# Patient Record
Sex: MALE | Race: WHITE | NOT HISPANIC OR LATINO | Employment: OTHER | URBAN - NONMETROPOLITAN AREA
[De-identification: names, ages, dates, MRNs, and addresses within clinical notes are randomized per-mention and may not be internally consistent; named-entity substitution may affect disease eponyms.]

---

## 2017-05-19 ENCOUNTER — OFFICE VISIT (OUTPATIENT)
Dept: FAMILY MEDICINE CLINIC | Facility: CLINIC | Age: 57
End: 2017-05-19

## 2017-05-19 VITALS
WEIGHT: 213 LBS | OXYGEN SATURATION: 98 % | HEART RATE: 83 BPM | BODY MASS INDEX: 30.49 KG/M2 | HEIGHT: 70 IN | SYSTOLIC BLOOD PRESSURE: 120 MMHG | DIASTOLIC BLOOD PRESSURE: 81 MMHG

## 2017-05-19 DIAGNOSIS — Z87.39 HISTORY OF DEGENERATIVE DISC DISEASE: Primary | ICD-10-CM

## 2017-05-19 DIAGNOSIS — G89.29 CHRONIC LOW BACK PAIN, UNSPECIFIED BACK PAIN LATERALITY, WITH SCIATICA PRESENCE UNSPECIFIED: ICD-10-CM

## 2017-05-19 DIAGNOSIS — I10 ESSENTIAL HYPERTENSION: ICD-10-CM

## 2017-05-19 DIAGNOSIS — G56.01 CARPAL TUNNEL SYNDROME OF RIGHT WRIST: ICD-10-CM

## 2017-05-19 DIAGNOSIS — M54.5 CHRONIC LOW BACK PAIN, UNSPECIFIED BACK PAIN LATERALITY, WITH SCIATICA PRESENCE UNSPECIFIED: ICD-10-CM

## 2017-05-19 PROCEDURE — 99214 OFFICE O/P EST MOD 30 MIN: CPT | Performed by: NURSE PRACTITIONER

## 2017-05-19 RX ORDER — NABUMETONE 500 MG/1
500 TABLET, FILM COATED ORAL 2 TIMES DAILY
Qty: 60 TABLET | Refills: 5 | Status: SHIPPED | OUTPATIENT
Start: 2017-05-19 | End: 2017-10-19 | Stop reason: SDUPTHER

## 2017-05-19 RX ORDER — METHOCARBAMOL 750 MG/1
TABLET, FILM COATED ORAL
Qty: 120 TABLET | Refills: 5 | Status: SHIPPED | OUTPATIENT
Start: 2017-05-19 | End: 2017-10-19

## 2017-05-19 RX ORDER — GABAPENTIN 800 MG/1
1200 TABLET ORAL 3 TIMES DAILY
Qty: 135 TABLET | Refills: 5 | Status: SHIPPED | OUTPATIENT
Start: 2017-05-19 | End: 2017-10-19 | Stop reason: SDUPTHER

## 2017-05-19 RX ORDER — ATENOLOL 50 MG/1
50 TABLET ORAL DAILY
Qty: 30 TABLET | Refills: 5 | Status: SHIPPED | OUTPATIENT
Start: 2017-05-19 | End: 2017-10-19 | Stop reason: SDUPTHER

## 2017-05-19 RX ORDER — LISINOPRIL 40 MG/1
40 TABLET ORAL DAILY
Qty: 30 TABLET | Refills: 5 | Status: SHIPPED | OUTPATIENT
Start: 2017-05-19 | End: 2017-10-19 | Stop reason: SDUPTHER

## 2017-07-19 DIAGNOSIS — I10 ESSENTIAL HYPERTENSION: ICD-10-CM

## 2017-10-19 ENCOUNTER — TELEPHONE (OUTPATIENT)
Dept: FAMILY MEDICINE CLINIC | Facility: CLINIC | Age: 57
End: 2017-10-19

## 2017-10-19 ENCOUNTER — OFFICE VISIT (OUTPATIENT)
Dept: FAMILY MEDICINE CLINIC | Facility: CLINIC | Age: 57
End: 2017-10-19

## 2017-10-19 VITALS
BODY MASS INDEX: 31.41 KG/M2 | DIASTOLIC BLOOD PRESSURE: 75 MMHG | HEART RATE: 88 BPM | WEIGHT: 219.4 LBS | OXYGEN SATURATION: 98 % | SYSTOLIC BLOOD PRESSURE: 121 MMHG | HEIGHT: 70 IN

## 2017-10-19 DIAGNOSIS — Z72.0 TOBACCO ABUSE: ICD-10-CM

## 2017-10-19 DIAGNOSIS — F10.10 ALCOHOL ABUSE: ICD-10-CM

## 2017-10-19 DIAGNOSIS — Z87.39 HISTORY OF DEGENERATIVE DISC DISEASE: ICD-10-CM

## 2017-10-19 DIAGNOSIS — H61.22 IMPACTED CERUMEN OF LEFT EAR: ICD-10-CM

## 2017-10-19 DIAGNOSIS — M54.5 CHRONIC LOW BACK PAIN, UNSPECIFIED BACK PAIN LATERALITY, WITH SCIATICA PRESENCE UNSPECIFIED: ICD-10-CM

## 2017-10-19 DIAGNOSIS — G89.29 CHRONIC LOW BACK PAIN, UNSPECIFIED BACK PAIN LATERALITY, WITH SCIATICA PRESENCE UNSPECIFIED: ICD-10-CM

## 2017-10-19 DIAGNOSIS — I10 ESSENTIAL HYPERTENSION: Primary | ICD-10-CM

## 2017-10-19 DIAGNOSIS — E55.9 VITAMIN D DEFICIENCY: ICD-10-CM

## 2017-10-19 LAB
25(OH)D3 SERPL-MCNC: 29 NG/ML
ALBUMIN SERPL-MCNC: 4.2 G/DL (ref 3.5–5)
ALBUMIN/GLOB SERPL: 1.1 G/DL (ref 1.5–2.5)
ALP SERPL-CCNC: 102 U/L (ref 40–129)
ALT SERPL W P-5'-P-CCNC: 24 U/L (ref 10–44)
ANION GAP SERPL CALCULATED.3IONS-SCNC: 1 MMOL/L (ref 3.6–11.2)
AST SERPL-CCNC: 31 U/L (ref 10–34)
BASOPHILS # BLD AUTO: 0.1 10*3/MM3 (ref 0–0.3)
BASOPHILS NFR BLD AUTO: 1.8 % (ref 0–2)
BILIRUB SERPL-MCNC: 0.4 MG/DL (ref 0.2–1.8)
BUN BLD-MCNC: 7 MG/DL (ref 7–21)
BUN/CREAT SERPL: 5.6 (ref 7–25)
CALCIUM SPEC-SCNC: 10 MG/DL (ref 7.7–10)
CHLORIDE SERPL-SCNC: 105 MMOL/L (ref 99–112)
CO2 SERPL-SCNC: 30 MMOL/L (ref 24.3–31.9)
CREAT BLD-MCNC: 1.25 MG/DL (ref 0.43–1.29)
DEPRECATED RDW RBC AUTO: 45.7 FL (ref 37–54)
EOSINOPHIL # BLD AUTO: 0.35 10*3/MM3 (ref 0–0.7)
EOSINOPHIL NFR BLD AUTO: 6.2 % (ref 0–5)
ERYTHROCYTE [DISTWIDTH] IN BLOOD BY AUTOMATED COUNT: 13.9 % (ref 11.5–14.5)
GFR SERPL CREATININE-BSD FRML MDRD: 60 ML/MIN/1.73
GLOBULIN UR ELPH-MCNC: 3.7 GM/DL
GLUCOSE BLD-MCNC: 122 MG/DL (ref 70–110)
HCT VFR BLD AUTO: 49.5 % (ref 42–52)
HGB BLD-MCNC: 16.8 G/DL (ref 14–18)
IMM GRANULOCYTES # BLD: 0.02 10*3/MM3 (ref 0–0.03)
IMM GRANULOCYTES NFR BLD: 0.4 % (ref 0–0.5)
LYMPHOCYTES # BLD AUTO: 1.56 10*3/MM3 (ref 1–3)
LYMPHOCYTES NFR BLD AUTO: 27.5 % (ref 21–51)
MAGNESIUM SERPL-MCNC: 1.9 MG/DL (ref 1.7–2.6)
MCH RBC QN AUTO: 31.4 PG (ref 27–33)
MCHC RBC AUTO-ENTMCNC: 33.9 G/DL (ref 33–37)
MCV RBC AUTO: 92.5 FL (ref 80–94)
MONOCYTES # BLD AUTO: 0.72 10*3/MM3 (ref 0.1–0.9)
MONOCYTES NFR BLD AUTO: 12.7 % (ref 0–10)
NEUTROPHILS # BLD AUTO: 2.92 10*3/MM3 (ref 1.4–6.5)
NEUTROPHILS NFR BLD AUTO: 51.4 % (ref 30–70)
OSMOLALITY SERPL CALC.SUM OF ELEC: 271.2 MOSM/KG (ref 273–305)
PLATELET # BLD AUTO: 189 10*3/MM3 (ref 130–400)
PMV BLD AUTO: 11 FL (ref 6–10)
POTASSIUM BLD-SCNC: 4.8 MMOL/L (ref 3.5–5.3)
PROT SERPL-MCNC: 7.9 G/DL (ref 6–8)
RBC # BLD AUTO: 5.35 10*6/MM3 (ref 4.7–6.1)
SODIUM BLD-SCNC: 136 MMOL/L (ref 135–153)
TSH SERPL DL<=0.05 MIU/L-ACNC: 0.88 MIU/ML (ref 0.55–4.78)
VIT B12 BLD-MCNC: 1563 PG/ML (ref 211–911)
WBC NRBC COR # BLD: 5.67 10*3/MM3 (ref 4.5–12.5)

## 2017-10-19 PROCEDURE — 99214 OFFICE O/P EST MOD 30 MIN: CPT | Performed by: NURSE PRACTITIONER

## 2017-10-19 PROCEDURE — 84443 ASSAY THYROID STIM HORMONE: CPT | Performed by: NURSE PRACTITIONER

## 2017-10-19 PROCEDURE — 82306 VITAMIN D 25 HYDROXY: CPT | Performed by: NURSE PRACTITIONER

## 2017-10-19 PROCEDURE — 82607 VITAMIN B-12: CPT | Performed by: NURSE PRACTITIONER

## 2017-10-19 PROCEDURE — 83735 ASSAY OF MAGNESIUM: CPT | Performed by: NURSE PRACTITIONER

## 2017-10-19 PROCEDURE — 85025 COMPLETE CBC W/AUTO DIFF WBC: CPT | Performed by: NURSE PRACTITIONER

## 2017-10-19 PROCEDURE — 80053 COMPREHEN METABOLIC PANEL: CPT | Performed by: NURSE PRACTITIONER

## 2017-10-19 PROCEDURE — 69209 REMOVE IMPACTED EAR WAX UNI: CPT | Performed by: NURSE PRACTITIONER

## 2017-10-19 RX ORDER — ATENOLOL 50 MG/1
50 TABLET ORAL DAILY
Qty: 30 TABLET | Refills: 5 | Status: SHIPPED | OUTPATIENT
Start: 2017-10-19 | End: 2018-05-11 | Stop reason: SDUPTHER

## 2017-10-19 RX ORDER — NABUMETONE 500 MG/1
500 TABLET, FILM COATED ORAL 2 TIMES DAILY
Qty: 60 TABLET | Refills: 5 | Status: SHIPPED | OUTPATIENT
Start: 2017-10-19 | End: 2018-05-11 | Stop reason: SDUPTHER

## 2017-10-19 RX ORDER — GABAPENTIN 800 MG/1
1200 TABLET ORAL 3 TIMES DAILY
Qty: 135 TABLET | Refills: 3 | Status: SHIPPED | OUTPATIENT
Start: 2017-10-19 | End: 2018-02-12 | Stop reason: SDUPTHER

## 2017-10-19 RX ORDER — LISINOPRIL 40 MG/1
40 TABLET ORAL DAILY
Qty: 30 TABLET | Refills: 5 | Status: SHIPPED | OUTPATIENT
Start: 2017-10-19 | End: 2018-05-11 | Stop reason: SDUPTHER

## 2017-10-19 NOTE — PROGRESS NOTES
Subjective   Gurwinder Harding is a 57 y.o. male.     Chief Complaint: Earache (pt stats pain about 1 week)    History of Present Illness     Patient has a history of chronic lower back pain with right lower extremity neuropathy. History of back surgery with fusion; neuropathy is at knee downwards, especially on right lower extremity. Patient is currently on gabapentin and with relafen 500 mg. Used to be on tramadol 50 mg orally BID, but we discontinued secondary to concerns about elevated liver enzymes. Failed etodalac 200 mg orally TID. Feels that the gabapentin and the relafen does offer some relief along with the robaxin. Denies any side effects of the medications. States that the medications somewhat control the pain enough so that he can accomplish daily activities. Movement and ambulation are okay. 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  Denies any sedation from the medications.      Patient has a history of hypertension and is on atenolol 50 mg orally daily and lisinopril 20 mg orally daily. Denies any side effects of the medications. Denies any dizziness, lightheadedness, blurry vision, chest pain, or edema. Patient does not take his blood pressures at home. Tries to follow a low-salt diet. His blood pressure is controlled today at 121/75.      Patient does continue to smoke cigarettes.  Patient also continues to drink alcohol on a daily basis.    Left ear pain for the past week.  Feels full.  Cant hear very well from left ear.  No issues with the right ear.  He has tried rinsing and flushing his left ear without any relief.      Denies fever, headache, n/v/d.        [facility] # [id number]  Reviewed and is consistent.  Patient comfort assessment guide reviewed and updated today.    As part of the patient's treatment plan they are being prescribed a controlled substance/ substances with potential for abuse.  This patient has been made aware of the appropriate use of such medications, including potential risk of  somnolence, limited ability to drive and/or work safely, and potential for overdose.  It has also been made clear these medications are for use by the patient only, without concomitant use of alcohol or other substances unless prescribed/advised by medical provider.    Patient has completed prescribing agreement detailing terms of continued prescribing of controlled substances including monitoring HERON reports, urine drug screens, and pill counts.  The patient is aware HERON reports are reviewed on a regular basis and scanned into the chart.    History and physical exam exhibit continued safe and appropriate use of controlled substances.    Family History   Problem Relation Age of Onset   • Hypertension Mother    • Cancer Father      LUNG   • Diabetes Father    • Hypertension Father    • Heart attack Other      GRANDFATHER       Social History     Social History   • Marital status:      Spouse name: N/A   • Number of children: N/A   • Years of education: N/A     Occupational History   • Not on file.     Social History Main Topics   • Smoking status: Current Every Day Smoker     Packs/day: 1.00     Types: Cigarettes   • Smokeless tobacco: Never Used   • Alcohol use Yes      Comment: OCCASIONAL   • Drug use: No   • Sexual activity: Defer     Other Topics Concern   • Not on file     Social History Narrative       Past Medical History:   Diagnosis Date   • Alcohol abuse    • ED (erectile dysfunction)    • History of degenerative disc disease    • Hyperglycemia    • Hypertension    • Low back pain    • Neuropathy    • Screening PSA (prostate specific antigen) 09/2015   • Tobacco abuse    • Vitamin D deficiency        Review of Systems   Constitutional: Negative.    HENT: Positive for ear pain and hearing loss.    Respiratory: Negative.    Cardiovascular: Negative.    Gastrointestinal: Negative.    Musculoskeletal: Negative.    Skin: Negative.    Neurological: Negative.    Psychiatric/Behavioral: Negative.   "      Objective   Physical Exam   Constitutional: He is oriented to person, place, and time. He appears well-developed and well-nourished.   HENT:   Right Ear: External ear normal.   Mouth/Throat: Oropharynx is clear and moist.   Left cerumen impaction   Neck: Normal range of motion. Neck supple.   Cardiovascular: Normal rate, regular rhythm and normal heart sounds.    Pulmonary/Chest: Effort normal and breath sounds normal.   Neurological: He is alert and oriented to person, place, and time.   Skin: Skin is warm and dry.   Psychiatric: He has a normal mood and affect. His behavior is normal. Judgment and thought content normal.   Nursing note and vitals reviewed.      Ear Cerumen Removal Instrumentation  Date/Time: 10/19/2017 11:41 AM  Performed by: PAIGE OLVERA  Authorized by: PAIGE OLVERA   Consent: Verbal consent obtained.  Risks and benefits: risks, benefits and alternatives were discussed  Consent given by: patient  Patient understanding: patient states understanding of the procedure being performed  Patient consent: the patient's understanding of the procedure matches consent given  Patient identity confirmed: verbally with patient    Anesthesia:  Local Anesthetic: none  Location details: left ear  Procedure type: irrigation    Sedation:  Patient sedated: no  Patient tolerance: Patient tolerated the procedure well with no immediate complications          Vitals: Blood pressure 121/75, pulse 88, height 70\" (177.8 cm), weight 219 lb 6.4 oz (99.5 kg), SpO2 98 %.    Allergies:   Allergies   Allergen Reactions   • Novocain [Procaine]    • Penicillins           Assessment/Plan   Gurwinder was seen today for earache.    Diagnoses and all orders for this visit:    Essential hypertension  -     atenolol (TENORMIN) 50 MG tablet; Take 1 tablet by mouth Daily.  -     folic acid-pyridoxine-cyanocobalamin (FOLBIC) 2.5-25-2 MG tablet tablet; Take 1 tablet by mouth Daily.  -     lisinopril (PRINIVIL,ZESTRIL) 40 " MG tablet; Take 1 tablet by mouth Daily.  -     CBC & Differential  -     Comprehensive Metabolic Panel  -     Magnesium  -     TSH  -     Vitamin D 25 Hydroxy  -     Vitamin B12  -     CBC Auto Differential  -     Osmolality, Calculated; Future  -     Osmolality, Calculated    Chronic low back pain, unspecified back pain laterality, with sciatica presence unspecified  -     gabapentin (NEURONTIN) 800 MG tablet; Take 1.5 tablets by mouth 3 (Three) Times a Day.  -     nabumetone (RELAFEN) 500 MG tablet; Take 1 tablet by mouth 2 (Two) Times a Day.  -     CBC & Differential  -     Comprehensive Metabolic Panel  -     Magnesium  -     TSH  -     Vitamin D 25 Hydroxy  -     Vitamin B12  -     CBC Auto Differential  -     Osmolality, Calculated; Future  -     Osmolality, Calculated    History of degenerative disc disease  -     CBC & Differential  -     Comprehensive Metabolic Panel  -     Magnesium  -     TSH  -     Vitamin D 25 Hydroxy  -     Vitamin B12  -     CBC Auto Differential  -     Osmolality, Calculated; Future  -     Osmolality, Calculated    Vitamin D deficiency  -     CBC & Differential  -     Comprehensive Metabolic Panel  -     Magnesium  -     TSH  -     Vitamin D 25 Hydroxy  -     Vitamin B12  -     CBC Auto Differential  -     Osmolality, Calculated; Future  -     Osmolality, Calculated    Tobacco abuse    Alcohol abuse    Impacted cerumen of left ear  -     Ear Cerumen Removal

## 2017-10-19 NOTE — PATIENT INSTRUCTIONS
Neuropathic Pain  Neuropathic pain is pain caused by damage to the nerves that are responsible for certain sensations in your body (sensory nerves). The pain can be caused by damage to:   · The sensory nerves that send signals to your spinal cord and brain (peripheral nervous system).  · The sensory nerves in your brain or spinal cord (central nervous system).  Neuropathic pain can make you more sensitive to pain. What would be a minor sensation for most people may feel very painful if you have neuropathic pain. This is usually a long-term condition that can be difficult to treat. The type of pain can differ from person to person. It may start suddenly (acute), or it may develop slowly and last for a long time (chronic). Neuropathic pain may come and go as damaged nerves heal or may stay at the same level for years. It often causes emotional distress, loss of sleep, and a lower quality of life.  CAUSES   The most common cause of damage to a sensory nerve is diabetes. Many other diseases and conditions can also cause neuropathic pain. Causes of neuropathic pain can be classified as:  · Toxic. Many drugs and chemicals can cause toxic damage. The most common cause of toxic neuropathic pain is damage from drug treatment for cancer (chemotherapy).  · Metabolic. This type of pain can happen when a disease causes imbalances that damage nerves. Diabetes is the most common of these diseases. Vitamin B deficiency caused by long-term alcohol abuse is another common cause.  · Traumatic. Any injury that cuts, crushes, or stretches a nerve can cause damage and pain. A common example is feeling pain after losing an arm or leg (phantom limb pain).  · Compression-related. If a sensory nerve gets trapped or compressed for a long period of time, the blood supply to the nerve can be cut off.  · Vascular. Many blood vessel diseases can cause neuropathic pain by decreasing blood supply and oxygen to nerves.  · Autoimmune. This type of  pain results from diseases in which the body's defense system mistakenly attacks sensory nerves. Examples of autoimmune diseases that can cause neuropathic pain include lupus and multiple sclerosis.  · Infectious. Many types of viral infections can damage sensory nerves and cause pain. Shingles infection is a common cause of this type of pain.  · Inherited. Neuropathic pain can be a symptom of many diseases that are passed down through families (genetic).  SIGNS AND SYMPTOMS   The main symptom is pain. Neuropathic pain is often described as:  · Burning.  · Shock-like.  · Stinging.  · Hot or cold.  · Itching.  DIAGNOSIS   No single test can diagnose neuropathic pain. Your health care provider will do a physical exam and ask you about your pain. You may use a pain scale to describe how bad your pain is. You may also have tests to see if you have a high sensitivity to pain and to help find the cause and location of any sensory nerve damage. These tests may include:  · Imaging studies, such as:    X-rays.    CT scan.    MRI.  · Nerve conduction studies to test how well nerve signals travel through your sensory nerves (electrodiagnostic testing).  · Stimulating your sensory nerves through electrodes on your skin and measuring the response in your spinal cord and brain (somatosensory evoked potentials).  TREATMENT   Treatment for neuropathic pain may change over time. You may need to try different treatment options or a combination of treatments. Some options include:  · Over-the-counter pain relievers.  · Prescription medicines. Some medicines used to treat other conditions may also help neuropathic pain. These include medicines to:  ¨ Control seizures (anticonvulsants).  ¨ Relieve depression (antidepressants).  · Prescription-strength pain relievers (narcotics). These are usually used when other pain relievers do not help.  · Transcutaneous nerve stimulation (TENS). This uses electrical currents to block painful nerve  signals. The treatment is painless.  · Topical and local anesthetics. These are medicines that numb the nerves. They can be injected as a nerve block or applied to the skin.  · Alternative treatments, such as:  ¨ Acupuncture.  ¨ Meditation.  ¨ Massage.  ¨ Physical therapy.  ¨ Pain management programs.  ¨ Counseling.  HOME CARE INSTRUCTIONS  · Learn as much as you can about your condition.  · Take medicines only as directed by your health care provider.  · Work closely with all your health care providers to find what works best for you.  · Have a good support system at home.  · Consider joining a chronic pain support group.  SEEK MEDICAL CARE IF:  · Your pain treatments are not helping.  · You are having side effects from your medicines.  · You are struggling with fatigue, mood changes, depression, or anxiety.     This information is not intended to replace advice given to you by your health care provider. Make sure you discuss any questions you have with your health care provider.     Document Released: 09/14/2005 Document Revised: 01/08/2016 Document Reviewed: 05/28/2015  [email or web address] Patient Education ©2017 [email or web address]

## 2017-11-30 ENCOUNTER — TELEPHONE (OUTPATIENT)
Dept: FAMILY MEDICINE CLINIC | Facility: CLINIC | Age: 57
End: 2017-11-30

## 2017-11-30 RX ORDER — METHOCARBAMOL 750 MG/1
750 TABLET, FILM COATED ORAL 4 TIMES DAILY PRN
Qty: 120 TABLET | Refills: 5 | Status: SHIPPED | OUTPATIENT
Start: 2017-11-30 | End: 2018-05-11 | Stop reason: SDUPTHER

## 2017-11-30 NOTE — TELEPHONE ENCOUNTER
Script sent to his pharmacy.  He had been taking it.  It was on the discontinued list.      Patient notified.

## 2017-11-30 NOTE — TELEPHONE ENCOUNTER
Patient called requesting refills on his Robaxin 750mg reports he takkes 3-4 a day (not on med list) he reports he has been taking this for about a year?

## 2018-02-12 ENCOUNTER — OFFICE VISIT (OUTPATIENT)
Dept: FAMILY MEDICINE CLINIC | Facility: CLINIC | Age: 58
End: 2018-02-12

## 2018-02-12 VITALS
WEIGHT: 216.6 LBS | HEIGHT: 70 IN | OXYGEN SATURATION: 99 % | DIASTOLIC BLOOD PRESSURE: 85 MMHG | SYSTOLIC BLOOD PRESSURE: 137 MMHG | BODY MASS INDEX: 31.01 KG/M2 | HEART RATE: 89 BPM

## 2018-02-12 DIAGNOSIS — G62.9 NEUROPATHY: Primary | ICD-10-CM

## 2018-02-12 DIAGNOSIS — I10 ESSENTIAL HYPERTENSION: ICD-10-CM

## 2018-02-12 DIAGNOSIS — G89.29 CHRONIC LOW BACK PAIN, UNSPECIFIED BACK PAIN LATERALITY, WITH SCIATICA PRESENCE UNSPECIFIED: ICD-10-CM

## 2018-02-12 DIAGNOSIS — R05.9 COUGH: ICD-10-CM

## 2018-02-12 DIAGNOSIS — M54.5 CHRONIC LOW BACK PAIN, UNSPECIFIED BACK PAIN LATERALITY, WITH SCIATICA PRESENCE UNSPECIFIED: ICD-10-CM

## 2018-02-12 PROCEDURE — 99214 OFFICE O/P EST MOD 30 MIN: CPT | Performed by: NURSE PRACTITIONER

## 2018-02-12 RX ORDER — GABAPENTIN 800 MG/1
1200 TABLET ORAL 3 TIMES DAILY
Qty: 135 TABLET | Refills: 3 | Status: SHIPPED | OUTPATIENT
Start: 2018-02-12 | End: 2018-05-11 | Stop reason: SDUPTHER

## 2018-02-12 RX ORDER — AZITHROMYCIN 250 MG/1
TABLET, FILM COATED ORAL
Qty: 6 TABLET | Refills: 0 | Status: SHIPPED | OUTPATIENT
Start: 2018-02-12 | End: 2018-05-11

## 2018-02-12 RX ORDER — ALBUTEROL SULFATE 90 UG/1
2 AEROSOL, METERED RESPIRATORY (INHALATION) EVERY 4 HOURS PRN
Qty: 1 INHALER | Refills: 1 | Status: SHIPPED | OUTPATIENT
Start: 2018-02-12 | End: 2019-02-08 | Stop reason: SDUPTHER

## 2018-02-12 NOTE — PROGRESS NOTES
Subjective   Gurwinder Harding is a 57 y.o. male.     Chief Complaint: Cough and Leg Pain (wants work restriction)    History of Present Illness     Patient has a history of chronic lower back pain with right lower extremity neuropathy. History of back surgery with fusion; neuropathy is at knee downwards, especially on right lower extremity. Patient is currently on gabapentin and with relafen 500 mg. Used to be on tramadol 50 mg orally BID, but we discontinued secondary to concerns about elevated liver enzymes. Failed etodalac 200 mg orally TID. Feels that the gabapentin and the relafen does offer some relief along with the robaxin. Denies any side effects of the medications. States that the medications somewhat control the pain enough so that he can accomplish daily activities. Movement and ambulation are okay. 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  Denies any sedation from the medications.  Continues to work a full time position and states that with his pain he is unable to stand on his feet greater than a regular 8 hour shift.  Patient ask that he get a note requesting to reduce his hours        Patient has a history of hypertension and is on atenolol 50 mg orally daily and lisinopril 20 mg orally daily. Denies any side effects of the medications. Denies any dizziness, lightheadedness, blurry vision, chest pain, or edema. Patient does not take his blood pressures at home. Tries to follow a low-salt diet. His blood pressure is controlled today at 121/75.      Patient complains of a cough for the past 4 days.  Cough deep with production.  Denies any fever chills or sweats.  Patient has a history of smoking.  Cough improved with inhaler of albuterol form his wife.         Family History   Problem Relation Age of Onset   • Hypertension Mother    • Cancer Father      LUNG   • Diabetes Father    • Hypertension Father    • Heart attack Other      GRANDFATHER       Social History     Social History   • Marital status:       Spouse name: N/A   • Number of children: N/A   • Years of education: N/A     Occupational History   • Not on file.     Social History Main Topics   • Smoking status: Current Every Day Smoker     Packs/day: 1.00     Types: Cigarettes   • Smokeless tobacco: Never Used   • Alcohol use Yes      Comment: OCCASIONAL   • Drug use: No   • Sexual activity: Defer     Other Topics Concern   • Not on file     Social History Narrative       Past Medical History:   Diagnosis Date   • Alcohol abuse    • ED (erectile dysfunction)    • History of degenerative disc disease    • Hyperglycemia    • Hypertension    • Low back pain    • Neuropathy    • Screening PSA (prostate specific antigen) 09/2015   • Tobacco abuse    • Vitamin D deficiency        Review of Systems   Constitutional: Negative.  Negative for fever.   HENT: Positive for congestion, postnasal drip and rhinorrhea. Negative for sinus pressure.    Respiratory: Positive for cough. Negative for chest tightness, shortness of breath and wheezing.    Cardiovascular: Negative.    Gastrointestinal: Negative.    Musculoskeletal: Positive for arthralgias and back pain.   Skin: Negative.    Neurological: Negative.         Lower extremity aches and burns especially with standing for long periods of time     Psychiatric/Behavioral: Negative.    All other systems reviewed and are negative.      Objective   Physical Exam   Constitutional: He is oriented to person, place, and time. He appears well-developed and well-nourished. No distress.   HENT:   Head: Normocephalic.   Right Ear: External ear normal.   Left Ear: External ear normal.   Nose: Nose normal.   Mouth/Throat: Oropharynx is clear and moist. No oropharyngeal exudate.   Left cerumen impaction   Eyes: Conjunctivae are normal. Right eye exhibits no discharge. Left eye exhibits no discharge.   Neck: Normal range of motion. Neck supple.   Cardiovascular: Normal rate, regular rhythm, normal heart sounds and intact distal  "pulses.    Pulmonary/Chest: Effort normal and breath sounds normal.   Musculoskeletal: He exhibits no edema.        Lumbar back: He exhibits decreased range of motion, tenderness and bony tenderness.   Vascular changes bilateral lower extremity     Neurological: He is alert and oriented to person, place, and time.   Skin: Skin is warm and dry. He is not diaphoretic.   Psychiatric: He has a normal mood and affect. His behavior is normal. Judgment and thought content normal.   Nursing note and vitals reviewed.      Procedures    Vitals: Blood pressure 137/85, pulse 89, height 177.8 cm (70\"), weight 98.2 kg (216 lb 9.6 oz), SpO2 99 %.    Allergies:   Allergies   Allergen Reactions   • Novocain [Procaine]    • Penicillins           Assessment/Plan   Gurwinder was seen today for cough and leg pain.    Diagnoses and all orders for this visit:    Neuropathy    Chronic low back pain, unspecified back pain laterality, with sciatica presence unspecified  -     gabapentin (NEURONTIN) 800 MG tablet; Take 1.5 tablets by mouth 3 (Three) Times a Day.    Cough    Essential hypertension    Other orders  -     azithromycin (ZITHROMAX) 250 MG tablet; Take 2 tablets the first day, then 1 tablet daily for 4 days.  -     albuterol (PROVENTIL HFA;VENTOLIN HFA) 108 (90 Base) MCG/ACT inhaler; Inhale 2 puffs Every 4 (Four) Hours As Needed for Wheezing.      [facility] # [id number] Reviewed and is consistent.  UDS collected consistent in the past.   Patient comfort assessment guide reviewed and updated today.    As part of the patient's treatment plan they are being prescribed a controlled substance/ substances with potential for abuse.  This patient has been made aware of the appropriate use of such medications, including potential risk of somnolence, limited ability to drive and/or work safely, and potential for overdose.  It has also been made clear these medications are for use by the patient only, without concomitant use of alcohol or other " substances unless prescribed/advised by medical provider.    Patient has completed prescribing agreement detailing terms of continued prescribing of controlled substances including monitoring HERON reports, urine drug screens, and pill counts.  The patient is aware HERON reports are reviewed on a regular basis and scanned into the chart.    History and physical exam exhibit continued safe and appropriate use of controlled substances.

## 2018-05-11 ENCOUNTER — OFFICE VISIT (OUTPATIENT)
Dept: FAMILY MEDICINE CLINIC | Facility: CLINIC | Age: 58
End: 2018-05-11

## 2018-05-11 VITALS
HEIGHT: 70 IN | DIASTOLIC BLOOD PRESSURE: 77 MMHG | WEIGHT: 216 LBS | HEART RATE: 89 BPM | SYSTOLIC BLOOD PRESSURE: 145 MMHG | OXYGEN SATURATION: 99 % | BODY MASS INDEX: 30.92 KG/M2

## 2018-05-11 DIAGNOSIS — I10 ESSENTIAL HYPERTENSION: Primary | ICD-10-CM

## 2018-05-11 DIAGNOSIS — R73.09 ELEVATED GLUCOSE: Primary | ICD-10-CM

## 2018-05-11 DIAGNOSIS — G62.9 NEUROPATHY: ICD-10-CM

## 2018-05-11 DIAGNOSIS — F10.10 ALCOHOL ABUSE: ICD-10-CM

## 2018-05-11 DIAGNOSIS — E55.9 VITAMIN D DEFICIENCY: ICD-10-CM

## 2018-05-11 DIAGNOSIS — Z72.0 TOBACCO ABUSE: ICD-10-CM

## 2018-05-11 DIAGNOSIS — M54.5 CHRONIC LOW BACK PAIN, UNSPECIFIED BACK PAIN LATERALITY, WITH SCIATICA PRESENCE UNSPECIFIED: ICD-10-CM

## 2018-05-11 DIAGNOSIS — G89.29 CHRONIC LOW BACK PAIN, UNSPECIFIED BACK PAIN LATERALITY, WITH SCIATICA PRESENCE UNSPECIFIED: ICD-10-CM

## 2018-05-11 LAB
25(OH)D3 SERPL-MCNC: 26 NG/ML
ALBUMIN SERPL-MCNC: 4 G/DL (ref 3.5–5)
ALBUMIN/GLOB SERPL: 1.2 G/DL (ref 1.5–2.5)
ALP SERPL-CCNC: 96 U/L (ref 40–129)
ALT SERPL W P-5'-P-CCNC: 27 U/L (ref 10–44)
ANION GAP SERPL CALCULATED.3IONS-SCNC: 0.7 MMOL/L (ref 3.6–11.2)
AST SERPL-CCNC: 28 U/L (ref 10–34)
BASOPHILS # BLD AUTO: 0.1 10*3/MM3 (ref 0–0.3)
BASOPHILS NFR BLD AUTO: 1.7 % (ref 0–2)
BILIRUB SERPL-MCNC: 0.5 MG/DL (ref 0.2–1.8)
BUN BLD-MCNC: 9 MG/DL (ref 7–21)
BUN/CREAT SERPL: 6.9 (ref 7–25)
CALCIUM SPEC-SCNC: 9.2 MG/DL (ref 7.7–10)
CHLORIDE SERPL-SCNC: 102 MMOL/L (ref 99–112)
CHOLEST SERPL-MCNC: 149 MG/DL (ref 0–200)
CO2 SERPL-SCNC: 28.3 MMOL/L (ref 24.3–31.9)
CREAT BLD-MCNC: 1.31 MG/DL (ref 0.43–1.29)
DEPRECATED RDW RBC AUTO: 44.5 FL (ref 37–54)
EOSINOPHIL # BLD AUTO: 0.15 10*3/MM3 (ref 0–0.7)
EOSINOPHIL NFR BLD AUTO: 2.5 % (ref 0–5)
ERYTHROCYTE [DISTWIDTH] IN BLOOD BY AUTOMATED COUNT: 13.6 % (ref 11.5–14.5)
GFR SERPL CREATININE-BSD FRML MDRD: 57 ML/MIN/1.73
GLOBULIN UR ELPH-MCNC: 3.3 GM/DL
GLUCOSE BLD-MCNC: 154 MG/DL (ref 70–110)
HBA1C MFR BLD: 5.8 % (ref 4.5–5.7)
HCT VFR BLD AUTO: 50.1 % (ref 42–52)
HDLC SERPL-MCNC: 50 MG/DL (ref 60–100)
HGB BLD-MCNC: 17.4 G/DL (ref 14–18)
IMM GRANULOCYTES # BLD: 0.02 10*3/MM3 (ref 0–0.03)
IMM GRANULOCYTES NFR BLD: 0.3 % (ref 0–0.5)
LDLC SERPL CALC-MCNC: 82 MG/DL (ref 0–100)
LDLC/HDLC SERPL: 1.64 {RATIO}
LYMPHOCYTES # BLD AUTO: 1.14 10*3/MM3 (ref 1–3)
LYMPHOCYTES NFR BLD AUTO: 18.9 % (ref 21–51)
MAGNESIUM SERPL-MCNC: 2.2 MG/DL (ref 1.7–2.6)
MCH RBC QN AUTO: 32.3 PG (ref 27–33)
MCHC RBC AUTO-ENTMCNC: 34.7 G/DL (ref 33–37)
MCV RBC AUTO: 92.9 FL (ref 80–94)
MONOCYTES # BLD AUTO: 0.59 10*3/MM3 (ref 0.1–0.9)
MONOCYTES NFR BLD AUTO: 9.8 % (ref 0–10)
NEUTROPHILS # BLD AUTO: 4.04 10*3/MM3 (ref 1.4–6.5)
NEUTROPHILS NFR BLD AUTO: 66.8 % (ref 30–70)
OSMOLALITY SERPL CALC.SUM OF ELEC: 264.4 MOSM/KG (ref 273–305)
PLATELET # BLD AUTO: 181 10*3/MM3 (ref 130–400)
PMV BLD AUTO: 10.9 FL (ref 6–10)
POTASSIUM BLD-SCNC: 5.1 MMOL/L (ref 3.5–5.3)
PROT SERPL-MCNC: 7.3 G/DL (ref 6–8)
RBC # BLD AUTO: 5.39 10*6/MM3 (ref 4.7–6.1)
SODIUM BLD-SCNC: 131 MMOL/L (ref 135–153)
TRIGL SERPL-MCNC: 85 MG/DL (ref 0–150)
TSH SERPL DL<=0.05 MIU/L-ACNC: 0.93 MIU/ML (ref 0.55–4.78)
VIT B12 BLD-MCNC: 1741 PG/ML (ref 211–911)
VLDLC SERPL-MCNC: 17 MG/DL
WBC NRBC COR # BLD: 6.04 10*3/MM3 (ref 4.5–12.5)

## 2018-05-11 PROCEDURE — 82607 VITAMIN B-12: CPT | Performed by: NURSE PRACTITIONER

## 2018-05-11 PROCEDURE — 82306 VITAMIN D 25 HYDROXY: CPT | Performed by: NURSE PRACTITIONER

## 2018-05-11 PROCEDURE — 80053 COMPREHEN METABOLIC PANEL: CPT | Performed by: NURSE PRACTITIONER

## 2018-05-11 PROCEDURE — 80061 LIPID PANEL: CPT | Performed by: NURSE PRACTITIONER

## 2018-05-11 PROCEDURE — 83036 HEMOGLOBIN GLYCOSYLATED A1C: CPT | Performed by: NURSE PRACTITIONER

## 2018-05-11 PROCEDURE — 83735 ASSAY OF MAGNESIUM: CPT | Performed by: NURSE PRACTITIONER

## 2018-05-11 PROCEDURE — 99214 OFFICE O/P EST MOD 30 MIN: CPT | Performed by: NURSE PRACTITIONER

## 2018-05-11 PROCEDURE — 36415 COLL VENOUS BLD VENIPUNCTURE: CPT | Performed by: NURSE PRACTITIONER

## 2018-05-11 PROCEDURE — 84443 ASSAY THYROID STIM HORMONE: CPT | Performed by: NURSE PRACTITIONER

## 2018-05-11 PROCEDURE — 85025 COMPLETE CBC W/AUTO DIFF WBC: CPT | Performed by: NURSE PRACTITIONER

## 2018-05-11 RX ORDER — NABUMETONE 500 MG/1
500 TABLET, FILM COATED ORAL 2 TIMES DAILY PRN
Qty: 60 TABLET | Refills: 5 | Status: SHIPPED | OUTPATIENT
Start: 2018-05-11 | End: 2018-08-10 | Stop reason: SDUPTHER

## 2018-05-11 RX ORDER — METHOCARBAMOL 750 MG/1
750 TABLET, FILM COATED ORAL 4 TIMES DAILY PRN
Qty: 120 TABLET | Refills: 5 | Status: SHIPPED | OUTPATIENT
Start: 2018-05-11 | End: 2018-08-10 | Stop reason: SDUPTHER

## 2018-05-11 RX ORDER — LISINOPRIL 40 MG/1
40 TABLET ORAL DAILY
Qty: 30 TABLET | Refills: 5 | Status: SHIPPED | OUTPATIENT
Start: 2018-05-11 | End: 2018-08-10 | Stop reason: SDUPTHER

## 2018-05-11 RX ORDER — ERGOCALCIFEROL 1.25 MG/1
50000 CAPSULE ORAL WEEKLY
Qty: 4 CAPSULE | Refills: 2 | Status: SHIPPED | OUTPATIENT
Start: 2018-05-11 | End: 2019-02-08

## 2018-05-11 RX ORDER — ATENOLOL 50 MG/1
50 TABLET ORAL DAILY
Qty: 30 TABLET | Refills: 5 | Status: SHIPPED | OUTPATIENT
Start: 2018-05-11 | End: 2018-08-10 | Stop reason: SDUPTHER

## 2018-05-11 RX ORDER — GABAPENTIN 800 MG/1
1200 TABLET ORAL 3 TIMES DAILY
Qty: 135 TABLET | Refills: 3 | Status: SHIPPED | OUTPATIENT
Start: 2018-05-11 | End: 2018-08-10 | Stop reason: SDUPTHER

## 2018-05-11 NOTE — PATIENT INSTRUCTIONS
Alcohol Abuse and Nutrition  Alcohol abuse is any pattern of alcohol consumption that harms your health, relationships, or work. Alcohol abuse can affect how your body breaks down and absorbs nutrients from food by causing your liver to work abnormally. Additionally, many people who abuse alcohol do not eat enough carbohydrates, protein, fat, vitamins, and minerals. This can cause poor nutrition (malnutrition) and a lack of nutrients (nutrient deficiencies), which can lead to further complications.  Nutrients that are commonly lacking (deficient) among people who abuse alcohol include:  · Vitamins.  ¨ Vitamin A. This is stored in your liver. It is important for your vision, metabolism, and ability to fight off infections (immunity).  ¨ B vitamins. These include vitamins such as folate, thiamin, and niacin. These are important in new cell growth and maintenance.  ¨ Vitamin C. This plays an important role in iron absorption, wound healing, and immunity.  ¨ Vitamin D. This is produced by your liver, but you can also get vitamin D from food. Vitamin D is necessary for your body to absorb and use calcium.  · Minerals.  ¨ Calcium. This is important for your bones and your heart and blood vessel (cardiovascular) function.  ¨ Iron. This is important for blood, muscle, and nervous system functioning.  ¨ Magnesium. This plays an important role in muscle and nerve function, and it helps to control blood sugar and blood pressure.  ¨ Zinc. This is important for the normal function of your nervous system and digestive system (gastrointestinal tract).  Nutrition is an essential component of therapy for alcohol abuse. Your health care provider or dietitian will work with you to design a plan that can help restore nutrients to your body and prevent potential complications.  What is my plan?  Your dietitian may develop a specific diet plan that is based on your condition and any other complications you may have. A diet plan will  commonly include:  · A balanced diet.  ¨ Grains: 6-8 oz per day.  ¨ Vegetables: 2-3 cups per day.  ¨ Fruits: 1-2 cups per day.  ¨ Meat and other protein: 5-6 oz per day.  ¨ Dairy: 2-3 cups per day.  · Vitamin and mineral supplements.  What do I need to know about alcohol and nutrition?  · Consume foods that are high in antioxidants, such as grapes, berries, nuts, green tea, and dark green and orange vegetables. This can help to counteract some of the stress that is placed on your liver by consuming alcohol.  · Avoid food and drinks that are high in fat and sugar. Foods such as sugared soft drinks, salty snack foods, and candy contain empty calories. This means that they lack important nutrients such as protein, fiber, and vitamins.  · Eat frequent meals and snacks. Try to eat 5-6 small meals each day.  · Eat a variety of fresh fruits and vegetables each day. This will help you get plenty of water, fiber, and vitamins in your diet.  · Drink plenty of water and other clear fluids. Try to drink at least 48-64 oz (1.5-2 L) of water per day.  · If you are a vegetarian, eat a variety of protein-rich foods. Pair whole grains with plant-based proteins at meals and snacks to obtain the greatest nutrient benefit from your food. For example, eat rice with beans, put peanut butter on whole-grain toast, or eat oatmeal with sunflower seeds.  · Soak beans and whole grains overnight before cooking. This can help your body to absorb the nutrients more easily.  · Include foods fortified with vitamins and minerals in your diet. Commonly fortified foods include milk, orange juice, cereal, and bread.  · If you are malnourished, your dietitian may recommend a high-protein, high-calorie diet. This may include:  ¨ 2,000-3,000 calories (kilocalories) per day.  ¨  grams of protein per day.  · Your health care provider may recommend a complete nutritional supplement beverage. This can help to restore calories, protein, and vitamins to  your body. Depending on your condition, you may be advised to consume this instead of or in addition to meals.  · Limit your intake of caffeine. Replace drinks like coffee and black tea with decaffeinated coffee and herbal tea.  · Eat a variety of foods that are high in omega fatty acids. These include fish, nuts and seeds, and soybeans. These foods may help your liver to recover and may also stabilize your mood.  · Certain medicines may cause changes in your appetite, taste, and weight. Work with your health care provider and dietitian to make any adjustments to your medicines and diet plan.  · Include other healthy lifestyle choices in your daily routine.  ¨ Be physically active.  ¨ Get enough sleep.  ¨ Spend time doing activities that you enjoy.  · If you are unable to take in enough food and calories by mouth, your health care provider may recommend a feeding tube. This is a tube that passes through your nose and throat, directly into your stomach. Nutritional supplement beverages can be given to you through the feeding tube to help you get the nutrients you need.  · Take vitamin or mineral supplements as recommended by your health care provider.  What foods can I eat?  Grains   Enriched pasta. Enriched rice. Fortified whole-grain bread. Fortified whole-grain cereal. Barley. Brown rice. Quinoa. Millet.  Vegetables   All fresh, frozen, and canned vegetables. Spinach. Kale. Artichoke. Carrots. Winter squash and pumpkin. Sweet potatoes. Broccoli. Cabbage. Cucumbers. Tomatoes. Sweet peppers. Green beans. Peas. Corn.  Fruits   All fresh and frozen fruits. Berries. Grapes. Hector. Papaya. Guava. Cherries. Apples. Bananas. Peaches. Plums. Pineapple. Watermelon. Cantaloupe. Oranges. Avocado.  Meats and Other Protein Sources   Beef liver. Lean beef. Pork. Fresh and canned chicken. Fresh fish. Oysters. Sardines. Canned tuna. Shrimp. Eggs with yolks. Nuts and seeds. Peanut butter. Beans and lentils. Soybeans. Tofu.  Dairy    Whole, low-fat, and nonfat milk. Whole, low-fat, and nonfat yogurt. Cottage cheese. Sour cream. Hard and soft cheeses.  Beverages   Water. Herbal tea. Decaffeinated coffee. Decaffeinated green tea. 100% fruit juice. 100% vegetable juice. Instant breakfast shakes.  Condiments   Ketchup. Mayonnaise. Mustard. Salad dressing. Barbecue sauce.  Sweets and Desserts   Sugar-free ice cream. Sugar-free pudding. Sugar-free gelatin.  Fats and Oils   Butter. Vegetable oil, flaxseed oil, olive oil, and walnut oil.  Other   Complete nutrition shakes. Protein bars. Sugar-free gum.  The items listed above may not be a complete list of recommended foods or beverages. Contact your dietitian for more options.   What foods are not recommended?  Grains   Sugar-sweetened breakfast cereals. Flavored instant oatmeal. Fried breads.  Vegetables   Breaded or deep-fried vegetables.  Fruits   Dried fruit with added sugar. Candied fruit. Canned fruit in syrup.  Meats and Other Protein Sources   Breaded or deep-fried meats.  Dairy   Flavored milks. Fried cheese curds or fried cheese sticks.  Beverages   Alcohol. Sugar-sweetened soft drinks. Sugar-sweetened tea. Caffeinated coffee and tea.  Condiments   Sugar. Honey. Agave nectar. Molasses.  Sweets and Desserts   Chocolate. Cake. Cookies. Candy.  Other   Potato chips. Pretzels. Salted nuts. Candied nuts.  The items listed above may not be a complete list of foods and beverages to avoid. Contact your dietitian for more information.   This information is not intended to replace advice given to you by your health care provider. Make sure you discuss any questions you have with your health care provider.  Document Released: 10/12/2006 Document Revised: 04/26/2017 Document Reviewed: 07/21/2015  Elsevier Interactive Patient Education © 2017 [employer]  Health Risks of Smoking  Smoking cigarettes is very bad for your health. Tobacco smoke has over 200 known poisons in it. It contains the poisonous  gases nitrogen oxide and carbon monoxide. There are over 60 chemicals in tobacco smoke that cause cancer.  Smoking is difficult to quit because a chemical in tobacco, called nicotine, causes addiction or dependence. When you smoke and inhale, nicotine is absorbed rapidly into the bloodstream through your lungs. Both inhaled and non-inhaled nicotine may be addictive.  What are the risks of cigarette smoke?  Cigarette smokers have an increased risk of many serious medical problems, including:  · Lung cancer.  · Lung disease, such as pneumonia, bronchitis, and emphysema.  · Chest pain (angina) and heart attack because the heart is not getting enough oxygen.  · Heart disease and peripheral blood vessel disease.  · High blood pressure (hypertension).  · Stroke.  · Oral cancer, including cancer of the lip, mouth, or voice box.  · Bladder cancer.  · Pancreatic cancer.  · Cervical cancer.  · Pregnancy complications, including premature birth.  · Stillbirths and smaller  babies, birth defects, and genetic damage to sperm.  · Early menopause.  · Lower estrogen level for women.  · Infertility.  · Facial wrinkles.  · Blindness.  · Increased risk of broken bones (fractures).  · Senile dementia.  · Stomach ulcers and internal bleeding.  · Delayed wound healing and increased risk of complications during surgery.  · Even smoking lightly shortens your life expectancy by several years.  Because of secondhand smoke exposure, children of smokers have an increased risk of the following:  · Sudden infant death syndrome (SIDS).  · Respiratory infections.  · Lung cancer.  · Heart disease.  · Ear infections.  What are the benefits of quitting?  There are many health benefits of quitting smoking. Here are some of them:  · Within days of quitting smoking, your risk of having a heart attack decreases, your blood flow improves, and your lung capacity improves. Blood pressure, pulse rate, and breathing patterns start returning to normal  soon after quitting.  · Within months, your lungs may clear up completely.  · Quitting for 10 years reduces your risk of developing lung cancer and heart disease to almost that of a nonsmoker.  · People who quit may see an improvement in their overall quality of life.  How do I quit smoking?  Smoking is an addiction with both physical and psychological effects, and longtime habits can be hard to change. Your health care provider can recommend:  · Programs and community resources, which may include group support, education, or talk therapy.  · Prescription medicines to help reduce cravings.  · Nicotine replacement products, such as patches, gum, and nasal sprays. Use these products only as directed. Do not replace cigarette smoking with electronic cigarettes, which are commonly called e-cigarettes. The safety of e-cigarettes is not known, and some may contain harmful chemicals.  · A combination of two or more of these methods.  Where to find more information:  · American Lung Association: www.lung.org  · American Cancer Society: www.cancer.org  Summary  · Smoking cigarettes is very bad for your health. Cigarette smokers have an increased risk of many serious medical problems, including several cancers, heart disease, and stroke.  · Smoking is an addiction with both physical and psychological effects, and longtime habits can be hard to change.  · By stopping right away, you can greatly reduce the risk of medical problems for you and your family.  · To help you quit smoking, your health care provider can recommend programs, community resources, prescription medicines, and nicotine replacement products such as patches, gum, and nasal sprays.  This information is not intended to replace advice given to you by your health care provider. Make sure you discuss any questions you have with your health care provider.  Document Released: 01/25/2006 Document Revised: 12/22/2017 Document Reviewed: 12/22/2017  [employer]  Patient Education © 2017 [employer]  Calorie Counting for Weight Loss  Calories are units of energy. Your body needs a certain amount of calories from food to keep you going throughout the day. When you eat more calories than your body needs, your body stores the extra calories as fat. When you eat fewer calories than your body needs, your body burns fat to get the energy it needs.  Calorie counting means keeping track of how many calories you eat and drink each day. Calorie counting can be helpful if you need to lose weight. If you make sure to eat fewer calories than your body needs, you should lose weight. Ask your health care provider what a healthy weight is for you.  For calorie counting to work, you will need to eat the right number of calories in a day in order to lose a healthy amount of weight per week. A dietitian can help you determine how many calories you need in a day and will give you suggestions on how to reach your calorie goal.  · A healthy amount of weight to lose per week is usually 1-2 lb (0.5-0.9 kg). This usually means that your daily calorie intake should be reduced by 500-750 calories.  · Eating 1,200 - 1,500 calories per day can help most women lose weight.  · Eating 1,500 - 1,800 calories per day can help most men lose weight.  What is my plan?  My goal is to have __________ calories per day.  If I have this many calories per day, I should lose around __________ pounds per week.  What do I need to know about calorie counting?  In order to meet your daily calorie goal, you will need to:  · Find out how many calories are in each food you would like to eat. Try to do this before you eat.  · Decide how much of the food you plan to eat.  · Write down what you ate and how many calories it had. Doing this is called keeping a food log.  To successfully lose weight, it is important to balance calorie counting with a healthy lifestyle that includes regular activity. Aim for 150 minutes of  moderate exercise (such as walking) or 75 minutes of vigorous exercise (such as running) each week.  Where do I find calorie information?     The number of calories in a food can be found on a Nutrition Facts label. If a food does not have a Nutrition Facts label, try to look up the calories online or ask your dietitian for help.  Remember that calories are listed per serving. If you choose to have more than one serving of a food, you will have to multiply the calories per serving by the amount of servings you plan to eat. For example, the label on a package of bread might say that a serving size is 1 slice and that there are 90 calories in a serving. If you eat 1 slice, you will have eaten 90 calories. If you eat 2 slices, you will have eaten 180 calories.  How do I keep a food log?  Immediately after each meal, record the following information in your food log:  · What you ate. Don't forget to include toppings, sauces, and other extras on the food.  · How much you ate. This can be measured in cups, ounces, or number of items.  · How many calories each food and drink had.  · The total number of calories in the meal.  Keep your food log near you, such as in a small notebook in your pocket, or use a mobile nacho or website. Some programs will calculate calories for you and show you how many calories you have left for the day to meet your goal.  What are some calorie counting tips?  · Use your calories on foods and drinks that will fill you up and not leave you hungry:  ¨ Some examples of foods that fill you up are nuts and nut butters, vegetables, lean proteins, and high-fiber foods like whole grains. High-fiber foods are foods with more than 5 g fiber per serving.  ¨ Drinks such as sodas, specialty coffee drinks, alcohol, and juices have a lot of calories, yet do not fill you up.  · Eat nutritious foods and avoid empty calories. Empty calories are calories you get from foods or beverages that do not have many vitamins  "or protein, such as candy, sweets, and soda. It is better to have a nutritious high-calorie food (such as an avocado) than a food with few nutrients (such as a bag of chips).  · Know how many calories are in the foods you eat most often. This will help you calculate calorie counts faster.  · Pay attention to calories in drinks. Low-calorie drinks include water and unsweetened drinks.  · Pay attention to nutrition labels for \"low fat\" or \"fat free\" foods. These foods sometimes have the same amount of calories or more calories than the full fat versions. They also often have added sugar, starch, or salt, to make up for flavor that was removed with the fat.  · Find a way of tracking calories that works for you. Get creative. Try different apps or programs if writing down calories does not work for you.  What are some portion control tips?  · Know how many calories are in a serving. This will help you know how many servings of a certain food you can have.  · Use a measuring cup to measure serving sizes. You could also try weighing out portions on a kitchen scale. With time, you will be able to estimate serving sizes for some foods.  · Take some time to put servings of different foods on your favorite plates, bowls, and cups so you know what a serving looks like.  · Try not to eat straight from a bag or box. Doing this can lead to overeating. Put the amount you would like to eat in a cup or on a plate to make sure you are eating the right portion.  · Use smaller plates, glasses, and bowls to prevent overeating.  · Try not to multitask (for example, watch TV or use your computer) while eating. If it is time to eat, sit down at a table and enjoy your food. This will help you to know when you are full. It will also help you to be aware of what you are eating and how much you are eating.  What are tips for following this plan?  Reading food labels   · Check the calorie count compared to the serving size. The serving size may " be smaller than what you are used to eating.  · Check the source of the calories. Make sure the food you are eating is high in vitamins and protein and low in saturated and trans fats.  Shopping   · Read nutrition labels while you shop. This will help you make healthy decisions before you decide to purchase your food.  · Make a grocery list and stick to it.  Cooking   · Try to cook your favorite foods in a healthier way. For example, try baking instead of frying.  · Use low-fat dairy products.  Meal planning   · Use more fruits and vegetables. Half of your plate should be fruits and vegetables.  · Include lean proteins like poultry and fish.  How do I count calories when eating out?  · Ask for smaller portion sizes.  · Consider sharing an entree and sides instead of getting your own entree.  · If you get your own entree, eat only half. Ask for a box at the beginning of your meal and put the rest of your entree in it so you are not tempted to eat it.  · If calories are listed on the menu, choose the lower calorie options.  · Choose dishes that include vegetables, fruits, whole grains, low-fat dairy products, and lean protein.  · Choose items that are boiled, broiled, grilled, or steamed. Stay away from items that are buttered, battered, fried, or served with cream sauce. Items labeled “crispy” are usually fried, unless stated otherwise.  · Choose water, low-fat milk, unsweetened iced tea, or other drinks without added sugar. If you want an alcoholic beverage, choose a lower calorie option such as a glass of wine or light beer.  · Ask for dressings, sauces, and syrups on the side. These are usually high in calories, so you should limit the amount you eat.  · If you want a salad, choose a garden salad and ask for grilled meats. Avoid extra toppings like bryant, cheese, or fried items. Ask for the dressing on the side, or ask for olive oil and vinegar or lemon to use as dressing.  · Estimate how many servings of a food  you are given. For example, a serving of cooked rice is ½ cup or about the size of half a baseball. Knowing serving sizes will help you be aware of how much food you are eating at restaurants. The list below tells you how big or small some common portion sizes are based on everyday objects:  ¨ 1 oz--4 stacked dice.  ¨ 3 oz--1 deck of cards.  ¨ 1 tsp--1 die.  ¨ 1 Tbsp--½ a ping-pong ball.  ¨ 2 Tbsp--1 ping-pong ball.  ¨ ½ cup--½ baseball.  ¨ 1 cup--1 baseball.  Summary  · Calorie counting means keeping track of how many calories you eat and drink each day. If you eat fewer calories than your body needs, you should lose weight.  · A healthy amount of weight to lose per week is usually 1-2 lb (0.5-0.9 kg). This usually means reducing your daily calorie intake by 500-750 calories.  · The number of calories in a food can be found on a Nutrition Facts label. If a food does not have a Nutrition Facts label, try to look up the calories online or ask your dietitian for help.  · Use your calories on foods and drinks that will fill you up, and not on foods and drinks that will leave you hungry.  · Use smaller plates, glasses, and bowls to prevent overeating.  This information is not intended to replace advice given to you by your health care provider. Make sure you discuss any questions you have with your health care provider.  Document Released: 12/18/2006 Document Revised: 11/17/2017 Document Reviewed: 11/17/2017  [employer] Interactive Patient Education © 2017 [employer]

## 2018-05-11 NOTE — PROGRESS NOTES
Subjective   Gurwinder Harding is a 57 y.o. male.     Chief Complaint: Follow-up; Hypertension; and Leg Pain (bilateral)    History of Present Illness   Patient has a history of chronic lower back pain with right lower extremity neuropathy. History of back surgery with fusion; neuropathy is at knee downwards, especially on right lower extremity. Patient is currently on gabapentin and with relafen 500 mg. Used to be on tramadol 50 mg orally BID, but we discontinued secondary to concerns about elevated liver enzymes. Failed etodalac 200 mg orally TID. Feels that the gabapentin and the relafen does offer some relief along with the robaxin. Denies any side effects of the medications. States that the medications somewhat control the pain enough so that he can accomplish daily activities. Movement and ambulation are okay. 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  Denies any sedation from the medications.         Patient has a history of hypertension and is on atenolol 50 mg orally daily and lisinopril 20 mg orally daily. Denies any side effects of the medications. Denies any dizziness, lightheadedness, blurry vision, chest pain, or edema. Patient does not take his blood pressures at home. Tries to follow a low-salt diet. His blood pressure is controlled today at 145/77.  He states that he has not had his medication yet today.    Labs today.    Patient's Body mass index is 30.99 kg/m². BMI is above normal parameters. Recommendations include: educational material.    I advised the patient of the risks in continuing to use tobacco, and I provided this patient with smoking cessation educational materials.    During this visit, I spent 3 minutes counseling the patient regarding smoking cessation.    Family History   Problem Relation Age of Onset   • Hypertension Mother    • Cancer Father      LUNG   • Diabetes Father    • Hypertension Father    • Heart attack Other      GRANDFATHER       Social History     Social History   • Marital  "status:      Spouse name: N/A   • Number of children: N/A   • Years of education: N/A     Occupational History   • Not on file.     Social History Main Topics   • Smoking status: Current Every Day Smoker     Packs/day: 1.00     Types: Cigarettes   • Smokeless tobacco: Never Used   • Alcohol use 3.6 oz/week     6 Cans of beer per week      Comment: Daily    • Drug use: No   • Sexual activity: Defer     Other Topics Concern   • Not on file     Social History Narrative   • No narrative on file       Past Medical History:   Diagnosis Date   • Alcohol abuse    • ED (erectile dysfunction)    • History of degenerative disc disease    • Hyperglycemia    • Hypertension    • Low back pain    • Neuropathy    • Screening PSA (prostate specific antigen) 09/2015   • Tobacco abuse    • Vitamin D deficiency        Review of Systems   Constitutional: Negative.    HENT: Negative.    Respiratory: Negative.    Cardiovascular: Negative.    Gastrointestinal: Negative.    Musculoskeletal: Positive for back pain and myalgias.   Skin: Negative.    Neurological: Negative.    Psychiatric/Behavioral: Negative.        Objective   Physical Exam   Constitutional: He is oriented to person, place, and time. He appears well-developed and well-nourished.   Neck: Normal range of motion. Neck supple.   Cardiovascular: Normal rate, regular rhythm and normal heart sounds.    Pulmonary/Chest: Effort normal and breath sounds normal.   Musculoskeletal: Normal range of motion. He exhibits no edema, tenderness or deformity.   Neurological: He is alert and oriented to person, place, and time.   Skin: Skin is warm and dry.   Psychiatric: He has a normal mood and affect. His behavior is normal. Judgment and thought content normal.   Nursing note and vitals reviewed.      Procedures    Vitals: Blood pressure 145/77, pulse 89, height 177.8 cm (70\"), weight 98 kg (216 lb), SpO2 99 %.    Allergies:   Allergies   Allergen Reactions   • Novocain [Procaine]    • " Penicillins         During this visit the following were done:  Labs Reviewed []    Labs Ordered [x]    Radiology Reports Reviewed []    Radiology Ordered []    PCP Records Reviewed []    Referring Provider Records Reviewed []    ER Records Reviewed []    Hospital Records Reviewed []    History Obtained From Family []    Radiology Images Reviewed []    Other Reviewed []    Records Requested []      Assessment/Plan   Gurwinder was seen today for follow-up, hypertension and leg pain.    Diagnoses and all orders for this visit:    Essential hypertension  -     CBC & Differential  -     Comprehensive Metabolic Panel  -     Lipid Panel  -     TSH  -     Vitamin B12  -     Magnesium  -     atenolol (TENORMIN) 50 MG tablet; Take 1 tablet by mouth Daily.  -     folic acid-pyridoxine-cyanocobalamin (FOLBIC) 2.5-25-2 MG tablet tablet; Take 1 tablet by mouth Daily.  -     lisinopril (PRINIVIL,ZESTRIL) 40 MG tablet; Take 1 tablet by mouth Daily.    Vitamin D deficiency  -     CBC & Differential  -     Comprehensive Metabolic Panel  -     Lipid Panel  -     TSH  -     Vitamin B12  -     Magnesium  -     Vitamin D 25 Hydroxy    Tobacco abuse  -     CBC & Differential  -     Comprehensive Metabolic Panel  -     Lipid Panel  -     TSH  -     Vitamin B12  -     Magnesium    Alcohol abuse  -     CBC & Differential  -     Comprehensive Metabolic Panel  -     Lipid Panel  -     TSH  -     Vitamin B12  -     Magnesium    Chronic low back pain, unspecified back pain laterality, with sciatica presence unspecified  -     CBC & Differential  -     Comprehensive Metabolic Panel  -     Lipid Panel  -     TSH  -     Vitamin B12  -     Magnesium  -     nabumetone (RELAFEN) 500 MG tablet; Take 1 tablet by mouth 2 (Two) Times a Day As Needed for Mild Pain .  -     gabapentin (NEURONTIN) 800 MG tablet; Take 1.5 tablets by mouth 3 (Three) Times a Day.    Neuropathy  -     CBC & Differential  -     Comprehensive Metabolic Panel  -     Lipid Panel  -      TSH  -     Vitamin B12  -     Magnesium    Other orders  -     methocarbamol (ROBAXIN) 750 MG tablet; Take 1 tablet by mouth 4 (Four) Times a Day As Needed for Muscle Spasms.

## 2018-05-11 NOTE — PROGRESS NOTES
Patients Vit D is a little low.  I have sent a script to his pharmacy for supplementation.   His kidney function is slightly elevated.  He needs to drink plenty of water.  We discussed water this morning due to leg cramps.  He really does need to drink.   Also his blood sugar was elevated this morning.  I have sent an additional order in for an A1C but it is not back yet.  Could you please let pt know.   Thanks

## 2018-05-12 NOTE — PROGRESS NOTES
His A1C is slightly elevated.  He needs to watch his sweets/carbohydrate intake.  Recheck the A1C in 3 months.   Thank you

## 2018-06-20 ENCOUNTER — OFFICE VISIT (OUTPATIENT)
Dept: FAMILY MEDICINE CLINIC | Facility: CLINIC | Age: 58
End: 2018-06-20

## 2018-06-20 VITALS
WEIGHT: 216 LBS | DIASTOLIC BLOOD PRESSURE: 83 MMHG | HEART RATE: 88 BPM | SYSTOLIC BLOOD PRESSURE: 130 MMHG | OXYGEN SATURATION: 98 % | BODY MASS INDEX: 30.92 KG/M2 | HEIGHT: 70 IN

## 2018-06-20 DIAGNOSIS — Z53.21 PATIENT LEFT WITHOUT BEING SEEN: ICD-10-CM

## 2018-06-20 NOTE — PROGRESS NOTES
Patient came in seeking care for a Workman's Comp injury to his back done several weeks ago.  He has seen a Workmans Comp physician but hasnt gotten the follow up care he thought he should receive.  After rooming, I told Ray we couldn't see him for a Workmans Comp injury and that he would have to follow up and be persistent with them if he didn't feel he was getting proper care.  He understood and expressed understanding and left without seeing the provider.

## 2018-08-10 ENCOUNTER — OFFICE VISIT (OUTPATIENT)
Dept: FAMILY MEDICINE CLINIC | Facility: CLINIC | Age: 58
End: 2018-08-10

## 2018-08-10 VITALS
DIASTOLIC BLOOD PRESSURE: 72 MMHG | HEIGHT: 70 IN | SYSTOLIC BLOOD PRESSURE: 120 MMHG | HEART RATE: 91 BPM | WEIGHT: 220 LBS | OXYGEN SATURATION: 97 % | BODY MASS INDEX: 31.5 KG/M2

## 2018-08-10 DIAGNOSIS — Z87.39 HISTORY OF DEGENERATIVE DISC DISEASE: Primary | ICD-10-CM

## 2018-08-10 DIAGNOSIS — I10 ESSENTIAL HYPERTENSION: ICD-10-CM

## 2018-08-10 DIAGNOSIS — G89.29 CHRONIC LOW BACK PAIN, UNSPECIFIED BACK PAIN LATERALITY, WITH SCIATICA PRESENCE UNSPECIFIED: ICD-10-CM

## 2018-08-10 DIAGNOSIS — M54.5 CHRONIC LOW BACK PAIN, UNSPECIFIED BACK PAIN LATERALITY, WITH SCIATICA PRESENCE UNSPECIFIED: ICD-10-CM

## 2018-08-10 DIAGNOSIS — G62.9 NEUROPATHY: ICD-10-CM

## 2018-08-10 PROCEDURE — 99214 OFFICE O/P EST MOD 30 MIN: CPT | Performed by: NURSE PRACTITIONER

## 2018-08-10 RX ORDER — GABAPENTIN 800 MG/1
1200 TABLET ORAL 3 TIMES DAILY
Qty: 135 TABLET | Refills: 1 | Status: SHIPPED | OUTPATIENT
Start: 2018-08-10 | End: 2019-01-30 | Stop reason: SDUPTHER

## 2018-08-10 RX ORDER — ALBUTEROL SULFATE 90 UG/1
2 AEROSOL, METERED RESPIRATORY (INHALATION) EVERY 4 HOURS PRN
Qty: 1 INHALER | Refills: 1 | Status: CANCELLED | OUTPATIENT
Start: 2018-08-10

## 2018-08-10 RX ORDER — METHOCARBAMOL 750 MG/1
750 TABLET, FILM COATED ORAL 4 TIMES DAILY PRN
Qty: 120 TABLET | Refills: 5 | Status: SHIPPED | OUTPATIENT
Start: 2018-08-10 | End: 2019-02-08 | Stop reason: SDUPTHER

## 2018-08-10 RX ORDER — GABAPENTIN 800 MG/1
1200 TABLET ORAL 3 TIMES DAILY
Qty: 135 TABLET | Refills: 1 | Status: SHIPPED | OUTPATIENT
Start: 2018-08-10 | End: 2018-08-10 | Stop reason: SDUPTHER

## 2018-08-10 RX ORDER — ATENOLOL 50 MG/1
50 TABLET ORAL DAILY
Qty: 30 TABLET | Refills: 5 | Status: SHIPPED | OUTPATIENT
Start: 2018-08-10 | End: 2019-02-08 | Stop reason: SDUPTHER

## 2018-08-10 RX ORDER — LISINOPRIL 40 MG/1
40 TABLET ORAL DAILY
Qty: 30 TABLET | Refills: 5 | Status: SHIPPED | OUTPATIENT
Start: 2018-08-10 | End: 2019-01-24 | Stop reason: SDUPTHER

## 2018-08-10 RX ORDER — METHYLPREDNISOLONE 4 MG/1
TABLET ORAL
Qty: 21 EACH | Refills: 0 | Status: SHIPPED | OUTPATIENT
Start: 2018-08-10 | End: 2018-09-17

## 2018-08-10 RX ORDER — NABUMETONE 500 MG/1
500 TABLET, FILM COATED ORAL 2 TIMES DAILY PRN
Qty: 60 TABLET | Refills: 5 | Status: SHIPPED | OUTPATIENT
Start: 2018-08-10 | End: 2019-02-08 | Stop reason: SDUPTHER

## 2018-08-10 NOTE — PROGRESS NOTES
Subjective   Gurwinder Harding is a 58 y.o. male.     Chief Complaint: Follow-up and Hypertension    Hypertension   This is a chronic problem. The problem is controlled. Pertinent negatives include no chest pain, headaches, palpitations or shortness of breath. Current antihypertension treatment includes ACE inhibitors and beta blockers. The current treatment provides significant improvement. Compliance problems include exercise and diet.    Back Pain   This is a chronic problem. The current episode started more than 1 year ago. The problem has been waxing and waning since onset. The pain is present in the lumbar spine. The quality of the pain is described as aching. The pain radiates to the left foot and right knee. The pain is moderate. The symptoms are aggravated by standing, bending and twisting. Associated symptoms include leg pain. Pertinent negatives include no bladder incontinence, bowel incontinence, chest pain or headaches. He has tried analgesics and muscle relaxant for the symptoms. The treatment provided mild relief.      Patient has a history of chronic lower back pain with right lower extremity neuropathy. History of back surgery with fusion; neuropathy is at knee downwards, especially on right lower extremity. Patient is currently on gabapentin and with relafen 500 mg. Used to be on tramadol 50 mg orally BID, but we discontinued secondary to concerns about elevated liver enzymes. Failed etodalac 200 mg orally TID. Feels that the gabapentin and the relafen does offer some relief along with the robaxin. Denies any side effects of the medications. States that the medications somewhat control the pain enough so that he can accomplish daily activities. Movement and ambulation are okay. 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  Denies any sedation from the medications.  Pain has been exacerbated over the past couple of weeks.    Family History   Problem Relation Age of Onset   • Hypertension Mother    • Cancer  Father         LUNG   • Diabetes Father    • Hypertension Father    • Heart attack Other         GRANDFATHER       Social History     Social History   • Marital status:      Spouse name: N/A   • Number of children: N/A   • Years of education: N/A     Occupational History   • Not on file.     Social History Main Topics   • Smoking status: Current Every Day Smoker     Packs/day: 1.00     Types: Cigarettes   • Smokeless tobacco: Never Used   • Alcohol use 3.6 oz/week     6 Cans of beer per week      Comment: Daily    • Drug use: No   • Sexual activity: Defer     Other Topics Concern   • Not on file     Social History Narrative   • No narrative on file       Past Medical History:   Diagnosis Date   • Alcohol abuse    • ED (erectile dysfunction)    • History of degenerative disc disease    • Hyperglycemia    • Hypertension    • Low back pain    • Neuropathy    • Screening PSA (prostate specific antigen) 09/2015   • Tobacco abuse    • Vitamin D deficiency        Review of Systems   Constitutional: Negative.    HENT: Negative.    Respiratory: Negative.  Negative for shortness of breath.    Cardiovascular: Negative.  Negative for chest pain and palpitations.   Gastrointestinal: Negative.  Negative for bowel incontinence.   Genitourinary: Negative for bladder incontinence.   Musculoskeletal: Positive for back pain.   Skin: Negative.    Neurological: Negative.  Negative for headaches.   Psychiatric/Behavioral: Negative.        Objective   Physical Exam   Constitutional: He is oriented to person, place, and time. He appears well-developed and well-nourished.   Neck: Normal range of motion. Neck supple.   Cardiovascular: Normal rate, regular rhythm and normal heart sounds.    Pulmonary/Chest: Effort normal and breath sounds normal.   Neurological: He is alert and oriented to person, place, and time.   Skin: Skin is warm and dry.   Psychiatric: He has a normal mood and affect. His behavior is normal. Judgment and thought  "content normal.   Nursing note and vitals reviewed.      Procedures    Vitals: Blood pressure 120/72, pulse 91, height 177.8 cm (70\"), weight 99.8 kg (220 lb), SpO2 97 %.    Allergies:   Allergies   Allergen Reactions   • Novocain [Procaine]    • Penicillins         During this visit the following were done:  Labs Reviewed []    Labs Ordered []    Radiology Reports Reviewed []    Radiology Ordered []    PCP Records Reviewed []    Referring Provider Records Reviewed []    ER Records Reviewed []    Hospital Records Reviewed []    History Obtained From Family []    Radiology Images Reviewed []    Other Reviewed []    Records Requested []      Assessment/Plan   Gurwinder was seen today for follow-up and hypertension.    Diagnoses and all orders for this visit:    History of degenerative disc disease  -     methocarbamol (ROBAXIN) 750 MG tablet; Take 1 tablet by mouth 4 (Four) Times a Day As Needed for Muscle Spasms.  -     MethylPREDNISolone (MEDROL, NEY,) 4 MG tablet; Take as directed on package instructions.    Chronic low back pain, unspecified back pain laterality, with sciatica presence unspecified  -     Discontinue: gabapentin (NEURONTIN) 800 MG tablet; Take 1.5 tablets by mouth 3 (Three) Times a Day.  -     nabumetone (RELAFEN) 500 MG tablet; Take 1 tablet by mouth 2 (Two) Times a Day As Needed for Mild Pain .  -     methocarbamol (ROBAXIN) 750 MG tablet; Take 1 tablet by mouth 4 (Four) Times a Day As Needed for Muscle Spasms.  -     MethylPREDNISolone (MEDROL, NEY,) 4 MG tablet; Take as directed on package instructions.  -     gabapentin (NEURONTIN) 800 MG tablet; Take 1.5 tablets by mouth 3 (Three) Times a Day.    Essential hypertension  -     atenolol (TENORMIN) 50 MG tablet; Take 1 tablet by mouth Daily.  -     folic acid-pyridoxine-cyanocobalamin (FOLBIC) 2.5-25-2 MG tablet tablet; Take 1 tablet by mouth Daily.  -     lisinopril (PRINIVIL,ZESTRIL) 40 MG tablet; Take 1 tablet by mouth " Daily.    Neuropathy    Other orders  -     Cancel: albuterol (PROVENTIL HFA;VENTOLIN HFA) 108 (90 Base) MCG/ACT inhaler; Inhale 2 puffs Every 4 (Four) Hours As Needed for Wheezing.

## 2018-09-17 ENCOUNTER — OFFICE VISIT (OUTPATIENT)
Dept: FAMILY MEDICINE CLINIC | Facility: CLINIC | Age: 58
End: 2018-09-17

## 2018-09-17 VITALS
SYSTOLIC BLOOD PRESSURE: 152 MMHG | BODY MASS INDEX: 32.5 KG/M2 | HEIGHT: 70 IN | OXYGEN SATURATION: 98 % | HEART RATE: 94 BPM | WEIGHT: 227 LBS | DIASTOLIC BLOOD PRESSURE: 91 MMHG

## 2018-09-17 DIAGNOSIS — M54.50 CHRONIC BILATERAL LOW BACK PAIN WITHOUT SCIATICA: Primary | ICD-10-CM

## 2018-09-17 DIAGNOSIS — G89.29 CHRONIC BILATERAL LOW BACK PAIN WITHOUT SCIATICA: Primary | ICD-10-CM

## 2018-09-17 PROCEDURE — 99214 OFFICE O/P EST MOD 30 MIN: CPT | Performed by: FAMILY MEDICINE

## 2018-09-17 RX ORDER — ERGOCALCIFEROL 1.25 MG/1
50000 CAPSULE ORAL WEEKLY
Qty: 4 CAPSULE | Refills: 2 | Status: CANCELLED | OUTPATIENT
Start: 2018-09-17

## 2018-09-17 NOTE — PROGRESS NOTES
Subjective   Gruwinder Harding is a 58 y.o. male.   Pt presents today with CC of Back Pain; Pain (IN LEGS ); and Extremity Weakness (LEGS )      History of Present Illness   Patient is a 58-year-old male current every day smoker, history of back surgery, reports that in late May he fell back and hit his back on a metal corner.  He denies laceration, weakness, loss of sensation, loss of bowel or bladder, or known bruising after the event.  However, he had back pain and went to urgent care on May 29, 2018.  No acute findings were seen on lumbar and thoracic x-ray at that time.   Despite this, he has continued to have mid back pain since.  The pain is better with rest, worse with bending, he denies radiation, he denies any associated symptoms.  He presents today for evaluation.  He denies any acute issues.         The following portions of the patient's history were reviewed and updated as appropriate: allergies, current medications, past medical history, past social history, past surgical history and problem list.    Review of Systems   Constitutional: Negative for chills, fever and unexpected weight loss.   HENT: Negative for congestion and sore throat.    Eyes: Negative for blurred vision and visual disturbance.   Respiratory: Negative for cough and wheezing.    Cardiovascular: Negative for chest pain and palpitations.   Gastrointestinal: Negative for abdominal pain and diarrhea.   Genitourinary: Negative for hematuria and nocturia.   Musculoskeletal: Negative for gait problem and neck stiffness.   Skin: Negative for rash.   Neurological: Negative for dizziness, seizures, syncope, numbness and headache.       Objective   Physical Exam   Constitutional: He is oriented to person, place, and time. He appears well-developed and well-nourished.   HENT:   Head: Normocephalic and atraumatic.   Right Ear: External ear normal.   Left Ear: External ear normal.   Mouth/Throat: Oropharynx is clear and moist.   Eyes: Pupils are  equal, round, and reactive to light. Conjunctivae and EOM are normal.   Neck: Normal range of motion. Neck supple.   Cardiovascular: Normal rate, regular rhythm and normal heart sounds.    Pulmonary/Chest: Effort normal and breath sounds normal.   Abdominal: Soft. Bowel sounds are normal.   Musculoskeletal:   Surgical scar noted over L5-S1.  No skin lesions otherwise.  No bruising, patient is tender to palpation over the paraspinal musculature bilaterally in his lower L-spine.    Hips, knees, and ankles with full range of motion and 5/5 strength bilaterally. DTRs symmetrical. Straight leg raise test negative bilaterally.   Neurological: He is alert and oriented to person, place, and time. No sensory deficit. Coordination normal.   Skin: Skin is warm and dry.         Assessment/Plan   Gurwinder was seen today for back pain, pain and extremity weakness.    Diagnoses and all orders for this visit:    Chronic bilateral low back pain without sciatica  -     Ambulatory Referral to Physical Therapy  He is agreeable to physical therapy, and he is agreeable to return for osteopathic manipulation as needed.  Retrieved records from urgent care for his back imaging.  May consider MRI later.  May also consider compounded creams to help with his pain.  He is to continue his muscle relaxer, and Relafen for as needed pain.       I advised Gurwinder of the risks of continuing to use tobacco, and I provided him with tobacco cessation educational materials in the After Visit Summary.     During this visit, I spent 2 minutes counseling the patient regarding tobacco cessation.    Patient's Body mass index is 32.57 kg/m². BMI is above normal parameters. Recommendations include: exercise counseling and nutrition counseling.

## 2018-09-19 ENCOUNTER — HOSPITAL ENCOUNTER (OUTPATIENT)
Dept: PHYSICAL THERAPY | Facility: HOSPITAL | Age: 58
Setting detail: THERAPIES SERIES
Discharge: HOME | End: 2018-09-19

## 2018-09-19 DIAGNOSIS — G89.29 CHRONIC BILATERAL LOW BACK PAIN WITHOUT SCIATICA: Primary | ICD-10-CM

## 2018-09-19 DIAGNOSIS — M54.50 CHRONIC BILATERAL LOW BACK PAIN WITHOUT SCIATICA: Primary | ICD-10-CM

## 2018-09-19 PROCEDURE — 97010 HOT OR COLD PACKS THERAPY: CPT | Performed by: PHYSICAL THERAPIST

## 2018-09-19 PROCEDURE — G0283 ELEC STIM OTHER THAN WOUND: HCPCS | Performed by: PHYSICAL THERAPIST

## 2018-09-19 PROCEDURE — 97163 PT EVAL HIGH COMPLEX 45 MIN: CPT | Performed by: PHYSICAL THERAPIST

## 2018-09-19 NOTE — THERAPY EVALUATION
"    Outpatient Physical Therapy Ortho Initial Evaluation   Samy     Patient Name: Gurwinder Harding  : 1960  MRN: [id number]  Today's Date: 2018      Visit Date: 2018    Patient Active Problem List   Diagnosis   • Vitamin D deficiency   • Tobacco abuse   • Alcohol abuse   • ED (erectile dysfunction)   • History of degenerative disc disease   • Neuropathy   • Low back pain   • Hypertension   • Elevated AST (SGOT)   • Screening PSA (prostate specific antigen)   • Hyperglycemia        Past Medical History:   Diagnosis Date   • Alcohol abuse    • ED (erectile dysfunction)    • History of degenerative disc disease    • Hyperglycemia    • Hypertension    • Low back pain    • Neuropathy    • Screening PSA (prostate specific antigen) 2015   • Tobacco abuse    • Vitamin D deficiency         Past Surgical History:   Procedure Laterality Date   • APPENDECTOMY     • BACK SURGERY      DISC RUPTURE REPAIR, L5       Visit Dx:     ICD-10-CM ICD-9-CM   1. Chronic bilateral low back pain without sciatica M54.5 724.2    G89.29 338.29             Patient History     Row Name 18 1000             History    Chief Complaint Pain  -AD      Type of Pain Back pain  -AD      Date Current Problem(s) Began --   3 months  -AD      Brief Description of Current Complaint Pt reported falling at work and hitting a metal beam, which resulted in instant pain. He stated he did not hear/feel a pop but stated he had \"such extreme pain it about knocked me out\". He stated he had difficulty breathing and now has pain down both legs into the feet. He also reported he has bilateral leg weakness in addition to low back pain. He reported he is currently on light duty seondary to the injury. He stated he had back surgery 15 years ago which resulted in intermittent pain, but stated he was not functionally impaired. He stated prior to the fall he was able to play golf and \"do whatever I wanted\" but since the fall he is \"unable to go " "anywhere\". He stated he currently has difficulty with prolonged standing, sitting, and walking. He is currently only able to lift light weights and has increased pain with self care. He reported the pain has \"stayed the same for three months\".  -AD      Patient/Caregiver Goals Relieve pain;Return to prior level of function;Improve mobility;Improve strength   Return to full duty at work.  -AD      Smoking Status Daily smoker  -AD      Patient's Rating of General Health Good  -AD      Hand Dominance ambidextrous  -AD      Occupation/sports/leisure activities \"\" at [employer] food  -AD      Patient seeing anyone else for problem(s)? Physician  -AD      How has patient tried to help current problem? Muscle relaxer, heat, ice  -AD      What clinical tests have you had for this problem? X-ray  -AD      Results of Clinical Tests Negative for fractures  -AD         Pain     Pain Location Back  -AD      Pain at Present 6  -AD      Pain at Best 4   With muscle relaxer  -AD      Pain at Worst 9  -AD      Pain Frequency Constant/continuous  -AD      Pain Description Aching;Sharp;Shooting;Numbness   Numbness into right foot  -AD      What Performance Factors Make the Current Problem(s) WORSE? Prolonged standing, walking, bending, lifting  -AD      What Performance Factors Make the Current Problem(s) BETTER? Muscle relaxer  -AD      Tolerance Time- Standing 30 minutes  -AD      Tolerance Time- Sitting 30 minutes  -AD      Tolerance Time- Walking 10 minutes  -AD      Is your sleep disturbed? No  -AD      Is medication used to assist with sleep? No  -AD      Difficulties at work? Currently on light duty.  -AD      Difficulties with ADL's? Bathing, dressing, donning/doffing socks and shoes.  -AD      Difficulties with recreational activities? Unable to play golf  -AD         Fall Risk Assessment    Any falls in the past year: Yes  -AD      Number of falls reported in the last 12 months 1  -AD      Factors that contributed to " the fall: Tripped  -AD         Daily Activities    Primary Language English  -AD      How does patient learn best? Listening  -AD      Pt Participated in POC and Goals Yes  -AD         Safety    Are you being hurt, hit, or frightened by anyone at home or in your life? No  -AD      Are you being neglected by a caregiver No  -AD        User Key  (r) = Recorded By, (t) = Taken By, (c) = Cosigned By    Initials Name Provider Type    AD Dalton, Ashley Claudene, PT Physical Therapist                PT Ortho     Row Name 09/19/18 1000       Posture/Observations    Posture- WNL --   Flexed forward posture.  -AD       DTR- Lower Quarter Clearing    Patellar tendon (L2-4) Right:;1- Minimal response;Left:;2- Normal response  -AD    Achilles tendon (S1-2) Right:;1- Minimal response;Left:;2- Normal response  -AD       Neural Tension Signs- Lower Quarter Clearing    Slump Bilateral:;Negative  -AD       Pathological Reflexes- Lower Quarter Clearing    Clonus Bilateral:;Negative  -AD       Sensory Screen for Light Touch- Lower Quarter Clearing    L1 (inguinal area) Bilateral:;Intact  -AD    L2 (anterior mid thigh) Bilateral:;Intact  -AD    L3 (distal anterior thigh) Bilateral:;Intact  -AD    L4 (medial lower leg/foot) Right:;Diminished  -AD    L5 (lateral lower leg/great toe) Right:;Diminished   Pt reported right foot tingling/numbness since surgery.  -AD    S1 (bottom of foot) Bilateral:;Intact  -AD       Myotomal Screen- Lower Quarter Clearing    Hip flexion (L2) Right:;3+ (Fair +);Left:;4+ (Good +)  -AD    Knee extension (L3) Right:;3+ (Fair +);Left:;4+ (Good +)  -AD    Ankle DF (L4) Right:;3+ (Fair +);Left:;4+ (Good +)  -AD    Ankle PF (S1) Right:;3+ (Fair +);Left:;4+ (Good +)  -AD    Knee flexion (S2) Right:;3+ (Fair +);Left:;4+ (Good +)  -AD       Lumbar ROM Screen- Lower Quarter Clearing    Lumbar Flexion --   50%  -AD    Lumbar Extension --   25%  -AD    Lumbar Lateral Flexion --   25% bilaterally  -AD    Lumbar Rotation --    25% bilaterally  -AD       SI/Hip Screen- Lower Quarter Clearing    ASIS compression Negative  -AD    ASIS distraction Negative  -AD    Sigifredo's/Genaro's test Right:;Positive  -AD    Posterior thigh sheer Right:;Positive  -AD       Special Tests/Palpation    Special Tests/Palpation --   L3-L5 SP, bilateral paraspinal tenderness  -AD       Sensation    Sensation WNL? --   Decreased sensation of right foot.  -AD       Gait/Stairs Assessment/Training    Comment (Gait/Stairs) No gait abnormalities observed.  -AD      User Key  (r) = Recorded By, (t) = Taken By, (c) = Cosigned By    Initials Name Provider Type    AD Dalton, Ashley Claudene, PT Physical Therapist                      Therapy Education  Given: HEP, Symptoms/condition management, Pain management, Posture/body mechanics, Fall prevention and home safety, Mobility training  Program: New  How Provided: Verbal, Demonstration, Written  Provided to: Patient  Level of Understanding: Teach back education performed, Verbalized, Demonstrated           PT OP Goals     Row Name 09/19/18 1100          PT Short Term Goals    STG Date to Achieve 10/03/18  -AD     STG 1 Pt will be instructed in HEP for improved functional independence.  -AD     STG 1 Progress New  -AD     STG 2 Pt will demonstrate a 25% improvement in lumbar ROM for improved functional mobility.  -AD     STG 2 Progress New  -AD     STG 3 Pt will report a maximum of 6/10 low back pain with daily activities.  -AD     STG 3 Progress New  -AD        Long Term Goals    LTG Date to Achieve 10/19/18  -AD     LTG 1 Pt will demonstrate 75% lumbar ROM for improved functional independence.  -AD     LTG 1 Progress New  -AD     LTG 2 Pt will demonstrate 4/5 RLE strength for improved functional independence and balance.  -AD     LTG 2 Progress New  -AD     LTG 3 Pt will report the ability to stand for 3 hours at work with no more than 5/10 low back for improved ability to perform work duties.  -AD     LTG 3 Progress  New  -AD     LTG 4 Pt will report less than 40% impairment on the Modified Oswestry for improved functional independence.  -AD     LTG 4 Progress New  -AD     LTG 5 Pt will report a maximum of 2/10 low back pain with self-care for improved independence.  -AD     LTG 5 Progress New  -AD        Time Calculation    PT Goal Re-Cert Due Date 10/19/18  -AD       User Key  (r) = Recorded By, (t) = Taken By, (c) = Cosigned By    Initials Name Provider Type    AD Dalton, Ashley Claudene, PT Physical Therapist                PT Assessment/Plan     Row Name 09/19/18 1124          PT Assessment    Functional Limitations Decreased safety during functional activities;Limitation in home management;Limitations in community activities;Performance in work activities;Performance in self-care ADL;Performance in leisure activities;Limitations in functional capacity and performance  -AD     Impairments Balance;Endurance;Gait;Joint mobility;Joint integrity;Muscle strength;Pain;Poor body mechanics;Posture;Range of motion;Sensation  -AD     Assessment Comments The patient is a 58 year old female presenting to the clinic with low back and right lower extremity pain. Special tests to reproduce right radicular pain were negative, with positive SARAH and hip scour on the right side. He demonstrated impaired lumbar ROM as well as right lower extremity weakness. He would benefit from skilled physical therapy to address functional limitations and impairments. He will be progressed as tolerated.  -AD     Please refer to paper survey for additional self-reported information Yes  -AD     Rehab Potential Good  -AD     Patient/caregiver participated in establishment of treatment plan and goals Yes  -AD     Patient would benefit from skilled therapy intervention Yes  -AD        PT Plan    PT Frequency 2x/week;1x/week;3x/week  -AD     Predicted Duration of Therapy Intervention (Therapy Eval) 4 weeks  -AD     Planned CPT's? PT EVAL HIGH COMPLEXITY:  05493;PT RE-EVAL: 24761;PT NEUROMUSC RE-EDUCATION EA 15 MIN: 58510;PT MANUAL THERAPY EA 15 MIN: 69599;PT THER ACT EA 15 MIN: 61385;PT THER PROC EA 15 MIN: 11180;PT SELF CARE/HOME MGMT/TRAIN EA 15: 07428;PT ULTRASOUND EA 15 MIN: 91011;PT ELECTRICAL STIM ATTD EA 15 MIN: 07257;PT ELECTRICAL STIM UNATTEND: ;PT TRACTION LUMBAR: 31972;PT HOT/COLD PACK WC NONMCARE: 06390;PT IONTOPHORESIS EA 15 MIN: 36079;PT THER SUPP EA 15 MIN  -AD     Physical Therapy Interventions (Optional Details) balance training;gait training;gross motor skills;home exercise program;patient/family education;neuromuscular re-education;motor coordination training;modalities;manual therapy techniques;lumbar stabilization;joint mobilization;postural re-education;ROM (Range of Motion);stair training;strengthening;stretching;transfer training  -AD     PT Plan Comments The above noted interventions will be used to address functional limitations and impairments. He will be progressed as tolerated.  -AD       User Key  (r) = Recorded By, (t) = Taken By, (c) = Cosigned By    Initials Name Provider Type    AD Dalton, Ashley Claudene, PT Physical Therapist                Modalities     Row Name 09/19/18 1100             Moist Heat    MH Applied Yes   With IFC, no skin irritation observed.  -AD      Location Lumbar  -AD      Rx Minutes 10 mins  -AD      MH S/P Rx Yes  -AD         ELECTRICAL STIMULATION    Attended/Unattended Unattended  -AD      Stimulation Type IFC  -AD      Max mAmp --   Per pt tolerance.  -AD      Location/Electrode Placement/Other Lumbar  -AD      62860 - PT Electrical Stimulation (Manual) Minutes --   Unattended with MH for 10 minutes.  -AD        User Key  (r) = Recorded By, (t) = Taken By, (c) = Cosigned By    Initials Name Provider Type    AD Dalton, Ashley Claudene, PT Physical Therapist              Exercises     Row Name 09/19/18 1100             Exercise 1    Exercise Name 1 HEP: SKTC, piriformis stretch, LTR  -AD      Cueing 1  Verbal;Demo  -AD        User Key  (r) = Recorded By, (t) = Taken By, (c) = Cosigned By    Initials Name Provider Type    AD Dalton, Ashley Claudene, PT Physical Therapist                        Outcome Measure Options: Modifed Owestry  Modified Oswestry  Modified Oswestry Score/Comments: 26/50 = 52% impaired      Time Calculation:     Therapy Suggested Charges     Code   Minutes Charges    None             Start Time: 1000  Stop Time: 1125  Time Calculation (min): 85 min     Therapy Charges for Today     Code Description Service Date Service Provider Modifiers Qty    12317002782 HC PT EVAL HIGH COMPLEXITY 4 9/19/2018 Dalton, Ashley Claudene, PT GP 1    58550103642 HC PT ELECTRICAL STIM UNATTENDED 9/19/2018 Dalton, Ashley Claudene, PT  1    29578787913 HC PT HOT/COLD PACK WC NONMCARE 9/19/2018 Dalton, Ashley Claudene, PT GP 1          PT G-Codes  Outcome Measure Options: Modifed Owestry  Modified Oswestry Score/Comments: 26/50 = 52% impaired         Ashley Claudene Dalton, PT  9/19/2018

## 2018-09-24 ENCOUNTER — HOSPITAL ENCOUNTER (OUTPATIENT)
Dept: PHYSICAL THERAPY | Facility: HOSPITAL | Age: 58
Setting detail: THERAPIES SERIES
Discharge: HOME | End: 2018-09-24

## 2018-09-24 DIAGNOSIS — M54.50 CHRONIC BILATERAL LOW BACK PAIN WITHOUT SCIATICA: Primary | ICD-10-CM

## 2018-09-24 DIAGNOSIS — G89.29 CHRONIC BILATERAL LOW BACK PAIN WITHOUT SCIATICA: Primary | ICD-10-CM

## 2018-09-24 PROCEDURE — 97035 APP MDLTY 1+ULTRASOUND EA 15: CPT

## 2018-09-24 PROCEDURE — 97010 HOT OR COLD PACKS THERAPY: CPT

## 2018-09-24 PROCEDURE — 97110 THERAPEUTIC EXERCISES: CPT

## 2018-09-24 PROCEDURE — G0283 ELEC STIM OTHER THAN WOUND: HCPCS

## 2018-09-24 NOTE — THERAPY TREATMENT NOTE
Outpatient Physical Therapy Ortho Treatment Note  [facility]     Patient Name: Gurwinder Harding  : 1960  MRN: [id number]  Today's Date: 2018      Visit Date: 2018    Visit Dx:    ICD-10-CM ICD-9-CM   1. Chronic bilateral low back pain without sciatica M54.5 724.2    G89.29 338.29       Patient Active Problem List   Diagnosis   • Vitamin D deficiency   • Tobacco abuse   • Alcohol abuse   • ED (erectile dysfunction)   • History of degenerative disc disease   • Neuropathy   • Low back pain   • Hypertension   • Elevated AST (SGOT)   • Screening PSA (prostate specific antigen)   • Hyperglycemia        Past Medical History:   Diagnosis Date   • Alcohol abuse    • ED (erectile dysfunction)    • History of degenerative disc disease    • Hyperglycemia    • Hypertension    • Low back pain    • Neuropathy    • Screening PSA (prostate specific antigen) 2015   • Tobacco abuse    • Vitamin D deficiency         Past Surgical History:   Procedure Laterality Date   • APPENDECTOMY     • BACK SURGERY      DISC RUPTURE REPAIR, L5             PT Ortho     Row Name 18 1000       Subjective Comments    Subjective Comments Patient arrives to therapy w/ reports of 2/10 low back pain.  Pt verbalizes compliance of initiating home program w/ no complaints.   -JENNIFER       Subjective Pain    Able to rate subjective pain? yes  -JENNIFER    Pre-Treatment Pain Level 2  -JENNIFER    Post-Treatment Pain Level 2  -JENNIFER      User Key  (r) = Recorded By, (t) = Taken By, (c) = Cosigned By    Initials Name Provider Type    Hannah Harp, PTA Physical Therapy Assistant                            PT Assessment/Plan     Row Name 18 1107          PT Assessment    Assessment Comments Patient completed today's tx w/ no changes in pain reported pre and post session.  Pt verbalized compliance of initiating home program w/ no complaints.  Pt recevied  and Estim to lowback region for pain control f/b therex as listed.  Treatment  "concluded with continuous US to lumbar paraspinals.  Pt received cues throughout session for improved mechanics and for max benefit w/ activities.  No distress or adverse reactions observed during and/ or following session.    -JENNIFER        PT Plan    PT Plan Comments Continue with PT's POC and progress tx as tolerated by patient.   -JENNIFER       User Key  (r) = Recorded By, (t) = Taken By, (c) = Cosigned By    Initials Name Provider Type    Hannah Harp PTA Physical Therapy Assistant                Modalities     Row Name 09/24/18 1000             Moist Heat    MH Applied Yes   no redness observed following MH  -JENNIFER      Location Lumbar  -JENNIFER      Rx Minutes 15 mins  -JENNIFER      MH Prior to Rx Yes   w/ estim in seated position  -JENNIFER         Ultrasound 35302    Location bilateral lumbar paraspinals  -JENNIFER      Duty Cycle 100  -JENNIFER      Frequency 1.0 MHz  -JENNIFER      Intensity - Wts/cm 1.2  -JENNIFER      42843 - PT Ultrasound Minutes 8  -JENNIFER         ELECTRICAL STIMULATION    Attended/Unattended Unattended   no skin irritation observed following  -JENNIFER      Stimulation Type IFC  -JENNIFER      Max mAmp --   as to pt's tolerance  -JENNIFER      Location/Electrode Placement/Other Lumbar  -JENNIFER        User Key  (r) = Recorded By, (t) = Taken By, (c) = Cosigned By    Initials Name Provider Type    Hannah Harp PTA Physical Therapy Assistant                Exercises     Row Name 09/24/18 1000             Subjective Comments    Subjective Comments Patient arrives to therapy w/ reports of 2/10 low back pain.  Pt verbalizes compliance of initiating home program w/ no complaints.   -JENNIFER         Subjective Pain    Able to rate subjective pain? yes  -JENNIFER      Pre-Treatment Pain Level 2  -JENNIFER      Post-Treatment Pain Level 2  -JENNIFER         Total Minutes    76649 - PT Therapeutic Exercise Minutes 30  -JENNIFER         Exercise 1    Exercise Name 1 SKTC 3x20\", LTR 10x2, piriformis stretch 3x20\", bridge x10, PPT 10x2, pillow squeeze 10x2, clams (sidelying) " 10x2  -JENNIFER      Cueing 1 Verbal;Tactile;Demo  -JENNIFER      Time 1 30 min  -JENNIFER        User Key  (r) = Recorded By, (t) = Taken By, (c) = Cosigned By    Initials Name Provider Type    Hannah Harp PTA Physical Therapy Assistant                             Therapy Education  Given: HEP, Symptoms/condition management, Pain management, Posture/body mechanics  Program: Reinforced  How Provided: Verbal, Demonstration  Provided to: Patient  Level of Understanding: Verbalized, Demonstrated              Time Calculation:   Start Time: 1000  Stop Time: 1103  Time Calculation (min): 63 min  Therapy Suggested Charges     Code   Minutes Charges    10712 (CPT®) Hc Pt Neuromusc Re Education Ea 15 Min      86544 (CPT®) Hc Pt Ther Proc Ea 15 Min 30 2    24836 (CPT®) Hc Gait Training Ea 15 Min      61472 (CPT®) Hc Pt Therapeutic Act Ea 15 Min      96426 (CPT®) Hc Pt Manual Therapy Ea 15 Min      11856 (CPT®) Hc Pt Ther Massage- Per 15 Min      16400 (CPT®) Hc Pt Iontophoresis Ea 15 Min      51028 (CPT®) Hc Pt Elec Stim Ea-Per 15 Min      73029 (CPT®) Hc Pt Ultrasound Ea 15 Min 8 1    94250 (CPT®) Hc Pt Self Care/Mgmt/Train Ea 15 Min      67580 (CPT®) Hc Pt Prosthetic (S) Train Initial Encounter, Each 15 Min      35312 (CPT®) Hc Orthotic(S) Mgmt/Train Initial Encounter, Each 15min      22092 (CPT®) Hc Pt Aquatic Therapy Ea 15 Min      98158 (CPT®) Hc Pt Orthotic(S)/Prosthetic(S) Encounter, Each 15 Min      Total  38 3        Therapy Charges for Today     Code Description Service Date Service Provider Modifiers Qty    80208118114 HC PT THER PROC EA 15 MIN 9/24/2018 Hannah Houston PTA GP 2    72694991317 HC PT ULTRASOUND EA 15 MIN 9/24/2018 Hannah Houston PTA GP 1    61471629209 HC PT ELECTRICAL STIM UNATTENDED 9/24/2018 Hannah Houston PTA  1    64328656251 HC PT HOT/COLD PACK WC NONMCARE 9/24/2018 Hannah Houston PTA GP 1                    Hannah Dejesus. RODENY Houston  9/24/2018

## 2018-09-26 ENCOUNTER — OFFICE VISIT (OUTPATIENT)
Dept: FAMILY MEDICINE CLINIC | Facility: CLINIC | Age: 58
End: 2018-09-26

## 2018-09-26 VITALS
HEART RATE: 94 BPM | TEMPERATURE: 97.6 F | HEIGHT: 70 IN | DIASTOLIC BLOOD PRESSURE: 81 MMHG | WEIGHT: 226.4 LBS | BODY MASS INDEX: 32.41 KG/M2 | OXYGEN SATURATION: 99 % | SYSTOLIC BLOOD PRESSURE: 124 MMHG

## 2018-09-26 DIAGNOSIS — M99.03 SEGMENTAL AND SOMATIC DYSFUNCTION OF LUMBAR REGION: ICD-10-CM

## 2018-09-26 DIAGNOSIS — M54.50 CHRONIC BILATERAL LOW BACK PAIN WITHOUT SCIATICA: Primary | ICD-10-CM

## 2018-09-26 DIAGNOSIS — G89.29 CHRONIC BILATERAL LOW BACK PAIN WITHOUT SCIATICA: Primary | ICD-10-CM

## 2018-09-26 DIAGNOSIS — M99.05 SEGMENTAL AND SOMATIC DYSFUNCTION OF PELVIC REGION: ICD-10-CM

## 2018-09-26 DIAGNOSIS — M99.04 SEGMENTAL AND SOMATIC DYSFUNCTION OF SACRAL REGION: ICD-10-CM

## 2018-09-26 PROCEDURE — 98926 OSTEOPATH MANJ 3-4 REGIONS: CPT | Performed by: FAMILY MEDICINE

## 2018-09-26 PROCEDURE — 99213 OFFICE O/P EST LOW 20 MIN: CPT | Performed by: FAMILY MEDICINE

## 2018-09-26 RX ORDER — ERGOCALCIFEROL 1.25 MG/1
50000 CAPSULE ORAL WEEKLY
Qty: 4 CAPSULE | Refills: 2 | Status: CANCELLED | OUTPATIENT
Start: 2018-09-26

## 2018-09-26 NOTE — PROGRESS NOTES
Subjective   Gurwinder Harding is a 58 y.o. male.   Pt presents today with CC of Follow-up (back and leg pain)      History of Present Illness   Patient is here today to follow-up on low back pain.  Today it is associated with some right anterior leg pain at the top of his thigh that is worse when he stands up straight.  He is been going to physical therapy for his back, he reports that he has been sore from this though overall improved.  However, yesterday he stood up and noted to have a sharp increase in his pain, it has improved slightly since then, currently 7 out of 10 severity.  He has been taking his Relafen and muscle relaxer with some benefit.  If indicated, he would like manipulation today.         The following portions of the patient's history were reviewed and updated as appropriate: allergies, current medications, past family history, past social history, past surgical history and problem list.    Review of Systems   Constitutional: Negative for chills, fever and unexpected weight loss.   Eyes: Negative for double vision and visual disturbance.   Respiratory: Negative for shortness of breath and wheezing.    Cardiovascular: Negative for chest pain and palpitations.   Gastrointestinal: Negative for abdominal pain and vomiting.   Genitourinary: Negative for urgency.   Musculoskeletal: Positive for back pain. Negative for gait problem and neck pain.   Skin: Negative for skin lesions.   Neurological: Negative for dizziness, numbness and confusion.       Objective   Physical Exam   Constitutional: He is oriented to person, place, and time. He appears well-developed and well-nourished.   HENT:   Head: Normocephalic and atraumatic.   Eyes: Conjunctivae are normal.   Neck: Neck supple.   Musculoskeletal:   Patient is tender to palpation over L2 transverse process on the right.  It is associated with tenderness to palpation of the paraspinal musculature  Osteopathic: L2 is flexed rotated side bent right.   Associated with TART findings.  Positive Mariusz test on the right.  Sacrum is  left on left,  right anterior hip with inferior right pubic symphysis   Neurological: He is alert and oriented to person, place, and time.   Mild intention for tremor, upper and lower extremity.  Minimal cogwheel type movements in his upper and lower extremities.  I will call it within normal limits, though wanted to make note.  Hips, knees, and ankles with full range of motion and 5/5 strength bilaterally. DTRs symmetrical. Straight leg raise test negative bilaterally.   Skin: Skin is warm and dry.         Assessment/Plan   Gurwinder was seen today for follow-up.    Diagnoses and all orders for this visit:    Chronic bilateral low back pain without sciatica  Patient's low back pain had been improving with physical therapy.  However, something happened in the last couple days that has set him back a little.  The treatment options were discussed, he would like to try osteopathic manipulation today.  He intends to continue with physical therapy.  Segmental and somatic dysfunction of lumbar region  OMT procedure, expected risks and benefits discussed with patient, who elects to proceed. Verbal consent obtained. 3 techniques were used(no hvla). Pt tolerated OMT well. No complications noted. Patient to do home stretches as shown in clinic today or instructed in after visit summary.  Segmental and somatic dysfunction of pelvic region    Segmental and somatic dysfunction of sacral region       Patient's Body mass index is 32.49 kg/m². BMI is above normal parameters. Recommendations include: exercise counseling and nutrition counseling.

## 2018-09-27 ENCOUNTER — HOSPITAL ENCOUNTER (OUTPATIENT)
Dept: PHYSICAL THERAPY | Facility: HOSPITAL | Age: 58
Setting detail: THERAPIES SERIES
Discharge: HOME | End: 2018-09-27

## 2018-09-27 DIAGNOSIS — M54.50 CHRONIC BILATERAL LOW BACK PAIN WITHOUT SCIATICA: Primary | ICD-10-CM

## 2018-09-27 DIAGNOSIS — G89.29 CHRONIC BILATERAL LOW BACK PAIN WITHOUT SCIATICA: Primary | ICD-10-CM

## 2018-09-27 PROCEDURE — G0283 ELEC STIM OTHER THAN WOUND: HCPCS

## 2018-09-27 PROCEDURE — 97035 APP MDLTY 1+ULTRASOUND EA 15: CPT

## 2018-09-27 PROCEDURE — 97110 THERAPEUTIC EXERCISES: CPT

## 2018-09-27 NOTE — THERAPY TREATMENT NOTE
Outpatient Physical Therapy Ortho Treatment Note  [facility]     Patient Name: Gurwinder Harding  : 1960  MRN: [id number]  Today's Date: 2018      Visit Date: 2018    Visit Dx:    ICD-10-CM ICD-9-CM   1. Chronic bilateral low back pain without sciatica M54.5 724.2    G89.29 338.29       Patient Active Problem List   Diagnosis   • Vitamin D deficiency   • Tobacco abuse   • Alcohol abuse   • ED (erectile dysfunction)   • History of degenerative disc disease   • Neuropathy   • Low back pain   • Hypertension   • Elevated AST (SGOT)   • Screening PSA (prostate specific antigen)   • Hyperglycemia   • Segmental and somatic dysfunction of lumbar region   • Segmental and somatic dysfunction of pelvic region   • Segmental and somatic dysfunction of sacral region        Past Medical History:   Diagnosis Date   • Alcohol abuse    • ED (erectile dysfunction)    • History of degenerative disc disease    • Hyperglycemia    • Hypertension    • Low back pain    • Neuropathy    • Screening PSA (prostate specific antigen) 2015   • Tobacco abuse    • Vitamin D deficiency         Past Surgical History:   Procedure Laterality Date   • APPENDECTOMY     • BACK SURGERY      DISC RUPTURE REPAIR, L5             PT Ortho     Row Name 18 1100       Subjective Comments    Subjective Comments Patient reports that he was sore after last treatment session. Patient reports that he is having soreness in both sides of his low back.  -AC       Subjective Pain    Able to rate subjective pain? yes  -AC    Pre-Treatment Pain Level 6  -AC    Post-Treatment Pain Level 4  -AC      User Key  (r) = Recorded By, (t) = Taken By, (c) = Cosigned By    Initials Name Provider Type    Jeanette Osorio, PTA Physical Therapy Assistant                            PT Assessment/Plan     Row Name 18 1154          PT Assessment    Assessment Comments New ther-ex added per the patient's tolerance, patient demonstrated and understood  new ther-ex with no increase in pain noted. Educated patient to perform ther-ex per his tolerance, patient verbalized understanding. No adverse reactions with modalities or treatment session. Verbal and tactile cues needed on proper technique of exercises. Decrease in pain noted following treatment session.   -AC        PT Plan    PT Plan Comments Continue per PT's POC, progress per the patient's tolerance.  -AC       User Key  (r) = Recorded By, (t) = Taken By, (c) = Cosigned By    Initials Name Provider Type    Jeanette Osorio PTA Physical Therapy Assistant                Modalities     Row Name 09/27/18 1100             Moist Heat    MH Applied Yes   No redness noted following moist heat  -AC      Location Lumbar  -AC      Rx Minutes 15 mins  -AC      MH Prior to Rx Yes  -AC         Ultrasound 14423    Location bilateral lumbar paraspinals  -AC      Duty Cycle 100  -AC      Frequency 1.0 MHz  -AC      Intensity - Wts/cm 1.2  -AC      61144 - PT Ultrasound Minutes 8  -AC         ELECTRICAL STIMULATION    Attended/Unattended Unattended   No irritation noted following estim  -AC      Stimulation Type IFC  -AC      Max mAmp --   per the patient's tolerance, 15 minutes with MH  -AC      Location/Electrode Placement/Other Lumbar  -AC        User Key  (r) = Recorded By, (t) = Taken By, (c) = Cosigned By    Initials Name Provider Type    Jeanette Osorio PTA Physical Therapy Assistant                Exercises     Row Name 09/27/18 1100             Subjective Comments    Subjective Comments Patient reports that he was sore after last treatment session. Patient reports that he is having soreness in both sides of his low back.  -AC         Subjective Pain    Able to rate subjective pain? yes  -AC      Pre-Treatment Pain Level 6  -AC      Post-Treatment Pain Level 4  -AC         Total Minutes    07026 - PT Therapeutic Exercise Minutes 30  -AC         Exercise 1    Exercise Name 1 SKTC 20 sec hold x3,  piriformis stretch 20 sec hold x3, LTR 10x2, PPT x15, pillow squeeze x15, supine march x15, supine clams x15  -AC      Cueing 1 Verbal;Tactile;Demo  -AC      Time 1 30 min  -AC        User Key  (r) = Recorded By, (t) = Taken By, (c) = Cosigned By    Initials Name Provider Type    AC Jeanette Veliz, RODNEY Physical Therapy Assistant                             Therapy Education  Given: HEP, Symptoms/condition management, Pain management, Posture/body mechanics  Program: New  How Provided: Verbal, Demonstration  Provided to: Patient  Level of Understanding: Verbalized, Demonstrated              Time Calculation:   Start Time: 1000  Stop Time: 1100  Time Calculation (min): 60 min  Therapy Suggested Charges     Code   Minutes Charges    38024 (CPT®) Hc Pt Neuromusc Re Education Ea 15 Min      40066 (CPT®) Hc Pt Ther Proc Ea 15 Min 30 2    72436 (CPT®) Hc Gait Training Ea 15 Min      84600 (CPT®) Hc Pt Therapeutic Act Ea 15 Min      65239 (CPT®) Hc Pt Manual Therapy Ea 15 Min      82036 (CPT®) Hc Pt Ther Massage- Per 15 Min      12763 (CPT®) Hc Pt Iontophoresis Ea 15 Min      15709 (CPT®) Hc Pt Elec Stim Ea-Per 15 Min      75842 (CPT®) Hc Pt Ultrasound Ea 15 Min 8 1    81089 (CPT®) Hc Pt Self Care/Mgmt/Train Ea 15 Min      81925 (CPT®) Hc Pt Prosthetic (S) Train Initial Encounter, Each 15 Min      76901 (CPT®) Hc Orthotic(S) Mgmt/Train Initial Encounter, Each 15min      43053 (CPT®) Hc Pt Aquatic Therapy Ea 15 Min      99091 (CPT®) Hc Pt Orthotic(S)/Prosthetic(S) Encounter, Each 15 Min      Total  38 3        Therapy Charges for Today     Code Description Service Date Service Provider Modifiers Qty    93482570664 HC PT THER PROC EA 15 MIN 9/27/2018 Jeanette Veliz PTA GP 2    46300425070 HC PT ELECTRICAL STIM UNATTENDED 9/27/2018 Jeanette Veliz, RODNEY  1    23750832889 HC PT ULTRASOUND EA 15 MIN 9/27/2018 Jeanette Veliz PTA GP 1                    Jeanette Veliz PTA  9/27/2018

## 2018-10-01 ENCOUNTER — HOSPITAL ENCOUNTER (OUTPATIENT)
Dept: PHYSICAL THERAPY | Facility: HOSPITAL | Age: 58
Setting detail: THERAPIES SERIES
Discharge: HOME | End: 2018-10-01

## 2018-10-01 DIAGNOSIS — G89.29 CHRONIC BILATERAL LOW BACK PAIN WITHOUT SCIATICA: Primary | ICD-10-CM

## 2018-10-01 DIAGNOSIS — M54.50 CHRONIC BILATERAL LOW BACK PAIN WITHOUT SCIATICA: Primary | ICD-10-CM

## 2018-10-01 PROCEDURE — 97035 APP MDLTY 1+ULTRASOUND EA 15: CPT

## 2018-10-01 PROCEDURE — 97110 THERAPEUTIC EXERCISES: CPT

## 2018-10-01 PROCEDURE — G0283 ELEC STIM OTHER THAN WOUND: HCPCS

## 2018-10-01 NOTE — THERAPY TREATMENT NOTE
Outpatient Physical Therapy Ortho Treatment Note  [facility]     Patient Name: Gurwinder Harding  : 1960  MRN: [id number]  Today's Date: 10/1/2018      Visit Date: 10/01/2018    Visit Dx:    ICD-10-CM ICD-9-CM   1. Chronic bilateral low back pain without sciatica M54.5 724.2    G89.29 338.29       Patient Active Problem List   Diagnosis   • Vitamin D deficiency   • Tobacco abuse   • Alcohol abuse   • ED (erectile dysfunction)   • History of degenerative disc disease   • Neuropathy   • Low back pain   • Hypertension   • Elevated AST (SGOT)   • Screening PSA (prostate specific antigen)   • Hyperglycemia   • Segmental and somatic dysfunction of lumbar region   • Segmental and somatic dysfunction of pelvic region   • Segmental and somatic dysfunction of sacral region        Past Medical History:   Diagnosis Date   • Alcohol abuse    • ED (erectile dysfunction)    • History of degenerative disc disease    • Hyperglycemia    • Hypertension    • Low back pain    • Neuropathy    • Screening PSA (prostate specific antigen) 2015   • Tobacco abuse    • Vitamin D deficiency         Past Surgical History:   Procedure Laterality Date   • APPENDECTOMY     • BACK SURGERY      DISC RUPTURE REPAIR, L5             PT Ortho     Row Name 10/01/18 1300       Subjective Comments    Subjective Comments Patient states that last treatment session went good. Patient states that he is having pain in his low back.  -AC       Subjective Pain    Able to rate subjective pain? yes  -AC    Pre-Treatment Pain Level 2  -AC    Post-Treatment Pain Level 2  -AC      User Key  (r) = Recorded By, (t) = Taken By, (c) = Cosigned By    Initials Name Provider Type    Jeanette Osorio, PTA Physical Therapy Assistant                            PT Assessment/Plan     Row Name 10/01/18 1317          PT Assessment    Assessment Comments Patient tolerated treatment session well with rest breaks taken as needed by the patient. New ther-ex added  per the patient's tolerance, patient demonstrated and understood new ther-ex per his tolerance with no increase in pain noted. Educated patient to perform ther-ex per his tolerance, patient verbalized understanding. No adverse reactions with modalities or treatment session. Jeanette March, PT, DPT initiated treatment session.   -AC        PT Plan    PT Plan Comments Continue per PT's POC, progress per the patient's tolerance.  -AC       User Key  (r) = Recorded By, (t) = Taken By, (c) = Cosigned By    Initials Name Provider Type    Jeanette Osorio PTA Physical Therapy Assistant                Modalities     Row Name 10/01/18 1300             Moist Heat    MH Applied Yes   No redness noted following moist heat  -AC      Location Lumbar  -AC      Rx Minutes 15 mins  -AC      MH Prior to Rx Yes  -AC         Ultrasound 91625    Location bilateral lumbar paraspinals  -AC      Duty Cycle 100  -AC      Frequency 1.0 MHz  -AC      Intensity - Wts/cm 1.2  -AC      29149 - PT Ultrasound Minutes 8  -AC         ELECTRICAL STIMULATION    Attended/Unattended Unattended   No irritation noted following estim  -AC      Stimulation Type IFC  -AC      Max mAmp --   per the patient's tolerance, 15 minutes with MH  -AC      Location/Electrode Placement/Other Lumbar  -AC        User Key  (r) = Recorded By, (t) = Taken By, (c) = Cosigned By    Initials Name Provider Type    Jeanette Osorio PTA Physical Therapy Assistant                Exercises     Row Name 10/01/18 1300             Subjective Comments    Subjective Comments Patient states that last treatment session went good. Patient states that he is having pain in his low back.  -AC         Subjective Pain    Able to rate subjective pain? yes  -AC      Pre-Treatment Pain Level 2  -AC      Post-Treatment Pain Level 2  -AC         Total Minutes    32378 - PT Therapeutic Exercise Minutes 32  -AC         Exercise 1    Exercise Name 1 SKTC 20 sec hold x3, piriformis  stretch 20 sec hold x3, LTR 10x2, PPT x15, supine march x15, ball squeeze 10x2, supine clams x15, SAQ x10 each, bridge x10  -AC      Cueing 1 Verbal;Tactile;Demo  -AC      Time 1 32 minutes  -AC        User Key  (r) = Recorded By, (t) = Taken By, (c) = Cosigned By    Initials Name Provider Type    AC Jeanette Veliz PTA Physical Therapy Assistant                             Therapy Education  Given: HEP, Symptoms/condition management, Pain management, Posture/body mechanics  Program: Reinforced, Progressed  How Provided: Demonstration, Verbal  Provided to: Patient  Level of Understanding: Verbalized              Time Calculation:   Start Time: 0959  Stop Time: 1100  Time Calculation (min): 61 min  Therapy Suggested Charges     Code   Minutes Charges    57438 (CPT®) Hc Pt Neuromusc Re Education Ea 15 Min      39838 (CPT®) Hc Pt Ther Proc Ea 15 Min 32 2    42924 (CPT®) Hc Gait Training Ea 15 Min      96258 (CPT®) Hc Pt Therapeutic Act Ea 15 Min      18550 (CPT®) Hc Pt Manual Therapy Ea 15 Min      05502 (CPT®) Hc Pt Ther Massage- Per 15 Min      19505 (CPT®) Hc Pt Iontophoresis Ea 15 Min      24350 (CPT®) Hc Pt Elec Stim Ea-Per 15 Min      28433 (CPT®) Hc Pt Ultrasound Ea 15 Min 8 1    16395 (CPT®) Hc Pt Self Care/Mgmt/Train Ea 15 Min      85586 (CPT®) Hc Pt Prosthetic (S) Train Initial Encounter, Each 15 Min      75581 (CPT®) Hc Orthotic(S) Mgmt/Train Initial Encounter, Each 15min      54434 (CPT®) Hc Pt Aquatic Therapy Ea 15 Min      57915 (CPT®) Hc Pt Orthotic(S)/Prosthetic(S) Encounter, Each 15 Min      Total  40 3        Therapy Charges for Today     Code Description Service Date Service Provider Modifiers Qty    47044673472 HC PT THER PROC EA 15 MIN 10/1/2018 Jeanette Veliz PTA GP 2    83344869642 HC PT ULTRASOUND EA 15 MIN 10/1/2018 Jeanette Veliz PTA GP 1    27549635854 HC PT ELECTRICAL STIM UNATTENDED 10/1/2018 Jeanette Veliz, PTA  1                    Jeanette Veliz  PTA  10/1/2018

## 2018-10-08 ENCOUNTER — HOSPITAL ENCOUNTER (OUTPATIENT)
Dept: PHYSICAL THERAPY | Facility: HOSPITAL | Age: 58
Setting detail: THERAPIES SERIES
Discharge: HOME | End: 2018-10-08

## 2018-10-08 DIAGNOSIS — G89.29 CHRONIC BILATERAL LOW BACK PAIN WITHOUT SCIATICA: Primary | ICD-10-CM

## 2018-10-08 DIAGNOSIS — M54.50 CHRONIC BILATERAL LOW BACK PAIN WITHOUT SCIATICA: Primary | ICD-10-CM

## 2018-10-08 PROCEDURE — G0283 ELEC STIM OTHER THAN WOUND: HCPCS

## 2018-10-08 PROCEDURE — 97010 HOT OR COLD PACKS THERAPY: CPT

## 2018-10-08 PROCEDURE — 97035 APP MDLTY 1+ULTRASOUND EA 15: CPT

## 2018-10-08 PROCEDURE — 97110 THERAPEUTIC EXERCISES: CPT

## 2018-10-08 NOTE — THERAPY TREATMENT NOTE
Outpatient Physical Therapy Ortho Treatment Note  [facility]     Patient Name: Gurwinder Harding  : 1960  MRN: [id number]  Today's Date: 10/8/2018      Visit Date: 10/08/2018    Visit Dx:    ICD-10-CM ICD-9-CM   1. Chronic bilateral low back pain without sciatica M54.5 724.2    G89.29 338.29       Patient Active Problem List   Diagnosis   • Vitamin D deficiency   • Tobacco abuse   • Alcohol abuse   • ED (erectile dysfunction)   • History of degenerative disc disease   • Neuropathy   • Low back pain   • Hypertension   • Elevated AST (SGOT)   • Screening PSA (prostate specific antigen)   • Hyperglycemia   • Segmental and somatic dysfunction of lumbar region   • Segmental and somatic dysfunction of pelvic region   • Segmental and somatic dysfunction of sacral region        Past Medical History:   Diagnosis Date   • Alcohol abuse    • ED (erectile dysfunction)    • History of degenerative disc disease    • Hyperglycemia    • Hypertension    • Low back pain    • Neuropathy    • Screening PSA (prostate specific antigen) 2015   • Tobacco abuse    • Vitamin D deficiency         Past Surgical History:   Procedure Laterality Date   • APPENDECTOMY     • BACK SURGERY      DISC RUPTURE REPAIR, L5             PT Ortho     Row Name 10/08/18 0900       Subjective Comments    Subjective Comments Patient arrives to therapy w/ reports of 3/10 low back and intermittent leg pain.  Pt states he feels therapy has helped some, however states he is not back to prior activities.    -JENNIFER       Subjective Pain    Able to rate subjective pain? yes  -JENNIFER    Pre-Treatment Pain Level 3  -JENNIFER    Post-Treatment Pain Level 1  -JENNIFER      User Key  (r) = Recorded By, (t) = Taken By, (c) = Cosigned By    Initials Name Provider Type    Hannah Harp, PTA Physical Therapy Assistant                            PT Assessment/Plan     Row Name 10/08/18 1048          PT Assessment    Assessment Comments Patient tolerated treatment well today  w/ reports of decreased pain at conclusion of session, 1/10.  Pt received MH and Estim to low back region in seated position for pain control f/b therex as listed.  Additional LE strengthening activities added to program w/ good response.  Pt required cues throughout session for improved mechanics and for max benefit w/ activities.  Pt continues to progress w/ therapy as tolerated to address goals, and achieve maximum level of function.  No adverse reactions observed following modalities.   -JENNIFER        PT Plan    PT Plan Comments Continue with PT's POC and will progress tx as tolerated by patient.   -JENNIFER       User Key  (r) = Recorded By, (t) = Taken By, (c) = Cosigned By    Initials Name Provider Type    Hannah Harp PTA Physical Therapy Assistant                Modalities     Row Name 10/08/18 0900             Moist Heat    MH Applied Yes   no redness observed following MH  -JENNIFER      Location Lumbar  -JENNIFER      Rx Minutes 15 mins  -JENNIFER      MH Prior to Rx Yes   w/ estim in seated position  -JENNIFER         Ultrasound 91873    Location R) lumbar paraspinals  -JENNIFER      Duty Cycle 100  -JENNIFER      Frequency 1.0 MHz  -JENNIFER      Intensity - Wts/cm 1.2  -JENNIFER      18608 - PT Ultrasound Minutes 8  -JENNIFER         ELECTRICAL STIMULATION    Attended/Unattended Unattended   no skin irritation observed following estim  -JENNIFER      Stimulation Type Pre-Mod  -JENNIFER      Max mAmp --   as to pt's tolerance  -JENNIFER      Location/Electrode Placement/Other Lumbar  -JENNIFER        User Key  (r) = Recorded By, (t) = Taken By, (c) = Cosigned By    Initials Name Provider Type    Hannah Harp PTA Physical Therapy Assistant                Exercises     Row Name 10/08/18 0900             Subjective Comments    Subjective Comments Patient arrives to therapy w/ reports of 3/10 low back and intermittent leg pain.  Pt states he feels therapy has helped some, however states he is not back to prior activities.    -JENNIFER         Subjective Pain    Able to  "rate subjective pain? yes  -JENNIFER      Pre-Treatment Pain Level 3  -JENNIFER      Post-Treatment Pain Level 1  -JENNIFER         Total Minutes    22389 - PT Therapeutic Exercise Minutes 30  -JENNIFER         Exercise 1    Exercise Name 1 SKTC 3x20\", piriformis stretch 3x20\", LTR 10x2, PPT x25, supine march 10x2, clams (sidelying) 10x2, SAQ 15x2, knee flex w/tband (red) 10x2, seated march 10x2  -JENNIFER      Cueing 1 Verbal;Tactile;Demo  -JENNIFER      Time 1 30 min  -JENNIFER        User Key  (r) = Recorded By, (t) = Taken By, (c) = Cosigned By    Initials Name Provider Type    Hannah Harp, PTA Physical Therapy Assistant                             Therapy Education  Given: HEP, Symptoms/condition management, Pain management, Posture/body mechanics  Program: Reinforced  How Provided: Demonstration, Verbal  Provided to: Patient  Level of Understanding: Verbalized, Demonstrated              Time Calculation:   Start Time: 0950  Stop Time: 1052  Time Calculation (min): 62 min  Therapy Suggested Charges     Code   Minutes Charges    71731 (CPT®) Hc Pt Neuromusc Re Education Ea 15 Min      91806 (CPT®) Hc Pt Ther Proc Ea 15 Min 30 2    83502 (CPT®) Hc Gait Training Ea 15 Min      89971 (CPT®) Hc Pt Therapeutic Act Ea 15 Min      33659 (CPT®) Hc Pt Manual Therapy Ea 15 Min      83256 (CPT®) Hc Pt Ther Massage- Per 15 Min      39558 (CPT®) Hc Pt Iontophoresis Ea 15 Min      61605 (CPT®) Hc Pt Elec Stim Ea-Per 15 Min      15833 (CPT®) Hc Pt Ultrasound Ea 15 Min 8 1    24677 (CPT®) Hc Pt Self Care/Mgmt/Train Ea 15 Min      64715 (CPT®) Hc Pt Prosthetic (S) Train Initial Encounter, Each 15 Min      98368 (CPT®) Hc Orthotic(S) Mgmt/Train Initial Encounter, Each 15min      92650 (CPT®) Hc Pt Aquatic Therapy Ea 15 Min      37905 (CPT®) Hc Pt Orthotic(S)/Prosthetic(S) Encounter, Each 15 Min       (CPT®) Hc Pt Electrical Stim Unattended      Total  38 3        Therapy Charges for Today     Code Description Service Date Service Provider Modifiers Qty "    57135960189 HC PT THER PROC EA 15 MIN 10/8/2018 Hannah Houston, PTA GP 2    16500218669 HC PT ULTRASOUND EA 15 MIN 10/8/2018 Hannah Houston, PTA GP 1    79931485371 HC PT ELECTRICAL STIM UNATTENDED 10/8/2018 Hannah Houston, PTA  1    41773035612 HC PT HOT/COLD PACK WC NONMCARE 10/8/2018 Hannah Houston, PTA GP 1                    Hannah Dejesus. RODNEY [place]  10/8/2018

## 2018-10-11 ENCOUNTER — HOSPITAL ENCOUNTER (OUTPATIENT)
Dept: PHYSICAL THERAPY | Facility: HOSPITAL | Age: 58
Setting detail: THERAPIES SERIES
Discharge: HOME | End: 2018-10-11

## 2018-10-11 DIAGNOSIS — G89.29 CHRONIC BILATERAL LOW BACK PAIN WITHOUT SCIATICA: Primary | ICD-10-CM

## 2018-10-11 DIAGNOSIS — M54.50 CHRONIC BILATERAL LOW BACK PAIN WITHOUT SCIATICA: Primary | ICD-10-CM

## 2018-10-11 PROCEDURE — 97010 HOT OR COLD PACKS THERAPY: CPT

## 2018-10-11 PROCEDURE — 97110 THERAPEUTIC EXERCISES: CPT

## 2018-10-11 PROCEDURE — G0283 ELEC STIM OTHER THAN WOUND: HCPCS

## 2018-10-11 PROCEDURE — 97035 APP MDLTY 1+ULTRASOUND EA 15: CPT

## 2018-10-11 NOTE — THERAPY TREATMENT NOTE
Outpatient Physical Therapy Ortho Treatment Note  [facility]     Patient Name: Gurwinder Harding  : 1960  MRN: [id number]  Today's Date: 10/11/2018      Visit Date: 10/11/2018    Visit Dx:    ICD-10-CM ICD-9-CM   1. Chronic bilateral low back pain without sciatica M54.5 724.2    G89.29 338.29       Patient Active Problem List   Diagnosis   • Vitamin D deficiency   • Tobacco abuse   • Alcohol abuse   • ED (erectile dysfunction)   • History of degenerative disc disease   • Neuropathy   • Low back pain   • Hypertension   • Elevated AST (SGOT)   • Screening PSA (prostate specific antigen)   • Hyperglycemia   • Segmental and somatic dysfunction of lumbar region   • Segmental and somatic dysfunction of pelvic region   • Segmental and somatic dysfunction of sacral region        Past Medical History:   Diagnosis Date   • Alcohol abuse    • ED (erectile dysfunction)    • History of degenerative disc disease    • Hyperglycemia    • Hypertension    • Low back pain    • Neuropathy    • Screening PSA (prostate specific antigen) 2015   • Tobacco abuse    • Vitamin D deficiency         Past Surgical History:   Procedure Laterality Date   • APPENDECTOMY     • BACK SURGERY      DISC RUPTURE REPAIR, L5             PT Ortho     Row Name 10/11/18 1000       Subjective Comments    Subjective Comments Patient reports 3/10 low back and bilateral leg pain prior to tx.  Pt states he is scheduled to f/u with referring MD at end the of October or beginning of November.    -JENNIFER       Subjective Pain    Able to rate subjective pain? yes  -JENNIFER    Pre-Treatment Pain Level 3  -JENNIFER    Post-Treatment Pain Level 2  -JENNIFER      User Key  (r) = Recorded By, (t) = Taken By, (c) = Cosigned By    Initials Name Provider Type    Hannah Harp, PTA Physical Therapy Assistant                            PT Assessment/Plan     Row Name 10/11/18 1242          PT Assessment    Assessment Comments Patient tolerated treatment well today w/  decreased pain noted at conclusion of session, 2/10.  Pt initiated tband back strengthening activities as well as LE bike w/ no complaints.  Pt required cues and demonstration w/ new added therex for improved mechanics and for max benefit w/ activities.  Treatment concluded with continuous US.  No adverse reactions observed following modalities.   -JENNIFER        PT Plan    PT Plan Comments Continue with PT's POC and progress tx as tolerated by patient.   -JENNIFER       User Key  (r) = Recorded By, (t) = Taken By, (c) = Cosigned By    Initials Name Provider Type    Hannah Harp PTA Physical Therapy Assistant                Modalities     Row Name 10/11/18 1000             Moist Heat    MH Applied Yes   no redness observed following MH  -JENNIFER      Location Lumbar  -JENNIFER      Rx Minutes 15 mins  -JENNIFER      MH Prior to Rx Yes   w/ estim in seated position  -JENNIFER         Ultrasound 77649    Location B) lumbar paraspinals  -JENNIFER      Duty Cycle 100  -JENNIFER      Frequency 1.0 MHz  -JENNIFER      Intensity - Wts/cm 1.2  -JENNIFER      45062 - PT Ultrasound Minutes 8  -JENNIFRE         ELECTRICAL STIMULATION    Attended/Unattended Unattended   no skin irritation observed following estim  -JENNIFER      Stimulation Type IFC  -JENNIFER      Max mAmp --   as to pt's tolerance  -JENNIFER      Location/Electrode Placement/Other Lumbar  -JENNIFER        User Key  (r) = Recorded By, (t) = Taken By, (c) = Cosigned By    Initials Name Provider Type    Hannah Harp PTA Physical Therapy Assistant                Exercises     Row Name 10/11/18 1000             Subjective Comments    Subjective Comments Patient reports 3/10 low back and bilateral leg pain prior to tx.  Pt states he is scheduled to f/u with referring MD at end the of October or beginning of November.    -JENNIFER         Subjective Pain    Able to rate subjective pain? yes  -JENNIFER      Pre-Treatment Pain Level 3  -JENNIFER      Post-Treatment Pain Level 2  -JENNIFER         Total Minutes    44804 - PT Therapeutic Exercise  "Minutes 35  -JENNIFER         Exercise 1    Exercise Name 1 SKTC 3x20\", piriformis stretch 3x20\", LTR 15x2, PPT 15x2, bridge 10x2, clams (sidelying) 10x2, SAQ 15x2, midrow w/ tband (red) 10x2, LE bike LV 1 x3min  -JENNIFER      Cueing 1 Verbal;Tactile;Demo  -JENNIFER      Time 1 35 min  -JENNIFER        User Key  (r) = Recorded By, (t) = Taken By, (c) = Cosigned By    Initials Name Provider Type    Hannah Harp PTA Physical Therapy Assistant                             Therapy Education  Given: HEP, Symptoms/condition management, Pain management, Posture/body mechanics  Program: Reinforced  How Provided: Verbal, Demonstration  Provided to: Patient  Level of Understanding: Verbalized, Demonstrated              Time Calculation:   Start Time: 0950  Stop Time: 1057  Time Calculation (min): 67 min  Therapy Suggested Charges     Code   Minutes Charges    80161 (CPT®) Hc Pt Neuromusc Re Education Ea 15 Min      26657 (CPT®) Hc Pt Ther Proc Ea 15 Min 35 2    70620 (CPT®) Hc Gait Training Ea 15 Min      24747 (CPT®) Hc Pt Therapeutic Act Ea 15 Min      19268 (CPT®) Hc Pt Manual Therapy Ea 15 Min      42396 (CPT®) Hc Pt Ther Massage- Per 15 Min      94266 (CPT®) Hc Pt Iontophoresis Ea 15 Min      30208 (CPT®) Hc Pt Elec Stim Ea-Per 15 Min      85347 (CPT®) Hc Pt Ultrasound Ea 15 Min 8 1    74775 (CPT®) Hc Pt Self Care/Mgmt/Train Ea 15 Min      12057 (CPT®) Hc Pt Prosthetic (S) Train Initial Encounter, Each 15 Min      84755 (CPT®) Hc Orthotic(S) Mgmt/Train Initial Encounter, Each 15min      31663 (CPT®) Hc Pt Aquatic Therapy Ea 15 Min      31692 (CPT®) Hc Pt Orthotic(S)/Prosthetic(S) Encounter, Each 15 Min       (CPT®) Hc Pt Electrical Stim Unattended      Total  43 3        Therapy Charges for Today     Code Description Service Date Service Provider Modifiers Qty    49772421897 HC PT THER PROC EA 15 MIN 10/11/2018 Hannah Houston PTA GP 2    63629073592 HC PT ULTRASOUND EA 15 MIN 10/11/2018 Hannah Houston PTA GP " 1    57062667994 HC PT ELECTRICAL STIM UNATTENDED 10/11/2018 Hannah Houston, PTA  1    22441679271 HC PT HOT/COLD PACK WC NONMCARE 10/11/2018 Hannah Houston, PTA GP 1                    Hannah Dejesus. Celso, RODNEY  10/11/2018

## 2018-10-15 ENCOUNTER — HOSPITAL ENCOUNTER (OUTPATIENT)
Dept: PHYSICAL THERAPY | Facility: HOSPITAL | Age: 58
Setting detail: THERAPIES SERIES
Discharge: HOME | End: 2018-10-15

## 2018-10-15 DIAGNOSIS — G89.29 CHRONIC BILATERAL LOW BACK PAIN WITHOUT SCIATICA: Primary | ICD-10-CM

## 2018-10-15 DIAGNOSIS — M54.50 CHRONIC BILATERAL LOW BACK PAIN WITHOUT SCIATICA: Primary | ICD-10-CM

## 2018-10-15 PROCEDURE — 97035 APP MDLTY 1+ULTRASOUND EA 15: CPT

## 2018-10-15 PROCEDURE — 97110 THERAPEUTIC EXERCISES: CPT

## 2018-10-15 PROCEDURE — G0283 ELEC STIM OTHER THAN WOUND: HCPCS

## 2018-10-15 NOTE — THERAPY TREATMENT NOTE
Outpatient Physical Therapy Ortho Treatment Note  [facility]     Patient Name: Gurwinder Harding  : 1960  MRN: [id number]  Today's Date: 10/15/2018      Visit Date: 10/15/2018    Visit Dx:    ICD-10-CM ICD-9-CM   1. Chronic bilateral low back pain without sciatica M54.5 724.2    G89.29 338.29       Patient Active Problem List   Diagnosis   • Vitamin D deficiency   • Tobacco abuse   • Alcohol abuse   • ED (erectile dysfunction)   • History of degenerative disc disease   • Neuropathy   • Low back pain   • Hypertension   • Elevated AST (SGOT)   • Screening PSA (prostate specific antigen)   • Hyperglycemia   • Segmental and somatic dysfunction of lumbar region   • Segmental and somatic dysfunction of pelvic region   • Segmental and somatic dysfunction of sacral region        Past Medical History:   Diagnosis Date   • Alcohol abuse    • ED (erectile dysfunction)    • History of degenerative disc disease    • Hyperglycemia    • Hypertension    • Low back pain    • Neuropathy    • Screening PSA (prostate specific antigen) 2015   • Tobacco abuse    • Vitamin D deficiency         Past Surgical History:   Procedure Laterality Date   • APPENDECTOMY     • BACK SURGERY      DISC RUPTURE REPAIR, L5             PT Ortho     Row Name 10/15/18 0900       Subjective Comments    Subjective Comments Patient states that his pain is 3/10 this morning. Patient reports that he done some traveling and the car ride went good. Patient states that pain runs down both legs but mainly on the left leg today. Patient states that steps bothered him  -AC       Subjective Pain    Able to rate subjective pain? yes  -AC    Pre-Treatment Pain Level 3  -AC      User Key  (r) = Recorded By, (t) = Taken By, (c) = Cosigned By    Initials Name Provider Type    Jeanette Osorio, PTA Physical Therapy Assistant                            PT Assessment/Plan     Row Name 10/15/18 1114          PT Assessment    Assessment Comments WANDA mcfarlane  unable to be performed secondary to patient's increased pain prior to treatment session. Reps decreased on mid rows secondary to increased soreness. Patient tolerated treatment session well with rest breaks taken as needed by the patient. Verbal cues needed on proper technique of exercises. Decrease in pain noted following treatment session.  -AC        PT Plan    PT Plan Comments Continue per PT's POC, progress per the patient's tolerance.  -AC       User Key  (r) = Recorded By, (t) = Taken By, (c) = Cosigned By    Initials Name Provider Type    Jeanette Osorio PTA Physical Therapy Assistant                Modalities     Row Name 10/15/18 0900             Moist Heat    MH Applied Yes   No redness noted following moist heat  -AC      Location Lumbar  -AC      Rx Minutes 15 mins  -AC      MH Prior to Rx Yes  -AC         Ultrasound 41703    Location B) lumbar paraspinals  -AC      Duty Cycle 100  -AC      Frequency 1.0 MHz  -AC      Intensity - Wts/cm 1.2  -AC      83949 - PT Ultrasound Minutes 8  -AC         ELECTRICAL STIMULATION    Attended/Unattended Unattended   No irritation noted following estim  -AC      Stimulation Type IFC  -AC      Max mAmp --   per the patient's tolerance, 15 minutes with MH  -AC      Location/Electrode Placement/Other Lumbar  -AC        User Key  (r) = Recorded By, (t) = Taken By, (c) = Cosigned By    Initials Name Provider Type    Jeanette Osorio PTA Physical Therapy Assistant                Exercises     Row Name 10/15/18 0900             Subjective Comments    Subjective Comments Patient states that his pain is 3/10 this morning. Patient reports that he done some traveling and the car ride went good. Patient states that pain runs down both legs but mainly on the left leg today. Patient states that steps bothered him  -AC         Subjective Pain    Able to rate subjective pain? yes  -AC      Pre-Treatment Pain Level 3  -AC      Post-Treatment Pain Level 1  -AC       "   Total Minutes    68374 - PT Therapeutic Exercise Minutes 30  -AC         Exercise 1    Exercise Name 1 SKTC 3x20\", piriformis stretch 3x20\", LTR 15x2, PPT 15x2, bridge 10x2, clams (sidelying) 10x2, SAQ 15x2, midrow w/ tband (red) x15  -AC      Cueing 1 Verbal;Tactile;Demo  -AC      Time 1 30 minutes  -AC        User Key  (r) = Recorded By, (t) = Taken By, (c) = Cosigned By    Initials Name Provider Type    AC Jeanette Veliz PTA Physical Therapy Assistant                             Therapy Education  Given: HEP, Symptoms/condition management, Pain management, Posture/body mechanics  Program: Reinforced  How Provided: Verbal, Demonstration  Provided to: Patient  Level of Understanding: Demonstrated, Verbalized              Time Calculation:   Start Time: 0953  Stop Time: 1053  Time Calculation (min): 60 min  Therapy Suggested Charges     Code   Minutes Charges    05299 (CPT®) Hc Pt Neuromusc Re Education Ea 15 Min      49322 (CPT®) Hc Pt Ther Proc Ea 15 Min 30 2    11125 (CPT®) Hc Gait Training Ea 15 Min      61416 (CPT®) Hc Pt Therapeutic Act Ea 15 Min      89891 (CPT®) Hc Pt Manual Therapy Ea 15 Min      75840 (CPT®) Hc Pt Ther Massage- Per 15 Min      53524 (CPT®) Hc Pt Iontophoresis Ea 15 Min      49218 (CPT®) Hc Pt Elec Stim Ea-Per 15 Min      61472 (CPT®) Hc Pt Ultrasound Ea 15 Min 8 1    73285 (CPT®) Hc Pt Self Care/Mgmt/Train Ea 15 Min      76983 (CPT®) Hc Pt Prosthetic (S) Train Initial Encounter, Each 15 Min      38884 (CPT®) Hc Orthotic(S) Mgmt/Train Initial Encounter, Each 15min      71009 (CPT®) Hc Pt Aquatic Therapy Ea 15 Min      58396 (CPT®) Hc Pt Orthotic(S)/Prosthetic(S) Encounter, Each 15 Min       (CPT®) Hc Pt Electrical Stim Unattended      Total  38 3        Therapy Charges for Today     Code Description Service Date Service Provider Modifiers Qty    41760503805 HC PT THER PROC EA 15 MIN 10/15/2018 Jeanette Veliz PTA GP 2    04490004012 HC PT ULTRASOUND EA 15 MIN " 10/15/2018 Jeanette Veliz, PTA GP 1    17489857069 HC PT ELECTRICAL STIM UNATTENDED 10/15/2018 Jeanette Veliz, RODNEY  1                    Jeanette Veliz, RODNEY  10/15/2018

## 2018-10-18 ENCOUNTER — APPOINTMENT (OUTPATIENT)
Dept: PHYSICAL THERAPY | Facility: HOSPITAL | Age: 58
End: 2018-10-18

## 2018-10-31 ENCOUNTER — DOCUMENTATION (OUTPATIENT)
Dept: PHYSICAL THERAPY | Facility: HOSPITAL | Age: 58
End: 2018-10-31

## 2018-10-31 DIAGNOSIS — G89.29 CHRONIC BILATERAL LOW BACK PAIN WITHOUT SCIATICA: Primary | ICD-10-CM

## 2018-10-31 DIAGNOSIS — M54.50 CHRONIC BILATERAL LOW BACK PAIN WITHOUT SCIATICA: Primary | ICD-10-CM

## 2018-11-07 ENCOUNTER — DOCUMENTATION (OUTPATIENT)
Dept: PHYSICAL THERAPY | Facility: HOSPITAL | Age: 58
End: 2018-11-07

## 2018-11-07 DIAGNOSIS — G89.29 CHRONIC BILATERAL LOW BACK PAIN WITHOUT SCIATICA: Primary | ICD-10-CM

## 2018-11-07 DIAGNOSIS — M54.50 CHRONIC BILATERAL LOW BACK PAIN WITHOUT SCIATICA: Primary | ICD-10-CM

## 2019-01-24 DIAGNOSIS — I10 ESSENTIAL HYPERTENSION: ICD-10-CM

## 2019-01-24 RX ORDER — LISINOPRIL 40 MG/1
40 TABLET ORAL DAILY
Qty: 30 TABLET | Refills: 0 | Status: SHIPPED | OUTPATIENT
Start: 2019-01-24 | End: 2019-02-08 | Stop reason: SDUPTHER

## 2019-01-24 NOTE — TELEPHONE ENCOUNTER
Patsy called reports they need a 1 month refill to do until his apt. (pharmacy says they have no refills left) refilled x 1 month pending his follow up in Feb.

## 2019-01-30 ENCOUNTER — TELEPHONE (OUTPATIENT)
Dept: FAMILY MEDICINE CLINIC | Facility: CLINIC | Age: 59
End: 2019-01-30

## 2019-01-30 DIAGNOSIS — G89.29 CHRONIC LOW BACK PAIN, UNSPECIFIED BACK PAIN LATERALITY, WITH SCIATICA PRESENCE UNSPECIFIED: ICD-10-CM

## 2019-01-30 DIAGNOSIS — M54.5 CHRONIC LOW BACK PAIN, UNSPECIFIED BACK PAIN LATERALITY, WITH SCIATICA PRESENCE UNSPECIFIED: ICD-10-CM

## 2019-01-30 RX ORDER — GABAPENTIN 800 MG/1
1200 TABLET ORAL 3 TIMES DAILY
Qty: 135 TABLET | Refills: 1 | Status: SHIPPED | OUTPATIENT
Start: 2019-01-30 | End: 2019-02-08 | Stop reason: SDUPTHER

## 2019-01-30 NOTE — TELEPHONE ENCOUNTER
Wife called for a refill on patient's Gabapentin,axel is on your desk,has a follow up scheduled for 2/08/19.

## 2019-02-08 ENCOUNTER — OFFICE VISIT (OUTPATIENT)
Dept: FAMILY MEDICINE CLINIC | Facility: CLINIC | Age: 59
End: 2019-02-08

## 2019-02-08 VITALS
SYSTOLIC BLOOD PRESSURE: 118 MMHG | WEIGHT: 234 LBS | BODY MASS INDEX: 33.5 KG/M2 | TEMPERATURE: 97.9 F | DIASTOLIC BLOOD PRESSURE: 62 MMHG | OXYGEN SATURATION: 98 % | HEIGHT: 70 IN | HEART RATE: 94 BPM

## 2019-02-08 DIAGNOSIS — G89.29 CHRONIC LOW BACK PAIN, UNSPECIFIED BACK PAIN LATERALITY, WITH SCIATICA PRESENCE UNSPECIFIED: ICD-10-CM

## 2019-02-08 DIAGNOSIS — Z87.39 HISTORY OF DEGENERATIVE DISC DISEASE: ICD-10-CM

## 2019-02-08 DIAGNOSIS — E55.9 VITAMIN D DEFICIENCY: Primary | ICD-10-CM

## 2019-02-08 DIAGNOSIS — R73.09 ELEVATED GLUCOSE: ICD-10-CM

## 2019-02-08 DIAGNOSIS — M54.5 CHRONIC LOW BACK PAIN, UNSPECIFIED BACK PAIN LATERALITY, WITH SCIATICA PRESENCE UNSPECIFIED: ICD-10-CM

## 2019-02-08 DIAGNOSIS — I10 ESSENTIAL HYPERTENSION: ICD-10-CM

## 2019-02-08 LAB
25(OH)D3 SERPL-MCNC: 25 NG/ML
ALBUMIN SERPL-MCNC: 4 G/DL (ref 3.5–5)
ALBUMIN/GLOB SERPL: 1.2 G/DL (ref 1.5–2.5)
ALP SERPL-CCNC: 98 U/L (ref 40–129)
ALT SERPL W P-5'-P-CCNC: 23 U/L (ref 10–44)
ANION GAP SERPL CALCULATED.3IONS-SCNC: 0.5 MMOL/L (ref 3.6–11.2)
AST SERPL-CCNC: 24 U/L (ref 10–34)
BASOPHILS # BLD AUTO: 0.07 10*3/MM3 (ref 0–0.3)
BASOPHILS NFR BLD AUTO: 0.9 % (ref 0–2)
BILIRUB SERPL-MCNC: 0.3 MG/DL (ref 0.2–1.8)
BUN BLD-MCNC: 9 MG/DL (ref 7–21)
BUN/CREAT SERPL: 7.1 (ref 7–25)
CALCIUM SPEC-SCNC: 8.8 MG/DL (ref 7.7–10)
CHLORIDE SERPL-SCNC: 101 MMOL/L (ref 99–112)
CO2 SERPL-SCNC: 27.5 MMOL/L (ref 24.3–31.9)
CREAT BLD-MCNC: 1.27 MG/DL (ref 0.43–1.29)
DEPRECATED RDW RBC AUTO: 45.1 FL (ref 37–54)
EOSINOPHIL # BLD AUTO: 0.12 10*3/MM3 (ref 0–0.7)
EOSINOPHIL NFR BLD AUTO: 1.5 % (ref 0–5)
ERYTHROCYTE [DISTWIDTH] IN BLOOD BY AUTOMATED COUNT: 13.6 % (ref 11.5–14.5)
GFR SERPL CREATININE-BSD FRML MDRD: 58 ML/MIN/1.73
GLOBULIN UR ELPH-MCNC: 3.3 GM/DL
GLUCOSE BLD-MCNC: 166 MG/DL (ref 70–110)
HBA1C MFR BLD: 6.3 % (ref 4.5–5.7)
HCT VFR BLD AUTO: 49.7 % (ref 42–52)
HGB BLD-MCNC: 17 G/DL (ref 14–18)
IMM GRANULOCYTES # BLD AUTO: 0.02 10*3/MM3 (ref 0–0.03)
IMM GRANULOCYTES NFR BLD AUTO: 0.2 % (ref 0–0.5)
LYMPHOCYTES # BLD AUTO: 1.59 10*3/MM3 (ref 1–3)
LYMPHOCYTES NFR BLD AUTO: 19.3 % (ref 21–51)
MCH RBC QN AUTO: 32 PG (ref 27–33)
MCHC RBC AUTO-ENTMCNC: 34.2 G/DL (ref 33–37)
MCV RBC AUTO: 93.6 FL (ref 80–94)
MONOCYTES # BLD AUTO: 0.76 10*3/MM3 (ref 0.1–0.9)
MONOCYTES NFR BLD AUTO: 9.2 % (ref 0–10)
NEUTROPHILS # BLD AUTO: 5.67 10*3/MM3 (ref 1.4–6.5)
NEUTROPHILS NFR BLD AUTO: 68.9 % (ref 30–70)
OSMOLALITY SERPL CALC.SUM OF ELEC: 261.4 MOSM/KG (ref 273–305)
PLATELET # BLD AUTO: 185 10*3/MM3 (ref 130–400)
PMV BLD AUTO: 11 FL (ref 6–10)
POTASSIUM BLD-SCNC: 4.8 MMOL/L (ref 3.5–5.3)
PROT SERPL-MCNC: 7.3 G/DL (ref 6–8)
RBC # BLD AUTO: 5.31 10*6/MM3 (ref 4.7–6.1)
SODIUM BLD-SCNC: 129 MMOL/L (ref 135–153)
TSH SERPL DL<=0.05 MIU/L-ACNC: 1.03 MIU/ML (ref 0.55–4.78)
VIT B12 BLD-MCNC: 1612 PG/ML (ref 211–911)
WBC NRBC COR # BLD: 8.23 10*3/MM3 (ref 4.5–12.5)

## 2019-02-08 PROCEDURE — 84443 ASSAY THYROID STIM HORMONE: CPT | Performed by: NURSE PRACTITIONER

## 2019-02-08 PROCEDURE — 82607 VITAMIN B-12: CPT | Performed by: NURSE PRACTITIONER

## 2019-02-08 PROCEDURE — 80053 COMPREHEN METABOLIC PANEL: CPT | Performed by: NURSE PRACTITIONER

## 2019-02-08 PROCEDURE — 99214 OFFICE O/P EST MOD 30 MIN: CPT | Performed by: NURSE PRACTITIONER

## 2019-02-08 PROCEDURE — 82306 VITAMIN D 25 HYDROXY: CPT | Performed by: NURSE PRACTITIONER

## 2019-02-08 PROCEDURE — 83036 HEMOGLOBIN GLYCOSYLATED A1C: CPT | Performed by: NURSE PRACTITIONER

## 2019-02-08 PROCEDURE — 85025 COMPLETE CBC W/AUTO DIFF WBC: CPT | Performed by: NURSE PRACTITIONER

## 2019-02-08 RX ORDER — NABUMETONE 500 MG/1
500 TABLET, FILM COATED ORAL 2 TIMES DAILY PRN
Qty: 60 TABLET | Refills: 5 | Status: SHIPPED | OUTPATIENT
Start: 2019-02-08 | End: 2019-08-07 | Stop reason: SDUPTHER

## 2019-02-08 RX ORDER — ATENOLOL 50 MG/1
50 TABLET ORAL DAILY
Qty: 30 TABLET | Refills: 5 | Status: SHIPPED | OUTPATIENT
Start: 2019-02-08 | End: 2019-08-07 | Stop reason: SDUPTHER

## 2019-02-08 RX ORDER — METHOCARBAMOL 750 MG/1
750 TABLET, FILM COATED ORAL 4 TIMES DAILY PRN
Qty: 120 TABLET | Refills: 5 | Status: SHIPPED | OUTPATIENT
Start: 2019-02-08 | End: 2019-08-07 | Stop reason: SDUPTHER

## 2019-02-08 RX ORDER — ALBUTEROL SULFATE 90 UG/1
2 AEROSOL, METERED RESPIRATORY (INHALATION) EVERY 6 HOURS PRN
Qty: 1 INHALER | Refills: 5 | Status: SHIPPED | OUTPATIENT
Start: 2019-02-08 | End: 2020-08-13 | Stop reason: SDUPTHER

## 2019-02-08 RX ORDER — GABAPENTIN 800 MG/1
1200 TABLET ORAL 3 TIMES DAILY
Qty: 135 TABLET | Refills: 2 | Status: SHIPPED | OUTPATIENT
Start: 2019-02-08 | End: 2019-07-08 | Stop reason: SDUPTHER

## 2019-02-08 RX ORDER — LISINOPRIL 40 MG/1
40 TABLET ORAL DAILY
Qty: 30 TABLET | Refills: 5 | Status: SHIPPED | OUTPATIENT
Start: 2019-02-08 | End: 2019-08-07 | Stop reason: SDUPTHER

## 2019-02-08 RX ORDER — GABAPENTIN 800 MG/1
1200 TABLET ORAL 3 TIMES DAILY
Qty: 135 TABLET | Refills: 1 | Status: CANCELLED | OUTPATIENT
Start: 2019-02-08

## 2019-02-08 NOTE — PROGRESS NOTES
Subjective   Gurwinder Harding is a 58 y.o. male.     Chief Complaint: Follow-up and Hypertension    History of Present Illness   Hypertension   This is a chronic problem. The problem is controlled. Pertinent negatives include no chest pain, headaches, palpitations or shortness of breath. Current antihypertension treatment includes ACE inhibitors and beta blockers. The current treatment provides significant improvement. Compliance problems include exercise and diet.    Back Pain   This is a chronic problem. The current episode started more than 1 year ago. The problem has been waxing and waning since onset. The pain is present in the lumbar spine. The quality of the pain is described as aching. The pain radiates to the left foot and right knee. The pain is moderate. The symptoms are aggravated by standing, bending and twisting. Associated symptoms include leg pain. Pertinent negatives include no bladder incontinence, bowel incontinence, chest pain or headaches. He has tried analgesics and muscle relaxant for the symptoms. The treatment provided mild relief.   Patient has a history of chronic lower back pain with right lower extremity neuropathy. History of back surgery with fusion; neuropathy is at knee downwards, especially on right lower extremity. Patient is currently on gabapentin and with relafen 500 mg. Used to be on tramadol 50 mg orally BID, but we discontinued secondary to concerns about elevated liver enzymes. Failed etodalac 200 mg orally TID. Feels that the gabapentin and the relafen does offer some relief along with the robaxin. Denies any side effects of the medications. States that the medications somewhat control the pain enough so that he can accomplish daily activities. Movement and ambulation are okay. 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  Denies any sedation from the medications.     Pt also states that he has a workers compensation case at work.  He apparently fell at work last year and has a  "\"broken vertabrae and rib\" from the fall.  He continues to see Dr. Fay in [place] regarding his treatment.  He is currently taking hydrocodone prn for his pain.  He has brought the bottle to his office visit today to show what he is taking.  I have advised him to continue to follow with Dr. Fay for his back pain/workers compensation claim.     Heron # [id number] Reviewed and is consistent.  UDS 8/10/2018 reviewed and is consistent.  Patient comfort assessment guide reviewed and updated today.    As part of the patient's treatment plan they are being prescribed a controlled substance/ substances with potential for abuse.  This patient has been made aware of the appropriate use of such medications, including potential risk of somnolence, limited ability to drive and/or work safely, and potential for overdose.  It has also been made clear these medications are for use by the patient only, without concomitant use of alcohol or other substances unless prescribed/advised by medical provider.    Patient has completed prescribing agreement detailing terms of continued prescribing of controlled substances including monitoring HERON reports, urine drug screens, and pill counts.  The patient is aware HERON reports are reviewed on a regular basis and scanned into the chart.    History and physical exam exhibit continued safe and appropriate use of controlled substances.    I advised Gurwinder of the risks of continuing to use tobacco, and I provided him with tobacco cessation educational materials in the After Visit Summary.     During this visit, I spent 3 minutes counseling the patient regarding tobacco cessation.    Family History   Problem Relation Age of Onset   • Hypertension Mother    • Cancer Father         LUNG   • Diabetes Father    • Hypertension Father    • Heart attack Other         GRANDFATHER       Social History     Socioeconomic History   • Marital status:      Spouse name: Not on file   • Number of " children: Not on file   • Years of education: Not on file   • Highest education level: Not on file   Social Needs   • Financial resource strain: Not on file   • Food insecurity - worry: Not on file   • Food insecurity - inability: Not on file   • Transportation needs - medical: Not on file   • Transportation needs - non-medical: Not on file   Occupational History   • Not on file   Tobacco Use   • Smoking status: Current Every Day Smoker     Packs/day: 1.00     Types: Cigarettes   • Smokeless tobacco: Never Used   Substance and Sexual Activity   • Alcohol use: Yes     Alcohol/week: 3.6 oz     Types: 6 Cans of beer per week     Comment: Daily    • Drug use: No   • Sexual activity: Defer   Other Topics Concern   • Not on file   Social History Narrative   • Not on file       Past Medical History:   Diagnosis Date   • Alcohol abuse    • ED (erectile dysfunction)    • History of degenerative disc disease    • Hyperglycemia    • Hypertension    • Low back pain    • Neuropathy    • Screening PSA (prostate specific antigen) 09/2015   • Tobacco abuse    • Vitamin D deficiency        Review of Systems   Constitutional: Negative.    HENT: Negative.    Respiratory: Negative.    Cardiovascular: Negative.    Gastrointestinal: Negative.    Musculoskeletal: Positive for back pain.   Skin: Negative.    Neurological: Negative.    Psychiatric/Behavioral: Negative.        Objective   Physical Exam   Constitutional: He is oriented to person, place, and time. He appears well-developed and well-nourished.   Neck: Normal range of motion. Neck supple.   Cardiovascular: Normal rate, regular rhythm and normal heart sounds.   Pulmonary/Chest: Effort normal and breath sounds normal.   Neurological: He is alert and oriented to person, place, and time.   Skin: Skin is warm and dry.   Psychiatric: He has a normal mood and affect. His behavior is normal. Judgment and thought content normal.   Nursing note and vitals  "reviewed.      Procedures    Vitals: Blood pressure 118/62, pulse 94, temperature 97.9 °F (36.6 °C), temperature source Oral, height 177.8 cm (70\"), weight 106 kg (234 lb), SpO2 98 %.    Allergies:   Allergies   Allergen Reactions   • Novocain [Procaine]    • Penicillins         During this visit the following were done:  Labs Reviewed []    Labs Ordered []    Radiology Reports Reviewed []    Radiology Ordered []    PCP Records Reviewed []    Referring Provider Records Reviewed []    ER Records Reviewed []    Hospital Records Reviewed []    History Obtained From Family []    Radiology Images Reviewed []    Other Reviewed []    Records Requested []      Assessment/Plan   Gurwinder was seen today for follow-up and hypertension.    Diagnoses and all orders for this visit:    Vitamin D deficiency  -     CBC & Differential  -     Comprehensive Metabolic Panel  -     TSH  -     Vitamin B12  -     Vitamin D 25 Hydroxy  -     CBC Auto Differential    Chronic low back pain, unspecified back pain laterality, with sciatica presence unspecified  -     nabumetone (RELAFEN) 500 MG tablet; Take 1 tablet by mouth 2 (Two) Times a Day As Needed for Mild Pain .  -     methocarbamol (ROBAXIN) 750 MG tablet; Take 1 tablet by mouth 4 (Four) Times a Day As Needed for Muscle Spasms.  -     CBC & Differential  -     Comprehensive Metabolic Panel  -     TSH  -     Vitamin B12  -     gabapentin (NEURONTIN) 800 MG tablet; Take 1.5 tablets by mouth 3 (Three) Times a Day.  -     CBC Auto Differential    History of degenerative disc disease  -     methocarbamol (ROBAXIN) 750 MG tablet; Take 1 tablet by mouth 4 (Four) Times a Day As Needed for Muscle Spasms.  -     CBC & Differential  -     Comprehensive Metabolic Panel  -     TSH  -     Vitamin B12  -     CBC Auto Differential    Essential hypertension  -     atenolol (TENORMIN) 50 MG tablet; Take 1 tablet by mouth Daily.  -     folic acid-pyridoxine-cyanocobalamin (FOLBIC) 2.5-25-2 MG tablet tablet; " Take 1 tablet by mouth Daily.  -     lisinopril (PRINIVIL,ZESTRIL) 40 MG tablet; Take 1 tablet by mouth Daily.  -     CBC & Differential  -     Comprehensive Metabolic Panel  -     TSH  -     Vitamin B12  -     CBC Auto Differential    Elevated glucose  -     CBC & Differential  -     Comprehensive Metabolic Panel  -     Hemoglobin A1c  -     TSH  -     Vitamin B12  -     CBC Auto Differential    Other orders  -     Cancel: gabapentin (NEURONTIN) 800 MG tablet; Take 1.5 tablets by mouth 3 (Three) Times a Day.  -     albuterol sulfate  (90 Base) MCG/ACT inhaler; Inhale 2 puffs Every 6 (Six) Hours As Needed for Wheezing.      Labs today

## 2019-02-12 ENCOUNTER — TELEPHONE (OUTPATIENT)
Dept: FAMILY MEDICINE CLINIC | Facility: CLINIC | Age: 59
End: 2019-02-12

## 2019-02-12 DIAGNOSIS — E87.1 HYPONATREMIA: Primary | ICD-10-CM

## 2019-02-12 RX ORDER — ERGOCALCIFEROL 1.25 MG/1
50000 CAPSULE ORAL WEEKLY
Qty: 4 CAPSULE | Refills: 2 | Status: SHIPPED | OUTPATIENT
Start: 2019-02-12 | End: 2019-08-07

## 2019-02-12 NOTE — TELEPHONE ENCOUNTER
----- Message from KEVIN Mullen sent at 2/12/2019  1:13 PM EST -----  Vit D is a little low.  I have sent a script to his pharmacy for weekly Vit D.    His sodium is a little low.  Is he still drinking alcohol daily.  He really needs to stop alcohol.  It is dropping his sodium too low and he could end up hospitalized due to it being low.   I would suggest rechecking his sodium level in a month  Please let him know.        Spoke with patient & he verbalized understanding,reports he is still drinking alcohol,reports he will return in 1 month for a repeat lab

## 2019-02-12 NOTE — PROGRESS NOTES
Vit D is a little low.  I have sent a script to his pharmacy for weekly Vit D.    His sodium is a little low.  Is he still drinking alcohol daily.  He really needs to stop alcohol.  It is dropping his sodium too low and he could end up hospitalized due to it being low.   I would suggest rechecking his sodium level in a month  Please let him know.

## 2019-06-13 ENCOUNTER — OFFICE VISIT (OUTPATIENT)
Dept: FAMILY MEDICINE CLINIC | Facility: CLINIC | Age: 59
End: 2019-06-13

## 2019-06-13 VITALS
TEMPERATURE: 98 F | HEIGHT: 70 IN | BODY MASS INDEX: 31.92 KG/M2 | OXYGEN SATURATION: 99 % | DIASTOLIC BLOOD PRESSURE: 81 MMHG | WEIGHT: 223 LBS | SYSTOLIC BLOOD PRESSURE: 133 MMHG | HEART RATE: 73 BPM

## 2019-06-13 DIAGNOSIS — M79.672 BILATERAL FOOT PAIN: Primary | ICD-10-CM

## 2019-06-13 DIAGNOSIS — L02.91 ABSCESS: ICD-10-CM

## 2019-06-13 DIAGNOSIS — M79.671 BILATERAL FOOT PAIN: Primary | ICD-10-CM

## 2019-06-13 PROCEDURE — 99214 OFFICE O/P EST MOD 30 MIN: CPT | Performed by: NURSE PRACTITIONER

## 2019-06-13 RX ORDER — SULFAMETHOXAZOLE AND TRIMETHOPRIM 800; 160 MG/1; MG/1
1 TABLET ORAL 2 TIMES DAILY
Qty: 20 TABLET | Refills: 0 | Status: SHIPPED | OUTPATIENT
Start: 2019-06-13 | End: 2019-08-07

## 2019-06-13 NOTE — PROGRESS NOTES
Subjective   Gurwinder Harding is a 59 y.o. male.     Chief Complaint: Pain (feet and legs ) and Suspicious Skin Lesion (back of left ear )    Lower Extremity Issue   This is a new problem. The current episode started 1 to 4 weeks ago. The problem occurs constantly. The problem has been unchanged. Associated symptoms include a rash. Associated symptoms comments: Bilateral lower extremity pain and redness to bilateral feet; pt states that he awoke through the night and had severe leg cramp in his left lower extremity and then foot pain with redness since then; painful walking on feet . Nothing aggravates the symptoms. He has tried nothing for the symptoms.   Rash   This is a new (abscess behind left ear) problem. The current episode started 1 to 4 weeks ago. The problem is unchanged. Location: behind left ear. He was exposed to nothing. Past treatments include nothing.        Family History   Problem Relation Age of Onset   • Hypertension Mother    • Cancer Father         LUNG   • Diabetes Father    • Hypertension Father    • Heart attack Other         GRANDFATHER       Social History     Socioeconomic History   • Marital status:      Spouse name: Not on file   • Number of children: Not on file   • Years of education: Not on file   • Highest education level: Not on file   Tobacco Use   • Smoking status: Current Every Day Smoker     Packs/day: 1.00     Types: Cigarettes   • Smokeless tobacco: Never Used   Substance and Sexual Activity   • Alcohol use: Yes     Alcohol/week: 3.6 oz     Types: 6 Cans of beer per week     Comment: Daily    • Drug use: No   • Sexual activity: Defer       Past Medical History:   Diagnosis Date   • Alcohol abuse    • ED (erectile dysfunction)    • History of degenerative disc disease    • Hyperglycemia    • Hypertension    • Low back pain    • Neuropathy    • Screening PSA (prostate specific antigen) 09/2015   • Tobacco abuse    • Vitamin D deficiency        Review of Systems  "  Constitutional: Negative.    HENT: Negative.    Respiratory: Negative.    Cardiovascular: Negative.    Gastrointestinal: Negative.    Musculoskeletal: Negative.    Skin: Positive for rash.   Neurological: Negative.    Psychiatric/Behavioral: Negative.        Objective   Physical Exam   Constitutional: He is oriented to person, place, and time. He appears well-developed and well-nourished.   Neck: Normal range of motion. Neck supple.   Cardiovascular: Normal rate, regular rhythm and normal heart sounds.   Pulmonary/Chest: Effort normal and breath sounds normal.   Musculoskeletal: Normal range of motion. He exhibits no edema or deformity.   Erythematous feet bilaterally; blanchable areas; tender to touch; bilateral pedal pulses decreased   Neurological: He is alert and oriented to person, place, and time.   Skin: Skin is warm and dry. Capillary refill takes more than 3 seconds. There is erythema.   Small cyst appearing area behind left ear.  Right foot cool to touch   Psychiatric: He has a normal mood and affect. His behavior is normal. Judgment and thought content normal.   Nursing note and vitals reviewed.      Procedures    Vitals: Blood pressure 133/81, pulse 73, temperature 98 °F (36.7 °C), temperature source Oral, height 177.8 cm (70\"), weight 101 kg (223 lb), SpO2 99 %.    Allergies:   Allergies   Allergen Reactions   • Novocain [Procaine]    • Penicillins         During this visit the following were done:  Labs Reviewed []    Labs Ordered []    Radiology Reports Reviewed []    Radiology Ordered []    PCP Records Reviewed []    Referring Provider Records Reviewed []    ER Records Reviewed []    Hospital Records Reviewed []    History Obtained From Family []    Radiology Images Reviewed []    Other Reviewed []    Records Requested []      Assessment/Plan   Gurwinder was seen today for pain and suspicious skin lesion.    Diagnoses and all orders for this visit:    Bilateral foot pain  -     Duplex Lower Extremity " Art / Grafts - Bilateral CAR; Future    Abscess  -     sulfamethoxazole-trimethoprim (BACTRIM DS,SEPTRA DS) 800-160 MG per tablet; Take 1 tablet by mouth 2 (Two) Times a Day.

## 2019-06-14 ENCOUNTER — TELEPHONE (OUTPATIENT)
Dept: FAMILY MEDICINE CLINIC | Facility: CLINIC | Age: 59
End: 2019-06-14

## 2019-06-14 DIAGNOSIS — G62.9 NEUROPATHY: ICD-10-CM

## 2019-06-14 DIAGNOSIS — M79.672 BILATERAL FOOT PAIN: Primary | ICD-10-CM

## 2019-06-14 DIAGNOSIS — M79.671 BILATERAL FOOT PAIN: Primary | ICD-10-CM

## 2019-06-14 NOTE — TELEPHONE ENCOUNTER
Need corrected order for duplex lower ext.    TEST SHOULD BE US ARTERIAL DOPPLER LOWER EXT COMPLETE. PLEASE CORRECT. Per scheduling

## 2019-06-25 ENCOUNTER — HOSPITAL ENCOUNTER (OUTPATIENT)
Dept: CARDIOLOGY | Facility: HOSPITAL | Age: 59
Discharge: HOME | End: 2019-06-25
Admitting: NURSE PRACTITIONER

## 2019-06-25 DIAGNOSIS — M79.672 BILATERAL FOOT PAIN: ICD-10-CM

## 2019-06-25 DIAGNOSIS — M79.671 BILATERAL FOOT PAIN: ICD-10-CM

## 2019-06-25 DIAGNOSIS — G62.9 NEUROPATHY: ICD-10-CM

## 2019-06-25 PROCEDURE — 93925 LOWER EXTREMITY STUDY: CPT

## 2019-06-25 PROCEDURE — 93925 LOWER EXTREMITY STUDY: CPT | Performed by: RADIOLOGY

## 2019-06-26 NOTE — PROGRESS NOTES
Patient does have some blood flow issues in his lower extremities and needs to be evaluated by vascular surgeon.    Is he agreeable and does he have a preference to who he sees?

## 2019-06-27 ENCOUNTER — TELEPHONE (OUTPATIENT)
Dept: FAMILY MEDICINE CLINIC | Facility: CLINIC | Age: 59
End: 2019-06-27

## 2019-06-27 NOTE — TELEPHONE ENCOUNTER
----- Message from KEVIN Mullen sent at 6/26/2019 11:52 AM EDT -----  Patient does have some blood flow issues in his lower extremities and needs to be evaluated by vascular surgeon.    Is he agreeable and does he have a preference to who he sees?      Left a message to return call.    Spoke with patient & he verbalized understanding,has no preferences,whom ever you feel is best.

## 2019-07-02 DIAGNOSIS — I73.9 PAD (PERIPHERAL ARTERY DISEASE) (HCC): Primary | ICD-10-CM

## 2019-07-08 ENCOUNTER — TELEPHONE (OUTPATIENT)
Dept: FAMILY MEDICINE CLINIC | Facility: CLINIC | Age: 59
End: 2019-07-08

## 2019-07-08 DIAGNOSIS — M54.5 CHRONIC LOW BACK PAIN, UNSPECIFIED BACK PAIN LATERALITY, WITH SCIATICA PRESENCE UNSPECIFIED: ICD-10-CM

## 2019-07-08 DIAGNOSIS — G89.29 CHRONIC LOW BACK PAIN, UNSPECIFIED BACK PAIN LATERALITY, WITH SCIATICA PRESENCE UNSPECIFIED: ICD-10-CM

## 2019-07-08 RX ORDER — GABAPENTIN 800 MG/1
1200 TABLET ORAL 3 TIMES DAILY
Qty: 135 TABLET | Refills: 2 | Status: SHIPPED | OUTPATIENT
Start: 2019-07-08 | End: 2019-10-07 | Stop reason: SDUPTHER

## 2019-07-08 NOTE — TELEPHONE ENCOUNTER
Sent to pharmacy  axel # [id number] reviewed      Left a detailed message that the requested medication has been sent to his pharmacy.

## 2019-07-19 ENCOUNTER — OFFICE VISIT (OUTPATIENT)
Dept: SURGERY | Facility: CLINIC | Age: 59
End: 2019-07-19

## 2019-07-19 VITALS
BODY MASS INDEX: 31.92 KG/M2 | HEIGHT: 70 IN | DIASTOLIC BLOOD PRESSURE: 89 MMHG | OXYGEN SATURATION: 99 % | TEMPERATURE: 98 F | HEART RATE: 76 BPM | RESPIRATION RATE: 17 BRPM | WEIGHT: 223 LBS | SYSTOLIC BLOOD PRESSURE: 137 MMHG

## 2019-07-19 DIAGNOSIS — I10 HYPERTENSION, UNSPECIFIED TYPE: ICD-10-CM

## 2019-07-19 DIAGNOSIS — I73.9 PERIPHERAL VASCULAR DISEASE OF EXTREMITY (HCC): Primary | ICD-10-CM

## 2019-07-19 DIAGNOSIS — I73.9 CLAUDICATION (HCC): ICD-10-CM

## 2019-07-19 PROCEDURE — 99204 OFFICE O/P NEW MOD 45 MIN: CPT | Performed by: SURGERY

## 2019-07-19 NOTE — PROGRESS NOTES
7/19/2019    Patient Information  Gurwinder Harding  [address] [place] [address]  1960  [phone] (home) [phone] (work)    Chief Complaint   Patient presents with   • Consult     Vascular Consult       HPI  Patient is a 59-year-old white male referred for vascular consultation.  He is having bilateral leg pain and very short distance claudication.  He is a long-term smoker.  He also has hypertension.  He denies diabetes, stroke, TIAs.  He had bilateral arterial Doppler performed which suggest inflow disease and also's outflow disease.    Review of Systems    The Past medical history, family history, social history, medication list, allergies, and Review of Systems has been reviewed and confirmed.      General: negative  Integumentary: negative  Eyes: glasses  ENT: negative  Respiratory: shortness of breath  Gastrointestinal: negative  Cardiovascular: negative  Neurological: dizziness  Psychiatric: negative  Hematologic/Lymphatic: negative  Genitourinary: negative  Musculoskeletal: painful joints, sore muscles and back pain  Endocrine: negative  Breasts: negative      Patient Active Problem List   Diagnosis   • Vitamin D deficiency   • Tobacco abuse   • Alcohol abuse   • ED (erectile dysfunction)   • History of degenerative disc disease   • Neuropathy   • Low back pain   • Hypertension   • Elevated AST (SGOT)   • Screening PSA (prostate specific antigen)   • Hyperglycemia   • Segmental and somatic dysfunction of lumbar region   • Segmental and somatic dysfunction of pelvic region   • Segmental and somatic dysfunction of sacral region         Past Medical History:   Diagnosis Date   • Alcohol abuse    • ED (erectile dysfunction)    • History of degenerative disc disease    • Hyperglycemia    • Hypertension    • Low back pain    • Neuropathy    • Screening PSA (prostate specific antigen) 09/2015   • Tobacco abuse    • Vitamin D deficiency          Past Surgical History:   Procedure Laterality Date   •  "APPENDECTOMY     • BACK SURGERY      DISC RUPTURE REPAIR, L5         Family History   Problem Relation Age of Onset   • Hypertension Mother    • Cancer Father         LUNG   • Diabetes Father    • Hypertension Father    • Heart attack Other         GRANDFATHER         Social History     Tobacco Use   • Smoking status: Current Every Day Smoker     Packs/day: 1.00     Types: Cigarettes   • Smokeless tobacco: Never Used   Substance Use Topics   • Alcohol use: Yes     Alcohol/week: 3.6 oz     Types: 6 Cans of beer per week     Comment: Daily    • Drug use: No       Current Outpatient Medications   Medication Sig Dispense Refill   • albuterol sulfate  (90 Base) MCG/ACT inhaler Inhale 2 puffs Every 6 (Six) Hours As Needed for Wheezing. 1 inhaler 5   • atenolol (TENORMIN) 50 MG tablet Take 1 tablet by mouth Daily. 30 tablet 5   • folic acid-pyridoxine-cyanocobalamin (FOLBIC) 2.5-25-2 MG tablet tablet Take 1 tablet by mouth Daily. 30 each 5   • gabapentin (NEURONTIN) 800 MG tablet Take 1.5 tablets by mouth 3 (Three) Times a Day. 135 tablet 2   • lisinopril (PRINIVIL,ZESTRIL) 40 MG tablet Take 1 tablet by mouth Daily. 30 tablet 5   • methocarbamol (ROBAXIN) 750 MG tablet Take 1 tablet by mouth 4 (Four) Times a Day As Needed for Muscle Spasms. 120 tablet 5   • nabumetone (RELAFEN) 500 MG tablet Take 1 tablet by mouth 2 (Two) Times a Day As Needed for Mild Pain . 60 tablet 5   • sulfamethoxazole-trimethoprim (BACTRIM DS,SEPTRA DS) 800-160 MG per tablet Take 1 tablet by mouth 2 (Two) Times a Day. 20 tablet 0   • vitamin D (ERGOCALCIFEROL) 83490 units capsule capsule Take 1 capsule by mouth 1 (One) Time Per Week. 4 capsule 2     No current facility-administered medications for this visit.          Allergies  Novocain [procaine] and Penicillins    /89   Pulse 76   Temp 98 °F (36.7 °C)   Resp 17   Ht 177.8 cm (70\")   Wt 101 kg (223 lb)   SpO2 99%   BMI 32.00 kg/m²      Physical Exam    General :  Patient is a " 59 y.o. male in no acute distress.    HEENT:  Normocephalic, pupils equally round and reactive to light, extraocular motions intact.  Chest:  Clear bilaterally. Equal breath sounds. No rales or rhonchi.  Heart:   Regular rate and rhythm.  Abdomen:  Soft, nontender. No distention.  :  Normal external genitalia.  Rectal:  Not performed.  Extremities:  He has dependent rubor and pallor on elevation.  He has no palpable popliteal, posterior tibial, dorsalis pedis pulses bilaterally.  Left side he has a very weak femoral pulse.  Right side he has an adequate femoral pulse   patient oriented ×3. Cranial nerves grossly intact. No sensory,cellebellar, or motor dysfunction.                ASSESSMENT  Peripheral vascular disease with lifestyle limiting claudication        PLAN    ABIs, CTA    Discussed with patient and wife the importance of smoking sensation    Patient's Body mass index is 32 kg/m². BMI is above normal parameters. Recommendations include: educational material.           This document signed by Rick Floyd MD July 19, 2019 4:20 PM

## 2019-07-31 ENCOUNTER — HOSPITAL ENCOUNTER (OUTPATIENT)
Dept: CT IMAGING | Facility: HOSPITAL | Age: 59
Discharge: HOME | End: 2019-07-31
Admitting: SURGERY

## 2019-07-31 ENCOUNTER — HOSPITAL ENCOUNTER (OUTPATIENT)
Dept: ULTRASOUND IMAGING | Facility: HOSPITAL | Age: 59
Discharge: HOME | End: 2019-07-31

## 2019-07-31 DIAGNOSIS — I73.9 CLAUDICATION (HCC): ICD-10-CM

## 2019-07-31 DIAGNOSIS — I73.9 PERIPHERAL VASCULAR DISEASE OF EXTREMITY (HCC): ICD-10-CM

## 2019-07-31 DIAGNOSIS — I10 HYPERTENSION, UNSPECIFIED TYPE: ICD-10-CM

## 2019-07-31 PROCEDURE — 75635 CT ANGIO ABDOMINAL ARTERIES: CPT | Performed by: RADIOLOGY

## 2019-07-31 PROCEDURE — 0 IOVERSOL 74 % SOLUTION: Performed by: SURGERY

## 2019-07-31 PROCEDURE — 93922 UPR/L XTREMITY ART 2 LEVELS: CPT | Performed by: RADIOLOGY

## 2019-07-31 PROCEDURE — 75635 CT ANGIO ABDOMINAL ARTERIES: CPT

## 2019-07-31 PROCEDURE — 93922 UPR/L XTREMITY ART 2 LEVELS: CPT

## 2019-07-31 RX ADMIN — IOVERSOL 100 ML: 741 INJECTION INTRA-ARTERIAL; INTRAVENOUS at 14:14

## 2019-08-07 ENCOUNTER — OFFICE VISIT (OUTPATIENT)
Dept: FAMILY MEDICINE CLINIC | Facility: CLINIC | Age: 59
End: 2019-08-07

## 2019-08-07 VITALS
TEMPERATURE: 97.9 F | HEART RATE: 86 BPM | HEIGHT: 70 IN | WEIGHT: 224 LBS | BODY MASS INDEX: 32.07 KG/M2 | SYSTOLIC BLOOD PRESSURE: 134 MMHG | OXYGEN SATURATION: 99 % | DIASTOLIC BLOOD PRESSURE: 66 MMHG

## 2019-08-07 DIAGNOSIS — Z87.39 HISTORY OF DEGENERATIVE DISC DISEASE: ICD-10-CM

## 2019-08-07 DIAGNOSIS — I10 ESSENTIAL HYPERTENSION: Primary | ICD-10-CM

## 2019-08-07 DIAGNOSIS — F10.10 ALCOHOL ABUSE: ICD-10-CM

## 2019-08-07 DIAGNOSIS — E55.9 VITAMIN D DEFICIENCY: ICD-10-CM

## 2019-08-07 DIAGNOSIS — G89.29 CHRONIC LOW BACK PAIN, UNSPECIFIED BACK PAIN LATERALITY, WITH SCIATICA PRESENCE UNSPECIFIED: ICD-10-CM

## 2019-08-07 DIAGNOSIS — Z72.0 TOBACCO ABUSE: ICD-10-CM

## 2019-08-07 DIAGNOSIS — I73.9 PVD (PERIPHERAL VASCULAR DISEASE) WITH CLAUDICATION (HCC): ICD-10-CM

## 2019-08-07 DIAGNOSIS — R73.03 PREDIABETES: ICD-10-CM

## 2019-08-07 DIAGNOSIS — M99.03 SEGMENTAL AND SOMATIC DYSFUNCTION OF LUMBAR REGION: ICD-10-CM

## 2019-08-07 DIAGNOSIS — G62.9 NEUROPATHY: ICD-10-CM

## 2019-08-07 DIAGNOSIS — S90.812A FOOT ABRASION, LEFT, INITIAL ENCOUNTER: ICD-10-CM

## 2019-08-07 DIAGNOSIS — M54.5 CHRONIC LOW BACK PAIN, UNSPECIFIED BACK PAIN LATERALITY, WITH SCIATICA PRESENCE UNSPECIFIED: ICD-10-CM

## 2019-08-07 PROCEDURE — 82306 VITAMIN D 25 HYDROXY: CPT | Performed by: NURSE PRACTITIONER

## 2019-08-07 PROCEDURE — 83036 HEMOGLOBIN GLYCOSYLATED A1C: CPT | Performed by: NURSE PRACTITIONER

## 2019-08-07 PROCEDURE — 99214 OFFICE O/P EST MOD 30 MIN: CPT | Performed by: NURSE PRACTITIONER

## 2019-08-07 PROCEDURE — 80053 COMPREHEN METABOLIC PANEL: CPT | Performed by: NURSE PRACTITIONER

## 2019-08-07 PROCEDURE — 84443 ASSAY THYROID STIM HORMONE: CPT | Performed by: NURSE PRACTITIONER

## 2019-08-07 PROCEDURE — 85025 COMPLETE CBC W/AUTO DIFF WBC: CPT | Performed by: NURSE PRACTITIONER

## 2019-08-07 PROCEDURE — 83735 ASSAY OF MAGNESIUM: CPT | Performed by: NURSE PRACTITIONER

## 2019-08-07 RX ORDER — NABUMETONE 500 MG/1
500 TABLET, FILM COATED ORAL 2 TIMES DAILY PRN
Qty: 60 TABLET | Refills: 5 | Status: SHIPPED | OUTPATIENT
Start: 2019-08-07 | End: 2020-05-07 | Stop reason: SDUPTHER

## 2019-08-07 RX ORDER — GINSENG 100 MG
CAPSULE ORAL 2 TIMES DAILY
Qty: 28 G | Refills: 1 | Status: SHIPPED | OUTPATIENT
Start: 2019-08-07 | End: 2020-02-07

## 2019-08-07 RX ORDER — METHOCARBAMOL 750 MG/1
750 TABLET, FILM COATED ORAL 4 TIMES DAILY PRN
Qty: 120 TABLET | Refills: 5 | Status: SHIPPED | OUTPATIENT
Start: 2019-08-07 | End: 2020-04-06 | Stop reason: SDUPTHER

## 2019-08-07 RX ORDER — DOXYCYCLINE 50 MG/1
50 CAPSULE ORAL 2 TIMES DAILY
Qty: 14 CAPSULE | Refills: 0 | Status: SHIPPED | OUTPATIENT
Start: 2019-08-07 | End: 2019-08-28

## 2019-08-07 RX ORDER — ATENOLOL 50 MG/1
50 TABLET ORAL DAILY
Qty: 30 TABLET | Refills: 5 | Status: SHIPPED | OUTPATIENT
Start: 2019-08-07 | End: 2020-02-07 | Stop reason: SDUPTHER

## 2019-08-07 RX ORDER — LISINOPRIL 40 MG/1
40 TABLET ORAL DAILY
Qty: 30 TABLET | Refills: 5 | Status: SHIPPED | OUTPATIENT
Start: 2019-08-07 | End: 2020-02-07 | Stop reason: SDUPTHER

## 2019-08-07 NOTE — PROGRESS NOTES
Subjective   Gurwinder Harding is a 59 y.o. male.     Chief Complaint: Follow-up; Hypertension; and Foot Pain (left )    Lower Extremity Issue   This is a chronic (neuropathy bilateral lower extremities; PVD lower extremities and currently following with Dr Floyd) problem. The problem occurs constantly. The problem has been unchanged. The symptoms are aggravated by smoking. Treatments tried: gabapentin. The treatment provided moderate relief.   Diabetes   He presents for his follow-up diabetic visit. Diabetes type: pre-diabetes. His disease course has been stable. There are no hypoglycemic associated symptoms. Associated symptoms include foot paresthesias. There are no hypoglycemic complications. Symptoms are stable. There are no diabetic complications. Risk factors for coronary artery disease include hypertension, male sex, sedentary lifestyle and tobacco exposure. When asked about current treatments, none were reported. He is following a generally healthy diet. When asked about meal planning, he reported none. He has not had a previous visit with a dietitian. He rarely participates in exercise. An ACE inhibitor/angiotensin II receptor blocker is being taken. He does not see a podiatrist.Eye exam is not current.   Foot Injury    The incident occurred more than 1 week ago (pt had 2 cracks in heel and area is reddned and very tender and aching). There was no injury mechanism. The pain is present in the left heel. Nothing aggravates the symptoms.   Hypertension   This is a chronic problem. The problem is controlled. Pertinent negatives include no chest pain, headaches, palpitations or shortness of breath. Current antihypertension treatment includes ACE inhibitors and beta blockers. The current treatment provides significant improvement. Compliance problems include exercise and diet.    Back Pain   This is a chronic problem. The current episode started more than 1 year ago. The problem has been waxing and waning since  onset. The pain is present in the lumbar spine. The quality of the pain is described as aching. The pain radiates to the left foot and right knee. The pain is moderate. The symptoms are aggravated by standing, bending and twisting. Associated symptoms include leg pain. Pertinent negatives include no bladder incontinence, bowel incontinence, chest pain or headaches. He has tried analgesics and muscle relaxant for the symptoms. The treatment provided mild relief.      Heron # [id number] Reviewed and is consistent.  UDS 2/8/2019 reviewed and is consistent.  Patient comfort assessment guide reviewed and updated today.    As part of the patient's treatment plan they are being prescribed a controlled substance/ substances with potential for abuse.  This patient has been made aware of the appropriate use of such medications, including potential risk of somnolence, limited ability to drive and/or work safely, and potential for overdose.  It has also been made clear these medications are for use by the patient only, without concomitant use of alcohol or other substances unless prescribed/advised by medical provider.    Patient has completed prescribing agreement detailing terms of continued prescribing of controlled substances including monitoring HERON reports, urine drug screens, and pill counts.  The patient is aware HERON reports are reviewed on a regular basis and scanned into the chart.    History and physical exam exhibit continued safe and appropriate use of controlled substances.          Family History   Problem Relation Age of Onset   • Hypertension Mother    • Cancer Father         LUNG   • Diabetes Father    • Hypertension Father    • Heart attack Other         GRANDFATHER       Social History     Socioeconomic History   • Marital status:      Spouse name: Not on file   • Number of children: Not on file   • Years of education: Not on file   • Highest education level: Not on file   Tobacco Use   • Smoking  "status: Current Every Day Smoker     Packs/day: 1.00     Types: Cigarettes   • Smokeless tobacco: Never Used   Substance and Sexual Activity   • Alcohol use: Yes     Alcohol/week: 3.6 oz     Types: 6 Cans of beer per week     Comment: Daily    • Drug use: No   • Sexual activity: Defer       Past Medical History:   Diagnosis Date   • Alcohol abuse    • ED (erectile dysfunction)    • History of degenerative disc disease    • Hyperglycemia    • Hypertension    • Low back pain    • Neuropathy    • Screening PSA (prostate specific antigen) 09/2015   • Tobacco abuse    • Vitamin D deficiency        Review of Systems   Constitutional: Negative.    HENT: Negative.    Respiratory: Negative.    Cardiovascular: Negative.    Gastrointestinal: Negative.    Musculoskeletal: Negative.    Skin: Positive for wound.   Neurological: Negative.    Psychiatric/Behavioral: Negative.        Objective   Physical Exam   Constitutional: He is oriented to person, place, and time. He appears well-developed and well-nourished.   Neck: Normal range of motion. Neck supple.   Cardiovascular: Normal rate, regular rhythm and normal heart sounds.   Pulmonary/Chest: Effort normal and breath sounds normal.   Neurological: He is alert and oriented to person, place, and time.   Skin: Skin is warm and dry.   Skin breakdown to left lateral heel with redness extending to ankle area   Psychiatric: He has a normal mood and affect. His behavior is normal. Judgment and thought content normal.   Nursing note and vitals reviewed.      Procedures    Vitals: Blood pressure 134/66, pulse 86, temperature 97.9 °F (36.6 °C), temperature source Oral, height 177.8 cm (70\"), weight 102 kg (224 lb), SpO2 99 %.    Allergies:   Allergies   Allergen Reactions   • Novocain [Procaine]    • Penicillins         During this visit the following were done:  Labs Reviewed []    Labs Ordered []    Radiology Reports Reviewed []    Radiology Ordered []    PCP Records Reviewed []  "   Referring Provider Records Reviewed []    ER Records Reviewed []    Hospital Records Reviewed []    History Obtained From Family []    Radiology Images Reviewed []    Other Reviewed []    Records Requested []      Assessment/Plan   Gurwinder was seen today for follow-up, hypertension and foot pain.    Diagnoses and all orders for this visit:    Essential hypertension  -     atenolol (TENORMIN) 50 MG tablet; Take 1 tablet by mouth Daily.  -     lisinopril (PRINIVIL,ZESTRIL) 40 MG tablet; Take 1 tablet by mouth Daily.  -     CBC & Differential; Future  -     Comprehensive Metabolic Panel; Future  -     TSH; Future  -     Magnesium; Future  -     Hemoglobin A1c; Future  -     Vitamin D 25 Hydroxy; Future    Neuropathy  -     CBC & Differential; Future  -     Comprehensive Metabolic Panel; Future  -     TSH; Future  -     Magnesium; Future  -     Hemoglobin A1c; Future  -     Vitamin D 25 Hydroxy; Future    Vitamin D deficiency  -     CBC & Differential; Future  -     Comprehensive Metabolic Panel; Future  -     TSH; Future  -     Magnesium; Future  -     Hemoglobin A1c; Future  -     Vitamin D 25 Hydroxy; Future    PVD (peripheral vascular disease) with claudication (CMS/[facility])  -     CBC & Differential; Future  -     Comprehensive Metabolic Panel; Future  -     TSH; Future  -     Magnesium; Future  -     Hemoglobin A1c; Future  -     Vitamin D 25 Hydroxy; Future    Prediabetes  -     CBC & Differential; Future  -     Comprehensive Metabolic Panel; Future  -     TSH; Future  -     Magnesium; Future  -     Hemoglobin A1c; Future  -     Vitamin D 25 Hydroxy; Future    Foot abrasion, left, initial encounter  -     bacitracin 500 UNIT/GM ointment; Apply  topically to the appropriate area as directed 2 (Two) Times a Day.  -     doxycycline (MONODOX) 50 MG capsule; Take 1 capsule by mouth 2 (Two) Times a Day.    Chronic low back pain, unspecified back pain laterality, with sciatica presence unspecified  -     methocarbamol  (ROBAXIN) 750 MG tablet; Take 1 tablet by mouth 4 (Four) Times a Day As Needed for Muscle Spasms.  -     nabumetone (RELAFEN) 500 MG tablet; Take 1 tablet by mouth 2 (Two) Times a Day As Needed for Mild Pain .  -     CBC & Differential; Future  -     Comprehensive Metabolic Panel; Future  -     TSH; Future  -     Magnesium; Future  -     Hemoglobin A1c; Future  -     Vitamin D 25 Hydroxy; Future

## 2019-08-08 LAB
25(OH)D3 SERPL-MCNC: 24.3 NG/ML (ref 30–100)
ALBUMIN SERPL-MCNC: 3.9 G/DL (ref 3.5–5.2)
ALBUMIN/GLOB SERPL: 1.2 G/DL
ALP SERPL-CCNC: 82 U/L (ref 39–117)
ALT SERPL W P-5'-P-CCNC: 17 U/L (ref 1–41)
ANION GAP SERPL CALCULATED.3IONS-SCNC: 14.2 MMOL/L (ref 5–15)
AST SERPL-CCNC: 30 U/L (ref 1–40)
BASOPHILS # BLD AUTO: 0.08 10*3/MM3 (ref 0–0.2)
BASOPHILS NFR BLD AUTO: 1.5 % (ref 0–1.5)
BILIRUB SERPL-MCNC: 0.3 MG/DL (ref 0.2–1.2)
BUN BLD-MCNC: 8 MG/DL (ref 6–20)
BUN/CREAT SERPL: 6.1 (ref 7–25)
CALCIUM SPEC-SCNC: 9.5 MG/DL (ref 8.6–10.5)
CHLORIDE SERPL-SCNC: 95 MMOL/L (ref 98–107)
CO2 SERPL-SCNC: 23.8 MMOL/L (ref 22–29)
CREAT BLD-MCNC: 1.31 MG/DL (ref 0.76–1.27)
DEPRECATED RDW RBC AUTO: 51.6 FL (ref 37–54)
EOSINOPHIL # BLD AUTO: 0.28 10*3/MM3 (ref 0–0.4)
EOSINOPHIL NFR BLD AUTO: 5.4 % (ref 0.3–6.2)
ERYTHROCYTE [DISTWIDTH] IN BLOOD BY AUTOMATED COUNT: 13.7 % (ref 12.3–15.4)
GFR SERPL CREATININE-BSD FRML MDRD: 56 ML/MIN/1.73
GLOBULIN UR ELPH-MCNC: 3.2 GM/DL
GLUCOSE BLD-MCNC: 126 MG/DL (ref 65–99)
HBA1C MFR BLD: 5.6 % (ref 4.8–5.6)
HCT VFR BLD AUTO: 51.5 % (ref 37.5–51)
HGB BLD-MCNC: 16.5 G/DL (ref 13–17.7)
IMM GRANULOCYTES # BLD AUTO: 0.03 10*3/MM3 (ref 0–0.05)
IMM GRANULOCYTES NFR BLD AUTO: 0.6 % (ref 0–0.5)
LYMPHOCYTES # BLD AUTO: 1.26 10*3/MM3 (ref 0.7–3.1)
LYMPHOCYTES NFR BLD AUTO: 24.1 % (ref 19.6–45.3)
MAGNESIUM SERPL-MCNC: 2.2 MG/DL (ref 1.6–2.6)
MCH RBC QN AUTO: 32.5 PG (ref 26.6–33)
MCHC RBC AUTO-ENTMCNC: 32 G/DL (ref 31.5–35.7)
MCV RBC AUTO: 101.6 FL (ref 79–97)
MONOCYTES # BLD AUTO: 0.47 10*3/MM3 (ref 0.1–0.9)
MONOCYTES NFR BLD AUTO: 9 % (ref 5–12)
NEUTROPHILS # BLD AUTO: 3.11 10*3/MM3 (ref 1.7–7)
NEUTROPHILS NFR BLD AUTO: 59.4 % (ref 42.7–76)
NRBC BLD AUTO-RTO: 0 /100 WBC (ref 0–0.2)
PLATELET # BLD AUTO: 176 10*3/MM3 (ref 140–450)
PMV BLD AUTO: 11.9 FL (ref 6–12)
POTASSIUM BLD-SCNC: 5.4 MMOL/L (ref 3.5–5.2)
PROT SERPL-MCNC: 7.1 G/DL (ref 6–8.5)
RBC # BLD AUTO: 5.07 10*6/MM3 (ref 4.14–5.8)
SODIUM BLD-SCNC: 133 MMOL/L (ref 136–145)
TSH SERPL DL<=0.05 MIU/L-ACNC: 3.79 MIU/ML (ref 0.27–4.2)
WBC NRBC COR # BLD: 5.23 10*3/MM3 (ref 3.4–10.8)

## 2019-08-08 NOTE — PROGRESS NOTES
Vit D is low.  He might consider taking Vit D otc daily.  HIs A1C is normal.   He needs to drink plenty of water as his kidney function has slightly declined.  He needs to limit his alcohol intake also.   Please let him know.

## 2019-08-09 ENCOUNTER — TELEPHONE (OUTPATIENT)
Dept: FAMILY MEDICINE CLINIC | Facility: CLINIC | Age: 59
End: 2019-08-09

## 2019-08-09 NOTE — TELEPHONE ENCOUNTER
----- Message from KEVIN Mullen sent at 8/8/2019  4:39 PM EDT -----  Vit D is low.  He might consider taking Vit D otc daily.  HIs A1C is normal.   He needs to drink plenty of water as his kidney function has slightly declined.  He needs to limit his alcohol intake also.   Please let him know.       Left a message to return call.      Patient returned call & verbalized understanding.

## 2019-08-15 ENCOUNTER — OFFICE VISIT (OUTPATIENT)
Dept: SURGERY | Facility: CLINIC | Age: 59
End: 2019-08-15

## 2019-08-15 VITALS
HEART RATE: 75 BPM | DIASTOLIC BLOOD PRESSURE: 88 MMHG | HEIGHT: 70 IN | SYSTOLIC BLOOD PRESSURE: 138 MMHG | BODY MASS INDEX: 32.07 KG/M2 | WEIGHT: 224 LBS | TEMPERATURE: 98 F

## 2019-08-15 DIAGNOSIS — I73.9 PERIPHERAL VASCULAR DISEASE OF EXTREMITY (HCC): Primary | ICD-10-CM

## 2019-08-15 DIAGNOSIS — I73.9 CLAUDICATION (HCC): ICD-10-CM

## 2019-08-15 PROCEDURE — 99214 OFFICE O/P EST MOD 30 MIN: CPT | Performed by: SURGERY

## 2019-08-15 NOTE — PROGRESS NOTES
8/15/2019    Patient Information  Gurwinder Harding  [address] [place] [address]  1960  [phone] (home) [phone] (work)    Chief Complaint   Patient presents with   • Follow-up     FU ABIs/CTA       HPI  Patient is a 59-year-old white male here for follow-up on ABIs and CTA.  ABIs are 0.6 bilaterally.  CTA shows occlusion of bilateral superficial femoral arteries.  Patient says he is having so much difficulty with his legs that he feels he cannot even work anymore.    Review of Systems    The Past medical history, family history, social history, medication list, allergies, and Review of Systems has been reviewed and confirmed.      General: negative  Integumentary: negative  Eyes: glasses  ENT: negative  Respiratory: shortness of breath  Gastrointestinal: negative  Cardiovascular: negative  Neurological: dizziness  Psychiatric: negative  Hematologic/Lymphatic: negative  Genitourinary: negative  Musculoskeletal: painful joints, sore muscles and back pain  Endocrine: negative  Breasts: negative    Physical Exam      Patient Active Problem List   Diagnosis   • Vitamin D deficiency   • Tobacco abuse   • Alcohol abuse   • ED (erectile dysfunction)   • History of degenerative disc disease   • Neuropathy   • Low back pain   • Hypertension   • Elevated AST (SGOT)   • Screening PSA (prostate specific antigen)   • Hyperglycemia   • Segmental and somatic dysfunction of lumbar region   • Segmental and somatic dysfunction of pelvic region   • Segmental and somatic dysfunction of sacral region   • Prediabetes   • PVD (peripheral vascular disease) with claudication (CMS/HCC)         Past Medical History:   Diagnosis Date   • Alcohol abuse    • ED (erectile dysfunction)    • History of degenerative disc disease    • Hyperglycemia    • Hypertension    • Low back pain    • Neuropathy    • Screening PSA (prostate specific antigen) 09/2015   • Tobacco abuse    • Vitamin D deficiency          Past Surgical History:  "  Procedure Laterality Date   • APPENDECTOMY     • BACK SURGERY      DISC RUPTURE REPAIR, L5         Family History   Problem Relation Age of Onset   • Hypertension Mother    • Cancer Father         LUNG   • Diabetes Father    • Hypertension Father    • Heart attack Other         GRANDFATHER         Social History     Tobacco Use   • Smoking status: Current Every Day Smoker     Packs/day: 1.00     Types: Cigarettes   • Smokeless tobacco: Never Used   Substance Use Topics   • Alcohol use: Yes     Alcohol/week: 3.6 oz     Types: 6 Cans of beer per week     Comment: Daily    • Drug use: No       Current Outpatient Medications   Medication Sig Dispense Refill   • albuterol sulfate  (90 Base) MCG/ACT inhaler Inhale 2 puffs Every 6 (Six) Hours As Needed for Wheezing. 1 inhaler 5   • atenolol (TENORMIN) 50 MG tablet Take 1 tablet by mouth Daily. 30 tablet 5   • bacitracin 500 UNIT/GM ointment Apply  topically to the appropriate area as directed 2 (Two) Times a Day. 28 g 1   • doxycycline (MONODOX) 50 MG capsule Take 1 capsule by mouth 2 (Two) Times a Day. 14 capsule 0   • folic acid-pyridoxine-cyanocobalamin (FOLBIC) 2.5-25-2 MG tablet tablet Take 1 tablet by mouth Daily. 30 each 5   • gabapentin (NEURONTIN) 800 MG tablet Take 1.5 tablets by mouth 3 (Three) Times a Day. 135 tablet 2   • lisinopril (PRINIVIL,ZESTRIL) 40 MG tablet Take 1 tablet by mouth Daily. 30 tablet 5   • methocarbamol (ROBAXIN) 750 MG tablet Take 1 tablet by mouth 4 (Four) Times a Day As Needed for Muscle Spasms. 120 tablet 5   • nabumetone (RELAFEN) 500 MG tablet Take 1 tablet by mouth 2 (Two) Times a Day As Needed for Mild Pain . 60 tablet 5     No current facility-administered medications for this visit.          Allergies  Novocain [procaine] and Penicillins    /88   Pulse 75   Temp 98 °F (36.7 °C)   Ht 177.8 cm (70\")   Wt 102 kg (224 lb)   BMI 32.14 kg/m²            ASSESSMENT  Severe peripheral vascular " disease        PLAN    Will ask Dr. Azar to evaluate him for possible endovascular intervention.    I spent approximately 15 minutes reviewing all the x-rays with the patient and wife.  Also discussed the importance of stop smoking    Patient's Body mass index is 32.14 kg/m². BMI is above normal parameters. Recommendations include: educational material.           This document signed by Rick Floyd MD August 15, 2019 9:47 AM

## 2019-08-16 ENCOUNTER — OFFICE VISIT (OUTPATIENT)
Dept: FAMILY MEDICINE CLINIC | Facility: CLINIC | Age: 59
End: 2019-08-16

## 2019-08-16 VITALS
HEART RATE: 84 BPM | OXYGEN SATURATION: 99 % | SYSTOLIC BLOOD PRESSURE: 122 MMHG | BODY MASS INDEX: 31.92 KG/M2 | HEIGHT: 70 IN | WEIGHT: 223 LBS | TEMPERATURE: 97.6 F | DIASTOLIC BLOOD PRESSURE: 70 MMHG

## 2019-08-16 DIAGNOSIS — I73.9 PVD (PERIPHERAL VASCULAR DISEASE) WITH CLAUDICATION (HCC): Primary | ICD-10-CM

## 2019-08-16 DIAGNOSIS — Z72.0 TOBACCO ABUSE: ICD-10-CM

## 2019-08-16 PROCEDURE — 99213 OFFICE O/P EST LOW 20 MIN: CPT | Performed by: NURSE PRACTITIONER

## 2019-08-16 RX ORDER — VARENICLINE TARTRATE 1 MG/1
1 TABLET, FILM COATED ORAL 2 TIMES DAILY
Qty: 60 TABLET | Refills: 2 | Status: SHIPPED | OUTPATIENT
Start: 2019-08-16 | End: 2020-02-07

## 2019-08-16 NOTE — PROGRESS NOTES
Subjective   Gurwinder Harding is a 59 y.o. male.     Chief Complaint: Follow-up and Leg Pain    History of Present Illness   Lower Extremity Issue   This is a chronic (neuropathy bilateral lower extremities; PVD lower extremities and currently following with Dr Floyd) problem. The problem occurs constantly. The problem has been unchanged. The symptoms are aggravated by smoking. Treatments tried: gabapentin. The treatment provided moderate relief. Pt has had further testing done and he does have occlusion in bilateral lower extremities.  He has followed up with Dr Floyd and it has been suggested that he be referred to Dr Azar, interventional cardiologist, for possible interventional procedure to both lower extremities.  He has apparently discussed being off work due to the pain in his legs and Dr. Floyd has concurred that he needs to remain off work for the next two months.  He has brought a letter to our office where Dr Floyd has written for him to be off until 10/14/2019.  He will be keeping his appt as scheduled on 9/18/2019 with Dr. Azar.   Tobacco Abuse  Pt continues to smoke cigarettes.  I have discussed with him today that it is imperative that he stop smoking.  I have discussed the risks of tobacco abuse and the damage that it is causing him health wise.  I have also discussed with him the damage that smoking is causing with his legs/arteries.  I will be writing him a prescription for Chantix in order to help with his smoking cessation.        Family History   Problem Relation Age of Onset   • Hypertension Mother    • Cancer Father         LUNG   • Diabetes Father    • Hypertension Father    • Heart attack Other         GRANDFATHER       Social History     Socioeconomic History   • Marital status:      Spouse name: Not on file   • Number of children: Not on file   • Years of education: Not on file   • Highest education level: Not on file   Tobacco Use   • Smoking status: Current Every Day Smoker      "Packs/day: 1.00     Types: Cigarettes   • Smokeless tobacco: Never Used   Substance and Sexual Activity   • Alcohol use: Yes     Alcohol/week: 3.6 oz     Types: 6 Cans of beer per week     Comment: Daily    • Drug use: No   • Sexual activity: Defer       Past Medical History:   Diagnosis Date   • Alcohol abuse    • ED (erectile dysfunction)    • History of degenerative disc disease    • Hyperglycemia    • Hypertension    • Low back pain    • Neuropathy    • Screening PSA (prostate specific antigen) 09/2015   • Tobacco abuse    • Vitamin D deficiency        Review of Systems   Constitutional: Negative.    HENT: Negative.    Respiratory: Negative.    Cardiovascular: Negative.    Gastrointestinal: Negative.    Musculoskeletal: Positive for myalgias.   Skin: Negative.    Neurological: Negative.    Psychiatric/Behavioral: Negative.        Objective   Physical Exam   Constitutional: He is oriented to person, place, and time. He appears well-developed and well-nourished.   Neck: Normal range of motion. Neck supple.   Cardiovascular: Normal rate, regular rhythm and normal heart sounds.   Pulmonary/Chest: Effort normal and breath sounds normal.   Musculoskeletal: Normal range of motion. He exhibits no edema or deformity.   Erythematous feet bilaterally; blanchable areas; tender to touch; bilateral pedal pulses decreased   Neurological: He is alert and oriented to person, place, and time.   Skin: Skin is warm and dry. Capillary refill takes more than 3 seconds. There is erythema.       Psychiatric: He has a normal mood and affect. His behavior is normal. Judgment and thought content normal.   Nursing note and vitals reviewed.      Procedures    Vitals: Blood pressure 122/70, pulse 84, temperature 97.6 °F (36.4 °C), temperature source Oral, height 177.8 cm (70\"), weight 101 kg (223 lb), SpO2 99 %.    Allergies:   Allergies   Allergen Reactions   • Novocain [Procaine]    • Penicillins         During this visit the following " were done:  Labs Reviewed []    Labs Ordered []    Radiology Reports Reviewed []    Radiology Ordered []    PCP Records Reviewed []    Referring Provider Records Reviewed []    ER Records Reviewed []    Hospital Records Reviewed []    History Obtained From Family []    Radiology Images Reviewed []    Other Reviewed []    Records Requested []      Assessment/Plan   Gurwinder was seen today for follow-up and leg pain.    Diagnoses and all orders for this visit:    PVD (peripheral vascular disease) with claudication (CMS/[facility])   Continue to follow with Dr Floyd and Dr Azar as scheduled  Tobacco abuse  -     varenicline (CHANTIX STARTING MONTH NEY) 0.5 MG X 11 & 1 MG X 42 tablet; Take 0.5 mg one daily on days 1-3 and and 0.5 mg twice daily on days 4-7.Then 1 mg twice daily for a total of 12 weeks.  -     varenicline (CHANTIX CONTINUING MONTH NEY) 1 MG tablet; Take 1 tablet by mouth 2 (Two) Times a Day.    Paperwork completed for short term disability due to PVD.

## 2019-08-28 ENCOUNTER — OFFICE VISIT (OUTPATIENT)
Dept: FAMILY MEDICINE CLINIC | Facility: CLINIC | Age: 59
End: 2019-08-28

## 2019-08-28 VITALS
TEMPERATURE: 98.1 F | BODY MASS INDEX: 32.18 KG/M2 | HEART RATE: 71 BPM | SYSTOLIC BLOOD PRESSURE: 138 MMHG | WEIGHT: 224.8 LBS | DIASTOLIC BLOOD PRESSURE: 76 MMHG | OXYGEN SATURATION: 100 % | HEIGHT: 70 IN

## 2019-08-28 DIAGNOSIS — I73.9 PVD (PERIPHERAL VASCULAR DISEASE) WITH CLAUDICATION (HCC): Primary | ICD-10-CM

## 2019-08-28 DIAGNOSIS — L97.521 SKIN ULCER OF LEFT FOOT, LIMITED TO BREAKDOWN OF SKIN (HCC): ICD-10-CM

## 2019-08-28 PROCEDURE — 99213 OFFICE O/P EST LOW 20 MIN: CPT | Performed by: NURSE PRACTITIONER

## 2019-08-28 RX ORDER — DOXYCYCLINE 100 MG/1
100 CAPSULE ORAL 2 TIMES DAILY
Qty: 20 CAPSULE | Refills: 0 | Status: ON HOLD | OUTPATIENT
Start: 2019-08-28 | End: 2019-09-19

## 2019-08-28 NOTE — PROGRESS NOTES
Subjective   Gurwinder Harding is a 59 y.o. male.     Chief Complaint: Foot Pain (left heel with open wound x3 weeks)    Foot Pain   This is a chronic problem. The current episode started more than 1 month ago. The problem occurs constantly. The problem has been gradually worsening. Associated symptoms comments: ulcer. Nothing aggravates the symptoms.   Ulcer appears to be healing some.  He continues to have pain in the heel and the area is still open.  Redness continues but seems to be some less.  No drainage from area.   Lower Extremity Issue   This is a chronic (neuropathy bilateral lower extremities; PVD lower extremities and currently following with Dr Floyd) problem. The problem occurs constantly. The problem has been unchanged. The symptoms are aggravated by smoking. Treatments tried: gabapentin. The treatment provided moderate relief. Pt has had further testing done and he does have occlusion in bilateral lower extremities.  He has followed up with Dr Floyd and it has been suggested that he be referred to Dr Azar, interventional cardiologist, for possible interventional procedure to both lower extremities.     Family History   Problem Relation Age of Onset   • Hypertension Mother    • Cancer Father         LUNG   • Diabetes Father    • Hypertension Father    • Heart attack Other         GRANDFATHER       Social History     Socioeconomic History   • Marital status:      Spouse name: Not on file   • Number of children: Not on file   • Years of education: Not on file   • Highest education level: Not on file   Tobacco Use   • Smoking status: Current Every Day Smoker     Packs/day: 1.00     Types: Cigarettes   • Smokeless tobacco: Never Used   Substance and Sexual Activity   • Alcohol use: Yes     Alcohol/week: 3.6 oz     Types: 6 Cans of beer per week     Comment: Daily    • Drug use: No   • Sexual activity: Defer       Past Medical History:   Diagnosis Date   • Alcohol abuse    • ED (erectile dysfunction)   "  • History of degenerative disc disease    • Hyperglycemia    • Hypertension    • Low back pain    • Neuropathy    • Screening PSA (prostate specific antigen) 09/2015   • Tobacco abuse    • Vitamin D deficiency        Review of Systems   Constitutional: Negative.    HENT: Negative.    Respiratory: Negative.    Cardiovascular: Negative.    Gastrointestinal: Negative.    Musculoskeletal: Negative.    Skin: Positive for wound.   Neurological: Negative.    Psychiatric/Behavioral: Negative.        Objective   Physical Exam   Constitutional: He is oriented to person, place, and time. He appears well-developed and well-nourished.   Neck: Normal range of motion. Neck supple.   Cardiovascular: Normal rate, regular rhythm and normal heart sounds.   Pulmonary/Chest: Effort normal and breath sounds normal.   Neurological: He is alert and oriented to person, place, and time.   Skin: Skin is warm and dry.   Quarter size area to left lateral foot area appears to be healing; area is more shallow; no drainage; continues to have redness around area   Psychiatric: He has a normal mood and affect. His behavior is normal. Judgment and thought content normal.   Nursing note and vitals reviewed.      Procedures    Vitals: Blood pressure 138/76, pulse 71, temperature 98.1 °F (36.7 °C), temperature source Oral, height 177.8 cm (70\"), weight 102 kg (224 lb 12.8 oz), SpO2 100 %.    Allergies:   Allergies   Allergen Reactions   • Novocain [Procaine]    • Penicillins         During this visit the following were done:  Labs Reviewed []    Labs Ordered []    Radiology Reports Reviewed []    Radiology Ordered []    PCP Records Reviewed []    Referring Provider Records Reviewed []    ER Records Reviewed []    Hospital Records Reviewed []    History Obtained From Family []    Radiology Images Reviewed []    Other Reviewed []    Records Requested []      Assessment/Plan   Gurwinder was seen today for foot pain.    Diagnoses and all orders for this " visit:    PVD (peripheral vascular disease) with claudication (CMS/HCC)    Skin ulcer of left foot, limited to breakdown of skin (CMS/HCC)  -     doxycycline (MONODOX) 100 MG capsule; Take 1 capsule by mouth 2 (Two) Times a Day.

## 2019-09-04 ENCOUNTER — TELEPHONE (OUTPATIENT)
Dept: FAMILY MEDICINE CLINIC | Facility: CLINIC | Age: 59
End: 2019-09-04

## 2019-09-04 DIAGNOSIS — I10 ESSENTIAL HYPERTENSION: ICD-10-CM

## 2019-09-05 ENCOUNTER — TELEPHONE (OUTPATIENT)
Dept: FAMILY MEDICINE CLINIC | Facility: CLINIC | Age: 59
End: 2019-09-05

## 2019-09-05 ENCOUNTER — OFFICE VISIT (OUTPATIENT)
Dept: CARDIOLOGY | Facility: CLINIC | Age: 59
End: 2019-09-05

## 2019-09-05 VITALS
HEIGHT: 70 IN | SYSTOLIC BLOOD PRESSURE: 114 MMHG | BODY MASS INDEX: 32.07 KG/M2 | WEIGHT: 224 LBS | DIASTOLIC BLOOD PRESSURE: 70 MMHG | OXYGEN SATURATION: 98 % | HEART RATE: 72 BPM

## 2019-09-05 DIAGNOSIS — L97.409 NONHEALING ULCER OF HEEL (HCC): ICD-10-CM

## 2019-09-05 DIAGNOSIS — F17.210 CIGARETTE NICOTINE DEPENDENCE WITHOUT COMPLICATION: ICD-10-CM

## 2019-09-05 DIAGNOSIS — I73.9 PVD (PERIPHERAL VASCULAR DISEASE) WITH CLAUDICATION (HCC): Primary | ICD-10-CM

## 2019-09-05 DIAGNOSIS — I70.229 CRITICAL LOWER LIMB ISCHEMIA (HCC): ICD-10-CM

## 2019-09-05 PROCEDURE — 99204 OFFICE O/P NEW MOD 45 MIN: CPT | Performed by: INTERNAL MEDICINE

## 2019-09-05 PROCEDURE — 99406 BEHAV CHNG SMOKING 3-10 MIN: CPT | Performed by: INTERNAL MEDICINE

## 2019-09-05 RX ORDER — ASPIRIN 81 MG/1
81 TABLET ORAL DAILY
Status: ON HOLD | COMMUNITY
End: 2020-09-01

## 2019-09-05 RX ORDER — CLOPIDOGREL BISULFATE 75 MG/1
75 TABLET ORAL DAILY
Status: ON HOLD | COMMUNITY
End: 2019-09-19 | Stop reason: SDUPTHER

## 2019-09-05 NOTE — H&P (VIEW-ONLY)
[facility] CARDIOLOGY  [address]  [place] [address]  Phone: [phone]  Fax: [phone]    09/05/2019    Chief Complaint   Patient presents with   • Peripheral Vascular Disease   • Claudication   • Left foot skin ulceration        History:   Gurwinder Harding is a 59 y.o. male seen in consultation, referred by Dr.Donald MANE Floyd MD  For severe PVD He has a history of back injury, hypertension, and dyslipidemia.  He has complaints of bilateral lower extremities.  Starts at groin down to feet. Onset was about 1 year ago and proceeded to worsen. The pain started about 1 year ago.  The pain is worse when standing for long period of time. He is a current every day 1 ppd smoker. Start in his 20's.  He was smoking up to 2 ppd.     The chart and medications were reviewed today.     Past Medical History:   Diagnosis Date   • Alcohol abuse    • ED (erectile dysfunction)    • History of degenerative disc disease    • Hyperglycemia    • Hypertension    • Low back pain    • Neuropathy    • Screening PSA (prostate specific antigen) 09/2015   • Tobacco abuse    • Vitamin D deficiency        Past Surgical History:   Procedure Laterality Date   • APPENDECTOMY     • BACK SURGERY      DISC RUPTURE REPAIR, L5        Review of Systems:  Please see HPI  Constitution: No chills, no rigors, no unexplained weight loss or weight gain  Eyes:  No diplopia, no blurred vision, no loss of vision, conjunctiva is pink and sclera is anicteric  ENT:  No tinnitus, no otorrhea, no epistaxis, no sore throat   Respiratory: No cough, no hemoptysis  Cardiovascular: see HPI  Gastrointestinal: No nausea, no vomiting, no hematemesis, no diarrhea or constipation, no melena  Genitourinary: No frequency of dysuria no hematuria  Integument: No pruritis and  no skin rash  Hematologic / Lymphatic: No excessive bleeding, easy bruising, fatigue, lymphadenopathy and petechiae  Musculoskeletal: No joint pain, joint stiffness,  joint swelling, muscle pain, muscle weakness and neck pain  Neurological: No dizziness, headaches, light headedness, seizures and vertigo  Endocrine: No frequent urination and nocturia, temperature intolerance, weight gain, unintended and weight loss, unintended        Past Social History:  Social History     Socioeconomic History   • Marital status:      Spouse name: Not on file   • Number of children: Not on file   • Years of education: Not on file   • Highest education level: Not on file   Tobacco Use   • Smoking status: Current Every Day Smoker     Packs/day: 1.00     Types: Cigarettes   • Smokeless tobacco: Never Used   Substance and Sexual Activity   • Alcohol use: Yes     Alcohol/week: 3.6 oz     Types: 6 Cans of beer per week     Comment: Daily    • Drug use: No   • Sexual activity: Defer       Past Family History:  Family History   Problem Relation Age of Onset   • Hypertension Mother    • Cancer Father         LUNG   • Diabetes Father    • Hypertension Father    • Heart attack Other         GRANDFATHER       Current Outpatient Medications   Medication Sig Dispense Refill   • albuterol sulfate  (90 Base) MCG/ACT inhaler Inhale 2 puffs Every 6 (Six) Hours As Needed for Wheezing. 1 inhaler 5   • aspirin 81 MG EC tablet Take 81 mg by mouth Daily.     • atenolol (TENORMIN) 50 MG tablet Take 1 tablet by mouth Daily. 30 tablet 5   • bacitracin 500 UNIT/GM ointment Apply  topically to the appropriate area as directed 2 (Two) Times a Day. 28 g 1   • clopidogrel (PLAVIX) 75 MG tablet Take 75 mg by mouth Daily.     • doxycycline (MONODOX) 100 MG capsule Take 1 capsule by mouth 2 (Two) Times a Day. 20 capsule 0   • folic acid-pyridoxine-cyanocobalamin (FOLBIC) 2.5-25-2 MG tablet tablet Take 1 tablet by mouth Daily. 30 each 5   • gabapentin (NEURONTIN) 800 MG tablet Take 1.5 tablets by mouth 3 (Three) Times a Day. 135 tablet 2   • lisinopril (PRINIVIL,ZESTRIL) 40 MG tablet Take 1 tablet by mouth Daily. 30  tablet 5   • methocarbamol (ROBAXIN) 750 MG tablet Take 1 tablet by mouth 4 (Four) Times a Day As Needed for Muscle Spasms. 120 tablet 5   • nabumetone (RELAFEN) 500 MG tablet Take 1 tablet by mouth 2 (Two) Times a Day As Needed for Mild Pain . 60 tablet 5   • varenicline (CHANTIX CONTINUING MONTH NEY) 1 MG tablet Take 1 tablet by mouth 2 (Two) Times a Day. 60 tablet 2   • varenicline (CHANTIX STARTING MONTH NEY) 0.5 MG X 11 & 1 MG X 42 tablet Take 0.5 mg one daily on days 1-3 and and 0.5 mg twice daily on days 4-7.Then 1 mg twice daily for a total of 12 weeks. 53 tablet 0     No current facility-administered medications for this visit.         Allergies   Allergen Reactions   • Novocain [Procaine]    • Penicillins        Objective:  Vitals:    09/05/19 1345   BP: 114/70   Pulse: 72   SpO2: 98%         Comfortable NAD  PERRL, conjunctiva clear  Neck supple, no JVD or thyromegaly appreciated  S1/S2 RRR, no m/r/g  Lungs CTA B, normal effort  Abdomen S/NT/ND (+) BS, no HSM appreciated  Extremities warm, no clubbing, cyanosis, or edema  Normal gait  No visible or palpable skin lesions  A/Ox4, mood and affect appropriate  Pulse exam:   Feet are warm bilateral  Negative edema bilateral  Capillary refill is normal  No evidence of ulceration or color change of the toes  PULSES  Right DP and PT are 0 and Left DP and PT are 0    DATA:        Results for orders placed during the hospital encounter of 11/30/16   Stress Test With Myocardial Perfusion One Day    Narrative · Left ventricular ejection fraction is normal (Calculated EF = 67%).  · Myocardial perfusion imaging indicates a normal myocardial perfusion   study with no evidence of ischemia.  · Impressions are consistent with a low risk study.  · low risk for ischemic heart disease.  · Findings consistent with a normal ECG stress test.         Results for orders placed during the hospital encounter of 11/30/16   Stress Test With Myocardial Perfusion One Day    Narrative ·  Left ventricular ejection fraction is normal (Calculated EF = 67%).  · Myocardial perfusion imaging indicates a normal myocardial perfusion   study with no evidence of ischemia.  · Impressions are consistent with a low risk study.  · low risk for ischemic heart disease.  · Findings consistent with a normal ECG stress test.           Results for orders placed during the hospital encounter of 07/31/19   US Ankle / Brachial Indices Extremity Limited    Narrative EXAMINATION: US ANKLE / BRACHIAL INDICES EXTREMITY LIMITED-      TECHNIQUE: Ankle/brachial index pressures performed by noninvasive  vascular laboratory.     COMPARISON:     CLINICAL INDICATION:    Leg Pain Claudication and Hypertension;  I73.9-Peripheral vascular disease, unspecified; I73.9-Peripheral  vascular disease, unspecified; I10-Essential (primary) hypertension     FINDINGS:     RIGHT:  Right index brachial pressure of 131 mmHg.  Posterior tibialis arterial pressure of 79 mmHg for an  index of 0.59.  Dorsalis pedis artery  pressure of 90 mmHg for an  index of 0.67.  Biphasic low amplitude waveforms     LEFT:  Left index brachial pressure of 134 mmHg.  Posterior tibialis artery pressure of 91 mmHg for an  index of 0.68.  Dorsalis pedis artery pressure of 62 mmHg for an  index of 0.46.  Biphasic low amplitude waveforms       Impression Composite Right DONNIE: 0.67  Composite Left DONNIE: 0.68     This report was finalized on 7/31/2019 1:47 PM by Dr. Neal Lanza MD.          Procedures     Assessment:    Critical limb ischemia  Non-healing ulcer left heel.  BLE pain left worse than right.   Abnormal CT and DONNIE  Tobacco dependence he has a Rx for Chantix that he hasn't started.    Plan:    I will proceed with a peripheral angiogram left lower extremity.  Start Plavix and EC ASA 81 mg daily   I will see him back post test.     I have explained the risks associated with the procedure to the patient including but not limited to an allergic reaction to the  contrast material or medications used during the procedure bleeding, infection, and bruising at the catheter insertion site blood clots, which may trigger heart attack, stroke,   damage to the artery where the catheter was inserted, or damage to the arteries as the catheter travels through your body, irregular heart rhythm arrhythmias, kidney damage caused by the contrast material.     I extensively discussed consequences of smoking namely cardiovascular/metabolic, lung cancer, mouth cancer, pulmonary disorder including COPD and reduced quality of life.  At the end of the conversation, patient voices understanding of the risk involved in smoking.  Patient also understands the benefits of quitting.  I provided the patient smoking cessation material. Patient displayed understanding and stated that he will try to quit smoking.  During this visit I spent 10 minutes counseling the patient regarding the smoking cessation.              Patient's Body mass index is 32.14 kg/m². BMI is above normal parameters. Recommendations include: exercise counseling2.         ICD-10-CM ICD-9-CM   1. PVD (peripheral vascular disease) with claudication (CMS/[facility]) I73.9 443.9   2. Critical lower limb ischemia I99.8 459.9   3. Nonhealing ulcer of heel (CMS/[facility]) L97.409 707.14   4. Cigarette nicotine dependence without complication F17.210 305.1        Thank you for allowing me to participate in the care of Gurwinder Harding. Feel free to contact me directly with any further questions or concerns.        Director, Cardiac Cath Lab      RAMON Eng, acting as scribe for Tong Azar MD.   09/07/19  8:34 PM    I have read and agree the documentation that has been completed regarding this visit.  By signing this record, I attest that the documentation was completed by my physical presence and is an accurate record of the encounter.  Voice recognition / transcription technology used for some documentation in this chart in attempt to mitigate  substantial inefficiencies created by this electronic health record technology.  As a result, there may be some typos and.or nonsensical language introduced into the chart that either overlooked in editing/review and/or that I am unable to correct as patient care needs require me to prioritize my attention to bedside patient care rather than electronic documentation. MA

## 2019-09-05 NOTE — PROGRESS NOTES
[facility] CARDIOLOGY  [address]  [place] [address]  Phone: [phone]  Fax: [phone]    09/05/2019    Chief Complaint   Patient presents with   • Peripheral Vascular Disease   • Claudication   • Left foot skin ulceration        History:   Gurwinder Harding is a 59 y.o. male seen in consultation, referred by Dr.Donald MANE Floyd MD  For severe PVD He has a history of back injury, hypertension, and dyslipidemia.  He has complaints of bilateral lower extremities.  Starts at groin down to feet. Onset was about 1 year ago and proceeded to worsen. The pain started about 1 year ago.  The pain is worse when standing for long period of time. He is a current every day 1 ppd smoker. Start in his 20's.  He was smoking up to 2 ppd.     The chart and medications were reviewed today.     Past Medical History:   Diagnosis Date   • Alcohol abuse    • ED (erectile dysfunction)    • History of degenerative disc disease    • Hyperglycemia    • Hypertension    • Low back pain    • Neuropathy    • Screening PSA (prostate specific antigen) 09/2015   • Tobacco abuse    • Vitamin D deficiency        Past Surgical History:   Procedure Laterality Date   • APPENDECTOMY     • BACK SURGERY      DISC RUPTURE REPAIR, L5        Review of Systems:  Please see HPI  Constitution: No chills, no rigors, no unexplained weight loss or weight gain  Eyes:  No diplopia, no blurred vision, no loss of vision, conjunctiva is pink and sclera is anicteric  ENT:  No tinnitus, no otorrhea, no epistaxis, no sore throat   Respiratory: No cough, no hemoptysis  Cardiovascular: see HPI  Gastrointestinal: No nausea, no vomiting, no hematemesis, no diarrhea or constipation, no melena  Genitourinary: No frequency of dysuria no hematuria  Integument: No pruritis and  no skin rash  Hematologic / Lymphatic: No excessive bleeding, easy bruising, fatigue, lymphadenopathy and petechiae  Musculoskeletal: No joint pain, joint stiffness,  joint swelling, muscle pain, muscle weakness and neck pain  Neurological: No dizziness, headaches, light headedness, seizures and vertigo  Endocrine: No frequent urination and nocturia, temperature intolerance, weight gain, unintended and weight loss, unintended        Past Social History:  Social History     Socioeconomic History   • Marital status:      Spouse name: Not on file   • Number of children: Not on file   • Years of education: Not on file   • Highest education level: Not on file   Tobacco Use   • Smoking status: Current Every Day Smoker     Packs/day: 1.00     Types: Cigarettes   • Smokeless tobacco: Never Used   Substance and Sexual Activity   • Alcohol use: Yes     Alcohol/week: 3.6 oz     Types: 6 Cans of beer per week     Comment: Daily    • Drug use: No   • Sexual activity: Defer       Past Family History:  Family History   Problem Relation Age of Onset   • Hypertension Mother    • Cancer Father         LUNG   • Diabetes Father    • Hypertension Father    • Heart attack Other         GRANDFATHER       Current Outpatient Medications   Medication Sig Dispense Refill   • albuterol sulfate  (90 Base) MCG/ACT inhaler Inhale 2 puffs Every 6 (Six) Hours As Needed for Wheezing. 1 inhaler 5   • aspirin 81 MG EC tablet Take 81 mg by mouth Daily.     • atenolol (TENORMIN) 50 MG tablet Take 1 tablet by mouth Daily. 30 tablet 5   • bacitracin 500 UNIT/GM ointment Apply  topically to the appropriate area as directed 2 (Two) Times a Day. 28 g 1   • clopidogrel (PLAVIX) 75 MG tablet Take 75 mg by mouth Daily.     • doxycycline (MONODOX) 100 MG capsule Take 1 capsule by mouth 2 (Two) Times a Day. 20 capsule 0   • folic acid-pyridoxine-cyanocobalamin (FOLBIC) 2.5-25-2 MG tablet tablet Take 1 tablet by mouth Daily. 30 each 5   • gabapentin (NEURONTIN) 800 MG tablet Take 1.5 tablets by mouth 3 (Three) Times a Day. 135 tablet 2   • lisinopril (PRINIVIL,ZESTRIL) 40 MG tablet Take 1 tablet by mouth Daily. 30  tablet 5   • methocarbamol (ROBAXIN) 750 MG tablet Take 1 tablet by mouth 4 (Four) Times a Day As Needed for Muscle Spasms. 120 tablet 5   • nabumetone (RELAFEN) 500 MG tablet Take 1 tablet by mouth 2 (Two) Times a Day As Needed for Mild Pain . 60 tablet 5   • varenicline (CHANTIX CONTINUING MONTH NEY) 1 MG tablet Take 1 tablet by mouth 2 (Two) Times a Day. 60 tablet 2   • varenicline (CHANTIX STARTING MONTH NEY) 0.5 MG X 11 & 1 MG X 42 tablet Take 0.5 mg one daily on days 1-3 and and 0.5 mg twice daily on days 4-7.Then 1 mg twice daily for a total of 12 weeks. 53 tablet 0     No current facility-administered medications for this visit.         Allergies   Allergen Reactions   • Novocain [Procaine]    • Penicillins        Objective:  Vitals:    09/05/19 1345   BP: 114/70   Pulse: 72   SpO2: 98%         Comfortable NAD  PERRL, conjunctiva clear  Neck supple, no JVD or thyromegaly appreciated  S1/S2 RRR, no m/r/g  Lungs CTA B, normal effort  Abdomen S/NT/ND (+) BS, no HSM appreciated  Extremities warm, no clubbing, cyanosis, or edema  Normal gait  No visible or palpable skin lesions  A/Ox4, mood and affect appropriate  Pulse exam:   Feet are warm bilateral  Negative edema bilateral  Capillary refill is normal  No evidence of ulceration or color change of the toes  PULSES  Right DP and PT are 0 and Left DP and PT are 0    DATA:        Results for orders placed during the hospital encounter of 11/30/16   Stress Test With Myocardial Perfusion One Day    Narrative · Left ventricular ejection fraction is normal (Calculated EF = 67%).  · Myocardial perfusion imaging indicates a normal myocardial perfusion   study with no evidence of ischemia.  · Impressions are consistent with a low risk study.  · low risk for ischemic heart disease.  · Findings consistent with a normal ECG stress test.         Results for orders placed during the hospital encounter of 11/30/16   Stress Test With Myocardial Perfusion One Day    Narrative ·  Left ventricular ejection fraction is normal (Calculated EF = 67%).  · Myocardial perfusion imaging indicates a normal myocardial perfusion   study with no evidence of ischemia.  · Impressions are consistent with a low risk study.  · low risk for ischemic heart disease.  · Findings consistent with a normal ECG stress test.           Results for orders placed during the hospital encounter of 07/31/19   US Ankle / Brachial Indices Extremity Limited    Narrative EXAMINATION: US ANKLE / BRACHIAL INDICES EXTREMITY LIMITED-      TECHNIQUE: Ankle/brachial index pressures performed by noninvasive  vascular laboratory.     COMPARISON:     CLINICAL INDICATION:    Leg Pain Claudication and Hypertension;  I73.9-Peripheral vascular disease, unspecified; I73.9-Peripheral  vascular disease, unspecified; I10-Essential (primary) hypertension     FINDINGS:     RIGHT:  Right index brachial pressure of 131 mmHg.  Posterior tibialis arterial pressure of 79 mmHg for an  index of 0.59.  Dorsalis pedis artery  pressure of 90 mmHg for an  index of 0.67.  Biphasic low amplitude waveforms     LEFT:  Left index brachial pressure of 134 mmHg.  Posterior tibialis artery pressure of 91 mmHg for an  index of 0.68.  Dorsalis pedis artery pressure of 62 mmHg for an  index of 0.46.  Biphasic low amplitude waveforms       Impression Composite Right DONNIE: 0.67  Composite Left DONNIE: 0.68     This report was finalized on 7/31/2019 1:47 PM by Dr. Neal Lanza MD.          Procedures     Assessment:    Critical limb ischemia  Non-healing ulcer left heel.  BLE pain left worse than right.   Abnormal CT and DONNEI  Tobacco dependence he has a Rx for Chantix that he hasn't started.    Plan:    I will proceed with a peripheral angiogram left lower extremity.  Start Plavix and EC ASA 81 mg daily   I will see him back post test.     I have explained the risks associated with the procedure to the patient including but not limited to an allergic reaction to the  contrast material or medications used during the procedure bleeding, infection, and bruising at the catheter insertion site blood clots, which may trigger heart attack, stroke,   damage to the artery where the catheter was inserted, or damage to the arteries as the catheter travels through your body, irregular heart rhythm arrhythmias, kidney damage caused by the contrast material.     I extensively discussed consequences of smoking namely cardiovascular/metabolic, lung cancer, mouth cancer, pulmonary disorder including COPD and reduced quality of life.  At the end of the conversation, patient voices understanding of the risk involved in smoking.  Patient also understands the benefits of quitting.  I provided the patient smoking cessation material. Patient displayed understanding and stated that he will try to quit smoking.  During this visit I spent 10 minutes counseling the patient regarding the smoking cessation.              Patient's Body mass index is 32.14 kg/m². BMI is above normal parameters. Recommendations include: exercise counseling2.         ICD-10-CM ICD-9-CM   1. PVD (peripheral vascular disease) with claudication (CMS/[facility]) I73.9 443.9   2. Critical lower limb ischemia I99.8 459.9   3. Nonhealing ulcer of heel (CMS/[facility]) L97.409 707.14   4. Cigarette nicotine dependence without complication F17.210 305.1        Thank you for allowing me to participate in the care of Gurwinder Harding. Feel free to contact me directly with any further questions or concerns.        Director, Cardiac Cath Lab      RAMON Eng, acting as scribe for Tong Azar MD.   09/07/19  8:34 PM    I have read and agree the documentation that has been completed regarding this visit.  By signing this record, I attest that the documentation was completed by my physical presence and is an accurate record of the encounter.  Voice recognition / transcription technology used for some documentation in this chart in attempt to mitigate  substantial inefficiencies created by this electronic health record technology.  As a result, there may be some typos and.or nonsensical language introduced into the chart that either overlooked in editing/review and/or that I am unable to correct as patient care needs require me to prioritize my attention to bedside patient care rather than electronic documentation. MA

## 2019-09-10 PROBLEM — I70.229 CRITICAL LOWER LIMB ISCHEMIA: Status: ACTIVE | Noted: 2019-09-10

## 2019-09-10 PROBLEM — L97.409: Status: ACTIVE | Noted: 2019-09-10

## 2019-09-19 ENCOUNTER — HOSPITAL ENCOUNTER (OUTPATIENT)
Facility: HOSPITAL | Age: 59
Discharge: HOME | End: 2019-09-19
Attending: INTERNAL MEDICINE | Admitting: INTERNAL MEDICINE

## 2019-09-19 VITALS
WEIGHT: 224 LBS | HEART RATE: 80 BPM | DIASTOLIC BLOOD PRESSURE: 78 MMHG | TEMPERATURE: 97.7 F | OXYGEN SATURATION: 99 % | BODY MASS INDEX: 32.07 KG/M2 | HEIGHT: 70 IN | SYSTOLIC BLOOD PRESSURE: 139 MMHG | RESPIRATION RATE: 18 BRPM

## 2019-09-19 DIAGNOSIS — L97.409 NONHEALING ULCER OF HEEL (HCC): ICD-10-CM

## 2019-09-19 DIAGNOSIS — I73.9 PVD (PERIPHERAL VASCULAR DISEASE) WITH CLAUDICATION (HCC): ICD-10-CM

## 2019-09-19 DIAGNOSIS — I70.229 CRITICAL LOWER LIMB ISCHEMIA (HCC): ICD-10-CM

## 2019-09-19 LAB
ACT BLD: 219 SECONDS (ref 82–152)
ALBUMIN SERPL-MCNC: 3.8 G/DL (ref 3.5–5.2)
ALBUMIN/GLOB SERPL: 1.1 G/DL
ALP SERPL-CCNC: 85 U/L (ref 39–117)
ALT SERPL W P-5'-P-CCNC: 20 U/L (ref 1–41)
ANION GAP SERPL CALCULATED.3IONS-SCNC: 10.8 MMOL/L (ref 5–15)
AST SERPL-CCNC: 26 U/L (ref 1–40)
BASOPHILS # BLD AUTO: 0.07 10*3/MM3 (ref 0–0.2)
BASOPHILS NFR BLD AUTO: 1 % (ref 0–1.5)
BILIRUB SERPL-MCNC: 0.3 MG/DL (ref 0.2–1.2)
BUN BLD-MCNC: 11 MG/DL (ref 6–20)
BUN/CREAT SERPL: 9.2 (ref 7–25)
CALCIUM SPEC-SCNC: 8.6 MG/DL (ref 8.6–10.5)
CHLORIDE SERPL-SCNC: 96 MMOL/L (ref 98–107)
CO2 SERPL-SCNC: 22.2 MMOL/L (ref 22–29)
CREAT BLD-MCNC: 1.2 MG/DL (ref 0.76–1.27)
DEPRECATED RDW RBC AUTO: 46.4 FL (ref 37–54)
EOSINOPHIL # BLD AUTO: 0.25 10*3/MM3 (ref 0–0.4)
EOSINOPHIL NFR BLD AUTO: 3.7 % (ref 0.3–6.2)
ERYTHROCYTE [DISTWIDTH] IN BLOOD BY AUTOMATED COUNT: 13.4 % (ref 12.3–15.4)
GFR SERPL CREATININE-BSD FRML MDRD: 62 ML/MIN/1.73
GLOBULIN UR ELPH-MCNC: 3.4 GM/DL
GLUCOSE BLD-MCNC: 149 MG/DL (ref 65–99)
HCT VFR BLD AUTO: 43.6 % (ref 37.5–51)
HGB BLD-MCNC: 15.5 G/DL (ref 13–17.7)
IMM GRANULOCYTES # BLD AUTO: 0.03 10*3/MM3 (ref 0–0.05)
IMM GRANULOCYTES NFR BLD AUTO: 0.4 % (ref 0–0.5)
INR PPP: 1 (ref 0.9–1.1)
LYMPHOCYTES # BLD AUTO: 1.79 10*3/MM3 (ref 0.7–3.1)
LYMPHOCYTES NFR BLD AUTO: 26.7 % (ref 19.6–45.3)
MCH RBC QN AUTO: 33.9 PG (ref 26.6–33)
MCHC RBC AUTO-ENTMCNC: 35.6 G/DL (ref 31.5–35.7)
MCV RBC AUTO: 95.4 FL (ref 79–97)
MONOCYTES # BLD AUTO: 0.91 10*3/MM3 (ref 0.1–0.9)
MONOCYTES NFR BLD AUTO: 13.6 % (ref 5–12)
NEUTROPHILS # BLD AUTO: 3.65 10*3/MM3 (ref 1.7–7)
NEUTROPHILS NFR BLD AUTO: 54.6 % (ref 42.7–76)
PLATELET # BLD AUTO: 184 10*3/MM3 (ref 140–450)
PMV BLD AUTO: 10.8 FL (ref 6–12)
POTASSIUM BLD-SCNC: 5.3 MMOL/L (ref 3.5–5.2)
PROT SERPL-MCNC: 7.2 G/DL (ref 6–8.5)
PROTHROMBIN TIME: 13.7 SECONDS (ref 11–15.4)
RBC # BLD AUTO: 4.57 10*6/MM3 (ref 4.14–5.8)
SODIUM BLD-SCNC: 129 MMOL/L (ref 136–145)
WBC NRBC COR # BLD: 6.7 10*3/MM3 (ref 3.4–10.8)

## 2019-09-19 PROCEDURE — C2623 CATH, TRANSLUMIN, DRUG-COAT: HCPCS | Performed by: INTERNAL MEDICINE

## 2019-09-19 PROCEDURE — C1760 CLOSURE DEV, VASC: HCPCS | Performed by: INTERNAL MEDICINE

## 2019-09-19 PROCEDURE — 25010000002 IOPAMIDOL 61 % SOLUTION: Performed by: INTERNAL MEDICINE

## 2019-09-19 PROCEDURE — 25010000002 HEPARIN (PORCINE) PER 1000 UNITS: Performed by: INTERNAL MEDICINE

## 2019-09-19 PROCEDURE — C1751 CATH, INF, PER/CENT/MIDLINE: HCPCS | Performed by: INTERNAL MEDICINE

## 2019-09-19 PROCEDURE — 80053 COMPREHEN METABOLIC PANEL: CPT | Performed by: INTERNAL MEDICINE

## 2019-09-19 PROCEDURE — 75716 ARTERY X-RAYS ARMS/LEGS: CPT | Performed by: INTERNAL MEDICINE

## 2019-09-19 PROCEDURE — C1769 GUIDE WIRE: HCPCS | Performed by: INTERNAL MEDICINE

## 2019-09-19 PROCEDURE — C1714 CATH, TRANS ATHERECTOMY, DIR: HCPCS | Performed by: INTERNAL MEDICINE

## 2019-09-19 PROCEDURE — C1894 INTRO/SHEATH, NON-LASER: HCPCS | Performed by: INTERNAL MEDICINE

## 2019-09-19 PROCEDURE — 25010000002 FENTANYL CITRATE (PF) 100 MCG/2ML SOLUTION: Performed by: INTERNAL MEDICINE

## 2019-09-19 PROCEDURE — 99152 MOD SED SAME PHYS/QHP 5/>YRS: CPT | Performed by: INTERNAL MEDICINE

## 2019-09-19 PROCEDURE — C1887 CATHETER, GUIDING: HCPCS | Performed by: INTERNAL MEDICINE

## 2019-09-19 PROCEDURE — 85025 COMPLETE CBC W/AUTO DIFF WBC: CPT | Performed by: INTERNAL MEDICINE

## 2019-09-19 PROCEDURE — 85610 PROTHROMBIN TIME: CPT | Performed by: INTERNAL MEDICINE

## 2019-09-19 PROCEDURE — 25010000002 MIDAZOLAM PER 1 MG: Performed by: INTERNAL MEDICINE

## 2019-09-19 PROCEDURE — 85347 COAGULATION TIME ACTIVATED: CPT

## 2019-09-19 PROCEDURE — 99153 MOD SED SAME PHYS/QHP EA: CPT | Performed by: INTERNAL MEDICINE

## 2019-09-19 PROCEDURE — 37225 PR REVSC OPN/PRQ FEM/POP W/ATHRC/ANGIOP SM VSL: CPT | Performed by: INTERNAL MEDICINE

## 2019-09-19 RX ORDER — LORAZEPAM 1 MG/1
1 TABLET ORAL EVERY 6 HOURS PRN
Status: DISCONTINUED | OUTPATIENT
Start: 2019-09-19 | End: 2019-09-19 | Stop reason: HOSPADM

## 2019-09-19 RX ORDER — CLOPIDOGREL BISULFATE 75 MG/1
75 TABLET ORAL DAILY
Qty: 30 TABLET | Refills: 3 | Status: SHIPPED | OUTPATIENT
Start: 2019-09-19 | End: 2020-01-14 | Stop reason: SDUPTHER

## 2019-09-19 RX ORDER — SODIUM CHLORIDE 9 MG/ML
100 INJECTION, SOLUTION INTRAVENOUS ONCE
Status: COMPLETED | OUTPATIENT
Start: 2019-09-19 | End: 2019-09-19

## 2019-09-19 RX ORDER — FENTANYL CITRATE 50 UG/ML
INJECTION, SOLUTION INTRAMUSCULAR; INTRAVENOUS AS NEEDED
Status: DISCONTINUED | OUTPATIENT
Start: 2019-09-19 | End: 2019-09-19 | Stop reason: HOSPADM

## 2019-09-19 RX ORDER — MIDAZOLAM HYDROCHLORIDE 1 MG/ML
INJECTION INTRAMUSCULAR; INTRAVENOUS AS NEEDED
Status: DISCONTINUED | OUTPATIENT
Start: 2019-09-19 | End: 2019-09-19 | Stop reason: HOSPADM

## 2019-09-19 RX ORDER — CLOPIDOGREL BISULFATE 75 MG/1
600 TABLET ORAL ONCE
Status: COMPLETED | OUTPATIENT
Start: 2019-09-19 | End: 2019-09-19

## 2019-09-19 RX ORDER — SODIUM CHLORIDE 9 MG/ML
INJECTION, SOLUTION INTRAVENOUS CONTINUOUS PRN
Status: COMPLETED | OUTPATIENT
Start: 2019-09-19 | End: 2019-09-19

## 2019-09-19 RX ORDER — ONDANSETRON 2 MG/ML
4 INJECTION INTRAMUSCULAR; INTRAVENOUS EVERY 6 HOURS PRN
Status: DISCONTINUED | OUTPATIENT
Start: 2019-09-19 | End: 2019-09-19 | Stop reason: HOSPADM

## 2019-09-19 RX ORDER — LISINOPRIL 10 MG/1
40 TABLET ORAL DAILY
Status: DISCONTINUED | OUTPATIENT
Start: 2019-09-20 | End: 2019-09-19 | Stop reason: HOSPADM

## 2019-09-19 RX ORDER — GABAPENTIN 300 MG/1
600 CAPSULE ORAL EVERY 8 HOURS SCHEDULED
Status: DISCONTINUED | OUTPATIENT
Start: 2019-09-19 | End: 2019-09-19 | Stop reason: HOSPADM

## 2019-09-19 RX ORDER — ONDANSETRON 4 MG/1
4 TABLET, FILM COATED ORAL EVERY 6 HOURS PRN
Status: DISCONTINUED | OUTPATIENT
Start: 2019-09-19 | End: 2019-09-19 | Stop reason: HOSPADM

## 2019-09-19 RX ORDER — CLOPIDOGREL BISULFATE 75 MG/1
75 TABLET ORAL DAILY
Status: DISCONTINUED | OUTPATIENT
Start: 2019-09-20 | End: 2019-09-19 | Stop reason: HOSPADM

## 2019-09-19 RX ORDER — HEPARIN SODIUM 1000 [USP'U]/ML
INJECTION, SOLUTION INTRAVENOUS; SUBCUTANEOUS AS NEEDED
Status: DISCONTINUED | OUTPATIENT
Start: 2019-09-19 | End: 2019-09-19 | Stop reason: HOSPADM

## 2019-09-19 RX ORDER — ALBUTEROL SULFATE 2.5 MG/3ML
2.5 SOLUTION RESPIRATORY (INHALATION) EVERY 6 HOURS PRN
Status: DISCONTINUED | OUTPATIENT
Start: 2019-09-19 | End: 2019-09-19 | Stop reason: HOSPADM

## 2019-09-19 RX ORDER — CLOPIDOGREL BISULFATE 75 MG/1
TABLET ORAL AS NEEDED
Status: DISCONTINUED | OUTPATIENT
Start: 2019-09-19 | End: 2019-09-19 | Stop reason: HOSPADM

## 2019-09-19 RX ORDER — ASPIRIN 81 MG/1
81 TABLET ORAL DAILY
Status: DISCONTINUED | OUTPATIENT
Start: 2019-09-19 | End: 2019-09-19 | Stop reason: HOSPADM

## 2019-09-19 RX ORDER — LIDOCAINE HYDROCHLORIDE 20 MG/ML
INJECTION, SOLUTION INFILTRATION; PERINEURAL AS NEEDED
Status: DISCONTINUED | OUTPATIENT
Start: 2019-09-19 | End: 2019-09-19 | Stop reason: HOSPADM

## 2019-09-19 RX ORDER — ATENOLOL 50 MG/1
50 TABLET ORAL DAILY
Status: DISCONTINUED | OUTPATIENT
Start: 2019-09-19 | End: 2019-09-19 | Stop reason: HOSPADM

## 2019-09-19 RX ADMIN — GABAPENTIN 600 MG: 300 CAPSULE ORAL at 13:32

## 2019-09-19 RX ADMIN — SODIUM CHLORIDE 100 ML/HR: 9 INJECTION, SOLUTION INTRAVENOUS at 13:31

## 2019-09-19 RX ADMIN — ASPIRIN 81 MG: 81 TABLET, COATED ORAL at 13:31

## 2019-09-19 RX ADMIN — ATENOLOL 50 MG: 50 TABLET ORAL at 13:32

## 2019-09-19 RX ADMIN — CLOPIDOGREL 600 MG: 75 TABLET, FILM COATED ORAL at 13:31

## 2019-09-19 NOTE — PLAN OF CARE
Problem: Patient Care Overview  Goal: Plan of Care Review  Outcome: Ongoing (interventions implemented as appropriate)   09/19/19 1030   Coping/Psychosocial   Plan of Care Reviewed With patient;family     Goal: Discharge Needs Assessment  Outcome: Ongoing (interventions implemented as appropriate)   09/19/19 1139   Discharge Needs Assessment   Concerns to be Addressed no discharge needs identified   Patient/Family Anticipates Transition to home with family   Patient/Family Anticipated Services at Transition none   Transportation Concerns car, none   Transportation Anticipated family or friend will provide     Goal: Interprofessional Rounds/Family Conf  Outcome: Ongoing (interventions implemented as appropriate)   09/19/19 1139   Interdisciplinary Rounds/Family Conf   Participants family;nursing;patient;physician       Problem: Pain, Acute (Adult)  Goal: Identify Related Risk Factors and Signs and Symptoms  Outcome: Ongoing (interventions implemented as appropriate)   09/19/19 1139   Pain, Acute (Adult)   Related Risk Factors (Acute Pain) procedure/treatment     Goal: Acceptable Pain Control/Comfort Level  Outcome: Ongoing (interventions implemented as appropriate)   09/19/19 1139   Pain, Acute (Adult)   Acceptable Pain Control/Comfort Level making progress toward outcome

## 2019-09-19 NOTE — NURSING NOTE
Patient walked approximately 500ft. Site checked multiple times. Dressing is clean, dry, and intact. Patient denies any distress.Will continue to monitor.

## 2019-10-04 ENCOUNTER — TELEPHONE (OUTPATIENT)
Dept: CARDIOLOGY | Facility: CLINIC | Age: 59
End: 2019-10-04

## 2019-10-04 NOTE — TELEPHONE ENCOUNTER
Patient's wife called in today stating that Gurwinder had been experiencing some swelling in his ankle and leg. He had an appointment on 10/30 and I moved it up to 10/15 and I had told her for him to elevate his legs if he is sitting a lot. But I also let her know that if the swelling does subside that he may need to go to the ER because Dr. Azar and Dr. Jones are not in the office today and that  will be back on the 14th. She stated that if the swelling didn't subside they would go to the ER.

## 2019-10-07 ENCOUNTER — TELEPHONE (OUTPATIENT)
Dept: FAMILY MEDICINE CLINIC | Facility: CLINIC | Age: 59
End: 2019-10-07

## 2019-10-07 DIAGNOSIS — G62.9 NEUROPATHY: Primary | ICD-10-CM

## 2019-10-07 RX ORDER — GABAPENTIN 800 MG/1
1200 TABLET ORAL 3 TIMES DAILY
Qty: 135 TABLET | Refills: 2 | Status: SHIPPED | OUTPATIENT
Start: 2019-10-07 | End: 2020-01-06 | Stop reason: SDUPTHER

## 2019-10-07 NOTE — TELEPHONE ENCOUNTER
Wife called for a refill on patient's Gabapentin,axel is on your desk.              Wife reports he is on Medical leave due to PAD,has had angioplasty on one leg but still has not had the other one yet,leave runs out the 14th,does not even see cardiology again until the 15 th,is requesting a letter of extension,sent to [employer] .

## 2019-10-08 ENCOUNTER — TELEPHONE (OUTPATIENT)
Dept: FAMILY MEDICINE CLINIC | Facility: CLINIC | Age: 59
End: 2019-10-08

## 2019-10-08 NOTE — TELEPHONE ENCOUNTER
Wife called reports she called the Cardiologist his apt. Is not until the 15 th,his leave is thru the 13th there is nothing that they can or will do until then,what can they do?

## 2019-10-08 NOTE — TELEPHONE ENCOUNTER
He will need to talk with cardiologist for additional leave      Patsy notified & verbalized understanding.

## 2019-10-09 NOTE — TELEPHONE ENCOUNTER
He can have his workplace send me paperwork and I will complete it saying that he should be off work until he follows up with the cardiologist on the 15th.  Then the cardiologist can take over from there      Left a message to return call.      Patsy notified & verbalized understanding.

## 2019-10-09 NOTE — TELEPHONE ENCOUNTER
He can have his workplace send me paperwork and I will complete it saying that he should be off work until he follows up with the cardiologist on the 15th.  Then the cardiologist can take over from there

## 2019-10-15 ENCOUNTER — OFFICE VISIT (OUTPATIENT)
Dept: CARDIOLOGY | Facility: CLINIC | Age: 59
End: 2019-10-15

## 2019-10-15 DIAGNOSIS — G62.9 NEUROPATHY: ICD-10-CM

## 2019-10-15 DIAGNOSIS — I73.9 PVD (PERIPHERAL VASCULAR DISEASE) WITH CLAUDICATION (HCC): Primary | ICD-10-CM

## 2019-10-15 DIAGNOSIS — I70.229 CRITICAL LOWER LIMB ISCHEMIA (HCC): ICD-10-CM

## 2019-10-15 DIAGNOSIS — M79.672 BILATERAL FOOT PAIN: ICD-10-CM

## 2019-10-15 DIAGNOSIS — M79.671 BILATERAL FOOT PAIN: ICD-10-CM

## 2019-10-15 PROCEDURE — 99213 OFFICE O/P EST LOW 20 MIN: CPT | Performed by: INTERNAL MEDICINE

## 2019-10-16 VITALS
BODY MASS INDEX: 33.36 KG/M2 | HEIGHT: 70 IN | SYSTOLIC BLOOD PRESSURE: 160 MMHG | WEIGHT: 233 LBS | OXYGEN SATURATION: 98 % | HEART RATE: 77 BPM | DIASTOLIC BLOOD PRESSURE: 93 MMHG

## 2019-10-22 PROBLEM — M79.672 BILATERAL FOOT PAIN: Status: ACTIVE | Noted: 2019-10-22

## 2019-10-22 PROBLEM — M79.671 BILATERAL FOOT PAIN: Status: ACTIVE | Noted: 2019-10-22

## 2019-10-28 ENCOUNTER — LAB (OUTPATIENT)
Dept: LAB | Facility: HOSPITAL | Age: 59
End: 2019-10-28

## 2019-10-28 DIAGNOSIS — M79.671 BILATERAL FOOT PAIN: ICD-10-CM

## 2019-10-28 DIAGNOSIS — I73.9 PVD (PERIPHERAL VASCULAR DISEASE) WITH CLAUDICATION (HCC): ICD-10-CM

## 2019-10-28 DIAGNOSIS — I70.229 CRITICAL LOWER LIMB ISCHEMIA (HCC): ICD-10-CM

## 2019-10-28 DIAGNOSIS — G62.9 NEUROPATHY: ICD-10-CM

## 2019-10-28 DIAGNOSIS — M79.672 BILATERAL FOOT PAIN: ICD-10-CM

## 2019-10-28 LAB
ALBUMIN SERPL-MCNC: 3.97 G/DL (ref 3.5–5.2)
ALBUMIN/GLOB SERPL: 1.1 G/DL
ALP SERPL-CCNC: 83 U/L (ref 39–117)
ALT SERPL W P-5'-P-CCNC: 19 U/L (ref 1–41)
ANION GAP SERPL CALCULATED.3IONS-SCNC: 11.1 MMOL/L (ref 5–15)
AST SERPL-CCNC: 25 U/L (ref 1–40)
BASOPHILS # BLD AUTO: 0.08 10*3/MM3 (ref 0–0.2)
BASOPHILS NFR BLD AUTO: 1.2 % (ref 0–1.5)
BILIRUB SERPL-MCNC: 0.4 MG/DL (ref 0.2–1.2)
BUN BLD-MCNC: 9 MG/DL (ref 6–20)
BUN/CREAT SERPL: 7.1 (ref 7–25)
CALCIUM SPEC-SCNC: 9.7 MG/DL (ref 8.6–10.5)
CHLORIDE SERPL-SCNC: 96 MMOL/L (ref 98–107)
CO2 SERPL-SCNC: 23.9 MMOL/L (ref 22–29)
CREAT BLD-MCNC: 1.26 MG/DL (ref 0.76–1.27)
DEPRECATED RDW RBC AUTO: 44.9 FL (ref 37–54)
EOSINOPHIL # BLD AUTO: 0.09 10*3/MM3 (ref 0–0.4)
EOSINOPHIL NFR BLD AUTO: 1.4 % (ref 0.3–6.2)
ERYTHROCYTE [DISTWIDTH] IN BLOOD BY AUTOMATED COUNT: 12.9 % (ref 12.3–15.4)
GFR SERPL CREATININE-BSD FRML MDRD: 59 ML/MIN/1.73
GLOBULIN UR ELPH-MCNC: 3.5 GM/DL
GLUCOSE BLD-MCNC: 135 MG/DL (ref 65–99)
HCT VFR BLD AUTO: 43.5 % (ref 37.5–51)
HGB BLD-MCNC: 14.7 G/DL (ref 13–17.7)
IMM GRANULOCYTES # BLD AUTO: 0.02 10*3/MM3 (ref 0–0.05)
IMM GRANULOCYTES NFR BLD AUTO: 0.3 % (ref 0–0.5)
INR PPP: 1.09 (ref 0.9–1.1)
LYMPHOCYTES # BLD AUTO: 1.29 10*3/MM3 (ref 0.7–3.1)
LYMPHOCYTES NFR BLD AUTO: 19.5 % (ref 19.6–45.3)
MCH RBC QN AUTO: 32.2 PG (ref 26.6–33)
MCHC RBC AUTO-ENTMCNC: 33.8 G/DL (ref 31.5–35.7)
MCV RBC AUTO: 95.2 FL (ref 79–97)
MONOCYTES # BLD AUTO: 0.61 10*3/MM3 (ref 0.1–0.9)
MONOCYTES NFR BLD AUTO: 9.2 % (ref 5–12)
NEUTROPHILS # BLD AUTO: 4.51 10*3/MM3 (ref 1.7–7)
NEUTROPHILS NFR BLD AUTO: 68.4 % (ref 42.7–76)
NRBC BLD AUTO-RTO: 0 /100 WBC (ref 0–0.2)
PLATELET # BLD AUTO: 200 10*3/MM3 (ref 140–450)
PMV BLD AUTO: 10 FL (ref 6–12)
POTASSIUM BLD-SCNC: 4.9 MMOL/L (ref 3.5–5.2)
PROT SERPL-MCNC: 7.5 G/DL (ref 6–8.5)
PROTHROMBIN TIME: 14.7 SECONDS (ref 11–15.4)
RBC # BLD AUTO: 4.57 10*6/MM3 (ref 4.14–5.8)
SODIUM BLD-SCNC: 131 MMOL/L (ref 136–145)
WBC NRBC COR # BLD: 6.6 10*3/MM3 (ref 3.4–10.8)

## 2019-10-28 PROCEDURE — 85610 PROTHROMBIN TIME: CPT

## 2019-10-28 PROCEDURE — 85025 COMPLETE CBC W/AUTO DIFF WBC: CPT

## 2019-10-28 PROCEDURE — 36415 COLL VENOUS BLD VENIPUNCTURE: CPT

## 2019-10-28 PROCEDURE — 80053 COMPREHEN METABOLIC PANEL: CPT

## 2019-10-29 ENCOUNTER — HOSPITAL ENCOUNTER (OUTPATIENT)
Facility: HOSPITAL | Age: 59
Discharge: HOME | End: 2019-10-29
Attending: INTERNAL MEDICINE | Admitting: INTERNAL MEDICINE

## 2019-10-29 VITALS
TEMPERATURE: 97.8 F | WEIGHT: 230 LBS | DIASTOLIC BLOOD PRESSURE: 89 MMHG | SYSTOLIC BLOOD PRESSURE: 138 MMHG | HEIGHT: 70 IN | BODY MASS INDEX: 32.93 KG/M2 | HEART RATE: 81 BPM | OXYGEN SATURATION: 98 % | RESPIRATION RATE: 18 BRPM

## 2019-10-29 DIAGNOSIS — I73.9 PVD (PERIPHERAL VASCULAR DISEASE) WITH CLAUDICATION (HCC): ICD-10-CM

## 2019-10-29 DIAGNOSIS — M79.671 BILATERAL FOOT PAIN: ICD-10-CM

## 2019-10-29 DIAGNOSIS — G62.9 NEUROPATHY: ICD-10-CM

## 2019-10-29 DIAGNOSIS — I70.229 CRITICAL LOWER LIMB ISCHEMIA (HCC): ICD-10-CM

## 2019-10-29 DIAGNOSIS — M79.672 BILATERAL FOOT PAIN: ICD-10-CM

## 2019-10-29 LAB — ACT BLD: 241 SECONDS (ref 82–152)

## 2019-10-29 PROCEDURE — 37227 PR REVSC OPN/PRQ FEM/POP W/STNT/ATHRC/ANGIOP SM VSL: CPT | Performed by: INTERNAL MEDICINE

## 2019-10-29 PROCEDURE — 25010000002 INFLUENZA VAC SUBUNIT QUAD 0.5 ML SUSPENSION PREFILLED SYRINGE: Performed by: INTERNAL MEDICINE

## 2019-10-29 PROCEDURE — C1714 CATH, TRANS ATHERECTOMY, DIR: HCPCS | Performed by: INTERNAL MEDICINE

## 2019-10-29 PROCEDURE — C1760 CLOSURE DEV, VASC: HCPCS | Performed by: INTERNAL MEDICINE

## 2019-10-29 PROCEDURE — 25010000002 FENTANYL CITRATE (PF) 100 MCG/2ML SOLUTION: Performed by: INTERNAL MEDICINE

## 2019-10-29 PROCEDURE — C1894 INTRO/SHEATH, NON-LASER: HCPCS | Performed by: INTERNAL MEDICINE

## 2019-10-29 PROCEDURE — 75710 ARTERY X-RAYS ARM/LEG: CPT | Performed by: INTERNAL MEDICINE

## 2019-10-29 PROCEDURE — C1769 GUIDE WIRE: HCPCS | Performed by: INTERNAL MEDICINE

## 2019-10-29 PROCEDURE — 25010000002 HEPARIN (PORCINE) PER 1000 UNITS: Performed by: INTERNAL MEDICINE

## 2019-10-29 PROCEDURE — C1876 STENT, NON-COA/NON-COV W/DEL: HCPCS | Performed by: INTERNAL MEDICINE

## 2019-10-29 PROCEDURE — C1887 CATHETER, GUIDING: HCPCS | Performed by: INTERNAL MEDICINE

## 2019-10-29 PROCEDURE — 85347 COAGULATION TIME ACTIVATED: CPT

## 2019-10-29 PROCEDURE — C1751 CATH, INF, PER/CENT/MIDLINE: HCPCS | Performed by: INTERNAL MEDICINE

## 2019-10-29 PROCEDURE — S0260 H&P FOR SURGERY: HCPCS | Performed by: INTERNAL MEDICINE

## 2019-10-29 PROCEDURE — C2623 CATH, TRANSLUMIN, DRUG-COAT: HCPCS | Performed by: INTERNAL MEDICINE

## 2019-10-29 PROCEDURE — 25010000002 IOPAMIDOL 61 % SOLUTION: Performed by: INTERNAL MEDICINE

## 2019-10-29 PROCEDURE — 90674 CCIIV4 VAC NO PRSV 0.5 ML IM: CPT | Performed by: INTERNAL MEDICINE

## 2019-10-29 PROCEDURE — G0008 ADMIN INFLUENZA VIRUS VAC: HCPCS | Performed by: INTERNAL MEDICINE

## 2019-10-29 PROCEDURE — 25010000002 MIDAZOLAM PER 1 MG: Performed by: INTERNAL MEDICINE

## 2019-10-29 DEVICE — IMPLANTABLE DEVICE: Type: IMPLANTABLE DEVICE | Status: FUNCTIONAL

## 2019-10-29 RX ORDER — HEPARIN SODIUM 1000 [USP'U]/ML
INJECTION, SOLUTION INTRAVENOUS; SUBCUTANEOUS AS NEEDED
Status: DISCONTINUED | OUTPATIENT
Start: 2019-10-29 | End: 2019-10-29 | Stop reason: HOSPADM

## 2019-10-29 RX ORDER — MIDAZOLAM HYDROCHLORIDE 1 MG/ML
INJECTION INTRAMUSCULAR; INTRAVENOUS AS NEEDED
Status: DISCONTINUED | OUTPATIENT
Start: 2019-10-29 | End: 2019-10-29 | Stop reason: HOSPADM

## 2019-10-29 RX ORDER — ONDANSETRON 2 MG/ML
4 INJECTION INTRAMUSCULAR; INTRAVENOUS EVERY 6 HOURS PRN
Status: DISCONTINUED | OUTPATIENT
Start: 2019-10-29 | End: 2019-10-29 | Stop reason: HOSPADM

## 2019-10-29 RX ORDER — SODIUM CHLORIDE 9 MG/ML
100 INJECTION, SOLUTION INTRAVENOUS ONCE
Status: COMPLETED | OUTPATIENT
Start: 2019-10-29 | End: 2019-10-29

## 2019-10-29 RX ORDER — LORAZEPAM 2 MG/ML
1 INJECTION INTRAMUSCULAR EVERY 6 HOURS PRN
Status: DISCONTINUED | OUTPATIENT
Start: 2019-10-29 | End: 2019-10-29 | Stop reason: HOSPADM

## 2019-10-29 RX ORDER — LIDOCAINE HYDROCHLORIDE 20 MG/ML
INJECTION, SOLUTION INFILTRATION; PERINEURAL AS NEEDED
Status: DISCONTINUED | OUTPATIENT
Start: 2019-10-29 | End: 2019-10-29 | Stop reason: HOSPADM

## 2019-10-29 RX ORDER — FENTANYL CITRATE 50 UG/ML
INJECTION, SOLUTION INTRAMUSCULAR; INTRAVENOUS AS NEEDED
Status: DISCONTINUED | OUTPATIENT
Start: 2019-10-29 | End: 2019-10-29 | Stop reason: HOSPADM

## 2019-10-29 RX ORDER — CLOPIDOGREL BISULFATE 75 MG/1
TABLET ORAL AS NEEDED
Status: DISCONTINUED | OUTPATIENT
Start: 2019-10-29 | End: 2019-10-29 | Stop reason: HOSPADM

## 2019-10-29 RX ORDER — SODIUM CHLORIDE 9 MG/ML
INJECTION, SOLUTION INTRAVENOUS CONTINUOUS PRN
Status: DISCONTINUED | OUTPATIENT
Start: 2019-10-29 | End: 2019-10-29 | Stop reason: HOSPADM

## 2019-10-29 RX ORDER — ONDANSETRON 4 MG/1
4 TABLET, FILM COATED ORAL EVERY 6 HOURS PRN
Status: DISCONTINUED | OUTPATIENT
Start: 2019-10-29 | End: 2019-10-29 | Stop reason: HOSPADM

## 2019-10-29 RX ADMIN — INFLUENZA A VIRUS A/SINGAPORE/GP1908/2015 IVR-180 (H1N1) ANTIGEN (MDCK CELL DERIVED, PROPIOLACTONE INACTIVATED), INFLUENZA A VIRUS A/NORTH CAROLINA/04/2016 (H3N2) HEMAGGLUTININ ANTIGEN (MDCK CELL DERIVED, PROPIOLACTONE INACTIVATED), INFLUENZA B VIRUS B/IOWA/06/2017 HEMAGGLUTININ ANTIGEN (MDCK CELL DERIVED, PROPIOLACTONE INACTIVATED), INFLUENZA B VIRUS B/SINGAPORE/INFTT-16-0610/2016 HEMAGGLUTININ ANTIGEN (MDCK CELL DERIVED, PROPIOLACTONE INACTIVATED) 0.5 ML: 15; 15; 15; 15 INJECTION, SUSPENSION INTRAMUSCULAR at 16:59

## 2019-10-29 RX ADMIN — SODIUM CHLORIDE 100 ML/HR: 9 INJECTION, SOLUTION INTRAVENOUS at 13:17

## 2019-11-25 ENCOUNTER — TELEPHONE (OUTPATIENT)
Dept: CARDIOLOGY | Facility: CLINIC | Age: 59
End: 2019-11-25

## 2019-11-25 NOTE — TELEPHONE ENCOUNTER
Patient's wife called stating that Gurwinder had a procedure done 10/29/19 and he was fine for a little while after but now he has experienced weakness in his legs, dizziness and not feeling right. I told Patsy to take him to the ED and she said she would try to get him to go.

## 2020-01-03 ENCOUNTER — TELEPHONE (OUTPATIENT)
Dept: FAMILY MEDICINE CLINIC | Facility: CLINIC | Age: 60
End: 2020-01-03

## 2020-01-06 DIAGNOSIS — G62.9 NEUROPATHY: ICD-10-CM

## 2020-01-06 RX ORDER — GABAPENTIN 800 MG/1
1200 TABLET ORAL 3 TIMES DAILY
Qty: 135 TABLET | Refills: 2 | Status: SHIPPED | OUTPATIENT
Start: 2020-01-06 | End: 2020-02-07 | Stop reason: SDUPTHER

## 2020-01-06 NOTE — PROGRESS NOTES
[facility] # [id number]  Reviewed and is consistent.  [facility] 8/2019 reviewed and is consistent.  Patient comfort assessment guide reviewed and updated today.    As part of the patient's treatment plan they are being prescribed a controlled substance/ substances with potential for abuse.  This patient has been made aware of the appropriate use of such medications, including potential risk of somnolence, limited ability to drive and/or work safely, and potential for overdose.  It has also been made clear these medications are for use by the patient only, without concomitant use of alcohol or other substances unless prescribed/advised by medical provider.    Patient has completed prescribing agreement detailing terms of continued prescribing of controlled substances including monitoring HERON reports, urine drug screens, and pill counts.  The patient is aware HERON reports are reviewed on a regular basis and scanned into the chart.    History and physical exam exhibit continued safe and appropriate use of controlled substances.

## 2020-01-14 RX ORDER — CLOPIDOGREL BISULFATE 75 MG/1
75 TABLET ORAL DAILY
Qty: 30 TABLET | Refills: 1 | Status: SHIPPED | OUTPATIENT
Start: 2020-01-14 | End: 2020-02-04 | Stop reason: SDUPTHER

## 2020-01-14 NOTE — TELEPHONE ENCOUNTER
Wife called for a refill on his plavix,reports they do not have a follow up with cardiology until feb. (pended)    Left a detailed message.

## 2020-02-04 ENCOUNTER — OFFICE VISIT (OUTPATIENT)
Dept: CARDIOLOGY | Facility: CLINIC | Age: 60
End: 2020-02-04

## 2020-02-04 VITALS
HEIGHT: 70 IN | HEART RATE: 83 BPM | BODY MASS INDEX: 32.35 KG/M2 | DIASTOLIC BLOOD PRESSURE: 80 MMHG | SYSTOLIC BLOOD PRESSURE: 127 MMHG | OXYGEN SATURATION: 97 % | WEIGHT: 226 LBS

## 2020-02-04 DIAGNOSIS — I10 ESSENTIAL HYPERTENSION: ICD-10-CM

## 2020-02-04 DIAGNOSIS — F17.210 CIGARETTE NICOTINE DEPENDENCE WITHOUT COMPLICATION: ICD-10-CM

## 2020-02-04 DIAGNOSIS — R42 DIZZINESS: ICD-10-CM

## 2020-02-04 DIAGNOSIS — I73.9 PVD (PERIPHERAL VASCULAR DISEASE) WITH CLAUDICATION (HCC): Primary | ICD-10-CM

## 2020-02-04 DIAGNOSIS — I70.229 CRITICAL LOWER LIMB ISCHEMIA (HCC): ICD-10-CM

## 2020-02-04 PROCEDURE — 99213 OFFICE O/P EST LOW 20 MIN: CPT | Performed by: INTERNAL MEDICINE

## 2020-02-04 RX ORDER — CLOPIDOGREL BISULFATE 75 MG/1
75 TABLET ORAL DAILY
Qty: 30 TABLET | Refills: 5 | Status: SHIPPED | OUTPATIENT
Start: 2020-02-04 | End: 2020-05-07 | Stop reason: SDUPTHER

## 2020-02-04 NOTE — PROGRESS NOTES
[facility] CARDIOLOGY  [address] [facility] [address]  [place] [address]  Phone: [phone]  Fax: [phone]    02/04/2020    Chief Complaint   Patient presents with   • Dizziness   • Peripheral Vascular Disease        History:   Gurwinder Harding is a 59 y.o. male presents for a regular follow up. He is needing medication refills. He has complaints of dizziness today. No complaints of chest pain, shortness of breath or palpitations. Ucler has healed well and dry of LLE His groin also has healed well. He is able to walk more since procedure has been completed. He has a history significant for essential hypertension, PVD S/P successful atherectomy followed by angioplasty and stenting of the right SFA , S/P successful atherectomy and balloon angioplasty of the left SFA ,  and tobacco abuse.  He still continues to smoke 1 ppd and denied to quit at this time.   The chart and medications were reviewed today. Problems above addressed this visit.      Past Medical History:   Diagnosis Date   • Alcohol abuse    • ED (erectile dysfunction)    • History of degenerative disc disease    • Hyperglycemia    • Hypertension    • Low back pain    • Neuropathy    • Screening PSA (prostate specific antigen) 09/2015   • Tobacco abuse    • Vitamin D deficiency        Past Surgical History:   Procedure Laterality Date   • APPENDECTOMY     • ARTERIOGRAM N/A 9/19/2019    Procedure: Arteriogram;  Surgeon: Tong Azar MD;  Location:  [facility] CATH INVASIVE LOCATION;  Service: Cardiology   • BACK SURGERY      DISC RUPTURE REPAIR, L5   • CARDIAC CATHETERIZATION Right 10/29/2019    Procedure: Peripheral angiography;  Surgeon: Tong Azar MD;  Location:  [facility] CATH INV[facility] LOCATION;  Service: Cardiovascular        Past Social History:  Social History     Socioeconomic History   • Marital status:      Spouse name: Not on file   • Number of children: Not on file   • Years of education: Not on file   • Highest  education level: Not on file   Tobacco Use   • Smoking status: Current Every Day Smoker     Packs/day: 1.00     Types: Cigarettes   • Smokeless tobacco: Never Used   Substance and Sexual Activity   • Alcohol use: Yes     Alcohol/week: 6.0 standard drinks     Types: 6 Cans of beer per week     Comment: Daily    • Drug use: No   • Sexual activity: Defer       Past Family History:  Family History   Problem Relation Age of Onset   • Hypertension Mother    • Cancer Father         LUNG   • Diabetes Father    • Hypertension Father    • Heart attack Other         GRANDFATHER       Review of Systems:   Please see HPI  Constitution: No chills, no rigors, no unexplained weight loss or weight gain  Eyes:  No diplopia, no blurred vision, no loss of vision, conjunctiva is pink and sclera is anicteric  ENT:  No tinnitus, no otorrhea, no epistaxis, no sore throat   Respiratory: No cough, no hemoptysis  Cardiovascular: see HPI  Gastrointestinal: No nausea, no vomiting, no hematemesis, no diarrhea or constipation, no melena  Genitourinary: No frequency of dysuria no hematuria  Integument: No pruritis and  no skin rash  Hematologic / Lymphatic: No excessive bleeding, easy bruising, fatigue, lymphadenopathy and petechiae  Musculoskeletal: No joint pain, joint stiffness, joint swelling, muscle pain, muscle weakness and neck pain  Neurological: No dizziness, headaches, light headedness, seizures and vertigo  Endocrine: No frequent urination and nocturia, temperature intolerance, weight gain, unintended and weight loss, unintended      Current Outpatient Medications   Medication Sig Dispense Refill   • albuterol sulfate  (90 Base) MCG/ACT inhaler Inhale 2 puffs Every 6 (Six) Hours As Needed for Wheezing. 1 inhaler 5   • aspirin 81 MG EC tablet Take 81 mg by mouth Daily.     • atenolol (TENORMIN) 50 MG tablet Take 1 tablet by mouth Daily. 30 tablet 5   • bacitracin 500 UNIT/GM ointment Apply  topically to the appropriate area as  directed 2 (Two) Times a Day. 28 g 1   • clopidogrel (PLAVIX) 75 MG tablet Take 1 tablet by mouth Daily. 30 tablet 5   • folic acid-pyridoxine-cyanocobalamin (FOLBIC) 2.5-25-2 MG tablet tablet Take 1 tablet by mouth Daily. 30 each 5   • gabapentin (NEURONTIN) 800 MG tablet Take 1.5 tablets by mouth 3 (Three) Times a Day. 135 tablet 2   • lisinopril (PRINIVIL,ZESTRIL) 40 MG tablet Take 1 tablet by mouth Daily. 30 tablet 5   • methocarbamol (ROBAXIN) 750 MG tablet Take 1 tablet by mouth 4 (Four) Times a Day As Needed for Muscle Spasms. 120 tablet 5   • nabumetone (RELAFEN) 500 MG tablet Take 1 tablet by mouth 2 (Two) Times a Day As Needed for Mild Pain . 60 tablet 5   • varenicline (CHANTIX CONTINUING MONTH NEY) 1 MG tablet Take 1 tablet by mouth 2 (Two) Times a Day. 60 tablet 2   • varenicline (CHANTIX STARTING MONTH NEY) 0.5 MG X 11 & 1 MG X 42 tablet Take 0.5 mg one daily on days 1-3 and and 0.5 mg twice daily on days 4-7.Then 1 mg twice daily for a total of 12 weeks. 53 tablet 0     No current facility-administered medications for this visit.         Allergies   Allergen Reactions   • Novocain [Procaine]    • Penicillins        Objective:  Vitals:    02/04/20 1517   BP: 127/80   Pulse: 83   SpO2: 97%     Comfortable NAD  PERRL, conjunctiva clear  Neck supple, no JVD or thyromegaly appreciated  S1/S2 RRR, no m/r/g  Lungs CTA B, normal effort  Abdomen S/NT/ND (+) BS, no HSM appreciated  Extremities warm, no clubbing, cyanosis, or edema  Normal gait  No visible or palpable skin lesions  A/Ox4, mood and affect appropriate  Pulse exam:    Feet are warm bilateral  No edema bilateral  Capillary refill is normal  No evidence of ulceration or color change of the toes  PULSES  Right DP and PT are 1+ and Left DP and PT are 2+    DATA:        Results for orders placed during the hospital encounter of 11/30/16   Stress Test With Myocardial Perfusion One Day    Narrative · Left ventricular ejection fraction is normal  (Calculated EF = 67%).  · Myocardial perfusion imaging indicates a normal myocardial perfusion   study with no evidence of ischemia.  · Impressions are consistent with a low risk study.  · low risk for ischemic heart disease.  · Findings consistent with a normal ECG stress test.         Results for orders placed during the hospital encounter of 11/30/16   Stress Test With Myocardial Perfusion One Day    Narrative · Left ventricular ejection fraction is normal (Calculated EF = 67%).  · Myocardial perfusion imaging indicates a normal myocardial perfusion   study with no evidence of ischemia.  · Impressions are consistent with a low risk study.  · low risk for ischemic heart disease.  · Findings consistent with a normal ECG stress test.         Results for orders placed during the hospital encounter of 10/29/19   Cardiac Catheterization/Vascular Study    Addendum PERIPHERAL ARTERIOGRAM / INTERVENTION REPORT   DATE OF PROCEDURE: 10/29/19   INDICATION FOR PROCEDURE: Severe claudication of the RIGHT leg.  Recent intervention to LLE with healing of ulcer on the left Pt still has cold foot on the right with no pulse.    PROCEDURE PERFORMED:  LEFT femoral artery access with 5 Urdu sheath Bilateral lower extremity angiogram Atherectomy followed by Balloon angioplasty and stenting of the R SFA  PROCEDURE COMMENTS:  Gurwinder Harding was brought to the cath lab and placed on the cardiac  catherization table in the postabsortive state. The patient was prepped  and draped according to the cath lab protocol under strict aseptic and  sterile condition. Patient's right femoral artery sight was prepped and  draped. Under fluoroscopic guidance theright femoral artery was punctured  using a 21 gauge needle utilizing the modified Seldinger technique. 5  German sheath was introduced over a wire. After aspirating for blood it  was flushed with heparinized saline.  LEFT extremity imaging was performed via the sheath. A 5F RIM catheter was   then advanced to the bifurcation at L5 and engaged in the contralateral  CLEMENTE. RIGHT extremity imaging was then performed. After the procedure was  completed the sheath was removed in the cath lab and hemostasis achieved  via manual compression. No complications occurred.   Findings: Right Lower Extremity:  Common  Iliac artery was normal SFA shows 100% short occlusion in the distal third Popliteal Artery was patent with no disease Tibio-peroneal trunk patent with no disease Anterior tibial artery was patent Posterior tibial artery was patent 3 Vessel run off present  Left Lower Extremity: Common iliac artery shows mild disease and calcific changes  SFA is patent at the site of previous atherectomy and DCB with excellent  flow Popliteal Artery is patent with no disease  Tibio-peroneal trunk patent with no significant disease Anterior tibial artery was patent Posterior tibial artery is patent  3 Vessel run off present  Recommendations: Intervention to R SFA  6x45 straight sheath used  Angled NaviCross with glidewire 0.035 Crossed successfully and a Stabilizer wire was used for Atherectomy with  HawkOne 6F DCB with 5x120 Admiral performed There was residual stenosis which was flow limiting and thus stenting was  elected. 6x120 Everflex stent was delivered with excellent results and flow was  fully re-established.   FINAL RESULTS: Successful Intervention to the Right SFA Chronic Total occlusion   beginning at the ostium and extending to the Adductor canal using:  Atherectomy HawkOne device 6F Balloon angioplasty with Admiral DCB 5x120 followed by stenting of the  right SFA using 6x120 Everflex stent From 100% to 20% residual with brisk flow to the distal vessel.   EBL: 50 ML No specimens were removed.  Plan Continue medical therapy.    Tong Azar MD 10/29/19    Director, Cardiac Cath Lab       Tong Azar MD 11/12/2019  3:08 PM          Narrative PERIPHERAL ARTERIOGRAM / INTERVENTION REPORT     DATE OF  PROCEDURE: 10/29/19     INDICATION FOR PROCEDURE: Severe claudication of the RIGHT leg.   Recent intervention to LLE with healing of ulcer on the left  Pt still has cold foot on the right with no pulse.       PROCEDURE PERFORMED:   LEFT femoral artery access with 5 Jordanian sheath  Bilateral lower extremity angiogram  Atherectomy followed by Balloon angioplasty and stenting of the R SFA    PROCEDURE COMMENTS:    Gurwinder Harding was brought to the cath lab and placed on the cardiac   catherization table in the postabsortive state. The patient was prepped   and draped according to the cath lab protocol under strict aseptic and   sterile condition. Patient's right femoral artery sight was prepped and   draped. Under fluoroscopic guidance theright femoral artery was punctured   using a 21 gauge needle utilizing the modified Seldinger technique. 4   Bruneian sheath was introduced over a wire. After aspirating for blood it   was flushed with heparinized saline.    LEFT extremity imaging was performed via the sheath. A 5F RIM catheter was   then advanced to the bifurcation at L5 and engaged in the contralateral   CLEMENTE. RIGHT extremity imaging was then performed. After the procedure was   completed the sheath was removed in the cath lab and hemostasis achieved   via manual compression. No complications occurred.     Findings:  Right Lower Extremity:   Common  Iliac artery was normal  SFA shows 100% short occlusion in the distal third  Popliteal Artery was patent with no disease  Tibio-peroneal trunk patent with no disease  Anterior tibial artery was patent  Posterior tibial artery was patent  3 Vessel run off present    Left Lower Extremity:  Common iliac artery shows mild disease and calcific changes   SFA is patent at the site of previous atherectomy and DCB with excellent   flow  Popliteal Artery is patent with no disease   Tibio-peroneal trunk patent with no significant disease  Anterior tibial artery was patent  Posterior  tibial artery is patent   3 Vessel run off present    Recommendations:  Intervention to R SFA    6x45 straight sheath used   Angled NaviCross with glidewire 0.035  Crossed successfully and a Stabilizer wire was used for Atherectomy with   HawkOne 6F  DCB with 5x120 Admiral performed  There was residual stenosis which was flow limiting and thus stenting was   elected.  6x120 Everflex stent was delivered with excellent results and flow was   fully re-established.      FINAL RESULTS:  Successful Intervention to the Right SFA Chronic Total occlusion    beginning at the ostium and extending to the Adductor canal using:    Atherectomy HawkOne device 6F  Balloon angioplasty with Admiral DCB 5x120 followed by stenting of the   right SFA using 6x120 Everflex stent  From 100% to 20% residual with brisk flow to the distal vessel.      EBL: 50 ML  No specimens were removed.    Plan  Continue medical therapy and if symptoms worsen in a few months we will   proceed with common iliac stenting at that time.      Tong Azar MD  10/29/19        Director, Cardiac Cath Lab       Procedures       Assessment  PVD S/P successful atherectomy followed by angioplasty and stenting of the R SFA. Successful atherectomy and balloon angioplasty of the LEFT SFA   Essential HTN well controlled.  Chronic tobacco abuse    Plan  No medication changes today  Refilled Plavix today   Follow up in 4 months or sooner if needed.     I extensively discussed consequences of smoking namely cardiovascular/metabolic, lung cancer, mouth cancer, pulmonary disorder including COPD and reduced quality of life.  At the end of the conversation, patient voices understanding of the risk involved in smoking.  Patient also understands the benefits of quitting.  I provided the patient smoking cessation material. Patient displayed understanding and denies to quit smoking.  During this visit I spent 5-10 minutes counseling the patient regarding the smoking  cessation.        Patient's Body mass index is 32.43 kg/m². BMI is above normal parameters. Recommendations include: educational material.         ICD-10-CM ICD-9-CM   1. PVD (peripheral vascular disease) with claudication (CMS/Formerly [facility]) I73.9 443.9   2. Cigarette nicotine dependence without complication F17.210 305.1   3. Critical lower limb ischemia I99.8 459.9   4. Essential hypertension I10 401.9   5. Dizziness R42 780.4        Thank you for allowing me to participate in the care of Gurwinder Harding. Feel free to contact me directly with any further questions or concerns.        Director, Cardiac Cath Lab    RAMON Eng, acting as scribe for Tong Azar MD, [facility], [facility].   02/04/20  4:33 PM    I have read and agree the documentation that has been completed regarding this visit.  By signing this record, I attest that the documentation was completed by my physical presence and is an accurate record of the encounter.  Voice recognition / transcription technology used for some documentation in this chart in attempt to mitigate substantial inefficiencies created by this electronic health record technology.  As a result, there may be some typos and.or nonsensical language introduced into the chart that either overlooked in editing/review and/or that I am unable to correct as patient care needs require me to prioritize my attention to bedside patient care rather than electronic documentation. MA

## 2020-02-07 ENCOUNTER — OFFICE VISIT (OUTPATIENT)
Dept: FAMILY MEDICINE CLINIC | Facility: CLINIC | Age: 60
End: 2020-02-07

## 2020-02-07 VITALS
HEIGHT: 70 IN | TEMPERATURE: 97.9 F | BODY MASS INDEX: 32.33 KG/M2 | SYSTOLIC BLOOD PRESSURE: 132 MMHG | WEIGHT: 225.8 LBS | OXYGEN SATURATION: 97 % | HEART RATE: 95 BPM | DIASTOLIC BLOOD PRESSURE: 84 MMHG

## 2020-02-07 DIAGNOSIS — Z72.0 TOBACCO ABUSE: Primary | ICD-10-CM

## 2020-02-07 DIAGNOSIS — M54.50 CHRONIC BILATERAL LOW BACK PAIN WITHOUT SCIATICA: ICD-10-CM

## 2020-02-07 DIAGNOSIS — I10 ESSENTIAL HYPERTENSION: ICD-10-CM

## 2020-02-07 DIAGNOSIS — R42 DIZZINESS: ICD-10-CM

## 2020-02-07 DIAGNOSIS — G62.9 NEUROPATHY: ICD-10-CM

## 2020-02-07 DIAGNOSIS — I73.9 PVD (PERIPHERAL VASCULAR DISEASE) WITH CLAUDICATION (HCC): ICD-10-CM

## 2020-02-07 DIAGNOSIS — G89.29 CHRONIC BILATERAL LOW BACK PAIN WITHOUT SCIATICA: ICD-10-CM

## 2020-02-07 PROCEDURE — 85025 COMPLETE CBC W/AUTO DIFF WBC: CPT | Performed by: NURSE PRACTITIONER

## 2020-02-07 PROCEDURE — 84439 ASSAY OF FREE THYROXINE: CPT | Performed by: NURSE PRACTITIONER

## 2020-02-07 PROCEDURE — 84443 ASSAY THYROID STIM HORMONE: CPT | Performed by: NURSE PRACTITIONER

## 2020-02-07 PROCEDURE — 82607 VITAMIN B-12: CPT | Performed by: NURSE PRACTITIONER

## 2020-02-07 PROCEDURE — 83735 ASSAY OF MAGNESIUM: CPT | Performed by: NURSE PRACTITIONER

## 2020-02-07 PROCEDURE — 80053 COMPREHEN METABOLIC PANEL: CPT | Performed by: NURSE PRACTITIONER

## 2020-02-07 PROCEDURE — 99214 OFFICE O/P EST MOD 30 MIN: CPT | Performed by: NURSE PRACTITIONER

## 2020-02-07 PROCEDURE — 93000 ELECTROCARDIOGRAM COMPLETE: CPT | Performed by: NURSE PRACTITIONER

## 2020-02-07 RX ORDER — LISINOPRIL 40 MG/1
40 TABLET ORAL DAILY
Qty: 90 TABLET | Refills: 1 | Status: SHIPPED | OUTPATIENT
Start: 2020-02-07 | End: 2020-05-07 | Stop reason: SDUPTHER

## 2020-02-07 RX ORDER — GABAPENTIN 800 MG/1
1200 TABLET ORAL 3 TIMES DAILY
Qty: 135 TABLET | Refills: 2 | Status: SHIPPED | OUTPATIENT
Start: 2020-02-07 | End: 2020-05-07 | Stop reason: SDUPTHER

## 2020-02-07 RX ORDER — ATENOLOL 50 MG/1
50 TABLET ORAL DAILY
Qty: 90 TABLET | Refills: 1 | Status: SHIPPED | OUTPATIENT
Start: 2020-02-07 | End: 2020-04-06 | Stop reason: SDUPTHER

## 2020-02-07 NOTE — PROGRESS NOTES
Subjective   Gurwinder Harding is a 59 y.o. male.     Chief Complaint: Hypertension    Hypertension   This is a chronic problem. The current episode started more than 1 year ago. The problem is controlled. Pertinent negatives include no chest pain or shortness of breath. Risk factors for coronary artery disease include smoking/tobacco exposure. Current antihypertension treatment includes ACE inhibitors and beta blockers. The current treatment provides significant improvement. Compliance problems include exercise and diet.  Hypertensive end-organ damage includes PVD.   Dizziness   This is a new problem. The current episode started in the past 7 days. Episode frequency: upon standing. The problem has been waxing and waning. Pertinent negatives include no chest pain. Nothing aggravates the symptoms. He has tried nothing for the symptoms.   Foot Pain   This is a chronic problem. The current episode started more than 1 month ago. The problem occurs constantly. The problem has been gradually worsening. Associated symptoms comments: ulcer. Nothing aggravates the symptoms.   Lower Extremity Issue   This is a chronic (neuropathy bilateral lower extremities; PVD lower extremities and currently following with Dr Floyd) problem. The problem occurs constantly. The problem has been unchanged. The symptoms are aggravated by smoking. Treatments tried: gabapentin. The treatment provided moderate relief. Pt also has PVD and has been following with vascular provider who has performed intervention for his PVD.   Back Pain   This is a chronic problem. The current episode started more than 1 year ago. The problem has been waxing and waning since onset. The pain is present in the lumbar spine. The quality of the pain is described as aching. The pain radiates to the left foot and right knee. The pain is moderate. The symptoms are aggravated by standing, bending and twisting. Associated symptoms include leg pain. Pertinent negatives include  no bladder incontinence, bowel incontinence, chest pain or headaches. He has tried analgesics and muscle relaxant for the symptoms. The treatment provided mild relief.  History of back surgery with fusion; neuropathy is at knee downwards, especially on right lower extremity. Patient is currently on gabapentin and with relafen 500 mg. Used to be on tramadol 50 mg orally BID, but we discontinued secondary to concerns about elevated liver enzymes. Failed etodalac 200 mg orally TID. Feels that the gabapentin and the relafen does offer some relief along with the robaxin. Denies any side effects of the medications. States that the medications somewhat control the pain enough so that he can accomplish daily activities. Movement and ambulation are okay. 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  Denies any sedation from the medications.     Family History   Problem Relation Age of Onset   • Hypertension Mother    • Cancer Father         LUNG   • Diabetes Father    • Hypertension Father    • Heart attack Other         GRANDFATHER       Social History     Socioeconomic History   • Marital status:      Spouse name: Not on file   • Number of children: Not on file   • Years of education: Not on file   • Highest education level: Not on file   Tobacco Use   • Smoking status: Current Every Day Smoker     Packs/day: 1.00     Types: Cigarettes   • Smokeless tobacco: Never Used   Substance and Sexual Activity   • Alcohol use: Yes     Alcohol/week: 6.0 standard drinks     Types: 6 Cans of beer per week     Comment: Daily    • Drug use: No   • Sexual activity: Defer       Past Medical History:   Diagnosis Date   • Alcohol abuse    • ED (erectile dysfunction)    • History of degenerative disc disease    • Hyperglycemia    • Hypertension    • Low back pain    • Neuropathy    • Screening PSA (prostate specific antigen) 09/2015   • Tobacco abuse    • Vitamin D deficiency        Review of Systems   Constitutional: Negative.    HENT:  "Negative.    Respiratory: Negative.  Negative for shortness of breath.    Cardiovascular: Negative.  Negative for chest pain.   Gastrointestinal: Negative.    Musculoskeletal: Negative.    Skin: Negative.    Neurological: Positive for dizziness.   Psychiatric/Behavioral: Negative.        Objective   Physical Exam   Constitutional: He is oriented to person, place, and time. He appears well-developed and well-nourished.   Neck: Normal range of motion. Neck supple.   Cardiovascular: Normal rate, regular rhythm and normal heart sounds.   Pulmonary/Chest: Effort normal and breath sounds normal.   Neurological: He is alert and oriented to person, place, and time.   Skin: Skin is warm and dry.   Psychiatric: He has a normal mood and affect. His behavior is normal. Judgment and thought content normal.   Nursing note and vitals reviewed.        ECG 12 Lead  Date/Time: 2/7/2020 11:53 AM  Performed by: Fátima Mai APRN  Authorized by: Fátima Mai APRN   Comparison: not compared with previous ECG   Rhythm: sinus rhythm  Rate: normal  Conduction: conduction normal  ST Segments: ST segments normal  T Waves: T waves normal  QRS axis: normal  Other: no other findings    Clinical impression: normal ECG            Vitals: Blood pressure 132/84, pulse 95, temperature 97.9 °F (36.6 °C), temperature source Oral, height 177.8 cm (70\"), weight 102 kg (225 lb 12.8 oz), SpO2 97 %.    Allergies:   Allergies   Allergen Reactions   • Novocain [Procaine]    • Penicillins         During this visit the following were done:  Labs Reviewed []    Labs Ordered []    Radiology Reports Reviewed []    Radiology Ordered []    PCP Records Reviewed []    Referring Provider Records Reviewed []    ER Records Reviewed []    Hospital Records Reviewed []    History Obtained From Family []    Radiology Images Reviewed []    Other Reviewed []    Records Requested []      Assessment/Plan   Gurwinder was seen today for hypertension.    Diagnoses " and all orders for this visit:    Tobacco abuse  -     CBC & Differential; Future  -     Comprehensive Metabolic Panel; Future  -     Magnesium; Future  -     TSH; Future  -     Vitamin B12; Future  -     T4, Free; Future  -     US Carotid Bilateral; Future  -     CBC & Differential  -     Comprehensive Metabolic Panel  -     Magnesium  -     TSH  -     Vitamin B12  -     T4, Free  -     CBC Auto Differential    Neuropathy  -     gabapentin (NEURONTIN) 800 MG tablet; Take 1.5 tablets by mouth 3 (Three) Times a Day.  -     CBC & Differential; Future  -     Comprehensive Metabolic Panel; Future  -     Magnesium; Future  -     TSH; Future  -     Vitamin B12; Future  -     T4, Free; Future  -     CBC & Differential  -     Comprehensive Metabolic Panel  -     Magnesium  -     TSH  -     Vitamin B12  -     T4, Free  -     CBC Auto Differential    Essential hypertension  -     atenolol (TENORMIN) 50 MG tablet; Take 1 tablet by mouth Daily.  -     lisinopril (PRINIVIL,ZESTRIL) 40 MG tablet; Take 1 tablet by mouth Daily.  -     CBC & Differential; Future  -     Comprehensive Metabolic Panel; Future  -     Magnesium; Future  -     TSH; Future  -     Vitamin B12; Future  -     T4, Free; Future  -     US Carotid Bilateral; Future  -     ECG 12 Lead  -     CBC & Differential  -     Comprehensive Metabolic Panel  -     Magnesium  -     TSH  -     Vitamin B12  -     T4, Free  -     CBC Auto Differential    Dizziness  -     CBC & Differential; Future  -     Comprehensive Metabolic Panel; Future  -     Magnesium; Future  -     TSH; Future  -     Vitamin B12; Future  -     T4, Free; Future  -     US Carotid Bilateral; Future  -     ECG 12 Lead  -     CBC & Differential  -     Comprehensive Metabolic Panel  -     Magnesium  -     TSH  -     Vitamin B12  -     T4, Free  -     CBC Auto Differential    PVD (peripheral vascular disease) with claudication (CMS/HCC)  -     US Carotid Bilateral; Future  -     ECG 12 Lead    Chronic bilateral  low back pain without sciatica      Orthostatics checked; no abnormality.     Heron # [id number]  Reviewed and is consistent.  UDS 8/7/2019 reviewed and is consistent.  Patient comfort assessment guide reviewed and updated today.    As part of the patient's treatment plan they are being prescribed a controlled substance/ substances with potential for abuse.  This patient has been made aware of the appropriate use of such medications, including potential risk of somnolence, limited ability to drive and/or work safely, and potential for overdose.  It has also been made clear these medications are for use by the patient only, without concomitant use of alcohol or other substances unless prescribed/advised by medical provider.    Patient has completed prescribing agreement detailing terms of continued prescribing of controlled substances including monitoring HERON reports, urine drug screens, and pill counts.  The patient is aware HERON reports are reviewed on a regular basis and scanned into the chart.    History and physical exam exhibit continued safe and appropriate use of controlled substances.    Gurwinder Richker  reports that he has been smoking cigarettes. He has been smoking about 1.00 pack per day. He has never used smokeless tobacco.. I have educated him on the risk of diseases from using tobacco products such as cancer, COPD and heart diease.     I advised him to quit and he is not willing to quit.    I spent 3  minutes counseling the patient.

## 2020-02-08 LAB
ALBUMIN SERPL-MCNC: 3.7 G/DL (ref 3.5–5.2)
ALBUMIN/GLOB SERPL: 1.2 G/DL
ALP SERPL-CCNC: 76 U/L (ref 39–117)
ALT SERPL W P-5'-P-CCNC: 18 U/L (ref 1–41)
ANION GAP SERPL CALCULATED.3IONS-SCNC: 10.5 MMOL/L (ref 5–15)
AST SERPL-CCNC: 26 U/L (ref 1–40)
BASOPHILS # BLD AUTO: 0.06 10*3/MM3 (ref 0–0.2)
BASOPHILS NFR BLD AUTO: 0.9 % (ref 0–1.5)
BILIRUB SERPL-MCNC: 0.3 MG/DL (ref 0.2–1.2)
BUN BLD-MCNC: 7 MG/DL (ref 6–20)
BUN/CREAT SERPL: 6.5 (ref 7–25)
CALCIUM SPEC-SCNC: 8.9 MG/DL (ref 8.6–10.5)
CHLORIDE SERPL-SCNC: 98 MMOL/L (ref 98–107)
CO2 SERPL-SCNC: 22.5 MMOL/L (ref 22–29)
CREAT BLD-MCNC: 1.07 MG/DL (ref 0.76–1.27)
DEPRECATED RDW RBC AUTO: 45.7 FL (ref 37–54)
EOSINOPHIL # BLD AUTO: 0.09 10*3/MM3 (ref 0–0.4)
EOSINOPHIL NFR BLD AUTO: 1.4 % (ref 0.3–6.2)
ERYTHROCYTE [DISTWIDTH] IN BLOOD BY AUTOMATED COUNT: 13.3 % (ref 12.3–15.4)
GFR SERPL CREATININE-BSD FRML MDRD: 71 ML/MIN/1.73
GLOBULIN UR ELPH-MCNC: 3.1 GM/DL
GLUCOSE BLD-MCNC: 136 MG/DL (ref 65–99)
HCT VFR BLD AUTO: 40.3 % (ref 37.5–51)
HGB BLD-MCNC: 13.7 G/DL (ref 13–17.7)
IMM GRANULOCYTES # BLD AUTO: 0.03 10*3/MM3 (ref 0–0.05)
IMM GRANULOCYTES NFR BLD AUTO: 0.5 % (ref 0–0.5)
LYMPHOCYTES # BLD AUTO: 1 10*3/MM3 (ref 0.7–3.1)
LYMPHOCYTES NFR BLD AUTO: 15.2 % (ref 19.6–45.3)
MAGNESIUM SERPL-MCNC: 2 MG/DL (ref 1.6–2.6)
MCH RBC QN AUTO: 32 PG (ref 26.6–33)
MCHC RBC AUTO-ENTMCNC: 34 G/DL (ref 31.5–35.7)
MCV RBC AUTO: 94.2 FL (ref 79–97)
MONOCYTES # BLD AUTO: 0.5 10*3/MM3 (ref 0.1–0.9)
MONOCYTES NFR BLD AUTO: 7.6 % (ref 5–12)
NEUTROPHILS # BLD AUTO: 4.88 10*3/MM3 (ref 1.7–7)
NEUTROPHILS NFR BLD AUTO: 74.4 % (ref 42.7–76)
NRBC BLD AUTO-RTO: 0 /100 WBC (ref 0–0.2)
PLATELET # BLD AUTO: 230 10*3/MM3 (ref 140–450)
PMV BLD AUTO: 11.3 FL (ref 6–12)
POTASSIUM BLD-SCNC: 5.2 MMOL/L (ref 3.5–5.2)
PROT SERPL-MCNC: 6.8 G/DL (ref 6–8.5)
RBC # BLD AUTO: 4.28 10*6/MM3 (ref 4.14–5.8)
SODIUM BLD-SCNC: 131 MMOL/L (ref 136–145)
T4 FREE SERPL-MCNC: 1.11 NG/DL (ref 0.93–1.7)
TSH SERPL DL<=0.05 MIU/L-ACNC: 2.02 UIU/ML (ref 0.27–4.2)
VIT B12 BLD-MCNC: 1629 PG/ML (ref 211–946)
WBC NRBC COR # BLD: 6.56 10*3/MM3 (ref 3.4–10.8)

## 2020-02-11 ENCOUNTER — TELEPHONE (OUTPATIENT)
Dept: FAMILY MEDICINE CLINIC | Facility: CLINIC | Age: 60
End: 2020-02-11

## 2020-02-11 NOTE — TELEPHONE ENCOUNTER
----- Message from KEVIN Mullen sent at 2/11/2020 12:27 PM EST -----  Labs appear stable.  Please let pt know.       Patient notified .

## 2020-02-12 ENCOUNTER — HOSPITAL ENCOUNTER (OUTPATIENT)
Dept: CARDIOLOGY | Facility: HOSPITAL | Age: 60
Discharge: HOME | End: 2020-02-12
Admitting: NURSE PRACTITIONER

## 2020-02-12 DIAGNOSIS — Z72.0 TOBACCO ABUSE: ICD-10-CM

## 2020-02-12 DIAGNOSIS — R42 DIZZINESS: ICD-10-CM

## 2020-02-12 DIAGNOSIS — I10 ESSENTIAL HYPERTENSION: ICD-10-CM

## 2020-02-12 DIAGNOSIS — I73.9 PVD (PERIPHERAL VASCULAR DISEASE) WITH CLAUDICATION (HCC): ICD-10-CM

## 2020-02-12 PROCEDURE — 93880 EXTRACRANIAL BILAT STUDY: CPT | Performed by: RADIOLOGY

## 2020-02-12 PROCEDURE — 93880 EXTRACRANIAL BILAT STUDY: CPT

## 2020-02-13 ENCOUNTER — TELEPHONE (OUTPATIENT)
Dept: FAMILY MEDICINE CLINIC | Facility: CLINIC | Age: 60
End: 2020-02-13

## 2020-02-13 NOTE — TELEPHONE ENCOUNTER
----- Message from KEVIN Mullen sent at 2/13/2020  4:10 PM EST -----  Mr Harding has complete occlusion of his left internal carotid artery and he also has 50%-60% stenosis on the right internal carotid artery. He needs referral to vascular surgeon.  Not certain if he will need surgery but he definitely needs an evaluat_  ion.  I think Dr Azar office has recommended Dr Brice in [place].  Is he agreeable?      Left a message to return call.    Spoke with patient & he verbalized understanding,agreeable to referral to Dr. Brice.

## 2020-02-13 NOTE — PROGRESS NOTES
Mr Meaghan has complete occlusion of his left internal carotid artery and he also has 50%-60% stenosis on the right internal carotid artery. He needs referral to vascular surgeon.  Not certain if he will need surgery but he definitely needs an evaluation.  I think Dr Nissa chandler has recommended Dr Brice in [place].  Is he agreeable?

## 2020-02-14 DIAGNOSIS — I73.9 PVD (PERIPHERAL VASCULAR DISEASE) WITH CLAUDICATION (HCC): Primary | ICD-10-CM

## 2020-02-14 DIAGNOSIS — I65.29 STENOSIS OF CAROTID ARTERY, UNSPECIFIED LATERALITY: ICD-10-CM

## 2020-02-14 NOTE — TELEPHONE ENCOUNTER
Referral in epic [facility] Loss Patient Status:  Hospital Outpatient Surgery    4/15/1983 MRN [id number]   [facility] ENDOSCOPY Attending Frandy Garcia MD   Date 2019 PCP Ellyn Hilliard.  Екатерина Hillman MD     PREOPERATIVE DIAGNOSIS/INDICATION: GERD, h

## 2020-03-03 DIAGNOSIS — I10 ESSENTIAL HYPERTENSION: ICD-10-CM

## 2020-03-11 ENCOUNTER — OFFICE VISIT (OUTPATIENT)
Dept: CARDIAC SURGERY | Facility: CLINIC | Age: 60
End: 2020-03-11

## 2020-03-11 VITALS
HEART RATE: 87 BPM | DIASTOLIC BLOOD PRESSURE: 70 MMHG | WEIGHT: 226 LBS | OXYGEN SATURATION: 100 % | HEIGHT: 70 IN | BODY MASS INDEX: 32.35 KG/M2 | SYSTOLIC BLOOD PRESSURE: 140 MMHG

## 2020-03-11 DIAGNOSIS — I65.21 STENOSIS OF RIGHT CAROTID ARTERY: Primary | ICD-10-CM

## 2020-03-11 DIAGNOSIS — I65.22 OCCLUSION OF LEFT CAROTID ARTERY: ICD-10-CM

## 2020-03-11 PROCEDURE — 99202 OFFICE O/P NEW SF 15 MIN: CPT | Performed by: THORACIC SURGERY (CARDIOTHORACIC VASCULAR SURGERY)

## 2020-03-11 NOTE — PROGRESS NOTES
03/11/2020  Patient Information  Gurwinder Harding                                                                                          [address] [place] [address]   1960  'PCP/Referring Physician'  Fátima Mai, APRN  [phone]  SergeiFátima, *  [phone]  Chief Complaint   Patient presents with   • Consult     NP per Gabby BROWN for Carotid Stenosis and PVD-Complains of Dizziness and Light Headedness-Prev. Lower Extremity Procedures       History of Present Illness: 59-year-old  male with a history of hypertension, peripheral vascular disease and active tobacco abuse who presents with carotid artery stenosis.  The patient was evaluated by Dr. Azar for a nonhealing lower extremity wound and underwent peripheral intervention.  His wound has subsequently healed.  The patient has no lower extremity complaints.  He does have some dizziness with standing that has been present for approximately 2 months.  This will last approximately 30 seconds and spontaneously resolves.  He had previously 1-2 episodes per week, but in the last week and a half he has had no further issues.  The patient denies vision loss, speaking difficulties or focal weakness.      Patient Active Problem List   Diagnosis   • Vitamin D deficiency   • Tobacco abuse   • Alcohol abuse   • ED (erectile dysfunction)   • History of degenerative disc disease   • Neuropathy   • Low back pain   • Hypertension   • Elevated AST (SGOT)   • Screening PSA (prostate specific antigen)   • Hyperglycemia   • Segmental and somatic dysfunction of lumbar region   • Segmental and somatic dysfunction of pelvic region   • Segmental and somatic dysfunction of sacral region   • Prediabetes   • PVD (peripheral vascular disease) with claudication (CMS/HCC)   • Critical lower limb ischemia   • Nonhealing ulcer of heel (CMS/HCC)   • Bilateral foot pain     Past Medical History:   Diagnosis Date   • Alcohol abuse    • Arthritis    •  Carotid stenosis    • ED (erectile dysfunction)    • History of degenerative disc disease    • Hyperglycemia    • Hypertension    • Low back pain    • Neuropathy    • Peripheral vascular disease (CMS/HCC)    • Screening PSA (prostate specific antigen) 09/2015   • Tobacco abuse    • Vitamin D deficiency      Past Surgical History:   Procedure Laterality Date   • APPENDECTOMY     • ARTERIOGRAM N/A 9/19/2019    Procedure: Arteriogram;  Surgeon: Tong Azar MD;  Location:  [facility];  Service: Cardiology   • BACK SURGERY      DISC RUPTURE REPAIR, L5   • CARDIAC CATHETERIZATION Right 10/29/2019    Procedure: Peripheral angiography;  Surgeon: Tong Azar MD;  Location:  [facility];  Service: Cardiovascular   • VASCULAR SURGERY Bilateral        Current Outpatient Medications:   •  aspirin 81 MG EC tablet, Take 81 mg by mouth Daily., Disp: , Rfl:   •  atenolol (TENORMIN) 50 MG tablet, Take 1 tablet by mouth Daily., Disp: 90 tablet, Rfl: 1  •  clopidogrel (PLAVIX) 75 MG tablet, Take 1 tablet by mouth Daily., Disp: 30 tablet, Rfl: 5  •  folic acid-pyridoxine-cyanocobalamin (FOLBIC) 2.5-25-2 MG tablet tablet, Take 1 tablet by mouth Daily., Disp: 30 each, Rfl: 5  •  gabapentin (NEURONTIN) 800 MG tablet, Take 1.5 tablets by mouth 3 (Three) Times a Day., Disp: 135 tablet, Rfl: 2  •  lisinopril (PRINIVIL,ZESTRIL) 40 MG tablet, Take 1 tablet by mouth Daily., Disp: 90 tablet, Rfl: 1  •  methocarbamol (ROBAXIN) 750 MG tablet, Take 1 tablet by mouth 4 (Four) Times a Day As Needed for Muscle Spasms., Disp: 120 tablet, Rfl: 5  •  nabumetone (RELAFEN) 500 MG tablet, Take 1 tablet by mouth 2 (Two) Times a Day As Needed for Mild Pain ., Disp: 60 tablet, Rfl: 5  •  albuterol sulfate  (90 Base) MCG/ACT inhaler, Inhale 2 puffs Every 6 (Six) Hours As Needed for Wheezing., Disp: 1 inhaler, Rfl: 5  Allergies   Allergen Reactions   • Penicillins Hives and Shortness Of Breath     As A child   •  Novocain [Procaine] Nausea And Vomiting     Social History     Socioeconomic History   • Marital status:      Spouse name: Not on file   • Number of children: 1   • Years of education: Not on file   • Highest education level: Not on file   Occupational History   • Occupation:  at [employer]   Tobacco Use   • Smoking status: Current Every Day Smoker     Packs/day: 1.00     Types: Cigarettes   • Smokeless tobacco: Never Used   Substance and Sexual Activity   • Alcohol use: Yes     Alcohol/week: 6.0 standard drinks     Types: 6 Cans of beer per week     Comment: Daily    • Drug use: No   • Sexual activity: Defer   Social History Narrative    Lives in [place].      Family History   Problem Relation Age of Onset   • Hypertension Mother    • Cancer Father         LUNG   • Diabetes Father    • Hypertension Father    • Heart attack Other         GRANDFATHER     Review of Systems   Constitution: Negative for chills, fever, malaise/fatigue, night sweats and weight loss.   HENT: Negative for hearing loss, odynophagia and sore throat.    Cardiovascular: Positive for claudication and dyspnea on exertion. Negative for chest pain, leg swelling, orthopnea and palpitations.   Respiratory: Positive for cough. Negative for hemoptysis.    Endocrine: Negative for cold intolerance, heat intolerance, polydipsia, polyphagia and polyuria.   Hematologic/Lymphatic: Bruises/bleeds easily.   Skin: Negative for itching and rash.   Musculoskeletal: Positive for arthritis, back pain and joint pain. Negative for joint swelling and myalgias.   Gastrointestinal: Positive for constipation. Negative for abdominal pain, diarrhea, hematemesis, hematochezia, melena, nausea and vomiting.   Genitourinary: Negative for dysuria, frequency and hematuria.   Neurological: Positive for dizziness and headaches. Negative for focal weakness, numbness and seizures.   Psychiatric/Behavioral: Negative for suicidal ideas.   All other systems reviewed and  "are negative.    Vitals:    03/11/20 1139   BP: 140/70   BP Location: Left arm   Patient Position: Sitting   Pulse: 87   SpO2: 100%   Weight: 103 kg (226 lb)   Height: 177.8 cm (70\")      Physical Exam   Constitutional: He is oriented to person, place, and time. He appears well-developed and well-nourished. No distress.   HENT:   Head: Normocephalic and atraumatic.   Eyes: Conjunctivae are normal. No scleral icterus.   Neck: Normal range of motion. No JVD present. Carotid bruit is not present. No tracheal deviation present.   Cardiovascular: Normal rate, regular rhythm and normal heart sounds. Exam reveals no gallop and no friction rub.   No murmur heard.  Pulses:       Femoral pulses are 2+ on the right side, and 2+ on the left side.       Popliteal pulses are 0 on the right side, and 2+ on the left side.        Dorsalis pedis pulses are 0 on the right side, and 0 on the left side.        Posterior tibial pulses are 0 on the right side, and 0 on the left side.   Pulmonary/Chest: Effort normal and breath sounds normal. No stridor. No respiratory distress. He has no wheezes. He has no rales.   Abdominal: Soft. He exhibits no distension and no mass. There is no tenderness. There is no rebound and no guarding.   Musculoskeletal: Normal range of motion. He exhibits no edema.   Neurological: He is alert and oriented to person, place, and time.   Intact pedal motor function and sensation.   Skin: Skin is warm and dry. No rash noted. He is not diaphoretic. No erythema.   No pedal erythema or ulcerations.  He has multiple pain patches on the feet.   Psychiatric: He has a normal mood and affect. His behavior is normal. Judgment and thought content normal.       Labs/Imaging:  -Carotid duplex performed 2/12/2020, personally reviewed, demonstrates left internal carotid artery occlusion.  The right internal carotid artery has 50 to 69% stenosis.  The right internal carotid artery peak systolic velocity is 149 cm/s.  There is " antegrade vertebral flow bilaterally.    Assessment/Plan:  59-year-old  male with a history of hypertension, peripheral vascular disease and active tobacco abuse who presents with asymptomatic bilateral carotid artery stenosis.  The patient had orthostatic hypotension in the office today when comparing blood pressures in the supine position to standing (160/80 versus 138/80).  He will need to remain hydrated and may need adjustment in his oral antihypertensives.  We discussed the importance of slow changes in position and when standing.  He should continue with medical management including aspirin, statin and beta-blocker therapy for his peripheral vascular disease.  We also discussed the importance of smoking cessation.  I will defer continued management of his peripheral vascular disease to Dr. Azar.  At this time, there is no need for surgical intervention on his right carotid artery.  He may follow-up in surgical clinic as needed.    Patient Active Problem List   Diagnosis   • Vitamin D deficiency   • Tobacco abuse   • Alcohol abuse   • ED (erectile dysfunction)   • History of degenerative disc disease   • Neuropathy   • Low back pain   • Hypertension   • Elevated AST (SGOT)   • Screening PSA (prostate specific antigen)   • Hyperglycemia   • Segmental and somatic dysfunction of lumbar region   • Segmental and somatic dysfunction of pelvic region   • Segmental and somatic dysfunction of sacral region   • Prediabetes   • PVD (peripheral vascular disease) with claudication (CMS/HCC)   • Critical lower limb ischemia   • Nonhealing ulcer of heel (CMS/HCC)   • Bilateral foot pain

## 2020-04-06 DIAGNOSIS — I10 ESSENTIAL HYPERTENSION: ICD-10-CM

## 2020-04-06 DIAGNOSIS — Z87.39 HISTORY OF DEGENERATIVE DISC DISEASE: ICD-10-CM

## 2020-04-06 DIAGNOSIS — G89.29 CHRONIC LOW BACK PAIN: ICD-10-CM

## 2020-04-06 DIAGNOSIS — M54.50 CHRONIC LOW BACK PAIN: ICD-10-CM

## 2020-04-06 RX ORDER — ATENOLOL 50 MG/1
50 TABLET ORAL DAILY
Qty: 90 TABLET | Refills: 1 | Status: SHIPPED | OUTPATIENT
Start: 2020-04-06 | End: 2020-05-07 | Stop reason: SDUPTHER

## 2020-04-06 RX ORDER — METHOCARBAMOL 750 MG/1
750 TABLET, FILM COATED ORAL 4 TIMES DAILY PRN
Qty: 120 TABLET | Refills: 5 | Status: SHIPPED | OUTPATIENT
Start: 2020-04-06 | End: 2020-05-07 | Stop reason: SDUPTHER

## 2020-05-07 ENCOUNTER — TELEMEDICINE (OUTPATIENT)
Dept: FAMILY MEDICINE CLINIC | Facility: CLINIC | Age: 60
End: 2020-05-07

## 2020-05-07 DIAGNOSIS — Z87.39 HISTORY OF DEGENERATIVE DISC DISEASE: ICD-10-CM

## 2020-05-07 DIAGNOSIS — I10 ESSENTIAL HYPERTENSION: ICD-10-CM

## 2020-05-07 DIAGNOSIS — M54.50 CHRONIC LOW BACK PAIN: ICD-10-CM

## 2020-05-07 DIAGNOSIS — I73.9 PAD (PERIPHERAL ARTERY DISEASE) (HCC): Primary | ICD-10-CM

## 2020-05-07 DIAGNOSIS — G89.29 CHRONIC LOW BACK PAIN: ICD-10-CM

## 2020-05-07 DIAGNOSIS — G62.9 NEUROPATHY: ICD-10-CM

## 2020-05-07 PROCEDURE — 99214 OFFICE O/P EST MOD 30 MIN: CPT | Performed by: NURSE PRACTITIONER

## 2020-05-07 RX ORDER — ATENOLOL 50 MG/1
50 TABLET ORAL DAILY
Qty: 90 TABLET | Refills: 1 | Status: SHIPPED | OUTPATIENT
Start: 2020-05-07 | End: 2020-08-13 | Stop reason: SDUPTHER

## 2020-05-07 RX ORDER — CLOPIDOGREL BISULFATE 75 MG/1
75 TABLET ORAL DAILY
Qty: 90 TABLET | Refills: 1 | Status: SHIPPED | OUTPATIENT
Start: 2020-05-07 | End: 2020-08-13 | Stop reason: SDUPTHER

## 2020-05-07 RX ORDER — LISINOPRIL 40 MG/1
40 TABLET ORAL DAILY
Qty: 90 TABLET | Refills: 1 | Status: SHIPPED | OUTPATIENT
Start: 2020-05-07 | End: 2020-08-13 | Stop reason: SDUPTHER

## 2020-05-07 RX ORDER — GABAPENTIN 800 MG/1
1200 TABLET ORAL 3 TIMES DAILY
Qty: 135 TABLET | Refills: 2 | Status: SHIPPED | OUTPATIENT
Start: 2020-05-07 | End: 2020-08-07 | Stop reason: SDUPTHER

## 2020-05-07 RX ORDER — METHOCARBAMOL 750 MG/1
750 TABLET, FILM COATED ORAL 4 TIMES DAILY PRN
Qty: 360 TABLET | Refills: 1 | Status: SHIPPED | OUTPATIENT
Start: 2020-05-07 | End: 2020-08-13 | Stop reason: SDUPTHER

## 2020-05-07 RX ORDER — NABUMETONE 500 MG/1
500 TABLET, FILM COATED ORAL 2 TIMES DAILY PRN
Qty: 180 TABLET | Refills: 1 | Status: SHIPPED | OUTPATIENT
Start: 2020-05-07 | End: 2020-05-26 | Stop reason: SDUPTHER

## 2020-05-07 NOTE — PROGRESS NOTES
Subjective   Gurwinder Harding is a 59 y.o. male.     Chief Complaint: Follow-up; Post-op Peripheral Arterial Bypass; and Hypertension    History of Present Illness   Hypertension   This is a chronic problem. The current episode started more than 1 year ago. The problem is controlled. Pertinent negatives include no chest pain or shortness of breath. Risk factors for coronary artery disease include smoking/tobacco exposure. Current antihypertension treatment includes ACE inhibitors and beta blockers. The current treatment provides significant improvement. Compliance problems include exercise and diet.  Hypertensive end-organ damage includes PVD.   Dizziness   This is a new problem. The current episode started in the past 7 days. Episode frequency: upon standing. The problem has been waxing and waning. Pertinent negatives include no chest pain. Nothing aggravates the symptoms. He has tried nothing for the symptoms. He has followed up with vascular and has had discussion regarding his carotid US.  No intervention at this time but he knows he needs to use care when he changes positions suddenly.  Foot Pain   This is a chronic problem. The current episode started more than 1 month ago. The problem occurs constantly. The problem has been gradually worsening. Associated symptoms comments: ulcer. Nothing aggravates the symptoms.   Lower Extremity Issue   This is a chronic (neuropathy bilateral lower extremities; PAD lower extremities and currently following with Dr Azar) problem. The problem occurs constantly. The problem has been unchanged. The symptoms are aggravated by smoking. Treatments tried: gabapentin. The treatment provided moderate relief. He has underwent intervention per Dr Azar and he is doing much better.  He continues to follow up with Dr Azar as scheduled.    Back Pain   This is a chronic problem. The current episode started more than 1 year ago. The problem has been waxing and waning since onset. The pain  is present in the lumbar spine. The quality of the pain is described as aching. The pain radiates to the left foot and right knee. The pain is moderate. The symptoms are aggravated by standing, bending and twisting. Associated symptoms include leg pain. Pertinent negatives include no bladder incontinence, bowel incontinence, chest pain or headaches. He has tried analgesics and muscle relaxant for the symptoms. The treatment provided mild relief.  History of back surgery with fusion; neuropathy is at knee downwards, especially on right lower extremity. Patient is currently on gabapentin and with relafen 500 mg. Used to be on tramadol 50 mg orally BID, but we discontinued secondary to concerns about elevated liver enzymes. Failed etodalac 200 mg orally TID. Feels that the gabapentin and the relafen does offer some relief along with the robaxin. Denies any side effects of the medications. States that the medications somewhat control the pain enough so that he can accomplish daily activities. Movement and ambulation are okay. 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  Denies any sedation from the medications.      Family History   Problem Relation Age of Onset   • Hypertension Mother    • Cancer Father         LUNG   • Diabetes Father    • Hypertension Father    • Heart attack Other         GRANDFATHER       Social History     Socioeconomic History   • Marital status:      Spouse name: Not on file   • Number of children: 1   • Years of education: Not on file   • Highest education level: Not on file   Occupational History   • Occupation:  at [employer]   Tobacco Use   • Smoking status: Current Every Day Smoker     Packs/day: 1.00     Types: Cigarettes   • Smokeless tobacco: Never Used   Substance and Sexual Activity   • Alcohol use: Yes     Alcohol/week: 6.0 standard drinks     Types: 6 Cans of beer per week     Comment: Daily    • Drug use: No   • Sexual activity: Defer   Social History Narrative    Lives  in [place].        Past Medical History:   Diagnosis Date   • Alcohol abuse    • Arthritis    • Carotid stenosis    • ED (erectile dysfunction)    • History of degenerative disc disease    • Hyperglycemia    • Hypertension    • Low back pain    • Neuropathy    • Peripheral vascular disease (CMS/HCC)    • Screening PSA (prostate specific antigen) 09/2015   • Tobacco abuse    • Vitamin D deficiency        Review of Systems   Constitutional: Negative.    HENT: Negative.    Respiratory: Negative.    Cardiovascular: Negative.    Gastrointestinal: Negative.    Musculoskeletal: Positive for back pain.   Skin: Negative.    Neurological: Negative.    Psychiatric/Behavioral: Negative.        Objective   Physical Exam   Constitutional: He is oriented to person, place, and time.   Pulmonary/Chest: Effort normal.   Neurological: He is alert and oriented to person, place, and time.   Psychiatric: He has a normal mood and affect. Judgment and thought content normal.       Procedures    Vitals: There were no vitals taken for this visit.    There is no height or weight on file to calculate BMI.     Allergies:   Allergies   Allergen Reactions   • Penicillins Hives and Shortness Of Breath     As A child   • Novocain [Procaine] Nausea And Vomiting        During this visit the following were done:  Labs Reviewed []    Labs Ordered []    Radiology Reports Reviewed []    Radiology Ordered []    PCP Records Reviewed []    Referring Provider Records Reviewed []    ER Records Reviewed []    Hospital Records Reviewed []    History Obtained From Family []    Radiology Images Reviewed []    Other Reviewed []    Records Requested []      Assessment/Plan   Gurwinder was seen today for follow-up, post-op peripheral arterial bypass and hypertension.    Diagnoses and all orders for this visit:    PAD (peripheral artery disease) (CMS/[facility])  -     clopidogrel (PLAVIX) 75 MG tablet; Take 1 tablet by mouth Daily.    Essential hypertension  -     atenolol  (TENORMIN) 50 MG tablet; Take 1 tablet by mouth Daily.  -     folic acid-pyridoxine-cyanocobalamin (Folbic) 2.5-25-2 MG tablet tablet; Take 1 tablet by mouth Daily.  -     lisinopril (PRINIVIL,ZESTRIL) 40 MG tablet; Take 1 tablet by mouth Daily.    Neuropathy  -     gabapentin (NEURONTIN) 800 MG tablet; Take 1.5 tablets by mouth 3 (Three) Times a Day.    Chronic low back pain  -     methocarbamol (ROBAXIN) 750 MG tablet; Take 1 tablet by mouth 4 (Four) Times a Day As Needed for Muscle Spasms.  -     nabumetone (RELAFEN) 500 MG tablet; Take 1 tablet by mouth 2 (Two) Times a Day As Needed for Mild Pain .    History of degenerative disc disease  -     methocarbamol (ROBAXIN) 750 MG tablet; Take 1 tablet by mouth 4 (Four) Times a Day As Needed for Muscle Spasms.      Continue current plan of care and medications.   Continue to follow with Dr Azar as scheduled.

## 2020-05-26 DIAGNOSIS — M54.50 CHRONIC LOW BACK PAIN: ICD-10-CM

## 2020-05-26 DIAGNOSIS — G89.29 CHRONIC LOW BACK PAIN: ICD-10-CM

## 2020-05-26 RX ORDER — NABUMETONE 500 MG/1
500 TABLET, FILM COATED ORAL 2 TIMES DAILY PRN
Qty: 180 TABLET | Refills: 1 | Status: SHIPPED | OUTPATIENT
Start: 2020-05-26 | End: 2020-08-13 | Stop reason: SDUPTHER

## 2020-06-08 NOTE — PROGRESS NOTES
[facility] CARDIOLOGY  [address]  [place] [address]  Phone: [phone]  Fax: [phone]    06/09/2020    Chief Complaint   Patient presents with   • Follow-up     4 Month   • Peripheral Vascular Disease   • Dizziness        History:   Gurwinder Harding is a 59 y.o. male seen in follow up 4 months PVD. He present today with complaints of dizziness. He has intermittent episodes of numbness and cold to touch in the right foot. Right lower extremity has remarkable decreased capillary refill. No complaint of chest pain, shortness of breath or palpitations. States he is doing well from a cardiac standpoint. He has a past medical history significant for PVD with atherectomy followed by balloon angioplasty and stenting of the R SFA, chronic tobacco abuse, carotid artery stenosis, S/P successful atherectomy and balloon angioplasty of the left SFA , and essential hypertension.  The chart and medications were reviewed today. Problems above addressed this visit.     Past Medical History:   Diagnosis Date   • Alcohol abuse    • Arthritis    • Carotid stenosis    • ED (erectile dysfunction)    • History of degenerative disc disease    • Hyperglycemia    • Hypertension    • Low back pain    • Neuropathy    • Peripheral vascular disease (CMS/HCC)    • Screening PSA (prostate specific antigen) 09/2015   • Tobacco abuse    • Vitamin D deficiency        Past Surgical History:   Procedure Laterality Date   • APPENDECTOMY     • ARTERIOGRAM N/A 9/19/2019    Procedure: Arteriogram;  Surgeon: Tong Azar MD;  Location:  [facility] CATH INVASIVE LOCATION;  Service: Cardiology   • BACK SURGERY      DISC RUPTURE REPAIR, L5   • CARDIAC CATHETERIZATION Right 10/29/2019    Procedure: Peripheral angiography;  Surgeon: Tong Azar MD;  Location:  [facility] CATH INV[facility]IVE LOCATION;  Service: Cardiovascular   • VASCULAR SURGERY Bilateral         Past Social History:  Social History     Socioeconomic History    • Marital status:      Spouse name: Not on file   • Number of children: 1   • Years of education: Not on file   • Highest education level: Not on file   Occupational History   • Occupation:  at [employer]   Tobacco Use   • Smoking status: Current Every Day Smoker     Packs/day: 1.00     Types: Cigarettes   • Smokeless tobacco: Never Used   Substance and Sexual Activity   • Alcohol use: Yes     Alcohol/week: 6.0 standard drinks     Types: 6 Cans of beer per week     Comment: Daily    • Drug use: No   • Sexual activity: Defer   Social History Narrative    Lives in [place].        Past Family History:  Family History   Problem Relation Age of Onset   • Hypertension Mother    • Cancer Father         LUNG   • Diabetes Father    • Hypertension Father    • Heart attack Other         GRANDFATHER       Review of Systems:   Please see HPI  Constitution: No chills, no rigors, no unexplained weight loss or weight gain  Eyes:  No diplopia, no blurred vision, no loss of vision, conjunctiva is pink and sclera is anicteric  ENT:  No tinnitus, no otorrhea, no epistaxis, no sore throat   Respiratory: No cough, no hemoptysis  Cardiovascular: see HPI  Gastrointestinal: No nausea, no vomiting, no hematemesis, no diarrhea or constipation, no melena  Genitourinary: No frequency of dysuria no hematuria  Integument: No pruritis and  no skin rash  Hematologic / Lymphatic: No excessive bleeding, easy bruising, fatigue, lymphadenopathy and petechiae  Musculoskeletal: No joint pain, joint stiffness, joint swelling, muscle pain, muscle weakness and neck pain  Neurological: No dizziness, headaches, light headedness, seizures and vertigo  Endocrine: No frequent urination and nocturia, temperature intolerance, weight gain, unintended and weight loss, unintended      Current Outpatient Medications   Medication Sig Dispense Refill   • albuterol sulfate  (90 Base) MCG/ACT inhaler Inhale 2 puffs Every 6 (Six) Hours As Needed  for Wheezing. 1 inhaler 5   • aspirin 81 MG EC tablet Take 81 mg by mouth Daily.     • atenolol (TENORMIN) 50 MG tablet Take 1 tablet by mouth Daily. 90 tablet 1   • clopidogrel (PLAVIX) 75 MG tablet Take 1 tablet by mouth Daily. 90 tablet 1   • folic acid-pyridoxine-cyanocobalamin (Folbic) 2.5-25-2 MG tablet tablet Take 1 tablet by mouth Daily. 90 tablet 1   • gabapentin (NEURONTIN) 800 MG tablet Take 1.5 tablets by mouth 3 (Three) Times a Day. 135 tablet 2   • lisinopril (PRINIVIL,ZESTRIL) 40 MG tablet Take 1 tablet by mouth Daily. 90 tablet 1   • methocarbamol (ROBAXIN) 750 MG tablet Take 1 tablet by mouth 4 (Four) Times a Day As Needed for Muscle Spasms. 360 tablet 1   • nabumetone (RELAFEN) 500 MG tablet Take 1 tablet by mouth 2 (Two) Times a Day As Needed for Mild Pain . 180 tablet 1     No current facility-administered medications for this visit.         Allergies   Allergen Reactions   • Penicillins Hives and Shortness Of Breath     As A child   • Novocain [Procaine] Nausea And Vomiting       Objective:  Vitals:    06/09/20 1320   BP: 136/94   Pulse: 80   SpO2: 95%     Comfortable NAD  PERRL, conjunctiva clear  Neck supple, no JVD or thyromegaly appreciated  S1/S2 RRR, no m/r/g  Lungs CTA B, normal effort  Abdomen S/NT/ND (+) BS, no HSM appreciated  Extremities o clubbing, cyanosis, or edema  Normal gait  No visible or palpable skin lesions  A/Ox4, mood and affect appropriate  Pulse exam:    Right foot cold to touch  No edema bilateral  Capillary refill is normal  No evidence of ulceration or color change of the toes  PULSES  Right DP and PT are 1+ and Left DP and PT are 1+    DATA:        Results for orders placed during the hospital encounter of 11/30/16   Stress Test With Myocardial Perfusion One Day    Narrative · Left ventricular ejection fraction is normal (Calculated EF = 67%).  · Myocardial perfusion imaging indicates a normal myocardial perfusion   study with no evidence of ischemia.  · Impressions  are consistent with a low risk study.  · low risk for ischemic heart disease.  · Findings consistent with a normal ECG stress test.         Results for orders placed during the hospital encounter of 11/30/16   Stress Test With Myocardial Perfusion One Day    Narrative · Left ventricular ejection fraction is normal (Calculated EF = 67%).  · Myocardial perfusion imaging indicates a normal myocardial perfusion   study with no evidence of ischemia.  · Impressions are consistent with a low risk study.  · low risk for ischemic heart disease.  · Findings consistent with a normal ECG stress test.         Results for orders placed during the hospital encounter of 10/29/19   Cardiac Catheterization/Vascular Study    Addendum PERIPHERAL ARTERIOGRAM / INTERVENTION REPORT   DATE OF PROCEDURE: 10/29/19   INDICATION FOR PROCEDURE: Severe claudication of the RIGHT leg.  Recent intervention to LLE with healing of ulcer on the left Pt still has cold foot on the right with no pulse.    PROCEDURE PERFORMED:  LEFT femoral artery access with 5 Italian sheath Bilateral lower extremity angiogram Atherectomy followed by Balloon angioplasty and stenting of the R SFA  PROCEDURE COMMENTS:  Gurwinder Harding was brought to the cath lab and placed on the cardiac  catherization table in the postabsortive state. The patient was prepped  and draped according to the cath lab protocol under strict aseptic and  sterile condition. Patient's right femoral artery sight was prepped and  draped. Under fluoroscopic guidance theright femoral artery was punctured  using a 21 gauge needle utilizing the modified Seldinger technique. 5  Georgian sheath was introduced over a wire. After aspirating for blood it  was flushed with heparinized saline.  LEFT extremity imaging was performed via the sheath. A 5F RIM catheter was  then advanced to the bifurcation at L5 and engaged in the contralateral  CLEMENTE. RIGHT extremity imaging was then performed. After the procedure was   completed the sheath was removed in the cath lab and hemostasis achieved  via manual compression. No complications occurred.   Findings: Right Lower Extremity:  Common  Iliac artery was normal SFA shows 100% short occlusion in the distal third Popliteal Artery was patent with no disease Tibio-peroneal trunk patent with no disease Anterior tibial artery was patent Posterior tibial artery was patent 3 Vessel run off present  Left Lower Extremity: Common iliac artery shows mild disease and calcific changes  SFA is patent at the site of previous atherectomy and DCB with excellent  flow Popliteal Artery is patent with no disease  Tibio-peroneal trunk patent with no significant disease Anterior tibial artery was patent Posterior tibial artery is patent  3 Vessel run off present  Recommendations: Intervention to R SFA  6x45 straight sheath used  Angled NaviCross with glidewire 0.035 Crossed successfully and a Stabilizer wire was used for Atherectomy with  HawkOne 6F DCB with 5x120 Admiral performed There was residual stenosis which was flow limiting and thus stenting was  elected. 6x120 Everflex stent was delivered with excellent results and flow was  fully re-established.   FINAL RESULTS: Successful Intervention to the Right SFA Chronic Total occlusion   beginning at the ostium and extending to the Adductor canal using:  Atherectomy HawkOne device 6F Balloon angioplasty with Admiral DCB 5x120 followed by stenting of the  right SFA using 6x120 Everflex stent From 100% to 20% residual with brisk flow to the distal vessel.   EBL: 50 ML No specimens were removed.  Plan Continue medical therapy.    Tong Azar MD 10/29/19    Director, Cardiac Cath Lab       Tong Azar MD 11/12/2019  3:08 PM          Narrative PERIPHERAL ARTERIOGRAM / INTERVENTION REPORT     DATE OF PROCEDURE: 10/29/19     INDICATION FOR PROCEDURE: Severe claudication of the RIGHT leg.   Recent intervention to LLE with healing of ulcer on the  left  Pt still has cold foot on the right with no pulse.       PROCEDURE PERFORMED:   LEFT femoral artery access with 5 Kinyarwanda sheath  Bilateral lower extremity angiogram  Atherectomy followed by Balloon angioplasty and stenting of the R SFA    PROCEDURE COMMENTS:    Gurwinder Harding was brought to the cath lab and placed on the cardiac   catherization table in the postabsortive state. The patient was prepped   and draped according to the cath lab protocol under strict aseptic and   sterile condition. Patient's right femoral artery sight was prepped and   draped. Under fluoroscopic guidance theright femoral artery was punctured   using a 21 gauge needle utilizing the modified Seldinger technique. 4   Japanese sheath was introduced over a wire. After aspirating for blood it   was flushed with heparinized saline.    LEFT extremity imaging was performed via the sheath. A 5F RIM catheter was   then advanced to the bifurcation at L5 and engaged in the contralateral   CLEMENTE. RIGHT extremity imaging was then performed. After the procedure was   completed the sheath was removed in the cath lab and hemostasis achieved   via manual compression. No complications occurred.     Findings:  Right Lower Extremity:   Common  Iliac artery was normal  SFA shows 100% short occlusion in the distal third  Popliteal Artery was patent with no disease  Tibio-peroneal trunk patent with no disease  Anterior tibial artery was patent  Posterior tibial artery was patent  3 Vessel run off present    Left Lower Extremity:  Common iliac artery shows mild disease and calcific changes   SFA is patent at the site of previous atherectomy and DCB with excellent   flow  Popliteal Artery is patent with no disease   Tibio-peroneal trunk patent with no significant disease  Anterior tibial artery was patent  Posterior tibial artery is patent   3 Vessel run off present    Recommendations:  Intervention to R SFA    6x45 straight sheath used   Angled NaviCross with  glidewire 0.035  Crossed successfully and a Stabilizer wire was used for Atherectomy with   HawkOne 6F  DCB with 5x120 Admiral performed  There was residual stenosis which was flow limiting and thus stenting was   elected.  6x120 Everflex stent was delivered with excellent results and flow was   fully re-established.      FINAL RESULTS:  Successful Intervention to the Right SFA Chronic Total occlusion    beginning at the ostium and extending to the Adductor canal using:    Atherectomy HawkOne device 6F  Balloon angioplasty with Admiral DCB 5x120 followed by stenting of the   right SFA using 6x120 Everflex stent  From 100% to 20% residual with brisk flow to the distal vessel.      EBL: 50 ML  No specimens were removed.    Plan  Continue medical therapy and if symptoms worsen in a few months we will   proceed with common iliac stenting at that time.      Tong Azar MD  10/29/19        Director, Cardiac Cath Lab       Procedures       Gurwinder was seen today for follow-up, peripheral vascular disease and dizziness.    Diagnoses and all orders for this visit:    Occlusion of left carotid artery    PVD (peripheral vascular disease) with claudication (CMS/HCC)    Bilateral foot pain    Stenosis of right carotid artery    Dizziness    Essential hypertension    Tobacco abuse      Assessment:  Dizziness associated with carotid artery stenosis. Medical management at this time.   PVD with decreased capillary refill in the right lower extremity. Foot is cold to touch with numbness.   Status post atherectomy followed by balloon angioplasty and stenting of the R SFA, chronic tobacco abuse, carotid artery stenosis, S/P successful atherectomy and balloon angioplasty of the left SFA   Essential hypertension.  Chronic tobacco abuse    Plan:  Proceed with an arterial US to assess popliteal and SFA right lower extremity.  Follow up in 3 months  I extensively discussed consequences of smoking namely cardiovascular/metabolic, lung  cancer, mouth cancer, pulmonary disorder including COPD and reduced quality of life.  At the end of the conversation, patient voices understanding of the risk involved in smoking.  Patient also understands the benefits of quitting.  I provided the patient smoking cessation material. Patient displayed understanding and stated that he will try to quit smoking.  During this visit I spent 3-5 minutes counseling the patient regarding the smoking cessation.        Patient's Body mass index is 30.85 kg/m². BMI is above normal parameters. Recommendations include: exercise counseling.         ICD-10-CM ICD-9-CM   1. Occlusion of left carotid artery I65.22 433.10   2. PVD (peripheral vascular disease) with claudication (CMS/[facility]) I73.9 443.9   3. Bilateral foot pain M79.671 729.5    M79.672    4. Stenosis of right carotid artery I65.21 433.10   5. Dizziness R42 780.4   6. Essential hypertension I10 401.9   7. Tobacco abuse Z72.0 305.1        Thank you for allowing me to participate in the care of Gurwinder Harding. Feel free to contact me directly with any further questions or concerns.        Director, Cardiac Cath Lab    RAMON Eng, acting as scribe for Tong Azar MD, [facility], [facility].   06/09/20  13:47     I have read and agree the documentation that has been completed regarding this visit.  By signing this record, I attest that the documentation was completed by my physical presence and is an accurate record of the encounter.  Voice recognition / transcription technology used for some documentation in this chart in attempt to mitigate substantial inefficiencies created by this electronic health record technology.  As a result, there may be some typos and.or nonsensical language introduced into the chart that either overlooked in editing/review and/or that I am unable to correct as patient care needs require me to prioritize my attention to bedside patient care rather than electronic documentation. MA

## 2020-06-09 ENCOUNTER — OFFICE VISIT (OUTPATIENT)
Dept: CARDIOLOGY | Facility: CLINIC | Age: 60
End: 2020-06-09

## 2020-06-09 VITALS
OXYGEN SATURATION: 95 % | SYSTOLIC BLOOD PRESSURE: 136 MMHG | HEIGHT: 70 IN | WEIGHT: 215 LBS | DIASTOLIC BLOOD PRESSURE: 94 MMHG | HEART RATE: 80 BPM | BODY MASS INDEX: 30.78 KG/M2

## 2020-06-09 DIAGNOSIS — M79.671 BILATERAL FOOT PAIN: ICD-10-CM

## 2020-06-09 DIAGNOSIS — I65.21 STENOSIS OF RIGHT CAROTID ARTERY: ICD-10-CM

## 2020-06-09 DIAGNOSIS — I10 ESSENTIAL HYPERTENSION: ICD-10-CM

## 2020-06-09 DIAGNOSIS — R42 DIZZINESS: ICD-10-CM

## 2020-06-09 DIAGNOSIS — Z72.0 TOBACCO ABUSE: ICD-10-CM

## 2020-06-09 DIAGNOSIS — I65.22 OCCLUSION OF LEFT CAROTID ARTERY: Primary | ICD-10-CM

## 2020-06-09 DIAGNOSIS — M79.672 BILATERAL FOOT PAIN: ICD-10-CM

## 2020-06-09 DIAGNOSIS — I73.9 PVD (PERIPHERAL VASCULAR DISEASE) WITH CLAUDICATION (HCC): ICD-10-CM

## 2020-06-09 PROCEDURE — 99214 OFFICE O/P EST MOD 30 MIN: CPT | Performed by: INTERNAL MEDICINE

## 2020-08-07 ENCOUNTER — TELEPHONE (OUTPATIENT)
Dept: FAMILY MEDICINE CLINIC | Facility: CLINIC | Age: 60
End: 2020-08-07

## 2020-08-07 DIAGNOSIS — G62.9 NEUROPATHY: ICD-10-CM

## 2020-08-07 RX ORDER — GABAPENTIN 800 MG/1
1200 TABLET ORAL 3 TIMES DAILY
Qty: 135 TABLET | Refills: 2 | Status: SHIPPED | OUTPATIENT
Start: 2020-08-07 | End: 2020-08-13 | Stop reason: SDUPTHER

## 2020-08-13 ENCOUNTER — OFFICE VISIT (OUTPATIENT)
Dept: FAMILY MEDICINE CLINIC | Facility: CLINIC | Age: 60
End: 2020-08-13

## 2020-08-13 VITALS
OXYGEN SATURATION: 99 % | DIASTOLIC BLOOD PRESSURE: 62 MMHG | SYSTOLIC BLOOD PRESSURE: 118 MMHG | WEIGHT: 221 LBS | HEIGHT: 70 IN | HEART RATE: 83 BPM | TEMPERATURE: 97.3 F | BODY MASS INDEX: 31.64 KG/M2

## 2020-08-13 DIAGNOSIS — F33.1 MAJOR DEPRESSIVE DISORDER, RECURRENT, MODERATE (HCC): ICD-10-CM

## 2020-08-13 DIAGNOSIS — R73.09 ELEVATED GLUCOSE: ICD-10-CM

## 2020-08-13 DIAGNOSIS — I73.9 PAD (PERIPHERAL ARTERY DISEASE) (HCC): ICD-10-CM

## 2020-08-13 DIAGNOSIS — R53.83 OTHER FATIGUE: ICD-10-CM

## 2020-08-13 DIAGNOSIS — G62.9 NEUROPATHY: ICD-10-CM

## 2020-08-13 DIAGNOSIS — G89.29 CHRONIC LOW BACK PAIN: ICD-10-CM

## 2020-08-13 DIAGNOSIS — E55.9 VITAMIN D DEFICIENCY: Primary | ICD-10-CM

## 2020-08-13 DIAGNOSIS — Z87.39 HISTORY OF DEGENERATIVE DISC DISEASE: ICD-10-CM

## 2020-08-13 DIAGNOSIS — I10 ESSENTIAL HYPERTENSION: ICD-10-CM

## 2020-08-13 DIAGNOSIS — M54.50 CHRONIC LOW BACK PAIN: ICD-10-CM

## 2020-08-13 LAB
25(OH)D3 SERPL-MCNC: 30.6 NG/ML (ref 30–100)
ALBUMIN SERPL-MCNC: 3.9 G/DL (ref 3.5–5.2)
ALBUMIN/GLOB SERPL: 1.2 G/DL
ALP SERPL-CCNC: 70 U/L (ref 39–117)
ALT SERPL W P-5'-P-CCNC: 20 U/L (ref 1–41)
ANION GAP SERPL CALCULATED.3IONS-SCNC: 11.4 MMOL/L (ref 5–15)
AST SERPL-CCNC: 22 U/L (ref 1–40)
BASOPHILS # BLD AUTO: 0.07 10*3/MM3 (ref 0–0.2)
BASOPHILS NFR BLD AUTO: 0.9 % (ref 0–1.5)
BILIRUB SERPL-MCNC: 0.3 MG/DL (ref 0–1.2)
BUN SERPL-MCNC: 9 MG/DL (ref 6–20)
BUN/CREAT SERPL: 6.6 (ref 7–25)
CALCIUM SPEC-SCNC: 9.4 MG/DL (ref 8.6–10.5)
CHLORIDE SERPL-SCNC: 92 MMOL/L (ref 98–107)
CO2 SERPL-SCNC: 23.6 MMOL/L (ref 22–29)
CREAT SERPL-MCNC: 1.36 MG/DL (ref 0.76–1.27)
DEPRECATED RDW RBC AUTO: 49.2 FL (ref 37–54)
EOSINOPHIL # BLD AUTO: 0.03 10*3/MM3 (ref 0–0.4)
EOSINOPHIL NFR BLD AUTO: 0.4 % (ref 0.3–6.2)
ERYTHROCYTE [DISTWIDTH] IN BLOOD BY AUTOMATED COUNT: 15.9 % (ref 12.3–15.4)
GFR SERPL CREATININE-BSD FRML MDRD: 54 ML/MIN/1.73
GLOBULIN UR ELPH-MCNC: 3.2 GM/DL
GLUCOSE SERPL-MCNC: 150 MG/DL (ref 65–99)
HCT VFR BLD AUTO: 37.9 % (ref 37.5–51)
HGB BLD-MCNC: 12.7 G/DL (ref 13–17.7)
IMM GRANULOCYTES # BLD AUTO: 0.03 10*3/MM3 (ref 0–0.05)
IMM GRANULOCYTES NFR BLD AUTO: 0.4 % (ref 0–0.5)
LYMPHOCYTES # BLD AUTO: 1.02 10*3/MM3 (ref 0.7–3.1)
LYMPHOCYTES NFR BLD AUTO: 13.5 % (ref 19.6–45.3)
MAGNESIUM SERPL-MCNC: 2.1 MG/DL (ref 1.6–2.6)
MCH RBC QN AUTO: 28.7 PG (ref 26.6–33)
MCHC RBC AUTO-ENTMCNC: 33.5 G/DL (ref 31.5–35.7)
MCV RBC AUTO: 85.6 FL (ref 79–97)
MONOCYTES # BLD AUTO: 0.61 10*3/MM3 (ref 0.1–0.9)
MONOCYTES NFR BLD AUTO: 8 % (ref 5–12)
NEUTROPHILS NFR BLD AUTO: 5.82 10*3/MM3 (ref 1.7–7)
NEUTROPHILS NFR BLD AUTO: 76.8 % (ref 42.7–76)
NRBC BLD AUTO-RTO: 0 /100 WBC (ref 0–0.2)
PLATELET # BLD AUTO: 222 10*3/MM3 (ref 140–450)
PMV BLD AUTO: 10.8 FL (ref 6–12)
POTASSIUM SERPL-SCNC: 5.2 MMOL/L (ref 3.5–5.2)
PROT SERPL-MCNC: 7.1 G/DL (ref 6–8.5)
RBC # BLD AUTO: 4.43 10*6/MM3 (ref 4.14–5.8)
SODIUM SERPL-SCNC: 127 MMOL/L (ref 136–145)
TSH SERPL DL<=0.05 MIU/L-ACNC: 1.24 UIU/ML (ref 0.27–4.2)
VIT B12 BLD-MCNC: 1016 PG/ML (ref 211–946)
WBC # BLD AUTO: 7.58 10*3/MM3 (ref 3.4–10.8)

## 2020-08-13 PROCEDURE — 83735 ASSAY OF MAGNESIUM: CPT | Performed by: NURSE PRACTITIONER

## 2020-08-13 PROCEDURE — 82306 VITAMIN D 25 HYDROXY: CPT | Performed by: NURSE PRACTITIONER

## 2020-08-13 PROCEDURE — 82607 VITAMIN B-12: CPT | Performed by: NURSE PRACTITIONER

## 2020-08-13 PROCEDURE — 80053 COMPREHEN METABOLIC PANEL: CPT | Performed by: NURSE PRACTITIONER

## 2020-08-13 PROCEDURE — 99214 OFFICE O/P EST MOD 30 MIN: CPT | Performed by: NURSE PRACTITIONER

## 2020-08-13 PROCEDURE — 84443 ASSAY THYROID STIM HORMONE: CPT | Performed by: NURSE PRACTITIONER

## 2020-08-13 PROCEDURE — 83036 HEMOGLOBIN GLYCOSYLATED A1C: CPT | Performed by: NURSE PRACTITIONER

## 2020-08-13 PROCEDURE — 85025 COMPLETE CBC W/AUTO DIFF WBC: CPT | Performed by: NURSE PRACTITIONER

## 2020-08-13 RX ORDER — METHOCARBAMOL 750 MG/1
750 TABLET, FILM COATED ORAL 4 TIMES DAILY PRN
Qty: 360 TABLET | Refills: 1 | Status: SHIPPED | OUTPATIENT
Start: 2020-08-13 | End: 2020-09-16

## 2020-08-13 RX ORDER — ATENOLOL 50 MG/1
50 TABLET ORAL DAILY
Qty: 90 TABLET | Refills: 1 | Status: SHIPPED | OUTPATIENT
Start: 2020-08-13 | End: 2020-11-04 | Stop reason: SDUPTHER

## 2020-08-13 RX ORDER — LISINOPRIL 40 MG/1
40 TABLET ORAL DAILY
Qty: 90 TABLET | Refills: 1 | Status: ON HOLD | OUTPATIENT
Start: 2020-08-13 | End: 2020-09-03 | Stop reason: SDUPTHER

## 2020-08-13 RX ORDER — CLOPIDOGREL BISULFATE 75 MG/1
75 TABLET ORAL DAILY
Qty: 90 TABLET | Refills: 1 | Status: ON HOLD | OUTPATIENT
Start: 2020-08-13 | End: 2020-09-01

## 2020-08-13 RX ORDER — NABUMETONE 500 MG/1
500 TABLET, FILM COATED ORAL 2 TIMES DAILY PRN
Qty: 180 TABLET | Refills: 1 | Status: SHIPPED | OUTPATIENT
Start: 2020-08-13 | End: 2020-09-03 | Stop reason: HOSPADM

## 2020-08-13 RX ORDER — GABAPENTIN 800 MG/1
1200 TABLET ORAL 3 TIMES DAILY
Qty: 135 TABLET | Refills: 2 | Status: SHIPPED | OUTPATIENT
Start: 2020-08-13 | End: 2020-11-04 | Stop reason: SDUPTHER

## 2020-08-13 RX ORDER — ALBUTEROL SULFATE 90 UG/1
2 AEROSOL, METERED RESPIRATORY (INHALATION) EVERY 6 HOURS PRN
Qty: 18 G | Refills: 3 | Status: ON HOLD | OUTPATIENT
Start: 2020-08-13 | End: 2020-09-01

## 2020-08-13 RX ORDER — GABAPENTIN 800 MG/1
1200 TABLET ORAL 3 TIMES DAILY
Qty: 135 TABLET | Refills: 2 | Status: CANCELLED | OUTPATIENT
Start: 2020-08-13

## 2020-08-13 NOTE — PROGRESS NOTES
Subjective   Gurwinder Harding is a 60 y.o. male.     Chief Complaint: Follow-up and Hypertension    Depression   Visit Type: initial  Onset of symptoms: 1 to 6 months ago  Patient presents with the following symptoms: depressed mood, irritability and malaise.  Frequency of symptoms: most days   Severity: causing significant distress   Risk factors: recent illness  Treatment tried: nothing      Hypertension   This is a chronic problem. The current episode started more than 1 year ago. The problem is controlled. Pertinent negatives include no chest pain or shortness of breath. Risk factors for coronary artery disease include smoking/tobacco exposure. Current antihypertension treatment includes ACE inhibitors and beta blockers. The current treatment provides significant improvement. Compliance problems include exercise and diet.  Hypertensive end-organ damage includes PVD.   Dizziness   This is a new problem. The current episode started in the past 7 days. Episode frequency: upon standing. The problem has been waxing and waning. Pertinent negatives include no chest pain. Nothing aggravates the symptoms. He has tried nothing for the symptoms.   Lower Extremity Issue   This is a chronic (neuropathy bilateral lower extremities; PVD lower extremities and currently following with Dr Floyd) problem. The problem occurs constantly. The problem has been unchanged. The symptoms are aggravated by smoking. Treatments tried: gabapentin. The treatment provided moderate relief. Pt also has PVD and has been following with vascular provider who has performed intervention for his PVD.   Back Pain   This is a chronic problem. The current episode started more than 1 year ago. The problem has been waxing and waning since onset. The pain is present in the lumbar spine. The quality of the pain is described as aching. The pain radiates to the left foot and right knee. The pain is moderate. The symptoms are aggravated by standing, bending and  twisting. Associated symptoms include leg pain. Pertinent negatives include no bladder incontinence, bowel incontinence, chest pain or headaches. He has tried analgesics and muscle relaxant for the symptoms. The treatment provided mild relief.  History of back surgery with fusion; neuropathy is at knee downwards, especially on right lower extremity. Patient is currently on gabapentin and with relafen 500 mg. Used to be on tramadol 50 mg orally BID, but we discontinued secondary to concerns about elevated liver enzymes. Failed etodalac 200 mg orally TID. Feels that the gabapentin and the relafen does offer some relief along with the robaxin. Denies any side effects of the medications. States that the medications somewhat control the pain enough so that he can accomplish daily activities. Movement and ambulation are okay. 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  Denies any sedation from the medications.     Family History   Problem Relation Age of Onset   • Hypertension Mother    • Cancer Father         LUNG   • Diabetes Father    • Hypertension Father    • Heart attack Other         GRANDFATHER       Social History     Socioeconomic History   • Marital status:      Spouse name: Not on file   • Number of children: 1   • Years of education: Not on file   • Highest education level: Not on file   Occupational History   • Occupation:  at [employer]   Tobacco Use   • Smoking status: Current Every Day Smoker     Packs/day: 1.00     Types: Cigarettes   • Smokeless tobacco: Never Used   Substance and Sexual Activity   • Alcohol use: Yes     Alcohol/week: 6.0 standard drinks     Types: 6 Cans of beer per week     Comment: Daily    • Drug use: No   • Sexual activity: Defer   Social History Narrative    Lives in [place].        Past Medical History:   Diagnosis Date   • Alcohol abuse    • Arthritis    • Carotid stenosis    • ED (erectile dysfunction)    • History of degenerative disc disease    • Hyperglycemia    •  "Hypertension    • Low back pain    • Neuropathy    • Peripheral vascular disease (CMS/HCC)    • Screening PSA (prostate specific antigen) 09/2015   • Tobacco abuse    • Vitamin D deficiency        Review of Systems   Constitutional: Positive for irritability.   HENT: Negative.    Respiratory: Negative.    Cardiovascular: Negative.    Gastrointestinal: Negative.    Musculoskeletal: Negative.    Skin: Negative.    Neurological: Negative.    Psychiatric/Behavioral: Negative.        Objective   Physical Exam   Constitutional: He is oriented to person, place, and time. He appears well-developed and well-nourished.   Neck: Normal range of motion. Neck supple.   Cardiovascular: Normal rate, regular rhythm and normal heart sounds.   Pulmonary/Chest: Effort normal and breath sounds normal.   Neurological: He is alert and oriented to person, place, and time.   Skin: Skin is warm and dry.   Psychiatric: He has a normal mood and affect. His behavior is normal. Judgment and thought content normal.   Nursing note and vitals reviewed.      Procedures    Vitals: Blood pressure 118/62, pulse 83, temperature 97.3 °F (36.3 °C), height 177.8 cm (70\"), weight 100 kg (221 lb), SpO2 99 %.    Body mass index is 31.71 kg/m².     Allergies:   Allergies   Allergen Reactions   • Penicillins Hives and Shortness Of Breath     As A child   • Novocain [Procaine] Nausea And Vomiting        During this visit the following were done:  Labs Reviewed []    Labs Ordered []    Radiology Reports Reviewed []    Radiology Ordered []    PCP Records Reviewed []    Referring Provider Records Reviewed []    ER Records Reviewed []    Hospital Records Reviewed []    History Obtained From Family []    Radiology Images Reviewed []    Other Reviewed []    Records Requested []      Assessment/Plan   Gurwinder was seen today for follow-up and hypertension.    Diagnoses and all orders for this visit:    Vitamin D deficiency  -     Vitamin D 25 Hydroxy; Future  -     " Vitamin D 25 Hydroxy    Chronic low back pain  -     nabumetone (RELAFEN) 500 MG tablet; Take 1 tablet by mouth 2 (Two) Times a Day As Needed for Mild Pain .  -     methocarbamol (ROBAXIN) 750 MG tablet; Take 1 tablet by mouth 4 (Four) Times a Day As Needed for Muscle Spasms.  -     CBC & Differential; Future  -     Comprehensive Metabolic Panel; Future  -     Magnesium; Future  -     TSH; Future  -     Vitamin B12; Future  -     CBC & Differential  -     Comprehensive Metabolic Panel  -     Magnesium  -     TSH  -     Vitamin B12  -     CBC Auto Differential    History of degenerative disc disease  -     methocarbamol (ROBAXIN) 750 MG tablet; Take 1 tablet by mouth 4 (Four) Times a Day As Needed for Muscle Spasms.  -     CBC & Differential; Future  -     Comprehensive Metabolic Panel; Future  -     Magnesium; Future  -     TSH; Future  -     Vitamin B12; Future  -     CBC & Differential  -     Comprehensive Metabolic Panel  -     Magnesium  -     TSH  -     Vitamin B12  -     CBC Auto Differential    Essential hypertension  -     atenolol (TENORMIN) 50 MG tablet; Take 1 tablet by mouth Daily.  -     folic acid-pyridoxine-cyanocobalamin (Folbic) 2.5-25-2 MG tablet tablet; Take 1 tablet by mouth Daily.  -     lisinopril (PRINIVIL,ZESTRIL) 40 MG tablet; Take 1 tablet by mouth Daily.  -     CBC & Differential; Future  -     Comprehensive Metabolic Panel; Future  -     Magnesium; Future  -     TSH; Future  -     Vitamin B12; Future  -     CBC & Differential  -     Comprehensive Metabolic Panel  -     Magnesium  -     TSH  -     Vitamin B12  -     CBC Auto Differential    Neuropathy  -     CBC & Differential; Future  -     Comprehensive Metabolic Panel; Future  -     Magnesium; Future  -     TSH; Future  -     Vitamin B12; Future  -     CBC & Differential  -     Comprehensive Metabolic Panel  -     Magnesium  -     TSH  -     Vitamin B12  -     CBC Auto Differential  -     gabapentin (NEURONTIN) 800 MG tablet; Take 1.5  tablets by mouth 3 (Three) Times a Day.    PAD (peripheral artery disease) (CMS/HCC)  -     clopidogrel (PLAVIX) 75 MG tablet; Take 1 tablet by mouth Daily.  -     CBC & Differential; Future  -     Comprehensive Metabolic Panel; Future  -     Magnesium; Future  -     TSH; Future  -     Vitamin B12; Future  -     CBC & Differential  -     Comprehensive Metabolic Panel  -     Magnesium  -     TSH  -     Vitamin B12  -     CBC Auto Differential    Other fatigue  -     CBC & Differential; Future  -     Comprehensive Metabolic Panel; Future  -     Magnesium; Future  -     TSH; Future  -     Vitamin B12; Future  -     CBC & Differential  -     Comprehensive Metabolic Panel  -     Magnesium  -     TSH  -     Vitamin B12  -     CBC Auto Differential    Major depressive disorder, recurrent, moderate (CMS/HCC)  -     sertraline (Zoloft) 50 MG tablet; Take 1 tablet by mouth Daily.    Other orders  -     albuterol sulfate  (90 Base) MCG/ACT inhaler; Inhale 2 puffs Every 6 (Six) Hours As Needed for Wheezing.    start zoloft.  RTC 1 month for re-evaluation     Heron # [id number] Reviewed and is consistent.  UDS 2/7/2020 reviewed and is consistent.  Patient comfort assessment guide reviewed and updated today.    As part of the patient's treatment plan they are being prescribed a controlled substance/ substances with potential for abuse.  This patient has been made aware of the appropriate use of such medications, including potential risk of somnolence, limited ability to drive and/or work safely, and potential for overdose.  It has also been made clear these medications are for use by the patient only, without concomitant use of alcohol or other substances unless prescribed/advised by medical provider.    Patient has completed prescribing agreement detailing terms of continued prescribing of controlled substances including monitoring HERON reports, urine drug screens, and pill counts.  The patient is aware HERON reports  are reviewed on a regular basis and scanned into the chart.    History and physical exam exhibit continued safe and appropriate use of controlled substances.

## 2020-08-14 ENCOUNTER — TELEPHONE (OUTPATIENT)
Dept: FAMILY MEDICINE CLINIC | Facility: CLINIC | Age: 60
End: 2020-08-14

## 2020-08-14 DIAGNOSIS — N28.9 RENAL INSUFFICIENCY: ICD-10-CM

## 2020-08-14 DIAGNOSIS — I10 ESSENTIAL HYPERTENSION: Primary | ICD-10-CM

## 2020-08-14 DIAGNOSIS — R73.09 ELEVATED GLUCOSE: Primary | ICD-10-CM

## 2020-08-14 LAB — HBA1C MFR BLD: 5.6 % (ref 4.8–5.6)

## 2020-08-14 NOTE — TELEPHONE ENCOUNTER
----- Message from KEVIN Mullen sent at 8/14/2020 12:55 PM EDT -----  His labs actually look pretty good with the exception of his creatinine level being elevated and his GFR decreased.  I suggest he drink plenty of water and recheck CMP in 3 weeks.   Please let him know      Left a message to return call.      Patient notified & verbalized understanding.

## 2020-08-14 NOTE — PROGRESS NOTES
His labs actually look pretty good with the exception of his creatinine level being elevated and his GFR decreased.  I suggest he drink plenty of water and recheck CMP in 3 weeks.   Please let him know

## 2020-08-25 ENCOUNTER — TELEPHONE (OUTPATIENT)
Dept: FAMILY MEDICINE CLINIC | Facility: CLINIC | Age: 60
End: 2020-08-25

## 2020-08-25 NOTE — TELEPHONE ENCOUNTER
Spoke with patient he reports he has been unable to work because his legs are so weak & he is still having a lot of dizziness,reports he has an apt. Scheduled with Dr. Azar but he thought he was supposed to have an U/S on his legs ordered prior ?

## 2020-08-26 NOTE — TELEPHONE ENCOUNTER
He should call their office. Jose Alejandro called their office and reminded them that Dr Azar had put in his  note to have an US done.  He will need to check with Dr Azar's office.  As far as work goes, he will need to discuss this with Dr Azar also.    Patient notified & verbalized understanding.

## 2020-08-26 NOTE — TELEPHONE ENCOUNTER
He should call their office. Jose Alejandro called their office and reminded them that Dr Azar had put in his  note to have an US done.  He will need to check with Dr Azar's office.  As far as work goes, he will need to discuss this with Dr Azar also.

## 2020-08-31 ENCOUNTER — APPOINTMENT (OUTPATIENT)
Dept: GENERAL RADIOLOGY | Facility: HOSPITAL | Age: 60
End: 2020-08-31

## 2020-08-31 ENCOUNTER — HOSPITAL ENCOUNTER (INPATIENT)
Facility: HOSPITAL | Age: 60
LOS: 3 days | Discharge: HOME | End: 2020-09-03
Attending: EMERGENCY MEDICINE | Admitting: HOSPITALIST

## 2020-08-31 ENCOUNTER — APPOINTMENT (OUTPATIENT)
Dept: CT IMAGING | Facility: HOSPITAL | Age: 60
End: 2020-08-31

## 2020-08-31 DIAGNOSIS — I10 ESSENTIAL HYPERTENSION: ICD-10-CM

## 2020-08-31 DIAGNOSIS — F10.10 ALCOHOL ABUSE: ICD-10-CM

## 2020-08-31 DIAGNOSIS — E87.1 HYPONATREMIA: Primary | ICD-10-CM

## 2020-08-31 DIAGNOSIS — R42 DIZZINESS: ICD-10-CM

## 2020-08-31 PROBLEM — I65.29 CAROTID ARTERY STENOSIS: Status: ACTIVE | Noted: 2020-03-11

## 2020-08-31 PROBLEM — I65.21 STENOSIS OF RIGHT CAROTID ARTERY: Status: RESOLVED | Noted: 2020-03-11 | Resolved: 2020-08-31

## 2020-08-31 PROBLEM — E66.9 OBESITY (BMI 30-39.9): Chronic | Status: ACTIVE | Noted: 2020-08-31

## 2020-08-31 PROBLEM — I73.9 PVD (PERIPHERAL VASCULAR DISEASE) WITH CLAUDICATION: Chronic | Status: ACTIVE | Noted: 2019-08-07

## 2020-08-31 PROBLEM — L97.409: Status: RESOLVED | Noted: 2019-09-10 | Resolved: 2020-08-31

## 2020-08-31 PROBLEM — R73.03 PREDIABETES: Status: RESOLVED | Noted: 2019-08-07 | Resolved: 2020-08-31

## 2020-08-31 PROBLEM — I65.22 OCCLUSION OF LEFT CAROTID ARTERY: Status: RESOLVED | Noted: 2020-03-11 | Resolved: 2020-08-31

## 2020-08-31 PROBLEM — M79.672 BILATERAL FOOT PAIN: Status: RESOLVED | Noted: 2019-10-22 | Resolved: 2020-08-31

## 2020-08-31 PROBLEM — I65.29 CAROTID ARTERY STENOSIS: Chronic | Status: ACTIVE | Noted: 2020-03-11

## 2020-08-31 PROBLEM — M79.671 BILATERAL FOOT PAIN: Status: RESOLVED | Noted: 2019-10-22 | Resolved: 2020-08-31

## 2020-08-31 PROBLEM — I70.229 CRITICAL LOWER LIMB ISCHEMIA: Status: RESOLVED | Noted: 2019-09-10 | Resolved: 2020-08-31

## 2020-08-31 PROBLEM — M99.03 SEGMENTAL AND SOMATIC DYSFUNCTION OF LUMBAR REGION: Status: RESOLVED | Noted: 2018-09-26 | Resolved: 2020-08-31

## 2020-08-31 PROBLEM — M99.04 SEGMENTAL AND SOMATIC DYSFUNCTION OF SACRAL REGION: Status: RESOLVED | Noted: 2018-09-26 | Resolved: 2020-08-31

## 2020-08-31 PROBLEM — M99.05 SEGMENTAL AND SOMATIC DYSFUNCTION OF PELVIC REGION: Status: RESOLVED | Noted: 2018-09-26 | Resolved: 2020-08-31

## 2020-08-31 LAB
6-ACETYL MORPHINE: NEGATIVE
ALBUMIN SERPL-MCNC: 3.67 G/DL (ref 3.5–5.2)
ALBUMIN/GLOB SERPL: 1.3 G/DL
ALP SERPL-CCNC: 74 U/L (ref 39–117)
ALT SERPL W P-5'-P-CCNC: 18 U/L (ref 1–41)
AMPHET+METHAMPHET UR QL: NEGATIVE
ANION GAP SERPL CALCULATED.3IONS-SCNC: 10.7 MMOL/L (ref 5–15)
ANION GAP SERPL CALCULATED.3IONS-SCNC: 8.7 MMOL/L (ref 5–15)
AST SERPL-CCNC: 21 U/L (ref 1–40)
BARBITURATES UR QL SCN: NEGATIVE
BASOPHILS # BLD AUTO: 0.06 10*3/MM3 (ref 0–0.2)
BASOPHILS NFR BLD AUTO: 0.7 % (ref 0–1.5)
BENZODIAZ UR QL SCN: NEGATIVE
BILIRUB SERPL-MCNC: 0.2 MG/DL (ref 0–1.2)
BILIRUB UR QL STRIP: NEGATIVE
BUN SERPL-MCNC: 10 MG/DL (ref 6–20)
BUN SERPL-MCNC: 10 MG/DL (ref 6–20)
BUN/CREAT SERPL: 8.3 (ref 7–25)
BUN/CREAT SERPL: 8.5 (ref 7–25)
BUPRENORPHINE SERPL-MCNC: NEGATIVE NG/ML
CALCIUM SPEC-SCNC: 8.1 MG/DL (ref 8.6–10.5)
CALCIUM SPEC-SCNC: 8.4 MG/DL (ref 8.6–10.5)
CANNABINOIDS SERPL QL: NEGATIVE
CHLORIDE SERPL-SCNC: 90 MMOL/L (ref 98–107)
CHLORIDE SERPL-SCNC: 92 MMOL/L (ref 98–107)
CLARITY UR: CLEAR
CO2 SERPL-SCNC: 20.3 MMOL/L (ref 22–29)
CO2 SERPL-SCNC: 20.3 MMOL/L (ref 22–29)
COCAINE UR QL: NEGATIVE
COLOR UR: YELLOW
CREAT SERPL-MCNC: 1.17 MG/DL (ref 0.76–1.27)
CREAT SERPL-MCNC: 1.2 MG/DL (ref 0.76–1.27)
DEPRECATED RDW RBC AUTO: 53 FL (ref 37–54)
EOSINOPHIL # BLD AUTO: 0.05 10*3/MM3 (ref 0–0.4)
EOSINOPHIL NFR BLD AUTO: 0.6 % (ref 0.3–6.2)
ERYTHROCYTE [DISTWIDTH] IN BLOOD BY AUTOMATED COUNT: 16.4 % (ref 12.3–15.4)
ETHANOL BLD-MCNC: 54 MG/DL (ref 0–10)
ETHANOL UR QL: 0.05 %
GFR SERPL CREATININE-BSD FRML MDRD: 62 ML/MIN/1.73
GFR SERPL CREATININE-BSD FRML MDRD: 64 ML/MIN/1.73
GLOBULIN UR ELPH-MCNC: 2.9 GM/DL
GLUCOSE BLDC GLUCOMTR-MCNC: 141 MG/DL (ref 70–130)
GLUCOSE SERPL-MCNC: 120 MG/DL (ref 65–99)
GLUCOSE SERPL-MCNC: 128 MG/DL (ref 65–99)
GLUCOSE UR STRIP-MCNC: NEGATIVE MG/DL
HCT VFR BLD AUTO: 35.2 % (ref 37.5–51)
HGB BLD-MCNC: 11.3 G/DL (ref 13–17.7)
HGB UR QL STRIP.AUTO: NEGATIVE
IMM GRANULOCYTES # BLD AUTO: 0.04 10*3/MM3 (ref 0–0.05)
IMM GRANULOCYTES NFR BLD AUTO: 0.4 % (ref 0–0.5)
KETONES UR QL STRIP: NEGATIVE
LEUKOCYTE ESTERASE UR QL STRIP.AUTO: NEGATIVE
LYMPHOCYTES # BLD AUTO: 0.69 10*3/MM3 (ref 0.7–3.1)
LYMPHOCYTES NFR BLD AUTO: 7.8 % (ref 19.6–45.3)
MCH RBC QN AUTO: 28 PG (ref 26.6–33)
MCHC RBC AUTO-ENTMCNC: 32.1 G/DL (ref 31.5–35.7)
MCV RBC AUTO: 87.1 FL (ref 79–97)
METHADONE UR QL SCN: NEGATIVE
MONOCYTES # BLD AUTO: 0.58 10*3/MM3 (ref 0.1–0.9)
MONOCYTES NFR BLD AUTO: 6.5 % (ref 5–12)
NEUTROPHILS NFR BLD AUTO: 7.47 10*3/MM3 (ref 1.7–7)
NEUTROPHILS NFR BLD AUTO: 84 % (ref 42.7–76)
NITRITE UR QL STRIP: NEGATIVE
NRBC BLD AUTO-RTO: 0 /100 WBC (ref 0–0.2)
OPIATES UR QL: NEGATIVE
OXYCODONE UR QL SCN: NEGATIVE
PCP UR QL SCN: NEGATIVE
PH UR STRIP.AUTO: 5.5 [PH] (ref 5–8)
PLATELET # BLD AUTO: 215 10*3/MM3 (ref 140–450)
PMV BLD AUTO: 9.8 FL (ref 6–12)
POTASSIUM SERPL-SCNC: 5 MMOL/L (ref 3.5–5.2)
POTASSIUM SERPL-SCNC: 5.1 MMOL/L (ref 3.5–5.2)
PROT SERPL-MCNC: 6.6 G/DL (ref 6–8.5)
PROT UR QL STRIP: NEGATIVE
RBC # BLD AUTO: 4.04 10*6/MM3 (ref 4.14–5.8)
SODIUM SERPL-SCNC: 121 MMOL/L (ref 136–145)
SODIUM SERPL-SCNC: 121 MMOL/L (ref 136–145)
SP GR UR STRIP: 1.01 (ref 1–1.03)
TROPONIN T SERPL-MCNC: <0.01 NG/ML (ref 0–0.03)
UROBILINOGEN UR QL STRIP: NORMAL
WBC # BLD AUTO: 8.89 10*3/MM3 (ref 3.4–10.8)

## 2020-08-31 PROCEDURE — 25010000002 HEPARIN (PORCINE) PER 1000 UNITS: Performed by: HOSPITALIST

## 2020-08-31 PROCEDURE — 99223 1ST HOSP IP/OBS HIGH 75: CPT | Performed by: PHYSICIAN ASSISTANT

## 2020-08-31 PROCEDURE — 80307 DRUG TEST PRSMV CHEM ANLYZR: CPT | Performed by: EMERGENCY MEDICINE

## 2020-08-31 PROCEDURE — 71045 X-RAY EXAM CHEST 1 VIEW: CPT | Performed by: RADIOLOGY

## 2020-08-31 PROCEDURE — 82962 GLUCOSE BLOOD TEST: CPT

## 2020-08-31 PROCEDURE — 84295 ASSAY OF SERUM SODIUM: CPT | Performed by: HOSPITALIST

## 2020-08-31 PROCEDURE — 25010000002 MAGNESIUM SULFATE PER 500 MG OF MAGNESIUM: Performed by: EMERGENCY MEDICINE

## 2020-08-31 PROCEDURE — 71045 X-RAY EXAM CHEST 1 VIEW: CPT

## 2020-08-31 PROCEDURE — 70450 CT HEAD/BRAIN W/O DYE: CPT

## 2020-08-31 PROCEDURE — 85025 COMPLETE CBC W/AUTO DIFF WBC: CPT | Performed by: EMERGENCY MEDICINE

## 2020-08-31 PROCEDURE — 80053 COMPREHEN METABOLIC PANEL: CPT | Performed by: EMERGENCY MEDICINE

## 2020-08-31 PROCEDURE — 84484 ASSAY OF TROPONIN QUANT: CPT | Performed by: EMERGENCY MEDICINE

## 2020-08-31 PROCEDURE — 36415 COLL VENOUS BLD VENIPUNCTURE: CPT

## 2020-08-31 PROCEDURE — 80048 BASIC METABOLIC PNL TOTAL CA: CPT | Performed by: FAMILY MEDICINE

## 2020-08-31 PROCEDURE — 99285 EMERGENCY DEPT VISIT HI MDM: CPT

## 2020-08-31 PROCEDURE — 25010000002 THIAMINE PER 100 MG: Performed by: EMERGENCY MEDICINE

## 2020-08-31 PROCEDURE — 93010 ELECTROCARDIOGRAM REPORT: CPT | Performed by: INTERNAL MEDICINE

## 2020-08-31 PROCEDURE — 93005 ELECTROCARDIOGRAM TRACING: CPT | Performed by: EMERGENCY MEDICINE

## 2020-08-31 PROCEDURE — 81003 URINALYSIS AUTO W/O SCOPE: CPT | Performed by: EMERGENCY MEDICINE

## 2020-08-31 PROCEDURE — 84484 ASSAY OF TROPONIN QUANT: CPT | Performed by: HOSPITALIST

## 2020-08-31 RX ORDER — SODIUM CHLORIDE 0.9 % (FLUSH) 0.9 %
10 SYRINGE (ML) INJECTION EVERY 12 HOURS SCHEDULED
Status: DISCONTINUED | OUTPATIENT
Start: 2020-08-31 | End: 2020-09-03 | Stop reason: HOSPADM

## 2020-08-31 RX ORDER — FOLIC ACID 1 MG/1
1 TABLET ORAL DAILY
Status: DISCONTINUED | OUTPATIENT
Start: 2020-09-01 | End: 2020-09-03 | Stop reason: HOSPADM

## 2020-08-31 RX ORDER — SODIUM CHLORIDE 0.9 % (FLUSH) 0.9 %
10 SYRINGE (ML) INJECTION AS NEEDED
Status: DISCONTINUED | OUTPATIENT
Start: 2020-08-31 | End: 2020-09-03 | Stop reason: HOSPADM

## 2020-08-31 RX ORDER — THIAMINE MONONITRATE (VIT B1) 100 MG
100 TABLET ORAL DAILY
Status: DISCONTINUED | OUTPATIENT
Start: 2020-09-01 | End: 2020-09-03 | Stop reason: HOSPADM

## 2020-08-31 RX ORDER — MULTIVITAMIN
1 TABLET ORAL DAILY
Status: DISCONTINUED | OUTPATIENT
Start: 2020-09-01 | End: 2020-09-03 | Stop reason: HOSPADM

## 2020-08-31 RX ORDER — NICOTINE 21 MG/24HR
1 PATCH, TRANSDERMAL 24 HOURS TRANSDERMAL ONCE
Status: COMPLETED | OUTPATIENT
Start: 2020-08-31 | End: 2020-09-01

## 2020-08-31 RX ORDER — NICOTINE 21 MG/24HR
1 PATCH, TRANSDERMAL 24 HOURS TRANSDERMAL
Status: DISCONTINUED | OUTPATIENT
Start: 2020-09-01 | End: 2020-08-31

## 2020-08-31 RX ORDER — PANTOPRAZOLE SODIUM 40 MG/1
40 TABLET, DELAYED RELEASE ORAL
Status: DISCONTINUED | OUTPATIENT
Start: 2020-09-01 | End: 2020-09-03 | Stop reason: HOSPADM

## 2020-08-31 RX ORDER — HEPARIN SODIUM 5000 [USP'U]/ML
5000 INJECTION, SOLUTION INTRAVENOUS; SUBCUTANEOUS EVERY 12 HOURS SCHEDULED
Status: DISCONTINUED | OUTPATIENT
Start: 2020-08-31 | End: 2020-09-01

## 2020-08-31 RX ORDER — NICOTINE 21 MG/24HR
1 PATCH, TRANSDERMAL 24 HOURS TRANSDERMAL
Status: DISCONTINUED | OUTPATIENT
Start: 2020-09-01 | End: 2020-09-03 | Stop reason: HOSPADM

## 2020-08-31 RX ADMIN — SODIUM CHLORIDE 1000 ML: 9 INJECTION, SOLUTION INTRAVENOUS at 19:02

## 2020-08-31 RX ADMIN — SODIUM CHLORIDE, PRESERVATIVE FREE 10 ML: 5 INJECTION INTRAVENOUS at 22:31

## 2020-08-31 RX ADMIN — HEPARIN SODIUM 5000 UNITS: 5000 INJECTION INTRAVENOUS; SUBCUTANEOUS at 22:30

## 2020-08-31 RX ADMIN — NICOTINE 1 PATCH: 21 PATCH TRANSDERMAL at 21:47

## 2020-08-31 RX ADMIN — THIAMINE HYDROCHLORIDE 1000 ML/HR: 100 INJECTION, SOLUTION INTRAMUSCULAR; INTRAVENOUS at 19:30

## 2020-09-01 ENCOUNTER — APPOINTMENT (OUTPATIENT)
Dept: CARDIOLOGY | Facility: HOSPITAL | Age: 60
End: 2020-09-01

## 2020-09-01 ENCOUNTER — APPOINTMENT (OUTPATIENT)
Dept: ULTRASOUND IMAGING | Facility: HOSPITAL | Age: 60
End: 2020-09-01

## 2020-09-01 LAB
ALBUMIN SERPL-MCNC: 3.55 G/DL (ref 3.5–5.2)
ALBUMIN/GLOB SERPL: 1.3 G/DL
ALP SERPL-CCNC: 73 U/L (ref 39–117)
ALT SERPL W P-5'-P-CCNC: 18 U/L (ref 1–41)
ANION GAP SERPL CALCULATED.3IONS-SCNC: 10.3 MMOL/L (ref 5–15)
AST SERPL-CCNC: 21 U/L (ref 1–40)
BASOPHILS # BLD AUTO: 0.07 10*3/MM3 (ref 0–0.2)
BASOPHILS NFR BLD AUTO: 0.9 % (ref 0–1.5)
BH CV ECHO MEAS - % IVS THICK: 8.4 %
BH CV ECHO MEAS - % LVPW THICK: 37.6 %
BH CV ECHO MEAS - ACS: 2 CM
BH CV ECHO MEAS - AO MAX PG: 6.9 MMHG
BH CV ECHO MEAS - AO MEAN PG: 3 MMHG
BH CV ECHO MEAS - AO ROOT AREA (BSA CORRECTED): 1.4
BH CV ECHO MEAS - AO ROOT AREA: 7.1 CM^2
BH CV ECHO MEAS - AO ROOT DIAM: 3 CM
BH CV ECHO MEAS - AO V2 MAX: 131 CM/SEC
BH CV ECHO MEAS - AO V2 MEAN: 77.6 CM/SEC
BH CV ECHO MEAS - AO V2 VTI: 25.9 CM
BH CV ECHO MEAS - BSA(HAYCOCK): 2.3 M^2
BH CV ECHO MEAS - BSA: 2.2 M^2
BH CV ECHO MEAS - BZI_BMI: 32.4 KILOGRAMS/M^2
BH CV ECHO MEAS - BZI_METRIC_HEIGHT: 177.8 CM
BH CV ECHO MEAS - BZI_METRIC_WEIGHT: 102.5 KG
BH CV ECHO MEAS - EDV(CUBED): 114.8 ML
BH CV ECHO MEAS - EDV(MOD-SP4): 55.5 ML
BH CV ECHO MEAS - EDV(TEICH): 110.7 ML
BH CV ECHO MEAS - EF(CUBED): 62.5 %
BH CV ECHO MEAS - EF(MOD-SP4): 57.1 %
BH CV ECHO MEAS - EF(TEICH): 53.9 %
BH CV ECHO MEAS - ESV(CUBED): 43.1 ML
BH CV ECHO MEAS - ESV(MOD-SP4): 23.8 ML
BH CV ECHO MEAS - ESV(TEICH): 51 ML
BH CV ECHO MEAS - FS: 27.9 %
BH CV ECHO MEAS - IVS/LVPW: 0.91
BH CV ECHO MEAS - IVSD: 1.1 CM
BH CV ECHO MEAS - IVSS: 1.2 CM
BH CV ECHO MEAS - LA DIMENSION: 2.9 CM
BH CV ECHO MEAS - LA/AO: 0.97
BH CV ECHO MEAS - LV DIASTOLIC VOL/BSA (35-75): 25.2 ML/M^2
BH CV ECHO MEAS - LV MASS(C)D: 201.7 GRAMS
BH CV ECHO MEAS - LV MASS(C)DI: 91.8 GRAMS/M^2
BH CV ECHO MEAS - LV MASS(C)S: 169.4 GRAMS
BH CV ECHO MEAS - LV MASS(C)SI: 77 GRAMS/M^2
BH CV ECHO MEAS - LV SYSTOLIC VOL/BSA (12-30): 10.8 ML/M^2
BH CV ECHO MEAS - LVIDD: 4.9 CM
BH CV ECHO MEAS - LVIDS: 3.5 CM
BH CV ECHO MEAS - LVLD AP4: 6.7 CM
BH CV ECHO MEAS - LVLS AP4: 6.4 CM
BH CV ECHO MEAS - LVOT AREA (M): 3.1 CM^2
BH CV ECHO MEAS - LVOT AREA: 3.1 CM^2
BH CV ECHO MEAS - LVOT DIAM: 2 CM
BH CV ECHO MEAS - LVPWD: 1.2 CM
BH CV ECHO MEAS - LVPWS: 1.6 CM
BH CV ECHO MEAS - MV A MAX VEL: 52.3 CM/SEC
BH CV ECHO MEAS - MV E MAX VEL: 68.6 CM/SEC
BH CV ECHO MEAS - MV E/A: 1.3
BH CV ECHO MEAS - PA ACC TIME: 0.1 SEC
BH CV ECHO MEAS - PA PR(ACCEL): 34.5 MMHG
BH CV ECHO MEAS - RAP SYSTOLE: 10 MMHG
BH CV ECHO MEAS - RVSP: 35.8 MMHG
BH CV ECHO MEAS - SI(AO): 83.3 ML/M^2
BH CV ECHO MEAS - SI(CUBED): 32.6 ML/M^2
BH CV ECHO MEAS - SI(MOD-SP4): 14.4 ML/M^2
BH CV ECHO MEAS - SI(TEICH): 27.1 ML/M^2
BH CV ECHO MEAS - SV(AO): 183.1 ML
BH CV ECHO MEAS - SV(CUBED): 71.7 ML
BH CV ECHO MEAS - SV(MOD-SP4): 31.7 ML
BH CV ECHO MEAS - SV(TEICH): 59.6 ML
BH CV ECHO MEAS - TR MAX VEL: 251.3 CM/SEC
BILIRUB SERPL-MCNC: 0.3 MG/DL (ref 0–1.2)
BUN SERPL-MCNC: 8 MG/DL (ref 6–20)
BUN/CREAT SERPL: 7.5 (ref 7–25)
CALCIUM SPEC-SCNC: 8.1 MG/DL (ref 8.6–10.5)
CHLORIDE SERPL-SCNC: 95 MMOL/L (ref 98–107)
CO2 SERPL-SCNC: 21.7 MMOL/L (ref 22–29)
CREAT SERPL-MCNC: 1.07 MG/DL (ref 0.76–1.27)
DEPRECATED RDW RBC AUTO: 54 FL (ref 37–54)
EOSINOPHIL # BLD AUTO: 1.23 10*3/MM3 (ref 0–0.4)
EOSINOPHIL NFR BLD AUTO: 15.6 % (ref 0.3–6.2)
ERYTHROCYTE [DISTWIDTH] IN BLOOD BY AUTOMATED COUNT: 16.5 % (ref 12.3–15.4)
FERRITIN SERPL-MCNC: 22.92 NG/ML (ref 30–400)
FOLATE SERPL-MCNC: >20 NG/ML (ref 4.78–24.2)
GFR SERPL CREATININE-BSD FRML MDRD: 71 ML/MIN/1.73
GLOBULIN UR ELPH-MCNC: 2.8 GM/DL
GLUCOSE SERPL-MCNC: 109 MG/DL (ref 65–99)
HCT VFR BLD AUTO: 34.6 % (ref 37.5–51)
HGB BLD-MCNC: 10.8 G/DL (ref 13–17.7)
IMM GRANULOCYTES # BLD AUTO: 0.04 10*3/MM3 (ref 0–0.05)
IMM GRANULOCYTES NFR BLD AUTO: 0.5 % (ref 0–0.5)
IRON 24H UR-MRATE: 47 MCG/DL (ref 59–158)
IRON SATN MFR SERPL: 11 % (ref 20–50)
LYMPHOCYTES # BLD AUTO: 1.69 10*3/MM3 (ref 0.7–3.1)
LYMPHOCYTES NFR BLD AUTO: 21.4 % (ref 19.6–45.3)
MAGNESIUM SERPL-MCNC: 2.3 MG/DL (ref 1.6–2.6)
MAXIMAL PREDICTED HEART RATE: 161 BPM
MCH RBC QN AUTO: 27.7 PG (ref 26.6–33)
MCHC RBC AUTO-ENTMCNC: 31.2 G/DL (ref 31.5–35.7)
MCV RBC AUTO: 88.7 FL (ref 79–97)
MONOCYTES # BLD AUTO: 0.73 10*3/MM3 (ref 0.1–0.9)
MONOCYTES NFR BLD AUTO: 9.2 % (ref 5–12)
NEUTROPHILS NFR BLD AUTO: 4.14 10*3/MM3 (ref 1.7–7)
NEUTROPHILS NFR BLD AUTO: 52.4 % (ref 42.7–76)
NRBC BLD AUTO-RTO: 0 /100 WBC (ref 0–0.2)
PHOSPHATE SERPL-MCNC: 2.7 MG/DL (ref 2.5–4.5)
PLATELET # BLD AUTO: 225 10*3/MM3 (ref 140–450)
PMV BLD AUTO: 10 FL (ref 6–12)
POTASSIUM SERPL-SCNC: 4.7 MMOL/L (ref 3.5–5.2)
PROT SERPL-MCNC: 6.3 G/DL (ref 6–8.5)
RBC # BLD AUTO: 3.9 10*6/MM3 (ref 4.14–5.8)
SODIUM SERPL-SCNC: 123 MMOL/L (ref 136–145)
SODIUM SERPL-SCNC: 127 MMOL/L (ref 136–145)
SODIUM SERPL-SCNC: 129 MMOL/L (ref 136–145)
SODIUM SERPL-SCNC: 129 MMOL/L (ref 136–145)
SODIUM SERPL-SCNC: 130 MMOL/L (ref 136–145)
SODIUM SERPL-SCNC: 131 MMOL/L (ref 136–145)
STRESS TARGET HR: 137 BPM
TIBC SERPL-MCNC: 431 MCG/DL (ref 298–536)
TRANSFERRIN SERPL-MCNC: 289 MG/DL (ref 200–360)
TROPONIN T SERPL-MCNC: <0.01 NG/ML (ref 0–0.03)
TROPONIN T SERPL-MCNC: <0.01 NG/ML (ref 0–0.03)
VIT B12 BLD-MCNC: 1221 PG/ML (ref 211–946)
WBC # BLD AUTO: 7.9 10*3/MM3 (ref 3.4–10.8)

## 2020-09-01 PROCEDURE — 84100 ASSAY OF PHOSPHORUS: CPT | Performed by: PHYSICIAN ASSISTANT

## 2020-09-01 PROCEDURE — 84295 ASSAY OF SERUM SODIUM: CPT | Performed by: HOSPITALIST

## 2020-09-01 PROCEDURE — 99232 SBSQ HOSP IP/OBS MODERATE 35: CPT | Performed by: HOSPITALIST

## 2020-09-01 PROCEDURE — 85025 COMPLETE CBC W/AUTO DIFF WBC: CPT | Performed by: HOSPITALIST

## 2020-09-01 PROCEDURE — 82607 VITAMIN B-12: CPT | Performed by: PHYSICIAN ASSISTANT

## 2020-09-01 PROCEDURE — 80053 COMPREHEN METABOLIC PANEL: CPT | Performed by: HOSPITALIST

## 2020-09-01 PROCEDURE — 25010000002 HEPARIN (PORCINE) PER 1000 UNITS: Performed by: HOSPITALIST

## 2020-09-01 PROCEDURE — 82728 ASSAY OF FERRITIN: CPT | Performed by: PHYSICIAN ASSISTANT

## 2020-09-01 PROCEDURE — 93306 TTE W/DOPPLER COMPLETE: CPT

## 2020-09-01 PROCEDURE — 84484 ASSAY OF TROPONIN QUANT: CPT | Performed by: HOSPITALIST

## 2020-09-01 PROCEDURE — 83540 ASSAY OF IRON: CPT | Performed by: PHYSICIAN ASSISTANT

## 2020-09-01 PROCEDURE — 84466 ASSAY OF TRANSFERRIN: CPT | Performed by: PHYSICIAN ASSISTANT

## 2020-09-01 PROCEDURE — 83735 ASSAY OF MAGNESIUM: CPT | Performed by: PHYSICIAN ASSISTANT

## 2020-09-01 PROCEDURE — 93880 EXTRACRANIAL BILAT STUDY: CPT

## 2020-09-01 PROCEDURE — 93923 UPR/LXTR ART STDY 3+ LVLS: CPT

## 2020-09-01 PROCEDURE — 93880 EXTRACRANIAL BILAT STUDY: CPT | Performed by: RADIOLOGY

## 2020-09-01 PROCEDURE — 94799 UNLISTED PULMONARY SVC/PX: CPT

## 2020-09-01 PROCEDURE — 93923 UPR/LXTR ART STDY 3+ LVLS: CPT | Performed by: RADIOLOGY

## 2020-09-01 PROCEDURE — 82746 ASSAY OF FOLIC ACID SERUM: CPT | Performed by: PHYSICIAN ASSISTANT

## 2020-09-01 PROCEDURE — 93306 TTE W/DOPPLER COMPLETE: CPT | Performed by: SPECIALIST

## 2020-09-01 RX ORDER — METHOCARBAMOL 750 MG/1
750 TABLET, FILM COATED ORAL EVERY 6 HOURS PRN
Status: DISCONTINUED | OUTPATIENT
Start: 2020-09-01 | End: 2020-09-03 | Stop reason: HOSPADM

## 2020-09-01 RX ORDER — IRON POLYSACCHARIDE COMPLEX 150 MG
150 CAPSULE ORAL DAILY
Status: DISCONTINUED | OUTPATIENT
Start: 2020-09-02 | End: 2020-09-03 | Stop reason: HOSPADM

## 2020-09-01 RX ORDER — METHOCARBAMOL 750 MG/1
750 TABLET, FILM COATED ORAL 4 TIMES DAILY PRN
Status: CANCELLED | OUTPATIENT
Start: 2020-09-01

## 2020-09-01 RX ORDER — GABAPENTIN 400 MG/1
1200 CAPSULE ORAL 3 TIMES DAILY
Status: DISCONTINUED | OUTPATIENT
Start: 2020-09-01 | End: 2020-09-03 | Stop reason: HOSPADM

## 2020-09-01 RX ORDER — DEXTROSE MONOHYDRATE 50 MG/ML
100 INJECTION, SOLUTION INTRAVENOUS CONTINUOUS
Status: ACTIVE | OUTPATIENT
Start: 2020-09-01 | End: 2020-09-01

## 2020-09-01 RX ORDER — NAPROXEN 250 MG/1
250 TABLET ORAL 2 TIMES DAILY WITH MEALS
Status: DISCONTINUED | OUTPATIENT
Start: 2020-09-01 | End: 2020-09-01

## 2020-09-01 RX ORDER — ACETAMINOPHEN 325 MG/1
650 TABLET ORAL EVERY 6 HOURS PRN
Status: DISCONTINUED | OUTPATIENT
Start: 2020-09-01 | End: 2020-09-03 | Stop reason: HOSPADM

## 2020-09-01 RX ORDER — LISINOPRIL 10 MG/1
40 TABLET ORAL DAILY
Status: DISCONTINUED | OUTPATIENT
Start: 2020-09-01 | End: 2020-09-03 | Stop reason: HOSPADM

## 2020-09-01 RX ORDER — ATORVASTATIN CALCIUM 40 MG/1
40 TABLET, FILM COATED ORAL NIGHTLY
Status: DISCONTINUED | OUTPATIENT
Start: 2020-09-01 | End: 2020-09-03 | Stop reason: HOSPADM

## 2020-09-01 RX ORDER — ATENOLOL 50 MG/1
50 TABLET ORAL DAILY
Status: DISCONTINUED | OUTPATIENT
Start: 2020-09-01 | End: 2020-09-03 | Stop reason: HOSPADM

## 2020-09-01 RX ORDER — ASPIRIN 81 MG/1
81 TABLET ORAL DAILY
Status: ON HOLD | COMMUNITY
End: 2020-09-01

## 2020-09-01 RX ORDER — HEPARIN SODIUM 5000 [USP'U]/ML
5000 INJECTION, SOLUTION INTRAVENOUS; SUBCUTANEOUS EVERY 8 HOURS SCHEDULED
Status: DISCONTINUED | OUTPATIENT
Start: 2020-09-01 | End: 2020-09-03 | Stop reason: HOSPADM

## 2020-09-01 RX ORDER — ASPIRIN 81 MG/1
81 TABLET ORAL DAILY
Status: DISCONTINUED | OUTPATIENT
Start: 2020-09-02 | End: 2020-09-03 | Stop reason: HOSPADM

## 2020-09-01 RX ADMIN — SODIUM CHLORIDE, PRESERVATIVE FREE 10 ML: 5 INJECTION INTRAVENOUS at 09:20

## 2020-09-01 RX ADMIN — GABAPENTIN 1200 MG: 400 CAPSULE ORAL at 04:31

## 2020-09-01 RX ADMIN — HEPARIN SODIUM 5000 UNITS: 5000 INJECTION INTRAVENOUS; SUBCUTANEOUS at 19:52

## 2020-09-01 RX ADMIN — GABAPENTIN 1200 MG: 400 CAPSULE ORAL at 12:57

## 2020-09-01 RX ADMIN — LISINOPRIL 40 MG: 10 TABLET ORAL at 09:19

## 2020-09-01 RX ADMIN — Medication 100 MG: at 09:19

## 2020-09-01 RX ADMIN — FOLIC ACID 1 MG: 1 TABLET ORAL at 09:19

## 2020-09-01 RX ADMIN — PANTOPRAZOLE SODIUM 40 MG: 40 TABLET, DELAYED RELEASE ORAL at 04:27

## 2020-09-01 RX ADMIN — NICOTINE 1 PATCH: 14 PATCH, EXTENDED RELEASE TRANSDERMAL at 19:53

## 2020-09-01 RX ADMIN — DEXTROSE MONOHYDRATE 100 ML/HR: 50 INJECTION, SOLUTION INTRAVENOUS at 10:52

## 2020-09-01 RX ADMIN — ATENOLOL 50 MG: 50 TABLET ORAL at 12:57

## 2020-09-01 RX ADMIN — METHOCARBAMOL 750 MG: 750 TABLET, FILM COATED ORAL at 19:53

## 2020-09-01 RX ADMIN — HEPARIN SODIUM 5000 UNITS: 5000 INJECTION INTRAVENOUS; SUBCUTANEOUS at 12:57

## 2020-09-01 RX ADMIN — Medication 1 TABLET: at 09:19

## 2020-09-01 RX ADMIN — GABAPENTIN 1200 MG: 400 CAPSULE ORAL at 17:37

## 2020-09-01 NOTE — ED NOTES
Called Dr Barrios and left a message requesting him to return our call for Dr Fields.      Reyna Munguia  08/31/20 2005

## 2020-09-01 NOTE — PROGRESS NOTES
[facility] HOSPITALIST PROGRESS NOTE     Patient Identification:  Name:  Gurwinder Harding  Age:  60 y.o.  Sex:  male  :  1960  MRN:  [id number]  Visit Number:  [id number]  ROOM: [address] [place] [address]     Primary Care Provider:  Fátima Mai APRN    Length of stay:  1    Subjective        Chief Compliant Follow up for the Hyponatremia and syncope    Patient seen and examined this morning with no family at bedside.  Currently resting.  States that he is feeling slightly better.  Denies any dizziness nausea vomiting abdominal pain chest pain shortness of breath cough.  Afebrile no events overnight.  On room air      Objective     Current Hospital Meds:    [START ON 2020] aspirin 81 mg Oral Daily   atenolol 50 mg Oral Daily   atorvastatin 40 mg Oral Nightly   folic acid 1 mg Oral Daily   gabapentin 1,200 mg Oral TID   heparin (porcine) 5,000 Units Subcutaneous Q8H   [START ON 2020] iron polysaccharides 150 mg Oral Daily   lisinopril 40 mg Oral Daily   multivitamin 1 tablet Oral Daily   nicotine 1 patch Transdermal Q24H   nicotine 1 patch Transdermal Once   pantoprazole 40 mg Oral Q AM   sodium chloride 10 mL Intravenous Q12H   thiamine 100 mg Oral Daily      ----------------------------------------------------------------------------------------------------------------------  Vital Signs:  Temp:  [96.7 °F (35.9 °C)-98.4 °F (36.9 °C)] 96.7 °F (35.9 °C)  Heart Rate:  [74-98] 81  Resp:  [16-20] 18  BP: (109-187)/(59-95) 112/73  SpO2:  [98 %-100 %] 98 %  on   ;   Device (Oxygen Therapy): room air  Body mass index is 32.48 kg/m².    Wt Readings from Last 3 Encounters:   20 103 kg (226 lb 6.4 oz)   20 100 kg (221 lb)   20 97.5 kg (215 lb)       Intake/Output Summary (Last 24 hours) at 2020  Last data filed at 2020 1801  Gross per 24 hour   Intake 2099 ml   Output 4000 ml   Net -1901 ml     Diet Regular; Cardiac, Daily Fluid Restriction; 1000 mL Fluid Per  Day  ----------------------------------------------------------------------------------------------------------------------  Physical exam:  General: Comfortable, Not in distress.  Well-developed and well-nourished.   HENT:  Head:  Normocephalic and atraumatic.  Mouth:  Moist mucous membranes.    Eyes:  Conjunctivae and EOM are normal.  Pupils are equal, round, and reactive to light.  No scleral icterus.    Neck:  Neck supple.  No JVD present.  Trachea midline.   Cardiovascular:  Normal rate, regular rhythm with no murmur.  Pulmonary/Chest:  No respiratory distress, no wheezes, no crackles, with normal breath sounds and good air movement.  Abdomen:  Soft.  Bowel sounds are normal.  No distension and no tenderness.   Musculoskeletal:  No edema, no tenderness, and no deformity.  No red or swollen joints anywhere.    Neurological:  Alert and oriented to person, place, and time.  No cranial nerve deficit. No focal deficits. No facial droop.  No slurred speech.   Skin:  Skin is warm and dry. No rash noted. No pallor.   Peripheral vascular:  pulses in all 4 extremities with no clubbing, no cyanosis, no edema.  Genitourinary: no calixto   ----------------------------------------------------------------------------------------------------------------------  ----------------------------------------------------------------------------------------------------------------------  Results from last 7 days   Lab Units 09/01/20 0406 08/31/20  1858   WBC 10*3/mm3 7.90 8.89   HEMOGLOBIN g/dL 10.8* 11.3*   HEMATOCRIT % 34.6* 35.2*   MCV fL 88.7 87.1   MCHC g/dL 31.2* 32.1   PLATELETS 10*3/mm3 225 215     Results from last 7 days   Lab Units 09/01/20  1940 09/01/20  1626 09/01/20  1153  09/01/20  0406  08/31/20 2021 08/31/20  1858   SODIUM mmol/L 129* 131* 129*   < > 127*   < > 121* 121*   POTASSIUM mmol/L  --   --   --   --  4.7  --  5.1 5.0   MAGNESIUM mg/dL  --   --   --   --  2.3  --   --   --    CHLORIDE mmol/L  --   --   --   --   95*  --  92* 90*   CO2 mmol/L  --   --   --   --  21.7*  --  20.3* 20.3*   BUN mg/dL  --   --   --   --  8  --  10 10   CREATININE mg/dL  --   --   --   --  1.07  --  1.17 1.20   PHOSPHORUS mg/dL  --   --   --   --  2.7  --   --   --    EGFR IF NONAFRICN AM mL/min/1.73  --   --   --   --  71  --  64 62   CALCIUM mg/dL  --   --   --   --  8.1*  --  8.1* 8.4*   GLUCOSE mg/dL  --   --   --   --  109*  --  120* 128*   ALBUMIN g/dL  --   --   --   --  3.55  --   --  3.67   BILIRUBIN mg/dL  --   --   --   --  0.3  --   --  0.2   ALK PHOS U/L  --   --   --   --  73  --   --  74   AST (SGOT) U/L  --   --   --   --  21  --   --  21   ALT (SGPT) U/L  --   --   --   --  18  --   --  18    < > = values in this interval not displayed.   Estimated Creatinine Clearance: 89.4 mL/min (by C-G formula based on SCr of 1.07 mg/dL).  Results from last 7 days   Lab Units 09/01/20  0406 08/31/20  2332 08/31/20  1858   TROPONIN T ng/mL <0.010 <0.010 <0.010     Glucose   Date/Time Value Ref Range Status   08/31/2020 1758 141 (H) 70 - 130 mg/dL Final     No results found for: AMMONIA    Results from last 7 days   Lab Units 08/31/20 2021   NITRITE UA  Negative     No results found for: BLOODCXNo results found for: RESPCXNo results found for: URINECXNo results found for: WOUNDCXNo results found for: BODYFLDCXNo results found for: STOOLCX  I have personally looked at the labs and they are summarized above.  ----------------------------------------------------------------------------------------------------------------------  Imaging Results (Last 24 Hours)     Procedure Component Value Units Date/Time    US Carotid Bilateral [[id number]] Collected:  09/01/20 1436     Updated:  09/01/20 1508    Narrative:       EXAMINATION: US CAROTID BILATERAL-      Technique: Multiple real-time color Doppler images were acquired of  bilateral carotid arteries.     Stenosis measurements if obtained, were performed by the NASCET or  similar method.         CLINICAL INDICATION:     syncope; E87.7-Ljxw-[id number] and  hyponatremia; R42-Dizziness and giddiness; F10.10-Alcohol abuse,  uncomplicated     COMPARISON:    None     FINDINGS:        RIGHT:  Moderate stenosis in the right carotid system     RIGHT ICA PSV: 182 cm/s  RIGHT ICA EDV: 74 cm/s.   Right ICA/CCA Ratio: 1.8  Anterograde flow is demonstrated in RIGHT vertebral artery.     LEFT:  Occlusion of the left internal carotid artery.     Anterograde flow is demonstrated in LEFT vertebral artery.       Impression:       Impression:     Occlusion of the left internal carotid artery.     This report was finalized on 9/1/2020 3:06 PM by Dr. Mikie Dow MD.       US Arterial Doppler Lower Extremity Bilateral [[id number]] Collected:  09/01/20 1441     Updated:  09/01/20 1508    Narrative:       EXAMINATION: US ARTERIAL DOPPLER LOWER EXTREMITY  BILATERAL-      CLINICAL INDICATION: history PVD in right leg s/p stent about a year  ago, was told by Dr Azar last month his pulses were weak in right foot  and needed repeat arterial doppler; E87.7-Ubqp-bxjaltnddg and  hyponatremia; R42-Dizziness and giddiness; F10.10-Alcohol abuse,  uncomplicated        COMPARISON: 06/25/2019     FINDINGS:  Any stenoses are evaluated using the NASCET or similar method.     Color Doppler imaging with postoperative waveform to evaluate the lower  extremity arteries.     Triphasic and biphasic waveforms are seen to the level of popliteal  arteries.     Monophasic waveforms in the right posterior tibial artery. No occlusion  was identified.     No significant velocity elevation.       Impression:       1. Monophasic waveforms in the right posterior tibial artery.  2. No occlusion.      This report was finalized on 9/1/2020 3:05 PM by Dr. Mikie Dow MD.       CT Head Without Contrast [[id number]] Collected:  09/01/20 0146     Updated:  09/01/20 0148    Narrative:       CT Head WO    HISTORY:   Alcohol abuse. Syncopal episode. Dizziness and  giddiness.    TECHNIQUE:   Axial unenhanced head CT. Radiation dose reduction techniques included automated exposure control or exposure modulation based on body size. Count of known CT and cardiac nuc med studies performed in previous 12 months: 0.     Time of scan: 2219 hours 8/31/2020  Time of scan availability: 0058 hours 9/1/2020    COMPARISON:   None.    FINDINGS:   No intracranial hemorrhage, mass, or infarct. No hydrocephalus or extra-axial fluid collection. There are senescent changes, including volume loss and nonspecific white matter change, but no acute abnormality is seen. The skull base, calvarium, and  extracranial soft tissues are normal. Acute on chronic left maxillary sinus disease.      Impression:       Senescent changes without acute abnormality.          Signer Name: Artemio Oviedo MD   Signed: 9/1/2020 1:46 AM   Workstation Name: [id number]    [facility]        I have personally reviewed the radiology images and read the final radiology report.    Assessment & Plan      Assessment:  Acute on chronic hyponatremia  Syncope  Essential HTN  PVD with occlusion of the L ICA/moderate stenosis of the R ICA  S/P Atherectomy/Balloon angioplasty/stent Right SFA  10/2019  ID Anemia  Alcohol abuse  Tobacco abuse  Obesity    Plan:  Acute on chronic hyponatremia, likely secondary to beer proteinemia.  Continue with fluid restriction. sodium improved from 121-130 in 12 hours.  Nephrology consulted and was receiving D5W.  Goal is to correct 6-8 M EQ in 24 hours.  Current to monitor sodium every 4 hours.    Syncope, carotid Doppler done which showed similar finding to previous carotid Doppler occlusion of the L ICA/moderate stenosis of the R ICA.   2D echocardiogram with mild concentric hypertrophy and EF of 56 to 60% and grade 1 diastolic dysfunction.  Orthostatic vitals negative.    Essential HTN, continue with home atenolol and lisinopril.    PVD with occlusion of the L  ICA/stenosis of the R ICA, not on aspirin or statin at home.  Will start aspirin statin and check lipid panel in AM.     S/P Atherectomy/Balloon angioplasty/stent Right SFA  10/2019, arterial Doppler done both lower extremity and it shows monophasic waveform in the right posterior tibial artery.  No occlusion.  Start on aspirin and statin.    ID Anemia, iron panel with iron deficiency.  Will start on p.o. iron supplement.    Alcohol abuse, continue with p.o. multivitamin folate and thiamine.  Continue with CIWA protocol.    Tobacco abuse, on nicotine patch.    Heparin subcu for DVT for prophylaxis and Protonix p.o. for GI prophylaxis.     Management and plan discussed in detail with patient and nursing.    The patient is high risk due to the following diagnoses/reasons: Acute on chronic hyponatremia, Syncope    Thu Long MD  09/01/20  20:29

## 2020-09-01 NOTE — NURSING NOTE
Wound consult for un stageable PI to RT foot. PT reports he it this area about a week ago and has been being treated at home with ERICH. Small dry scab noted with Erythema noted to gm wound skin. No additional treatment needed at this time.

## 2020-09-01 NOTE — PLAN OF CARE
Patient in no acute distress today.  No complaints of chest pain or soa.  Will continue to monitor.

## 2020-09-01 NOTE — H&P
"     [facility] Medicine Services  HISTORY & PHYSICAL    Patient Identification:  Name:  Gurwinder Harding  Age:  60 y.o.  Sex:  male  :  1960  MRN:  [id number]   Visit Number:  [id number]  Primary Care Physician:  Fátima Mai APRN     Subjective     Chief complaint:   Chief Complaint   Patient presents with   • Syncope     History of presenting illness:   Patient is a 60 y.o. male with past medical history significant for peripheral vascular disease status post arthrectomy followed by balloon angioplasty and stenting to the right and left SFA, carotid artery stenosis, alcohol abuse, essential hypertension, smoking tobacco use, that presented to the [facility] emergency department for evaluation of syncope.     The patient states that he was at [employer] this evening having a couple of beers with friends and when he suddenly experienced blurry vision and then passed out.  He states that he was sitting in a chair when the event occurred and when he came to he was still sitting in the chair.  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  He is unsure of how long he was unconscious for but believes it was around a minute.  He denies any head trauma or wounds.  He also admits that when the paramedics got him up from the chair his bilateral lower extremities felt weak and he once again became lightheaded. He does state that he has been feeling lightheaded/dizzy for the past 1 to 2 weeks, mainly when going from a sitting to standing position.  He further admits to frequent falls with his most recent one being last night when he got up out of a chair.  He states that his right foot often feels \"heavy.\"  He does have a history of peripheral vascular disease status post stenting and chronic back pain that required surgery in the past.  He denies any recent illness, fever, chills, congestion, sore throat, cough, shortness " of breath, wheezing, chest pain, palpitations, leg edema, gastrointestinal symptoms, genitourinary symptoms, facial asymmetry, headaches, numbness/tingling, seizures, tremors, or difficulty with speech.  He does state that he has a healing wound present on his right ankle secondary to a fall couple weeks ago.  He denies any purulent drainage, edema, erythema, or warmth from the area.  In addition, he admits to drinking on a daily basis and states that he often goes through a 24 pack of beer in a week.  He reports that the amount he drinks on a day-to-day basis often fluctuates and ranges from 4-7 on a typical day.    Upon arrival to the ED, vitals were temperature 97.6 °F, pulse 77, respirations 20, BP 85/58, SPO2 95% on room air.  Troponin T negative x1.  CMP of glucose 128, sodium 121, CO2 20.3, chloride 90, calcium 8.4.  CBC with RBC 4.04, hemoglobin 11.3, hematocrit 35.2.  Urinalysis unremarkable.  UDS negative.  Blood ethanol 54.  Chest x-ray shows no acute cardiopulmonary findings.  EKG shows normal sinus rhythm, heart rate 78 bpm, QTc 421 MS.    In the emergency department the patient received IV normal saline 1000 mL bolus and IV banana bag.    Patient has been admitted to the telemetry floor for further evaluation and treatment.  ---------------------------------------------------------------------------------------------------------------------   Review of Systems   Constitutional: Negative for chills, diaphoresis and fever.   HENT: Negative for congestion and sore throat.    Eyes: Negative for discharge and visual disturbance.   Respiratory: Negative for cough, shortness of breath and wheezing.    Cardiovascular: Negative for chest pain, palpitations and leg swelling.   Gastrointestinal: Negative for abdominal pain, constipation, diarrhea, nausea and vomiting.   Endocrine: Negative for polydipsia and polyuria.   Genitourinary: Negative for dysuria and frequency.   Musculoskeletal: Positive for back pain  (Chronic) and gait problem (Frequent falls).   Skin: Negative for rash and wound.   Neurological: Positive for dizziness, syncope, weakness and light-headedness. Negative for tremors, seizures, facial asymmetry, speech difficulty, numbness and headaches.   Hematological: Does not bruise/bleed easily.   Psychiatric/Behavioral: Negative for confusion. The patient is not nervous/anxious.       ---------------------------------------------------------------------------------------------------------------------   Past Medical History:   Diagnosis Date   • Alcohol abuse    • Arthritis    • Carotid stenosis    • ED (erectile dysfunction)    • History of degenerative disc disease    • Hypertension    • Neuropathy    • Peripheral vascular disease (CMS/[facility])     s/p arthrectomy follow by balloon angioplasty and stents of right and left SFA   • Tobacco abuse    • Vitamin D deficiency      Past Surgical History:   Procedure Laterality Date   • APPENDECTOMY     • ARTERIOGRAM N/A 9/19/2019    Procedure: Arteriogram;  Surgeon: Tong Azar MD;  Location: [facility] CATH INVASIVE LOCATION;  Service: Cardiology   • BACK SURGERY      DISC RUPTURE REPAIR, L5   • CARDIAC CATHETERIZATION Right 10/29/2019    Procedure: Peripheral angiography;  Surgeon: Tong Azar MD;  Location: [facility] CATH INVASIVE LOCATION;  Service: Cardiovascular   • VASCULAR SURGERY Bilateral      Family History   Problem Relation Age of Onset   • Hypertension Mother    • Cancer Father         LUNG   • Diabetes Father    • Hypertension Father    • Heart attack Other         GRANDFATHER     Social History     Socioeconomic History   • Marital status:      Spouse name: Not on file   • Number of children: 1   • Years of education: Not on file   • Highest education level: Not on file   Occupational History   • Occupation:  at [employer]   Tobacco Use   • Smoking status: Current Every Day Smoker     Packs/day: 1.50     Years: 30.00     Pack years:  45.00     Types: Cigarettes   • Smokeless tobacco: Never Used   Substance and Sexual Activity   • Alcohol use: Yes     Alcohol/week: 24.0 standard drinks     Types: 24 Cans of beer per week     Drinks per session: 5 or 6     Binge frequency: Weekly   • Drug use: No   • Sexual activity: Defer   Social History Narrative    Lives in [place].      ---------------------------------------------------------------------------------------------------------------------   Allergies:  Penicillins and Novocain [procaine]  ---------------------------------------------------------------------------------------------------------------------   Medications below are reported home medications pulling from within the system; at this time, these medications have not been reconciled unless otherwise specified and are in the verification process for further verifcation as current home medications.    Prior to Admission Medications     Prescriptions Last Dose Informant Patient Reported? Taking?    albuterol sulfate  (90 Base) MCG/ACT inhaler   No No    Inhale 2 puffs Every 6 (Six) Hours As Needed for Wheezing.    aspirin 81 MG EC tablet   Yes No    Take 81 mg by mouth Daily.    atenolol (TENORMIN) 50 MG tablet   No No    Take 1 tablet by mouth Daily.    clopidogrel (PLAVIX) 75 MG tablet   No No    Take 1 tablet by mouth Daily.    folic acid-pyridoxine-cyanocobalamin (Folbic) 2.5-25-2 MG tablet tablet   No No    Take 1 tablet by mouth Daily.    gabapentin (NEURONTIN) 800 MG tablet   No No    Take 1.5 tablets by mouth 3 (Three) Times a Day.    lisinopril (PRINIVIL,ZESTRIL) 40 MG tablet   No No    Take 1 tablet by mouth Daily.    methocarbamol (ROBAXIN) 750 MG tablet   No No    Take 1 tablet by mouth 4 (Four) Times a Day As Needed for Muscle Spasms.    nabumetone (RELAFEN) 500 MG tablet   No No    Take 1 tablet by mouth 2 (Two) Times a Day As Needed for Mild Pain .    sertraline (Zoloft) 50 MG tablet   No No    Take 1 tablet by mouth  Daily.        ---------------------------------------------------------------------------------------------------------------------    Objective     Hospital Scheduled Meds:    [START ON 9/1/2020] folic acid 1 mg Oral Daily   heparin (porcine) 5,000 Units Subcutaneous Q12H   [START ON 9/1/2020] multivitamin 1 tablet Oral Daily   [START ON 9/1/2020] nicotine 1 patch Transdermal Q24H   nicotine 1 patch Transdermal Once   [START ON 9/1/2020] pantoprazole 40 mg Oral Q AM   sodium chloride 10 mL Intravenous Q12H   [START ON 9/1/2020] thiamine 100 mg Oral Daily          Current listed hospital scheduled medications may not yet reflect those currently placed in orders that are signed and held, awaiting patient's arrival to floor/unit.    ---------------------------------------------------------------------------------------------------------------------   Vital Signs:  Temp:  [97.6 °F (36.4 °C)-98.4 °F (36.9 °C)] 98.4 °F (36.9 °C)  Heart Rate:  [73-98] 98  Resp:  [20] 20  BP: ()/(57-95) 187/95  Mean Arterial Pressure (Non-Invasive) for the past 24 hrs (Last 3 readings):   Noninvasive MAP (mmHg)   08/31/20 2159 130   08/31/20 2133 111   08/31/20 2118 112     SpO2 Percentage    08/31/20 2118 08/31/20 2133 08/31/20 2159   SpO2: 100% 100% 99%     SpO2:  [95 %-100 %] 99 %  on   ;   Device (Oxygen Therapy): room air    Body mass index is 32.49 kg/m².  Wt Readings from Last 3 Encounters:   08/31/20 103 kg (226 lb 6.4 oz)   08/13/20 100 kg (221 lb)   06/09/20 97.5 kg (215 lb)     ---------------------------------------------------------------------------------------------------------------------   Physical Exam:  Physical Exam   Constitutional: He is oriented to person, place, and time. He appears well-developed and well-nourished. He is cooperative.   HENT:   Head: Normocephalic and atraumatic.   Eyes: Pupils are equal, round, and reactive to light. Conjunctivae and lids are normal. Right eye exhibits no discharge. Left eye  exhibits no discharge. Right conjunctiva is not injected. Left conjunctiva is not injected.   Neck: No JVD present. Carotid bruit is not present.   Cardiovascular: Normal rate, regular rhythm, normal heart sounds, intact distal pulses and normal pulses. Exam reveals no gallop, no friction rub and no decreased pulses.   No murmur heard.  Pulses:       Popliteal pulses are 2+ on the right side, and 2+ on the left side.        Dorsalis pedis pulses are 2+ on the right side, and 2+ on the left side.   Pulmonary/Chest: Effort normal and breath sounds normal. No tachypnea and no bradypnea. No respiratory distress. He has no decreased breath sounds. He has no wheezes. He has no rhonchi. He has no rales.   Abdominal: Soft. Normal appearance and bowel sounds are normal. He exhibits no distension and no mass. There is no tenderness.   Musculoskeletal:        Right lower leg: He exhibits no edema.        Left lower leg: He exhibits no edema.     Vascular Status -  His right foot exhibits normal foot vasculature  and no edema. His left foot exhibits normal foot vasculature  and no edema.  Skin Integrity  -  His right foot skin is intact.His left foot skin is intact..  Neurological: He is alert and oriented to person, place, and time. He has normal strength. He is not disoriented (Alert and oriented to himself, place, month, year). He displays no tremor. No sensory deficit (Intact in bilateral upper and lower extremities).   No focal neurological deficits.   Skin: Abrasion (Right ankle) noted. No erythema.   Small abrasion/eschar on right ankle.  No drainage or obvious signs of infection.  Blanchable.   Psychiatric: He has a normal mood and affect. His mood appears not anxious. Cognition and memory are normal. Cognition and memory are not impaired. He does not exhibit a depressed mood.     ---------------------------------------------------------------------------------------------------------------------  EKG:    Pending  cardiology read.  Per my evaluation, normal sinus rhythm with questionable mild ST elevation in lead III but difficult to determine due to wavy baseline, poor R wave progression.  Heart rate 78 bpm, QTc 421 MS.    Telemetry:    Normal sinus rhythm, heart rate 94 bpm, SPO2 99% on room air.    I have personally reviewed the EKG/Telemetry strip  ---------------------------------------------------------------------------------------------------------------------   Results from last 7 days   Lab Units 08/31/20  1858   TROPONIN T ng/mL <0.010           Results from last 7 days   Lab Units 08/31/20  1858   WBC 10*3/mm3 8.89   HEMOGLOBIN g/dL 11.3*   HEMATOCRIT % 35.2*   MCV fL 87.1   MCHC g/dL 32.1   PLATELETS 10*3/mm3 215     Results from last 7 days   Lab Units 08/31/20 2021 08/31/20  1858   SODIUM mmol/L 121* 121*   POTASSIUM mmol/L 5.1 5.0   CHLORIDE mmol/L 92* 90*   CO2 mmol/L 20.3* 20.3*   BUN mg/dL 10 10   CREATININE mg/dL 1.17 1.20   EGFR IF NONAFRICN AM mL/min/1.73 64 62   CALCIUM mg/dL 8.1* 8.4*   GLUCOSE mg/dL 120* 128*   ALBUMIN g/dL  --  3.67   BILIRUBIN mg/dL  --  0.2   ALK PHOS U/L  --  74   AST (SGOT) U/L  --  21   ALT (SGPT) U/L  --  18   Estimated Creatinine Clearance: 81.7 mL/min (by C-G formula based on SCr of 1.17 mg/dL).  No results found for: AMMONIA    Glucose   Date/Time Value Ref Range Status   08/31/2020 1758 141 (H) 70 - 130 mg/dL Final     Lab Results   Component Value Date    HGBA1C 5.60 08/13/2020     Lab Results   Component Value Date    TSH 1.240 08/13/2020    FREET4 1.11 02/07/2020     Pain Management Panel     Pain Management Panel Latest Ref Rng & Units 8/31/2020    AMPHETAMINES SCREEN, URINE Negative Negative    BARBITURATES SCREEN Negative Negative    BENZODIAZEPINE SCREEN, URINE Negative Negative    BUPRENORPHINEUR Negative Negative    COCAINE SCREEN, URINE Negative Negative    METHADONE SCREEN, URINE Negative Negative        I have personally reviewed the above laboratory results.      ---------------------------------------------------------------------------------------------------------------------  Imaging Results (Last 7 Days)     Procedure Component Value Units Date/Time    CT Head Without Contrast [[id number]] Resulted:  08/31/20 2212     Updated:  08/31/20 2217    XR Chest 1 View [[id number]] Collected:  08/31/20 1933     Updated:  08/31/20 1935    Narrative:       XR CHEST 1 VW-     CLINICAL INDICATION: Syncope        COMPARISON: None immediately available      TECHNIQUE: Single frontal view of the chest.     FINDINGS:     There is no focal alveolar infiltrate or effusion.  The cardiac silhouette is normal. The pulmonary vasculature is  unremarkable.  There is no evidence of an acute osseous abnormality.   There are no suspicious-appearing parenchymal soft tissue nodules.          Impression:       No evidence of active or acute cardiopulmonary disease on today's chest  radiograph.     This report was finalized on 8/31/2020 7:33 PM by Dr. Mikie Dow MD.           Bilateral carotid ultrasound (2/12/20):      I have personally reviewed the above radiology results.      ---------------------------------------------------------------------------------------------------------------------    Assessment & Plan      Active Hospital Problems    Diagnosis POA   • **Hyponatremia [E87.1] Yes   • Obesity (BMI 30-39.9) [E66.9] Yes   • Carotid artery stenosis [I65.29] Unknown   • PVD (peripheral vascular disease) with claudication (CMS/HCC) [I73.9] Yes   • Tobacco abuse [Z72.0] Yes   • Alcohol abuse [F10.10] Yes   • Hypertension [I10] Yes     · Hyponatremia present upon admission suspect secondary to alcohol use: 121 on admission.  Suspect due to beer potomania.  The patient received IV normal saline 1000 mL bolus and IV banana bag in the emergency department without improvement in the sodium.  Nephrology was consulted in the ED who recommended fluid restrictions and repeat sodium levels every 4  hours.  We will continue with nephrology recommendations and appreciate further advice/assistance. [facility] protocol in place for alcohol abuse.  P.o. thiamine and multivitamin ordered.  Based off the current home medication list, the only 2 medications that may cause hyponatremia are nabumetone and Zoloft, hold for now.  Monitor closely.    · Witnessed syncopal event: Urinalysis unremarkable.  UDS negative.  Blood ethanol % 0.054, Ethanol 54. CT of the head, transthoracic echo, bilateral carotid ultrasound, and orthostatic vitals ordered.  Troponin T negative x1, continue with serial troponin. Patient is to be up with assistance, fall precautions in place.  Neuro checks every 4 hours. Suspect syncopal episode and dizziness is multifactorial 2/2 to ETOH abuse, carotid artery stenosis and hyponatremia.     · Normocytic anemia: H&H stable.  Baseline hemoglobin appears to be around 13-14, hemoglobin on admission 11.3.  Repeat a.m. CBC and monitor H&H closely.  If hemoglobin less than 7 will transfuse.  Obtain vitamin B12, folate, iron, iron panel.  Replace as necessary.    · History of peripheral vascular disease status post arthrectomy followed by balloon angioplasty and stenting to the right and left SFA: Currently following with interventional cardiology, Dr. Azar.  Patient had an appointment on 6/9/2028 with cardiology that was unremarkable.  Has a follow-up appointment on 9/15/2020.  Continue home aspirin and Plavix.     · Carotid artery stenosis: Patient had a bilateral carotid ultrasound performed on 2/12/2020 that showed complete occlusion of the internal carotid artery on the left and right internal carotid artery showed a plaque with the area of stenosis being around 50 to 69% range.  Vertebral artery flows were antegrade.  Repeat bilateral carotid ultrasound as stated above.  Fall precautions in place.  Up with assistance.    · Hyperglycemia present upon admission with no known history of diabetes mellitus:  Hemoglobin A1c from 8/13/2020 is 5.60.    · Essential hypertension: Patient had a hypotensive episode in the emergency department with BP 85/58.  Etiology unclear at this time.  Last /83.  Based off the current home medication list, the patient appears to be on atenolol and lisinopril.  Plan to continue home antihypertensive medications once reconciled per pharmacy.  Continue monitor BP per hospital protocol and titrate BP medications as necessary.    Smoking tobacco use: Gurwinder Harding  reports that he has been smoking cigarettes. He has a 45.00 pack-year smoking history. He has never used smokeless tobacco.. I have educated him on the risk of diseases from using tobacco products such as cancer, COPD and heart diease. I advised him to quit and he is not willing to quit. I spent 4 minutes counseling the patient. Nicotine patch ordered.     · Chronic back pain: supportive care.     · Obesity: BMI 32.49 kg/m2     · F/E/N: No IV fluids.  Replace electrolytes as necessary.  Cardiac diet, daily fluid restriction 1000 mL fluid per day  ---------------------------------------------------  DVT Prophylaxis: Subcutaneous heparin  GI Prophylaxis: Protonix  Activity: Up with assistance, fall precautions    The patient is considered to be a high risk patient due to: Hyponatremia present upon admission suspect secondary to alcohol use, syncope    INPATIENT status due to the need for care which can only be reasonably provided in an hospital setting such as aggressive/expedited ancillary services and/or consultation services, the necessity for IV medications, close physician monitoring and/or the possible need for procedures.  In such, I feel patient’s risk for adverse outcomes and need for care warrant INPATIENT evaluation and predict the patient’s care encounter to likely last beyond 2 midnights.    Code Status: FULL CODE     I have discussed the patient's assessment and plan with the patient, nursing staff, and attending  physician       Cesilia Joseph PA-C  Hospitalist Service -- [facility]       08/31/20  23:12    Attending Physician: Javy Dupont MD

## 2020-09-01 NOTE — PLAN OF CARE
Problem: Fall Risk (Adult)  Goal: Identify Related Risk Factors and Signs and Symptoms  Outcome: Ongoing (interventions implemented as appropriate)  Goal: Absence of Fall  Outcome: Ongoing (interventions implemented as appropriate)     Problem: Patient Care Overview  Goal: Plan of Care Review  Outcome: Ongoing (interventions implemented as appropriate)  Goal: Individualization and Mutuality  Outcome: Ongoing (interventions implemented as appropriate)  Goal: Discharge Needs Assessment  Outcome: Ongoing (interventions implemented as appropriate)  Goal: Interprofessional Rounds/Family Conf  Outcome: Ongoing (interventions implemented as appropriate)     Problem: Pain, Chronic (Adult)  Goal: Identify Related Risk Factors and Signs and Symptoms  Outcome: Ongoing (interventions implemented as appropriate)  Goal: Acceptable Pain/Comfort Level and Functional Ability  Outcome: Ongoing (interventions implemented as appropriate)     Problem: Skin Injury Risk (Adult)  Goal: Identify Related Risk Factors and Signs and Symptoms  Outcome: Ongoing (interventions implemented as appropriate)  Goal: Skin Health and Integrity  Outcome: Ongoing (interventions implemented as appropriate)     Problem: Wound (Includes Pressure Injury) (Adult)  Goal: Signs and Symptoms of Listed Potential Problems Will be Absent, Minimized or Managed (Wound)  Outcome: Ongoing (interventions implemented as appropriate)

## 2020-09-01 NOTE — CONSULTS
Nephrology Consult Note    Referring Provider: Javy Dupont  Reason for Consultation: Hyponatremia    Subjective       History of present illness:  Gurwinder Harding is a 60 y.o. male who presented to [facility] emergency department with chief complaint of syncope, pt is known history of Etoh abuse, he is currently admitted for syncopal workup. On initial lab work up, sodium was low and nephrology was consulted for management.   Pt mentioned, his oral intake has been lower and he has been drinking Etoh more than usual. He denied any nausea or vomiting.   Pt also denied any history of Chronic NSAIDS use. Patient denies hematuria, dysuria, difficulty passing urine. No prior history of renal stones. No family history of renal disease    History  Past Medical History:   Diagnosis Date   • Alcohol abuse    • Arthritis    • Carotid stenosis    • ED (erectile dysfunction)    • History of degenerative disc disease    • Hypertension    • Neuropathy    • Peripheral vascular disease (CMS/HCC)     s/p arthrectomy follow by balloon angioplasty and stents of right and left SFA   • Tobacco abuse    • Vitamin D deficiency    , Past Surgical History:   Procedure Laterality Date   • APPENDECTOMY     • ARTERIOGRAM N/A 9/19/2019    Procedure: Arteriogram;  Surgeon: Tong Azar MD;  Location:  [facility];  Service: Cardiology   • BACK SURGERY      DISC RUPTURE REPAIR, L5   • CARDIAC CATHETERIZATION Right 10/29/2019    Procedure: Peripheral angiography;  Surgeon: Tong Azar MD;  Location:  [facility];  Service: Cardiovascular   • VASCULAR SURGERY Bilateral    , Family History   Problem Relation Age of Onset   • Hypertension Mother    • Cancer Father         LUNG   • Diabetes Father    • Hypertension Father    • Heart attack Other         GRANDFATHER   , Social History     Tobacco Use   • Smoking status: Current Every Day Smoker     Packs/day: 1.50     Years: 30.00     Pack years:  45.00     Types: Cigarettes   • Smokeless tobacco: Never Used   Substance Use Topics   • Alcohol use: Yes     Alcohol/week: 24.0 standard drinks     Types: 24 Cans of beer per week     Drinks per session: 5 or 6     Binge frequency: Weekly   • Drug use: No   , Medications Prior to Admission   Medication Sig Dispense Refill Last Dose   • atenolol (TENORMIN) 50 MG tablet Take 1 tablet by mouth Daily. 90 tablet 1 8/31/2020 at AM   • folic acid-pyridoxine-cyanocobalamin (Folbic) 2.5-25-2 MG tablet tablet Take 1 tablet by mouth Daily. 90 tablet 1 8/31/2020 at AM   • gabapentin (NEURONTIN) 800 MG tablet Take 1.5 tablets by mouth 3 (Three) Times a Day. 135 tablet 2 8/31/2020 at MIDDAY   • lisinopril (PRINIVIL,ZESTRIL) 40 MG tablet Take 1 tablet by mouth Daily. 90 tablet 1 8/31/2020 at AM   • methocarbamol (ROBAXIN) 750 MG tablet Take 1 tablet by mouth 4 (Four) Times a Day As Needed for Muscle Spasms. 360 tablet 1 8/31/2020 at MIDDAY   • nabumetone (RELAFEN) 500 MG tablet Take 1 tablet by mouth 2 (Two) Times a Day As Needed for Mild Pain . 180 tablet 1 8/31/2020 at PM   , Scheduled Meds:    atenolol 50 mg Oral Daily   folic acid 1 mg Oral Daily   gabapentin 1,200 mg Oral TID   heparin (porcine) 5,000 Units Subcutaneous Q8H   lisinopril 40 mg Oral Daily   multivitamin 1 tablet Oral Daily   nicotine 1 patch Transdermal Q24H   nicotine 1 patch Transdermal Once   pantoprazole 40 mg Oral Q AM   sodium chloride 10 mL Intravenous Q12H   thiamine 100 mg Oral Daily   , Continuous Infusions:   , PRN Meds:  [COMPLETED] Insert peripheral IV **AND** sodium chloride  •  sodium chloride and Allergies:  Penicillins and Novocain [procaine]    Review of Systems  More than 10 point review of systems was done. Pertinent items are noted in HPI, all other systems reviewed and negative    Objective     Vital Signs  Temp:  [97.6 °F (36.4 °C)-98.4 °F (36.9 °C)] 97.8 °F (36.6 °C)  Heart Rate:  [73-98] 88  Resp:  [20] 20  BP: ()/(57-95)  130/59    No intake/output data recorded.  I/O last 3 completed shifts:  In: 2180 [P.O.:180; I.V.:1000; IV Piggyback:1000]  Out: 3525 [Urine:3525]    Physical Examination:  General Appearance: not in acute distress  Head: Normocephalic, without obvious abnormality and atraumatic  Eyes: Conjunctivae and sclerae normal, no icterus, no pallor and PERRLA  Neck: No adenopathy, suppple, no carotid bruit and No JVD  Lungs: Clear to auscultation, respirations regular and unlabored  Heart: Regular rhythm & normal rate, normal S1, S2, no murmur, no gallop, no rub   Abdomen: Normal bowel sounds, no masses and soft non-tender  Extremities: Moves extremities well, no redness and No edema  Pulses: Palpable and equal bilaterally  Neurologic: Orientated to person, place, time, grossly no focal deficitis    Laboratory Data :      WBC WBC   Date Value Ref Range Status   09/01/2020 7.90 3.40 - 10.80 10*3/mm3 Final   08/31/2020 8.89 3.40 - 10.80 10*3/mm3 Final      HGB Hemoglobin   Date Value Ref Range Status   09/01/2020 10.8 (L) 13.0 - 17.7 g/dL Final   08/31/2020 11.3 (L) 13.0 - 17.7 g/dL Final      HCT Hematocrit   Date Value Ref Range Status   09/01/2020 34.6 (L) 37.5 - 51.0 % Final   08/31/2020 35.2 (L) 37.5 - 51.0 % Final      Platlets No results found for: LABPLAT   MCV MCV   Date Value Ref Range Status   09/01/2020 88.7 79.0 - 97.0 fL Final   08/31/2020 87.1 79.0 - 97.0 fL Final          Sodium Sodium   Date Value Ref Range Status   09/01/2020 130 (L) 136 - 145 mmol/L Final   09/01/2020 127 (L) 136 - 145 mmol/L Final   08/31/2020 123 (L) 136 - 145 mmol/L Final   08/31/2020 121 (L) 136 - 145 mmol/L Final   08/31/2020 121 (L) 136 - 145 mmol/L Final      Potassium Potassium   Date Value Ref Range Status   09/01/2020 4.7 3.5 - 5.2 mmol/L Final   08/31/2020 5.1 3.5 - 5.2 mmol/L Final   08/31/2020 5.0 3.5 - 5.2 mmol/L Final     Comment:     Slight hemolysis detected by analyzer. Results may be affected.      Chloride Chloride   Date  Value Ref Range Status   09/01/2020 95 (L) 98 - 107 mmol/L Final   08/31/2020 92 (L) 98 - 107 mmol/L Final   08/31/2020 90 (L) 98 - 107 mmol/L Final      CO2 CO2   Date Value Ref Range Status   09/01/2020 21.7 (L) 22.0 - 29.0 mmol/L Final   08/31/2020 20.3 (L) 22.0 - 29.0 mmol/L Final   08/31/2020 20.3 (L) 22.0 - 29.0 mmol/L Final      BUN BUN   Date Value Ref Range Status   09/01/2020 8 6 - 20 mg/dL Final   08/31/2020 10 6 - 20 mg/dL Final   08/31/2020 10 6 - 20 mg/dL Final      Creatinine Creatinine   Date Value Ref Range Status   09/01/2020 1.07 0.76 - 1.27 mg/dL Final   08/31/2020 1.17 0.76 - 1.27 mg/dL Final   08/31/2020 1.20 0.76 - 1.27 mg/dL Final      Calcium Calcium   Date Value Ref Range Status   09/01/2020 8.1 (L) 8.6 - 10.5 mg/dL Final   08/31/2020 8.1 (L) 8.6 - 10.5 mg/dL Final   08/31/2020 8.4 (L) 8.6 - 10.5 mg/dL Final      PO4 No results found for: CAPO4   Albumin Albumin   Date Value Ref Range Status   09/01/2020 3.55 3.50 - 5.20 g/dL Final   08/31/2020 3.67 3.50 - 5.20 g/dL Final      Magnesium Magnesium   Date Value Ref Range Status   09/01/2020 2.3 1.6 - 2.6 mg/dL Final      Uric Acid No results found for: URICACID     Radiology results :     Imaging Results (Last 72 Hours)     Procedure Component Value Units Date/Time    CT Head Without Contrast [[id number]] Collected:  09/01/20 0146     Updated:  09/01/20 0148    Narrative:       CT Head WO    HISTORY:   Alcohol abuse. Syncopal episode. Dizziness and giddiness.    TECHNIQUE:   Axial unenhanced head CT. Radiation dose reduction techniques included automated exposure control or exposure modulation based on body size. Count of known CT and cardiac nuc med studies performed in previous 12 months: 0.     Time of scan: 2219 hours 8/31/2020  Time of scan availability: 0058 hours 9/1/2020    COMPARISON:   None.    FINDINGS:   No intracranial hemorrhage, mass, or infarct. No hydrocephalus or extra-axial fluid collection. There are senescent changes,  including volume loss and nonspecific white matter change, but no acute abnormality is seen. The skull base, calvarium, and  extracranial soft tissues are normal. Acute on chronic left maxillary sinus disease.      Impression:       Senescent changes without acute abnormality.          Signer Name: Artemio Oviedo MD   Signed: 9/1/2020 1:46 AM   Workstation Name: [id number]    [facility]    XR Chest 1 View [[id number]] Collected:  08/31/20 1933     Updated:  08/31/20 1935    Narrative:       XR CHEST 1 VW-     CLINICAL INDICATION: Syncope        COMPARISON: None immediately available      TECHNIQUE: Single frontal view of the chest.     FINDINGS:     There is no focal alveolar infiltrate or effusion.  The cardiac silhouette is normal. The pulmonary vasculature is  unremarkable.  There is no evidence of an acute osseous abnormality.   There are no suspicious-appearing parenchymal soft tissue nodules.          Impression:       No evidence of active or acute cardiopulmonary disease on today's chest  radiograph.     This report was finalized on 8/31/2020 7:33 PM by Dr. Mikie Dow MD.               Medications:        atenolol 50 mg Oral Daily   folic acid 1 mg Oral Daily   gabapentin 1,200 mg Oral TID   heparin (porcine) 5,000 Units Subcutaneous Q8H   lisinopril 40 mg Oral Daily   multivitamin 1 tablet Oral Daily   nicotine 1 patch Transdermal Q24H   nicotine 1 patch Transdermal Once   pantoprazole 40 mg Oral Q AM   sodium chloride 10 mL Intravenous Q12H   thiamine 100 mg Oral Daily          Assessment/Plan       Hyponatremia    Tobacco abuse    Alcohol abuse    Hypertension    PVD (peripheral vascular disease) with claudication (CMS/HCC)    Carotid artery stenosis    Obesity (BMI 30-39.9)      1. Hyponatremia, likely chronic  2. Sycope  3. Essential hypertension  4. Etoh abuse  5. Nicotine dependence.     Hyponatremia likely 2/2 polydipsia and bear potomania  Admitted with Sodium 121, has  increased to 130 in less than 12 hours. I will give 200ml of D5W and continue checking sodium [id number], continue of fluid restriction.   Target sodium for next 24 hours will be ~ 130    Thanks Dr Dupont for the consult. Nephrology will follow the patient.   I discussed the patient's findings and my recommendations with patient and nursing staff    Bharati Lino MD  09/01/20  08:37

## 2020-09-01 NOTE — PAYOR COMM NOTE
"[facility]  NPI:[id number]    Utilization Review  Contact: Lisa Rhoades RN  Phone: [phone]  Fax:[phone]    INITIATE INPATIENT AUTHORIZATION  Pending auth # [id number]      Janine Harding (60 y.o. Male)     Date of Birth Social Security Number Address Home Phone MRN    1960  [address]  [facility] [address] [phone] [id number]    Spiritism Marital Status          Jew        Admission Date Admission Type Admitting Provider Attending Provider Department, Room/Bed    8/31/20 Emergency Javy Dupont MD Srinivas, Priyanka, MD [address], 3308/1S    Discharge Date Discharge Disposition Discharge Destination                       Attending Provider:  Thu Long MD    Allergies:  Penicillins, Novocain [Procaine]    Isolation:  None   Infection:  None   Code Status:  CPR    Ht:  177.8 cm (70\")   Wt:  103 kg (226 lb 6.4 oz)    Admission Cmt:  None   Principal Problem:  Hyponatremia [E87.1]                 Active Insurance as of 8/31/2020     Primary Coverage     Payor Plan Insurance Group Employer/Plan Group    [employer] BLUE [employer]OSS [employer] BLUE CROSS BLUE [employer] PPO [id number]     Payor Plan Address Payor Plan Phone Number Payor Plan Fax Number Effective Dates    [address] [phone]  1/1/2018 - None Entered    [place] [address]       Subscriber Name Subscriber Birth Date Member ID       JANINE HARDING 1960 [id number]                 Emergency Contacts      (Rel.) Home Phone Work Phone Mobile Phone    Patsy Harding (Spouse) -- -- [phone]               History & Physical      Cesilia Joseph PA-C at 08/31/20 2140     Attestation signed by Javy Dupont MD at 09/01/20 0018    I have seen and examined the patient independently from Abida Joseph PA-C, and have reviewed Abida's findings, assessment and plan in the H&P below. I suspect hyponatremia is playing a major role in his symptoms. " Nephrology on board; I agree with Dr Barrios's plan to fluid restrict, as patient's sodium did not change after total of 2 L IV fluids were administered in the ED. Suspect beer potomania is primary cause of hyponatremia, though he recently started zoloft which could also be contributing. Prelim med list also included relafen and NSAIDs can lower sodium levels as well. Monitor sodium levels q4h per nephrology recommendation. Monitor closely for signs/symptoms of alcohol withdrawal per [facility] protocol as patient admits to drinking 6-8 beers per day and is considered high risk for withdrawal. Since fluid restricting, will give multivitamin, folic acid and thiamine supplementation via tablets instead of banana bag. Right leg is colder on exam and DP pulse is weaker compared to the left; patient has history of PVD s/p stent in right lower extremity appx 1 year ago, says Dr Azar saw him in follow up last month and told him he needed a repeat arterial doppler to evaluate weak pulses further. Will obtain arterial doppler in the morning.                        [facility]  HISTORY & PHYSICAL    Patient Identification:  Name:  Gurwinder Harding  Age:  60 y.o.  Sex:  male  :  1960  MRN:  [id number]   Visit Number:  [id number]  Primary Care Physician:  Fátima Mai APRN     Subjective     Chief complaint:   Chief Complaint   Patient presents with   • Syncope     History of presenting illness:   Patient is a 60 y.o. male with past medical history significant for peripheral vascular disease status post arthrectomy followed by balloon angioplasty and stenting to the right and left SFA, carotid artery stenosis, alcohol abuse, essential hypertension, smoking tobacco use, that presented to the [facility] emergency department for evaluation of syncope.     The patient states that he was at [employer] this evening having a couple of beers with friends and  "when he suddenly experienced blurry vision and then passed out.  He states that he was sitting in a chair when the event occurred and when he came to he was still sitting in the chair.  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  He is unsure of how long he was unconscious for but believes it was around a minute.  He denies any head trauma or wounds.  He also admits that when the paramedics got him up from the chair his bilateral lower extremities felt weak and he once again became lightheaded. He does state that he has been feeling lightheaded/dizzy for the past 1 to 2 weeks, mainly when going from a sitting to standing position.  He further admits to frequent falls with his most recent one being last night when he got up out of a chair.  He states that his right foot often feels \"heavy.\"  He does have a history of peripheral vascular disease status post stenting and chronic back pain that required surgery in the past.  He denies any recent illness, fever, chills, congestion, sore throat, cough, shortness of breath, wheezing, chest pain, palpitations, leg edema, gastrointestinal symptoms, genitourinary symptoms, facial asymmetry, headaches, numbness/tingling, seizures, tremors, or difficulty with speech.  He does state that he has a healing wound present on his right ankle secondary to a fall couple weeks ago.  He denies any purulent drainage, edema, erythema, or warmth from the area.  In addition, he admits to drinking on a daily basis and states that he often goes through a 24 pack of beer in a week.  He reports that the amount he drinks on a day-to-day basis often fluctuates and ranges from 4-7 on a typical day.    Upon arrival to the ED, vitals were temperature 97.6 °F, pulse 77, respirations 20, BP 85/58, SPO2 95% on room air.  Troponin T negative x1.  CMP of glucose 128, sodium 121, CO2 20.3, chloride 90, calcium 8.4.  CBC with RBC 4.04, hemoglobin 11.3, hematocrit 35.2. "  Urinalysis unremarkable.  UDS negative.  Blood ethanol 54.  Chest x-ray shows no acute cardiopulmonary findings.  EKG shows normal sinus rhythm, heart rate 78 bpm, QTc 421 MS.    In the emergency department the patient received IV normal saline 1000 mL bolus and IV banana bag.    Patient has been admitted to the telemetry floor for further evaluation and treatment.  ---------------------------------------------------------------------------------------------------------------------   Review of Systems   Constitutional: Negative for chills, diaphoresis and fever.   HENT: Negative for congestion and sore throat.    Eyes: Negative for discharge and visual disturbance.   Respiratory: Negative for cough, shortness of breath and wheezing.    Cardiovascular: Negative for chest pain, palpitations and leg swelling.   Gastrointestinal: Negative for abdominal pain, constipation, diarrhea, nausea and vomiting.   Endocrine: Negative for polydipsia and polyuria.   Genitourinary: Negative for dysuria and frequency.   Musculoskeletal: Positive for back pain (Chronic) and gait problem (Frequent falls).   Skin: Negative for rash and wound.   Neurological: Positive for dizziness, syncope, weakness and light-headedness. Negative for tremors, seizures, facial asymmetry, speech difficulty, numbness and headaches.   Hematological: Does not bruise/bleed easily.   Psychiatric/Behavioral: Negative for confusion. The patient is not nervous/anxious.       ---------------------------------------------------------------------------------------------------------------------   Past Medical History:   Diagnosis Date   • Alcohol abuse    • Arthritis    • Carotid stenosis    • ED (erectile dysfunction)    • History of degenerative disc disease    • Hypertension    • Neuropathy    • Peripheral vascular disease (CMS/HCC)     s/p arthrectomy follow by balloon angioplasty and stents of right and left SFA   • Tobacco abuse    • Vitamin D deficiency           Past Surgical History:   Procedure Laterality Date   • APPENDECTOMY     • ARTERIOGRAM N/A 9/19/2019    Procedure: Arteriogram;  Surgeon: Tong Azar MD;  Location:  [facility] CATH INVASIVE LOCATION;  Service: Cardiology   • BACK SURGERY      DISC RUPTURE REPAIR, L5   • CARDIAC CATHETERIZATION Right 10/29/2019    Procedure: Peripheral angiography;  Surgeon: Tong Azar MD;  Location:  [facility] CATH INVASIVE LOCATION;  Service: Cardiovascular   • VASCULAR SURGERY Bilateral      Family History   Problem Relation Age of Onset   • Hypertension Mother    • Cancer Father         LUNG   • Diabetes Father    • Hypertension Father    • Heart attack Other         GRANDFATHER     Social History     Socioeconomic History   • Marital status:      Spouse name: Not on file   • Number of children: 1   • Years of education: Not on file   • Highest education level: Not on file   Occupational History   • Occupation:  at [employer]   Tobacco Use   • Smoking status: Current Every Day Smoker     Packs/day: 1.50     Years: 30.00     Pack years: 45.00     Types: Cigarettes   • Smokeless tobacco: Never Used   Substance and Sexual Activity   • Alcohol use: Yes     Alcohol/week: 24.0 standard drinks     Types: 24 Cans of beer per week     Drinks per session: 5 or 6     Binge frequency: Weekly   • Drug use: No   • Sexual activity: Defer   Social History Narrative    Lives in [place].      ---------------------------------------------------------------------------------------------------------------------   Allergies:  Penicillins and Novocain [procaine]  ---------------------------------------------------------------------------------------------------------------------   Medications below are reported home medications pulling from within the system; at this time, these medications have not been reconciled unless otherwise specified and are in the verification process for further verifcation as current home  medications.    Prior to Admission Medications     Prescriptions Last Dose Informant Patient Reported? Taking?    albuterol sulfate  (90 Base) MCG/ACT inhaler   No No    Inhale 2 puffs Every 6 (Six) Hours As Needed for Wheezing.    aspirin 81 MG EC tablet   Yes No    Take 81 mg by mouth Daily.    atenolol (TENORMIN) 50 MG tablet   No No    Take 1 tablet by mouth Daily.    clopidogrel (PLAVIX) 75 MG tablet   No No    Take 1 tablet by mouth Daily.    folic acid-pyridoxine-cyanocobalamin (Folbic) 2.5-25-2 MG tablet tablet   No No    Take 1 tablet by mouth Daily.    gabapentin (NEURONTIN) 800 MG tablet   No No    Take 1.5 tablets by mouth 3 (Three) Times a Day.    lisinopril (PRINIVIL,ZESTRIL) 40 MG tablet   No No    Take 1 tablet by mouth Daily.    methocarbamol (ROBAXIN) 750 MG tablet   No No    Take 1 tablet by mouth 4 (Four) Times a Day As Needed for Muscle Spasms.    nabumetone (RELAFEN) 500 MG tablet   No No    Take 1 tablet by mouth 2 (Two) Times a Day As Needed for Mild Pain .    sertraline (Zoloft) 50 MG tablet   No No    Take 1 tablet by mouth Daily.        ---------------------------------------------------------------------------------------------------------------------    Objective     Hospital Scheduled Meds:    [START ON 9/1/2020] folic acid 1 mg Oral Daily   heparin (porcine) 5,000 Units Subcutaneous Q12H   [START ON 9/1/2020] multivitamin 1 tablet Oral Daily   [START ON 9/1/2020] nicotine 1 patch Transdermal Q24H   nicotine 1 patch Transdermal Once   [START ON 9/1/2020] pantoprazole 40 mg Oral Q AM   sodium chloride 10 mL Intravenous Q12H   [START ON 9/1/2020] thiamine 100 mg Oral Daily          Current listed hospital scheduled medications may not yet reflect those currently placed in orders that are signed and held, awaiting patient's arrival to floor/unit.    ---------------------------------------------------------------------------------------------------------------------   Vital  Signs:  Temp:  [97.6 °F (36.4 °C)-98.4 °F (36.9 °C)] 98.4 °F (36.9 °C)  Heart Rate:  [73-98] 98  Resp:  [20] 20  BP: ()/(57-95) 187/95  Mean Arterial Pressure (Non-Invasive) for the past 24 hrs (Last 3 readings):   Noninvasive MAP (mmHg)   08/31/20 2159 130   08/31/20 2133 111   08/31/20 2118 112     SpO2 Percentage    08/31/20 2118 08/31/20 2133 08/31/20 2159   SpO2: 100% 100% 99%     SpO2:  [95 %-100 %] 99 %  on   ;   Device (Oxygen Therapy): room air    Body mass index is 32.49 kg/m².  Wt Readings from Last 3 Encounters:   08/31/20 103 kg (226 lb 6.4 oz)   08/13/20 100 kg (221 lb)   06/09/20 97.5 kg (215 lb)     ---------------------------------------------------------------------------------------------------------------------   Physical Exam:  Physical Exam   Constitutional: He is oriented to person, place, and time. He appears well-developed and well-nourished. He is cooperative.   HENT:   Head: Normocephalic and atraumatic.   Eyes: Pupils are equal, round, and reactive to light. Conjunctivae and lids are normal. Right eye exhibits no discharge. Left eye exhibits no discharge. Right conjunctiva is not injected. Left conjunctiva is not injected.   Neck: No JVD present. Carotid bruit is not present.   Cardiovascular: Normal rate, regular rhythm, normal heart sounds, intact distal pulses and normal pulses. Exam reveals no gallop, no friction rub and no decreased pulses.   No murmur heard.  Pulses:       Popliteal pulses are 2+ on the right side, and 2+ on the left side.        Dorsalis pedis pulses are 2+ on the right side, and 2+ on the left side.   Pulmonary/Chest: Effort normal and breath sounds normal. No tachypnea and no bradypnea. No respiratory distress. He has no decreased breath sounds. He has no wheezes. He has no rhonchi. He has no rales.   Abdominal: Soft. Normal appearance and bowel sounds are normal. He exhibits no distension and no mass. There is no tenderness.   Musculoskeletal:        Right  lower leg: He exhibits no edema.        Left lower leg: He exhibits no edema.     Vascular Status -  His right foot exhibits normal foot vasculature  and no edema. His left foot exhibits normal foot vasculature  and no edema.  Skin Integrity  -  His right foot skin is intact.His left foot skin is intact..  Neurological: He is alert and oriented to person, place, and time. He has normal strength. He is not disoriented (Alert and oriented to himself, place, month, year). He displays no tremor. No sensory deficit (Intact in bilateral upper and lower extremities).   No focal neurological deficits.   Skin: Abrasion (Right ankle) noted. No erythema.   Small abrasion/eschar on right ankle.  No drainage or obvious signs of infection.  Blanchable.   Psychiatric: He has a normal mood and affect. His mood appears not anxious. Cognition and memory are normal. Cognition and memory are not impaired. He does not exhibit a depressed mood.     ---------------------------------------------------------------------------------------------------------------------  EKG:    Pending cardiology read.  Per my evaluation, normal sinus rhythm with questionable mild ST elevation in lead III but difficult to determine due to wavy baseline, poor R wave progression.  Heart rate 78 bpm, QTc 421 MS.    Telemetry:    Normal sinus rhythm, heart rate 94 bpm, SPO2 99% on room air.    I have personally reviewed the EKG/Telemetry strip  ---------------------------------------------------------------------------------------------------------------------   Results from last 7 days   Lab Units 08/31/20 1858   TROPONIN T ng/mL <0.010           Results from last 7 days   Lab Units 08/31/20 1858   WBC 10*3/mm3 8.89   HEMOGLOBIN g/dL 11.3*   HEMATOCRIT % 35.2*   MCV fL 87.1   MCHC g/dL 32.1   PLATELETS 10*3/mm3 215     Results from last 7 days   Lab Units 08/31/20 2021 08/31/20 1858   SODIUM mmol/L 121* 121*   POTASSIUM mmol/L 5.1 5.0   CHLORIDE mmol/L 92*  90*   CO2 mmol/L 20.3* 20.3*   BUN mg/dL 10 10   CREATININE mg/dL 1.17 1.20   EGFR IF NONAFRICN AM mL/min/1.73 64 62   CALCIUM mg/dL 8.1* 8.4*   GLUCOSE mg/dL 120* 128*   ALBUMIN g/dL  --  3.67   BILIRUBIN mg/dL  --  0.2   ALK PHOS U/L  --  74   AST (SGOT) U/L  --  21   ALT (SGPT) U/L  --  18   Estimated Creatinine Clearance: 81.7 mL/min (by C-G formula based on SCr of 1.17 mg/dL).  No results found for: AMMONIA    Glucose   Date/Time Value Ref Range Status   08/31/2020 1758 141 (H) 70 - 130 mg/dL Final     Lab Results   Component Value Date    HGBA1C 5.60 08/13/2020     Lab Results   Component Value Date    TSH 1.240 08/13/2020    FREET4 1.11 02/07/2020     Pain Management Panel     Pain Management Panel Latest Ref Rng & Units 8/31/2020    AMPHETAMINES SCREEN, URINE Negative Negative    BARBITURATES SCREEN Negative Negative    BENZODIAZEPINE SCREEN, URINE Negative Negative    BUPRENORPHINEUR Negative Negative    COCAINE SCREEN, URINE Negative Negative    METHADONE SCREEN, URINE Negative Negative        I have personally reviewed the above laboratory results.   ---------------------------------------------------------------------------------------------------------------------  Imaging Results (Last 7 Days)     Procedure Component Value Units Date/Time    CT Head Without Contrast [[id number]] Resulted:  08/31/20 2212     Updated:  08/31/20 2217    XR Chest 1 View [[id number]] Collected:  08/31/20 1933     Updated:  08/31/20 1935    Narrative:       XR CHEST 1 VW-     CLINICAL INDICATION: Syncope        COMPARISON: None immediately available      TECHNIQUE: Single frontal view of the chest.     FINDINGS:     There is no focal alveolar infiltrate or effusion.  The cardiac silhouette is normal. The pulmonary vasculature is  unremarkable.  There is no evidence of an acute osseous abnormality.   There are no suspicious-appearing parenchymal soft tissue nodules.          Impression:       No evidence of active or acute  cardiopulmonary disease on today's chest  radiograph.     This report was finalized on 8/31/2020 7:33 PM by Dr. Mikie Dow MD.           Bilateral carotid ultrasound (2/12/20):      I have personally reviewed the above radiology results.      ---------------------------------------------------------------------------------------------------------------------    Assessment & Plan      Active Hospital Problems    Diagnosis POA   • **Hyponatremia [E87.1] Yes   • Obesity (BMI 30-39.9) [E66.9] Yes   • Carotid artery stenosis [I65.29] Unknown   • PVD (peripheral vascular disease) with claudication (CMS/[facility]) [I73.9] Yes   • Tobacco abuse [Z72.0] Yes   • Alcohol abuse [F10.10] Yes   • Hypertension [I10] Yes     · Hyponatremia present upon admission suspect secondary to alcohol use: 121 on admission.  Suspect due to beer potomania.  The patient received IV normal saline 1000 mL bolus and IV banana bag in the emergency department without improvement in the sodium.  Nephrology was consulted in the ED who recommended fluid restrictions and repeat sodium levels every 4 hours.  We will continue with nephrology recommendations and appreciate further advice/assistance. [facility] protocol in place for alcohol abuse.  P.o. thiamine and multivitamin ordered.  Based off the current home medication list, the only 2 medications that may cause hyponatremia are nabumetone and Zoloft, hold for now.  Monitor closely.    · Witnessed syncopal event: Urinalysis unremarkable.  UDS negative.  Blood ethanol % 0.054, Ethanol 54. CT of the head, transthoracic echo, bilateral carotid ultrasound, and orthostatic vitals ordered.  Troponin T negative x1, continue with serial troponin. Patient is to be up with assistance, fall precautions in place.  Neuro checks every 4 hours. Suspect syncopal episode and dizziness is multifactorial 2/2 to ETOH abuse, carotid artery stenosis and hyponatremia.     · Normocytic anemia: H&H stable.  Baseline hemoglobin  appears to be around 13-14, hemoglobin on admission 11.3.  Repeat a.m. CBC and monitor H&H closely.  If hemoglobin less than 7 will transfuse.  Obtain vitamin B12, folate, iron, iron panel.  Replace as necessary.    · History of peripheral vascular disease status post arthrectomy followed by balloon angioplasty and stenting to the right and left SFA: Currently following with interventional cardiology, Dr. Azar.  Patient had an appointment on 6/9/2028 with cardiology that was unremarkable.  Has a follow-up appointment on 9/15/2020.  Continue home aspirin and Plavix.     · Carotid artery stenosis: Patient had a bilateral carotid ultrasound performed on 2/12/2020 that showed complete occlusion of the internal carotid artery on the left and right internal carotid artery showed a plaque with the area of stenosis being around 50 to 69% range.  Vertebral artery flows were antegrade.  Repeat bilateral carotid ultrasound as stated above.  Fall precautions in place.  Up with assistance.    · Hyperglycemia present upon admission with no known history of diabetes mellitus: Hemoglobin A1c from 8/13/2020 is 5.60.    · Essential hypertension: Patient had a hypotensive episode in the emergency department with BP 85/58.  Etiology unclear at this time.  Last /83.  Based off the current home medication list, the patient appears to be on atenolol and lisinopril.  Plan to continue home antihypertensive medications once reconciled per pharmacy.  Continue monitor BP per hospital protocol and titrate BP medications as necessary.    Smoking tobacco use: Gurwinder Harding  reports that he has been smoking cigarettes. He has a 45.00 pack-year smoking history. He has never used smokeless tobacco.. I have educated him on the risk of diseases from using tobacco products such as cancer, COPD and heart diease. I advised him to quit and he is not willing to quit. I spent 4 minutes counseling the patient. Nicotine patch ordered.     · Chronic  back pain: supportive care.     · Obesity: BMI 32.49 kg/m2     · F/E/N: No IV fluids.  Replace electrolytes as necessary.  Cardiac diet, daily fluid restriction 1000 mL fluid per day  ---------------------------------------------------  DVT Prophylaxis: Subcutaneous heparin  GI Prophylaxis: Protonix  Activity: Up with assistance, fall precautions    The patient is considered to be a high risk patient due to: Hyponatremia present upon admission suspect secondary to alcohol use, syncope    INPATIENT status due to the need for care which can only be reasonably provided in an hospital setting such as aggressive/expedited ancillary services and/or consultation services, the necessity for IV medications, close physician monitoring and/or the possible need for procedures.  In such, I feel patient’s risk for adverse outcomes and need for care warrant INPATIENT evaluation and predict the patient’s care encounter to likely last beyond 2 midnights.    Code Status: FULL CODE     I have discussed the patient's assessment and plan with the patient, nursing staff, and attending physician       Cesilia Joseph PA-C  Hospitalist Service -- [facility]       08/31/20  23:12    Attending Physician: Javy Dupont MD       Electronically signed by Javy Dupont MD at 09/01/20 0018          Emergency Department Notes      Alyssa Cunha RN at 08/31/20 1752        Pt voiced  Bilateral Cortaid blockage one 90% and other 50-60 %. He also has known blockage to lower extremities. A stent placed in right leg 1 year ago.  EMS reported attempted to complete ortho static but pt failed. Ems reports when pt stood up he passed out for 10 seconds but he came back around with out intervention      Alyssa Cunha RN  08/31/20 1755      Electronically signed by Alyssa Cunha RN at 08/31/20 1755     Orlando Fox at 08/31/20 1800        Pt glucose is 141  Alyssa puente RN notifide     Orlando Fox  08/31/20  1801      Electronically signed by Orlando Fox at 08/31/20 1801     Jodie Fields DO at 08/31/20 1838          Subjective   60-year-old male presents after syncopal episode.  Patient was at local restaurant drinking beer on the patio in the heat.  He had a syncopal episode.  His friends called EMS.  He states that he has had episodes before where he was lightheaded, especially when getting up quickly.  This is been worse since he was started on blood pressure medicines.  This is the first time that he is actually had complete loss of consciousness.  He denied any chest pain, palpitations, seizure activity, tongue biting, incontinence, nausea, vomiting, diarrhea or other complaints.  Patient states that he drinks 4-5 beers per day.  Smokes 1 pack of cigarettes per day.  He does have a history of peripheral vascular disease as well.  He states that this time he is feeling back to normal.  Initially on arrival BP was 85/58 at the time of evaluation blood pressure was 127/88.          Review of Systems   All other systems reviewed and are negative.      Past Medical History:   Diagnosis Date   • Alcohol abuse    • Arthritis    • Carotid stenosis    • ED (erectile dysfunction)    • History of degenerative disc disease    • Hyperglycemia    • Hypertension    • Low back pain    • Neuropathy    • Peripheral vascular disease (CMS/HCC)    • Screening PSA (prostate specific antigen) 09/2015   • Tobacco abuse    • Vitamin D deficiency        Allergies   Allergen Reactions   • Penicillins Hives and Shortness Of Breath     As A child   • Novocain [Procaine] Nausea And Vomiting       Past Surgical History:   Procedure Laterality Date   • APPENDECTOMY     • ARTERIOGRAM N/A 9/19/2019    Procedure: Arteriogram;  Surgeon: Tong Azar MD;  Location: [facility] CATH INVASIVE LOCATION;  Service: Cardiology   • BACK SURGERY      DISC RUPTURE REPAIR, L5   • CARDIAC CATHETERIZATION Right 10/29/2019    Procedure: Peripheral  angiography;  Surgeon: Tong zAar MD;  Location: [facility] INVASIVE LOCATION;  Service: Cardiovascular   • VASCULAR SURGERY Bilateral        Family History   Problem Relation Age of Onset   • Hypertension Mother    • Cancer Father         LUNG   • Diabetes Father    • Hypertension Father    • Heart attack Other         GRANDFATHER       Social History     Socioeconomic History   • Marital status:      Spouse name: Not on file   • Number of children: 1   • Years of education: Not on file   • Highest education level: Not on file   Occupational History   • Occupation:  at [employer]   Tobacco Use   • Smoking status: Current Every Day Smoker     Packs/day: 1.00     Types: Cigarettes   • Smokeless tobacco: Never Used   Substance and Sexual Activity   • Alcohol use: Yes     Alcohol/week: 6.0 standard drinks     Types: 6 Cans of beer per week     Comment: Daily    • Drug use: No   • Sexual activity: Defer   Social History Narrative    Lives in [place].            Objective   Physical Exam   Constitutional: He is oriented to person, place, and time. He appears well-developed and well-nourished.   HENT:   Head: Normocephalic and atraumatic.   Mouth/Throat: Oropharynx is clear and moist. Dental caries (Severe) present.   Eyes: Pupils are equal, round, and reactive to light. Conjunctivae and EOM are normal. No scleral icterus.   Neck: Normal range of motion. Neck supple.   Cardiovascular: Normal rate, regular rhythm and normal heart sounds. Exam reveals no gallop and no friction rub.   No murmur heard.  Pulmonary/Chest: Effort normal and breath sounds normal. No respiratory distress. He has no wheezes. He has no rales.   Abdominal: Soft. Bowel sounds are normal. He exhibits no distension. There is no tenderness.   Musculoskeletal: Normal range of motion. He exhibits no edema.   Neurological: He is alert and oriented to person, place, and time. He has normal strength. No cranial nerve deficit or sensory  deficit.   Skin: Skin is warm and dry.   Psychiatric: He has a normal mood and affect.   Nursing note and vitals reviewed.      Procedures          ED Course  ED Course as of Aug 31 2114   Mon Aug 31, 2020   2000 Assumed care at shift change.    [EG]   2110 Discussed the case with Dr. Barrios who states that the best course of action would be fluid restriction with every 4 hour sodium checks.    [EG]      ED Course User Index  [EG] Jodie Fields DO                                           MDM  Number of Diagnoses or Management Options  Alcohol abuse: new and requires workup  Dizziness: new and requires workup  Hyponatremia: new and requires workup     Amount and/or Complexity of Data Reviewed  Clinical lab tests: reviewed and ordered  Tests in the radiology section of CPT®:  ordered and reviewed  Tests in the medicine section of CPT®:  reviewed and ordered  Discuss the patient with other providers: yes  Independent visualization of images, tracings, or specimens: yes    Risk of Complications, Morbidity, and/or Mortality  Presenting problems: high  Diagnostic procedures: high  Management options: high    Patient Progress  Patient progress: stable      Final diagnoses:   Hyponatremia   Dizziness   Alcohol abuse            Jodie Fields DO  08/31/20 2114      Electronically signed by Jodie Fields DO at 08/31/20 2114     Mame Benson RN at 08/31/20 1850        Pt reports becoming dizzy and light headed around 5 pm while sitting outside at a table talking with friends. Pt reports loosing consciousness for a short period of time. Pt reports having dizzy spells more often lately. Pt reports no fall when he blacked out today. Pt reports falling last night and Wednesday night due to becoming dizzy. Pt reports landing on buttocks when he fell. Pt reports no dizziness at this time but he does feel weak. Pt denies any chest pain at this time. No wounds noted at this time. Pt is AAOx4. Bed is lowest position  and call light within reach.          Mame Benson RN  08/31/20 1904      Electronically signed by Mame Benson RN at 08/31/20 1904     Reyna Munguia at 08/31/20 2003        Called Dr Barrios and left a message requesting him to return our call for Dr Fields.      Reyna Munguia  08/31/20 2005      Electronically signed by Reyna Munguia at 08/31/20 2005         Facility-Administered Medications as of 9/1/2020   Medication Dose Route Frequency Provider Last Rate Last Dose   • atenolol (TENORMIN) tablet 50 mg  50 mg Oral Daily Javy Dupont MD       • folic acid (FOLVITE) tablet 1 mg  1 mg Oral Daily Javy Dupont MD       • gabapentin (NEURONTIN) capsule 1,200 mg  1,200 mg Oral TID Javy Dupont MD   1,200 mg at 09/01/20 0431   • heparin (porcine) 5000 UNIT/ML injection 5,000 Units  5,000 Units Subcutaneous Q8H Thu Long MD       • lisinopril (PRINIVIL,ZESTRIL) tablet 40 mg  40 mg Oral Daily Javy Dupont MD       • [COMPLETED] multiple vitamin (M.V.I. Adult) 10 mL, thiamine (B-1) 100 mg, folic acid 1 mg, magnesium sulfate 2 g in sodium chloride 0.9 % 1,000 mL infusion  1,000 mL/hr Intravenous Once Ranulfo Alvarado MD   Stopped at 08/31/20 2030   • multivitamin (DAILY NOY) tablet 1 tablet  1 tablet Oral Daily Javy Dupont MD       • nicotine (NICODERM CQ) 14 MG/24HR patch 1 patch  1 patch Transdermal Q24H Cesilia Joseph PA-C       • nicotine (NICODERM CQ) 21 MG/24HR patch 1 patch  1 patch Transdermal Once Jodie Fields DO   1 patch at 08/31/20 2147   • pantoprazole (PROTONIX) EC tablet 40 mg  40 mg Oral Q AM eCsilia Joseph PA-C   40 mg at 09/01/20 0427   • [COMPLETED] sodium chloride 0.9 % bolus 1,000 mL  1,000 mL Intravenous Once Ranulfo Alvarado MD   Stopped at 08/31/20 1932   • sodium chloride 0.9 % flush 10 mL  10 mL Intravenous PRN Javy Dupont MD       • sodium chloride 0.9 % flush 10 mL  10 mL Intravenous Q12H Cresencio,  Javy Timmons MD   10 mL at 08/31/20 2231   • sodium chloride 0.9 % flush 10 mL  10 mL Intravenous PRN Javy Dupont MD       • thiamine (VITAMIN B-1) tablet 100 mg  100 mg Oral Daily Javy Dupont MD         Physician Progress Notes (all)    No notes of this type exist for this encounter.         Consult Notes (all)    No notes of this type exist for this encounter.

## 2020-09-02 LAB
ANION GAP SERPL CALCULATED.3IONS-SCNC: 9.9 MMOL/L (ref 5–15)
BASOPHILS # BLD AUTO: 0.07 10*3/MM3 (ref 0–0.2)
BASOPHILS NFR BLD AUTO: 1 % (ref 0–1.5)
BUN SERPL-MCNC: 12 MG/DL (ref 6–20)
BUN/CREAT SERPL: 10 (ref 7–25)
CALCIUM SPEC-SCNC: 8.4 MG/DL (ref 8.6–10.5)
CHLORIDE SERPL-SCNC: 98 MMOL/L (ref 98–107)
CHOLEST SERPL-MCNC: 139 MG/DL (ref 0–200)
CO2 SERPL-SCNC: 21.1 MMOL/L (ref 22–29)
CREAT SERPL-MCNC: 1.2 MG/DL (ref 0.76–1.27)
DEPRECATED RDW RBC AUTO: 56.3 FL (ref 37–54)
EOSINOPHIL # BLD AUTO: 0.15 10*3/MM3 (ref 0–0.4)
EOSINOPHIL NFR BLD AUTO: 2.2 % (ref 0.3–6.2)
ERYTHROCYTE [DISTWIDTH] IN BLOOD BY AUTOMATED COUNT: 16.8 % (ref 12.3–15.4)
GFR SERPL CREATININE-BSD FRML MDRD: 62 ML/MIN/1.73
GLUCOSE SERPL-MCNC: 107 MG/DL (ref 65–99)
HCT VFR BLD AUTO: 36.6 % (ref 37.5–51)
HDLC SERPL-MCNC: 53 MG/DL (ref 40–60)
HGB BLD-MCNC: 11.3 G/DL (ref 13–17.7)
IMM GRANULOCYTES # BLD AUTO: 0.03 10*3/MM3 (ref 0–0.05)
IMM GRANULOCYTES NFR BLD AUTO: 0.4 % (ref 0–0.5)
LDLC SERPL CALC-MCNC: 68 MG/DL (ref 0–100)
LDLC/HDLC SERPL: 1.29 {RATIO}
LYMPHOCYTES # BLD AUTO: 1.65 10*3/MM3 (ref 0.7–3.1)
LYMPHOCYTES NFR BLD AUTO: 24.7 % (ref 19.6–45.3)
MCH RBC QN AUTO: 27.8 PG (ref 26.6–33)
MCHC RBC AUTO-ENTMCNC: 30.9 G/DL (ref 31.5–35.7)
MCV RBC AUTO: 90.1 FL (ref 79–97)
MONOCYTES # BLD AUTO: 0.69 10*3/MM3 (ref 0.1–0.9)
MONOCYTES NFR BLD AUTO: 10.3 % (ref 5–12)
NEUTROPHILS NFR BLD AUTO: 4.09 10*3/MM3 (ref 1.7–7)
NEUTROPHILS NFR BLD AUTO: 61.4 % (ref 42.7–76)
NRBC BLD AUTO-RTO: 0 /100 WBC (ref 0–0.2)
PLATELET # BLD AUTO: 218 10*3/MM3 (ref 140–450)
PMV BLD AUTO: 10.2 FL (ref 6–12)
POTASSIUM SERPL-SCNC: 4.7 MMOL/L (ref 3.5–5.2)
RBC # BLD AUTO: 4.06 10*6/MM3 (ref 4.14–5.8)
SODIUM SERPL-SCNC: 127 MMOL/L (ref 136–145)
SODIUM SERPL-SCNC: 128 MMOL/L (ref 136–145)
SODIUM SERPL-SCNC: 128 MMOL/L (ref 136–145)
SODIUM SERPL-SCNC: 129 MMOL/L (ref 136–145)
SODIUM SERPL-SCNC: 129 MMOL/L (ref 136–145)
SODIUM SERPL-SCNC: 130 MMOL/L (ref 136–145)
TRIGL SERPL-MCNC: 89 MG/DL (ref 0–150)
VLDLC SERPL-MCNC: 17.8 MG/DL
WBC # BLD AUTO: 6.68 10*3/MM3 (ref 3.4–10.8)

## 2020-09-02 PROCEDURE — 84295 ASSAY OF SERUM SODIUM: CPT | Performed by: HOSPITALIST

## 2020-09-02 PROCEDURE — 94799 UNLISTED PULMONARY SVC/PX: CPT

## 2020-09-02 PROCEDURE — 85025 COMPLETE CBC W/AUTO DIFF WBC: CPT | Performed by: HOSPITALIST

## 2020-09-02 PROCEDURE — 80048 BASIC METABOLIC PNL TOTAL CA: CPT | Performed by: HOSPITALIST

## 2020-09-02 PROCEDURE — 25010000002 HEPARIN (PORCINE) PER 1000 UNITS: Performed by: HOSPITALIST

## 2020-09-02 PROCEDURE — 80061 LIPID PANEL: CPT | Performed by: HOSPITALIST

## 2020-09-02 PROCEDURE — 99232 SBSQ HOSP IP/OBS MODERATE 35: CPT | Performed by: HOSPITALIST

## 2020-09-02 RX ORDER — LATANOPROST 50 UG/ML
1 SOLUTION/ DROPS OPHTHALMIC NIGHTLY
Status: DISCONTINUED | OUTPATIENT
Start: 2020-09-02 | End: 2020-09-03 | Stop reason: HOSPADM

## 2020-09-02 RX ORDER — LORAZEPAM 2 MG/ML
2 INJECTION INTRAMUSCULAR
Status: DISCONTINUED | OUTPATIENT
Start: 2020-09-02 | End: 2020-09-03 | Stop reason: HOSPADM

## 2020-09-02 RX ORDER — LORAZEPAM 2 MG/1
2 TABLET ORAL
Status: DISCONTINUED | OUTPATIENT
Start: 2020-09-02 | End: 2020-09-03 | Stop reason: HOSPADM

## 2020-09-02 RX ORDER — LORAZEPAM 1 MG/1
1 TABLET ORAL
Status: DISCONTINUED | OUTPATIENT
Start: 2020-09-02 | End: 2020-09-03 | Stop reason: HOSPADM

## 2020-09-02 RX ORDER — LORAZEPAM 2 MG/ML
1 INJECTION INTRAMUSCULAR
Status: DISCONTINUED | OUTPATIENT
Start: 2020-09-02 | End: 2020-09-03 | Stop reason: HOSPADM

## 2020-09-02 RX ORDER — CARBOXYMETHYLCELLULOSE SODIUM 5 MG/ML
2 SOLUTION/ DROPS OPHTHALMIC 3 TIMES DAILY PRN
Status: DISCONTINUED | OUTPATIENT
Start: 2020-09-02 | End: 2020-09-03 | Stop reason: HOSPADM

## 2020-09-02 RX ADMIN — CARBOXYMETHYLCELLULOSE SODIUM 2 DROP: 5 SOLUTION/ DROPS OPHTHALMIC at 12:26

## 2020-09-02 RX ADMIN — METHOCARBAMOL 750 MG: 750 TABLET, FILM COATED ORAL at 20:25

## 2020-09-02 RX ADMIN — ATORVASTATIN CALCIUM 40 MG: 40 TABLET, FILM COATED ORAL at 20:25

## 2020-09-02 RX ADMIN — METHOCARBAMOL 750 MG: 750 TABLET, FILM COATED ORAL at 14:48

## 2020-09-02 RX ADMIN — HEPARIN SODIUM 5000 UNITS: 5000 INJECTION INTRAVENOUS; SUBCUTANEOUS at 20:26

## 2020-09-02 RX ADMIN — GABAPENTIN 1200 MG: 400 CAPSULE ORAL at 05:34

## 2020-09-02 RX ADMIN — SODIUM CHLORIDE, PRESERVATIVE FREE 10 ML: 5 INJECTION INTRAVENOUS at 20:26

## 2020-09-02 RX ADMIN — LATANOPROST 1 DROP: 50 SOLUTION/ DROPS OPHTHALMIC at 20:25

## 2020-09-02 RX ADMIN — LISINOPRIL 40 MG: 10 TABLET ORAL at 08:38

## 2020-09-02 RX ADMIN — PANTOPRAZOLE SODIUM 40 MG: 40 TABLET, DELAYED RELEASE ORAL at 05:34

## 2020-09-02 RX ADMIN — FOLIC ACID 1 MG: 1 TABLET ORAL at 08:38

## 2020-09-02 RX ADMIN — ATENOLOL 50 MG: 50 TABLET ORAL at 14:47

## 2020-09-02 RX ADMIN — HEPARIN SODIUM 5000 UNITS: 5000 INJECTION INTRAVENOUS; SUBCUTANEOUS at 14:47

## 2020-09-02 RX ADMIN — GABAPENTIN 1200 MG: 400 CAPSULE ORAL at 17:06

## 2020-09-02 RX ADMIN — Medication 1 TABLET: at 08:38

## 2020-09-02 RX ADMIN — Medication 100 MG: at 08:38

## 2020-09-02 RX ADMIN — SODIUM CHLORIDE, PRESERVATIVE FREE 10 ML: 5 INJECTION INTRAVENOUS at 08:38

## 2020-09-02 RX ADMIN — HEPARIN SODIUM 5000 UNITS: 5000 INJECTION INTRAVENOUS; SUBCUTANEOUS at 05:34

## 2020-09-02 RX ADMIN — ASPIRIN 81 MG: 81 TABLET, COATED ORAL at 08:38

## 2020-09-02 RX ADMIN — Medication 150 MG: at 08:38

## 2020-09-02 RX ADMIN — GABAPENTIN 1200 MG: 400 CAPSULE ORAL at 14:46

## 2020-09-02 NOTE — PLAN OF CARE
Patient has been tired today.  He says he didn't sleep well last night.  He was hoping to go home today, but his sodium level dropped.  He has no confusion and CIWA score has been negative.  Will continue to monitor.

## 2020-09-02 NOTE — PROGRESS NOTES
Nephrology Progress Note      Subjective     Patient feels fine, no complaints.     Objective       Vital signs :     Temp:  [96.7 °F (35.9 °C)-97.9 °F (36.6 °C)] 97.9 °F (36.6 °C)  Heart Rate:  [73-85] 77  Resp:  [16-19] 19  BP: (109-148)/(66-85) 124/74      Intake/Output Summary (Last 24 hours) at 9/2/2020 0820  Last data filed at 9/2/2020 0630  Gross per 24 hour   Intake 1279 ml   Output 1775 ml   Net -496 ml       Physical Exam:    General Appearance : Not in acute distress  Lungs : clear to auscultation, respirations regular  Heart :  regular rhythm & normal rate, normal S1, S2 and no murmur, no rub  Abdomen : normal bowel sounds, no masses, no hepatomegaly, no splenomegaly, soft non-tender and no guarding  Extremities : moves extremities well, No edema, no cyanosis and no redness  Skin :  no bleeding, bruising or rash  Neurologic :   orientated to person, place, time and situation, Grossly no focal deficits      Laboratory Data :     Albumin Albumin   Date Value Ref Range Status   09/01/2020 3.55 3.50 - 5.20 g/dL Final   08/31/2020 3.67 3.50 - 5.20 g/dL Final      Magnesium Magnesium   Date Value Ref Range Status   09/01/2020 2.3 1.6 - 2.6 mg/dL Final          PTH               No results found for: PTH    CBC and coagulation:  Results from last 7 days   Lab Units 09/02/20  0120 09/01/20  0406 08/31/20  1858   WBC 10*3/mm3 6.68 7.90 8.89   HEMOGLOBIN g/dL 11.3* 10.8* 11.3*   HEMATOCRIT % 36.6* 34.6* 35.2*   MCV fL 90.1 88.7 87.1   MCHC g/dL 30.9* 31.2* 32.1   PLATELETS 10*3/mm3 218 225 215     Acid/base balance:      Renal and electrolytes:  Results from last 7 days   Lab Units 09/02/20  0439 09/02/20  0120 09/01/20  1940 09/01/20  1626 09/01/20  1153  09/01/20  0406  08/31/20 2021 08/31/20  1858   SODIUM mmol/L 130* 129* 129* 131* 129*   < > 127*   < > 121* 121*   POTASSIUM mmol/L  --  4.7  --   --   --   --  4.7  --  5.1 5.0   MAGNESIUM mg/dL  --   --   --   --   --   --  2.3  --   --   --    CHLORIDE  mmol/L  --  98  --   --   --   --  95*  --  92* 90*   CO2 mmol/L  --  21.1*  --   --   --   --  21.7*  --  20.3* 20.3*   BUN mg/dL  --  12  --   --   --   --  8  --  10 10   CREATININE mg/dL  --  1.20  --   --   --   --  1.07  --  1.17 1.20   EGFR IF NONAFRICN AM mL/min/1.73  --  62  --   --   --   --  71  --  64 62   CALCIUM mg/dL  --  8.4*  --   --   --   --  8.1*  --  8.1* 8.4*   PHOSPHORUS mg/dL  --   --   --   --   --   --  2.7  --   --   --     < > = values in this interval not displayed.     Estimated Creatinine Clearance: 77.5 mL/min (by C-G formula based on SCr of 1.2 mg/dL).    Liver and pancreatic function:  Results from last 7 days   Lab Units 09/01/20  0406 08/31/20  1858   ALBUMIN g/dL 3.55 3.67   BILIRUBIN mg/dL 0.3 0.2   ALK PHOS U/L 73 74   AST (SGOT) U/L 21 21   ALT (SGPT) U/L 18 18         Cardiac:      Liver and pancreatic function:  Results from last 7 days   Lab Units 09/01/20  0406 08/31/20  1858   ALBUMIN g/dL 3.55 3.67   BILIRUBIN mg/dL 0.3 0.2   ALK PHOS U/L 73 74   AST (SGOT) U/L 21 21   ALT (SGPT) U/L 18 18       Medications :       aspirin 81 mg Oral Daily   atenolol 50 mg Oral Daily   atorvastatin 40 mg Oral Nightly   folic acid 1 mg Oral Daily   gabapentin 1,200 mg Oral TID   heparin (porcine) 5,000 Units Subcutaneous Q8H   iron polysaccharides 150 mg Oral Daily   lisinopril 40 mg Oral Daily   multivitamin 1 tablet Oral Daily   nicotine 1 patch Transdermal Q24H   pantoprazole 40 mg Oral Q AM   sodium chloride 10 mL Intravenous Q12H   thiamine 100 mg Oral Daily            Assessment/Plan     1. Hyponatremia, likely chronic  2. Sycope  3. Essential hypertension  4. Etoh abuse  5. Nicotine dependence.      Sodium has appropriately stable at 130, will sign off. Please call if any questions.   Pt should quit drinking and to reduce his fluid intake to 1.5L/day. I educated and counseled the patient in detail. I will see him in clinic in 3 wks after discharge.     Hyponatremia likely 2/2  polydipsia and bear potomania  Admitted with Sodium 121, has increased to 130 in less than 12 hours. I will give 200ml of D5W and continue checking sodium [id number], continue of fluid restriction.          Bharati Lino MD  09/02/20  08:20

## 2020-09-02 NOTE — PROGRESS NOTES
[facility] HOSPITALIST PROGRESS NOTE     Patient Identification:  Name:  Gurwinder Harding  Age:  60 y.o.  Sex:  male  :  1960  MRN:  [id number]  Visit Number:  [id number]  ROOM: [address] [place] [address]     Primary Care Provider:  Fátima Mai APRN    Length of stay:  2    Subjective        Chief Compliant Follow up for the Hyponatremia and syncope    Patient seen and examined this morning with no family at bedside.  Doing better. Feels tired.  Denies any dizziness nausea vomiting abdominal pain chest pain shortness of breath cough. Ambulated and no dizziness. Did have imbalance. Afebrile no events overnight.  On room air      Objective     Current Hospital Meds:    aspirin 81 mg Oral Daily   atenolol 50 mg Oral Daily   atorvastatin 40 mg Oral Nightly   folic acid 1 mg Oral Daily   gabapentin 1,200 mg Oral TID   heparin (porcine) 5,000 Units Subcutaneous Q8H   iron polysaccharides 150 mg Oral Daily   latanoprost 1 drop Both Eyes Nightly   lisinopril 40 mg Oral Daily   multivitamin 1 tablet Oral Daily   nicotine 1 patch Transdermal Q24H   pantoprazole 40 mg Oral Q AM   sodium chloride 10 mL Intravenous Q12H   thiamine 100 mg Oral Daily      ----------------------------------------------------------------------------------------------------------------------  Vital Signs:  Temp:  [97.7 °F (36.5 °C)-98.3 °F (36.8 °C)] 98.3 °F (36.8 °C)  Heart Rate:  [73-86] 86  Resp:  [18-20] 20  BP: ()/(58-85) 111/59  SpO2:  [95 %-99 %] 95 %  on   ;   Device (Oxygen Therapy): room air  Body mass index is 30.78 kg/m².    Wt Readings from Last 3 Encounters:   20 97.3 kg (214 lb 8 oz)   20 100 kg (221 lb)   20 97.5 kg (215 lb)       Intake/Output Summary (Last 24 hours) at 2020 1855  Last data filed at 2020 1422  Gross per 24 hour   Intake 240 ml   Output 1900 ml   Net -1660 ml     Diet Regular; Cardiac, Daily Fluid Restriction; 1000 mL Fluid Per  Day  ----------------------------------------------------------------------------------------------------------------------  Physical exam:  General: Comfortable, Not in distress.  Well-developed and well-nourished.   HENT:  Head:  Normocephalic and atraumatic.  Mouth:  Moist mucous membranes.    Eyes:  Conjunctivae and EOM are normal.  Pupils are equal, round, and reactive to light.  No scleral icterus.    Neck:  Neck supple.  No JVD present.  Trachea midline.   Cardiovascular:  Normal rate, regular rhythm with no murmur.  Pulmonary/Chest:  No respiratory distress, no wheezes, no crackles, with normal breath sounds and good air movement.  Abdomen:  Soft.  Bowel sounds are normal.  No distension and no tenderness.   Musculoskeletal:  No edema, no tenderness, and no deformity.  No red or swollen joints anywhere.    Neurological:  Alert and oriented to person, place, and time.  No cranial nerve deficit. No focal deficits. No facial droop.  No slurred speech.   Skin:  Skin is warm and dry. No rash noted. No pallor.   Peripheral vascular:  pulses in all 4 extremities with no clubbing, no cyanosis, no edema.  Genitourinary: no calixto   ----------------------------------------------------------------------------------------------------------------------  ----------------------------------------------------------------------------------------------------------------------  Results from last 7 days   Lab Units 09/02/20  0120 09/01/20 0406 08/31/20  1858   WBC 10*3/mm3 6.68 7.90 8.89   HEMOGLOBIN g/dL 11.3* 10.8* 11.3*   HEMATOCRIT % 36.6* 34.6* 35.2*   MCV fL 90.1 88.7 87.1   MCHC g/dL 30.9* 31.2* 32.1   PLATELETS 10*3/mm3 218 225 215     Results from last 7 days   Lab Units 09/02/20  1540 09/02/20  1229 09/02/20  0951  09/02/20  0120  09/01/20 0406 08/31/20 2021 08/31/20  1858   SODIUM mmol/L 127* 128* 128*   < > 129*   < > 127*   < > 121* 121*   POTASSIUM mmol/L  --   --   --   --  4.7  --  4.7  --  5.1 5.0   MAGNESIUM  mg/dL  --   --   --   --   --   --  2.3  --   --   --    CHLORIDE mmol/L  --   --   --   --  98  --  95*  --  92* 90*   CO2 mmol/L  --   --   --   --  21.1*  --  21.7*  --  20.3* 20.3*   BUN mg/dL  --   --   --   --  12  --  8  --  10 10   CREATININE mg/dL  --   --   --   --  1.20  --  1.07  --  1.17 1.20   PHOSPHORUS mg/dL  --   --   --   --   --   --  2.7  --   --   --    EGFR IF NONAFRICN AM mL/min/1.73  --   --   --   --  62  --  71  --  64 62   CALCIUM mg/dL  --   --   --   --  8.4*  --  8.1*  --  8.1* 8.4*   GLUCOSE mg/dL  --   --   --   --  107*  --  109*  --  120* 128*   ALBUMIN g/dL  --   --   --   --   --   --  3.55  --   --  3.67   BILIRUBIN mg/dL  --   --   --   --   --   --  0.3  --   --  0.2   ALK PHOS U/L  --   --   --   --   --   --  73  --   --  74   AST (SGOT) U/L  --   --   --   --   --   --  21  --   --  21   ALT (SGPT) U/L  --   --   --   --   --   --  18  --   --  18    < > = values in this interval not displayed.   Estimated Creatinine Clearance: 77.5 mL/min (by C-G formula based on SCr of 1.2 mg/dL).  Results from last 7 days   Lab Units 09/01/20  0406 08/31/20  2332 08/31/20  1858   TROPONIN T ng/mL <0.010 <0.010 <0.010     Glucose   Date/Time Value Ref Range Status   08/31/2020 2260 141 (H) 70 - 130 mg/dL Final     No results found for: AMMONIA  Results from last 7 days   Lab Units 09/02/20  0120   CHOLESTEROL mg/dL 139   TRIGLYCERIDES mg/dL 89   HDL CHOL mg/dL 53   LDL CHOL mg/dL 68     Results from last 7 days   Lab Units 08/31/20 2021   NITRITE UA  Negative     No results found for: BLOODCXNo results found for: RESPCXNo results found for: URINECXNo results found for: WOUNDCXNo results found for: BODYFLDCXNo results found for: STOOLCX  I have personally looked at the labs and they are summarized above.  ----------------------------------------------------------------------------------------------------------------------  Imaging Results (Last 24 Hours)     ** No results found for the  last 24 hours. **        I have personally reviewed the radiology images and read the final radiology report.    Assessment & Plan      Assessment:  Acute on chronic hyponatremia  Syncope  Essential HTN  PVD with occlusion of the L ICA/moderate stenosis of the R ICA  S/P Atherectomy/Balloon angioplasty/stent Right SFA  10/2019  ID Anemia  Alcohol abuse  Tobacco abuse  Obesity    Plan:  Acute on chronic hyponatremia, likely secondary to beer proteinemia.  Continue with fluid restriction. sodium currently 127-130 today. Nephrology on board.  Goal is to correct 6-8 M EQ in 24 hours.  Current to monitor sodium every 4 hours.    Syncope, carotid Doppler done which showed similar finding to previous carotid Doppler occlusion of the L ICA/moderate stenosis of the R ICA.   2D echocardiogram with mild concentric hypertrophy and EF of 56 to 60% and grade 1 diastolic dysfunction.  Orthostatic vitals negative.    Essential HTN, continue with home atenolol and lisinopril.    PVD with occlusion of the L ICA/stenosis of the R ICA, not on aspirin or statin at home. Started On aspirin statin.     S/P Atherectomy/Balloon angioplasty/stent Right SFA  10/2019, arterial Doppler done both lower extremity and it shows monophasic waveform in the right posterior tibial artery.  No occlusion.  Started on aspirin and statin.    ID Anemia, iron panel with iron deficiency.  on p.o. iron supplement.    Alcohol abuse, continue with p.o. multivitamin folate and thiamine.  Continue with CIWA protocol.    Tobacco abuse, on nicotine patch.    Heparin subcu for DVT for prophylaxis and Protonix p.o. for GI prophylaxis.     Management and plan discussed in detail with patient and nursing.    The patient is high risk due to the following diagnoses/reasons: Acute on chronic hyponatremia, Syncope    Thu Long MD  09/02/20  18:59

## 2020-09-02 NOTE — PLAN OF CARE
Pt continues to show minimal s/s of alcohol withdrawal.  Pt denies chest pain and or SOB.  Sodium levels are within parameters set by nephrology.  Will continue to monitor.

## 2020-09-03 ENCOUNTER — READMISSION MANAGEMENT (OUTPATIENT)
Dept: CALL CENTER | Facility: HOSPITAL | Age: 60
End: 2020-09-03

## 2020-09-03 VITALS
BODY MASS INDEX: 30.75 KG/M2 | OXYGEN SATURATION: 99 % | SYSTOLIC BLOOD PRESSURE: 126 MMHG | HEIGHT: 70 IN | RESPIRATION RATE: 20 BRPM | TEMPERATURE: 97.7 F | DIASTOLIC BLOOD PRESSURE: 71 MMHG | WEIGHT: 214.8 LBS | HEART RATE: 115 BPM

## 2020-09-03 LAB
ANION GAP SERPL CALCULATED.3IONS-SCNC: 10 MMOL/L (ref 5–15)
BASOPHILS # BLD AUTO: 0.07 10*3/MM3 (ref 0–0.2)
BASOPHILS NFR BLD AUTO: 1.1 % (ref 0–1.5)
BUN SERPL-MCNC: 13 MG/DL (ref 6–20)
BUN/CREAT SERPL: 11.1 (ref 7–25)
CALCIUM SPEC-SCNC: 8.4 MG/DL (ref 8.6–10.5)
CHLORIDE SERPL-SCNC: 100 MMOL/L (ref 98–107)
CO2 SERPL-SCNC: 19 MMOL/L (ref 22–29)
CREAT SERPL-MCNC: 1.17 MG/DL (ref 0.76–1.27)
DEPRECATED RDW RBC AUTO: 57.5 FL (ref 37–54)
EOSINOPHIL # BLD AUTO: 0.14 10*3/MM3 (ref 0–0.4)
EOSINOPHIL NFR BLD AUTO: 2.1 % (ref 0.3–6.2)
ERYTHROCYTE [DISTWIDTH] IN BLOOD BY AUTOMATED COUNT: 17.2 % (ref 12.3–15.4)
GFR SERPL CREATININE-BSD FRML MDRD: 64 ML/MIN/1.73
GLUCOSE SERPL-MCNC: 110 MG/DL (ref 65–99)
HCT VFR BLD AUTO: 35.3 % (ref 37.5–51)
HGB BLD-MCNC: 10.7 G/DL (ref 13–17.7)
IMM GRANULOCYTES # BLD AUTO: 0.02 10*3/MM3 (ref 0–0.05)
IMM GRANULOCYTES NFR BLD AUTO: 0.3 % (ref 0–0.5)
LYMPHOCYTES # BLD AUTO: 1.34 10*3/MM3 (ref 0.7–3.1)
LYMPHOCYTES NFR BLD AUTO: 20.3 % (ref 19.6–45.3)
MCH RBC QN AUTO: 27.7 PG (ref 26.6–33)
MCHC RBC AUTO-ENTMCNC: 30.3 G/DL (ref 31.5–35.7)
MCV RBC AUTO: 91.5 FL (ref 79–97)
MONOCYTES # BLD AUTO: 0.75 10*3/MM3 (ref 0.1–0.9)
MONOCYTES NFR BLD AUTO: 11.3 % (ref 5–12)
NEUTROPHILS NFR BLD AUTO: 4.29 10*3/MM3 (ref 1.7–7)
NEUTROPHILS NFR BLD AUTO: 64.9 % (ref 42.7–76)
NRBC BLD AUTO-RTO: 0 /100 WBC (ref 0–0.2)
PLATELET # BLD AUTO: 190 10*3/MM3 (ref 140–450)
PMV BLD AUTO: 10.1 FL (ref 6–12)
POTASSIUM SERPL-SCNC: 4.4 MMOL/L (ref 3.5–5.2)
RBC # BLD AUTO: 3.86 10*6/MM3 (ref 4.14–5.8)
SODIUM SERPL-SCNC: 126 MMOL/L (ref 136–145)
SODIUM SERPL-SCNC: 129 MMOL/L (ref 136–145)
SODIUM SERPL-SCNC: 131 MMOL/L (ref 136–145)
SODIUM SERPL-SCNC: 131 MMOL/L (ref 136–145)
SODIUM SERPL-SCNC: 133 MMOL/L (ref 136–145)
WBC # BLD AUTO: 6.61 10*3/MM3 (ref 3.4–10.8)

## 2020-09-03 PROCEDURE — 99238 HOSP IP/OBS DSCHRG MGMT 30/<: CPT | Performed by: HOSPITALIST

## 2020-09-03 PROCEDURE — 25010000002 HEPARIN (PORCINE) PER 1000 UNITS: Performed by: HOSPITALIST

## 2020-09-03 PROCEDURE — 80048 BASIC METABOLIC PNL TOTAL CA: CPT | Performed by: HOSPITALIST

## 2020-09-03 PROCEDURE — 84295 ASSAY OF SERUM SODIUM: CPT | Performed by: HOSPITALIST

## 2020-09-03 PROCEDURE — 85025 COMPLETE CBC W/AUTO DIFF WBC: CPT | Performed by: HOSPITALIST

## 2020-09-03 RX ORDER — NICOTINE 21 MG/24HR
1 PATCH, TRANSDERMAL 24 HOURS TRANSDERMAL
Qty: 30 PATCH | Refills: 0 | Status: SHIPPED | OUTPATIENT
Start: 2020-09-04 | End: 2020-10-04

## 2020-09-03 RX ORDER — IRON POLYSACCHARIDE COMPLEX 150 MG
150 CAPSULE ORAL DAILY
Qty: 30 CAPSULE | Refills: 0 | Status: SHIPPED | OUTPATIENT
Start: 2020-09-04 | End: 2020-10-04

## 2020-09-03 RX ORDER — LISINOPRIL 40 MG/1
20 TABLET ORAL DAILY
Qty: 90 TABLET | Refills: 1
Start: 2020-09-03 | End: 2021-03-15

## 2020-09-03 RX ORDER — MULTIVITAMIN
1 TABLET ORAL DAILY
Qty: 30 TABLET | Refills: 0 | Status: SHIPPED | OUTPATIENT
Start: 2020-09-04 | End: 2020-10-04

## 2020-09-03 RX ORDER — ASPIRIN 81 MG/1
81 TABLET ORAL DAILY
Qty: 30 TABLET | Refills: 0 | Status: SHIPPED | OUTPATIENT
Start: 2020-09-04 | End: 2020-10-04

## 2020-09-03 RX ORDER — ATORVASTATIN CALCIUM 40 MG/1
40 TABLET, FILM COATED ORAL NIGHTLY
Qty: 30 TABLET | Refills: 0 | Status: SHIPPED | OUTPATIENT
Start: 2020-09-03 | End: 2020-10-03

## 2020-09-03 RX ADMIN — Medication 150 MG: at 08:28

## 2020-09-03 RX ADMIN — GABAPENTIN 1200 MG: 400 CAPSULE ORAL at 05:41

## 2020-09-03 RX ADMIN — Medication 1 TABLET: at 08:28

## 2020-09-03 RX ADMIN — Medication 100 MG: at 08:28

## 2020-09-03 RX ADMIN — LISINOPRIL 40 MG: 10 TABLET ORAL at 08:28

## 2020-09-03 RX ADMIN — FOLIC ACID 1 MG: 1 TABLET ORAL at 08:28

## 2020-09-03 RX ADMIN — SODIUM CHLORIDE, PRESERVATIVE FREE 10 ML: 5 INJECTION INTRAVENOUS at 08:28

## 2020-09-03 RX ADMIN — ASPIRIN 81 MG: 81 TABLET, COATED ORAL at 08:28

## 2020-09-03 RX ADMIN — NICOTINE 1 PATCH: 14 PATCH, EXTENDED RELEASE TRANSDERMAL at 08:27

## 2020-09-03 RX ADMIN — PANTOPRAZOLE SODIUM 40 MG: 40 TABLET, DELAYED RELEASE ORAL at 05:41

## 2020-09-03 RX ADMIN — METHOCARBAMOL 750 MG: 750 TABLET, FILM COATED ORAL at 08:31

## 2020-09-03 RX ADMIN — GABAPENTIN 1200 MG: 400 CAPSULE ORAL at 14:32

## 2020-09-03 RX ADMIN — HEPARIN SODIUM 5000 UNITS: 5000 INJECTION INTRAVENOUS; SUBCUTANEOUS at 05:41

## 2020-09-03 RX ADMIN — HEPARIN SODIUM 5000 UNITS: 5000 INJECTION INTRAVENOUS; SUBCUTANEOUS at 14:33

## 2020-09-03 NOTE — PROGRESS NOTES
Discharge Planning Assessment   [facility]     Patient Name: Gurwinder Harding  MRN: [id number]  Today's Date: 9/3/2020    Admit Date: 8/31/2020    Discharge Needs Assessment    No documentation.       Discharge Plan     Row Name 09/03/20 1709       Plan    Final Discharge Disposition Code  01 - home or self-care    Final Note  Pt being discharged home today. No other needs identified.           Pastora Bergman

## 2020-09-03 NOTE — OUTREACH NOTE
Prep Survey      Responses   [facility] facility patient discharged from?  [facility]   Is LACE score < 7 ?  No   Eligibility  [facility]   Hospital  [facility]   Date of Admission  08/31/20   Date of Discharge  09/03/20   Discharge Disposition  Home or Self Care   Discharge diagnosis  Hypnatremia   COVID-19 Test Status  Not tested   Does the patient have one of the following disease processes/diagnoses(primary or secondary)?  Other   Does the patient have Home health ordered?  No   Is there a DME ordered?  No   Prep survey completed?  Yes          Margarette Goldberg RN

## 2020-09-03 NOTE — DISCHARGE SUMMARY
[facility] HOSPITALISTS DISCHARGE SUMMARY    Patient Identification:  Name:  Gurwinder Harding  Age:  60 y.o.  Sex:  male  :  1960  MRN:  [id number]  Visit Number:  [id number]    Date of Admission: 2020  Date of Discharge:  9/3/2020     PCP: Fátima Mai, KEVIN      DISCHARGE DIAGNOSIS  Acute on chronic hyponatremia, improved  Syncope  Essential HTN  PVD with occlusion of the L ICA/moderate stenosis of the R ICA  S/P Atherectomy/Balloon angioplasty/stent Right SFA  10/2019  ID Anemia  Alcohol abuse  Tobacco abuse  Obesity    CONSULTS   Nephrology    PROCEDURES PERFORMED  None    HOSPITAL COURSE  Mr. Harding  is a 60 y.o. male with past medical history significant for peripheral vascular disease status post arthrectomy followed by balloon angioplasty and stenting to the right and left SFA, carotid artery stenosis, alcohol abuse, essential hypertension, smoking tobacco use, that presented to the [facility] emergency department for evaluation of syncope. Please see the admitting history and physical for further details.  Admitted with syncope and acute on chronic hyponatremia with sodium of 121.  Nephrology consulted and patient was treated for hyponatremia and sodium corrected to 131 at the time of discharge.  For syncope, patient had work-up done including carotid Doppler done which showed similar finding to previous carotid Doppler occlusion of the L ICA/moderate stenosis of the R ICA.  2D echocardiogram with mild concentric hypertrophy and EF of 56 to 60% and grade 1 diastolic dysfunction.  Orthostatic vitals negative.  Patient does have history of carotid artery stenosis and he has followed up with Dr. Brice in 2020.  Will make an outpatient follow-up apartment with   Dr. Brice.  Started on aspirin and statin. For HTN, blood pressure borderline so lisinopril dose decreased to 20 mg.  For alcohol abuse, patient was continued with multivitamin folate and thiamine.   CIWA protocol was initiated but the patient did not have any signs or symptoms of alcohol withdrawal.  Discussed in detail with the patient regarding strict abstinence from alcohol abuse and tobacco abuse and he verbalized understanding.  Also discussed with the patient regarding fluid restriction 1500 cc/day.  Will repeat the BMP in 5 days to be followed up by PCP.  Ambulating independently without any dizziness or imbalance.  Since patient was vitally stable, improved symptomatically and would like to go home, and nephrology signed off, it was decided to discharge patient to home.  Discharge disposition stable.      VITAL SIGNS:  Temp:  [97.7 °F (36.5 °C)-98.7 °F (37.1 °C)] 97.7 °F (36.5 °C)  Heart Rate:  [] 115  Resp:  [15-20] 20  BP: ()/(52-78) 126/71  SpO2:  [94 %-99 %] 99 %  on   ;   Device (Oxygen Therapy): room air    Body mass index is 30.82 kg/m².  Wt Readings from Last 3 Encounters:   09/03/20 97.4 kg (214 lb 12.8 oz)   08/13/20 100 kg (221 lb)   06/09/20 97.5 kg (215 lb)       PHYSICAL EXAM:  General: Comfortable, Not in distress.  Well-developed and well-nourished.   HENT:  Head:  Normocephalic and atraumatic.  Mouth:  Moist mucous membranes.    Eyes:  Conjunctivae and EOM are normal.  Pupils are equal, round, and reactive to light.  No scleral icterus.    Neck:  Neck supple.  No JVD present.  Trachea midline.   Cardiovascular:  Normal rate, regular rhythm with no murmur.  Pulmonary/Chest:  No respiratory distress, no wheezes, no crackles, with normal breath sounds and good air movement.  Abdomen:  Soft.  Bowel sounds are normal.  No distension and no tenderness.   Musculoskeletal: no tenderness, and no deformity.  No red or swollen joints anywhere.    Neurological:  Alert and oriented to person, place, and time.  No cranial nerve deficit. No focal deficits. No facial droop.  No slurred speech.   Skin:  Skin is warm and dry. No rash noted. No pallor.   Peripheral vascular:  pulses in all 4  extremities with no clubbing, no cyanosis, no edema.  Genitourinary: no calixto     DISCHARGE DISPOSITION   Home     DISCHARGE MEDICATIONS:     Discharge Medications      New Medications      Instructions Start Date   aspirin 81 MG EC tablet   81 mg, Oral, Daily   Start Date:  September 4, 2020     atorvastatin 40 MG tablet  Commonly known as:  LIPITOR   40 mg, Oral, Nightly      iron polysaccharides 150 MG capsule  Commonly known as:  NIFEREX   150 mg, Oral, Daily   Start Date:  September 4, 2020     multivitamin tablet tablet   1 tablet, Oral, Daily   Start Date:  September 4, 2020     nicotine 14 MG/24HR patch  Commonly known as:  NICODERM CQ   1 patch, Transdermal, Every 24 Hours Scheduled   Start Date:  September 4, 2020        Changes to Medications      Instructions Start Date   lisinopril 40 MG tablet  Commonly known as:  PRINIVIL,ZESTRIL  What changed:  how much to take   20 mg, Oral, Daily         Continue These Medications      Instructions Start Date   atenolol 50 MG tablet  Commonly known as:  TENORMIN   50 mg, Oral, Daily      folic acid-pyridoxine-cyanocobalamin 2.5-25-2 MG tablet tablet  Commonly known as:  Folbic   1 tablet, Oral, Daily      gabapentin 800 MG tablet  Commonly known as:  NEURONTIN   1,200 mg, Oral, 3 Times Daily      methocarbamol 750 MG tablet  Commonly known as:  ROBAXIN   750 mg, Oral, 4 Times Daily PRN         Stop These Medications    nabumetone 500 MG tablet  Commonly known as:  RELAFEN            Diet Instructions     Diet: Cardiac      Discharge Diet:  Cardiac    Fluid Restriction per day:  1500 mL Fluid        Activity Instructions     Activity as Tolerated          Future Appointments   Date Time Provider Department Center   9/15/2020  1:15 PM Tong Azar MD [facility] None   9/16/2020  1:30 PM Fátima Mai APRN [facility] None     Your Scheduled Appointments    Sep 15, 2020  1:15 PM EDT  Follow Up with Tong Azar MD  [facility]  CARDIOLOGY (--) [address]  [place] [address]  [phone]   Arrive 15 minutes prior to appointment.     Sep 16, 2020  1:30 PM EDT  Office Visit with KEVIN Mullen  [facility] FAMILY MEDICINE (--) [address]  [place] [address] [phone]   Arrive 15 minutes prior to appointment. If you are in need of a language or hearing  please call the Department.      Additional instructions:      OFFICES CLOSED.  FRI AM UNIT SEC WILL SCHEDULE AND CALL PT.                Additional Instructions for the Follow-ups that You Need to Schedule     Discharge Follow-up with PCP   As directed       Currently Documented PCP:    Fátima Mai APRN    PCP Phone Number:    [phone]     Follow Up Details:  Within 1 week         Discharge Follow-up with Specialty: Nephrology Dr. Lino; 3 Weeks   As directed      Specialty:  Nephrology Dr. Lino    Follow Up:  3 Weeks         Discharge Follow-up with Specified Provider: Dr. Brice CT Surgery; 3 Weeks   As directed      To:  Dr. Brice CT Surgery    Follow Up:  3 Weeks    Follow Up Details:  R Carotid artery stenosis         Basic Metabolic Panel    Sep 08, 2020 (Approximate)      Fax report to KEVIN Alberts for Hyponatremia.    Order Comments:  Fax report to KEVIN Alberts for Hyponatremia.            Follow-up Information     Fátima Mai APRN .    Specialty:  Family Medicine  Why:  Within 1 week  Contact information:  [address]  [address]  [facility] [address]  [phone]                    TEST  RESULTS PENDING AT DISCHARGE       CODE STATUS  Code Status and Medical Interventions:   Ordered at: 08/31/20 2127     Level Of Support Discussed With:    Patient     Code Status:    CPR     Medical Interventions (Level of Support Prior to Arrest):    Full       Thu Long MD  09/03/20  18:40     Time: I spent  25  minutes on this discharge activity which included:  face-to-face encounter with the patient, reviewing the data in the system, coordination of the care with the nursing staff as well as consultants, documentation, and entering orders.      Please note that this discharge summary required less than 30 minutes to complete.    Please send a copy of this dictation to the following providers:  Fátima Mai APRN

## 2020-09-04 ENCOUNTER — TRANSITIONAL CARE MANAGEMENT TELEPHONE ENCOUNTER (OUTPATIENT)
Dept: CALL CENTER | Facility: HOSPITAL | Age: 60
End: 2020-09-04

## 2020-09-04 NOTE — PAYOR COMM NOTE
"[facility]  NPI:[id number]    Utilization Review  Contact: Lisa Rhoades RN  Phone: [phone]  Fax:[phone]    DISCHARGE NOTIFICATION   Pending auth # [id number]    Janine Harding (60 y.o. Male)     Date of Birth Social Security Number Address Home Phone MRN    1960  [address] [place] [address] [phone] [id number]    Ep[employer]op[employer] Marital Status          Caodaism        Admission Date Admission Type Admitting Provider Attending Provider Department, Room/Bed    20 Emergency Javy Dupont MD  [facility] 3 [facility], 3308/1S    Discharge Date Discharge Disposition Discharge Destination        9/3/2020 Home or Self Care              Attending Provider:  (none)   Allergies:  Penicillins, Novocain [Procaine]    Isolation:  None   Infection:  None   Code Status:  Prior    Ht:  177.8 cm (70\")   Wt:  97.4 kg (214 lb 12.8 oz)    Admission Cmt:  None   Principal Problem:  Hyponatremia [E87.1]                 Active Insurance as of 2020     Primary Coverage     Payor Plan Insurance Group Employer/Plan Group    [employer]HEM BLUE CROSS ANTHEM BLUE CROSS BLUE SHIELD PPO [id number]     Payor Plan Address Payor Plan Phone Number Payor Plan Fax Number Effective Dates    [address] [phone]  2018 - None Entered    [place] [address]       Subscriber Name Subscriber Birth Date Member ID       JANINE HARDING 1960 [id number]                 Emergency Contacts      (Rel.) Home Phone Work Phone Mobile Phone    Patsy Harding (Spouse) -- -- [phone]               Discharge Summary      Thu Long MD at 20 1650              [facility] HOSPITALISTS DISCHARGE SUMMARY    Patient Identification:  Name:  Janine Harding  Age:  60 y.o.  Sex:  male  :  1960  MRN:  [id number]  Visit Number:  [id number]    Date of Admission: 2020  Date of Discharge:  9/3/2020     PCP: Fátima Mai APRN      DISCHARGE " DIAGNOSIS  Acute on chronic hyponatremia, improved  Syncope  Essential HTN  PVD with occlusion of the L ICA/moderate stenosis of the R ICA  S/P Atherectomy/Balloon angioplasty/stent Right SFA  10/2019  ID Anemia  Alcohol abuse  Tobacco abuse  Obesity    CONSULTS   Nephrology    PROCEDURES PERFORMED  None    HOSPITAL COURSE  Mr. Harding  is a 60 y.o. male with past medical history significant for peripheral vascular disease status post arthrectomy followed by balloon angioplasty and stenting to the right and left SFA, carotid artery stenosis, alcohol abuse, essential hypertension, smoking tobacco use, that presented to the [facility] emergency department for evaluation of syncope. Please see the admitting history and physical for further details.  Admitted with syncope and acute on chronic hyponatremia with sodium of 121.  Nephrology consulted and patient was treated for hyponatremia and sodium corrected to 131 at the time of discharge.  For syncope, patient had work-up done including carotid Doppler done which showed similar finding to previous carotid Doppler occlusion of the L ICA/moderate stenosis of the R ICA.  2D echocardiogram with mild concentric hypertrophy and EF of 56 to 60% and grade 1 diastolic dysfunction.  Orthostatic vitals negative.  Patient does have history of carotid artery stenosis and he has followed up with Dr. Brice in 03/2020.  Will make an outpatient follow-up apartment with   Dr. Brice.  Started on aspirin and statin. For HTN, blood pressure borderline so lisinopril dose decreased to 20 mg.  For alcohol abuse, patient was continued with multivitamin folate and thiamine.  CIWA protocol was initiated but the patient did not have any signs or symptoms of alcohol withdrawal.  Discussed in detail with the patient regarding strict abstinence from alcohol abuse and tobacco abuse and he verbalized understanding.  Also discussed with the patient regarding fluid restriction 1500  cc/day.  Will repeat the BMP in 5 days to be followed up by PCP.  Ambulating independently without any dizziness or imbalance.  Since patient was vitally stable, improved symptomatically and would like to go home, and nephrology signed off, it was decided to discharge patient to home.  Discharge disposition stable.      VITAL SIGNS:  Temp:  [97.7 °F (36.5 °C)-98.7 °F (37.1 °C)] 97.7 °F (36.5 °C)  Heart Rate:  [] 115  Resp:  [15-20] 20  BP: ()/(52-78) 126/71  SpO2:  [94 %-99 %] 99 %  on   ;   Device (Oxygen Therapy): room air    Body mass index is 30.82 kg/m².  Wt Readings from Last 3 Encounters:   09/03/20 97.4 kg (214 lb 12.8 oz)   08/13/20 100 kg (221 lb)   06/09/20 97.5 kg (215 lb)       PHYSICAL EXAM:  General: Comfortable, Not in distress.  Well-developed and well-nourished.   HENT:  Head:  Normocephalic and atraumatic.  Mouth:  Moist mucous membranes.    Eyes:  Conjunctivae and EOM are normal.  Pupils are equal, round, and reactive to light.  No scleral icterus.    Neck:  Neck supple.  No JVD present.  Trachea midline.   Cardiovascular:  Normal rate, regular rhythm with no murmur.  Pulmonary/Chest:  No respiratory distress, no wheezes, no crackles, with normal breath sounds and good air movement.  Abdomen:  Soft.  Bowel sounds are normal.  No distension and no tenderness.   Musculoskeletal: no tenderness, and no deformity.  No red or swollen joints anywhere.    Neurological:  Alert and oriented to person, place, and time.  No cranial nerve deficit. No focal deficits. No facial droop.  No slurred speech.   Skin:  Skin is warm and dry. No rash noted. No pallor.   Peripheral vascular:  pulses in all 4 extremities with no clubbing, no cyanosis, no edema.  Genitourinary: no calixto     DISCHARGE DISPOSITION   Home     DISCHARGE MEDICATIONS:     Discharge Medications      New Medications      Instructions Start Date   aspirin 81 MG EC tablet   81 mg, Oral, Daily   Start Date:  September 4, 2020        atorvastatin 40 MG tablet  Commonly known as:  LIPITOR   40 mg, Oral, Nightly      iron polysaccharides 150 MG capsule  Commonly known as:  NIFEREX   150 mg, Oral, Daily   Start Date:  September 4, 2020     multivitamin tablet tablet   1 tablet, Oral, Daily   Start Date:  September 4, 2020     nicotine 14 MG/24HR patch  Commonly known as:  NICODERM CQ   1 patch, Transdermal, Every 24 Hours Scheduled   Start Date:  September 4, 2020        Changes to Medications      Instructions Start Date   lisinopril 40 MG tablet  Commonly known as:  PRINIVIL,ZESTRIL  What changed:  how much to take   20 mg, Oral, Daily         Continue These Medications      Instructions Start Date   atenolol 50 MG tablet  Commonly known as:  TENORMIN   50 mg, Oral, Daily      folic acid-pyridoxine-cyanocobalamin 2.5-25-2 MG tablet tablet  Commonly known as:  Folbic   1 tablet, Oral, Daily      gabapentin 800 MG tablet  Commonly known as:  NEURONTIN   1,200 mg, Oral, 3 Times Daily      methocarbamol 750 MG tablet  Commonly known as:  ROBAXIN   750 mg, Oral, 4 Times Daily PRN         Stop These Medications    nabumetone 500 MG tablet  Commonly known as:  RELAFEN            Diet Instructions     Diet: Cardiac      Discharge Diet:  Cardiac    Fluid Restriction per day:  1500 mL Fluid        Activity Instructions     Activity as Tolerated          Future Appointments   Date Time Provider Department Center   9/15/2020  1:15 PM Tong Azar MD [facility] None   9/16/2020  1:30 PM Fátima Mai APRN [facility] None     Your Scheduled Appointments    Sep 15, 2020  1:15 PM EDT  Follow Up with Tong Azar MD  [facility] CARDIOLOGY (--) [address]  [place] [address] [phone]   Arrive 15 minutes prior to appointment.     Sep 16, 2020  1:30 PM EDT  Office Visit with KEVIN Mullen  [facility] FAMILY MEDICINE (--) [address]  [place]  [address]  [phone]   Arrive 15 minutes prior to appointment. If you are in need of a language or hearing  please call the Department.      Additional instructions:      OFFICES CLOSED.  FRI AM UNIT SEC WILL SCHEDULE AND CALL PT.                Additional Instructions for the Follow-ups that You Need to Schedule     Discharge Follow-up with PCP   As directed       Currently Documented PCP:    Fátima Mai APRN    PCP Phone Number:    [phone]     Follow Up Details:  Within 1 week         Discharge Follow-up with Specialty: Nephrology Dr. Lino; 3 Weeks   As directed      Specialty:  Nephrology Dr. Lino    Follow Up:  3 Weeks         Discharge Follow-up with Specified Provider: Dr. Brice CT Surgery; 3 Weeks   As directed      To:  Dr. Brice CT Surgery    Follow Up:  3 Weeks    Follow Up Details:  R Carotid artery stenosis         Basic Metabolic Panel    Sep 08, 2020 (Approximate)      Fax report to KEVIN Alberts for Hyponatremia.    Order Comments:  Fax report to KEVIN Alberts for Hyponatremia.            Follow-up Information     Fátima Mai APRN .    Specialty:  Family Medicine  Why:  Within 1 week  Contact information:  [address]  [address]  [facility] [address]  [phone]                    TEST  RESULTS PENDING AT DISCHARGE       CODE STATUS  Code Status and Medical Interventions:   Ordered at: 08/31/20 2127     Level Of Support Discussed With:    Patient     Code Status:    CPR     Medical Interventions (Level of Support Prior to Arrest):    Full       Thu Long MD  09/03/20  18:40     Time: I spent  25  minutes on this discharge activity which included: face-to-face encounter with the patient, reviewing the data in the system, coordination of the care with the nursing staff as well as consultants, documentation, and entering orders.      Please note that this discharge summary required less than 30 minutes to complete.    Please send a  copy of this dictation to the following providers:  Fátima Mai APRN    Electronically signed by Thu Long MD at 09/03/20 6108

## 2020-09-04 NOTE — OUTREACH NOTE
Call Center TCM Note      Responses   [facility] patient discharged from?  [place]   COVID-19 Test Status  Not tested   Does the patient have one of the following disease processes/diagnoses(primary or secondary)?  Other   TCM attempt successful?  No   Unsuccessful attempts  Attempt 2          Ivan Bradley RN    9/4/2020, 13:45

## 2020-09-04 NOTE — OUTREACH NOTE
Call Center TCM Note      Responses   [facility] patient discharged from?  [place]   COVID-19 Test Status  Not tested   Does the patient have one of the following disease processes/diagnoses(primary or secondary)?  Other   TCM attempt successful?  No   Unsuccessful attempts  Attempt 1   Call Status  Left message          Ivan Bradley RN    9/4/2020, 13:23

## 2020-09-04 NOTE — PAYOR COMM NOTE
"[facility]  NPI:[id number]    Utilization Review  Contact: Lisa Rhoades RN  Phone: [phone]  Fax:[phone]    DISCHARGE NOTIFICATION   Pending auth # [id number]    Janine Harding (60 y.o. Male)     Date of Birth Social Security Number Address Home Phone MRN    1960  [address] [place] [address] [phone] [id number]    Judaism Marital Status          [employer]        Admission Date Admission Type Admitting Provider Attending Provider Department, Room/Bed    20 Emergency Javy Dupont MD  [facility] 3 [facility], 3308/1S    Discharge Date Discharge Disposition Discharge Destination        9/3/2020 Home or Self Care              Attending Provider:  (none)   Allergies:  Penicillins, Novocain [Procaine]    Isolation:  None   Infection:  None   Code Status:  Prior    Ht:  177.8 cm (70\")   Wt:  97.4 kg (214 lb 12.8 oz)    Admission Cmt:  None   Principal Problem:  Hyponatremia [E87.1]                 Active Insurance as of 2020     Primary Coverage     Payor Plan Insurance Group Employer/Plan Group    [employer] BLUE [employer]OSS [employer] BLUE CROSS BLUE [employer] PPO [id number]     Payor Plan Address Payor Plan Phone Number Payor Plan Fax Number Effective Dates    [address] [phone]  2018 - None Entered    [place] [address]       Subscriber Name Subscriber Birth Date Member ID       JANINE HARDING 1960 [id number]                 Emergency Contacts      (Rel.) Home Phone Work Phone Mobile Phone    Patsy Harding (Spouse) -- -- [phone]               Discharge Summary      Thu Long MD at 20 1650              [facility]OSPITAL[facility] DISCHARGE SUMMARY    Patient Identification:  Name:  Janine Harding  Age:  60 y.o.  Sex:  male  :  1960  MRN:  [id number]  Visit Number:  [id number]    Date of Admission: 2020  Date of Discharge:  9/3/2020     PCP: Fátima Mai APRN      DISCHARGE " DIAGNOSIS  Acute on chronic hyponatremia, improved  Syncope  Essential HTN  PVD with occlusion of the L ICA/moderate stenosis of the R ICA  S/P Atherectomy/Balloon angioplasty/stent Right SFA  10/2019  ID Anemia  Alcohol abuse  Tobacco abuse  Obesity    CONSULTS   Nephrology    PROCEDURES PERFORMED  None    HOSPITAL COURSE  Mr. Harding  is a 60 y.o. male with past medical history significant for peripheral vascular disease status post arthrectomy followed by balloon angioplasty and stenting to the right and left SFA, carotid artery stenosis, alcohol abuse, essential hypertension, smoking tobacco use, that presented to the [facility] emergency department for evaluation of syncope. Please see the admitting history and physical for further details.  Admitted with syncope and acute on chronic hyponatremia with sodium of 121.  Nephrology consulted and patient was treated for hyponatremia and sodium corrected to 131 at the time of discharge.  For syncope, patient had work-up done including carotid Doppler done which showed similar finding to previous carotid Doppler occlusion of the L ICA/moderate stenosis of the R ICA.  2D echocardiogram with mild concentric hypertrophy and EF of 56 to 60% and grade 1 diastolic dysfunction.  Orthostatic vitals negative.  Patient does have history of carotid artery stenosis and he has followed up with Dr. Brice in 03/2020.  Will make an outpatient follow-up apartment with   Dr. Brice.  Started on aspirin and statin. For HTN, blood pressure borderline so lisinopril dose decreased to 20 mg.  For alcohol abuse, patient was continued with multivitamin folate and thiamine.  CIWA protocol was initiated but the patient did not have any signs or symptoms of alcohol withdrawal.  Discussed in detail with the patient regarding strict abstinence from alcohol abuse and tobacco abuse and he verbalized understanding.  Also discussed with the patient regarding fluid restriction 1500  cc/day.  Will repeat the BMP in 5 days to be followed up by PCP.  Ambulating independently without any dizziness or imbalance.  Since patient was vitally stable, improved symptomatically and would like to go home, and nephrology signed off, it was decided to discharge patient to home.  Discharge disposition stable.      VITAL SIGNS:  Temp:  [97.7 °F (36.5 °C)-98.7 °F (37.1 °C)] 97.7 °F (36.5 °C)  Heart Rate:  [] 115  Resp:  [15-20] 20  BP: ()/(52-78) 126/71  SpO2:  [94 %-99 %] 99 %  on   ;   Device (Oxygen Therapy): room air    Body mass index is 30.82 kg/m².  Wt Readings from Last 3 Encounters:   09/03/20 97.4 kg (214 lb 12.8 oz)   08/13/20 100 kg (221 lb)   06/09/20 97.5 kg (215 lb)       PHYSICAL EXAM:  General: Comfortable, Not in distress.  Well-developed and well-nourished.   HENT:  Head:  Normocephalic and atraumatic.  Mouth:  Moist mucous membranes.    Eyes:  Conjunctivae and EOM are normal.  Pupils are equal, round, and reactive to light.  No scleral icterus.    Neck:  Neck supple.  No JVD present.  Trachea midline.   Cardiovascular:  Normal rate, regular rhythm with no murmur.  Pulmonary/Chest:  No respiratory distress, no wheezes, no crackles, with normal breath sounds and good air movement.  Abdomen:  Soft.  Bowel sounds are normal.  No distension and no tenderness.   Musculoskeletal: no tenderness, and no deformity.  No red or swollen joints anywhere.    Neurological:  Alert and oriented to person, place, and time.  No cranial nerve deficit. No focal deficits. No facial droop.  No slurred speech.   Skin:  Skin is warm and dry. No rash noted. No pallor.   Peripheral vascular:  pulses in all 4 extremities with no clubbing, no cyanosis, no edema.  Genitourinary: no calixto     DISCHARGE DISPOSITION   Home     DISCHARGE MEDICATIONS:     Discharge Medications      New Medications      Instructions Start Date   aspirin 81 MG EC tablet   81 mg, Oral, Daily   Start Date:  September 4, 2020        atorvastatin 40 MG tablet  Commonly known as:  LIPITOR   40 mg, Oral, Nightly      iron polysaccharides 150 MG capsule  Commonly known as:  NIFEREX   150 mg, Oral, Daily   Start Date:  September 4, 2020     multivitamin tablet tablet   1 tablet, Oral, Daily   Start Date:  September 4, 2020     nicotine 14 MG/24HR patch  Commonly known as:  NICODERM CQ   1 patch, Transdermal, Every 24 Hours Scheduled   Start Date:  September 4, 2020        Changes to Medications      Instructions Start Date   lisinopril 40 MG tablet  Commonly known as:  PRINIVIL,ZESTRIL  What changed:  how much to take   20 mg, Oral, Daily         Continue These Medications      Instructions Start Date   atenolol 50 MG tablet  Commonly known as:  TENORMIN   50 mg, Oral, Daily      folic acid-pyridoxine-cyanocobalamin 2.5-25-2 MG tablet tablet  Commonly known as:  Folbic   1 tablet, Oral, Daily      gabapentin 800 MG tablet  Commonly known as:  NEURONTIN   1,200 mg, Oral, 3 Times Daily      methocarbamol 750 MG tablet  Commonly known as:  ROBAXIN   750 mg, Oral, 4 Times Daily PRN         Stop These Medications    nabumetone 500 MG tablet  Commonly known as:  RELAFEN            Diet Instructions     Diet: Cardiac      Discharge Diet:  Cardiac    Fluid Restriction per day:  1500 mL Fluid        Activity Instructions     Activity as Tolerated          Future Appointments   Date Time Provider Department Center   9/15/2020  1:15 PM Tong Azar MD [facility] None   9/16/2020  1:30 PM Fátima Mai APRN [facility] None     Your Scheduled Appointments    Sep 15, 2020  1:15 PM EDT  Follow Up with Tong Azar MD  [facility] CARDIOLOGY (--) [address]  [place] [address] [phone]   Arrive 15 minutes prior to appointment.     Sep 16, 2020  1:30 PM EDT  Office Visit with KEVIN Mullen  [facility] FAMILY MEDICINE (--) [address]  [place]  [address]  [phone]   Arrive 15 minutes prior to appointment. If you are in need of a language or hearing  please call the Department.      Additional instructions:      OFFICES CLOSED.  FRI AM UNIT SEC WILL SCHEDULE AND CALL PT.                Additional Instructions for the Follow-ups that You Need to Schedule     Discharge Follow-up with PCP   As directed       Currently Documented PCP:    Fátima Mai APRN    PCP Phone Number:    [phone]     Follow Up Details:  Within 1 week         Discharge Follow-up with Specialty: Nephrology Dr. Lino; 3 Weeks   As directed      Specialty:  Nephrology Dr. Lino    Follow Up:  3 Weeks         Discharge Follow-up with Specified Provider: Dr. Brice CT Surgery; 3 Weeks   As directed      To:  Dr. Brice CT Surgery    Follow Up:  3 Weeks    Follow Up Details:  R Carotid artery stenosis         Basic Metabolic Panel    Sep 08, 2020 (Approximate)      Fax report to KEVIN Alberts for Hyponatremia.    Order Comments:  Fax report to KEVIN Alberts for Hyponatremia.            Follow-up Information     Fátima Mai APRN .    Specialty:  Family Medicine  Why:  Within 1 week  Contact information:  [address]  [address]  [facility] [address]  [phone]                    TEST  RESULTS PENDING AT DISCHARGE       CODE STATUS  Code Status and Medical Interventions:   Ordered at: 08/31/20 2127     Level Of Support Discussed With:    Patient     Code Status:    CPR     Medical Interventions (Level of Support Prior to Arrest):    Full       Thu Long MD  09/03/20  18:40     Time: I spent  25  minutes on this discharge activity which included: face-to-face encounter with the patient, reviewing the data in the system, coordination of the care with the nursing staff as well as consultants, documentation, and entering orders.      Please note that this discharge summary required less than 30 minutes to complete.    Please send a  copy of this dictation to the following providers:  Fátima Mai APRN    Electronically signed by Thu Long MD at 09/03/20 3706

## 2020-09-06 ENCOUNTER — TRANSITIONAL CARE MANAGEMENT TELEPHONE ENCOUNTER (OUTPATIENT)
Dept: CALL CENTER | Facility: HOSPITAL | Age: 60
End: 2020-09-06

## 2020-09-06 NOTE — OUTREACH NOTE
Call Center TCM Note      Responses   [facility] patient discharged from?  [facility]   COVID-19 Test Status  Not tested   Does the patient have one of the following disease processes/diagnoses(primary or secondary)?  Other   TCM attempt successful?  Yes   Call start time  1215   Call end time  1217   Discharge diagnosis  Hypnatremia   Meds reviewed with patient/caregiver?  Yes   Is the patient having any side effects they believe may be caused by any medication additions or changes?  No   Does the patient have all medications ordered at discharge?  Yes   Is the patient taking all medications as directed (includes completed medication regime)?  Yes   Does the patient have a primary care provider?   Yes   Does the patient have an appointment with their PCP within 7 days of discharge?  Greater than 7 days   Comments regarding PCP  9/16/20 at 1:30 pm   What is preventing the patient from scheduling follow up appointments within 7 days of discharge?  Earlier appointment not available   Nursing Interventions  Verified appointment date/time/provider   Has the patient kept scheduled appointments due by today?  N/A   Pulse Ox monitoring  None   Psychosocial issues?  No   Did the patient receive a copy of their discharge instructions?  Yes   Nursing interventions  Reviewed instructions with patient   What is the patient's perception of their health status since discharge?  Improving   Is the patient/caregiver able to teach back signs and symptoms related to disease process for when to call PCP?  Yes   Is the patient/caregiver able to teach back signs and symptoms related to disease process for when to call 911?  Yes   Is the patient/caregiver able to teach back the hierarchy of who to call/visit for symptoms/problems? PCP, Specialist, Home health nurse, Urgent Care, ED, 911  Yes   If the patient is a current smoker, are they able to teach back resources for cessation?  Smoking cessation medications, [id number] [Smoker.  Sent home with patches.]   TCM call completed?  Yes          Alyssa Corado RN    9/6/2020, 12:17

## 2020-09-08 ENCOUNTER — LAB (OUTPATIENT)
Dept: FAMILY MEDICINE CLINIC | Facility: CLINIC | Age: 60
End: 2020-09-08

## 2020-09-08 DIAGNOSIS — E87.1 HYPONATREMIA: ICD-10-CM

## 2020-09-08 LAB
ANION GAP SERPL CALCULATED.3IONS-SCNC: 9.4 MMOL/L (ref 5–15)
BUN SERPL-MCNC: 13 MG/DL (ref 6–20)
BUN/CREAT SERPL: 10.1 (ref 7–25)
CALCIUM SPEC-SCNC: 9.5 MG/DL (ref 8.6–10.5)
CHLORIDE SERPL-SCNC: 101 MMOL/L (ref 98–107)
CO2 SERPL-SCNC: 23.6 MMOL/L (ref 22–29)
CREAT SERPL-MCNC: 1.29 MG/DL (ref 0.76–1.27)
GFR SERPL CREATININE-BSD FRML MDRD: 57 ML/MIN/1.73
GLUCOSE SERPL-MCNC: 120 MG/DL (ref 65–99)
POTASSIUM SERPL-SCNC: 5.4 MMOL/L (ref 3.5–5.2)
SODIUM SERPL-SCNC: 134 MMOL/L (ref 136–145)

## 2020-09-08 PROCEDURE — 80048 BASIC METABOLIC PNL TOTAL CA: CPT | Performed by: HOSPITALIST

## 2020-09-08 NOTE — PAYOR COMM NOTE
"[facility]  NPI:[id number]    Utilization Review  Contact: Lisa Rhoades RN  Phone: [phone]  Fax:[phone]    CLINICAL UPDATE   Additional clinical for dates 9/1/20-9/3/20 auth # [id number]    Harding Janine ROTH (60 y.o. Male)     Date of Birth Social Security Number Address Home Phone MRN    1960  [address] [address] [phone] [id number]    [employer]oda[employer] Marital Status          Mosque        Admission Date Admission Type Admitting Provider Attending Provider Department, Room/Bed    8/31/20 Emergency Javy Dupont MD  [address], 3308/1S    Discharge Date Discharge Disposition Discharge Destination        9/3/2020 Home or Self Care              Attending Provider:  (none)   Allergies:  Penicillins, Novocain [Procaine]    Isolation:  None   Infection:  None   Code Status:  Prior    Ht:  177.8 cm (70\")   Wt:  97.4 kg (214 lb 12.8 oz)    Admission Cmt:  None   Principal Problem:  Hyponatremia [E87.1]                 Active Insurance as of 8/31/2020     Primary Coverage     Payor Plan Insurance Group Employer/Plan Group    [facility]UE [facility] PPO [id number]     Payor Plan Address Payor Plan Phone Number Payor Plan Fax Number Effective Dates    [address] [phone]  1/1/2018 - None Entered    [place] [id number]       Subscriber Name Subscriber Birth Date Member ID       JANINE HARDING 1960 [id number]                 Emergency Contacts      (Rel.) Home Phone Work Phone Mobile Phone    Patsy Harding (Spouse) -- -- [phone]            Thu Long MD   Physician   Medicine   Discharge Summary   Signed   Date of Service:  09/03/20 1650   Creation Time:  09/03/20 1650            Signed            Show:Clear all  [x]Manual[x]Template[x]Copied    Added by:  [x]Thu Long MD    []Nancy for details       [facility]     Patient " Identification:  Name:  Gurwinder Harding  Age:  60 y.o.  Sex:  male  :  1960  MRN:  [id number]  Visit Number:  [id number]     Date of Admission: 2020  Date of Discharge:  9/3/2020      PCP: Fátima Mai, APRN        DISCHARGE DIAGNOSIS  Acute on chronic hyponatremia, improved  Syncope  Essential HTN  PVD with occlusion of the L ICA/moderate stenosis of the R ICA  S/P Atherectomy/Balloon angioplasty/stent Right SFA  10/2019  ID Anemia  Alcohol abuse  Tobacco abuse  Obesity     CONSULTS   Nephrology     PROCEDURES PERFORMED  None     HOSPITAL COURSE  Mr. Harding  is a 60 y.o. male with past medical history significant for peripheral vascular disease status post arthrectomy followed by balloon angioplasty and stenting to the right and left SFA, carotid artery stenosis, alcohol abuse, essential hypertension, smoking tobacco use, that presented to the [facility] emergency department for evaluation of syncope. Please see the admitting history and physical for further details.  Admitted with syncope and acute on chronic hyponatremia with sodium of 121.  Nephrology consulted and patient was treated for hyponatremia and sodium corrected to 131 at the time of discharge.  For syncope, patient had work-up done including carotid Doppler done which showed similar finding to previous carotid Doppler occlusion of the L ICA/moderate stenosis of the R ICA.  2D echocardiogram with mild concentric hypertrophy and EF of 56 to 60% and grade 1 diastolic dysfunction.  Orthostatic vitals negative.  Patient does have history of carotid artery stenosis and he has followed up with Dr. Brice in 2020.  Will make an outpatient follow-up apartment with   Dr. Brice.  Started on aspirin and statin. For HTN, blood pressure borderline so lisinopril dose decreased to 20 mg.  For alcohol abuse, patient was continued with multivitamin folate and thiamine.  CIWA protocol was initiated but the patient did not have any  signs or symptoms of alcohol withdrawal.  Discussed in detail with the patient regarding strict abstinence from alcohol abuse and tobacco abuse and he verbalized understanding.  Also discussed with the patient regarding fluid restriction 1500 cc/day.  Will repeat the BMP in 5 days to be followed up by PCP.  Ambulating independently without any dizziness or imbalance.  Since patient was vitally stable, improved symptomatically and would like to go home, and nephrology signed off, it was decided to discharge patient to home.  Discharge disposition stable.        VITAL SIGNS:  Temp:  [97.7 °F (36.5 °C)-98.7 °F (37.1 °C)] 97.7 °F (36.5 °C)  Heart Rate:  [] 115  Resp:  [15-20] 20  BP: ()/(52-78) 126/71  SpO2:  [94 %-99 %] 99 %  on   ;   Device (Oxygen Therapy): room air     Body mass index is 30.82 kg/m².      Wt Readings from Last 3 Encounters:   09/03/20 97.4 kg (214 lb 12.8 oz)   08/13/20 100 kg (221 lb)   06/09/20 97.5 kg (215 lb)         PHYSICAL EXAM:  General: Comfortable, Not in distress.  Well-developed and well-nourished.   HENT:  Head:  Normocephalic and atraumatic.  Mouth:  Moist mucous membranes.    Eyes:  Conjunctivae and EOM are normal.  Pupils are equal, round, and reactive to light.  No scleral icterus.    Neck:  Neck supple.  No JVD present.  Trachea midline.   Cardiovascular:  Normal rate, regular rhythm with no murmur.  Pulmonary/Chest:  No respiratory distress, no wheezes, no crackles, with normal breath sounds and good air movement.  Abdomen:  Soft.  Bowel sounds are normal.  No distension and no tenderness.   Musculoskeletal: no tenderness, and no deformity.  No red or swollen joints anywhere.    Neurological:  Alert and oriented to person, place, and time.  No cranial nerve deficit. No focal deficits. No facial droop.  No slurred speech.   Skin:  Skin is warm and dry. No rash noted. No pallor.   Peripheral vascular:  pulses in all 4 extremities with no clubbing, no cyanosis, no  edema.  Genitourinary: no calixto      DISCHARGE DISPOSITION   Home      DISCHARGE MEDICATIONS:           Discharge Medications            New Medications      Instructions Start Date   aspirin 81 MG EC tablet    81 mg, Oral, Daily    Start Date:  September 4, 2020      atorvastatin 40 MG tablet  Commonly known as:  LIPITOR    40 mg, Oral, Nightly        iron polysaccharides 150 MG capsule  Commonly known as:  NIFEREX    150 mg, Oral, Daily    Start Date:  September 4, 2020      multivitamin tablet tablet    1 tablet, Oral, Daily    Start Date:  September 4, 2020      nicotine 14 MG/24HR patch  Commonly known as:  NICODERM CQ    1 patch, Transdermal, Every 24 Hours Scheduled    Start Date:  September 4, 2020                    Changes to Medications      Instructions Start Date   lisinopril 40 MG tablet  Commonly known as:  PRINIVIL,ZESTRIL  What changed:  how much to take    20 mg, Oral, Daily                      Continue These Medications      Instructions Start Date   atenolol 50 MG tablet  Commonly known as:  TENORMIN    50 mg, Oral, Daily        folic acid-pyridoxine-cyanocobalamin 2.5-25-2 MG tablet tablet  Commonly known as:  Folbic    1 tablet, Oral, Daily        gabapentin 800 MG tablet  Commonly known as:  NEURONTIN    1,200 mg, Oral, 3 Times Daily        methocarbamol 750 MG tablet  Commonly known as:  ROBAXIN    750 mg, Oral, 4 Times Daily PRN            Stop These Medications    nabumetone 500 MG tablet  Commonly known as:  RELAFEN                    Diet Instructions      Diet: Cardiac       Discharge Diet:  Cardiac     Fluid Restriction per day:  1500 mL Fluid              Activity Instructions      Activity as Tolerated                   Future Appointments   Date Time Provider Department Center   9/15/2020  1:15 PM Tong Azar MD [facility] None   9/16/2020  1:30 PM Fátima Mai APRN [facility] None            Your Scheduled Appointments           Sep 15, 2020  1:15 PM EDT  Follow  Up with Tong Azar MD  [facility] CARDIOLOGY (--) [address]  [place] [address] [phone]    Arrive 15 minutes prior to appointment.            Sep 16, 2020  1:30 PM EDT  Office Visit with KEVIN Mullen  [facility] FAMILY MEDICINE (--) [address]  [facility] [id number] [phone]    Arrive 15 minutes prior to appointment. If you are in need of a language or hearing  please call the Department.        Additional instructions:                 OFFICES CLOSED.  FRI AM UNIT SEC WILL SCHEDULE AND CALL PT.                        Additional Instructions for the Follow-ups that You Need to Schedule      Discharge Follow-up with PCP   As directed         Currently Documented PCP:    Fátima Mai APRN    PCP Phone Number:    [phone]      Follow Up Details:  Within 1 week           Discharge Follow-up with Specialty: Nephrology Dr. Lino; 3 Weeks   As directed        Specialty:  Nephrology Dr. Lino    Follow Up:  3 Weeks           Discharge Follow-up with Specified Provider: Dr. Brice CT Surgery; 3 Weeks   As directed        To:  Dr. Brice CT Surgery    Follow Up:  3 Weeks    Follow Up Details:  R Carotid artery stenosis           Basic Metabolic Panel    Sep 08, 2020 (Approximate)        Fax report to KEVIN Alberts for Hyponatremia.     Order Comments:  Fax report to KEVIN Alberts for Hyponatremia.                      Follow-up Information      Fátima Mai APRN .    Specialty:  Family Medicine  Why:  Within 1 week  Contact information:  [address]  [place] [facility] [address]  [phone]                         TEST  RESULTS PENDING AT DISCHARGE     CODE STATUS      Code Status and Medical Interventions:   Ordered at: 08/31/20 2128     Level Of Support Discussed With:     Patient     Code Status:     CPR     Medical Interventions (Level of Support Prior to Arrest):     Full          Thu Long MD  20  18:40      Time: I spent  25  minutes on this discharge activity which included: face-to-face encounter with the patient, reviewing the data in the system, coordination of the care with the nursing staff as well as consultants, documentation, and entering orders.       Please note that this discharge summary required less than 30 minutes to complete.     Please send a copy of this dictation to the following providers:  Fátima Mai APRN            Routing History                 Thu Long MD   Physician   Medicine   Progress Notes   Signed   Date of Service:  20   Creation Time:  20            Signed            Show:Clear all  [x]Manual[x]Template[x]Copied    Added by:  [x]Thu Long MD    [][facility] for details       [facility]     Patient Identification:  Name:  Gurwinder Harding  Age:  60 y.o.  Sex:  male  :  1960  MRN:  [id number]  Visit Number:  [id number]  ROOM: [address] [place] [address]      Primary Care Provider:  Fátima Mai APRN     Length of stay:  2     Subjective         Chief Compliant Follow up for the Hyponatremia and syncope     Patient seen and examined this morning with no family at bedside.  Doing better. Feels tired.  Denies any dizziness nausea vomiting abdominal pain chest pain shortness of breath cough. Ambulated and no dizziness. Did have imbalance. Afebrile no events overnight.  On room air        Objective      Current Hospital Meds:     aspirin 81 mg Oral Daily   atenolol 50 mg Oral Daily   atorvastatin 40 mg Oral Nightly   folic acid 1 mg Oral Daily   gabapentin 1,200 mg Oral TID   heparin (porcine) 5,000 Units Subcutaneous Q8H   iron polysaccharides 150 mg Oral Daily   latanoprost 1 drop Both Eyes Nightly   lisinopril 40 mg Oral Daily   multivitamin 1 tablet Oral Daily   nicotine 1 patch Transdermal Q24H   pantoprazole 40 mg Oral Q AM   sodium chloride 10 mL  Intravenous Q12H   thiamine 100 mg Oral Daily   ----------------------------------------------------------------------------------------------------------------------  Vital Signs:  Temp:  [97.7 °F (36.5 °C)-98.3 °F (36.8 °C)] 98.3 °F (36.8 °C)  Heart Rate:  [73-86] 86  Resp:  [18-20] 20  BP: ()/(58-85) 111/59  SpO2:  [95 %-99 %] 95 %  on   ;   Device (Oxygen Therapy): room air  Body mass index is 30.78 kg/m².         Wt Readings from Last 3 Encounters:   09/02/20 97.3 kg (214 lb 8 oz)   08/13/20 100 kg (221 lb)   06/09/20 97.5 kg (215 lb)         Intake/Output Summary (Last 24 hours) at 9/2/2020 1859  Last data filed at 9/2/2020 1422      Gross per 24 hour   Intake 240 ml   Output 1900 ml   Net -1660 ml      Diet Regular; Cardiac, Daily Fluid Restriction; 1000 mL Fluid Per Day  ----------------------------------------------------------------------------------------------------------------------  Physical exam:  General: Comfortable, Not in distress.  Well-developed and well-nourished.   HENT:  Head:  Normocephalic and atraumatic.  Mouth:  Moist mucous membranes.    Eyes:  Conjunctivae and EOM are normal.  Pupils are equal, round, and reactive to light.  No scleral icterus.    Neck:  Neck supple.  No JVD present.  Trachea midline.   Cardiovascular:  Normal rate, regular rhythm with no murmur.  Pulmonary/Chest:  No respiratory distress, no wheezes, no crackles, with normal breath sounds and good air movement.  Abdomen:  Soft.  Bowel sounds are normal.  No distension and no tenderness.   Musculoskeletal:  No edema, no tenderness, and no deformity.  No red or swollen joints anywhere.    Neurological:  Alert and oriented to person, place, and time.  No cranial nerve deficit. No focal deficits. No facial droop.  No slurred speech.   Skin:  Skin is warm and dry. No rash noted. No pallor.   Peripheral vascular:  pulses in all 4 extremities with no clubbing, no cyanosis, no edema.  Genitourinary: no calixto    ----------------------------------------------------------------------------------------------------------------------  ----------------------------------------------------------------------------------------------------------------------         Results from last 7 days   Lab Units 09/02/20  0120 09/01/20  0406 08/31/20  1858   WBC 10*3/mm3 6.68 7.90 8.89   HEMOGLOBIN g/dL 11.3* 10.8* 11.3*   HEMATOCRIT % 36.6* 34.6* 35.2*   MCV fL 90.1 88.7 87.1   MCHC g/dL 30.9* 31.2* 32.1   PLATELETS 10*3/mm3 218 225 215                    Results from last 7 days   Lab Units 09/02/20  1540 09/02/20  1229 09/02/20  0951   09/02/20  0120   09/01/20  0406   08/31/20 2021 08/31/20  1858   SODIUM mmol/L 127* 128* 128*   < > 129*   < > 127*   < > 121* 121*   POTASSIUM mmol/L  --   --   --   --  4.7  --  4.7  --  5.1 5.0   MAGNESIUM mg/dL  --   --   --   --   --   --  2.3  --   --   --    CHLORIDE mmol/L  --   --   --   --  98  --  95*  --  92* 90*   CO2 mmol/L  --   --   --   --  21.1*  --  21.7*  --  20.3* 20.3*   BUN mg/dL  --   --   --   --  12  --  8  --  10 10   CREATININE mg/dL  --   --   --   --  1.20  --  1.07  --  1.17 1.20   PHOSPHORUS mg/dL  --   --   --   --   --   --  2.7  --   --   --    EGFR IF NONAFRICN AM mL/min/1.73  --   --   --   --  62  --  71  --  64 62   CALCIUM mg/dL  --   --   --   --  8.4*  --  8.1*  --  8.1* 8.4*   GLUCOSE mg/dL  --   --   --   --  107*  --  109*  --  120* 128*   ALBUMIN g/dL  --   --   --   --   --   --  3.55  --   --  3.67   BILIRUBIN mg/dL  --   --   --   --   --   --  0.3  --   --  0.2   ALK PHOS U/L  --   --   --   --   --   --  73  --   --  74   AST (SGOT) U/L  --   --   --   --   --   --  21  --   --  21   ALT (SGPT) U/L  --   --   --   --   --   --  18  --   --  18    < > = values in this interval not displayed.   Estimated Creatinine Clearance: 77.5 mL/min (by C-G formula based on SCr of 1.2 mg/dL).         Results from last 7 days   Lab Units 09/01/20  7906 08/31/20  9863  08/31/20  1858   TROPONIN T ng/mL <0.010 <0.010 <0.010            Glucose   Date/Time Value Ref Range Status   08/31/2020 1758 141 (H) 70 - 130 mg/dL Final      No results found for: AMMONIA       Results from last 7 days   Lab Units 09/02/20  0120   CHOLESTEROL mg/dL 139   TRIGLYCERIDES mg/dL 89   HDL CHOL mg/dL 53   LDL CHOL mg/dL 68           Results from last 7 days   Lab Units 08/31/20 2021   NITRITE UA   Negative      No results found for: BLOODCXNo results found for: RESPCXNo results found for: URINECXNo results found for: WOUNDCXNo results found for: BODYFLDCXNo results found for: STOOLCX  I have personally looked at the labs and they are summarized above.  ----------------------------------------------------------------------------------------------------------------------      Imaging Results (Last 24 Hours)      ** No results found for the last 24 hours. **          I have personally reviewed the radiology images and read the final radiology report.     Assessment & Plan       Assessment:  Acute on chronic hyponatremia  Syncope  Essential HTN  PVD with occlusion of the L ICA/moderate stenosis of the R ICA  S/P Atherectomy/Balloon angioplasty/stent Right SFA  10/2019  ID Anemia  Alcohol abuse  Tobacco abuse  Obesity     Plan:  Acute on chronic hyponatremia, likely secondary to beer proteinemia.  Continue with fluid restriction. sodium currently 127-130 today. Nephrology on board.  Goal is to correct 6-8 M EQ in 24 hours.  Current to monitor sodium every 4 hours.     Syncope, carotid Doppler done which showed similar finding to previous carotid Doppler occlusion of the L ICA/moderate stenosis of the R ICA.   2D echocardiogram with mild concentric hypertrophy and EF of 56 to 60% and grade 1 diastolic dysfunction.  Orthostatic vitals negative.  Essential HTN, continue with home atenolol and lisinopril.     PVD with occlusion of the L ICA/stenosis of the R ICA, not on aspirin or statin at home. Started On  aspirin statin.      S/P Atherectomy/Balloon angioplasty/stent Right SFA  10/2019, arterial Doppler done both lower extremity and it shows monophasic waveform in the right posterior tibial artery.  No occlusion.  Started on aspirin and statin.     ID Anemia, iron panel with iron deficiency.  on p.o. iron supplement.     Alcohol abuse, continue with p.o. multivitamin folate and thiamine.  Continue with CIWA protocol.     Tobacco abuse, on nicotine patch.     Heparin subcu for DVT for prophylaxis and Protonix p.o. for GI prophylaxis.      Management and plan discussed in detail with patient and nursing.     The patient is high risk due to the following diagnoses/reasons: Acute on chronic hyponatremia, Syncope     Thu Long MD  09/02/20  18:59              Bharati Lino MD   Physician   Medicine   Progress Notes   Signed   Date of Service:  09/02/20 0820   Creation Time:  09/02/20 0820            Signed        Expand All Collapse All     Show:Clear all  [x]Manual[x]Template[x]Copied    Added by:  [x]Bharati Lino MD    []ver for details  Nephrology Progress Note           Subjective         Patient feels fine, no complaints.         Objective            Vital signs :      Temp:  [96.7 °F (35.9 °C)-97.9 °F (36.6 °C)] 97.9 °F (36.6 °C)  Heart Rate:  [73-85] 77  Resp:  [16-19] 19  BP: (109-148)/(66-85) 124/74        Intake/Output Summary (Last 24 hours) at 9/2/2020 0820  Last data filed at 9/2/2020 0630      Gross per 24 hour   Intake 1279 ml   Output 1775 ml   Net -496 ml         Physical Exam:     General Appearance : Not in acute distress  Lungs : clear to auscultation, respirations regular  Heart :  regular rhythm & normal rate, normal S1, S2 and no murmur, no rub  Abdomen : normal bowel sounds, no masses, no hepatomegaly, no splenomegaly, soft non-tender and no guarding  Extremities : moves extremities well, No edema, no cyanosis and no redness  Skin :  no bleeding, bruising or rash  Neurologic :    orientated to person, place, time and situation, Grossly no focal deficits        Laboratory Data :      Albumin       Albumin   Date Value Ref Range Status   09/01/2020 3.55 3.50 - 5.20 g/dL Final   08/31/2020 3.67 3.50 - 5.20 g/dL Final       Magnesium       Magnesium   Date Value Ref Range Status   09/01/2020 2.3 1.6 - 2.6 mg/dL Final             PTH               No results found for: PTH     CBC and coagulation:         Results from last 7 days   Lab Units 09/02/20  0120 09/01/20  0406 08/31/20  1858   WBC 10*3/mm3 6.68 7.90 8.89   HEMOGLOBIN g/dL 11.3* 10.8* 11.3*   HEMATOCRIT % 36.6* 34.6* 35.2*   MCV fL 90.1 88.7 87.1   MCHC g/dL 30.9* 31.2* 32.1   PLATELETS 10*3/mm3 218 225 215      Acid/base balance:      Renal and electrolytes:                Results from last 7 days   Lab Units 09/02/20  0439 09/02/20  0120 09/01/20  1940 09/01/20  1626 09/01/20  1153   09/01/20  0406   08/31/20 2021 08/31/20  1858   SODIUM mmol/L 130* 129* 129* 131* 129*   < > 127*   < > 121* 121*   POTASSIUM mmol/L  --  4.7  --   --   --   --  4.7  --  5.1 5.0   MAGNESIUM mg/dL  --   --   --   --   --   --  2.3  --   --   --    CHLORIDE mmol/L  --  98  --   --   --   --  95*  --  92* 90*   CO2 mmol/L  --  21.1*  --   --   --   --  21.7*  --  20.3* 20.3*   BUN mg/dL  --  12  --   --   --   --  8  --  10 10   CREATININE mg/dL  --  1.20  --   --   --   --  1.07  --  1.17 1.20   EGFR IF NONAFRICN AM mL/min/1.73  --  62  --   --   --   --  71  --  64 62   CALCIUM mg/dL  --  8.4*  --   --   --   --  8.1*  --  8.1* 8.4*   PHOSPHORUS mg/dL  --   --   --   --   --   --  2.7  --   --   --     < > = values in this interval not displayed.      Estimated Creatinine Clearance: 77.5 mL/min (by C-G formula based on SCr of 1.2 mg/dL).     Liver and pancreatic function:        Results from last 7 days   Lab Units 09/01/20  0406 08/31/20  1858   ALBUMIN g/dL 3.55 3.67   BILIRUBIN mg/dL 0.3 0.2   ALK PHOS U/L 73 74   AST (SGOT) U/L 21 21   ALT (SGPT) U/L  18 18            Cardiac:      Liver and pancreatic function:        Results from last 7 days   Lab Units 09/01/20  0406 08/31/20  1858   ALBUMIN g/dL 3.55 3.67   BILIRUBIN mg/dL 0.3 0.2   ALK PHOS U/L 73 74   AST (SGOT) U/L 21 21   ALT (SGPT) U/L 18 18         Medications :         aspirin 81 mg Oral Daily   atenolol 50 mg Oral Daily   atorvastatin 40 mg Oral Nightly   folic acid 1 mg Oral Daily   gabapentin 1,200 mg Oral TID   heparin (porcine) 5,000 Units Subcutaneous Q8H   iron polysaccharides 150 mg Oral Daily   lisinopril 40 mg Oral Daily   multivitamin 1 tablet Oral Daily   nicotine 1 patch Transdermal Q24H   pantoprazole 40 mg Oral Q AM   sodium chloride 10 mL Intravenous Q12H   thiamine 100 mg Oral Daily               Assessment/Plan         1. Hyponatremia, likely chronic  2. Sycope  3. Essential hypertension  4. Etoh abuse  5. Nicotine dependence.      Sodium has appropriately stable at 130, will sign off. Please call if any questions.   Pt should quit drinking and to reduce his fluid intake to 1.5L/day. I educated and counseled the patient in detail. I will see him in clinic in 3 wks after discharge.      Hyponatremia likely 2/2 polydipsia and bear potomania  Admitted with Sodium 121, has increased to 130 in less than 12 hours. I will give 200ml of D5W and continue checking sodium [id number], continue of fluid restriction.            Bharati Lino MD  09/02/20  08:20                    Bharati Lino MD   Physician   Medicine   Consults   Signed   Date of Service:  09/01/20 0837   Creation Time:  09/01/20 0837         Consult Orders   Inpatient Nephrology Consult [[id number]] ordered by Javy Dupont MD at 08/31/20 2127          Signed        Expand All Collapse All     Show:Clear all  [x]Manual[x]Template[]Copied    Added by:  [x]Bharati Lino MD    []Nancy for details  Nephrology Consult Note     Referring Provider: Javy Dupont  Reason for Consultation: Hyponatremia        Subjective               History of present illness:  Gurwinder Harding is a 60 y.o. male who presented to [facility] emergency department with chief complaint of syncope, pt is known history of Etoh abuse, he is currently admitted for syncopal workup. On initial lab work up, sodium was low and nephrology was consulted for management.   Pt mentioned, his oral intake has been lower and he has been drinking Etoh more than usual. He denied any nausea or vomiting.   Pt also denied any history of Chronic NSAIDS use. Patient denies hematuria, dysuria, difficulty passing urine. No prior history of renal stones. No family history of renal disease     History  Medical History                Past Medical History:   Diagnosis Date   • Alcohol abuse     • Arthritis     • Carotid stenosis     • ED (erectile dysfunction)     • History of degenerative disc disease     • Hypertension     • Neuropathy     • Peripheral vascular disease (CMS/HCC)       s/p arthrectomy follow by balloon angioplasty and stents of right and left SFA   • Tobacco abuse     • Vitamin D deficiency                     , Past Surgical History:   Procedure Laterality Date   • APPENDECTOMY       • ARTERIOGRAM N/A 9/19/2019     Procedure: Arteriogram;  Surgeon: Tong Azar MD;  Location: [facility] CATH INVASIVE LOCATION;  Service: Cardiology   • BACK SURGERY         DISC RUPTURE REPAIR, L5   • CARDIAC CATHETERIZATION Right 10/29/2019     Procedure: Peripheral angiography;  Surgeon: Tong Azar MD;  Location: [facility] CATH INVASIVE LOCATION;  Service: Cardiovascular   • VASCULAR SURGERY Bilateral           , Family History   Problem Relation Age of Onset   • Hypertension Mother     • Cancer Father           LUNG   • Diabetes Father     • Hypertension Father     • Heart attack Other           GRANDFATHER   , Social History                  Tobacco Use   • Smoking status: Current Every Day Smoker       Packs/day: 1.50       Years: 30.00       Pack years: 45.00       Types:  Cigarettes   • Smokeless tobacco: Never Used   Substance Use Topics   • Alcohol use: Yes       Alcohol/week: 24.0 standard drinks       Types: 24 Cans of beer per week       Drinks per session: 5 or 6       Binge frequency: Weekly   • Drug use: No   Prescriptions Prior to Admission           , Medications Prior to Admission   Medication Sig Dispense Refill Last Dose   • atenolol (TENORMIN) 50 MG tablet Take 1 tablet by mouth Daily. 90 tablet 1 8/31/2020 at AM   • folic acid-pyridoxine-cyanocobalamin (Folbic) 2.5-25-2 MG tablet tablet Take 1 tablet by mouth Daily. 90 tablet 1 8/31/2020 at AM   • gabapentin (NEURONTIN) 800 MG tablet Take 1.5 tablets by mouth 3 (Three) Times a Day. 135 tablet 2 8/31/2020 at MIDDAY   • lisinopril (PRINIVIL,ZESTRIL) 40 MG tablet Take 1 tablet by mouth Daily. 90 tablet 1 8/31/2020 at AM   • methocarbamol (ROBAXIN) 750 MG tablet Take 1 tablet by mouth 4 (Four) Times a Day As Needed for Muscle Spasms. 360 tablet 1 8/31/2020 at MIDDAY   • nabumetone (RELAFEN) 500 MG tablet Take 1 tablet by mouth 2 (Two) Times a Day As Needed for Mild Pain . 180 tablet 1 8/31/2020 at PM      , Scheduled Meds:    atenolol 50 mg Oral Daily   folic acid 1 mg Oral Daily   gabapentin 1,200 mg Oral TID   heparin (porcine) 5,000 Units Subcutaneous Q8H   lisinopril 40 mg Oral Daily   multivitamin 1 tablet Oral Daily   nicotine 1 patch Transdermal Q24H   nicotine 1 patch Transdermal Once   pantoprazole 40 mg Oral Q AM   sodium chloride 10 mL Intravenous Q12H   thiamine 100 mg Oral Daily   , Continuous Infusions:   , PRN Meds:  [COMPLETED] Insert peripheral IV **AND** sodium chloride  •  sodium chloride and Allergies:  Penicillins and Novocain [procaine]     Review of Systems  More than 10 point review of systems was done. Pertinent items are noted in HPI, all other systems reviewed and negative        Objective         Vital Signs  Temp:  [97.6 °F (36.4 °C)-98.4 °F (36.9 °C)] 97.8 °F (36.6 °C)  Heart Rate:  [73-98]  88  Resp:  [20] 20  BP: ()/(57-95) 130/59     No intake/output data recorded.  I/O last 3 completed shifts:  In: 2180 [P.O.:180; I.V.:1000; IV Piggyback:1000]  Out: 3525 [Urine:3525]     Physical Examination:  General Appearance: not in acute distress  Head: Normocephalic, without obvious abnormality and atraumatic  Eyes: Conjunctivae and sclerae normal, no icterus, no pallor and PERRLA  Neck: No adenopathy, suppple, no carotid bruit and No JVD  Lungs: Clear to auscultation, respirations regular and unlabored  Heart: Regular rhythm & normal rate, normal S1, S2, no murmur, no gallop, no rub   Abdomen: Normal bowel sounds, no masses and soft non-tender  Extremities: Moves extremities well, no redness and No edema  Pulses: Palpable and equal bilaterally  Neurologic: Orientated to person, place, time, grossly no focal deficitis     Laboratory Data :        WBC       WBC   Date Value Ref Range Status   09/01/2020 7.90 3.40 - 10.80 10*3/mm3 Final   08/31/2020 8.89 3.40 - 10.80 10*3/mm3 Final       HGB       Hemoglobin   Date Value Ref Range Status   09/01/2020 10.8 (L) 13.0 - 17.7 g/dL Final   08/31/2020 11.3 (L) 13.0 - 17.7 g/dL Final       HCT       Hematocrit   Date Value Ref Range Status   09/01/2020 34.6 (L) 37.5 - 51.0 % Final   08/31/2020 35.2 (L) 37.5 - 51.0 % Final       Platlets No results found for: LABPLAT   MCV       MCV   Date Value Ref Range Status   09/01/2020 88.7 79.0 - 97.0 fL Final   08/31/2020 87.1 79.0 - 97.0 fL Final             Sodium       Sodium   Date Value Ref Range Status   09/01/2020 130 (L) 136 - 145 mmol/L Final   09/01/2020 127 (L) 136 - 145 mmol/L Final   08/31/2020 123 (L) 136 - 145 mmol/L Final   08/31/2020 121 (L) 136 - 145 mmol/L Final   08/31/2020 121 (L) 136 - 145 mmol/L Final       Potassium         Potassium   Date Value Ref Range Status   09/01/2020 4.7 3.5 - 5.2 mmol/L Final   08/31/2020 5.1 3.5 - 5.2 mmol/L Final   08/31/2020 5.0 3.5 - 5.2 mmol/L Final       Comment:        Slight hemolysis detected by analyzer. Results may be affected.       Chloride       Chloride   Date Value Ref Range Status   09/01/2020 95 (L) 98 - 107 mmol/L Final   08/31/2020 92 (L) 98 - 107 mmol/L Final   08/31/2020 90 (L) 98 - 107 mmol/L Final       CO2       CO2   Date Value Ref Range Status   09/01/2020 21.7 (L) 22.0 - 29.0 mmol/L Final   08/31/2020 20.3 (L) 22.0 - 29.0 mmol/L Final   08/31/2020 20.3 (L) 22.0 - 29.0 mmol/L Final       BUN       BUN   Date Value Ref Range Status   09/01/2020 8 6 - 20 mg/dL Final   08/31/2020 10 6 - 20 mg/dL Final   08/31/2020 10 6 - 20 mg/dL Final       Creatinine       Creatinine   Date Value Ref Range Status   09/01/2020 1.07 0.76 - 1.27 mg/dL Final   08/31/2020 1.17 0.76 - 1.27 mg/dL Final   08/31/2020 1.20 0.76 - 1.27 mg/dL Final       Calcium       Calcium   Date Value Ref Range Status   09/01/2020 8.1 (L) 8.6 - 10.5 mg/dL Final   08/31/2020 8.1 (L) 8.6 - 10.5 mg/dL Final   08/31/2020 8.4 (L) 8.6 - 10.5 mg/dL Final       PO4 No results found for: CAPO4   Albumin       Albumin   Date Value Ref Range Status   09/01/2020 3.55 3.50 - 5.20 g/dL Final   08/31/2020 3.67 3.50 - 5.20 g/dL Final       Magnesium       Magnesium   Date Value Ref Range Status   09/01/2020 2.3 1.6 - 2.6 mg/dL Final       Uric Acid No results found for: URICACID      Radiology results :               Imaging Results (Last 72 Hours)      Procedure Component Value Units Date/Time     CT Head Without Contrast [[id number]] Collected:  09/01/20 0146       Updated:  09/01/20 0148     Narrative:        CT Head WO     HISTORY:   Alcohol abuse. Syncopal episode. Dizziness and giddiness.     TECHNIQUE:   Axial unenhanced head CT. Radiation dose reduction techniques included automated exposure control or exposure modulation based on body size. Count of known CT and cardiac nuc med studies performed in previous 12 months: 0.      Time of scan: 2219 hours 8/31/2020  Time of scan availability: 0058 hours 9/1/2020      COMPARISON:   None.     FINDINGS:   No intracranial hemorrhage, mass, or infarct. No hydrocephalus or extra-axial fluid collection. There are senescent changes, including volume loss and nonspecific white matter change, but no acute abnormality is seen. The skull base, calvarium, and  extracranial soft tissues are normal. Acute on chronic left maxillary sinus disease.        Impression:        Senescent changes without acute abnormality.              Signer Name: Artemio Oviedo MD   Signed: 9/1/2020 1:46 AM   Workstation Name: [facility]    [facility]     XR Chest 1 View [[id number]] Collected:  08/31/20 1933       Updated:  08/31/20 1935     Narrative:        XR CHEST 1 VW-     CLINICAL INDICATION: Syncope        COMPARISON: None immediately available      TECHNIQUE: Single frontal view of the chest.     FINDINGS:     There is no focal alveolar infiltrate or effusion.  The cardiac silhouette is normal. The pulmonary vasculature is  unremarkable.  There is no evidence of an acute osseous abnormality.   There are no suspicious-appearing parenchymal soft tissue nodules.           Impression:        No evidence of active or acute cardiopulmonary disease on today's chest  radiograph.     This report was finalized on 8/31/2020 7:33 PM by Dr. Mikie Dow MD.                   Medications:           atenolol 50 mg Oral Daily   folic acid 1 mg Oral Daily   gabapentin 1,200 mg Oral TID   heparin (porcine) 5,000 Units Subcutaneous Q8H   lisinopril 40 mg Oral Daily   multivitamin 1 tablet Oral Daily   nicotine 1 patch Transdermal Q24H   nicotine 1 patch Transdermal Once   pantoprazole 40 mg Oral Q AM   sodium chloride 10 mL Intravenous Q12H   thiamine 100 mg Oral Daily            Assessment/Plan           Hyponatremia    Tobacco abuse    Alcohol abuse    Hypertension    PVD (peripheral vascular disease) with claudication (CMS/HCC)    Carotid artery stenosis    Obesity (BMI 30-39.9)        1.  Hyponatremia, likely chronic  2. Sycope  3. Essential hypertension  4. Etoh abuse  5. Nicotine dependence.      Hyponatremia likely 2/2 polydipsia and bear potomania  Admitted with Sodium 121, has increased to 130 in less than 12 hours. I will give 200ml of D5W and continue checking sodium c0tigan, continue of fluid restriction.   Target sodium for next 24 hours will be ~ 130     Thanks Dr Dupont for the consult. Nephrology will follow the patient.   I discussed the patient's findings and my recommendations with patient and nursing staff     Bharati Lino MD  20  08:37                    Thu Long MD   Physician   Medicine   Progress Notes   Signed   Date of Service:  20   Creation Time:  20            Signed            Show:Clear all  [x]Manual[x]Template[x]Copied    Added by:  [x]Thu Long MD    []brenda for details       [facility]     Patient Identification:  Name:  Gurwinder Harding  Age:  60 y.o.  Sex:  male  :  1960  MRN:  [id number]  Visit Number:  [id number]  ROOM: [address] [place] [address]      Primary Care Provider:  Fátima Mai APRN     Length of stay:  1     Subjective         Chief Compliant Follow up for the Hyponatremia and syncope     Patient seen and examined this morning with no family at bedside.  Currently resting.  States that he is feeling slightly better.  Denies any dizziness nausea vomiting abdominal pain chest pain shortness of breath cough.  Afebrile no events overnight.  On room air        Objective      Current Hospital Meds:     [START ON 2020] aspirin 81 mg Oral Daily   atenolol 50 mg Oral Daily   atorvastatin 40 mg Oral Nightly   folic acid 1 mg Oral Daily   gabapentin 1,200 mg Oral TID   heparin (porcine) 5,000 Units Subcutaneous Q8H   [START ON 2020] iron polysaccharides 150 mg Oral Daily   lisinopril 40 mg Oral Daily   multivitamin 1 tablet Oral Daily   nicotine 1 patch Transdermal Q24H      nicotine 1 patch Transdermal Once   pantoprazole 40 mg Oral Q AM   sodium chloride 10 mL Intravenous Q12H   thiamine 100 mg Oral Daily   ----------------------------------------------------------------------------------------------------------------------  Vital Signs:  Temp:  [96.7 °F (35.9 °C)-98.4 °F (36.9 °C)] 96.7 °F (35.9 °C)  Heart Rate:  [74-98] 81  Resp:  [16-20] 18  BP: (109-187)/(59-95) 112/73  SpO2:  [98 %-100 %] 98 %  on   ;   Device (Oxygen Therapy): room air  Body mass index is 32.48 kg/m².         Wt Readings from Last 3 Encounters:   09/01/20 103 kg (226 lb 6.4 oz)   08/13/20 100 kg (221 lb)   06/09/20 97.5 kg (215 lb)         Intake/Output Summary (Last 24 hours) at 9/1/2020 2029  Last data filed at 9/1/2020 1801      Gross per 24 hour   Intake 2099 ml   Output 4000 ml   Net -1901 ml      Diet Regular; Cardiac, Daily Fluid Restriction; 1000 mL Fluid Per Day  ----------------------------------------------------------------------------------------------------------------------  Physical exam:  General: Comfortable, Not in distress.  Well-developed and well-nourished.   HENT:  Head:  Normocephalic and atraumatic.  Mouth:  Moist mucous membranes.    Eyes:  Conjunctivae and EOM are normal.  Pupils are equal, round, and reactive to light.  No scleral icterus.    Neck:  Neck supple.  No JVD present.  Trachea midline.   Cardiovascular:  Normal rate, regular rhythm with no murmur.  Pulmonary/Chest:  No respiratory distress, no wheezes, no crackles, with normal breath sounds and good air movement.  Abdomen:  Soft.  Bowel sounds are normal.  No distension and no tenderness.   Musculoskeletal:  No edema, no tenderness, and no deformity.  No red or swollen joints anywhere.    Neurological:  Alert and oriented to person, place, and time.  No cranial nerve deficit. No focal deficits. No facial droop.  No slurred speech.   Skin:  Skin is warm and dry. No rash noted. No pallor.   Peripheral vascular:  pulses in  all 4 extremities with no clubbing, no cyanosis, no edema.  Genitourinary: no calixto   ----------------------------------------------------------------------------------------------------------------------  ----------------------------------------------------------------------------------------------------------------------        Results from last 7 days   Lab Units 09/01/20  0406 08/31/20  1858   WBC 10*3/mm3 7.90 8.89   HEMOGLOBIN g/dL 10.8* 11.3*   HEMATOCRIT % 34.6* 35.2*   MCV fL 88.7 87.1   MCHC g/dL 31.2* 32.1   PLATELETS 10*3/mm3 225 215                  Results from last 7 days   Lab Units 09/01/20  1940 09/01/20  1626 09/01/20  1153   09/01/20  0406 08/31/20 2021 08/31/20  1858   SODIUM mmol/L 129* 131* 129*   < > 127*   < > 121* 121*   POTASSIUM mmol/L  --   --   --   --  4.7  --  5.1 5.0   MAGNESIUM mg/dL  --   --   --   --  2.3  --   --   --    CHLORIDE mmol/L  --   --   --   --  95*  --  92* 90*   CO2 mmol/L  --   --   --   --  21.7*  --  20.3* 20.3*   BUN mg/dL  --   --   --   --  8  --  10 10   CREATININE mg/dL  --   --   --   --  1.07  --  1.17 1.20   PHOSPHORUS mg/dL  --   --   --   --  2.7  --   --   --    EGFR IF NONAFRICN AM mL/min/1.73  --   --   --   --  71  --  64 62   CALCIUM mg/dL  --   --   --   --  8.1*  --  8.1* 8.4*   GLUCOSE mg/dL  --   --   --   --  109*  --  120* 128*   ALBUMIN g/dL  --   --   --   --  3.55  --   --  3.67   BILIRUBIN mg/dL  --   --   --   --  0.3  --   --  0.2   ALK PHOS U/L  --   --   --   --  73  --   --  74   AST (SGOT) U/L  --   --   --   --  21  --   --  21   ALT (SGPT) U/L  --   --   --   --  18  --   --  18    < > = values in this interval not displayed.   Estimated Creatinine Clearance: 89.4 mL/min (by C-G formula based on SCr of 1.07 mg/dL).         Results from last 7 days   Lab Units 09/01/20  0406 08/31/20  2332 08/31/20  1858   TROPONIN T ng/mL <0.010 <0.010 <0.010            Glucose   Date/Time Value Ref Range Status   08/31/2020 4459 141 (H) 70 -  130 mg/dL Final      No results found for: AMMONIA         Results from last 7 days   Lab Units 08/31/20 2021   NITRITE UA   Negative      No results found for: BLOODCXNo results found for: RESPCXNo results found for: URINECXNo results found for: WOUNDCXNo results found for: BODYFLDCXNo results found for: STOOLCX  I have personally looked at the labs and they are summarized above.  ----------------------------------------------------------------------------------------------------------------------           Imaging Results (Last 24 Hours)      Procedure Component Value Units Date/Time     US Carotid Bilateral [[id number]] Collected:  09/01/20 1436       Updated:  09/01/20 1508     Narrative:        EXAMINATION: US CAROTID BILATERAL-      Technique: Multiple real-time color Doppler images were acquired of  bilateral carotid arteries.     Stenosis measurements if obtained, were performed by the NASCET or  similar method.        CLINICAL INDICATION:     syncope; E87.8-Ownj-faycivdqai and  hyponatremia; R42-Dizziness and giddiness; F10.10-Alcohol abuse,  uncomplicated     COMPARISON:    None     FINDINGS:        RIGHT:  Moderate stenosis in the right carotid system     RIGHT ICA PSV: 182 cm/s  RIGHT ICA EDV: 74 cm/s.   Right ICA/CCA Ratio: 1.8  Anterograde flow is demonstrated in RIGHT vertebral artery.     LEFT:  Occlusion of the left internal carotid artery.     Anterograde flow is demonstrated in LEFT vertebral artery.        Impression:        Impression:     Occlusion of the left internal carotid artery.     This report was finalized on 9/1/2020 3:06 PM by Dr. Mikie Dow MD.        US Arterial Doppler Lower Extremity Bilateral [[id number]] Collected:  09/01/20 1441       Updated:  09/01/20 1508     Narrative:        EXAMINATION: US ARTERIAL DOPPLER LOWER EXTREMITY  BILATERAL-      CLINICAL INDICATION: history PVD in right leg s/p stent about a year  ago, was told by Dr Azar last month his pulses were weak in  right foot  and needed repeat arterial doppler; E87.5-Tsxl-evhqzkbclw and  hyponatremia; R42-Dizziness and giddiness; F10.10-Alcohol abuse,  uncomplicated        COMPARISON: 06/25/2019     FINDINGS:  Any stenoses are evaluated using the NASCET or similar method.     Color Doppler imaging with postoperative waveform to evaluate the lower  extremity arteries.     Triphasic and biphasic waveforms are seen to the level of popliteal  arteries.     Monophasic waveforms in the right posterior tibial artery. No occlusion  was identified.     No significant velocity elevation.        Impression:        1. Monophasic waveforms in the right posterior tibial artery.  2. No occlusion.      This report was finalized on 9/1/2020 3:05 PM by Dr. Mikie Dow MD.        CT Head Without Contrast [[id number]] Collected:  09/01/20 0146       Updated:  09/01/20 0148     Narrative:        CT Head WO     HISTORY:   Alcohol abuse. Syncopal episode. Dizziness and giddiness.     TECHNIQUE:   Axial unenhanced head CT. Radiation dose reduction techniques included automated exposure control or exposure modulation based on body size. Count of known CT and cardiac nuc med studies performed in previous 12 months: 0.      Time of scan: 2219 hours 8/31/2020  Time of scan availability: 0058 hours 9/1/2020     COMPARISON:   None.     FINDINGS:   No intracranial hemorrhage, mass, or infarct. No hydrocephalus or extra-axial fluid collection. There are senescent changes, including volume loss and nonspecific white matter change, but no acute abnormality is seen. The skull base, calvarium, and  extracranial soft tissues are normal. Acute on chronic left maxillary sinus disease.        Impression:        Senescent changes without acute abnormality.              Signer Name: Artemio Oviedo MD   Signed: 9/1/2020 1:46 AM   Workstation Name: [employer]    [facility]          I have personally reviewed the radiology images and read the  final radiology report.     Assessment & Plan       Assessment:  Acute on chronic hyponatremia  Syncope  Essential HTN  PVD with occlusion of the L ICA/moderate stenosis of the R ICA  S/P Atherectomy/Balloon angioplasty/stent Right SFA  10/2019  ID Anemia  Alcohol abuse  Tobacco abuse  Obesity     Plan:  Acute on chronic hyponatremia, likely secondary to beer proteinemia.  Continue with fluid restriction. sodium improved from 121-130 in 12 hours.  Nephrology consulted and was receiving D5W.  Goal is to correct 6-8 M EQ in 24 hours.  Current to monitor sodium every 4 hours.     Syncope, carotid Doppler done which showed similar finding to previous carotid Doppler occlusion of the L ICA/moderate stenosis of the R ICA.   2D echocardiogram with mild concentric hypertrophy and EF of 56 to 60% and grade 1 diastolic dysfunction.  Orthostatic vitals negative.  Essential HTN, continue with home atenolol and lisinopril.     PVD with occlusion of the L ICA/stenosis of the R ICA, not on aspirin or statin at home.  Will start aspirin statin and check lipid panel in AM.      S/P Atherectomy/Balloon angioplasty/stent Right SFA  10/2019, arterial Doppler done both lower extremity and it shows monophasic waveform in the right posterior tibial artery.  No occlusion.  Start on aspirin and statin.     ID Anemia, iron panel with iron deficiency.  Will start on p.o. iron supplement.     Alcohol abuse, continue with p.o. multivitamin folate and thiamine.  Continue with CIWA protocol.     Tobacco abuse, on nicotine patch.     Heparin subcu for DVT for prophylaxis and Protonix p.o. for GI prophylaxis.      Management and plan discussed in detail with patient and nursing.     The patient is high risk due to the following diagnoses/reasons: Acute on chronic hyponatremia, Syncope     Thu Long MD  09/01/20  20:29

## 2020-09-15 ENCOUNTER — OFFICE VISIT (OUTPATIENT)
Dept: CARDIOLOGY | Facility: CLINIC | Age: 60
End: 2020-09-15

## 2020-09-15 ENCOUNTER — READMISSION MANAGEMENT (OUTPATIENT)
Dept: CALL CENTER | Facility: HOSPITAL | Age: 60
End: 2020-09-15

## 2020-09-15 VITALS
WEIGHT: 218 LBS | HEART RATE: 89 BPM | DIASTOLIC BLOOD PRESSURE: 70 MMHG | SYSTOLIC BLOOD PRESSURE: 106 MMHG | OXYGEN SATURATION: 96 % | BODY MASS INDEX: 31.21 KG/M2 | HEIGHT: 70 IN

## 2020-09-15 DIAGNOSIS — M79.672 BILATERAL FOOT PAIN: ICD-10-CM

## 2020-09-15 DIAGNOSIS — I10 ESSENTIAL HYPERTENSION: ICD-10-CM

## 2020-09-15 DIAGNOSIS — I73.9 PVD (PERIPHERAL VASCULAR DISEASE) WITH CLAUDICATION (HCC): Primary | ICD-10-CM

## 2020-09-15 DIAGNOSIS — G62.9 NEUROPATHY: ICD-10-CM

## 2020-09-15 DIAGNOSIS — M79.671 BILATERAL FOOT PAIN: ICD-10-CM

## 2020-09-15 PROCEDURE — 99214 OFFICE O/P EST MOD 30 MIN: CPT | Performed by: INTERNAL MEDICINE

## 2020-09-15 RX ORDER — CILOSTAZOL 100 MG/1
100 TABLET ORAL DAILY
COMMUNITY
End: 2020-09-15 | Stop reason: SDUPTHER

## 2020-09-15 RX ORDER — ISOSORBIDE MONONITRATE 30 MG/1
30 TABLET, EXTENDED RELEASE ORAL DAILY
Qty: 30 TABLET | Refills: 5 | Status: SHIPPED | OUTPATIENT
Start: 2020-09-15 | End: 2021-03-31 | Stop reason: SDUPTHER

## 2020-09-15 RX ORDER — CILOSTAZOL 100 MG/1
100 TABLET ORAL DAILY
Qty: 60 TABLET | Refills: 5 | Status: SHIPPED | OUTPATIENT
Start: 2020-09-15 | End: 2021-09-16 | Stop reason: SDUPTHER

## 2020-09-15 RX ORDER — CLOPIDOGREL BISULFATE 75 MG/1
TABLET ORAL
COMMUNITY
Start: 2020-09-14 | End: 2020-09-15

## 2020-09-15 RX ORDER — ISOSORBIDE MONONITRATE 30 MG/1
30 TABLET, EXTENDED RELEASE ORAL DAILY
COMMUNITY
End: 2020-09-15 | Stop reason: SDUPTHER

## 2020-09-15 NOTE — OUTREACH NOTE
Medical Week 2 Survey      Responses   [facility] patient discharged from?  [facility]   COVID-19 Test Status  Not tested   Does the patient have one of the following disease processes/diagnoses(primary or secondary)?  Other   Week 2 attempt successful?  No   Unsuccessful attempts  Attempt 1          Pastora Contreras RN

## 2020-09-16 ENCOUNTER — READMISSION MANAGEMENT (OUTPATIENT)
Dept: CALL CENTER | Facility: HOSPITAL | Age: 60
End: 2020-09-16

## 2020-09-16 ENCOUNTER — OFFICE VISIT (OUTPATIENT)
Dept: FAMILY MEDICINE CLINIC | Facility: CLINIC | Age: 60
End: 2020-09-16

## 2020-09-16 VITALS
HEART RATE: 90 BPM | TEMPERATURE: 96.9 F | DIASTOLIC BLOOD PRESSURE: 58 MMHG | OXYGEN SATURATION: 98 % | WEIGHT: 219 LBS | SYSTOLIC BLOOD PRESSURE: 94 MMHG | HEIGHT: 70 IN | BODY MASS INDEX: 31.35 KG/M2

## 2020-09-16 DIAGNOSIS — E87.1 HYPONATREMIA: Primary | ICD-10-CM

## 2020-09-16 DIAGNOSIS — I73.9 PAD (PERIPHERAL ARTERY DISEASE) (HCC): ICD-10-CM

## 2020-09-16 DIAGNOSIS — N28.9 RENAL INSUFFICIENCY: ICD-10-CM

## 2020-09-16 DIAGNOSIS — G62.9 NEUROPATHY: ICD-10-CM

## 2020-09-16 DIAGNOSIS — F33.1 MAJOR DEPRESSIVE DISORDER, RECURRENT, MODERATE (HCC): ICD-10-CM

## 2020-09-16 LAB
ALBUMIN SERPL-MCNC: 4 G/DL (ref 3.5–5.2)
ALBUMIN/GLOB SERPL: 1.2 G/DL
ALP SERPL-CCNC: 75 U/L (ref 39–117)
ALT SERPL W P-5'-P-CCNC: 19 U/L (ref 1–41)
ANION GAP SERPL CALCULATED.3IONS-SCNC: 10 MMOL/L (ref 5–15)
AST SERPL-CCNC: 20 U/L (ref 1–40)
BASOPHILS # BLD AUTO: 0.08 10*3/MM3 (ref 0–0.2)
BASOPHILS NFR BLD AUTO: 1 % (ref 0–1.5)
BILIRUB SERPL-MCNC: 0.2 MG/DL (ref 0–1.2)
BUN SERPL-MCNC: 12 MG/DL (ref 6–20)
BUN/CREAT SERPL: 9.3 (ref 7–25)
CALCIUM SPEC-SCNC: 9.6 MG/DL (ref 8.6–10.5)
CHLORIDE SERPL-SCNC: 106 MMOL/L (ref 98–107)
CO2 SERPL-SCNC: 23 MMOL/L (ref 22–29)
CREAT SERPL-MCNC: 1.29 MG/DL (ref 0.76–1.27)
DEPRECATED RDW RBC AUTO: 51.6 FL (ref 37–54)
EOSINOPHIL # BLD AUTO: 0.19 10*3/MM3 (ref 0–0.4)
EOSINOPHIL NFR BLD AUTO: 2.4 % (ref 0.3–6.2)
ERYTHROCYTE [DISTWIDTH] IN BLOOD BY AUTOMATED COUNT: 15.8 % (ref 12.3–15.4)
GFR SERPL CREATININE-BSD FRML MDRD: 57 ML/MIN/1.73
GLOBULIN UR ELPH-MCNC: 3.3 GM/DL
GLUCOSE SERPL-MCNC: 130 MG/DL (ref 65–99)
HCT VFR BLD AUTO: 39.7 % (ref 37.5–51)
HGB BLD-MCNC: 12.5 G/DL (ref 13–17.7)
IMM GRANULOCYTES # BLD AUTO: 0.01 10*3/MM3 (ref 0–0.05)
IMM GRANULOCYTES NFR BLD AUTO: 0.1 % (ref 0–0.5)
LYMPHOCYTES # BLD AUTO: 1.2 10*3/MM3 (ref 0.7–3.1)
LYMPHOCYTES NFR BLD AUTO: 14.9 % (ref 19.6–45.3)
MCH RBC QN AUTO: 28.2 PG (ref 26.6–33)
MCHC RBC AUTO-ENTMCNC: 31.5 G/DL (ref 31.5–35.7)
MCV RBC AUTO: 89.6 FL (ref 79–97)
MONOCYTES # BLD AUTO: 0.67 10*3/MM3 (ref 0.1–0.9)
MONOCYTES NFR BLD AUTO: 8.3 % (ref 5–12)
NEUTROPHILS NFR BLD AUTO: 5.92 10*3/MM3 (ref 1.7–7)
NEUTROPHILS NFR BLD AUTO: 73.3 % (ref 42.7–76)
NRBC BLD AUTO-RTO: 0 /100 WBC (ref 0–0.2)
PLATELET # BLD AUTO: 318 10*3/MM3 (ref 140–450)
PMV BLD AUTO: 11.3 FL (ref 6–12)
POTASSIUM SERPL-SCNC: 5.4 MMOL/L (ref 3.5–5.2)
PROT SERPL-MCNC: 7.3 G/DL (ref 6–8.5)
RBC # BLD AUTO: 4.43 10*6/MM3 (ref 4.14–5.8)
SODIUM SERPL-SCNC: 139 MMOL/L (ref 136–145)
WBC # BLD AUTO: 8.07 10*3/MM3 (ref 3.4–10.8)

## 2020-09-16 PROCEDURE — 80053 COMPREHEN METABOLIC PANEL: CPT | Performed by: NURSE PRACTITIONER

## 2020-09-16 PROCEDURE — 85025 COMPLETE CBC W/AUTO DIFF WBC: CPT | Performed by: NURSE PRACTITIONER

## 2020-09-16 PROCEDURE — 99495 TRANSJ CARE MGMT MOD F2F 14D: CPT | Performed by: NURSE PRACTITIONER

## 2020-09-16 RX ORDER — TRAZODONE HYDROCHLORIDE 50 MG/1
50 TABLET ORAL NIGHTLY
Qty: 30 TABLET | Refills: 5 | Status: SHIPPED | OUTPATIENT
Start: 2020-09-16 | End: 2021-03-15 | Stop reason: SDUPTHER

## 2020-09-16 NOTE — PROGRESS NOTES
Subjective   Gurwinder Harding is a 60 y.o. male.     Chief Complaint: Transitional Care Management    History of Present Illness     Patient here for transitional care management visit.  He was admitted to [facility] on 8/31/2020 and discharged home on 9/3/2020.  The following notes were reviewed from his hospital discharge summary:  Mr. Harding  is a 60 y.o. male with past medical history significant for peripheral vascular disease status post arthrectomy followed by balloon angioplasty and stenting to the right and left SFA, carotid artery stenosis, alcohol abuse, essential hypertension, smoking tobacco use, that presented to the [facility] emergency department for evaluation of syncope. Please see the admitting history and physical for further details.  Admitted with syncope and acute on chronic hyponatremia with sodium of 121.  Nephrology consulted and patient was treated for hyponatremia and sodium corrected to 131 at the time of discharge.  For syncope, patient had work-up done including carotid Doppler done which showed similar finding to previous carotid Doppler occlusion of the L ICA/moderate stenosis of the R ICA.  2D echocardiogram with mild concentric hypertrophy and EF of 56 to 60% and grade 1 diastolic dysfunction.  Orthostatic vitals negative.  Patient does have history of carotid artery stenosis and he has followed up with Dr. Brice in 03/2020.  Will make an outpatient follow-up apartment with   Dr. Brice.  Started on aspirin and statin. For HTN, blood pressure borderline so lisinopril dose decreased to 20 mg.  For alcohol abuse, patient was continued with multivitamin folate and thiamine.  CIWA protocol was initiated but the patient did not have any signs or symptoms of alcohol withdrawal.  Discussed in detail with the patient regarding strict abstinence from alcohol abuse and tobacco abuse and he verbalized understanding.  Also discussed with the patient regarding fluid restriction 1500 cc/day.   Will repeat the BMP in 5 days to be followed up by PCP.  Ambulating independently without any dizziness or imbalance.  Since patient was vitally stable, improved symptomatically and would like to go home, and nephrology signed off, it was decided to discharge patient to home.  Discharge disposition stable.     Pt states he has done well since discharge from hospital.  He has had no further episodes of weakness or dizziness or syncope.  He is eating well.  He is taking his medication as prescribed.    His zoloft was discontinued while hospitalized due to his hyponatremia.  It was working well for his depression.  He is requesting a different medication for his depression.  Patient was also started on Pletal instead of Plavix and also started on Lipitor and isosorbide due to the PAD per Dr. Azar, cardiologist.  He states that he will be picking those prescriptions up today and start taking them.  He does have a follow-up scheduled with nephrology in 2 weeks.  I have advised him to keep that appointment as scheduled.  He followed up with Dr. Azar, cardiologist, yesterday.    He has requested paperwork to be filled out for short-term disability as he has not been to work in a month due to the pain in his legs.  He stands his entire work shift and he is unable to do that at this time due to the pain in his legs.      Family History   Problem Relation Age of Onset   • Hypertension Mother    • Cancer Father         LUNG   • Diabetes Father    • Hypertension Father    • Heart attack Other         GRANDFATHER       Social History     Socioeconomic History   • Marital status:      Spouse name: Not on file   • Number of children: 1   • Years of education: Not on file   • Highest education level: Not on file   Occupational History   • Occupation:  at [employer]   Tobacco Use   • Smoking status: Current Every Day Smoker     Packs/day: 1.50     Years: 30.00     Pack years: 45.00     Types: Cigarettes   •  "Smokeless tobacco: Never Used   Substance and Sexual Activity   • Alcohol use: Yes     Alcohol/week: 24.0 standard drinks     Types: 24 Cans of beer per week     Drinks per session: 5 or 6     Binge frequency: Weekly   • Drug use: No   • Sexual activity: Defer   Social History Narrative    Lives in [place].        Past Medical History:   Diagnosis Date   • Alcohol abuse    • Arthritis    • Carotid stenosis    • ED (erectile dysfunction)    • History of degenerative disc disease    • Hypertension    • Neuropathy    • Peripheral vascular disease (CMS/HCC)     s/p arthrectomy follow by balloon angioplasty and stents of right and left SFA   • Tobacco abuse    • Vitamin D deficiency        Review of Systems   Constitutional: Negative.    HENT: Negative.    Respiratory: Negative.    Cardiovascular: Negative.    Gastrointestinal: Negative.    Musculoskeletal: Positive for back pain and myalgias.   Skin: Negative.    Neurological: Negative.    Psychiatric/Behavioral: Negative.        Objective   Physical Exam  Vitals signs and nursing note reviewed.   Constitutional:       Appearance: He is well-developed.   Neck:      Musculoskeletal: Normal range of motion and neck supple.   Cardiovascular:      Rate and Rhythm: Normal rate and regular rhythm.      Heart sounds: Normal heart sounds.   Pulmonary:      Effort: Pulmonary effort is normal.      Breath sounds: Normal breath sounds.   Skin:     General: Skin is warm and dry.   Neurological:      Mental Status: He is alert and oriented to person, place, and time.   Psychiatric:         Behavior: Behavior normal.         Thought Content: Thought content normal.         Judgment: Judgment normal.         Procedures    Vitals: Blood pressure 94/58, pulse 90, temperature 96.9 °F (36.1 °C), height 177.8 cm (70\"), weight 99.3 kg (219 lb), SpO2 98 %.    Body mass index is 31.42 kg/m².     Allergies:   Allergies   Allergen Reactions   • Penicillins Hives and Shortness Of Breath     As A " child   • Novocain [Procaine] Nausea And Vomiting        During this visit the following were done:  Labs Reviewed []    Labs Ordered []    Radiology Reports Reviewed []    Radiology Ordered []    PCP Records Reviewed []    Referring Provider Records Reviewed []    ER Records Reviewed []    Hospital Records Reviewed []    History Obtained From Family []    Radiology Images Reviewed []    Other Reviewed []    Records Requested []      Assessment/Plan   Gurwinder was seen today for transitional care management.    Diagnoses and all orders for this visit:    Hyponatremia  -     Comprehensive Metabolic Panel; Future  -     CBC & Differential; Future  -     Comprehensive Metabolic Panel  -     CBC & Differential  -     CBC Auto Differential    Renal insufficiency  -     Comprehensive Metabolic Panel; Future  -     CBC & Differential; Future  -     Comprehensive Metabolic Panel  -     CBC & Differential  -     CBC Auto Differential    PAD (peripheral artery disease) (CMS/HCC)   Continue to follow with cardiology    Major depressive disorder, recurrent, moderate (CMS/HCC)  -     Start traZODone (DESYREL) 50 MG tablet; Take 1 tablet by mouth Every Night; in place of Zoloft

## 2020-09-17 ENCOUNTER — TELEPHONE (OUTPATIENT)
Dept: FAMILY MEDICINE CLINIC | Facility: CLINIC | Age: 60
End: 2020-09-17

## 2020-09-17 NOTE — TELEPHONE ENCOUNTER
----- Message from KEVIN Mullen sent at 9/17/2020  8:30 AM EDT -----  His sodium is 139.  Looks good.  And his blood count is up to 12.5.  Please let him know.     Left message for patient.

## 2020-09-23 ENCOUNTER — READMISSION MANAGEMENT (OUTPATIENT)
Dept: CALL CENTER | Facility: HOSPITAL | Age: 60
End: 2020-09-23

## 2020-09-23 NOTE — OUTREACH NOTE
Medical Week 3 Survey      Responses   [facility] patient discharged from?  [facility]   COVID-19 Test Status  Not tested   Does the patient have one of the following disease processes/diagnoses(primary or secondary)?  Other   Week 3 attempt successful?  No   Unsuccessful attempts  Attempt 1          Eliza Lagunas, RN

## 2020-09-25 ENCOUNTER — READMISSION MANAGEMENT (OUTPATIENT)
Dept: CALL CENTER | Facility: HOSPITAL | Age: 60
End: 2020-09-25

## 2020-09-25 NOTE — OUTREACH NOTE
Medical Week 3 Survey      Responses   [facility] patient discharged from?  [facility]   COVID-19 Test Status  Not tested   Does the patient have one of the following disease processes/diagnoses(primary or secondary)?  Other   Week 3 attempt successful?  No   Unsuccessful attempts  Attempt 2          Pastora Contreras RN

## 2020-11-04 ENCOUNTER — OFFICE VISIT (OUTPATIENT)
Dept: FAMILY MEDICINE CLINIC | Facility: CLINIC | Age: 60
End: 2020-11-04

## 2020-11-04 VITALS
SYSTOLIC BLOOD PRESSURE: 130 MMHG | DIASTOLIC BLOOD PRESSURE: 68 MMHG | OXYGEN SATURATION: 99 % | WEIGHT: 215 LBS | TEMPERATURE: 97.3 F | HEIGHT: 70 IN | BODY MASS INDEX: 30.78 KG/M2 | HEART RATE: 97 BPM

## 2020-11-04 DIAGNOSIS — I73.9 PAD (PERIPHERAL ARTERY DISEASE) (HCC): Primary | ICD-10-CM

## 2020-11-04 DIAGNOSIS — N50.811 RIGHT TESTICULAR PAIN: ICD-10-CM

## 2020-11-04 DIAGNOSIS — I10 ESSENTIAL HYPERTENSION: ICD-10-CM

## 2020-11-04 DIAGNOSIS — G62.9 NEUROPATHY: ICD-10-CM

## 2020-11-04 PROCEDURE — 99214 OFFICE O/P EST MOD 30 MIN: CPT | Performed by: NURSE PRACTITIONER

## 2020-11-04 RX ORDER — GABAPENTIN 800 MG/1
1200 TABLET ORAL 3 TIMES DAILY
Qty: 135 TABLET | Refills: 2 | Status: SHIPPED | OUTPATIENT
Start: 2020-11-04 | End: 2021-03-11 | Stop reason: SDUPTHER

## 2020-11-04 RX ORDER — ATENOLOL 50 MG/1
25 TABLET ORAL DAILY
Qty: 45 TABLET | Refills: 0
Start: 2020-11-04 | End: 2021-03-15 | Stop reason: SDUPTHER

## 2020-11-04 RX ORDER — METHOCARBAMOL 750 MG/1
750 TABLET, FILM COATED ORAL 4 TIMES DAILY
COMMUNITY
Start: 2020-11-01 | End: 2021-03-31 | Stop reason: SDUPTHER

## 2020-11-04 NOTE — PROGRESS NOTES
Subjective   Gurwinder Harding is a 60 y.o. male.     Chief Complaint: Leg Pain (bilateral )    Testicle Pain  The patient's primary symptoms include testicular pain. This is a new problem. The current episode started 1 to 4 weeks ago. The problem occurs constantly. The problem has been unchanged. The pain is mild. Pertinent negatives include no chest pain, constipation, diarrhea, fever, hematuria, hesitancy, nausea or shortness of breath. The testicular pain affects the right testicle. There is swelling in the right testicle. Nothing aggravates the symptoms. He has tried nothing for the symptoms. He is sexually active. No, his partner does not have an STD.   Leg Pain   Incident onset: chronic, PAD, neuropathy. There was no injury mechanism. The pain is present in the left leg and right leg. The quality of the pain is described as burning and aching. The pain is moderate. The pain has been constant since onset. He reports no foreign bodies present. Exacerbated by: smoking cigarettes.   Hypertension  This is a chronic problem. The current episode started more than 1 year ago. The problem is unchanged. The problem is controlled. Pertinent negatives include no chest pain, palpitations or shortness of breath. Current antihypertension treatment includes beta blockers, direct vasodilators and ACE inhibitors. The current treatment provides significant improvement. Compliance problems include exercise and diet.       Patient has been unable to work since August.  His last day at work was 8/5/2020.  He has continued to remain off work per Dr Azar, cardiologist.      Family History   Problem Relation Age of Onset   • Hypertension Mother    • Cancer Father         LUNG   • Diabetes Father    • Hypertension Father    • Heart attack Other         GRANDFATHER       Social History     Socioeconomic History   • Marital status:      Spouse name: Not on file   • Number of children: 1   • Years of education: Not on file   •  Highest education level: Not on file   Occupational History   • Occupation:  at [employer]   Tobacco Use   • Smoking status: Current Every Day Smoker     Packs/day: 1.50     Years: 30.00     Pack years: 45.00     Types: Cigarettes   • Smokeless tobacco: Never Used   Substance and Sexual Activity   • Alcohol use: Yes     Alcohol/week: 24.0 standard drinks     Types: 24 Cans of beer per week     Drinks per session: 5 or 6     Binge frequency: Weekly   • Drug use: No   • Sexual activity: Defer   Social History Narrative    Lives in [place].        Past Medical History:   Diagnosis Date   • Alcohol abuse    • Arthritis    • Carotid stenosis    • ED (erectile dysfunction)    • History of degenerative disc disease    • Hypertension    • Neuropathy    • Peripheral vascular disease (CMS/HCC)     s/p arthrectomy follow by balloon angioplasty and stents of right and left SFA   • Tobacco abuse    • Vitamin D deficiency        Review of Systems   Constitutional: Negative.  Negative for fever.   HENT: Negative.    Respiratory: Negative.  Negative for shortness of breath.    Cardiovascular: Negative.  Negative for chest pain and palpitations.   Gastrointestinal: Negative.  Negative for constipation, diarrhea and nausea.   Genitourinary: Positive for testicular pain. Negative for hesitancy.   Musculoskeletal: Negative.    Skin: Negative.    Neurological: Negative.         Neuropathy   Psychiatric/Behavioral: Negative.        Objective   Physical Exam  Vitals signs and nursing note reviewed.   Constitutional:       Appearance: He is well-developed.   Neck:      Musculoskeletal: Normal range of motion and neck supple.   Cardiovascular:      Rate and Rhythm: Normal rate and regular rhythm.      Heart sounds: Normal heart sounds.   Pulmonary:      Effort: Pulmonary effort is normal.      Breath sounds: Normal breath sounds.   Skin:     General: Skin is warm and dry.   Neurological:      Mental Status: He is alert and oriented  "to person, place, and time.   Psychiatric:         Behavior: Behavior normal.         Thought Content: Thought content normal.         Judgment: Judgment normal.         Procedures    Vitals: Blood pressure 130/68, pulse 97, temperature 97.3 °F (36.3 °C), height 177.8 cm (70\"), weight 97.5 kg (215 lb), SpO2 99 %.    Body mass index is 30.85 kg/m².     Allergies:   Allergies   Allergen Reactions   • Penicillins Hives and Shortness Of Breath     As A child   • Novocain [Procaine] Nausea And Vomiting        During this visit the following were done:  Labs Reviewed []    Labs Ordered []    Radiology Reports Reviewed []    Radiology Ordered []    PCP Records Reviewed []    Referring Provider Records Reviewed []    ER Records Reviewed []    Hospital Records Reviewed []    History Obtained From Family []    Radiology Images Reviewed []    Other Reviewed []    Records Requested []      Assessment/Plan   Diagnoses and all orders for this visit:    1. PAD (peripheral artery disease) (CMS/HCC) (Primary)    2. Neuropathy  -     gabapentin (NEURONTIN) 800 MG tablet; Take 1.5 tablets by mouth 3 (Three) Times a Day.  Dispense: 135 tablet; Refill: 2    3. Essential hypertension  -     atenolol (TENORMIN) 50 MG tablet; Take 0.5 tablets by mouth Daily.  Dispense: 45 tablet; Refill: 0    4. Right testicular pain  -     US scrotum and testicles; Future      Paperwork completed for short term disability from 8/13/2020 - 10/21/2020.  Other disability papers completed per Dr Azar.      Michael # [id number]  Reviewed and is consistent.  UDS 8/31/2020 reviewed and is consistent.  Patient comfort assessment guide reviewed and updated today.    As part of the patient's treatment plan they are being prescribed a controlled substance/ substances with potential for abuse.  This patient has been made aware of the appropriate use of such medications, including potential risk of somnolence, limited ability to drive and/or work safely, and potential " for overdose.  It has also been made clear these medications are for use by the patient only, without concomitant use of alcohol or other substances unless prescribed/advised by medical provider.    Patient has completed prescribing agreement detailing terms of continued prescribing of controlled substances including monitoring HERON reports, urine drug screens, and pill counts.  The patient is aware HERON reports are reviewed on a regular basis and scanned into the chart.    History and physical exam exhibit continued safe and appropriate use of controlled substances.

## 2020-11-13 ENCOUNTER — TELEPHONE (OUTPATIENT)
Dept: FAMILY MEDICINE CLINIC | Facility: CLINIC | Age: 60
End: 2020-11-13

## 2020-11-13 ENCOUNTER — HOSPITAL ENCOUNTER (OUTPATIENT)
Dept: ULTRASOUND IMAGING | Facility: HOSPITAL | Age: 60
Discharge: HOME | End: 2020-11-13
Admitting: NURSE PRACTITIONER

## 2020-11-13 DIAGNOSIS — N50.811 RIGHT TESTICULAR PAIN: ICD-10-CM

## 2020-11-13 PROCEDURE — 76870 US EXAM SCROTUM: CPT | Performed by: RADIOLOGY

## 2020-11-13 PROCEDURE — 76870 US EXAM SCROTUM: CPT

## 2020-11-13 NOTE — TELEPHONE ENCOUNTER
----- Message from KEVIN Mullen sent at 11/13/2020  4:02 PM EST -----  He does have bilateral hydroceles.  If he is still having issues, he needs to be referred to urology.  Is he agreeable?    Made patient aware. He is agreeable to urology referral .

## 2020-11-13 NOTE — PROGRESS NOTES
He does have bilateral hydroceles.  If he is still having issues, he needs to be referred to urology.  Is he agreeable?

## 2020-11-14 DIAGNOSIS — N43.3 BILATERAL HYDROCELE: Primary | ICD-10-CM

## 2020-12-07 NOTE — TELEPHONE ENCOUNTER
Primary Care Provider: Justin Alcaraz MD    Chief Complaint:   Chief Complaint   Patient presents with   • Back Pain     Chronic low back pain, onset R leg pain x 2-3 years with worsening over past year. Prior exam with Dr. Jernigan in 2011 and was offered fusion, patient declined and was later diagnosed with testicular cancer. New MRI @ [facility] for review.       History of Present Illness  Alex Vann is a 47 y.o. male is being seen for consultation today at the request of Justin Alcaraz MD    Christopher symptom originally began around 2010 or 11.  Patient was originally evaluated by Dr. Taylor.  He had an MRI at this point.  He was told that he needed surgery but due to a diagnosis of testicular cancer he elected to delay surgery.  At this point he had 100% back pain.    Over the last 2 to 3 years ago he had an onset of right leg pain.  This has been getting much worse over the last year.  He had a new MRI by his PCP, Dr. Carolina.    Currently Alex's pain is located in low back in the lumbosacral region and right leg.  Severity: 0/10, but this flares up with activities.  Percentage pain: 85% right leg pain, 50% back pain  Radiation: Radiates into his right buttocks.  His right lateral thigh, and then into his posterior calf.  This will feel like a hot nail.  Numbness: Numbness in the right buttocks but no numbness below the level of the knee  Fine Motor Skills: Denies  Gait: Normal gait.  No assistive devices.  He does notice that when he steps up he has some worsening of his right leg pain.  Bowel and Bladder Function: Denies    Exacerbating factors: Driving more than an hour.  He notes that when he drives to [place] that he will have some pretty significant right leg pain.  He has to screws control while driving so he can move his leg.  And as previously mentioned stepping up.  Ameliorating factors: Used to be a side sleeper but he now sleeps on his back.  He puts a pillow underneath  Wife called requested when [facility] paperwork is completed it be faxed to Carmelita Ding at [phone].   his right leg to relieve pain.    Alternative therapies: Mobic and massage and Zanaflex.  He has not tried physical therapy, Occupational Therapy, chiropractic, narcotics, or injections    Risk factors for poor outcome following spine surgery: None    Oswestry Disability Index Lumbar = 14%   Score   Pain Intensity Very mild pain-1   Personal Care Look after myself without pain-0   Lifting Can lift heavy weights with extra pain-1   Walking Walk any distance-0   Sitting Sit as long as I like-0   Standing Stand as long as I like but with extra pain-1   Sleeping Occasionally disturbed-1   Sex Life (if applicable) My sex life is normal-1   Social Life Pain limits more energetic activities-2   Traveling Travel gives me extra pain-1   (Viri et al, 1980)    SCORE INTERPRETATION OF THE OSWESTRY LBP DISABILITY QUESTIONNAIRE   0-20% Minimal disability Can cope with most ADLs. Usually no treatment is needed, apart from advice on lifting, sitting, posture, physical fitness, and diet. In this group, some patients have particular difficulty with sitting and this may be important if their occupation is sedentary (, , etc.)       Review of Systems   Constitutional: Negative.    HENT: Positive for ear discharge and tinnitus.    Eyes: Negative.    Respiratory: Negative.    Cardiovascular: Negative.    Gastrointestinal: Negative.    Endocrine: Negative.    Genitourinary: Negative.    Musculoskeletal: Positive for back pain, neck pain and neck stiffness.   Skin: Negative.    Allergic/Immunologic: Positive for environmental allergies.   Neurological: Negative.    Hematological: Negative.    Psychiatric/Behavioral: Negative.        Past Medical History:   Diagnosis Date   • Hyperlipidemia    • Malignant neoplasm of right testis (CMS/HCC) 10/18/2016   • Osteoarthritis    • Testicular mass        Past Surgical History:   Procedure Laterality Date   • KNEE ARTHROSCOPY     • TESTICLE SURGERY       Granuloma of the right testicle   • VASECTOMY         Family History: family history includes Cancer in his mother; Diabetes in his father; Hypertension in his father; Mental illness in his paternal uncle.    Social History:  reports that he has quit smoking. He has never used smokeless tobacco. He reports current alcohol use. He reports that he does not use drugs.    Medications:    Current Outpatient Medications:   •  irbesartan (AVAPRO) 150 MG tablet, Take 150 mg by mouth Daily., Disp: , Rfl:   •  levocetirizine (XYZAL) 5 MG tablet, Take 5 mg by mouth Every Evening., Disp: , Rfl:   •  LIVALO 4 MG tablet, Take 1 tablet by mouth Daily., Disp: , Rfl:   •  meloxicam (MOBIC) 15 MG tablet, Take 15 mg by mouth Daily., Disp: , Rfl:   •  montelukast (SINGULAIR) 4 MG chewable tablet, Chew 4 mg., Disp: , Rfl:   •  vitamin B-12 (CYANOCOBALAMIN) 500 MCG tablet, Take 500 mcg by mouth Daily., Disp: , Rfl:   •  vitamin D (ERGOCALCIFEROL) 59847 units capsule capsule, Take 50,000 Units by mouth Every 7 (Seven) Days., Disp: , Rfl:     Allergies:  Sulfa antibiotics    Objective   Physical Exam  Constitutional:       Appearance: He is well-developed.   HENT:      Head: Normocephalic and atraumatic.   Eyes:      Extraocular Movements: EOM normal.      Conjunctiva/sclera: Conjunctivae normal.      Pupils: Pupils are equal, round, and reactive to light.      Funduscopic exam:     Right eye: No hemorrhage, exudate or papilledema.         Left eye: No hemorrhage, exudate or papilledema.   Neck:      Musculoskeletal: Normal range of motion and neck supple.   Cardiovascular:      Pulses:           Dorsalis pedis pulses are 2+ on the right side and 2+ on the left side.        Posterior tibial pulses are 2+ on the right side and 2+ on the left side.   Pulmonary:      Effort: Pulmonary effort is normal. No respiratory distress.   Skin:     General: Skin is warm.      Findings: No erythema or rash.   Neurological:      Mental Status: He is  oriented to person, place, and time.      Coordination: Finger-Nose-Finger Test and Heel to Shin Test normal.      Gait: Gait is intact. Tandem walk normal.      Deep Tendon Reflexes:      Reflex Scores:       Tricep reflexes are 2+ on the right side and 2+ on the left side.       Bicep reflexes are 2+ on the right side and 2+ on the left side.       Brachioradialis reflexes are 2+ on the right side and 2+ on the left side.       Patellar reflexes are 2+ on the right side and 2+ on the left side.       Achilles reflexes are 0 on the right side and 1+ on the left side.  Psychiatric:         Speech: Speech normal.       Neurologic Exam     Mental Status   Oriented to person, place, and time.   Registration: recalls 3 of 3 objects. Recall at 5 minutes: recalls 3 of 3 objects.   Attention: normal. Concentration: normal.   Speech: speech is normal   Level of consciousness: alert  Knowledge: consistent with education.     Cranial Nerves     CN II   Visual acuity: normal    CN III, IV, VI   Pupils are equal, round, and reactive to light.  Extraocular motions are normal.   Diplopia: none    CN V   Facial sensation intact.   Right corneal reflex: normal  Left corneal reflex: normal    CN VII   Right facial weakness: none  Left facial weakness: none    CN VIII   Hearing: intact    CN IX, X   Palate: symmetric  Right gag reflex: normal  Left gag reflex: normal    CN XI   Right trapezius strength: normal  Left trapezius strength: normal    CN XII   Tongue deviation: none    Motor Exam   Right arm tone: normal  Left arm tone: normal  Right arm pronator drift: absent  Left arm pronator drift: absent  Right leg tone: normal  Left leg tone: normal    Strength   Right deltoid: 5/5  Left deltoid: 5/5  Right biceps: 5/5  Left biceps: 5/5  Right triceps: 5/5  Left triceps: 5/5  Right interossei: 5/5  Left interossei: 5/5  Right iliopsoas: 5/5  Left iliopsoas: 5/5  Right quadriceps: 5/5  Left quadriceps: 5/5  Right anterior tibial:  5/5  Left anterior tibial: 5/5  Right gastroc: 5/5  Left gastroc: 5/5Right EHL 5/5   Left EHL 5/5     Sensory Exam   Light touch normal.   Right arm light touch: normal  Left arm light touch: normal  Right leg light touch: normal  Left leg light touch: normal  Proprioception normal.   Sensory deficit distribution on right: L5  Some radicular pain in the L5 distribution with activity but currently asymptomatic.     Gait, Coordination, and Reflexes     Gait  Gait: normal    Coordination   Finger to nose coordination: normal  Heel to shin coordination: normal  Tandem walking coordination: normal    Reflexes   Right brachioradialis: 2+  Left brachioradialis: 2+  Right biceps: 2+  Left biceps: 2+  Right triceps: 2+  Left triceps: 2+  Right patellar: 2+  Left patellar: 2+  Right achilles: 0  Left achilles: 1+  Right plantar: normal  Left plantar: normal  Right Gomez: absent  Left Gomez: absent  Right ankle clonus: absent  Left ankle clonus: absent      Gait:  Able to heel and toe walk without difficulty    Back exam:   No point tenderness over spine   No point tenderness over SI joint right and left   No pain with later loading of hip    Hip exam:   Negative WILLIAM right and left    Negative straight leg Raise bilaterally.      Imaging: (independent review and interpretation)  Mri Lumbar Spine Without Contrast    Result Date: 11/27/2020   1.  Bilateral L5 pars defects redemonstrated with grade 1 anterolisthesis and severe RIGHT and moderate LEFT foraminal narrowing.  2.  At L3-L4, there is a diffuse disc bulge and extra foraminal disc material on the RIGHT comes into close proximity with the exited RIGHT L3 nerve root. No definite mass effect on the nerve root.  3.  Mild narrowing of the RIGHT L4-L5 foramen.   This report was finalized on 11/27/2020 09:53 by Dr. Jaquan Vasquez MD.    MRI personally reviewed.  Evidence of T2 signal within the facets at L4/5 and L5/S1.  Anterior listhesis of L5 in reference to S1 and  L4.  This results in moderate to severe right foraminal stenosis at L5/S1 compressing the exiting L5 nerve root.  Only minimal progression when compared to 2011 MRI.              2/3/2011 noncontrast MRI of the lumbar spine from the orthopedic [facility] reviewed.  Similar findings 2/2020 imaging        ASSESSMENT and PLAN  Alex Vann is a 47 y.o. male with a significant comorbidity of testicular cancer in remission and obesity. He presents with a new problem of back and right pain in an L5 distribution with an Oswestry disability index of 14%. Physical exam findings of neurologically intact.  His imaging shows Evidence of T2 signal within the facets at L4/5 and L5/S1.  Anterior listhesis of L5 in reference to S1 and L4.  This results in moderate to severe right foraminal stenosis at L5/S1 compressing the exiting L5 nerve root.  Only minimal progression when compared to 2011 MRI..    Lumbar Spondylolisthesis, L5 secondary to bilateral pars defects  Lumbar foraminal stenosis secondary to above    Diagnosis  1. To establish the diagnosis of lumbar facet-mediated pain, the double-injection technique with an improvement threshold of 80% or greater is suggested (single Level I study; B).  2. There is no evidence to support the use of diagnostic facet blocks as a predictor of lumbar fusion outcome in patients with chronic low-back pain from degenerative lumbar disease (conflicting Level IV evidence).    Treatment Recommendations  1. Continued conservative care  2. Surgical decompression and fusion - recommended     Fusion Technique  1. Posterior Lateral Fusion (PLF)  2. PLF with Pedicle Screws  3. Transforaminal Interbody Fusion (TLIF)  4. Direct Lateral Interbody Fusion (DLIF)  5. Anterior Lumbar Interbody Fusion (ALIF)    Guidelines for  Fusion  1. Interbody fusion is recommended as an option to enhance the fusion rate.  (multiple Level II reports; B).   2. Posterolateral lumbar fusion (PLF) plus interbody  fusion is not recommended since the evidence indicates no substantial benefit but an increased rate of complications if a PLF is added to an interbody fusion (Level II and III reports; B).  3. Anterior lumbar interbody fusion has better clinical outcomes and fewer perioperative morbidities than instrumented PLF (Level III evidence from 2 reports; C).  4. Pedicle screw fixation is recommended when posterolateral lumbar fusion (PLF) is used to manage low-back pain in patients at high risk for pseudarthrosis (Level II reports; Grade B).  5. Routine use of pedicle screw fixation as an adjunct to PLF for patients with degenerative disc disease is an option. There is consistent evidence that the use of pedicle screws enhances the fusion rate.  6. The use of a brace following instrumented posterolateral lumbar fusion (PLF) for lumbar spondylosis is not supported due to equivalent outcomes with and without bracing (single Level II study; C).    Although there is insufficient evidence to recommend a standard fusion technique, the patient's anatomy, desires, and concerns as well as surgeon experience should all be factored into the decision-making process when determining the optimal strategy for an individual patient to maximize fusion potential while minimizing risk of complications.    I discussed with the risks benefits and possible complications of conservative therapy versus surgical intervention. risks of the procedure, including but not limited to infection, bleeding, damage to local structures, injury to the nerves or thecal sac resulting in CSF leak, weakness, numbness, paralysis, or loss of bowel, and bladder function, instrumentation failure, dysphagia, dysphonia, visual loss, stroke, coma, MI, or death.      Alex Mercerer has elected to proceed with conservative care.  Currently he has a positional radiculopathy was most likely coming from his L5/S1 foraminal stenosis.  Within Oswestry disability index of  only 14% and the intermittent nature of the radiculopathy, at this point the risk of surgery outweighs benefit.  There is a high likelihood that he will need surgery in the future, and I told him to contact my office when this becomes more burdensome.  In the interim we will obtain flexion-extension films lumbar spine to assess his instability at L4/5 given his fluid within the L4/5 facet.  Additionally we will attempt a course of PT/OT particularly focusing on decompression of the right L5/S1 neuroforamen.  This is to include dry needling.  We will also consider targeted nerve root injections for symptomatic relief prior to surgery.    Obesity Counseling  We discussed Alex's current weight and the effect on his spine, including premature dis degeneration and increased anterior force on the lumbar spine resulting in worsening facet arthropathy.  Alex has a BMI 35.0-39.9        Classification: class II obesity.  We spent 5 minutes in weight management counseling including discussing current weight, current diet, and exercise patterns.  Additional we set goals for weight reduction.  Therefore above normal parameters. Recommendations include: educational material and exercise counseling.       Diagnoses and all orders for this visit:    1. Spondylolisthesis of lumbar region (Primary)  -     Ambulatory Referral to Physical Therapy  -     Ambulatory Referral to Occupational Therapy  -     XR Spine Lumbar Complete With Flex & Ext; Future    2. Obesity (BMI 30-39.9)    3. Former tobacco use        Return if symptoms worsen or fail to improve, for call when ready to discuss further surgical discussion.    Thank you for this Consultation and the opportunity to participate in Alex's care.    Sincerely,  Fabrizio Yip MD    Level of Risk: Moderate due to: undiagnosed new problem  MDM: Moderate Complexity  (Mod = 45314, High = 95691)

## 2020-12-09 ENCOUNTER — OFFICE VISIT (OUTPATIENT)
Dept: CARDIOLOGY | Facility: CLINIC | Age: 60
End: 2020-12-09

## 2020-12-09 VITALS
SYSTOLIC BLOOD PRESSURE: 103 MMHG | WEIGHT: 220 LBS | DIASTOLIC BLOOD PRESSURE: 73 MMHG | HEIGHT: 70 IN | HEART RATE: 103 BPM | OXYGEN SATURATION: 98 % | BODY MASS INDEX: 31.5 KG/M2

## 2020-12-09 DIAGNOSIS — R42 DIZZINESS: Primary | ICD-10-CM

## 2020-12-09 DIAGNOSIS — I10 ESSENTIAL HYPERTENSION: ICD-10-CM

## 2020-12-09 DIAGNOSIS — R06.09 DYSPNEA ON EXERTION: ICD-10-CM

## 2020-12-09 DIAGNOSIS — I20.8 ANGINAL EQUIVALENT (HCC): ICD-10-CM

## 2020-12-09 DIAGNOSIS — Z72.0 TOBACCO ABUSE: ICD-10-CM

## 2020-12-09 DIAGNOSIS — I73.9 PVD (PERIPHERAL VASCULAR DISEASE) WITH CLAUDICATION (HCC): ICD-10-CM

## 2020-12-09 PROCEDURE — 99214 OFFICE O/P EST MOD 30 MIN: CPT | Performed by: INTERNAL MEDICINE

## 2020-12-28 ENCOUNTER — OFFICE VISIT (OUTPATIENT)
Dept: UROLOGY | Facility: CLINIC | Age: 60
End: 2020-12-28

## 2020-12-28 VITALS — TEMPERATURE: 98.1 F | WEIGHT: 221 LBS | BODY MASS INDEX: 31.64 KG/M2 | HEIGHT: 70 IN

## 2020-12-28 DIAGNOSIS — R10.9 FLANK PAIN: Primary | ICD-10-CM

## 2020-12-28 DIAGNOSIS — N43.3 HYDROCELE IN ADULT: ICD-10-CM

## 2020-12-28 PROCEDURE — 99203 OFFICE O/P NEW LOW 30 MIN: CPT | Performed by: UROLOGY

## 2020-12-28 NOTE — PROGRESS NOTES
Chief Complaint:          Chief Complaint   Patient presents with   • Bilateral Hydrocele     New Patient        HPI:   60 y.o. male accompanied by his wife with bilateral hydroceles.  He has multiple medical problems.  He is hypertensive low back pain uses a cane his severe peripheral vascular disease has left flank pain.  He is on multiple medications he cannot recall.  He is not a great historian.  He has bilateral hydroceles left minimal right I would call him minimal to moderate certainly nothing I recommend surgery for he says is causing a great deal of pain I told him to use better elevation and protection none we will reevaluate him he also wants to investigate his left upper quadrant pain and I will get a CT scan for      Past Medical History:        Past Medical History:   Diagnosis Date   • Alcohol abuse    • Arthritis    • Carotid stenosis    • ED (erectile dysfunction)    • History of degenerative disc disease    • Hypertension    • Neuropathy    • Peripheral vascular disease (CMS/HCC)     s/p arthrectomy follow by balloon angioplasty and stents of right and left SFA   • Tobacco abuse    • Vitamin D deficiency          Current Meds:     Current Outpatient Medications   Medication Sig Dispense Refill   • atenolol (TENORMIN) 50 MG tablet Take 0.5 tablets by mouth Daily. 45 tablet 0   • cilostazol (PLETAL) 100 MG tablet Take 1 tablet by mouth Daily. 60 tablet 5   • folic acid-pyridoxine-cyanocobalamin (Folbic) 2.5-25-2 MG tablet tablet Take 1 tablet by mouth Daily. 90 tablet 1   • gabapentin (NEURONTIN) 800 MG tablet Take 1.5 tablets by mouth 3 (Three) Times a Day. 135 tablet 2   • isosorbide mononitrate (IMDUR) 30 MG 24 hr tablet Take 1 tablet by mouth Daily. 30 tablet 5   • lisinopril (PRINIVIL,ZESTRIL) 40 MG tablet Take 0.5 tablets by mouth Daily. 90 tablet 1   • methocarbamol (ROBAXIN) 750 MG tablet Take 750 mg by mouth 4 (Four) Times a Day.     • traZODone (DESYREL) 50 MG tablet Take 1 tablet by mouth  Every Night. 30 tablet 5     No current facility-administered medications for this visit.         Allergies:      Allergies   Allergen Reactions   • Penicillins Hives and Shortness Of Breath     As A child   • Novocain [Procaine] Nausea And Vomiting        Past Surgical History:     Past Surgical History:   Procedure Laterality Date   • APPENDECTOMY     • ARTERIOGRAM N/A 9/19/2019    Procedure: Arteriogram;  Surgeon: Tong Azar MD;  Location: [facility] CATH INVASIVE LOCATION;  Service: Cardiology   • BACK SURGERY      DISC RUPTURE REPAIR, L5   • CARDIAC CATHETERIZATION Right 10/29/2019    Procedure: Peripheral angiography;  Surgeon: Tong Azar MD;  Location: [facility] CATH INVASIVE LOCATION;  Service: Cardiovascular   • VASCULAR SURGERY Bilateral          Social History:     Social History     Socioeconomic History   • Marital status:      Spouse name: Not on file   • Number of children: 1   • Years of education: Not on file   • Highest education level: Not on file   Occupational History   • Occupation:  at [employer]   Tobacco Use   • Smoking status: Current Every Day Smoker     Packs/day: 1.50     Years: 30.00     Pack years: 45.00     Types: Cigarettes   • Smokeless tobacco: Never Used   Substance and Sexual Activity   • Alcohol use: Yes     Alcohol/week: 24.0 standard drinks     Types: 24 Cans of beer per week     Drinks per session: 5 or 6     Binge frequency: Weekly   • Drug use: No   • Sexual activity: Defer   Social History Narrative    Lives in [place].        Family History:     Family History   Problem Relation Age of Onset   • Hypertension Mother    • Cancer Father         LUNG   • Diabetes Father    • Hypertension Father    • Heart attack Other         GRANDFATHER       Review of Systems:     Review of Systems   Constitutional: Negative.    HENT: Negative.    Eyes: Negative.    Respiratory: Negative.    Cardiovascular: Negative.    Gastrointestinal: Negative.    Endocrine: Negative.     Musculoskeletal: Negative.    Allergic/Immunologic: Negative.    Neurological: Negative.    Hematological: Negative.    Psychiatric/Behavioral: Negative.        Physical Exam:     Physical Exam  Vitals signs and nursing note reviewed.   Constitutional:       Appearance: He is well-developed.   HENT:      Head: Normocephalic and atraumatic.   Eyes:      Conjunctiva/sclera: Conjunctivae normal.      Pupils: Pupils are equal, round, and reactive to light.   Neck:      Musculoskeletal: Normal range of motion.   Cardiovascular:      Rate and Rhythm: Normal rate and regular rhythm.      Heart sounds: Normal heart sounds.   Pulmonary:      Effort: Pulmonary effort is normal.      Breath sounds: Normal breath sounds.   Abdominal:      General: Bowel sounds are normal.      Palpations: Abdomen is soft.   Genitourinary:     Comments: Obese, poor gait, normal phallus, bilateral hydroceles right greater than left.  Not dramatic, no hernia soft upper quadrant no palpable masses, organomegaly, rigidity, guarding or tenderness.  Musculoskeletal: Normal range of motion.   Skin:     General: Skin is warm and dry.   Neurological:      Mental Status: He is alert and oriented to person, place, and time.      Deep Tendon Reflexes: Reflexes are normal and symmetric.   Psychiatric:         Behavior: Behavior normal.         Thought Content: Thought content normal.         Judgment: Judgment normal.         I have reviewed the following portions of the patient's history: allergies, current medications, past family history, past medical history, past social history, past surgical history, problem list and ROS and confirm it's accurate.      Procedure:       Assessment/Plan:   Hydrocele-we discussed the presence of a hydrocele and the pathophysiology.  We discussed the in indications for intervention including discomfort pain interference with sexual intercourse.  We discussed various treatment alternatives including aspiration with 50%  chance of recurrence and open repair with a 6% chance of recurrence but the need for invasive surgery not dramatic recommended elevation we will recheck him back in a month  Flank pain-get CT scan to rule out stone            Patient's Body mass index is 31.71 kg/m². BMI is above normal parameters. Recommendations include: educational material.              This document has been electronically signed by JOANNE PIÑA MD December 28, 2020 13:38 EST

## 2020-12-29 PROBLEM — N43.3 HYDROCELE IN ADULT: Status: ACTIVE | Noted: 2020-12-29

## 2020-12-29 PROBLEM — R10.9 FLANK PAIN: Status: ACTIVE | Noted: 2020-12-29

## 2021-01-06 ENCOUNTER — HOSPITAL ENCOUNTER (OUTPATIENT)
Dept: CT IMAGING | Facility: HOSPITAL | Age: 61
Discharge: HOME | End: 2021-01-06
Admitting: UROLOGY

## 2021-01-06 DIAGNOSIS — R10.9 FLANK PAIN: ICD-10-CM

## 2021-01-06 PROCEDURE — 74176 CT ABD & PELVIS W/O CONTRAST: CPT

## 2021-01-06 PROCEDURE — 74176 CT ABD & PELVIS W/O CONTRAST: CPT | Performed by: RADIOLOGY

## 2021-01-11 ENCOUNTER — OFFICE VISIT (OUTPATIENT)
Dept: UROLOGY | Facility: CLINIC | Age: 61
End: 2021-01-11

## 2021-01-11 VITALS — HEIGHT: 70 IN | BODY MASS INDEX: 28.92 KG/M2 | TEMPERATURE: 96.8 F | WEIGHT: 202 LBS

## 2021-01-11 DIAGNOSIS — R31.0 GROSS HEMATURIA: ICD-10-CM

## 2021-01-11 DIAGNOSIS — Z72.0 TOBACCO ABUSE: Primary | Chronic | ICD-10-CM

## 2021-01-11 PROCEDURE — 99213 OFFICE O/P EST LOW 20 MIN: CPT | Performed by: UROLOGY

## 2021-01-11 NOTE — PROGRESS NOTES
Chief Complaint:          Chief Complaint   Patient presents with   • Results     CT       HPI:   60 y.o. male accompanied by his wife with bilateral hydroceles.  He has multiple medical problems.  He is hypertensive low back pain uses a cane his severe peripheral vascular disease has left flank pain.  He is on multiple medications he cannot recall.  He is not a great historian.  He has bilateral hydroceles left minimal right I would call him minimal to moderate certainly nothing I recommend surgery for he says is causing a great deal of pain I told him to use better elevation and protection none we will reevaluate him he also wants to investigate his left upper quadrant pain and I will get a CT scan for  Returns today to review CT scan.  Basically what I saw on the CT was normal upper tracts with some degree of medical renal disease and I think I see a filling defect he is a very heavy smoker he needs cystoscopy.  I will set this up as soon as possible also probably this Thursday.      Past Medical History:        Past Medical History:   Diagnosis Date   • Alcohol abuse    • Arthritis    • Carotid stenosis    • ED (erectile dysfunction)    • History of degenerative disc disease    • Hypertension    • Neuropathy    • Peripheral vascular disease (CMS/HCC)     s/p arthrectomy follow by balloon angioplasty and stents of right and left SFA   • Tobacco abuse    • Vitamin D deficiency          Current Meds:     Current Outpatient Medications   Medication Sig Dispense Refill   • atenolol (TENORMIN) 50 MG tablet Take 0.5 tablets by mouth Daily. 45 tablet 0   • cilostazol (PLETAL) 100 MG tablet Take 1 tablet by mouth Daily. 60 tablet 5   • folic acid-pyridoxine-cyanocobalamin (Folbic) 2.5-25-2 MG tablet tablet Take 1 tablet by mouth Daily. 90 tablet 1   • gabapentin (NEURONTIN) 800 MG tablet Take 1.5 tablets by mouth 3 (Three) Times a Day. 135 tablet 2   • isosorbide mononitrate (IMDUR) 30 MG 24 hr tablet Take 1 tablet by  mouth Daily. 30 tablet 5   • lisinopril (PRINIVIL,ZESTRIL) 40 MG tablet Take 0.5 tablets by mouth Daily. 90 tablet 1   • methocarbamol (ROBAXIN) 750 MG tablet Take 750 mg by mouth 4 (Four) Times a Day.     • traZODone (DESYREL) 50 MG tablet Take 1 tablet by mouth Every Night. 30 tablet 5     No current facility-administered medications for this visit.         Allergies:      Allergies   Allergen Reactions   • Penicillins Hives and Shortness Of Breath     As A child   • Novocain [Procaine] Nausea And Vomiting        Past Surgical History:     Past Surgical History:   Procedure Laterality Date   • APPENDECTOMY     • ARTERIOGRAM N/A 9/19/2019    Procedure: Arteriogram;  Surgeon: Tong Azar MD;  Location: [facility] CATH INVASIVE LOCATION;  Service: Cardiology   • BACK SURGERY      DISC RUPTURE REPAIR, L5   • CARDIAC CATHETERIZATION Right 10/29/2019    Procedure: Peripheral angiography;  Surgeon: Tong Azar MD;  Location: [facility] CATH INVASIVE LOCATION;  Service: Cardiovascular   • VASCULAR SURGERY Bilateral          Social History:     Social History     Socioeconomic History   • Marital status:      Spouse name: Not on file   • Number of children: 1   • Years of education: Not on file   • Highest education level: Not on file   Occupational History   • Occupation:  at [employer]   Tobacco Use   • Smoking status: Current Every Day Smoker     Packs/day: 1.50     Years: 30.00     Pack years: 45.00     Types: Cigarettes   • Smokeless tobacco: Never Used   Substance and Sexual Activity   • Alcohol use: Yes     Alcohol/week: 24.0 standard drinks     Types: 24 Cans of beer per week     Drinks per session: 5 or 6     Binge frequency: Weekly   • Drug use: No   • Sexual activity: Defer   Social History Narrative    Lives in [place].        Family History:     Family History   Problem Relation Age of Onset   • Hypertension Mother    • Cancer Father         LUNG   • Diabetes Father    • Hypertension Father       • Heart attack Other         GRANDFATHER       Review of Systems:     Review of Systems   Constitutional: Negative.    HENT: Negative.    Eyes: Negative.    Respiratory: Negative.    Cardiovascular: Negative.    Gastrointestinal: Negative.    Endocrine: Negative.    Musculoskeletal: Negative.    Allergic/Immunologic: Negative.    Neurological: Negative.    Hematological: Negative.    Psychiatric/Behavioral: Negative.        Physical Exam:     Physical Exam  Vitals signs and nursing note reviewed.   Constitutional:       Appearance: He is well-developed.   HENT:      Head: Normocephalic and atraumatic.   Eyes:      Conjunctiva/sclera: Conjunctivae normal.      Pupils: Pupils are equal, round, and reactive to light.   Neck:      Musculoskeletal: Normal range of motion.   Cardiovascular:      Rate and Rhythm: Normal rate and regular rhythm.      Heart sounds: Normal heart sounds.   Pulmonary:      Effort: Pulmonary effort is normal.      Breath sounds: Normal breath sounds.   Abdominal:      General: Bowel sounds are normal.      Palpations: Abdomen is soft.   Musculoskeletal: Normal range of motion.   Skin:     General: Skin is warm and dry.   Neurological:      Mental Status: He is alert and oriented to person, place, and time.      Deep Tendon Reflexes: Reflexes are normal and symmetric.   Psychiatric:         Behavior: Behavior normal.         Thought Content: Thought content normal.         Judgment: Judgment normal.         I have reviewed the following portions of the patient's history: allergies, current medications, past family history, past medical history, past social history, past surgical history, problem list and ROS and confirm it's accurate.      Procedure:       Assessment/Plan:   Hematuria-patient was diagnosed with hematuria.  We discussed the significance of microscopic hematuria versus gross hematuria.  We discussed the presence or absence of the type of clotting identified including vermiform  clots consistent with ureteral bleeding versus just pink tinged urine versus ascencion clots.  We discussed the presence of urokinase in the urine which causes the clots to dissolve with time.  We discussed the fact that it takes only a very small amount of blood in the urine to make the urine very red appearing and therefore give one the impression that there is much more blood loss that is really present.  He discussed the use of both an upper and lower tract investigation.  I discussed the fact that an upper tract investigation includes a normal renal ultrasound with a significant risks of missing more subtle lesions.  Progressing to a CT scan without contrast and finally the CT scan with contrast being the gold standard to diagnose the small neoplasms.  We discussed the lower tract investigation consisting of a cystoscopy in many of the cases where the upper tract study is negative.  Also discussed the fact that if there is a contraindication to the use of contrast we would do a noncontrasted study and this also has a chance of missing small lesions.  The specific instance would be cases of diabetes and chronic renal insufficiency.  Discussed the fact that there is about a 96% chance of a negative workup with episodes of microscopic hematuria and with much greater in the face of gross hematuria.  We discussed the fact that this is a non-cumulative test.  In other words if there is hematuria next year I would recommend continuing to work up the condition because of the fact that neoplasms may be small at the first workup and easily are missed.  I discussed the differential diagnosis of hematuria including trauma, neoplasia, infection, etc.  We discussed the fact that if there is any history of chronic kidney disease or risk factors such as diabetes for contrast a noncontrasted study will be utilized.  We will initiate an investigation.  He is a very heavy smoker and I think I see a filling defect on the CT scan it  was done without contrast because of diabetes.            Patient's Body mass index is 31.71 kg/m². BMI is above normal parameters. Recommendations include: exercise counseling.              This document has been electronically signed by JOANNE PIÑA MD January 11, 2021 13:25 EST

## 2021-01-12 PROBLEM — R31.0 GROSS HEMATURIA: Status: ACTIVE | Noted: 2021-01-12

## 2021-01-13 ENCOUNTER — OFFICE VISIT (OUTPATIENT)
Dept: CARDIAC SURGERY | Facility: CLINIC | Age: 61
End: 2021-01-13

## 2021-01-13 VITALS
HEIGHT: 70 IN | OXYGEN SATURATION: 92 % | SYSTOLIC BLOOD PRESSURE: 160 MMHG | HEART RATE: 62 BPM | BODY MASS INDEX: 28.63 KG/M2 | TEMPERATURE: 96.9 F | DIASTOLIC BLOOD PRESSURE: 88 MMHG | WEIGHT: 200 LBS

## 2021-01-13 DIAGNOSIS — I65.23 BILATERAL CAROTID ARTERY STENOSIS: Primary | Chronic | ICD-10-CM

## 2021-01-13 PROCEDURE — 99213 OFFICE O/P EST LOW 20 MIN: CPT | Performed by: NURSE PRACTITIONER

## 2021-01-13 NOTE — PROGRESS NOTES
[facility] Office Follow Up Note     Date of Encounter: 01/13/2021     MRN Number: [id number]  Name: Gurwinder Harding  Phone Number: [phone]     Referred By: No ref. provider found  PCP: Fátima Mai APRN    Chief Complaint:    Chief Complaint   Patient presents with   • Carotid Artery Disease     Follow up per [facility] for carotid stenosis       History of Present Illness:    Gurwinder Harding is a 60 y.o. male with a history of hypertension, EtOH abuse, ongoing tobacco use, peripheral vascular disease status post bilateral SFA stenting with Dr. Azar and bilateral carotid stenosis with known LICA occlusion.  He presents today for follow-up of his carotid disease.  He was last seen in the office on 3/11/2020.  He denies any TIA/CVA symptoms.  He was hospitalized at [facility] in Sept secondary to syncope and found to have hyponatremia.  Repeat carotid duplex performed which was unchanged.  He is followed by Dr. Azar for his peripheral vascular disease.    Review of Systems:  Review of Systems   Constitution: Positive for malaise/fatigue and weight loss (20lbs in the last 3-4 months ). Negative for chills, decreased appetite, diaphoresis, fever and night sweats.   HENT: Negative for congestion, hoarse voice, sore throat and stridor.    Cardiovascular: Positive for dyspnea on exertion (Baseline). Negative for chest pain, claudication, irregular heartbeat, leg swelling, near-syncope, orthopnea, palpitations, paroxysmal nocturnal dyspnea and syncope.   Respiratory: Negative for cough, hemoptysis, shortness of breath, sleep disturbances due to breathing, snoring, sputum production and wheezing.    Hematologic/Lymphatic: Negative for adenopathy and bleeding problem. Does not bruise/bleed easily.   Skin: Negative for color change, dry skin, itching, poor wound healing and rash.   Musculoskeletal: Positive for muscle weakness. Negative for arthritis, back pain and falls.    Gastrointestinal: Negative for abdominal pain, anorexia, constipation, diarrhea, hematochezia, melena, nausea and vomiting.   Neurological: Positive for difficulty with concentration (occasionally ), dizziness, light-headedness and weakness. Negative for disturbances in coordination, loss of balance, numbness, seizures and vertigo.   Psychiatric/Behavioral: Negative for altered mental status, depression, memory loss and substance abuse. The patient does not have insomnia and is not nervous/anxious.    Allergic/Immunologic: Negative for persistent infections.       I have reviewed the review of systems as entered by my clinical support staff and have updated it as appropriate.     Allergies:  Allergies   Allergen Reactions   • Penicillins Hives and Shortness Of Breath     As A child   • Novocain [Procaine] Nausea And Vomiting       Medications:      Current Outpatient Medications:   •  atenolol (TENORMIN) 50 MG tablet, Take 0.5 tablets by mouth Daily., Disp: 45 tablet, Rfl: 0  •  cilostazol (PLETAL) 100 MG tablet, Take 1 tablet by mouth Daily., Disp: 60 tablet, Rfl: 5  •  folic acid-pyridoxine-cyanocobalamin (Folbic) 2.5-25-2 MG tablet tablet, Take 1 tablet by mouth Daily., Disp: 90 tablet, Rfl: 1  •  gabapentin (NEURONTIN) 800 MG tablet, Take 1.5 tablets by mouth 3 (Three) Times a Day., Disp: 135 tablet, Rfl: 2  •  isosorbide mononitrate (IMDUR) 30 MG 24 hr tablet, Take 1 tablet by mouth Daily., Disp: 30 tablet, Rfl: 5  •  lisinopril (PRINIVIL,ZESTRIL) 40 MG tablet, Take 0.5 tablets by mouth Daily., Disp: 90 tablet, Rfl: 1  •  methocarbamol (ROBAXIN) 750 MG tablet, Take 750 mg by mouth 4 (Four) Times a Day., Disp: , Rfl:   •  traZODone (DESYREL) 50 MG tablet, Take 1 tablet by mouth Every Night., Disp: 30 tablet, Rfl: 5    Social History     Socioeconomic History   • Marital status:      Spouse name: Not on file   • Number of children: 1   • Years of education: Not on file   • Highest education level: Not on  "file   Occupational History   • Occupation:  at [employer]   Tobacco Use   • Smoking status: Current Every Day Smoker     Packs/day: 1.50     Years: 30.00     Pack years: 45.00     Types: Cigarettes   • Smokeless tobacco: Never Used   Substance and Sexual Activity   • Alcohol use: Yes     Alcohol/week: 24.0 standard drinks     Types: 24 Cans of beer per week     Drinks per session: 5 or 6     Binge frequency: Weekly   • Drug use: No   • Sexual activity: Defer   Social History Narrative    Lives in [place].        Family History   Problem Relation Age of Onset   • Hypertension Mother    • Cancer Father         LUNG   • Diabetes Father    • Hypertension Father    • Heart attack Other         GRANDFATHER       Past Medical History:   Diagnosis Date   • Alcohol abuse    • Arthritis    • Carotid stenosis    • ED (erectile dysfunction)    • History of degenerative disc disease    • Hypertension    • Neuropathy    • Peripheral vascular disease (CMS/HCC)     s/p arthrectomy follow by balloon angioplasty and stents of right and left SFA   • Tobacco abuse    • Vitamin D deficiency        Past Surgical History:   Procedure Laterality Date   • APPENDECTOMY     • ARTERIOGRAM N/A 9/19/2019    Procedure: Arteriogram;  Surgeon: Tong Azar MD;  Location: [facility] INVASIVE LOCATION;  Service: Cardiology   • BACK SURGERY      DISC RUPTURE REPAIR, L5   • CARDIAC CATHETERIZATION Right 10/29/2019    Procedure: Peripheral angiography;  Surgeon: Tong Azar MD;  Location: [facility] CATH INVASIVE LOCATION;  Service: Cardiovascular   • VASCULAR SURGERY Bilateral        Physical Exam:  Vital Signs:    Vitals:    01/13/21 1305   BP: 160/88   BP Location: Right arm   Patient Position: Sitting   Pulse: 62   Temp: 96.9 °F (36.1 °C)   SpO2: 92%   Weight: 90.7 kg (200 lb)   Height: 177.8 cm (70\")     Body mass index is 28.7 kg/m².     Physical Exam  Vitals signs and nursing note reviewed.   Constitutional:       Appearance: Normal " appearance. He is well-developed and well-groomed.   HENT:      Head: Normocephalic and atraumatic.   Neck:      Musculoskeletal: Neck supple.   Cardiovascular:      Rate and Rhythm: Normal rate and regular rhythm.      Heart sounds: Normal heart sounds, S1 normal and S2 normal. No murmur. No friction rub.   Pulmonary:      Comments: Unlabored, Clear to auscultation bilaterally  Abdominal:      General: Bowel sounds are normal.      Palpations: Abdomen is soft.      Tenderness: There is no abdominal tenderness.   Musculoskeletal:      Right lower leg: No edema.      Left lower leg: No edema.   Skin:     General: Skin is warm and dry.   Neurological:      Mental Status: He is alert and oriented to person, place, and time.   Psychiatric:         Attention and Perception: Attention normal.         Mood and Affect: Mood normal.         Speech: Speech normal.         Behavior: Behavior is cooperative.         Labs/Imagin/1/20 carotid duplex:  RIGHT:  Moderate stenosis in the right carotid system     RIGHT ICA PSV: 182 cm/s  RIGHT ICA EDV: 74 cm/s.   Right ICA/CCA Ratio: 1.8  Anterograde flow is demonstrated in RIGHT vertebral artery.     LEFT:  Occlusion of the left internal carotid artery.     Anterograde flow is demonstrated in LEFT vertebral artery.     IMPRESSION:  Impression:     Occlusion of the left internal carotid artery.          Assessment / Plan:  Diagnoses and all orders for this visit:    1. Bilateral carotid artery stenosis (Primary)     Gurwinder Harding is a 60 y.o. male with a history of hypertension, EtOH abuse, ongoing tobacco use, peripheral vascular disease status post bilateral SFA stenting with Dr. Azar and bilateral carotid stenosis with known LICA occlusion.  Discussed findings of 2020 carotid duplex with peak systolic velocity of 182 and right ICA.  He denies any TIA/CVA symptoms.  Patient is on aspirin therapy.  Patient continues to smoke and is not interested in cessation.  He  follows closely with Dr. Azar for his peripheral vascular disease and plans for stress testing in the next several weeks.  We will follow-up with patient in 1 year with repeat carotid duplex earlier as needed.    Mahnaz Modi [facility]    Please note that portions of this note may have been completed with a voice recognition program. Efforts were made to edit the dictations, but occasionally words are mistranscribed.

## 2021-01-20 ENCOUNTER — OFFICE VISIT (OUTPATIENT)
Dept: FAMILY MEDICINE CLINIC | Facility: CLINIC | Age: 61
End: 2021-01-20

## 2021-01-20 VITALS
HEIGHT: 70 IN | HEART RATE: 89 BPM | OXYGEN SATURATION: 94 % | DIASTOLIC BLOOD PRESSURE: 72 MMHG | SYSTOLIC BLOOD PRESSURE: 108 MMHG | TEMPERATURE: 96.9 F | BODY MASS INDEX: 28.7 KG/M2

## 2021-01-20 DIAGNOSIS — R63.4 WEIGHT LOSS: ICD-10-CM

## 2021-01-20 DIAGNOSIS — R53.83 OTHER FATIGUE: ICD-10-CM

## 2021-01-20 DIAGNOSIS — R10.9 LEFT SIDED ABDOMINAL PAIN: Primary | ICD-10-CM

## 2021-01-20 DIAGNOSIS — R19.7 DIARRHEA, UNSPECIFIED TYPE: ICD-10-CM

## 2021-01-20 PROCEDURE — 85025 COMPLETE CBC W/AUTO DIFF WBC: CPT | Performed by: NURSE PRACTITIONER

## 2021-01-20 PROCEDURE — 36415 COLL VENOUS BLD VENIPUNCTURE: CPT | Performed by: NURSE PRACTITIONER

## 2021-01-20 PROCEDURE — 84443 ASSAY THYROID STIM HORMONE: CPT | Performed by: NURSE PRACTITIONER

## 2021-01-20 PROCEDURE — 80053 COMPREHEN METABOLIC PANEL: CPT | Performed by: NURSE PRACTITIONER

## 2021-01-20 PROCEDURE — 99214 OFFICE O/P EST MOD 30 MIN: CPT | Performed by: NURSE PRACTITIONER

## 2021-01-20 NOTE — PROGRESS NOTES
"Chief Complaint  Fatigue    Subjective          Gurwinder Harding presents to [facility] for   History of Present Illness Presents today with wife complaints of progressive left sided abd pain, diarrhea, loss of appetite, fatigue and weight loss of 20 pounds.  Patient has ceased alcohol intake since hospitalization in September 2020. Following urology and scheduled for upcoming cystoscopy. Denies any fever chills or night sweats.       Objective   Vital Signs:   /72 (BP Location: Right arm, Patient Position: Sitting, Cuff Size: Adult)   Pulse 89   Temp 96.9 °F (36.1 °C)   Ht 177.8 cm (70\")   SpO2 94%   BMI 28.70 kg/m²     Physical Exam  Vitals signs and nursing note reviewed.   Constitutional:       General: He is not in acute distress.     Appearance: He is well-developed.   Cardiovascular:      Rate and Rhythm: Normal rate and regular rhythm.      Heart sounds: Normal heart sounds. No murmur.   Pulmonary:      Effort: Pulmonary effort is normal.      Breath sounds: Normal breath sounds.   Abdominal:      General: Bowel sounds are normal.      Tenderness: There is abdominal tenderness in the left upper quadrant and left lower quadrant. There is no left CVA tenderness.   Skin:     General: Skin is warm and dry.   Neurological:      Mental Status: He is alert and oriented to person, place, and time.   Psychiatric:         Behavior: Behavior normal.        Result Review :     CMP    CMP 9/3/20 9/3/20 9/3/20 9/3/20 9/3/20 9/8/20 9/16/20    0053 0441 0818 1201 1535     BUN 13     13 12   Creatinine 1.17     1.29 (A) 1.29 (A)   eGFR Non African Am 64     57 (A) 57 (A)   Sodium 129 (A) 126 (A) 133 (A) 131 (A) 131 (A) 134 (A) 139   Potassium 4.4     5.4 (A) 5.4 (A)   Chloride 100     101 106   Calcium 8.4 (A)     9.5 9.6   Albumin       4.00   Total Bilirubin       0.2   Alkaline Phosphatase       75   AST (SGOT)       20   ALT (SGPT)       19   (A) Abnormal value       Comments " are available for some flowsheets but are not being displayed.           CBC    CBC 9/2/20 9/3/20 9/16/20   WBC 6.68 6.61 8.07   RBC 4.06 (A) 3.86 (A) 4.43   Hemoglobin 11.3 (A) 10.7 (A) 12.5 (A)   Hematocrit 36.6 (A) 35.3 (A) 39.7   MCV 90.1 91.5 89.6   MCH 27.8 27.7 28.2   MCHC 30.9 (A) 30.3 (A) 31.5   RDW 16.8 (A) 17.2 (A) 15.8 (A)   Platelets 218 190 318   (A) Abnormal value            Data reviewed: Radiologic studies CT ABD Urology consult notes           Assessment and Plan    Problem List Items Addressed This Visit     None      Visit Diagnoses     Left sided abdominal pain    -  Primary    Relevant Orders    CBC Auto Differential    Comprehensive Metabolic Panel    Ambulatory Referral to Gastroenterology    Weight loss        Relevant Orders    CBC Auto Differential    Comprehensive Metabolic Panel    Ambulatory Referral to Gastroenterology    Other fatigue        Relevant Orders    CBC Auto Differential    Comprehensive Metabolic Panel    Ambulatory Referral to Gastroenterology    TSH    Diarrhea, unspecified type        Relevant Orders    CBC Auto Differential    Comprehensive Metabolic Panel    Ambulatory Referral to Gastroenterology   Stool cards X 3         Follow Up   No follow-ups on file.  Patient was given instructions and counseling regarding his condition or for health maintenance advice. Please see specific information pulled into the AVS if appropriate.

## 2021-01-21 ENCOUNTER — TELEPHONE (OUTPATIENT)
Dept: FAMILY MEDICINE CLINIC | Facility: CLINIC | Age: 61
End: 2021-01-21

## 2021-01-21 LAB
ALBUMIN SERPL-MCNC: 3.6 G/DL (ref 3.5–5.2)
ALBUMIN/GLOB SERPL: 1.1 G/DL
ALP SERPL-CCNC: 66 U/L (ref 39–117)
ALT SERPL W P-5'-P-CCNC: 18 U/L (ref 1–41)
ANION GAP SERPL CALCULATED.3IONS-SCNC: 10.1 MMOL/L (ref 5–15)
AST SERPL-CCNC: 15 U/L (ref 1–40)
BASOPHILS # BLD AUTO: 0.07 10*3/MM3 (ref 0–0.2)
BASOPHILS NFR BLD AUTO: 1.2 % (ref 0–1.5)
BILIRUB SERPL-MCNC: 0.3 MG/DL (ref 0–1.2)
BUN SERPL-MCNC: 14 MG/DL (ref 6–20)
BUN/CREAT SERPL: 12.2 (ref 7–25)
CALCIUM SPEC-SCNC: 9.2 MG/DL (ref 8.6–10.5)
CHLORIDE SERPL-SCNC: 100 MMOL/L (ref 98–107)
CO2 SERPL-SCNC: 24.9 MMOL/L (ref 22–29)
CREAT SERPL-MCNC: 1.15 MG/DL (ref 0.76–1.27)
DEPRECATED RDW RBC AUTO: 50.1 FL (ref 37–54)
EOSINOPHIL # BLD AUTO: 0.12 10*3/MM3 (ref 0–0.4)
EOSINOPHIL NFR BLD AUTO: 2 % (ref 0.3–6.2)
ERYTHROCYTE [DISTWIDTH] IN BLOOD BY AUTOMATED COUNT: 17.2 % (ref 12.3–15.4)
GFR SERPL CREATININE-BSD FRML MDRD: 65 ML/MIN/1.73
GLOBULIN UR ELPH-MCNC: 3.2 GM/DL
GLUCOSE SERPL-MCNC: 114 MG/DL (ref 65–99)
HCT VFR BLD AUTO: 42.9 % (ref 37.5–51)
HGB BLD-MCNC: 13.6 G/DL (ref 13–17.7)
IMM GRANULOCYTES # BLD AUTO: 0.01 10*3/MM3 (ref 0–0.05)
IMM GRANULOCYTES NFR BLD AUTO: 0.2 % (ref 0–0.5)
LYMPHOCYTES # BLD AUTO: 1.27 10*3/MM3 (ref 0.7–3.1)
LYMPHOCYTES NFR BLD AUTO: 21.1 % (ref 19.6–45.3)
MCH RBC QN AUTO: 25.9 PG (ref 26.6–33)
MCHC RBC AUTO-ENTMCNC: 31.7 G/DL (ref 31.5–35.7)
MCV RBC AUTO: 81.6 FL (ref 79–97)
MONOCYTES # BLD AUTO: 0.52 10*3/MM3 (ref 0.1–0.9)
MONOCYTES NFR BLD AUTO: 8.6 % (ref 5–12)
NEUTROPHILS NFR BLD AUTO: 4.04 10*3/MM3 (ref 1.7–7)
NEUTROPHILS NFR BLD AUTO: 66.9 % (ref 42.7–76)
NRBC BLD AUTO-RTO: 0 /100 WBC (ref 0–0.2)
PLATELET # BLD AUTO: 262 10*3/MM3 (ref 140–450)
PMV BLD AUTO: 11.4 FL (ref 6–12)
POTASSIUM SERPL-SCNC: 4.4 MMOL/L (ref 3.5–5.2)
PROT SERPL-MCNC: 6.8 G/DL (ref 6–8.5)
RBC # BLD AUTO: 5.26 10*6/MM3 (ref 4.14–5.8)
SODIUM SERPL-SCNC: 135 MMOL/L (ref 136–145)
TSH SERPL DL<=0.05 MIU/L-ACNC: 1.53 UIU/ML (ref 0.27–4.2)
WBC # BLD AUTO: 6.03 10*3/MM3 (ref 3.4–10.8)

## 2021-01-21 NOTE — TELEPHONE ENCOUNTER
----- Message from KEVIN Jensen sent at 1/21/2021 12:04 PM EST -----  Labs are stable.  Sodium improved      Patient notified & verbalized understanding.

## 2021-01-29 ENCOUNTER — PROCEDURE VISIT (OUTPATIENT)
Dept: UROLOGY | Facility: CLINIC | Age: 61
End: 2021-01-29

## 2021-01-29 DIAGNOSIS — R31.0 GROSS HEMATURIA: Primary | ICD-10-CM

## 2021-01-29 PROCEDURE — 52000 CYSTOURETHROSCOPY: CPT | Performed by: UROLOGY

## 2021-01-29 PROCEDURE — 99213 OFFICE O/P EST LOW 20 MIN: CPT | Performed by: UROLOGY

## 2021-02-02 NOTE — PROGRESS NOTES
Chief Complaint:          Gross hematuria and 25 pound weight loss    HPI:   60 y.o. male accompanied by his wife with bilateral hydroceles.  He has multiple medical problems.  He is hypertensive low back pain uses a cane his severe peripheral vascular disease has left flank pain.  He is on multiple medications he cannot recall.  He is not a great historian.  He has bilateral hydroceles left minimal right I would call him minimal to moderate certainly nothing I recommend surgery for he says is causing a great deal of pain I told him to use better elevation and protection none we will reevaluate him he also wants to investigate his left upper quadrant pain and I will get a CT scan for  Returns today to review CT scan.  Basically what I saw on the CT was normal upper tracts with some degree of medical renal disease and I think I see a filling defect he is a very heavy smoker he needs cystoscopy.  I will set this up as soon as possible also probably this Thursday.      Past Medical History:        Past Medical History:   Diagnosis Date   • Alcohol abuse    • Arthritis    • Carotid stenosis    • ED (erectile dysfunction)    • History of degenerative disc disease    • Hypertension    • Neuropathy    • Peripheral vascular disease (CMS/HCC)     s/p arthrectomy follow by balloon angioplasty and stents of right and left SFA   • Tobacco abuse    • Vitamin D deficiency          Current Meds:     Current Outpatient Medications   Medication Sig Dispense Refill   • atenolol (TENORMIN) 50 MG tablet Take 0.5 tablets by mouth Daily. 45 tablet 0   • cilostazol (PLETAL) 100 MG tablet Take 1 tablet by mouth Daily. 60 tablet 5   • folic acid-pyridoxine-cyanocobalamin (Folbic) 2.5-25-2 MG tablet tablet Take 1 tablet by mouth Daily. 90 tablet 1   • gabapentin (NEURONTIN) 800 MG tablet Take 1.5 tablets by mouth 3 (Three) Times a Day. 135 tablet 2   • isosorbide mononitrate (IMDUR) 30 MG 24 hr tablet Take 1 tablet by mouth Daily. 30 tablet  5   • lisinopril (PRINIVIL,ZESTRIL) 40 MG tablet Take 0.5 tablets by mouth Daily. 90 tablet 1   • methocarbamol (ROBAXIN) 750 MG tablet Take 750 mg by mouth 4 (Four) Times a Day.     • traZODone (DESYREL) 50 MG tablet Take 1 tablet by mouth Every Night. 30 tablet 5     No current facility-administered medications for this visit.         Allergies:      Allergies   Allergen Reactions   • Penicillins Hives and Shortness Of Breath     As A child   • Novocain [Procaine] Nausea And Vomiting        Past Surgical History:     Past Surgical History:   Procedure Laterality Date   • APPENDECTOMY     • ARTERIOGRAM N/A 9/19/2019    Procedure: Arteriogram;  Surgeon: Tong Azar MD;  Location: [facility] CATH INVASIVE LOCATION;  Service: Cardiology   • BACK SURGERY      DISC RUPTURE REPAIR, L5   • CARDIAC CATHETERIZATION Right 10/29/2019    Procedure: Peripheral angiography;  Surgeon: Tong Azar MD;  Location: [facility] CATH INVASIVE LOCATION;  Service: Cardiovascular   • VASCULAR SURGERY Bilateral          Social History:     Social History     Socioeconomic History   • Marital status:      Spouse name: Not on file   • Number of children: 1   • Years of education: Not on file   • Highest education level: Not on file   Occupational History   • Occupation:  at [employer]   Tobacco Use   • Smoking status: Current Every Day Smoker     Packs/day: 1.50     Years: 30.00     Pack years: 45.00     Types: Cigarettes   • Smokeless tobacco: Never Used   Substance and Sexual Activity   • Alcohol use: Not Currently     Alcohol/week: 24.0 standard drinks     Types: 24 Cans of beer per week     Drinks per session: 5 or 6     Binge frequency: Weekly   • Drug use: No   • Sexual activity: Defer   Social History Narrative    Lives in [place].        Family History:     Family History   Problem Relation Age of Onset   • Hypertension Mother    • Cancer Father         LUNG   • Diabetes Father    • Hypertension Father    • Heart  attack Other         GRANDFATHER       Review of Systems:     Review of Systems   Constitutional: Positive for unexpected weight change.   HENT: Negative.    Eyes: Negative.    Respiratory: Negative.    Cardiovascular: Negative.    Gastrointestinal: Negative.    Endocrine: Negative.    Musculoskeletal: Negative.    Allergic/Immunologic: Negative.    Neurological: Negative.    Hematological: Negative.    Psychiatric/Behavioral: Negative.        Physical Exam:     Physical Exam  Vitals signs and nursing note reviewed.   Constitutional:       Appearance: He is well-developed.   HENT:      Head: Normocephalic and atraumatic.   Eyes:      Conjunctiva/sclera: Conjunctivae normal.      Pupils: Pupils are equal, round, and reactive to light.   Neck:      Musculoskeletal: Normal range of motion.   Cardiovascular:      Rate and Rhythm: Normal rate and regular rhythm.      Heart sounds: Normal heart sounds.   Pulmonary:      Effort: Pulmonary effort is normal.      Breath sounds: Normal breath sounds.   Abdominal:      General: Bowel sounds are normal.      Palpations: Abdomen is soft.   Genitourinary:     Penis: Normal.       Scrotum/Testes: Normal.   Musculoskeletal: Normal range of motion.   Skin:     General: Skin is warm and dry.   Neurological:      Mental Status: He is alert and oriented to person, place, and time.      Deep Tendon Reflexes: Reflexes are normal and symmetric.   Psychiatric:         Behavior: Behavior normal.         Thought Content: Thought content normal.         Judgment: Judgment normal.         I have reviewed the following portions of the patient's history: allergies, current medications, past family history, past medical history, past social history, past surgical history, problem list and ROS and confirm it's accurate.      Procedure:     Cystoscopy:  Patient presents today for cystourethroscopy.  I went ahead and obtained an informed consent including the risk of anesthesia, bleeding, infection,  etc.  After prep and drape in a sterile fashion in the low dorsal lithotomy position the urethra was gently anesthetized with 10 cc of 2% viscous Xylocaine jelly.  After an appropriate period of topical anesthesia I used the Olympus digital 14 Malay flexible cystoscope to examine the anterior urethra which was completely normal, the ureteral orifices were visualized and normal in position and configuration there were no stones, tumors or foreign bodies.  The blue light was enabled and was negative allowing us to see small mucosal lesions. The patient was given 80 mg of gentamicin in an intramuscular fashion  as prophylaxis for the cystoscopy and released from the clinic.  Assessment/Plan:   Hematuria-patient was diagnosed with hematuria.  We discussed the significance of microscopic hematuria versus gross hematuria.  We discussed the presence or absence of the type of clotting identified including vermiform clots consistent with ureteral bleeding versus just pink tinged urine versus ascencion clots.  We discussed the presence of urokinase in the urine which causes the clots to dissolve with time.  We discussed the fact that it takes only a very small amount of blood in the urine to make the urine very red appearing and therefore give one the impression that there is much more blood loss that is really present.  He discussed the use of both an upper and lower tract investigation.  I discussed the fact that an upper tract investigation includes a normal renal ultrasound with a significant risks of missing more subtle lesions.  Progressing to a CT scan without contrast and finally the CT scan with contrast being the gold standard to diagnose the small neoplasms.  We discussed the lower tract investigation consisting of a cystoscopy in many of the cases where the upper tract study is negative.  Also discussed the fact that if there is a contraindication to the use of contrast we would do a noncontrasted study and this also  has a chance of missing small lesions.  The specific instance would be cases of diabetes and chronic renal insufficiency.  Discussed the fact that there is about a 96% chance of a negative workup with episodes of microscopic hematuria and with much greater in the face of gross hematuria.  We discussed the fact that this is a non-cumulative test.  In other words if there is hematuria next year I would recommend continuing to work up the condition because of the fact that neoplasms may be small at the first workup and easily are missed.  I discussed the differential diagnosis of hematuria including trauma, neoplasia, infection, etc.  We discussed the fact that if there is any history of chronic kidney disease or risk factors such as diabetes for contrast a noncontrasted study will be utilized.  We will initiate an investigation.  There is no obvious etiology for the hematuria.  He is lost 25 pounds I encouraged him to continue his search for occult malignancies.            Patient's There is no height or weight on file to calculate BMI. BMI is above normal parameters. Recommendations include: educational material.              This document has been electronically signed by JOANNE PIÑA MD February 2, 2021 11:18 EST

## 2021-02-19 ENCOUNTER — TELEPHONE (OUTPATIENT)
Dept: FAMILY MEDICINE CLINIC | Facility: CLINIC | Age: 61
End: 2021-02-19

## 2021-02-19 NOTE — TELEPHONE ENCOUNTER
Does he need to go to ER?      Left a message to return call.    Spoke with wife & she was agreeable to taking him to ER.

## 2021-03-02 ENCOUNTER — TELEPHONE (OUTPATIENT)
Dept: FAMILY MEDICINE CLINIC | Facility: CLINIC | Age: 61
End: 2021-03-02

## 2021-03-11 DIAGNOSIS — G62.9 NEUROPATHY: ICD-10-CM

## 2021-03-11 RX ORDER — GABAPENTIN 800 MG/1
1200 TABLET ORAL 3 TIMES DAILY
Qty: 135 TABLET | Refills: 2 | Status: SHIPPED | OUTPATIENT
Start: 2021-03-11 | End: 2021-06-02

## 2021-03-11 NOTE — TELEPHONE ENCOUNTER
Caller: Patsy Harding    Relationship: Emergency Contact    Best call back number: [phone]      Medication needed:   Requested Prescriptions     Pending Prescriptions Disp Refills   • gabapentin (NEURONTIN) 800 MG tablet 135 tablet 2     Sig: Take 1.5 tablets by mouth 3 (Three) Times a Day.       When do you need the refill by: 03/11/21    What details did the patient provide when requesting the medication: PATIENT IS OUT OF MEDICATION. PATIENT HAS AN APPOINTMENT ON 03/15/21 AT 4:30PM.    Does the patient have less than a 3 day supply:  [x] Yes  [] No    What is the patient's preferred pharmacy: [facility] [address] - [facility] AT [address] [place] [address] [phone] [facility] [phone] FX

## 2021-03-15 ENCOUNTER — OFFICE VISIT (OUTPATIENT)
Dept: FAMILY MEDICINE CLINIC | Facility: CLINIC | Age: 61
End: 2021-03-15

## 2021-03-15 VITALS
HEIGHT: 70 IN | WEIGHT: 183.6 LBS | DIASTOLIC BLOOD PRESSURE: 68 MMHG | HEART RATE: 86 BPM | OXYGEN SATURATION: 99 % | SYSTOLIC BLOOD PRESSURE: 98 MMHG | TEMPERATURE: 97.1 F | BODY MASS INDEX: 26.28 KG/M2

## 2021-03-15 DIAGNOSIS — I10 ESSENTIAL HYPERTENSION: ICD-10-CM

## 2021-03-15 DIAGNOSIS — M79.2 NEURALGIA: ICD-10-CM

## 2021-03-15 DIAGNOSIS — I73.9 PAD (PERIPHERAL ARTERY DISEASE) (HCC): ICD-10-CM

## 2021-03-15 DIAGNOSIS — R25.1 TREMORS OF NERVOUS SYSTEM: ICD-10-CM

## 2021-03-15 DIAGNOSIS — R26.89 SHUFFLING GAIT: ICD-10-CM

## 2021-03-15 DIAGNOSIS — F33.1 MAJOR DEPRESSIVE DISORDER, RECURRENT, MODERATE (HCC): ICD-10-CM

## 2021-03-15 DIAGNOSIS — R53.1 WEAKNESS: ICD-10-CM

## 2021-03-15 DIAGNOSIS — R10.9 LEFT SIDED ABDOMINAL PAIN: Primary | ICD-10-CM

## 2021-03-15 PROCEDURE — 99214 OFFICE O/P EST MOD 30 MIN: CPT | Performed by: NURSE PRACTITIONER

## 2021-03-15 RX ORDER — FOLIC ACID-PYRIDOXINE-CYANOCOBALAMIN TAB 2.5-25-2 MG 2.5-25-2 MG
1 TAB ORAL DAILY
Qty: 90 TABLET | Refills: 1 | Status: SHIPPED | OUTPATIENT
Start: 2021-03-15 | End: 2021-11-09 | Stop reason: SDUPTHER

## 2021-03-15 RX ORDER — LISINOPRIL 40 MG/1
20 TABLET ORAL DAILY
Qty: 45 TABLET | Refills: 1 | Status: CANCELLED | OUTPATIENT
Start: 2021-03-15

## 2021-03-15 RX ORDER — TRAZODONE HYDROCHLORIDE 50 MG/1
50 TABLET ORAL NIGHTLY
Qty: 90 TABLET | Refills: 1 | Status: SHIPPED | OUTPATIENT
Start: 2021-03-15 | End: 2021-09-16 | Stop reason: SDUPTHER

## 2021-03-15 RX ORDER — ATENOLOL 50 MG/1
25 TABLET ORAL DAILY
Qty: 45 TABLET | Refills: 0 | Status: SHIPPED | OUTPATIENT
Start: 2021-03-15 | End: 2021-06-29

## 2021-03-17 NOTE — PROGRESS NOTES
Chief Complaint  Abdominal Pain (left side with edema) and Fatigue    Subjective          Gurwinder Harding presents to [facility]  Abdominal Pain  This is a new (Onset several months ago did not follow with CT adn GI referral.  ) problem. The current episode started more than 1 month ago. The onset quality is undetermined. The problem occurs daily. The problem has been waxing and waning. The pain is located in the LLQ and LUQ. The pain is at a severity of 4/10. The pain is mild. The quality of the pain is colicky, a sensation of fullness and aching. The abdominal pain does not radiate. Associated symptoms include weight loss. Pertinent negatives include no anorexia, belching, constipation, diarrhea, dysuria, frequency, hematochezia, nausea or vomiting. Nothing aggravates the pain. The pain is relieved by nothing. He has tried nothing for the symptoms. HX alcohol abuse.     Fatigue  This is a chronic problem. The current episode started more than 1 year ago. The problem occurs constantly. The problem has been unchanged. Associated symptoms include abdominal pain and fatigue. Pertinent negatives include no anorexia, nausea or vomiting.   Hypertension  This is a chronic problem. The current episode started more than 1 year ago. Associated symptoms include malaise/fatigue. (Dizziness upon standing  ) Risk factors for coronary artery disease include smoking/tobacco exposure and sedentary lifestyle. Past treatments include beta blockers and ACE inhibitors. Current antihypertension treatment includes ACE inhibitors and beta blockers. Compliance problems include diet and exercise.    Extremity Weakness   Pain location: bilateral lower extremity. Chronicity: Known PAD post stenting with chronic Neuralgia.  Following second stenting unable to return to work.  Difficulty standing, walking.  Gait is slow and unsteady.  The current episode started more than 1 year ago. The problem occurs daily.  "The problem has been gradually worsening. The pain is moderate. Associated symptoms include a limited range of motion and stiffness. Associated symptoms comments: Tremors of hands new onset in the past several months  . Family history includes gout. Parkinsons in the family His past medical history is significant for gout. HX alcohol abuse.         Objective   Vital Signs:   BP 98/68   Pulse 86   Temp 97.1 °F (36.2 °C) (Temporal)   Ht 177.8 cm (70\")   Wt 83.3 kg (183 lb 9.6 oz)   SpO2 99%   BMI 26.34 kg/m²     Physical Exam  Vitals and nursing note reviewed.   Constitutional:       General: He is not in acute distress.     Appearance: He is well-developed.   HENT:      Head: Normocephalic.   Eyes:      Conjunctiva/sclera: Conjunctivae normal.   Cardiovascular:      Rate and Rhythm: Normal rate and regular rhythm.      Heart sounds: Normal heart sounds. No murmur heard.     Pulmonary:      Effort: Pulmonary effort is normal.      Breath sounds: Normal breath sounds.   Abdominal:      General: Bowel sounds are normal.      Tenderness: There is abdominal tenderness.   Musculoskeletal:         General: Tenderness present. No swelling.      Right lower leg: No edema.      Left lower leg: No edema.   Skin:     General: Skin is warm and dry.   Neurological:      Mental Status: He is alert and oriented to person, place, and time.      Sensory: Sensory deficit present.      Motor: Weakness present.      Coordination: Coordination abnormal.      Gait: Gait abnormal.   Psychiatric:         Mood and Affect: Mood normal.         Behavior: Behavior normal.         Thought Content: Thought content normal.         Judgment: Judgment normal.        Result Review :                 Assessment and Plan    Diagnoses and all orders for this visit:    1. Left sided abdominal pain (Primary)  -     CT Abdomen Pelvis With Contrast; Future  -     Ambulatory Referral to Gastroenterology    2. Essential hypertension  -     atenolol " (TENORMIN) 50 MG tablet; Take 0.5 tablets by mouth Daily.  Dispense: 45 tablet; Refill: 0  -     folic acid-pyridoxine-cyanocobalamin (Folbic) 2.5-25-2 MG tablet tablet; Take 1 tablet by mouth Daily.  Dispense: 90 tablet; Refill: 1  Stop lisinopril    3. Major depressive disorder, recurrent, moderate (CMS/HCC)  -     traZODone (DESYREL) 50 MG tablet; Take 1 tablet by mouth Every Night.  Dispense: 90 tablet; Refill: 1    4. Shuffling gait  -     Ambulatory Referral to Neurology    5. Tremors of nervous system  -     Ambulatory Referral to Neurology    6. Neuralgia  -     Ambulatory Referral to Neurology  Continue with Neurontin  Continue use of assistive devices as needed    7. Weakness  -     Ambulatory Referral to Neurology    8. PAD (peripheral artery disease) (CMS/HCC)  Continue with current meds.      Other orders  -     Cancel: lisinopril (PRINIVIL,ZESTRIL) 40 MG tablet; Take 0.5 tablets by mouth Daily.  Dispense: 45 tablet; Refill: 1        Follow Up   Return 2-3 weeks follow up blood pressure check, for Recheck.  Patient was given instructions and counseling regarding his condition or for health maintenance advice. Please see specific information pulled into the AVS if appropriate.

## 2021-03-18 ENCOUNTER — BULK ORDERING (OUTPATIENT)
Dept: CASE MANAGEMENT | Facility: OTHER | Age: 61
End: 2021-03-18

## 2021-03-18 DIAGNOSIS — Z23 IMMUNIZATION DUE: ICD-10-CM

## 2021-03-24 ENCOUNTER — IMMUNIZATION (OUTPATIENT)
Dept: VACCINE CLINIC | Facility: HOSPITAL | Age: 61
End: 2021-03-24

## 2021-03-24 DIAGNOSIS — Z23 IMMUNIZATION DUE: ICD-10-CM

## 2021-03-24 PROCEDURE — 91300 HC SARSCOV02 VAC 30MCG/0.3ML IM: CPT | Performed by: INTERNAL MEDICINE

## 2021-03-24 PROCEDURE — 0001A: CPT | Performed by: INTERNAL MEDICINE

## 2021-03-29 ENCOUNTER — HOSPITAL ENCOUNTER (OUTPATIENT)
Dept: CT IMAGING | Facility: HOSPITAL | Age: 61
Discharge: HOME | End: 2021-03-29
Admitting: NURSE PRACTITIONER

## 2021-03-29 DIAGNOSIS — R10.9 LEFT SIDED ABDOMINAL PAIN: ICD-10-CM

## 2021-03-29 LAB — CREAT BLDA-MCNC: 1.3 MG/DL (ref 0.6–1.3)

## 2021-03-29 PROCEDURE — 25010000002 IOPAMIDOL 61 % SOLUTION: Performed by: NURSE PRACTITIONER

## 2021-03-29 PROCEDURE — 82565 ASSAY OF CREATININE: CPT

## 2021-03-29 PROCEDURE — 74177 CT ABD & PELVIS W/CONTRAST: CPT | Performed by: RADIOLOGY

## 2021-03-29 PROCEDURE — 74177 CT ABD & PELVIS W/CONTRAST: CPT

## 2021-03-29 RX ADMIN — IOPAMIDOL 100 ML: 612 INJECTION, SOLUTION INTRAVENOUS at 14:00

## 2021-03-30 ENCOUNTER — TELEPHONE (OUTPATIENT)
Dept: FAMILY MEDICINE CLINIC | Facility: CLINIC | Age: 61
End: 2021-03-30

## 2021-03-30 NOTE — TELEPHONE ENCOUNTER
----- Message from KEVIN Jensen sent at 3/30/2021  2:01 PM EDT -----  In January Patient seen urology and scheduled or planned cystoscopy I am unable to see if he had this procedure he will need a follow up with Urology sooner than scheduled in late April   CT abd showed bilateral renal cyst as well as fatty liver       Left a message to return call.    Spoke with patient he reports he did have the Cystoscopy in February & everything was Ok.has a F/u scheduled with you tomorrow.Verbalized understanding of CT Results.

## 2021-03-31 ENCOUNTER — OFFICE VISIT (OUTPATIENT)
Dept: FAMILY MEDICINE CLINIC | Facility: CLINIC | Age: 61
End: 2021-03-31

## 2021-03-31 VITALS
BODY MASS INDEX: 26.66 KG/M2 | WEIGHT: 186.2 LBS | HEIGHT: 70 IN | TEMPERATURE: 96.9 F | SYSTOLIC BLOOD PRESSURE: 138 MMHG | OXYGEN SATURATION: 100 % | HEART RATE: 85 BPM | DIASTOLIC BLOOD PRESSURE: 80 MMHG

## 2021-03-31 DIAGNOSIS — R10.12 LEFT UPPER QUADRANT ABDOMINAL PAIN: ICD-10-CM

## 2021-03-31 DIAGNOSIS — I10 ESSENTIAL HYPERTENSION: Primary | ICD-10-CM

## 2021-03-31 PROCEDURE — 99213 OFFICE O/P EST LOW 20 MIN: CPT | Performed by: NURSE PRACTITIONER

## 2021-03-31 RX ORDER — ISOSORBIDE MONONITRATE 30 MG/1
30 TABLET, EXTENDED RELEASE ORAL DAILY
Qty: 30 TABLET | Refills: 5 | Status: SHIPPED | OUTPATIENT
Start: 2021-03-31 | End: 2021-10-05 | Stop reason: SDUPTHER

## 2021-03-31 RX ORDER — METHOCARBAMOL 750 MG/1
750 TABLET, FILM COATED ORAL 4 TIMES DAILY
Qty: 120 TABLET | Refills: 5 | Status: SHIPPED | OUTPATIENT
Start: 2021-03-31 | End: 2021-11-09 | Stop reason: SDUPTHER

## 2021-04-01 ENCOUNTER — OFFICE VISIT (OUTPATIENT)
Dept: GASTROENTEROLOGY | Facility: CLINIC | Age: 61
End: 2021-04-01

## 2021-04-01 VITALS
SYSTOLIC BLOOD PRESSURE: 135 MMHG | TEMPERATURE: 98.1 F | BODY MASS INDEX: 26.72 KG/M2 | HEIGHT: 70 IN | HEART RATE: 90 BPM | DIASTOLIC BLOOD PRESSURE: 86 MMHG

## 2021-04-01 DIAGNOSIS — K59.00 CONSTIPATION, UNSPECIFIED CONSTIPATION TYPE: Primary | ICD-10-CM

## 2021-04-01 DIAGNOSIS — R10.12 LEFT UPPER QUADRANT ABDOMINAL PAIN: ICD-10-CM

## 2021-04-01 DIAGNOSIS — R10.11 RIGHT UPPER QUADRANT ABDOMINAL PAIN: ICD-10-CM

## 2021-04-01 PROCEDURE — 99203 OFFICE O/P NEW LOW 30 MIN: CPT | Performed by: PHYSICIAN ASSISTANT

## 2021-04-01 RX ORDER — POLYETHYLENE GLYCOL 3350 17 G/17G
17 POWDER, FOR SOLUTION ORAL DAILY
Qty: 578 G | Refills: 3 | Status: SHIPPED | OUTPATIENT
Start: 2021-04-01 | End: 2021-07-22

## 2021-04-01 NOTE — PROGRESS NOTES
Chief Complaint   Patient presents with   • Weight Loss     Gurwinder Harding is a 60 y.o. male who presents to the office today at the request of KEVIN Jensen for Weight Loss.    HPI  The patient was seen for an initial GI evaluation.  He has lost 60 pounds since June or August.  He is eating less.  He states that he used to drink a lot of beer but has almost stopped that as well as sodas.  He drinks mostly water now.  Patient reports that he has abdominal pain just under his rib cage on both his right and left abdominal quadrants.  He denies rectal bleeding, black stool, nausea, vomiting, and diarrhea.  Patient states that he has acid reflux occasionally.  He has never had a colonoscopy.  Family history is positive for his maternal grandmother having intestinal cancer.  CT scan of his abdomen revealed constipation and mild fatty liver.  Patient has tried over the counter medication for his constipation with no success.        Review of Systems   Constitutional: Positive for unexpected weight change.   HENT: Negative for trouble swallowing.    Eyes: Negative.    Respiratory: Positive for shortness of breath.    Cardiovascular: Negative for chest pain.   Gastrointestinal: Positive for abdominal distention, abdominal pain and constipation. Negative for anal bleeding, blood in stool, diarrhea, nausea and vomiting.   Endocrine: Negative.    Genitourinary: Negative for difficulty urinating.   Musculoskeletal: Negative.    Skin: Negative.    Allergic/Immunologic: Negative for environmental allergies and food allergies.   Neurological: Positive for dizziness and light-headedness. Negative for headaches.   Hematological: Bruises/bleeds easily.       ACTIVE PROBLEMS:   Specialty Problems     None          PAST MEDICAL HISTORY:  Past Medical History:   Diagnosis Date   • Alcohol abuse    • Arthritis    • Carotid stenosis    • ED (erectile dysfunction)    • History of degenerative disc disease    • Hypertension    • Neuropathy     • Peripheral vascular disease (CMS/HCC)     s/p arthrectomy follow by balloon angioplasty and stents of right and left SFA   • Tobacco abuse    • Vitamin D deficiency        SURGICAL HISTORY:  Past Surgical History:   Procedure Laterality Date   • APPENDECTOMY     • ARTERIOGRAM N/A 9/19/2019    Procedure: Arteriogram;  Surgeon: Tong Azar MD;  Location:  [facility] CATH INVASIVE LOCATION;  Service: Cardiology   • BACK SURGERY      DISC RUPTURE REPAIR, L5   • CARDIAC CATHETERIZATION Right 10/29/2019    Procedure: Peripheral angiography;  Surgeon: Tong Azar MD;  Location:  [facility] CATH INVASIVE LOCATION;  Service: Cardiovascular   • VASCULAR SURGERY Bilateral        FAMILY HISTORY:  Family History   Problem Relation Age of Onset   • Hypertension Mother    • Cancer Father         LUNG   • Diabetes Father    • Hypertension Father    • Heart attack Other         GRANDFATHER       SOCIAL HISTORY:  Social History     Tobacco Use   • Smoking status: Current Every Day Smoker     Packs/day: 1.50     Years: 30.00     Pack years: 45.00     Types: Cigarettes   • Smokeless tobacco: Never Used   Substance Use Topics   • Alcohol use: Not Currently     Alcohol/week: 24.0 standard drinks     Types: 24 Cans of beer per week       CURRENT MEDICATION:    Current Outpatient Medications:   •  atenolol (TENORMIN) 50 MG tablet, Take 0.5 tablets by mouth Daily., Disp: 45 tablet, Rfl: 0  •  cilostazol (PLETAL) 100 MG tablet, Take 1 tablet by mouth Daily., Disp: 60 tablet, Rfl: 5  •  folic acid-pyridoxine-cyanocobalamin (Folbic) 2.5-25-2 MG tablet tablet, Take 1 tablet by mouth Daily., Disp: 90 tablet, Rfl: 1  •  gabapentin (NEURONTIN) 800 MG tablet, Take 1.5 tablets by mouth 3 (Three) Times a Day., Disp: 135 tablet, Rfl: 2  •  isosorbide mononitrate (IMDUR) 30 MG 24 hr tablet, Take 1 tablet by mouth Daily., Disp: 30 tablet, Rfl: 5  •  methocarbamol (ROBAXIN) 750 MG tablet, Take 1 tablet by mouth 4 (Four) Times a Day., Disp: 120 tablet,  "Rfl: 5  •  traZODone (DESYREL) 50 MG tablet, Take 1 tablet by mouth Every Night., Disp: 90 tablet, Rfl: 1  •  polyethylene glycol (MIRALAX) 17 GM/SCOOP powder, Take 17 g by mouth Daily., Disp: 578 g, Rfl: 3    ALLERGIES:  Penicillins and Novocain [procaine]    VISIT VITALS:  Blood Pressure 135/86 (BP Location: Left arm, Patient Position: Sitting, Cuff Size: Adult)   Pulse 90   Temperature 98.1 °F (36.7 °C)   Height 177.8 cm (70\")   Body Mass Index 26.72 kg/m²     PHYSICAL EXAMINATION:  Physical Exam  Constitutional:       General: He is not in acute distress.     Appearance: He is well-developed. He is not diaphoretic.   HENT:      Head: Normocephalic and atraumatic.      Right Ear: External ear normal.      Left Ear: External ear normal.      Nose: Nose normal.      Mouth/Throat:      Pharynx: No oropharyngeal exudate.   Eyes:      General: No scleral icterus.        Right eye: No discharge.         Left eye: No discharge.      Conjunctiva/sclera: Conjunctivae normal.      Pupils: Pupils are equal, round, and reactive to light.   Neck:      Thyroid: No thyromegaly.      Vascular: No JVD.      Trachea: No tracheal deviation.   Cardiovascular:      Rate and Rhythm: Normal rate and regular rhythm.      Heart sounds: Normal heart sounds. No murmur heard.   No friction rub. No gallop.    Pulmonary:      Effort: Pulmonary effort is normal. No respiratory distress.      Breath sounds: Normal breath sounds. No stridor. No wheezing or rales.   Chest:      Chest wall: No tenderness.   Abdominal:      General: Bowel sounds are normal. There is no distension.      Palpations: Abdomen is soft. There is no mass.      Tenderness: There is no abdominal tenderness. There is no guarding or rebound.      Hernia: No hernia is present.   Genitourinary:     Rectum: Guaiac result negative.   Musculoskeletal:      Cervical back: Normal range of motion and neck supple.      Comments: Generalized skin sensitivity   Lymphadenopathy:      " Cervical: No cervical adenopathy.   Skin:     General: Skin is warm and dry.      Coloration: Skin is not pale.      Findings: No erythema or rash.      Comments: Yellowing of nails   Neurological:      Mental Status: He is alert and oriented to person, place, and time.      Cranial Nerves: No cranial nerve deficit.      Motor: No abnormal muscle tone.      Coordination: Coordination normal.      Deep Tendon Reflexes: Reflexes are normal and symmetric. Reflexes normal.   Psychiatric:         Behavior: Behavior normal.         Thought Content: Thought content normal.         Judgment: Judgment normal.         Assessment/Plan      Diagnosis Plan   1. Constipation, unspecified constipation type     2. Right upper quadrant abdominal pain     3. Left upper quadrant abdominal pain         The patient will be started on Miralax for constipation.     Return in about 4 weeks (around 4/29/2021) for Recheck.         Patient's Body mass index is 26.72 kg/m². BMI is above normal parameters. Recommendations include: nutrition counseling.      GABI Gomez

## 2021-04-13 ENCOUNTER — OFFICE VISIT (OUTPATIENT)
Dept: CARDIOLOGY | Facility: CLINIC | Age: 61
End: 2021-04-13

## 2021-04-13 VITALS
BODY MASS INDEX: 26.63 KG/M2 | SYSTOLIC BLOOD PRESSURE: 133 MMHG | OXYGEN SATURATION: 97 % | HEART RATE: 87 BPM | WEIGHT: 186 LBS | DIASTOLIC BLOOD PRESSURE: 87 MMHG | HEIGHT: 70 IN

## 2021-04-13 DIAGNOSIS — R29.898 WEAKNESS OF BOTH LOWER EXTREMITIES: ICD-10-CM

## 2021-04-13 DIAGNOSIS — I73.9 PVD (PERIPHERAL VASCULAR DISEASE) WITH CLAUDICATION (HCC): ICD-10-CM

## 2021-04-13 DIAGNOSIS — M79.671 BILATERAL FOOT PAIN: ICD-10-CM

## 2021-04-13 DIAGNOSIS — M79.672 BILATERAL FOOT PAIN: ICD-10-CM

## 2021-04-13 DIAGNOSIS — Z72.0 TOBACCO ABUSE: ICD-10-CM

## 2021-04-13 DIAGNOSIS — I10 ESSENTIAL HYPERTENSION: Primary | ICD-10-CM

## 2021-04-13 PROCEDURE — 99213 OFFICE O/P EST LOW 20 MIN: CPT | Performed by: INTERNAL MEDICINE

## 2021-04-14 ENCOUNTER — IMMUNIZATION (OUTPATIENT)
Dept: VACCINE CLINIC | Facility: HOSPITAL | Age: 61
End: 2021-04-14

## 2021-04-14 PROCEDURE — 91300 HC SARSCOV02 VAC 30MCG/0.3ML IM: CPT | Performed by: INTERNAL MEDICINE

## 2021-04-14 PROCEDURE — 0002A: CPT | Performed by: INTERNAL MEDICINE

## 2021-04-29 ENCOUNTER — OFFICE VISIT (OUTPATIENT)
Dept: UROLOGY | Facility: CLINIC | Age: 61
End: 2021-04-29

## 2021-04-29 VITALS — BODY MASS INDEX: 26.69 KG/M2 | TEMPERATURE: 97.4 F | HEIGHT: 70 IN

## 2021-04-29 DIAGNOSIS — K59.04 CHRONIC IDIOPATHIC CONSTIPATION: Primary | ICD-10-CM

## 2021-04-29 DIAGNOSIS — R31.0 GROSS HEMATURIA: ICD-10-CM

## 2021-04-29 DIAGNOSIS — N43.3 HYDROCELE IN ADULT: ICD-10-CM

## 2021-04-29 PROCEDURE — 99213 OFFICE O/P EST LOW 20 MIN: CPT | Performed by: UROLOGY

## 2021-06-02 DIAGNOSIS — G62.9 NEUROPATHY: ICD-10-CM

## 2021-06-02 RX ORDER — GABAPENTIN 800 MG/1
TABLET ORAL
Qty: 135 TABLET | Refills: 1 | Status: SHIPPED | OUTPATIENT
Start: 2021-06-02 | End: 2021-06-29 | Stop reason: SDUPTHER

## 2021-06-21 ENCOUNTER — HOSPITAL ENCOUNTER (OUTPATIENT)
Dept: GENERAL RADIOLOGY | Facility: HOSPITAL | Age: 61
Discharge: HOME | End: 2021-06-21
Admitting: PSYCHIATRY & NEUROLOGY

## 2021-06-21 ENCOUNTER — TRANSCRIBE ORDERS (OUTPATIENT)
Dept: ADMINISTRATIVE | Facility: HOSPITAL | Age: 61
End: 2021-06-21

## 2021-06-21 DIAGNOSIS — R26.9 ABNORMALITY OF GAIT: ICD-10-CM

## 2021-06-21 DIAGNOSIS — M54.9 BACK PAIN, UNSPECIFIED BACK LOCATION, UNSPECIFIED BACK PAIN LATERALITY, UNSPECIFIED CHRONICITY: ICD-10-CM

## 2021-06-21 DIAGNOSIS — M54.9 BACK PAIN, UNSPECIFIED BACK LOCATION, UNSPECIFIED BACK PAIN LATERALITY, UNSPECIFIED CHRONICITY: Primary | ICD-10-CM

## 2021-06-21 PROCEDURE — 72050 X-RAY EXAM NECK SPINE 4/5VWS: CPT | Performed by: RADIOLOGY

## 2021-06-21 PROCEDURE — 72050 X-RAY EXAM NECK SPINE 4/5VWS: CPT

## 2021-06-21 PROCEDURE — 72110 X-RAY EXAM L-2 SPINE 4/>VWS: CPT | Performed by: RADIOLOGY

## 2021-06-21 PROCEDURE — 72110 X-RAY EXAM L-2 SPINE 4/>VWS: CPT

## 2021-06-29 ENCOUNTER — OFFICE VISIT (OUTPATIENT)
Dept: FAMILY MEDICINE CLINIC | Facility: CLINIC | Age: 61
End: 2021-06-29

## 2021-06-29 VITALS
OXYGEN SATURATION: 100 % | HEIGHT: 70 IN | SYSTOLIC BLOOD PRESSURE: 130 MMHG | WEIGHT: 177.4 LBS | HEART RATE: 85 BPM | DIASTOLIC BLOOD PRESSURE: 72 MMHG | TEMPERATURE: 96.8 F | BODY MASS INDEX: 25.4 KG/M2

## 2021-06-29 DIAGNOSIS — R25.1 TREMORS OF NERVOUS SYSTEM: ICD-10-CM

## 2021-06-29 DIAGNOSIS — Z12.5 SCREENING PSA (PROSTATE SPECIFIC ANTIGEN): ICD-10-CM

## 2021-06-29 DIAGNOSIS — I10 ESSENTIAL HYPERTENSION: Primary | ICD-10-CM

## 2021-06-29 DIAGNOSIS — I73.9 PAD (PERIPHERAL ARTERY DISEASE) (HCC): ICD-10-CM

## 2021-06-29 DIAGNOSIS — R63.4 WEIGHT LOSS: ICD-10-CM

## 2021-06-29 DIAGNOSIS — R10.12 LEFT UPPER QUADRANT ABDOMINAL PAIN: ICD-10-CM

## 2021-06-29 DIAGNOSIS — G62.9 NEUROPATHY: ICD-10-CM

## 2021-06-29 LAB
ALBUMIN SERPL-MCNC: 4.24 G/DL (ref 3.5–5.2)
ALBUMIN/GLOB SERPL: 1.2 G/DL
ALP SERPL-CCNC: 77 U/L (ref 39–117)
ALT SERPL W P-5'-P-CCNC: 11 U/L (ref 1–41)
ANION GAP SERPL CALCULATED.3IONS-SCNC: 7.9 MMOL/L (ref 5–15)
AST SERPL-CCNC: 19 U/L (ref 1–40)
BASOPHILS # BLD AUTO: 0.07 10*3/MM3 (ref 0–0.2)
BASOPHILS NFR BLD AUTO: 0.7 % (ref 0–1.5)
BILIRUB SERPL-MCNC: 0.3 MG/DL (ref 0–1.2)
BUN SERPL-MCNC: 15 MG/DL (ref 8–23)
BUN/CREAT SERPL: 11.7 (ref 7–25)
CALCIUM SPEC-SCNC: 9.8 MG/DL (ref 8.6–10.5)
CHLORIDE SERPL-SCNC: 101 MMOL/L (ref 98–107)
CHOLEST SERPL-MCNC: 181 MG/DL (ref 0–200)
CO2 SERPL-SCNC: 27.1 MMOL/L (ref 22–29)
CREAT SERPL-MCNC: 1.28 MG/DL (ref 0.76–1.27)
DEPRECATED RDW RBC AUTO: 45.9 FL (ref 37–54)
EOSINOPHIL # BLD AUTO: 0.16 10*3/MM3 (ref 0–0.4)
EOSINOPHIL NFR BLD AUTO: 1.7 % (ref 0.3–6.2)
ERYTHROCYTE [DISTWIDTH] IN BLOOD BY AUTOMATED COUNT: 13.4 % (ref 12.3–15.4)
GFR SERPL CREATININE-BSD FRML MDRD: 57 ML/MIN/1.73
GLOBULIN UR ELPH-MCNC: 3.6 GM/DL
GLUCOSE SERPL-MCNC: 133 MG/DL (ref 65–99)
HCT VFR BLD AUTO: 42.8 % (ref 37.5–51)
HDLC SERPL-MCNC: 47 MG/DL (ref 40–60)
HGB BLD-MCNC: 14.4 G/DL (ref 13–17.7)
IMM GRANULOCYTES # BLD AUTO: 0.02 10*3/MM3 (ref 0–0.05)
IMM GRANULOCYTES NFR BLD AUTO: 0.2 % (ref 0–0.5)
LDLC SERPL CALC-MCNC: 114 MG/DL (ref 0–100)
LDLC/HDLC SERPL: 2.38 {RATIO}
LYMPHOCYTES # BLD AUTO: 2.36 10*3/MM3 (ref 0.7–3.1)
LYMPHOCYTES NFR BLD AUTO: 24.6 % (ref 19.6–45.3)
MCH RBC QN AUTO: 31.3 PG (ref 26.6–33)
MCHC RBC AUTO-ENTMCNC: 33.6 G/DL (ref 31.5–35.7)
MCV RBC AUTO: 93 FL (ref 79–97)
MONOCYTES # BLD AUTO: 0.55 10*3/MM3 (ref 0.1–0.9)
MONOCYTES NFR BLD AUTO: 5.7 % (ref 5–12)
NEUTROPHILS NFR BLD AUTO: 6.42 10*3/MM3 (ref 1.7–7)
NEUTROPHILS NFR BLD AUTO: 67.1 % (ref 42.7–76)
NRBC BLD AUTO-RTO: 0 /100 WBC (ref 0–0.2)
PLATELET # BLD AUTO: 274 10*3/MM3 (ref 140–450)
PMV BLD AUTO: 10.7 FL (ref 6–12)
POTASSIUM SERPL-SCNC: 4.4 MMOL/L (ref 3.5–5.2)
PROT SERPL-MCNC: 7.8 G/DL (ref 6–8.5)
RBC # BLD AUTO: 4.6 10*6/MM3 (ref 4.14–5.8)
SODIUM SERPL-SCNC: 136 MMOL/L (ref 136–145)
TRIGL SERPL-MCNC: 110 MG/DL (ref 0–150)
TSH SERPL DL<=0.05 MIU/L-ACNC: 1.03 UIU/ML (ref 0.27–4.2)
VLDLC SERPL-MCNC: 20 MG/DL (ref 5–40)
WBC # BLD AUTO: 9.58 10*3/MM3 (ref 3.4–10.8)

## 2021-06-29 PROCEDURE — 80061 LIPID PANEL: CPT | Performed by: NURSE PRACTITIONER

## 2021-06-29 PROCEDURE — G0103 PSA SCREENING: HCPCS | Performed by: NURSE PRACTITIONER

## 2021-06-29 PROCEDURE — 99214 OFFICE O/P EST MOD 30 MIN: CPT | Performed by: NURSE PRACTITIONER

## 2021-06-29 PROCEDURE — 82306 VITAMIN D 25 HYDROXY: CPT | Performed by: NURSE PRACTITIONER

## 2021-06-29 PROCEDURE — 36415 COLL VENOUS BLD VENIPUNCTURE: CPT | Performed by: NURSE PRACTITIONER

## 2021-06-29 PROCEDURE — 82607 VITAMIN B-12: CPT | Performed by: NURSE PRACTITIONER

## 2021-06-29 PROCEDURE — 80050 GENERAL HEALTH PANEL: CPT | Performed by: NURSE PRACTITIONER

## 2021-06-29 RX ORDER — ATENOLOL 25 MG/1
25 TABLET ORAL DAILY
Qty: 30 TABLET | Refills: 5 | Status: SHIPPED | OUTPATIENT
Start: 2021-06-29 | End: 2021-11-09 | Stop reason: SDUPTHER

## 2021-06-29 RX ORDER — GABAPENTIN 800 MG/1
800 TABLET ORAL 3 TIMES DAILY
Qty: 135 TABLET | Refills: 2 | Status: SHIPPED | OUTPATIENT
Start: 2021-06-29 | End: 2021-12-08

## 2021-06-30 ENCOUNTER — TELEPHONE (OUTPATIENT)
Dept: FAMILY MEDICINE CLINIC | Facility: CLINIC | Age: 61
End: 2021-06-30

## 2021-06-30 LAB
25(OH)D3 SERPL-MCNC: 30.9 NG/ML (ref 30–100)
PSA SERPL-MCNC: 0.65 NG/ML (ref 0–4)
VIT B12 BLD-MCNC: 1722 PG/ML (ref 211–946)

## 2021-06-30 NOTE — TELEPHONE ENCOUNTER
----- Message from KEVIN Jensen sent at 6/30/2021  8:25 AM EDT -----  Labs are stable        Patient notified & verbalized understanding.

## 2021-06-30 NOTE — PROGRESS NOTES
Chief Complaint  Hypertension    Subjective          Gurwinder Harding presents to [facility]  Abdominal Pain  This is a new (Now following with GI with continued weightloss and work up  ) problem. The current episode started more than 1 month ago. The onset quality is undetermined. The problem occurs daily. The problem has been gradually improving (with medication additions for constipation). The pain is located in the LLQ and LUQ. The pain is at a severity of 4/10. The pain is mild. The quality of the pain is colicky, a sensation of fullness and aching. The abdominal pain does not radiate. Associated symptoms include weight loss. Pertinent negatives include no anorexia, belching, constipation, diarrhea, dysuria, frequency, hematochezia, nausea or vomiting. Nothing aggravates the pain. The pain is relieved by nothing. He has tried nothing for the symptoms. HX alcohol abuse.     Fatigue  This is a chronic problem. The current episode started more than 1 year ago. The problem occurs constantly. The problem has been unchanged. Associated symptoms include abdominal pain and fatigue. Pertinent negatives include no anorexia, nausea or vomiting.   Hypertension  This is a chronic problem. The current episode started more than 1 year ago. The problem is controlled. Associated symptoms include malaise/fatigue (improved with medication changes). Pertinent negatives include no peripheral edema. (  ) Risk factors for coronary artery disease include smoking/tobacco exposure and sedentary lifestyle. Past treatments include beta blockers and ACE inhibitors. Current antihypertension treatment includes ACE inhibitors and beta blockers. The current treatment provides moderate improvement. Compliance problems include diet and exercise.    Extremity Weakness   Pain location: bilateral lower extremity. Chronicity: Known PAD post stenting with chronic Neuralgia.  Following second stenting unable to return  "to work.  Difficulty standing, walking.  Gait is slow and unsteady.  Now following with Neurology. The current episode started more than 1 year ago. The problem occurs daily. The problem has been gradually worsening. The pain is moderate. Associated symptoms include a limited range of motion and stiffness. Associated symptoms comments: Tremors of hands new onset in the past several months  . Family history includes gout. Parkinsons in the family His past medical history is significant for gout. HX alcohol abuse.         Objective   Vital Signs:   /72   Pulse 85   Temp 96.8 °F (36 °C)   Ht 177.8 cm (70\")   Wt 80.5 kg (177 lb 6.4 oz)   SpO2 100%   BMI 25.45 kg/m²     Physical Exam  Vitals and nursing note reviewed.   Constitutional:       General: He is not in acute distress.     Appearance: He is well-developed.   HENT:      Head: Normocephalic.   Eyes:      Conjunctiva/sclera: Conjunctivae normal.   Cardiovascular:      Rate and Rhythm: Normal rate and regular rhythm.      Heart sounds: Normal heart sounds. No murmur heard.     Pulmonary:      Effort: Pulmonary effort is normal.      Breath sounds: Normal breath sounds.   Abdominal:      General: Bowel sounds are normal.      Tenderness: There is abdominal tenderness.   Musculoskeletal:         General: Tenderness present. No swelling.      Right lower leg: No edema.      Left lower leg: No edema.   Skin:     General: Skin is warm and dry.   Neurological:      Mental Status: He is alert and oriented to person, place, and time.      Sensory: Sensory deficit present.      Motor: Weakness present.      Coordination: Coordination abnormal.      Gait: Gait abnormal.   Psychiatric:         Mood and Affect: Mood normal.         Behavior: Behavior normal.         Thought Content: Thought content normal.         Judgment: Judgment normal.        During this visit the following were done:  Labs Reviewed [x]    Labs Ordered [x]    Radiology Reports Reviewed []  "   Radiology Ordered []    PCP Records Reviewed []    Referring Provider Records Reviewed []    ER Records Reviewed []    Hospital Records Reviewed []    History Obtained From Family []    Radiology Images Reviewed []    Other Reviewed []    Records Requested []              Diagnoses and all orders for this visit:    1. Essential hypertension (Primary)  -     atenolol (Tenormin) 25 MG tablet; Take 1 tablet by mouth Daily.  Dispense: 30 tablet; Refill: 5  -     CBC Auto Differential; Future  -     Comprehensive Metabolic Panel; Future  -     Lipid Panel; Future  -     TSH; Future  -     Vitamin B12; Future  -     PSA Screen; Future  -     Vitamin D 25 Hydroxy; Future  -     CBC Auto Differential  -     Comprehensive Metabolic Panel  -     Lipid Panel  -     TSH  -     Vitamin B12  -     PSA Screen  -     Vitamin D 25 Hydroxy    2. Screening PSA (prostate specific antigen)  -     PSA Screen; Future  -     PSA Screen    3. Neuropathy  -     gabapentin (NEURONTIN) 800 MG tablet; Take 1 tablet by mouth 3 (Three) Times a Day.  Dispense: 135 tablet; Refill: 2    4. Left upper quadrant abdominal pain    5. Tremors of nervous system    6. PAD (peripheral artery disease) (CMS/HCC)    7. Weight loss            Follow Up   Return in about 4 months (around 10/29/2021), or if symptoms worsen or fail to improve, for Recheck.  Patient was given instructions and counseling regarding his condition or for health maintenance advice. Please see specific information pulled into the AVS if appropriate.

## 2021-07-08 ENCOUNTER — TELEPHONE (OUTPATIENT)
Dept: GENERAL RADIOLOGY | Facility: CLINIC | Age: 61
End: 2021-07-08

## 2021-07-13 NOTE — TELEPHONE ENCOUNTER
Not yet. Still working on the extra forms.     Called Prior Authorization Line to give extra information; waiting for a response.

## 2021-07-15 ENCOUNTER — APPOINTMENT (OUTPATIENT)
Dept: CT IMAGING | Facility: HOSPITAL | Age: 61
End: 2021-07-15

## 2021-07-15 ENCOUNTER — TELEPHONE (OUTPATIENT)
Dept: FAMILY MEDICINE CLINIC | Facility: CLINIC | Age: 61
End: 2021-07-15

## 2021-07-15 ENCOUNTER — APPOINTMENT (OUTPATIENT)
Dept: GENERAL RADIOLOGY | Facility: HOSPITAL | Age: 61
End: 2021-07-15

## 2021-07-15 ENCOUNTER — APPOINTMENT (OUTPATIENT)
Dept: MRI IMAGING | Facility: HOSPITAL | Age: 61
End: 2021-07-15

## 2021-07-15 ENCOUNTER — HOSPITAL ENCOUNTER (EMERGENCY)
Facility: HOSPITAL | Age: 61
Discharge: LEFT BEFORE DISCHARGE | End: 2021-07-15
Attending: EMERGENCY MEDICINE | Admitting: EMERGENCY MEDICINE

## 2021-07-15 VITALS
WEIGHT: 175 LBS | DIASTOLIC BLOOD PRESSURE: 72 MMHG | SYSTOLIC BLOOD PRESSURE: 151 MMHG | OXYGEN SATURATION: 98 % | RESPIRATION RATE: 16 BRPM | TEMPERATURE: 97.5 F | HEART RATE: 75 BPM | HEIGHT: 69 IN | BODY MASS INDEX: 25.92 KG/M2

## 2021-07-15 DIAGNOSIS — I63.232 CEREBROVASCULAR ACCIDENT (CVA) DUE TO OCCLUSION OF LEFT CAROTID ARTERY (HCC): Primary | ICD-10-CM

## 2021-07-15 PROBLEM — H53.8 BLURRY VISION, LEFT EYE: Status: ACTIVE | Noted: 2021-07-15

## 2021-07-15 LAB
ALBUMIN SERPL-MCNC: 3.69 G/DL (ref 3.5–5.2)
ALBUMIN/GLOB SERPL: 1.1 G/DL
ALP SERPL-CCNC: 66 U/L (ref 39–117)
ALT SERPL W P-5'-P-CCNC: 9 U/L (ref 1–41)
ANION GAP SERPL CALCULATED.3IONS-SCNC: 11.8 MMOL/L (ref 5–15)
APTT PPP: 28.2 SECONDS (ref 25.5–35.4)
AST SERPL-CCNC: 16 U/L (ref 1–40)
BACTERIA UR QL AUTO: ABNORMAL /HPF
BASOPHILS # BLD AUTO: 0.08 10*3/MM3 (ref 0–0.2)
BASOPHILS NFR BLD AUTO: 1 % (ref 0–1.5)
BILIRUB SERPL-MCNC: 0.2 MG/DL (ref 0–1.2)
BILIRUB UR QL STRIP: NEGATIVE
BUN SERPL-MCNC: 14 MG/DL (ref 8–23)
BUN/CREAT SERPL: 11.4 (ref 7–25)
CALCIUM SPEC-SCNC: 9.3 MG/DL (ref 8.6–10.5)
CHLORIDE SERPL-SCNC: 101 MMOL/L (ref 98–107)
CLARITY UR: CLEAR
CO2 SERPL-SCNC: 21.2 MMOL/L (ref 22–29)
COLOR UR: YELLOW
CREAT BLDA-MCNC: 1.3 MG/DL (ref 0.6–1.3)
CREAT SERPL-MCNC: 1.23 MG/DL (ref 0.76–1.27)
DEPRECATED RDW RBC AUTO: 46.4 FL (ref 37–54)
EOSINOPHIL # BLD AUTO: 0.21 10*3/MM3 (ref 0–0.4)
EOSINOPHIL NFR BLD AUTO: 2.6 % (ref 0.3–6.2)
ERYTHROCYTE [DISTWIDTH] IN BLOOD BY AUTOMATED COUNT: 13.7 % (ref 12.3–15.4)
GFR SERPL CREATININE-BSD FRML MDRD: 60 ML/MIN/1.73
GLOBULIN UR ELPH-MCNC: 3.2 GM/DL
GLUCOSE SERPL-MCNC: 121 MG/DL (ref 65–99)
GLUCOSE UR STRIP-MCNC: NEGATIVE MG/DL
HCT VFR BLD AUTO: 39.5 % (ref 37.5–51)
HGB BLD-MCNC: 13.1 G/DL (ref 13–17.7)
HGB UR QL STRIP.AUTO: NEGATIVE
HOLD SPECIMEN: NORMAL
HOLD SPECIMEN: NORMAL
HYALINE CASTS UR QL AUTO: ABNORMAL /LPF
IMM GRANULOCYTES # BLD AUTO: 0.02 10*3/MM3 (ref 0–0.05)
IMM GRANULOCYTES NFR BLD AUTO: 0.2 % (ref 0–0.5)
INR PPP: 0.97 (ref 0.9–1.1)
KETONES UR QL STRIP: NEGATIVE
LEUKOCYTE ESTERASE UR QL STRIP.AUTO: NEGATIVE
LYMPHOCYTES # BLD AUTO: 2.29 10*3/MM3 (ref 0.7–3.1)
LYMPHOCYTES NFR BLD AUTO: 27.9 % (ref 19.6–45.3)
MAGNESIUM SERPL-MCNC: 2.1 MG/DL (ref 1.6–2.4)
MCH RBC QN AUTO: 31 PG (ref 26.6–33)
MCHC RBC AUTO-ENTMCNC: 33.2 G/DL (ref 31.5–35.7)
MCV RBC AUTO: 93.4 FL (ref 79–97)
MONOCYTES # BLD AUTO: 0.58 10*3/MM3 (ref 0.1–0.9)
MONOCYTES NFR BLD AUTO: 7.1 % (ref 5–12)
NEUTROPHILS NFR BLD AUTO: 5.04 10*3/MM3 (ref 1.7–7)
NEUTROPHILS NFR BLD AUTO: 61.2 % (ref 42.7–76)
NITRITE UR QL STRIP: POSITIVE
NRBC BLD AUTO-RTO: 0 /100 WBC (ref 0–0.2)
NT-PROBNP SERPL-MCNC: 202.4 PG/ML (ref 0–900)
PH UR STRIP.AUTO: 7 [PH] (ref 5–8)
PLATELET # BLD AUTO: 246 10*3/MM3 (ref 140–450)
PMV BLD AUTO: 10.7 FL (ref 6–12)
POTASSIUM SERPL-SCNC: 3.9 MMOL/L (ref 3.5–5.2)
PROT SERPL-MCNC: 6.9 G/DL (ref 6–8.5)
PROT UR QL STRIP: NEGATIVE
PROTHROMBIN TIME: 13.3 SECONDS (ref 12.8–14.5)
RBC # BLD AUTO: 4.23 10*6/MM3 (ref 4.14–5.8)
RBC # UR: ABNORMAL /HPF
REF LAB TEST METHOD: ABNORMAL
SODIUM SERPL-SCNC: 134 MMOL/L (ref 136–145)
SP GR UR STRIP: >1.03 (ref 1–1.03)
SQUAMOUS #/AREA URNS HPF: ABNORMAL /HPF
TROPONIN T SERPL-MCNC: <0.01 NG/ML (ref 0–0.03)
UROBILINOGEN UR QL STRIP: ABNORMAL
WBC # BLD AUTO: 8.22 10*3/MM3 (ref 3.4–10.8)
WBC UR QL AUTO: ABNORMAL /HPF
WHOLE BLOOD HOLD SPECIMEN: NORMAL

## 2021-07-15 PROCEDURE — 82565 ASSAY OF CREATININE: CPT

## 2021-07-15 PROCEDURE — 70450 CT HEAD/BRAIN W/O DYE: CPT

## 2021-07-15 PROCEDURE — 70496 CT ANGIOGRAPHY HEAD: CPT | Performed by: RADIOLOGY

## 2021-07-15 PROCEDURE — 70496 CT ANGIOGRAPHY HEAD: CPT

## 2021-07-15 PROCEDURE — 70498 CT ANGIOGRAPHY NECK: CPT

## 2021-07-15 PROCEDURE — 0042T HC CT CEREBRAL PERFUSION W/WO CONTRAST: CPT

## 2021-07-15 PROCEDURE — 70450 CT HEAD/BRAIN W/O DYE: CPT | Performed by: RADIOLOGY

## 2021-07-15 PROCEDURE — G0378 HOSPITAL OBSERVATION PER HR: HCPCS

## 2021-07-15 PROCEDURE — 83735 ASSAY OF MAGNESIUM: CPT | Performed by: EMERGENCY MEDICINE

## 2021-07-15 PROCEDURE — 99284 EMERGENCY DEPT VISIT MOD MDM: CPT

## 2021-07-15 PROCEDURE — 81001 URINALYSIS AUTO W/SCOPE: CPT | Performed by: EMERGENCY MEDICINE

## 2021-07-15 PROCEDURE — 0 IOPAMIDOL PER 1 ML: Performed by: EMERGENCY MEDICINE

## 2021-07-15 PROCEDURE — 85610 PROTHROMBIN TIME: CPT | Performed by: EMERGENCY MEDICINE

## 2021-07-15 PROCEDURE — 99204 OFFICE O/P NEW MOD 45 MIN: CPT | Performed by: STUDENT IN AN ORGANIZED HEALTH CARE EDUCATION/TRAINING PROGRAM

## 2021-07-15 PROCEDURE — 93005 ELECTROCARDIOGRAM TRACING: CPT | Performed by: EMERGENCY MEDICINE

## 2021-07-15 PROCEDURE — 85730 THROMBOPLASTIN TIME PARTIAL: CPT | Performed by: EMERGENCY MEDICINE

## 2021-07-15 PROCEDURE — 70547 MR ANGIOGRAPHY NECK W/O DYE: CPT

## 2021-07-15 PROCEDURE — 71045 X-RAY EXAM CHEST 1 VIEW: CPT | Performed by: RADIOLOGY

## 2021-07-15 PROCEDURE — 93010 ELECTROCARDIOGRAM REPORT: CPT | Performed by: INTERNAL MEDICINE

## 2021-07-15 PROCEDURE — 83880 ASSAY OF NATRIURETIC PEPTIDE: CPT | Performed by: EMERGENCY MEDICINE

## 2021-07-15 PROCEDURE — 80053 COMPREHEN METABOLIC PANEL: CPT | Performed by: EMERGENCY MEDICINE

## 2021-07-15 PROCEDURE — 70547 MR ANGIOGRAPHY NECK W/O DYE: CPT | Performed by: RADIOLOGY

## 2021-07-15 PROCEDURE — 0042T CT CEREBRAL PERFUSION W WO CONTRAST: CPT | Performed by: RADIOLOGY

## 2021-07-15 PROCEDURE — 70544 MR ANGIOGRAPHY HEAD W/O DYE: CPT

## 2021-07-15 PROCEDURE — 70544 MR ANGIOGRAPHY HEAD W/O DYE: CPT | Performed by: RADIOLOGY

## 2021-07-15 PROCEDURE — 85025 COMPLETE CBC W/AUTO DIFF WBC: CPT | Performed by: EMERGENCY MEDICINE

## 2021-07-15 PROCEDURE — 71045 X-RAY EXAM CHEST 1 VIEW: CPT

## 2021-07-15 PROCEDURE — 84484 ASSAY OF TROPONIN QUANT: CPT | Performed by: EMERGENCY MEDICINE

## 2021-07-15 PROCEDURE — 70498 CT ANGIOGRAPHY NECK: CPT | Performed by: RADIOLOGY

## 2021-07-15 RX ORDER — ASPIRIN 325 MG
325 TABLET, DELAYED RELEASE (ENTERIC COATED) ORAL DAILY
Status: CANCELLED | OUTPATIENT
Start: 2021-07-16

## 2021-07-15 RX ORDER — ASPIRIN 81 MG/1
324 TABLET, CHEWABLE ORAL ONCE
Status: COMPLETED | OUTPATIENT
Start: 2021-07-15 | End: 2021-07-15

## 2021-07-15 RX ORDER — NALOXONE HCL 0.4 MG/ML
0.2 VIAL (ML) INJECTION ONCE
Status: DISCONTINUED | OUTPATIENT
Start: 2021-07-15 | End: 2021-07-15

## 2021-07-15 RX ORDER — NITROGLYCERIN 0.4 MG/1
0.4 TABLET SUBLINGUAL
Status: CANCELLED | OUTPATIENT
Start: 2021-07-15

## 2021-07-15 RX ORDER — NICOTINE 21 MG/24HR
1 PATCH, TRANSDERMAL 24 HOURS TRANSDERMAL ONCE
Status: DISCONTINUED | OUTPATIENT
Start: 2021-07-15 | End: 2021-07-15 | Stop reason: HOSPADM

## 2021-07-15 RX ORDER — CLOPIDOGREL BISULFATE 75 MG/1
300 TABLET ORAL ONCE
Status: COMPLETED | OUTPATIENT
Start: 2021-07-15 | End: 2021-07-15

## 2021-07-15 RX ORDER — ATORVASTATIN CALCIUM 40 MG/1
80 TABLET, FILM COATED ORAL NIGHTLY
Status: CANCELLED | OUTPATIENT
Start: 2021-07-15

## 2021-07-15 RX ADMIN — IOPAMIDOL 100 ML: 755 INJECTION, SOLUTION INTRAVENOUS at 17:08

## 2021-07-15 RX ADMIN — CLOPIDOGREL 300 MG: 75 TABLET, FILM COATED ORAL at 19:30

## 2021-07-15 RX ADMIN — IOPAMIDOL 100 ML: 755 INJECTION, SOLUTION INTRAVENOUS at 17:10

## 2021-07-15 RX ADMIN — ASPIRIN 324 MG: 81 TABLET, CHEWABLE ORAL at 19:30

## 2021-07-15 NOTE — ED NOTES
Patient and family states that about 4 weeks ago he had a episode that was similar to [place] Palsy, states that he did not seek medical attention at that time.  States that he woke up this morning about 0900 and he had blurry vision in the left eye.  States that when he looks with both eyes open he has double vision.      Chance Soler RN  07/15/21 [id number]

## 2021-07-15 NOTE — ED PROVIDER NOTES
Subjective   Patient presents to ER with blurred vision that started yesterday afternoon      Eye Problem  Location:  Right eye (blurred vision left eye)  Severity:  Mild  Onset quality:  Gradual  Duration:  26 hours  Timing:  Constant  Chronicity:  New  Relieved by:  Nothing  Worsened by:  Nothing  Ineffective treatments:  None tried  Associated symptoms: blurred vision        Review of Systems   Constitutional: Positive for activity change and fatigue.   HENT: Negative.    Eyes: Positive for blurred vision and visual disturbance.   Respiratory: Negative.    Cardiovascular: Negative.    Gastrointestinal: Negative.    Endocrine: Negative.    Genitourinary: Negative.    Musculoskeletal: Negative.    Skin: Negative.    Allergic/Immunologic: Negative.    Neurological: Negative.    Hematological: Negative.    Psychiatric/Behavioral: Negative.        Past Medical History:   Diagnosis Date   • Alcohol abuse    • Arthritis    • Carotid stenosis    • ED (erectile dysfunction)    • History of degenerative disc disease    • Hypertension    • Neuropathy    • Peripheral vascular disease (CMS/HCC)     s/p arthrectomy follow by balloon angioplasty and stents of right and left SFA   • Tobacco abuse    • Vitamin D deficiency        Allergies   Allergen Reactions   • Penicillins Hives and Shortness Of Breath     As A child   • Novocain [Procaine] Nausea And Vomiting       Past Surgical History:   Procedure Laterality Date   • APPENDECTOMY     • ARTERIOGRAM N/A 9/19/2019    Procedure: Arteriogram;  Surgeon: Tong Azar MD;  Location:  [facility];  Service: Cardiology   • BACK SURGERY      DISC RUPTURE REPAIR, L5   • CARDIAC CATHETERIZATION Right 10/29/2019    Procedure: Peripheral angiography;  Surgeon: Tong Azar MD;  Location:  [facility] LOCATION;  Service: Cardiovascular   • VASCULAR SURGERY Bilateral        Family History   Problem Relation Age of Onset   • Hypertension Mother    • Cancer  Father         LUNG   • Diabetes Father    • Hypertension Father    • Heart attack Other         GRANDFATHER       Social History     Socioeconomic History   • Marital status:      Spouse name: Not on file   • Number of children: 1   • Years of education: Not on file   • Highest education level: Not on file   Tobacco Use   • Smoking status: Current Every Day Smoker     Packs/day: 1.50     Years: 30.00     Pack years: 45.00     Types: Cigarettes   • Smokeless tobacco: Never Used   Substance and Sexual Activity   • Alcohol use: Not Currently     Alcohol/week: 24.0 standard drinks     Types: 24 Cans of beer per week   • Drug use: No   • Sexual activity: Defer           Objective   Physical Exam  Vitals and nursing note reviewed.   HENT:      Head: Normocephalic.      Nose: Nose normal.      Mouth/Throat:      Mouth: Mucous membranes are moist.   Eyes:      Comments: Some blurring of vision right eye in peripheral vision   Cardiovascular:      Rate and Rhythm: Normal rate.      Pulses: Normal pulses.   Pulmonary:      Effort: Pulmonary effort is normal.   Abdominal:      General: Abdomen is flat.   Musculoskeletal:         General: Normal range of motion.      Cervical back: Normal range of motion.   Skin:     General: Skin is warm.      Capillary Refill: Capillary refill takes less than 2 seconds.   Neurological:      Mental Status: He is alert.      Comments: Blurred vision left eye   Psychiatric:         Mood and Affect: Mood normal.         Procedures           ED Course  ED Course as of Jul 26 1436   Thu Jul 15, 2021   1805 ECG 18:03 NSR, rate 79. Junctional ST depression. QT/QTc 364/417    [DOC]   1926 Dr Masters consulted times two. States admit patient for r/o stroke with plavix and ASA    [DOC]      ED Course User Index  [DOC] Renzo Templeton MD                                           [facility]    Final diagnoses:   Cerebrovascular accident (CVA) due to occlusion of left carotid artery (CMS/[facility])       ED  Disposition  ED Disposition     ED Disposition Condition Comment    AMA  Level of Care: Telemetry [5]  Diagnosis: Cerebrovascular accident (CVA) due to occlusion of left carotid artery (CMS/[facility]) [[id number]]  Admitting Physician: SONIDO AGUILERA [[id number]]            Pastora Person, APRN  [address] [place] [address]  [phone]    Schedule an appointment as soon as possible for a visit   If symptoms worsen         Medication List      No changes were made to your prescriptions during this visit.          Renzo Templeton MD  07/26/21 3280

## 2021-07-15 NOTE — TELEPHONE ENCOUNTER
PATIENT HAS BLURRED AND DOUBLE VISION IN ONE EYE.    WHAT TO DO?    PLEASE CALL [phone]      Spoke with patient & instructed him to call his eye doctor & tell them these symptoms,he verbalized understanding.

## 2021-07-15 NOTE — ED NOTES
"Patient refusing to be covid swabbed. Patient stated \" I took the shots and Id rather not be swabbed\" Dr. Templeton aware.      Earnest Martínez  07/15/21 1912    "

## 2021-07-16 LAB
QT INTERVAL: 364 MS
QTC INTERVAL: 417 MS

## 2021-07-16 NOTE — CONSULTS
Stroke Consult Note    Patient Name: Gurwinder Harding   MRN: [id number]  Age: 61 y.o.  Sex: male  : 1960    Primary Care Physician: Pastora Person APRN  Referring Physician:  No ref. provider found    TIME STROKE TEAM CALLED: 1726 EST     TIME PATIENT SEEN: 1730 EST    Handedness: R  Race: W    Chief Complaint/Reason for Consultation: Right blurry vision    Subjective .  HPI:   This 61-year-old with history of hypertension, known left ICA occlusion, takes aspirin 81 daily presented with left eye blurry vision.  The blurry vision was more explained as binocular diplopia.  Upon further questioning, patient was reluctant to answer.      Last Known Normal Date/Time: 2020 1:10 PM EST     Review of Systems   Constitutional: No fatigue  HENT: Negative for nosebleeds and rhinorrhea.    Eyes: Negative for redness.   Respiratory: Negative for cough.    Gastrointestinal: Negative for anal bleeding.   Endocrine: Negative for polydipsia.   Genitourinary: Negative for enuresis and urgency.   Musculoskeletal: Negative for joint swelling.   Neurological: Negative for tremors.   Psychiatric/Behavioral: Negative for hallucinations.     Temp:  [97.5 °F (36.4 °C)] 97.5 °F (36.4 °C)  Heart Rate:  [70-91] 75  Resp:  [16-18] 16  BP: (137-185)/(72-92) 151/72        GEN: NAD, pleasant, cooperative  Eyes-show anicteric sclera, moist conjunctiva with no lid lag, no redness  Neck-trachea midline.  There is no thyromegaly.  ENMT-oropharynx clear with moist mucous membranes and good dentition.  Skin-no rash, lesions or ulcers.  Cardiovascular exam-no pedal edema, regular rate and rhythm.  CHEST: No signs of resp distress, on room air  Abdomen-no abdominal distention, nontender.  Psychiatric exam-alert oriented x3 with intact judgment and insight      NEURO    MENTAL STATUS: AAOx3, memory intact, fund of knowledge appropriate    LANG/SPEECH: Naming and repetition intact, fluent, follows 3-step commands    CRANIAL NERVES:      II: Pupils  equal and reactive, no RAPD, no VF deficits, fundus(not done)      III, IV, VI: EOM intact, no gaze preference or deviation, no nystagmus.  Lateral diplopia      V: normal sensation in V1, V2, and V3 segments bilaterally      VII: no asymmetry, no nasolabial fold flattening      VIII: normal hearing to speech      IX, X: normal palatal elevation, no uvular deviation      XI: 5/5 head turn and 5/5 shoulder shrug bilaterally      XII: midline tongue protrusion    MOTOR:  Normal tone throughout  5/5 muscle power in Rt shoulder abductors/adductors, elbow flexors/extensors, wrist flexors/extensors, finger abductors/adductors.  5/5 in Rt hip flexors/extensors, knee flexors/extensors, ankle dorsiflexors and plantar flexors.    5/5 muscle power in Lt shoulder abductors/adductors, elbow flexors/extensors, wrist flexors/extensors, finger abductors/adductors.  5/5 in Lt hip flexors/extensors, knee flexors/extensors, ankle dorsiflexors and plantea flexors.    REFLEXES:  no Dumont's, no clonus    SENSORY:    Normal to light touch all throughout     COORDINATION: Normal finger to nose and heel to shin, no tremor, no dysmetria    STATION: Not assessed due to patient condition    GAIT: Not assessed due to patient condition  Acute Stroke Data    Alteplase (tPA) Inclusion / Exclusion Criteria    Time: 23:39 EDT  Person Administering Scale: Arthur Masters MD    Inclusion Criteria  [x]   18 years of age or greater   []   Onset of symptoms < 4.5 hours before beginning treatment (stroke onset = time patient was last seen well or without symptoms).   []   Diagnosis of acute ischemic stroke causing measurable disabling deficit (Complete Hemianopia, Any Aphasia, Visual or Sensory Extinction, Any weakness limiting sustained effort against gravity)   []   Any remaining deficit considered potentially disabling in view of patient and practitioner   Exclusion criteria (Do not proceed with Alteplase if any are checked under exclusion  criteria)  [x]   Onset unknown or GREATER than 4.5 hours   []   ICH on CT/MRI   []   CT demonstrates hypodensity representing acute or subacute infarct   []   Significant head trauma or prior stroke in the previous 3 months   []   Symptoms suggestive of subarachnoid hemorrhage   []   History of un-ruptured intracranial aneurysm GREATER than 10 mm   []   Recent intracranial or intraspinal surgery within the last 3 months   []   Arterial puncture at a non-compressible site in the previous 7 days   []   Active internal bleeding   []   Acute bleeding tendency   []   Platelet count LESS than 100,000 for known hematological diseases such as leukemia, thrombocytopenia or chronic cirrhosis   []   Current use of anticoagulant with INR GREATER than 1.7 or PT GREATER than 15 seconds, aPTT GREATER than 40 seconds   []   Heparin received within 48 hours, resulting in abnormally elevated aPTT GREATER than upper limit of normal   []   Current use of direct thrombin inhibitors or direct factor Xa inhibitors in the past 48 hours   []   Elevated blood pressure refractory to treatment (systolic GREATER than 185 mm/Hg or diastolic  GREATER than 110 mm/Hg   []   Suspected infective endocarditis and aortic arch dissection   []   Current use of therapeutic treatment dose of low-molecular-weight heparin (LMWH) within the previous 24 hours   []   Structural GI malignancy or bleed   Relative exclusion for all patients  []   Only minor non-disabling symptoms   []   Pregnancy   []   Seizure at onset with postictal residual neurological impairments   []   Major surgery or previous trauma within past 14 days   []   History of previous spontaneous ICH, intracranial neoplasm, or AV malformation   []   Postpartum (within previous 14 days)   []   Recent GI or urinary tract hemorrhage (within previous 21 days)   []   Recent acute MI (within previous 3 months)   []   History of un-ruptured intracranial aneurysm LESS than 10 mm   []   History of  ruptured intracranial aneurysm   []   Blood glucose LESS than 50 mg/dL (2.7 mmol/L)   []   Dural puncture within the last 7 days   []   Known GREATER than 10 cerebral microbleeds   Additional exclusions for patients with symptoms onset between 3 and 4.5 hours.  []   Age > 80.   []   On any anticoagulants regardless of INR  >>> Warfarin (Coumadin), Heparin, Enoxaparin (Lovenox), fondaparinux (Arixtra), bivalirudin (Angiomax), Argatroban, dabigatran (Pradaxa), rivaroxaban (Xarelto), or apixaban (Eliquis)   []   Severe stroke (NIHSS > 25).   []   History of BOTH diabetes and previous ischemic stroke.   []   The risks and benefits have been discussed with the patient or family related to the administration of IV Alteplase for stroke symptoms.   []   I have discussed and reviewed the patient's case and imaging with the attending prior to IV Alteplase.    Time Alteplase administered       Past Medical History:   Diagnosis Date   • Alcohol abuse    • Arthritis    • Carotid stenosis    • ED (erectile dysfunction)    • History of degenerative disc disease    • Hypertension    • Neuropathy    • Peripheral vascular disease (CMS/HCC)     s/p arthrectomy follow by balloon angioplasty and stents of right and left SFA   • Tobacco abuse    • Vitamin D deficiency      Past Surgical History:   Procedure Laterality Date   • APPENDECTOMY     • ARTERIOGRAM N/A 9/19/2019    Procedure: Arteriogram;  Surgeon: Tong Azar MD;  Location:  [facility];  Service: Cardiology   • BACK SURGERY      DISC RUPTURE REPAIR, L5   • CARDIAC CATHETERIZATION Right 10/29/2019    Procedure: Peripheral angiography;  Surgeon: Tong Azar MD;  Location:  [facility];  Service: Cardiovascular   • VASCULAR SURGERY Bilateral      Family History   Problem Relation Age of Onset   • Hypertension Mother    • Cancer Father         LUNG   • Diabetes Father    • Hypertension Father    • Heart attack Other         GRANDFATHER      Social History     Socioeconomic History   • Marital status:      Spouse name: Not on file   • Number of children: 1   • Years of education: Not on file   • Highest education level: Not on file   Tobacco Use   • Smoking status: Current Every Day Smoker     Packs/day: 1.50     Years: 30.00     Pack years: 45.00     Types: Cigarettes   • Smokeless tobacco: Never Used   Substance and Sexual Activity   • Alcohol use: Not Currently     Alcohol/week: 24.0 standard drinks     Types: 24 Cans of beer per week   • Drug use: No   • Sexual activity: Defer     Allergies   Allergen Reactions   • Penicillins Hives and Shortness Of Breath     As A child   • Novocain [Procaine] Nausea And Vomiting     Prior to Admission medications    Medication Sig Start Date End Date Taking? Authorizing Provider   aspirin 81 MG chewable tablet Chew 81 mg Daily.    Provider, MD Lashae   atenolol (Tenormin) 25 MG tablet Take 1 tablet by mouth Daily. 6/29/21   Pastora Person APRN   cilostazol (PLETAL) 100 MG tablet Take 1 tablet by mouth Daily. 9/15/20   Tong Azar MD   folic acid-pyridoxine-cyanocobalamin (Folbic) 2.5-25-2 MG tablet tablet Take 1 tablet by mouth Daily. 3/15/21   Pastora Person APRN   gabapentin (NEURONTIN) 800 MG tablet Take 1 tablet by mouth 3 (Three) Times a Day. 6/29/21   Pastora Person APRN   isosorbide mononitrate (IMDUR) 30 MG 24 hr tablet Take 1 tablet by mouth Daily. 3/31/21   Pastora Person APRN   Magnesium 100 MG tablet Take  by mouth.    Provider, MD Lashae   methocarbamol (ROBAXIN) 750 MG tablet Take 1 tablet by mouth 4 (Four) Times a Day. 3/31/21   Pastora Person APRN   polyethylene glycol (MIRALAX) 17 GM/SCOOP powder Take 17 g by mouth Daily. 4/1/21   Jelani Joe PA   Potassium 75 MG tablet Take  by mouth.    Provider, MD Lashae   traZODone (DESYREL) 50 MG tablet Take 1 tablet by mouth Every Night. 3/15/21   Pastora Person APRN       Hospital Meds:  Scheduled-   Infusions- No current  facility-administered medications for this encounter.     PRNs-     Functional Status Prior to Current Stroke/[place] Score: 0    NIH Stroke Scale  Time: 23:39 EDT  Person Administering Scale: Arthur Masters MD  1a  Level of consciousness: 0=alert; keenly responsive   1b. LOC questions:  0=Performs both tasks correctly   1c. LOC commands: 0=Performs both tasks correctly   2.  Best Gaze: 0=normal   3.  Visual: 0=No visual loss   4. Facial Palsy: 0=Normal symmetric movement   5a.  Motor left arm: 0=No drift, limb holds 90 (or 45) degrees for full 10 seconds   5b.  Motor right arm: 0=No drift, limb holds 90 (or 45) degrees for full 10 seconds   6a. motor left le=No drift, limb holds 90 (or 45) degrees for full 10 seconds   6b  Motor right le=No drift, limb holds 90 (or 45) degrees for full 10 seconds   7. Limb Ataxia: 0=Absent   8.  Sensory: 0=Normal; no sensory loss   9. Best Language:  0=No aphasia, normal   10. Dysarthria: 0=Normal   11. Extinction and Inattention: 0=No abnormality    Total:   0       Results Reviewed:  I have personally reviewed current lab, radiology, and data and agree with results.    Results for orders placed during the hospital encounter of 20    Transthoracic Echo Complete With Contrast if Necessary Per Protocol    Interpretation Summary  · Left ventricular wall thickness is consistent with mild concentric hypertrophy.  · Left ventricular systolic function is normal, EF 56-60%.  · Left ventricular diastolic dysfunction (grade I) consistent with impaired relaxation.            Assessment/Plan:      This 61-year-old with history of hypertension, known left ICA occlusion, takes aspirin 81 daily presented with left eye blurry vision.  Imaging included CT head, CTA head and neck, CT perfusion were unremarkable.  CTA neck revealed known left ICA occlusion proximally.  Extracranial intracranial mild to moderate atherosclerosis.     Patient is not a candidate for any acute stroke  intervention benefits.      Likely this could be a branch retinal occlusion.    PLAN   Recommend  [id number] aspirin.  325  Initiate ischemic stroke.onset with abdominal therapy,   MRI brain without   systolic goal blood pressure 120-1 80.    PT OT speech to work with the patient.    TTE A1c, lipid panel.    Stroke neurology will continue to follow the patient.        Arthur Masters MD  July 15, 2021  23:39 EDT    Verbal consent taken.  Patient agreeable to be seen via telemedicine.    This was an audio and video enabled telemedicine encounter.

## 2021-07-16 NOTE — ED NOTES
Patient and spouse discussed being admitted to hospital.   Has spoke with patient and discussed risk of leaving, patient verbalized understanding of these risk.  Patient states that he would rather go home and that they only live 10 minutes away and he would come back if anything changed.  Charge nurse and provider notified.      Chance Soler RN  07/15/21 2057

## 2021-07-21 ENCOUNTER — OFFICE VISIT (OUTPATIENT)
Dept: NEUROSURGERY | Facility: CLINIC | Age: 61
End: 2021-07-21

## 2021-07-21 VITALS
WEIGHT: 179 LBS | BODY MASS INDEX: 26.51 KG/M2 | SYSTOLIC BLOOD PRESSURE: 120 MMHG | HEIGHT: 69 IN | TEMPERATURE: 97.6 F | DIASTOLIC BLOOD PRESSURE: 62 MMHG

## 2021-07-21 DIAGNOSIS — R26.9 GAIT DIFFICULTY: ICD-10-CM

## 2021-07-21 DIAGNOSIS — M50.30 DDD (DEGENERATIVE DISC DISEASE), CERVICAL: ICD-10-CM

## 2021-07-21 DIAGNOSIS — M51.36 DDD (DEGENERATIVE DISC DISEASE), LUMBAR: ICD-10-CM

## 2021-07-21 DIAGNOSIS — H53.8 BLURRY VISION, LEFT EYE: Primary | ICD-10-CM

## 2021-07-21 DIAGNOSIS — I65.23 BILATERAL CAROTID ARTERY STENOSIS: ICD-10-CM

## 2021-07-21 PROCEDURE — 99214 OFFICE O/P EST MOD 30 MIN: CPT | Performed by: PHYSICIAN ASSISTANT

## 2021-07-21 RX ORDER — CLOPIDOGREL BISULFATE 75 MG/1
75 TABLET ORAL DAILY
Qty: 30 TABLET | Refills: 6 | Status: SHIPPED | OUTPATIENT
Start: 2021-07-21 | End: 2022-02-19 | Stop reason: HOSPADM

## 2021-07-21 NOTE — PROGRESS NOTES
NAME: JANNIE HARDING   DOS: 2021  : 1960  PCP: Pastora Person APRN    Chief Complaint:  Blurred Vision      History of Present Illness: Mr. Harding is a 61 y.o. male who is seen today in evaluation due to right carotid artery occlusion.  Patient initially presented to [facility] after experiencing blurry vision within his left eye that was present for about 24 hours at presentation.  Stroke neurology reviewed patient and thought it was potentially more binocular diplopia than an episode of amaurosis fugax. However, stroke neurology recommended loading of Plavix 300 and aspirin and admit for further evaluation.  However, patient stated that he was doing okay and he signed himself out AMA.    Today, he is seen today with ongoing vision difficulty and unable to turn right eye outward.  He also notes that he has continued to worsen with gait instability and radiculopathy into his bilateral upper and lower extremities.  Also states that he has significant neck and low back pain that has continued to progress.  He notes that the symptoms have continued to worsen over the last several months.  Patient does have noted vascular claudication, but he has been followed by a vascular doctor that is currently managing.      Past Medical History:   Diagnosis Date   • Alcohol abuse    • Arthritis    • Carotid stenosis    • ED (erectile dysfunction)    • History of degenerative disc disease    • Hypertension    • Neuropathy    • Peripheral vascular disease (CMS/[facility])     s/p arthrectomy follow by balloon angioplasty and stents of right and left SFA   • Tobacco abuse    • Vitamin D deficiency        Past Surgical History:   Procedure Laterality Date   • APPENDECTOMY     • ARTERIOGRAM N/A 2019    Procedure: Arteriogram;  Surgeon: Tong Azar MD;  Location: [facility] CATH INVASIVE LOCATION;  Service: Cardiology   • BACK SURGERY      DISC RUPTURE REPAIR, L5   • CARDIAC CATHETERIZATION Right 10/29/2019     Procedure: Peripheral angiography;  Surgeon: Tong Azar MD;  Location: [facility] INVASIVE LOCATION;  Service: Cardiovascular   • VASCULAR SURGERY Bilateral              Review of Systems   Constitutional: Positive for activity change, chills, fatigue and unexpected weight change.   HENT: Positive for voice change.    Eyes: Positive for pain.   Gastrointestinal: Positive for constipation.   Endocrine: Positive for heat intolerance.   Musculoskeletal: Positive for back pain and gait problem.   Neurological: Positive for dizziness, light-headedness and numbness.   Hematological: Bruises/bleeds easily.   Psychiatric/Behavioral: Positive for sleep disturbance.   All other systems reviewed and are negative.           Medications:    Current Outpatient Medications:   •  aspirin 81 MG chewable tablet, Chew 81 mg Daily., Disp: , Rfl:   •  atenolol (Tenormin) 25 MG tablet, Take 1 tablet by mouth Daily., Disp: 30 tablet, Rfl: 5  •  baclofen (LIORESAL) 20 MG tablet, , Disp: , Rfl:   •  cilostazol (PLETAL) 100 MG tablet, Take 1 tablet by mouth Daily., Disp: 60 tablet, Rfl: 5  •  clopidogrel (PLAVIX) 75 MG tablet, Take 1 tablet by mouth Daily., Disp: 30 tablet, Rfl: 6  •  folic acid-pyridoxine-cyanocobalamin (Folbic) 2.5-25-2 MG tablet tablet, Take 1 tablet by mouth Daily., Disp: 90 tablet, Rfl: 1  •  gabapentin (NEURONTIN) 800 MG tablet, Take 1 tablet by mouth 3 (Three) Times a Day., Disp: 135 tablet, Rfl: 2  •  isosorbide mononitrate (IMDUR) 30 MG 24 hr tablet, Take 1 tablet by mouth Daily., Disp: 30 tablet, Rfl: 5  •  Magnesium 100 MG tablet, Take  by mouth., Disp: , Rfl:   •  methocarbamol (ROBAXIN) 750 MG tablet, Take 1 tablet by mouth 4 (Four) Times a Day., Disp: 120 tablet, Rfl: 5  •  polyethylene glycol (MiraLax) 17 GM/SCOOP powder, Take 17 g by mouth Daily., Disp: 1020 g, Rfl: 5  •  Potassium 75 MG tablet, Take  by mouth., Disp: , Rfl:   •  traZODone (DESYREL) 50 MG tablet, Take 1 tablet by mouth Every Night.,  Disp: 90 tablet, Rfl: 1    Allergies:  Allergies   Allergen Reactions   • Penicillins Hives and Shortness Of Breath     As A child   • Novocain [Procaine] Nausea And Vomiting       Social History     Tobacco Use   • Smoking status: Current Every Day Smoker     Packs/day: 1.50     Years: 30.00     Pack years: 45.00     Types: Cigarettes   • Smokeless tobacco: Never Used   Substance Use Topics   • Alcohol use: Not Currently     Alcohol/week: 24.0 standard drinks     Types: 24 Cans of beer per week   • Drug use: No       Family History   Problem Relation Age of Onset   • Hypertension Mother    • Cancer Father         LUNG   • Diabetes Father    • Hypertension Father    • Heart attack Other         GRANDFATHER       Review of Imaging:  CT of the head and neck that was performed on 7/15/2021 was reviewed along with corresponding radiology report.  Study demonstrates complete occlusion of the left ICA, approximately 60% stenosis of the right carotid bulb and stenosis of the proximal right ICA.    MRA of the head and neck that was performed in 7/15/2021 was reviewed along with corresponding radiology report.  Study also confirms complete left occlusion of ICA and moderate stenosis of the proximal right ICA.    Vitals:    07/21/21 1158   BP: 120/62   Temp: 97.6 °F (36.4 °C)     Body mass index is 26.43 kg/m².    Physical Exam  Eyes:      Extraocular Movements: EOM normal.   Neurological:      Coordination: Romberg Test abnormal.      Gait: Tandem walk abnormal.       Neurologic Exam     Cranial Nerves     CN II   Visual acuity: (Lateral diplopia)    CN III, IV, VI   Extraocular motions are normal.   CN VI: right CN VI palsy    CN V   Facial sensation intact.     CN VII   Facial expression full, symmetric.     CN VIII   Hearing: intact    CN IX, X   Palate: symmetric    CN XI   Right trapezius strength: normal  Left trapezius strength: normal    CN XII   Tongue: not atrophic  Fasciculations: absent  Tongue deviation:  none    Gait, Coordination, and Reflexes     Gait  Gait: spastic    Coordination   Romberg: positive  Tandem walking coordination: abnormal    Reflexes   Right Dumont: absent  Left Dumont: absent  Right ankle clonus: absent  Left ankle clonus: absent      Diagnoses/Plan:    Mr. Harding is a 61 y.o. male who is seen today with several elements, including progressive gait instability and occlusion of the patient's right ICA.  After lengthy conversation with patient, I am going to obtain a MRI of the brain for further evaluation to help determine if patient had a TIA or stroke.  In addition, due to the patient's gait instability and progressive worsening, we will also evaluate for any other pathology that could be contributing in the brain.  I have initiated Plavix 75 mg daily due to the patient's vascular occlusion/stenosis.  Pending the results of the study, will help us determine the next appropriate steps.  We will likely continue to follow patient with a carotid duplex to ensure stability and no further progression of disease that might necessitate further treatment/intervention.  In addition to patient's neck and low back pain with bilateral radiculopathies and worsening gait instability, I am going to obtain an MRI of the cervical and lumbar for further evaluation of possible pathology.  Once patient obtains the studies I am going to review them and determine if patient can be seen in [place] or in the [place] offices.  In addition, due to the patient's blurry vision and cranial nerve VI palsy I did recommend patient to follow-up with ophthalmology.  Patient and wife know understanding of this plan and willing to proceed.    Patient's Body mass index is 26.43 kg/m². indicating that he is overweight (BMI 25-29.9). Obesity-related health conditions include the following: hypertension, peripheral vascular disease and osteoarthritis.      Gurwinder Harding  reports that he has been smoking cigarettes. He has a 45.00  pack-year smoking history. He has never used smokeless tobacco.. I have educated him on the risk of diseases from using tobacco products such as cancer, COPD and heart disease.     I advised him to quit and he is not willing to quit.          Alison Isaac PA-C

## 2021-07-22 ENCOUNTER — OFFICE VISIT (OUTPATIENT)
Dept: GASTROENTEROLOGY | Facility: CLINIC | Age: 61
End: 2021-07-22

## 2021-07-22 VITALS
SYSTOLIC BLOOD PRESSURE: 138 MMHG | HEIGHT: 69 IN | WEIGHT: 179 LBS | HEART RATE: 85 BPM | DIASTOLIC BLOOD PRESSURE: 81 MMHG | BODY MASS INDEX: 26.51 KG/M2

## 2021-07-22 DIAGNOSIS — K59.00 CONSTIPATION, UNSPECIFIED CONSTIPATION TYPE: Primary | ICD-10-CM

## 2021-07-22 PROCEDURE — 99213 OFFICE O/P EST LOW 20 MIN: CPT | Performed by: PHYSICIAN ASSISTANT

## 2021-07-22 RX ORDER — POLYETHYLENE GLYCOL 3350 17 G/17G
17 POWDER, FOR SOLUTION ORAL DAILY
Qty: 1020 G | Refills: 5 | Status: SHIPPED | OUTPATIENT
Start: 2021-07-22 | End: 2022-02-17

## 2021-07-22 RX ORDER — BACLOFEN 20 MG/1
TABLET ORAL
COMMUNITY
Start: 2021-05-07 | End: 2021-09-24 | Stop reason: ALTCHOICE

## 2021-07-22 NOTE — PROGRESS NOTES
Chief Complaint   Patient presents with   • Constipation       Gurwinder Harding is a 61 y.o. male who presents to the office today for follow up appointment for Constipation  .    HPI  The patient was seen for a follow up visit for constipation.  He was placed on Miralax 17 gm daily last visit.  Patient states that it has helped quite a bit.  He is now having daily bowel movements but is not evacuating well.  His abdominal pain has resolved with the improvement of constipation.          Review of Systems   Constitutional: Positive for unexpected weight change.   HENT: Negative for trouble swallowing.    Eyes: Negative.    Respiratory: Positive for shortness of breath.    Cardiovascular: Negative for chest pain.   Gastrointestinal: Positive for abdominal distention and constipation. Negative for abdominal pain, anal bleeding, blood in stool, diarrhea, nausea and vomiting.   Endocrine: Negative.    Genitourinary: Negative for difficulty urinating.   Musculoskeletal: Negative.    Skin: Negative.    Allergic/Immunologic: Negative for environmental allergies and food allergies.   Neurological: Positive for dizziness and light-headedness. Negative for headaches.   Hematological: Bruises/bleeds easily.       ACTIVE PROBLEMS:   Specialty Problems     None          PAST MEDICAL HISTORY:  Past Medical History:   Diagnosis Date   • Alcohol abuse    • Arthritis    • Carotid stenosis    • ED (erectile dysfunction)    • History of degenerative disc disease    • Hypertension    • Neuropathy    • Peripheral vascular disease (CMS/HCC)     s/p arthrectomy follow by balloon angioplasty and stents of right and left SFA   • Tobacco abuse    • Vitamin D deficiency        SURGICAL HISTORY:  Past Surgical History:   Procedure Laterality Date   • APPENDECTOMY     • ARTERIOGRAM N/A 9/19/2019    Procedure: Arteriogram;  Surgeon: Tong Azar MD;  Location: [facility] INVASIVE LOCATION;  Service: Cardiology   • BACK SURGERY      DISC RUPTURE  REPAIR, L5   • CARDIAC CATHETERIZATION Right 10/29/2019    Procedure: Peripheral angiography;  Surgeon: Tong Azar MD;  Location: [facility] CATH INVASIVE LOCATION;  Service: Cardiovascular   • VASCULAR SURGERY Bilateral        FAMILY HISTORY:  Family History   Problem Relation Age of Onset   • Hypertension Mother    • Cancer Father         LUNG   • Diabetes Father    • Hypertension Father    • Heart attack Other         GRANDFATHER       SOCIAL HISTORY:  Social History     Tobacco Use   • Smoking status: Current Every Day Smoker     Packs/day: 1.50     Years: 30.00     Pack years: 45.00     Types: Cigarettes   • Smokeless tobacco: Never Used   Substance Use Topics   • Alcohol use: Not Currently     Alcohol/week: 24.0 standard drinks     Types: 24 Cans of beer per week       CURRENT MEDICATION:    Current Outpatient Medications:   •  aspirin 81 MG chewable tablet, Chew 81 mg Daily., Disp: , Rfl:   •  atenolol (Tenormin) 25 MG tablet, Take 1 tablet by mouth Daily., Disp: 30 tablet, Rfl: 5  •  cilostazol (PLETAL) 100 MG tablet, Take 1 tablet by mouth Daily., Disp: 60 tablet, Rfl: 5  •  clopidogrel (PLAVIX) 75 MG tablet, Take 1 tablet by mouth Daily., Disp: 30 tablet, Rfl: 6  •  folic acid-pyridoxine-cyanocobalamin (Folbic) 2.5-25-2 MG tablet tablet, Take 1 tablet by mouth Daily., Disp: 90 tablet, Rfl: 1  •  gabapentin (NEURONTIN) 800 MG tablet, Take 1 tablet by mouth 3 (Three) Times a Day., Disp: 135 tablet, Rfl: 2  •  isosorbide mononitrate (IMDUR) 30 MG 24 hr tablet, Take 1 tablet by mouth Daily., Disp: 30 tablet, Rfl: 5  •  Magnesium 100 MG tablet, Take  by mouth., Disp: , Rfl:   •  methocarbamol (ROBAXIN) 750 MG tablet, Take 1 tablet by mouth 4 (Four) Times a Day., Disp: 120 tablet, Rfl: 5  •  Potassium 75 MG tablet, Take  by mouth., Disp: , Rfl:   •  traZODone (DESYREL) 50 MG tablet, Take 1 tablet by mouth Every Night., Disp: 90 tablet, Rfl: 1  •  baclofen (LIORESAL) 20 MG tablet, , Disp: , Rfl:   •   "polyethylene glycol (MiraLax) 17 GM/SCOOP powder, Take 17 g by mouth Daily., Disp: 1020 g, Rfl: 5    ALLERGIES:  Penicillins and Novocain [procaine]    VISIT VITALS:  Blood Pressure 138/81 (BP Location: Left arm, Patient Position: Sitting, Cuff Size: Adult)   Pulse 85   Height 175.3 cm (69.02\")   Weight 81.2 kg (179 lb)   Body Mass Index 26.42 kg/m²     Physical Exam  Constitutional:       General: He is not in acute distress.     Appearance: He is well-developed. He is not diaphoretic.   HENT:      Head: Normocephalic and atraumatic.      Right Ear: External ear normal.      Left Ear: External ear normal.      Nose: Nose normal.      Mouth/Throat:      Pharynx: No oropharyngeal exudate.   Eyes:      General: No scleral icterus.        Right eye: No discharge.         Left eye: No discharge.      Conjunctiva/sclera: Conjunctivae normal.      Pupils: Pupils are equal, round, and reactive to light.   Neck:      Thyroid: No thyromegaly.      Vascular: No JVD.      Trachea: No tracheal deviation.   Cardiovascular:      Rate and Rhythm: Normal rate and regular rhythm.      Heart sounds: Normal heart sounds. No murmur heard.   No friction rub. No gallop.    Pulmonary:      Effort: Pulmonary effort is normal. No respiratory distress.      Breath sounds: Normal breath sounds. No stridor. No wheezing or rales.   Chest:      Chest wall: No tenderness.   Abdominal:      General: Bowel sounds are normal. There is no distension.      Palpations: Abdomen is soft. There is no mass.      Tenderness: There is no abdominal tenderness. There is no guarding or rebound.      Hernia: No hernia is present.   Genitourinary:     Rectum: Guaiac result negative.   Musculoskeletal:      Cervical back: Normal range of motion and neck supple.   Lymphadenopathy:      Cervical: No cervical adenopathy.   Skin:     General: Skin is warm and dry.      Coloration: Skin is not pale.      Findings: No erythema or rash.      Comments: Very sensitive " skin; status dermatitis   Neurological:      Mental Status: He is alert and oriented to person, place, and time.      Cranial Nerves: No cranial nerve deficit.      Motor: No abnormal muscle tone.      Coordination: Coordination normal.      Deep Tendon Reflexes: Reflexes are normal and symmetric. Reflexes normal.   Psychiatric:         Behavior: Behavior normal.         Thought Content: Thought content normal.         Judgment: Judgment normal.         Assessment/Plan      Diagnosis Plan   1. Constipation, unspecified constipation type       The patient will have an increase in his frequency of Miralax 17 gm to BID to further alleviate constipation.    Return in about 3 months (around 10/22/2021) for Recheck.         Patient's Body mass index is 26.42 kg/m². indicating that he is overweight (BMI 25-29.9). Obesity-related health conditions include the following: peripheral vascular disease. Obesity is unchanged. BMI is is above average; BMI management plan is completed. We discussed portion control and increasing exercise..      GABI Gomez

## 2021-07-26 ENCOUNTER — TELEPHONE (OUTPATIENT)
Dept: NEUROSURGERY | Facility: CLINIC | Age: 61
End: 2021-07-26

## 2021-07-26 NOTE — TELEPHONE ENCOUNTER
PATIENT'S MRI WAS DENIED.  A REQUEST FOR PEER TO PEER IS SCANNED INTO THE MEDIA.  PLEASE PROVIDE ATTENTION TO THE PEER TO PEER REQUEST.    [phone] - KELLY PATE [employer]

## 2021-07-29 ENCOUNTER — HOSPITAL ENCOUNTER (OUTPATIENT)
Dept: MRI IMAGING | Facility: HOSPITAL | Age: 61
Discharge: HOME | End: 2021-07-29
Admitting: PHYSICIAN ASSISTANT

## 2021-07-29 ENCOUNTER — APPOINTMENT (OUTPATIENT)
Dept: MRI IMAGING | Facility: HOSPITAL | Age: 61
End: 2021-07-29

## 2021-07-29 DIAGNOSIS — H53.8 BLURRY VISION, LEFT EYE: ICD-10-CM

## 2021-07-29 DIAGNOSIS — I65.23 BILATERAL CAROTID ARTERY STENOSIS: ICD-10-CM

## 2021-07-29 PROCEDURE — 70551 MRI BRAIN STEM W/O DYE: CPT | Performed by: RADIOLOGY

## 2021-07-29 PROCEDURE — 70551 MRI BRAIN STEM W/O DYE: CPT

## 2021-08-09 ENCOUNTER — HOSPITAL ENCOUNTER (OUTPATIENT)
Dept: MRI IMAGING | Facility: HOSPITAL | Age: 61
Discharge: HOME | End: 2021-08-09

## 2021-08-09 DIAGNOSIS — M51.36 DDD (DEGENERATIVE DISC DISEASE), LUMBAR: ICD-10-CM

## 2021-08-09 DIAGNOSIS — R26.9 GAIT DIFFICULTY: ICD-10-CM

## 2021-08-09 DIAGNOSIS — M50.30 DDD (DEGENERATIVE DISC DISEASE), CERVICAL: ICD-10-CM

## 2021-08-09 LAB — CREAT BLDA-MCNC: 1.5 MG/DL (ref 0.6–1.3)

## 2021-08-09 PROCEDURE — A9577 INJ MULTIHANCE: HCPCS

## 2021-08-09 PROCEDURE — 72141 MRI NECK SPINE W/O DYE: CPT

## 2021-08-09 PROCEDURE — 72141 MRI NECK SPINE W/O DYE: CPT | Performed by: RADIOLOGY

## 2021-08-09 PROCEDURE — 0 GADOBENATE DIMEGLUMINE 529 MG/ML SOLUTION

## 2021-08-09 PROCEDURE — 72148 MRI LUMBAR SPINE W/O DYE: CPT

## 2021-08-09 PROCEDURE — 82565 ASSAY OF CREATININE: CPT

## 2021-08-09 PROCEDURE — 72148 MRI LUMBAR SPINE W/O DYE: CPT | Performed by: RADIOLOGY

## 2021-08-13 DIAGNOSIS — G62.9 NEUROPATHY: ICD-10-CM

## 2021-08-13 NOTE — TELEPHONE ENCOUNTER
Caller: Patsy Harding    Relationship: Emergency Contact    Best call back number: [phone]    Medication needed:   Requested Prescriptions     Pending Prescriptions Disp Refills   • gabapentin (NEURONTIN) 800 MG tablet 135 tablet 2     Sig: Take 1 tablet by mouth 3 (Three) Times a Day.       When do you need the refill by: ASAP    What additional details did the patient provide when requesting the medication: PATIENT HAS BEEN TAKING 1.5 TABLETS 3X PER DAY FOR YEARS. THE NEW PRESCRIPTION SAYS AT 1 TABLET 3X PER DAY    PATIENT STATED IT IS NOT WORKING. HE NEEDS HIS ORIGINAL PRESCRIPTION SENT TO HIS PHARMACY    TAKE 1.5 TABLETS 3X PER DAY    Does the patient have less than a 3 day supply:  [x] Yes  [] No    What is the patient's preferred pharmacy: [facility] [address] AT [address] [phone] [facility] [phone] FX

## 2021-08-16 RX ORDER — GABAPENTIN 800 MG/1
800 TABLET ORAL 3 TIMES DAILY
Qty: 135 TABLET | Refills: 2 | OUTPATIENT
Start: 2021-08-16

## 2021-08-24 ENCOUNTER — TELEPHONE (OUTPATIENT)
Dept: FAMILY MEDICINE CLINIC | Facility: CLINIC | Age: 61
End: 2021-08-24

## 2021-08-24 DIAGNOSIS — G62.9 NEUROPATHY: ICD-10-CM

## 2021-08-24 NOTE — TELEPHONE ENCOUNTER
Wife called regarding Gabapentin dosage,reports he always took 1200mg tid,on 6/29/2021 looks as though was changed to 800 mg tid,she reports they were unaware of this change,he has been feeling bad lately so noticed this change,he went back to taking 11/2 tid & felt better but will run out before he can refill,is requesting dosage be changed back & refilled.

## 2021-08-24 NOTE — TELEPHONE ENCOUNTER
Michael isnt loading  RX written for [address] Mary[address]armen with 2 RF    Michael on your desk,yes she reported they had only refilled x 1 but will not be able to refill due to the way he has been taking will show as too early.

## 2021-08-24 NOTE — TELEPHONE ENCOUNTER
Call pharmacy back directions are 1.5 tid ok to fill     Pharmacy notified.    Left a message to return call.    Left a second message to return call.    Left a third message to return call.

## 2021-08-25 ENCOUNTER — TELEPHONE (OUTPATIENT)
Dept: FAMILY MEDICINE CLINIC | Facility: CLINIC | Age: 61
End: 2021-08-25

## 2021-09-16 ENCOUNTER — TELEPHONE (OUTPATIENT)
Dept: FAMILY MEDICINE CLINIC | Facility: CLINIC | Age: 61
End: 2021-09-16

## 2021-09-16 DIAGNOSIS — F33.1 MAJOR DEPRESSIVE DISORDER, RECURRENT, MODERATE (HCC): ICD-10-CM

## 2021-09-16 RX ORDER — TRAZODONE HYDROCHLORIDE 50 MG/1
50 TABLET ORAL NIGHTLY
Qty: 90 TABLET | Refills: 1 | Status: SHIPPED | OUTPATIENT
Start: 2021-09-16 | End: 2022-02-16 | Stop reason: SDUPTHER

## 2021-09-16 RX ORDER — CILOSTAZOL 100 MG/1
100 TABLET ORAL DAILY
Qty: 60 TABLET | Refills: 5 | Status: SHIPPED | OUTPATIENT
Start: 2021-09-16 | End: 2022-02-16 | Stop reason: SDUPTHER

## 2021-09-16 NOTE — TELEPHONE ENCOUNTER
Caller: Patsy Harding    Relationship: Emergency Contact    Best call back number: [phone]    Medication needed:   Requested Prescriptions     Pending Prescriptions Disp Refills   • traZODone (DESYREL) 50 MG tablet 90 tablet 1     Sig: Take 1 tablet by mouth Every Night.   • cilostazol (PLETAL) 100 MG tablet 60 tablet 5     Sig: Take 1 tablet by mouth Daily.       When do you need the refill by:09/16/21    What additional details did the patient provide when requesting the medication: PATIENT IS COMPLETELY OUT OF BOTH PRESCRIPTIONS    Does the patient have less than a 3 day supply:  [x] Yes  [] No    What is the patient's [facility] [facility] [address], [place] [id number] [facility] AT [address] [place] [address] [phone] [facility] [phone] FX

## 2021-09-17 ENCOUNTER — TELEPHONE (OUTPATIENT)
Dept: NEUROSURGERY | Facility: CLINIC | Age: 61
End: 2021-09-17

## 2021-09-17 NOTE — TELEPHONE ENCOUNTER
Caller: MeaghanRichieGurwinder IRA    Relationship: Self    Best call back number: [phone]    Caller requesting test results: OF MRI CSPINE AND LSPINE, BRAIN    What test was performed: MRI C SPINE, L SPINE, BRIN    When was the test performed: 8/9/2021    Where was the test performed: [facility]    Additional notes: PT WANT TO K NOW WHAT THE NEXT STEP IS.  HAD MRI 8/9/2021 AND WAS SUPPOSED TO GET A CALLED BACK AND HAS NOT HEARD ANYTHING.  HAS NOT BE ABLE TO CALLED BACK UNTIL NOW. SCHEDULED APPT?, PHONE CALL?      PT HAS NOT GOTTEN ANY BETTER, BUT HAS HAD SOME GOOD DAYS.  IN LAST WEEK HAS GOTTEN WEAKER AND MORE LEG TREMORS    PLEASE REVIEW AND CONTACT PT.    THANK YOU

## 2021-09-17 NOTE — TELEPHONE ENCOUNTER
"Provider: Marlin   Caller: Patient  Time of call:   4:17  Phone #:  [phone]  Surgery: NA   Surgery Date: NA   Last visit:   07/21/2021  Next visit: NA    Reason for call:         Patient is inquiring about the next steps after completing his MRI.       MRI Cervical Spine Without Contrast (08/09/2021 09:51)    MRI Lumbar Spine Without Contrast (08/09/2021 10:14)    Office Visit with Alison Isaac PA-C (07/21/2021)    \"Once patient obtains the studies I am going to review them and determine if patient can be seen in [place] or in the [place] offices.\"  "

## 2021-09-22 ENCOUNTER — OFFICE VISIT (OUTPATIENT)
Dept: NEUROSURGERY | Facility: CLINIC | Age: 61
End: 2021-09-22

## 2021-09-22 VITALS — BODY MASS INDEX: 26.51 KG/M2 | WEIGHT: 179 LBS | HEIGHT: 69 IN | TEMPERATURE: 97.6 F

## 2021-09-22 DIAGNOSIS — M79.604 BILATERAL LEG PAIN: ICD-10-CM

## 2021-09-22 DIAGNOSIS — M51.36 DDD (DEGENERATIVE DISC DISEASE), LUMBAR: Primary | ICD-10-CM

## 2021-09-22 DIAGNOSIS — R26.9 GAIT DIFFICULTY: ICD-10-CM

## 2021-09-22 DIAGNOSIS — M79.602 BILATERAL ARM PAIN: ICD-10-CM

## 2021-09-22 DIAGNOSIS — M79.605 BILATERAL LEG PAIN: ICD-10-CM

## 2021-09-22 DIAGNOSIS — M43.10 ANTEROLISTHESIS: ICD-10-CM

## 2021-09-22 DIAGNOSIS — M79.601 BILATERAL ARM PAIN: ICD-10-CM

## 2021-09-22 PROCEDURE — 99214 OFFICE O/P EST MOD 30 MIN: CPT | Performed by: PHYSICIAN ASSISTANT

## 2021-09-22 RX ORDER — LATANOPROST 50 UG/ML
SOLUTION/ DROPS OPHTHALMIC AS NEEDED
COMMUNITY
Start: 2021-09-21 | End: 2022-02-17

## 2021-09-22 NOTE — PROGRESS NOTES
"NAME: JANINE HARDING   DOS: 2021  : 1960  PCP: Pastora Person APRN  Type of Service: Appointment via telemedicine  You have chosen to receive care through a telehealth visit.  Do you consent to use a video/audio connection for your medical care today? Yes   Mode of Transmission: Telemedicine via 2 way interactive audio telecommunication    Chief Complaint:  Back Pain      History of Present Illness: Mr. Harding is a 61 y.o. male who was initially seen due to a right carotid occlusion.  He presented to [facility] after experiencing blurry vision within his left eye that was present for approximately 24 hours.  Stroke neurology thought it was potentially more binocular diplopia rather than an episode of amaurosis fugax.  However, recommended loading of Plavix and aspirin and admit for further evaluation.  Patient signed out AMA due to him feeling \"better\".    Today, patient continues to have ongoing vision difficulty and right eye turned outward.  However, he does note that it is improving.  He has been seen by his eye doctor that is continuing to monitor.  He denies any new or recurrent TIA or strokelike episodes.    However, he does continue to note weakness in his bilateral legs.  He states difficulty walking.  He notes that his whole feet have numbness.  He also notes his bilateral hands (L>R) has significant \"shocking\" pain.  He notes when he uses his walker he has pain from his palms to his elbow.    He is seen today for in follow-up.      Past Medical History:   Diagnosis Date   • Alcohol abuse    • Arthritis    • Carotid stenosis    • ED (erectile dysfunction)    • History of degenerative disc disease    • Hypertension    • Neuropathy    • Peripheral vascular disease (CMS/[facility])     s/p arthrectomy follow by balloon angioplasty and stents of right and left SFA   • Tobacco abuse    • Vitamin D deficiency        Past Surgical History:   Procedure Laterality Date   • APPENDECTOMY     • " ARTERIOGRAM N/A 9/19/2019    Procedure: Arteriogram;  Surgeon: Tong Azar MD;  Location:  [facility] CATH INVASIVE LOCATION;  Service: Cardiology   • BACK SURGERY      DISC RUPTURE REPAIR, L5   • CARDIAC CATHETERIZATION Right 10/29/2019    Procedure: Peripheral angiography;  Surgeon: Tong Azar MD;  Location:  [facility] CATH INVASIVE LOCATION;  Service: Cardiovascular   • VASCULAR SURGERY Bilateral              Review of Systems   Eyes: Positive for visual disturbance.   Cardiovascular: Positive for leg swelling.   Musculoskeletal: Positive for back pain.   Neurological: Positive for weakness and numbness.   All other systems reviewed and are negative.       Medications:    Current Outpatient Medications:   •  aspirin 81 MG chewable tablet, Chew 81 mg Daily., Disp: , Rfl:   •  atenolol (Tenormin) 25 MG tablet, Take 1 tablet by mouth Daily., Disp: 30 tablet, Rfl: 5  •  baclofen (LIORESAL) 20 MG tablet, , Disp: , Rfl:   •  cilostazol (PLETAL) 100 MG tablet, Take 1 tablet by mouth Daily., Disp: 60 tablet, Rfl: 5  •  clopidogrel (PLAVIX) 75 MG tablet, Take 1 tablet by mouth Daily., Disp: 30 tablet, Rfl: 6  •  folic acid-pyridoxine-cyanocobalamin (Folbic) 2.5-25-2 MG tablet tablet, Take 1 tablet by mouth Daily., Disp: 90 tablet, Rfl: 1  •  gabapentin (NEURONTIN) 800 MG tablet, Take 1 tablet by mouth 3 (Three) Times a Day., Disp: 135 tablet, Rfl: 2  •  isosorbide mononitrate (IMDUR) 30 MG 24 hr tablet, Take 1 tablet by mouth Daily., Disp: 30 tablet, Rfl: 5  •  Magnesium 100 MG tablet, Take  by mouth., Disp: , Rfl:   •  methocarbamol (ROBAXIN) 750 MG tablet, Take 1 tablet by mouth 4 (Four) Times a Day., Disp: 120 tablet, Rfl: 5  •  polyethylene glycol (MiraLax) 17 GM/SCOOP powder, Take 17 g by mouth Daily., Disp: 1020 g, Rfl: 5  •  Potassium 75 MG tablet, Take  by mouth., Disp: , Rfl:   •  traZODone (DESYREL) 50 MG tablet, Take 1 tablet by mouth Every Night., Disp: 90 tablet, Rfl: 1    Allergies:  Allergies   Allergen  Reactions   • Penicillins Hives and Shortness Of Breath     As A child   • Novocain [Procaine] Nausea And Vomiting       Social History     Tobacco Use   • Smoking status: Current Every Day Smoker     Packs/day: 1.50     Years: 30.00     Pack years: 45.00     Types: Cigarettes   • Smokeless tobacco: Never Used   Substance Use Topics   • Alcohol use: Not Currently     Alcohol/week: 24.0 standard drinks     Types: 24 Cans of beer per week   • Drug use: No       Family History   Problem Relation Age of Onset   • Hypertension Mother    • Cancer Father         LUNG   • Diabetes Father    • Hypertension Father    • Heart attack Other         GRANDFATHER       Review of Imaging:  MRI of the lumbar spine that was performed on 8/9/2021 was reviewed.  Study demonstrates anterior spondylolisthesis of L4 on L5.  There is also evidence of moderate to severe central canal stenosis with bilateral neural foraminal narrowing at L4-5.  At L3-4 there is moderate canal stenosis, with right foraminal narrowing.  L5-S1 also notes right foraminal narrowing.    MRI of the cervical spine was reviewed along with the corresponding radiology report.  Did demonstrate degenerative disc disease throughout with bulging causing mild central canal stenosis.      Vitals:    09/22/21 1325   Temp: 97.6 °F (36.4 °C)     Body mass index is 26.42 kg/m².    Physical Exam  Neurologic Exam  Visit was done via telephone due to Covid 19 and difficulty with transportation.     Diagnoses/Plan:    Mr. Harding is a 61 y.o. male who is seen today in follow-up with several aliments, including occlusion of the patient's right ICA and progressive gait instability.  After reviewing the studies, we will have patient obtain a lumbar flexion-extension and EMG nerve conduction test for further evaluation.  After the studies are completed, we will review with a physician to determine if patient needs to been seen in [place] or [place] due to his difficulty with  transportation.  Patient also needs to remain on Plavix and aspirin regiment due to his vascular occlusion/stenosis.  We will obtain a carotid duplex in approximately 9 months time to ensure stability and no further progression of disease that might necessitate further treatment/intervention.  Patient was encouraged to continue to follow-up with ophthalmology due to his cranial nerve VI palsy.  Signs and symptoms reviewed with patient that would warrant a sooner call to our clinic and/are 9 1      Patient's Body mass index is 26.42 kg/m². indicating that he is overweight (BMI 25-29.9). Obesity-related health conditions include the following: hypertension, peripheral vascular disease and osteoarthritis.      Gurwinder Harding  reports that he has been smoking cigarettes. He has a 45.00 pack-year smoking history. He has never used smokeless tobacco.. I have educated him on the risk of diseases from using tobacco products such as cancer, COPD and heart disease.     I advised him to quit and he is not willing to quit.        Barbara Cruz MA    [employer] total time 20 minutes

## 2021-10-05 RX ORDER — ISOSORBIDE MONONITRATE 30 MG/1
30 TABLET, EXTENDED RELEASE ORAL DAILY
Qty: 30 TABLET | Refills: 0 | Status: SHIPPED | OUTPATIENT
Start: 2021-10-05 | End: 2021-11-09 | Stop reason: SDUPTHER

## 2021-10-05 NOTE — TELEPHONE ENCOUNTER
Caller: Patsy Harding    Relationship: Emergency Contact      Medication requested (name and dosage): isosorbide mononitrate (IMDUR) 30 MG 24 hr tablet    [facility] [address] [place] [address], [place] [facility] [address] - [phone]  - [phone] FX  [phone]    Additional details provided by patient: 3 DAYS LEFT     Best call back number: [phone] PATIENT PHONE NUMBER     Does the patient have less than a 3 day supply:  [x] Yes  [] No    Uli Hoyt Rep   10/05/21 15:01 EDT

## 2021-11-02 ENCOUNTER — HOSPITAL ENCOUNTER (OUTPATIENT)
Dept: GENERAL RADIOLOGY | Facility: HOSPITAL | Age: 61
Discharge: HOME | End: 2021-11-02
Admitting: PHYSICIAN ASSISTANT

## 2021-11-02 DIAGNOSIS — M43.10 ANTEROLISTHESIS: ICD-10-CM

## 2021-11-02 DIAGNOSIS — R26.9 GAIT DIFFICULTY: ICD-10-CM

## 2021-11-02 DIAGNOSIS — M51.36 DDD (DEGENERATIVE DISC DISEASE), LUMBAR: ICD-10-CM

## 2021-11-02 PROCEDURE — 72120 X-RAY BEND ONLY L-S SPINE: CPT

## 2021-11-02 PROCEDURE — 72120 X-RAY BEND ONLY L-S SPINE: CPT | Performed by: RADIOLOGY

## 2021-11-04 ENCOUNTER — OFFICE VISIT (OUTPATIENT)
Dept: NEUROSURGERY | Facility: CLINIC | Age: 61
End: 2021-11-04

## 2021-11-04 DIAGNOSIS — G62.9 NEUROPATHY: ICD-10-CM

## 2021-11-04 DIAGNOSIS — M51.36 DDD (DEGENERATIVE DISC DISEASE), LUMBAR: Primary | ICD-10-CM

## 2021-11-04 DIAGNOSIS — R26.9 GAIT DIFFICULTY: ICD-10-CM

## 2021-11-04 DIAGNOSIS — I73.9 PVD (PERIPHERAL VASCULAR DISEASE) WITH CLAUDICATION (HCC): ICD-10-CM

## 2021-11-04 PROCEDURE — 99214 OFFICE O/P EST MOD 30 MIN: CPT | Performed by: PHYSICIAN ASSISTANT

## 2021-11-09 ENCOUNTER — TELEPHONE (OUTPATIENT)
Dept: FAMILY MEDICINE CLINIC | Facility: CLINIC | Age: 61
End: 2021-11-09

## 2021-11-09 ENCOUNTER — OFFICE VISIT (OUTPATIENT)
Dept: FAMILY MEDICINE CLINIC | Facility: CLINIC | Age: 61
End: 2021-11-09

## 2021-11-09 VITALS
SYSTOLIC BLOOD PRESSURE: 110 MMHG | HEIGHT: 69 IN | DIASTOLIC BLOOD PRESSURE: 68 MMHG | BODY MASS INDEX: 26.45 KG/M2 | OXYGEN SATURATION: 93 % | HEART RATE: 78 BPM | WEIGHT: 178.6 LBS | TEMPERATURE: 98.4 F

## 2021-11-09 DIAGNOSIS — I10 ESSENTIAL HYPERTENSION: Primary | ICD-10-CM

## 2021-11-09 DIAGNOSIS — R09.02 HYPOXIA: ICD-10-CM

## 2021-11-09 DIAGNOSIS — G62.9 NEUROPATHY: ICD-10-CM

## 2021-11-09 DIAGNOSIS — I73.9 PAD (PERIPHERAL ARTERY DISEASE) (HCC): ICD-10-CM

## 2021-11-09 DIAGNOSIS — Z72.0 TOBACCO ABUSE: ICD-10-CM

## 2021-11-09 PROCEDURE — 90471 IMMUNIZATION ADMIN: CPT | Performed by: NURSE PRACTITIONER

## 2021-11-09 PROCEDURE — 99214 OFFICE O/P EST MOD 30 MIN: CPT | Performed by: NURSE PRACTITIONER

## 2021-11-09 PROCEDURE — 90686 IIV4 VACC NO PRSV 0.5 ML IM: CPT | Performed by: NURSE PRACTITIONER

## 2021-11-09 RX ORDER — METHOCARBAMOL 750 MG/1
750 TABLET, FILM COATED ORAL 4 TIMES DAILY
Qty: 120 TABLET | Refills: 5 | Status: SHIPPED | OUTPATIENT
Start: 2021-11-09 | End: 2022-06-06

## 2021-11-09 RX ORDER — FOLIC ACID-PYRIDOXINE-CYANOCOBALAMIN TAB 2.5-25-2 MG 2.5-25-2 MG
1 TAB ORAL DAILY
Qty: 90 TABLET | Refills: 1 | Status: SHIPPED | OUTPATIENT
Start: 2021-11-09 | End: 2022-02-16 | Stop reason: SDUPTHER

## 2021-11-09 RX ORDER — ISOSORBIDE MONONITRATE 30 MG/1
30 TABLET, EXTENDED RELEASE ORAL DAILY
Qty: 90 TABLET | Refills: 1 | Status: SHIPPED | OUTPATIENT
Start: 2021-11-09 | End: 2022-02-16 | Stop reason: SDUPTHER

## 2021-11-09 RX ORDER — ATENOLOL 25 MG/1
25 TABLET ORAL DAILY
Qty: 90 TABLET | Refills: 1 | Status: SHIPPED | OUTPATIENT
Start: 2021-11-09 | End: 2022-02-16 | Stop reason: SDUPTHER

## 2021-11-09 NOTE — PROGRESS NOTES
"NAME: JANINE HARDING   DOS: 11/10/2021  : 1960  PCP: Pastora Person APRN  Type of Service: Appointment via telemedicine  You have chosen to receive care through a telehealth visit.  Do you consent to use a video/audio connection for your medical care today? Yes   Mode of Transmission: Telemedicine via 2 way interactive audio telecommunication    Chief Complaint: Back pain, gait instability,carotid stenosis     History of Present Illness: Mr. Harding is a 61 y.o. male who was initially seen due to a right carotid occlusion.  He presented to [facility] after experiencing blurry vision within his left eye that was present for approximately 24 hours.  Stroke neurology thought it was potentially more binocular diplopia rather than an episode of amaurosis fugax.  However, recommended loading of Plavix and aspirin and admit for further evaluation.  Patient signed out AMA due to him feeling \"better\".  He continues to have ongoing vision difficulty and right eye turned outward.  However, he does note that it is improving.  He has been seen by his eye doctor that is continuing to monitor.  He denies any new or recurrent TIA or strokelike episodes.     At time of initial evaluation, he continued to have weakness in his bilateral legs.  He states difficulty walking.  He notes that his whole feet have numbness.  He also notes his bilateral hands (L>R) has significant \"shocking\" pain.  He notes when he uses his walker he has pain from his palms to his elbow.  Therefore, he is seen today in with lumbar flexion-extension x-rays and EMG and nerve conduction test.       Past Medical History:   Diagnosis Date   • Alcohol abuse    • Arthritis    • Carotid stenosis    • ED (erectile dysfunction)    • History of degenerative disc disease    • Hypertension    • Neuropathy    • Peripheral vascular disease (HCC)     s/p arthrectomy follow by balloon angioplasty and stents of right and left SFA   • Tobacco abuse    • Vitamin D " deficiency        Past Surgical History:   Procedure Laterality Date   • APPENDECTOMY     • ARTERIOGRAM N/A 9/19/2019    Procedure: Arteriogram;  Surgeon: Tong Azar MD;  Location:  [facility] CATH INVASIVE LOCATION;  Service: Cardiology   • BACK SURGERY      DISC RUPTURE REPAIR, L5   • CARDIAC CATHETERIZATION Right 10/29/2019    Procedure: Peripheral angiography;  Surgeon: Tong Azar MD;  Location:  [facility] CATH INVASIVE LOCATION;  Service: Cardiovascular   • VASCULAR SURGERY Bilateral              Review of Systems   Eyes: Positive for visual disturbance.   Cardiovascular: Positive for leg swelling.   Musculoskeletal: Positive for back pain and gait problem.   Neurological: Positive for weakness and numbness.   All other systems reviewed and are negative.       Medications:    Current Outpatient Medications:   •  Aspirin 81 MG capsule, Take 81 mg by mouth Daily., Disp: , Rfl:   •  atenolol (Tenormin) 25 MG tablet, Take 1 tablet by mouth Daily., Disp: 90 tablet, Rfl: 1  •  cilostazol (PLETAL) 100 MG tablet, Take 1 tablet by mouth Daily., Disp: 60 tablet, Rfl: 5  •  clopidogrel (PLAVIX) 75 MG tablet, Take 1 tablet by mouth Daily., Disp: 30 tablet, Rfl: 6  •  folic acid-pyridoxine-cyanocobalamin (Folbic) 2.5-25-2 MG tablet tablet, Take 1 tablet by mouth Daily., Disp: 90 tablet, Rfl: 1  •  gabapentin (NEURONTIN) 800 MG tablet, Take 1 tablet by mouth 3 (Three) Times a Day. (Patient taking differently: Take 1,200 mg by mouth 3 (Three) Times a Day.), Disp: 135 tablet, Rfl: 2  •  isosorbide mononitrate (IMDUR) 30 MG 24 hr tablet, Take 1 tablet by mouth Daily., Disp: 90 tablet, Rfl: 1  •  latanoprost (XALATAN) 0.005 % ophthalmic solution, , Disp: , Rfl:   •  Magnesium 100 MG tablet, Take  by mouth., Disp: , Rfl:   •  methocarbamol (ROBAXIN) 750 MG tablet, Take 1 tablet by mouth 4 (Four) Times a Day., Disp: 120 tablet, Rfl: 5  •  polyethylene glycol (MiraLax) 17 GM/SCOOP powder, Take 17 g by mouth Daily., Disp: 1020 g,  Rfl: 5  •  Potassium 75 MG tablet, Take  by mouth., Disp: , Rfl:   •  traZODone (DESYREL) 50 MG tablet, Take 1 tablet by mouth Every Night., Disp: 90 tablet, Rfl: 1    Allergies:  Allergies   Allergen Reactions   • Penicillins Hives and Shortness Of Breath     As A child   • Novocain [Procaine] Nausea And Vomiting       Social History     Tobacco Use   • Smoking status: Current Every Day Smoker     Packs/day: 1.50     Years: 30.00     Pack years: 45.00     Types: Cigarettes   • Smokeless tobacco: Never Used   Substance Use Topics   • Alcohol use: Not Currently     Alcohol/week: 24.0 standard drinks     Types: 24 Cans of beer per week   • Drug use: No       Family History   Problem Relation Age of Onset   • Hypertension Mother    • Cancer Father         LUNG   • Diabetes Father    • Hypertension Father    • Heart attack Other         GRANDFATHER       Review of Imaging:  EMG/nerve conduction test was reviewed along with the corresponding radiology report.  Study demonstrates bilateral sensory/motor carpal tunnel syndrome with evidence of axonal injury and demyelination.  In addition, his lower extremity demonstrates peripheral neuropathy.                                                                                                                                                                                                                                                                     Lumbar flexion-extension x-ray that was performed on 11/2/2021 was reviewed along with the corresponding radiology report.  Study demonstrates degenerative changes with an anterior spondylolisthesis at L4-L5 that appears to be grossly stable within flexion and extension.    There were no vitals filed for this visit.  There is no height or weight on file to calculate BMI.    Physical Exam  Neurologic Exam  Visit completed via telephone due to COVID-19 pandemic    Diagnoses/Plan:    Mr. Harding is a 61 y.o. male who is seen today  in follow-up with several aliments, including occlusion of the patient's right ICA stenosis and progressive gait instability.  After reviewing his recent studies, patient's flexion-extension is noted to remain grossly stable.  His EMG/nerve conduction test demonstrates lower extremity peripheral neuropathy and noted carpal tunnel of his bilateral upper extremity.  Due to these findings, encourage patient to continue to work with neurology.  In addition, patient has notable history with peripheral vascular disease and he is currently set up to follow-up with that physician for further work-up.  I encourage patient to work-up his peripheral vascular disease to see if this could be also contributory.  After patient completes these work-ups, we will have him follow-up with Dr. Gil in [place] to discuss if there are any surgical options.  Patient has a quite extensive comorbidities, and would like patient to continue to work-up with multiple specialists to help provide the best treatment options for patient.  Signs and symptoms of neurogenic claudication were reviewed with patient that would warrant a sooner call to our clinic and/are 911.      Patient's There is no height or weight on file to calculate BMI. indicating that he is within normal range (BMI 18.5-24.9). No BMI management plan needed..     Gurwinder Harding  reports that he has been smoking cigarettes. He has a 45.00 pack-year smoking history. He has never used smokeless tobacco.. I have educated him on the risk of diseases from using tobacco products such as cancer, COPD and heart disease.            Alison Isaac PA-C    Total telephone visit 20 minutes

## 2021-11-09 NOTE — TELEPHONE ENCOUNTER
----- Message from KEVIN Jensen sent at 11/9/2021  2:48 PM EST -----  Nothing on XR   Await Overnight oxygen saturation     Patient notified & verbalized understanding.

## 2021-11-10 NOTE — PATIENT INSTRUCTIONS

## 2021-11-18 ENCOUNTER — TELEPHONE (OUTPATIENT)
Dept: NEUROSURGERY | Facility: CLINIC | Age: 61
End: 2021-11-18

## 2021-11-18 NOTE — TELEPHONE ENCOUNTER
Provider:  Alison ASHLEY  Caller: Carline-check out  Time of call:     Phone #:    Surgery:    Surgery Date:    Last visit: 11/04/21    Next visit:     HERON:         Reason for call:     Carline Jose at our check out wanted me to ask you about Mr. Harding.    You wanted him to follow up with Dr. Gil in [place], however Becka told her that Dr. Gil is not approved to work there yet.    If she makes the apt with Diann ASHLEY in [place] it won't be until Feb.  Please advise. :)

## 2021-11-29 ENCOUNTER — HOSPITAL ENCOUNTER (OUTPATIENT)
Dept: CARDIOLOGY | Facility: HOSPITAL | Age: 61
Discharge: HOME | End: 2021-11-29
Admitting: NURSE PRACTITIONER

## 2021-11-29 DIAGNOSIS — G62.9 NEUROPATHY: ICD-10-CM

## 2021-11-29 DIAGNOSIS — I73.9 PAD (PERIPHERAL ARTERY DISEASE) (HCC): ICD-10-CM

## 2021-11-29 PROCEDURE — 93923 UPR/LXTR ART STDY 3+ LVLS: CPT | Performed by: RADIOLOGY

## 2021-11-29 PROCEDURE — 93923 UPR/LXTR ART STDY 3+ LVLS: CPT

## 2021-11-30 DIAGNOSIS — I73.9 PAD (PERIPHERAL ARTERY DISEASE) (HCC): Primary | ICD-10-CM

## 2021-12-02 ENCOUNTER — TELEPHONE (OUTPATIENT)
Dept: FAMILY MEDICINE CLINIC | Facility: CLINIC | Age: 61
End: 2021-12-02

## 2021-12-07 DIAGNOSIS — I65.23 BILATERAL CAROTID ARTERY STENOSIS: Primary | ICD-10-CM

## 2021-12-08 DIAGNOSIS — G62.9 NEUROPATHY: ICD-10-CM

## 2021-12-08 RX ORDER — GABAPENTIN 800 MG/1
TABLET ORAL
Qty: 135 TABLET | Refills: 2 | Status: SHIPPED | OUTPATIENT
Start: 2021-12-08 | End: 2022-02-16 | Stop reason: SDUPTHER

## 2021-12-20 ENCOUNTER — TELEPHONE (OUTPATIENT)
Dept: CARDIAC SURGERY | Facility: CLINIC | Age: 61
End: 2021-12-20

## 2021-12-20 NOTE — TELEPHONE ENCOUNTER
[facility] in [place] has tried to contact Mr. Harding multiple times to set up his carotid duplex prior to his appt with us on 1/19/22, but have been unable to reach him. I have mailed him a letter asking him to call [facility] at his convenience to set up his scan.

## 2022-02-16 ENCOUNTER — OFFICE VISIT (OUTPATIENT)
Dept: FAMILY MEDICINE CLINIC | Facility: CLINIC | Age: 62
End: 2022-02-16

## 2022-02-16 VITALS
DIASTOLIC BLOOD PRESSURE: 60 MMHG | WEIGHT: 172 LBS | SYSTOLIC BLOOD PRESSURE: 124 MMHG | TEMPERATURE: 98 F | BODY MASS INDEX: 25.48 KG/M2 | HEIGHT: 69 IN | HEART RATE: 60 BPM | OXYGEN SATURATION: 100 %

## 2022-02-16 DIAGNOSIS — E55.9 VITAMIN D DEFICIENCY: ICD-10-CM

## 2022-02-16 DIAGNOSIS — F33.1 MAJOR DEPRESSIVE DISORDER, RECURRENT, MODERATE: ICD-10-CM

## 2022-02-16 DIAGNOSIS — R42 DIZZINESS: ICD-10-CM

## 2022-02-16 DIAGNOSIS — R53.83 FATIGUE, UNSPECIFIED TYPE: Primary | ICD-10-CM

## 2022-02-16 DIAGNOSIS — I10 ESSENTIAL HYPERTENSION: ICD-10-CM

## 2022-02-16 DIAGNOSIS — G62.9 NEUROPATHY: ICD-10-CM

## 2022-02-16 DIAGNOSIS — I73.9 PAD (PERIPHERAL ARTERY DISEASE): ICD-10-CM

## 2022-02-16 PROCEDURE — 99214 OFFICE O/P EST MOD 30 MIN: CPT | Performed by: NURSE PRACTITIONER

## 2022-02-16 PROCEDURE — 83540 ASSAY OF IRON: CPT | Performed by: NURSE PRACTITIONER

## 2022-02-16 PROCEDURE — 82306 VITAMIN D 25 HYDROXY: CPT | Performed by: NURSE PRACTITIONER

## 2022-02-16 PROCEDURE — 84466 ASSAY OF TRANSFERRIN: CPT | Performed by: NURSE PRACTITIONER

## 2022-02-16 PROCEDURE — 36415 COLL VENOUS BLD VENIPUNCTURE: CPT | Performed by: NURSE PRACTITIONER

## 2022-02-16 PROCEDURE — 80053 COMPREHEN METABOLIC PANEL: CPT | Performed by: NURSE PRACTITIONER

## 2022-02-16 PROCEDURE — 85025 COMPLETE CBC W/AUTO DIFF WBC: CPT | Performed by: NURSE PRACTITIONER

## 2022-02-16 PROCEDURE — 82607 VITAMIN B-12: CPT | Performed by: NURSE PRACTITIONER

## 2022-02-16 RX ORDER — FOLIC ACID-PYRIDOXINE-CYANOCOBALAMIN TAB 2.5-25-2 MG 2.5-25-2 MG
1 TAB ORAL DAILY
Qty: 90 TABLET | Refills: 1 | Status: SHIPPED | OUTPATIENT
Start: 2022-02-16 | End: 2022-11-07 | Stop reason: SDUPTHER

## 2022-02-16 RX ORDER — ATENOLOL 25 MG/1
25 TABLET ORAL DAILY
Qty: 90 TABLET | Refills: 1 | Status: SHIPPED | OUTPATIENT
Start: 2022-02-16 | End: 2022-08-17 | Stop reason: SDUPTHER

## 2022-02-16 RX ORDER — CILOSTAZOL 100 MG/1
100 TABLET ORAL DAILY
Qty: 60 TABLET | Refills: 5 | Status: SHIPPED | OUTPATIENT
Start: 2022-02-16 | End: 2022-11-07 | Stop reason: SDUPTHER

## 2022-02-16 RX ORDER — ISOSORBIDE MONONITRATE 30 MG/1
30 TABLET, EXTENDED RELEASE ORAL DAILY
Qty: 90 TABLET | Refills: 1 | Status: SHIPPED | OUTPATIENT
Start: 2022-02-16 | End: 2022-05-23 | Stop reason: SDUPTHER

## 2022-02-16 RX ORDER — GABAPENTIN 800 MG/1
800 TABLET ORAL 3 TIMES DAILY
Qty: 135 TABLET | Refills: 2 | Status: SHIPPED | OUTPATIENT
Start: 2022-02-16 | End: 2022-04-18 | Stop reason: SDUPTHER

## 2022-02-16 RX ORDER — TRAZODONE HYDROCHLORIDE 50 MG/1
50 TABLET ORAL NIGHTLY
Qty: 90 TABLET | Refills: 1 | Status: SHIPPED | OUTPATIENT
Start: 2022-02-16 | End: 2022-08-17 | Stop reason: SDUPTHER

## 2022-02-17 ENCOUNTER — TELEPHONE (OUTPATIENT)
Dept: FAMILY MEDICINE CLINIC | Facility: CLINIC | Age: 62
End: 2022-02-17

## 2022-02-17 ENCOUNTER — APPOINTMENT (OUTPATIENT)
Dept: CT IMAGING | Facility: HOSPITAL | Age: 62
End: 2022-02-17

## 2022-02-17 ENCOUNTER — HOSPITAL ENCOUNTER (INPATIENT)
Facility: HOSPITAL | Age: 62
LOS: 2 days | Discharge: HOME | End: 2022-02-19
Attending: STUDENT IN AN ORGANIZED HEALTH CARE EDUCATION/TRAINING PROGRAM

## 2022-02-17 DIAGNOSIS — K92.2 GASTROINTESTINAL HEMORRHAGE, UNSPECIFIED GASTROINTESTINAL HEMORRHAGE TYPE: Primary | ICD-10-CM

## 2022-02-17 DIAGNOSIS — E87.6 HYPOKALEMIA: ICD-10-CM

## 2022-02-17 DIAGNOSIS — D64.9 ANEMIA, UNSPECIFIED TYPE: ICD-10-CM

## 2022-02-17 DIAGNOSIS — N17.9 AKI (ACUTE KIDNEY INJURY): ICD-10-CM

## 2022-02-17 LAB
25(OH)D3 SERPL-MCNC: 30.9 NG/ML (ref 30–100)
ABO GROUP BLD: NORMAL
ABO GROUP BLD: NORMAL
ALBUMIN SERPL-MCNC: 3.48 G/DL (ref 3.5–5.2)
ALBUMIN SERPL-MCNC: 4.2 G/DL (ref 3.5–5.2)
ALBUMIN/GLOB SERPL: 1 G/DL
ALBUMIN/GLOB SERPL: 1.5 G/DL
ALP SERPL-CCNC: 44 U/L (ref 39–117)
ALP SERPL-CCNC: 46 U/L (ref 39–117)
ALT SERPL W P-5'-P-CCNC: 11 U/L (ref 1–41)
ALT SERPL W P-5'-P-CCNC: 8 U/L (ref 1–41)
ANION GAP SERPL CALCULATED.3IONS-SCNC: 10.2 MMOL/L (ref 5–15)
ANION GAP SERPL CALCULATED.3IONS-SCNC: 11 MMOL/L (ref 5–15)
ANION GAP SERPL CALCULATED.3IONS-SCNC: 9 MMOL/L (ref 5–15)
APTT PPP: 25.8 SECONDS (ref 25.5–35.4)
AST SERPL-CCNC: 14 U/L (ref 1–40)
AST SERPL-CCNC: 15 U/L (ref 1–40)
BASOPHILS # BLD AUTO: 0.08 10*3/MM3 (ref 0–0.2)
BASOPHILS NFR BLD AUTO: 0.9 % (ref 0–1.5)
BASOPHILS NFR BLD AUTO: 0.9 % (ref 0–1.5)
BASOPHILS NFR BLD AUTO: 1 % (ref 0–1.5)
BILIRUB SERPL-MCNC: <0.2 MG/DL (ref 0–1.2)
BILIRUB SERPL-MCNC: <0.2 MG/DL (ref 0–1.2)
BLD GP AB SCN SERPL QL: NEGATIVE
BUN SERPL-MCNC: 26 MG/DL (ref 8–23)
BUN SERPL-MCNC: 30 MG/DL (ref 8–23)
BUN SERPL-MCNC: 31 MG/DL (ref 8–23)
BUN/CREAT SERPL: 13.8 (ref 7–25)
BUN/CREAT SERPL: 14.2 (ref 7–25)
BUN/CREAT SERPL: 16.1 (ref 7–25)
CALCIUM SPEC-SCNC: 11.4 MG/DL (ref 8.6–10.5)
CALCIUM SPEC-SCNC: 11.9 MG/DL (ref 8.6–10.5)
CALCIUM SPEC-SCNC: 13.4 MG/DL (ref 8.6–10.5)
CHLORIDE SERPL-SCNC: 102 MMOL/L (ref 98–107)
CHLORIDE SERPL-SCNC: 106 MMOL/L (ref 98–107)
CHLORIDE SERPL-SCNC: 107 MMOL/L (ref 98–107)
CO2 SERPL-SCNC: 20.8 MMOL/L (ref 22–29)
CO2 SERPL-SCNC: 22 MMOL/L (ref 22–29)
CO2 SERPL-SCNC: 24 MMOL/L (ref 22–29)
CREAT SERPL-MCNC: 1.89 MG/DL (ref 0.76–1.27)
CREAT SERPL-MCNC: 1.93 MG/DL (ref 0.76–1.27)
CREAT SERPL-MCNC: 2.11 MG/DL (ref 0.76–1.27)
DEPRECATED RDW RBC AUTO: 49.8 FL (ref 37–54)
DEPRECATED RDW RBC AUTO: 55.7 FL (ref 37–54)
DEPRECATED RDW RBC AUTO: 60.5 FL (ref 37–54)
EOSINOPHIL # BLD AUTO: 0.14 10*3/MM3 (ref 0–0.4)
EOSINOPHIL # BLD AUTO: 0.17 10*3/MM3 (ref 0–0.4)
EOSINOPHIL # BLD AUTO: 0.18 10*3/MM3 (ref 0–0.4)
EOSINOPHIL NFR BLD AUTO: 1.5 % (ref 0.3–6.2)
EOSINOPHIL NFR BLD AUTO: 1.8 % (ref 0.3–6.2)
EOSINOPHIL NFR BLD AUTO: 2.3 % (ref 0.3–6.2)
ERYTHROCYTE [DISTWIDTH] IN BLOOD BY AUTOMATED COUNT: 17.9 % (ref 12.3–15.4)
ERYTHROCYTE [DISTWIDTH] IN BLOOD BY AUTOMATED COUNT: 18.5 % (ref 12.3–15.4)
ERYTHROCYTE [DISTWIDTH] IN BLOOD BY AUTOMATED COUNT: 20.3 % (ref 12.3–15.4)
FLUAV RNA RESP QL NAA+PROBE: NOT DETECTED
FLUBV RNA RESP QL NAA+PROBE: NOT DETECTED
GFR SERPL CREATININE-BSD FRML MDRD: 32 ML/MIN/1.73
GFR SERPL CREATININE-BSD FRML MDRD: 36 ML/MIN/1.73
GFR SERPL CREATININE-BSD FRML MDRD: 37 ML/MIN/1.73
GLOBULIN UR ELPH-MCNC: 2.8 GM/DL
GLOBULIN UR ELPH-MCNC: 3.5 GM/DL
GLUCOSE SERPL-MCNC: 100 MG/DL (ref 65–99)
GLUCOSE SERPL-MCNC: 115 MG/DL (ref 65–99)
GLUCOSE SERPL-MCNC: 118 MG/DL (ref 65–99)
HCT VFR BLD AUTO: 20.9 % (ref 37.5–51)
HCT VFR BLD AUTO: 21.1 % (ref 37.5–51)
HCT VFR BLD AUTO: 28.7 % (ref 37.5–51)
HGB BLD-MCNC: 6.3 G/DL (ref 13–17.7)
HGB BLD-MCNC: 6.5 G/DL (ref 13–17.7)
HGB BLD-MCNC: 8.8 G/DL (ref 13–17.7)
HOLD SPECIMEN: NORMAL
HOLD SPECIMEN: NORMAL
IMM GRANULOCYTES # BLD AUTO: 0.01 10*3/MM3 (ref 0–0.05)
IMM GRANULOCYTES # BLD AUTO: 0.03 10*3/MM3 (ref 0–0.05)
IMM GRANULOCYTES # BLD AUTO: 0.03 10*3/MM3 (ref 0–0.05)
IMM GRANULOCYTES NFR BLD AUTO: 0.1 % (ref 0–0.5)
IMM GRANULOCYTES NFR BLD AUTO: 0.3 % (ref 0–0.5)
IMM GRANULOCYTES NFR BLD AUTO: 0.3 % (ref 0–0.5)
INR PPP: 1.04 (ref 0.9–1.1)
IRON 24H UR-MRATE: 15 MCG/DL (ref 59–158)
IRON SATN MFR SERPL: 3 % (ref 20–50)
LYMPHOCYTES # BLD AUTO: 1.85 10*3/MM3 (ref 0.7–3.1)
LYMPHOCYTES # BLD AUTO: 2.06 10*3/MM3 (ref 0.7–3.1)
LYMPHOCYTES # BLD AUTO: 2.11 10*3/MM3 (ref 0.7–3.1)
LYMPHOCYTES NFR BLD AUTO: 19.7 % (ref 19.6–45.3)
LYMPHOCYTES NFR BLD AUTO: 22.7 % (ref 19.6–45.3)
LYMPHOCYTES NFR BLD AUTO: 26.6 % (ref 19.6–45.3)
MAGNESIUM SERPL-MCNC: 1.6 MG/DL (ref 1.6–2.4)
MAGNESIUM SERPL-MCNC: 1.7 MG/DL (ref 1.6–2.4)
MCH RBC QN AUTO: 24.3 PG (ref 26.6–33)
MCH RBC QN AUTO: 24.3 PG (ref 26.6–33)
MCH RBC QN AUTO: 25.5 PG (ref 26.6–33)
MCHC RBC AUTO-ENTMCNC: 29.9 G/DL (ref 31.5–35.7)
MCHC RBC AUTO-ENTMCNC: 30.7 G/DL (ref 31.5–35.7)
MCHC RBC AUTO-ENTMCNC: 31.1 G/DL (ref 31.5–35.7)
MCV RBC AUTO: 78 FL (ref 79–97)
MCV RBC AUTO: 81.5 FL (ref 79–97)
MCV RBC AUTO: 83.2 FL (ref 79–97)
MONOCYTES # BLD AUTO: 0.51 10*3/MM3 (ref 0.1–0.9)
MONOCYTES # BLD AUTO: 0.52 10*3/MM3 (ref 0.1–0.9)
MONOCYTES # BLD AUTO: 0.55 10*3/MM3 (ref 0.1–0.9)
MONOCYTES NFR BLD AUTO: 5.5 % (ref 5–12)
MONOCYTES NFR BLD AUTO: 5.6 % (ref 5–12)
MONOCYTES NFR BLD AUTO: 6.9 % (ref 5–12)
NEUTROPHILS NFR BLD AUTO: 5.01 10*3/MM3 (ref 1.7–7)
NEUTROPHILS NFR BLD AUTO: 6.24 10*3/MM3 (ref 1.7–7)
NEUTROPHILS NFR BLD AUTO: 6.74 10*3/MM3 (ref 1.7–7)
NEUTROPHILS NFR BLD AUTO: 63.1 % (ref 42.7–76)
NEUTROPHILS NFR BLD AUTO: 69 % (ref 42.7–76)
NEUTROPHILS NFR BLD AUTO: 71.8 % (ref 42.7–76)
NRBC BLD AUTO-RTO: 0 /100 WBC (ref 0–0.2)
NRBC BLD AUTO-RTO: 0 /100 WBC (ref 0–0.2)
NRBC BLD AUTO-RTO: 0.1 /100 WBC (ref 0–0.2)
PHOSPHATE SERPL-MCNC: 2.2 MG/DL (ref 2.5–4.5)
PLATELET # BLD AUTO: 254 10*3/MM3 (ref 140–450)
PLATELET # BLD AUTO: 293 10*3/MM3 (ref 140–450)
PLATELET # BLD AUTO: 302 10*3/MM3 (ref 140–450)
PMV BLD AUTO: 10.7 FL (ref 6–12)
PMV BLD AUTO: 10.7 FL (ref 6–12)
PMV BLD AUTO: 11.1 FL (ref 6–12)
POTASSIUM SERPL-SCNC: 3.3 MMOL/L (ref 3.5–5.2)
PROT SERPL-MCNC: 7 G/DL (ref 6–8.5)
PROT SERPL-MCNC: 7 G/DL (ref 6–8.5)
PROTHROMBIN TIME: 14 SECONDS (ref 12.8–14.5)
QT INTERVAL: 364 MS
QTC INTERVAL: 393 MS
RBC # BLD AUTO: 2.59 10*6/MM3 (ref 4.14–5.8)
RBC # BLD AUTO: 2.68 10*6/MM3 (ref 4.14–5.8)
RBC # BLD AUTO: 3.45 10*6/MM3 (ref 4.14–5.8)
RH BLD: POSITIVE
RH BLD: POSITIVE
SARS-COV-2 RNA RESP QL NAA+PROBE: NOT DETECTED
SODIUM SERPL-SCNC: 137 MMOL/L (ref 136–145)
SODIUM SERPL-SCNC: 137 MMOL/L (ref 136–145)
SODIUM SERPL-SCNC: 138 MMOL/L (ref 136–145)
T&S EXPIRATION DATE: NORMAL
TIBC SERPL-MCNC: 505 MCG/DL (ref 298–536)
TRANSFERRIN SERPL-MCNC: 339 MG/DL (ref 200–360)
VIT B12 BLD-MCNC: 1547 PG/ML (ref 211–946)
WBC NRBC COR # BLD: 7.94 10*3/MM3 (ref 3.4–10.8)
WBC NRBC COR # BLD: 9.06 10*3/MM3 (ref 3.4–10.8)
WBC NRBC COR # BLD: 9.39 10*3/MM3 (ref 3.4–10.8)
WHOLE BLOOD HOLD SPECIMEN: NORMAL
WHOLE BLOOD HOLD SPECIMEN: NORMAL

## 2022-02-17 PROCEDURE — 85025 COMPLETE CBC W/AUTO DIFF WBC: CPT | Performed by: HOSPITALIST

## 2022-02-17 PROCEDURE — 99285 EMERGENCY DEPT VISIT HI MDM: CPT

## 2022-02-17 PROCEDURE — 87636 SARSCOV2 & INF A&B AMP PRB: CPT | Performed by: STUDENT IN AN ORGANIZED HEALTH CARE EDUCATION/TRAINING PROGRAM

## 2022-02-17 PROCEDURE — 86901 BLOOD TYPING SEROLOGIC RH(D): CPT

## 2022-02-17 PROCEDURE — 85025 COMPLETE CBC W/AUTO DIFF WBC: CPT | Performed by: PHYSICIAN ASSISTANT

## 2022-02-17 PROCEDURE — 99223 1ST HOSP IP/OBS HIGH 75: CPT | Performed by: HOSPITALIST

## 2022-02-17 PROCEDURE — P9016 RBC LEUKOCYTES REDUCED: HCPCS

## 2022-02-17 PROCEDURE — 93005 ELECTROCARDIOGRAM TRACING: CPT | Performed by: STUDENT IN AN ORGANIZED HEALTH CARE EDUCATION/TRAINING PROGRAM

## 2022-02-17 PROCEDURE — 86900 BLOOD TYPING SEROLOGIC ABO: CPT

## 2022-02-17 PROCEDURE — 86901 BLOOD TYPING SEROLOGIC RH(D): CPT | Performed by: PHYSICIAN ASSISTANT

## 2022-02-17 PROCEDURE — 86850 RBC ANTIBODY SCREEN: CPT | Performed by: PHYSICIAN ASSISTANT

## 2022-02-17 PROCEDURE — 80053 COMPREHEN METABOLIC PANEL: CPT | Performed by: PHYSICIAN ASSISTANT

## 2022-02-17 PROCEDURE — 85610 PROTHROMBIN TIME: CPT | Performed by: HOSPITALIST

## 2022-02-17 PROCEDURE — 86900 BLOOD TYPING SEROLOGIC ABO: CPT | Performed by: PHYSICIAN ASSISTANT

## 2022-02-17 PROCEDURE — 86923 COMPATIBILITY TEST ELECTRIC: CPT

## 2022-02-17 PROCEDURE — 83735 ASSAY OF MAGNESIUM: CPT | Performed by: HOSPITALIST

## 2022-02-17 PROCEDURE — 74176 CT ABD & PELVIS W/O CONTRAST: CPT

## 2022-02-17 PROCEDURE — 36430 TRANSFUSION BLD/BLD COMPNT: CPT

## 2022-02-17 PROCEDURE — 85730 THROMBOPLASTIN TIME PARTIAL: CPT | Performed by: HOSPITALIST

## 2022-02-17 PROCEDURE — 84100 ASSAY OF PHOSPHORUS: CPT | Performed by: HOSPITALIST

## 2022-02-17 RX ORDER — NICOTINE 21 MG/24HR
1 PATCH, TRANSDERMAL 24 HOURS TRANSDERMAL
Status: DISCONTINUED | OUTPATIENT
Start: 2022-02-18 | End: 2022-02-19 | Stop reason: HOSPADM

## 2022-02-17 RX ORDER — TRAZODONE HYDROCHLORIDE 50 MG/1
25 TABLET ORAL NIGHTLY PRN
Status: DISCONTINUED | OUTPATIENT
Start: 2022-02-17 | End: 2022-02-19 | Stop reason: HOSPADM

## 2022-02-17 RX ORDER — ISOSORBIDE MONONITRATE 30 MG/1
30 TABLET, EXTENDED RELEASE ORAL DAILY
Status: DISCONTINUED | OUTPATIENT
Start: 2022-02-18 | End: 2022-02-19 | Stop reason: HOSPADM

## 2022-02-17 RX ORDER — MAGNESIUM SULFATE HEPTAHYDRATE 40 MG/ML
4 INJECTION, SOLUTION INTRAVENOUS AS NEEDED
Status: DISCONTINUED | OUTPATIENT
Start: 2022-02-17 | End: 2022-02-19 | Stop reason: HOSPADM

## 2022-02-17 RX ORDER — CILOSTAZOL 100 MG/1
100 TABLET ORAL DAILY
Status: DISCONTINUED | OUTPATIENT
Start: 2022-02-18 | End: 2022-02-17

## 2022-02-17 RX ORDER — TRAZODONE HYDROCHLORIDE 50 MG/1
50 TABLET ORAL NIGHTLY
Status: DISCONTINUED | OUTPATIENT
Start: 2022-02-17 | End: 2022-02-17

## 2022-02-17 RX ORDER — CLOPIDOGREL BISULFATE 75 MG/1
75 TABLET ORAL DAILY
Status: CANCELLED | OUTPATIENT
Start: 2022-02-18

## 2022-02-17 RX ORDER — SODIUM CHLORIDE 9 MG/ML
75 INJECTION, SOLUTION INTRAVENOUS CONTINUOUS
Status: DISCONTINUED | OUTPATIENT
Start: 2022-02-17 | End: 2022-02-19

## 2022-02-17 RX ORDER — POTASSIUM CHLORIDE 20 MEQ/1
40 TABLET, EXTENDED RELEASE ORAL ONCE
Status: COMPLETED | OUTPATIENT
Start: 2022-02-17 | End: 2022-02-17

## 2022-02-17 RX ORDER — METHOCARBAMOL 500 MG/1
500 TABLET, FILM COATED ORAL EVERY 8 HOURS PRN
Status: DISCONTINUED | OUTPATIENT
Start: 2022-02-17 | End: 2022-02-19 | Stop reason: HOSPADM

## 2022-02-17 RX ORDER — ATENOLOL 25 MG/1
25 TABLET ORAL DAILY
Status: DISCONTINUED | OUTPATIENT
Start: 2022-02-18 | End: 2022-02-19 | Stop reason: HOSPADM

## 2022-02-17 RX ORDER — GABAPENTIN 400 MG/1
400 CAPSULE ORAL 3 TIMES DAILY
Status: CANCELLED | OUTPATIENT
Start: 2022-02-17

## 2022-02-17 RX ORDER — SODIUM CHLORIDE 0.9 % (FLUSH) 0.9 %
10 SYRINGE (ML) INJECTION EVERY 12 HOURS SCHEDULED
Status: DISCONTINUED | OUTPATIENT
Start: 2022-02-17 | End: 2022-02-19 | Stop reason: HOSPADM

## 2022-02-17 RX ORDER — SODIUM CHLORIDE 0.9 % (FLUSH) 0.9 %
10 SYRINGE (ML) INJECTION AS NEEDED
Status: DISCONTINUED | OUTPATIENT
Start: 2022-02-17 | End: 2022-02-19 | Stop reason: HOSPADM

## 2022-02-17 RX ORDER — ASPIRIN 81 MG/1
81 TABLET ORAL DAILY
Status: CANCELLED | OUTPATIENT
Start: 2022-02-18

## 2022-02-17 RX ORDER — METHOCARBAMOL 750 MG/1
750 TABLET, FILM COATED ORAL 4 TIMES DAILY
Status: DISCONTINUED | OUTPATIENT
Start: 2022-02-17 | End: 2022-02-17

## 2022-02-17 RX ORDER — NICOTINE 21 MG/24HR
1 PATCH, TRANSDERMAL 24 HOURS TRANSDERMAL ONCE
Status: DISCONTINUED | OUTPATIENT
Start: 2022-02-17 | End: 2022-02-17 | Stop reason: SDUPTHER

## 2022-02-17 RX ORDER — ASPIRIN 81 MG/1
81 TABLET ORAL DAILY
COMMUNITY
End: 2022-02-19 | Stop reason: HOSPADM

## 2022-02-17 RX ORDER — SODIUM CHLORIDE 9 MG/ML
125 INJECTION, SOLUTION INTRAVENOUS CONTINUOUS
Status: DISCONTINUED | OUTPATIENT
Start: 2022-02-17 | End: 2022-02-17

## 2022-02-17 RX ORDER — PANTOPRAZOLE SODIUM 40 MG/10ML
40 INJECTION, POWDER, LYOPHILIZED, FOR SOLUTION INTRAVENOUS EVERY 12 HOURS SCHEDULED
Status: DISCONTINUED | OUTPATIENT
Start: 2022-02-17 | End: 2022-02-19 | Stop reason: HOSPADM

## 2022-02-17 RX ORDER — NICOTINE 21 MG/24HR
1 PATCH, TRANSDERMAL 24 HOURS TRANSDERMAL ONCE
Status: COMPLETED | OUTPATIENT
Start: 2022-02-17 | End: 2022-02-18

## 2022-02-17 RX ORDER — MAGNESIUM SULFATE 1 G/100ML
1 INJECTION INTRAVENOUS AS NEEDED
Status: DISCONTINUED | OUTPATIENT
Start: 2022-02-17 | End: 2022-02-19 | Stop reason: HOSPADM

## 2022-02-17 RX ORDER — MAGNESIUM SULFATE HEPTAHYDRATE 40 MG/ML
2 INJECTION, SOLUTION INTRAVENOUS AS NEEDED
Status: DISCONTINUED | OUTPATIENT
Start: 2022-02-17 | End: 2022-02-19 | Stop reason: HOSPADM

## 2022-02-17 RX ORDER — GABAPENTIN 100 MG/1
200 CAPSULE ORAL EVERY 8 HOURS SCHEDULED
Status: DISCONTINUED | OUTPATIENT
Start: 2022-02-17 | End: 2022-02-19 | Stop reason: HOSPADM

## 2022-02-17 RX ORDER — NITROGLYCERIN 0.4 MG/1
0.4 TABLET SUBLINGUAL
Status: DISCONTINUED | OUTPATIENT
Start: 2022-02-17 | End: 2022-02-19 | Stop reason: HOSPADM

## 2022-02-17 RX ADMIN — SODIUM CHLORIDE 125 ML/HR: 9 INJECTION, SOLUTION INTRAVENOUS at 16:08

## 2022-02-17 RX ADMIN — NICOTINE 1 PATCH: 21 PATCH, EXTENDED RELEASE TRANSDERMAL at 15:10

## 2022-02-17 RX ADMIN — SODIUM CHLORIDE, PRESERVATIVE FREE 10 ML: 5 INJECTION INTRAVENOUS at 23:24

## 2022-02-17 RX ADMIN — GABAPENTIN 200 MG: 100 CAPSULE ORAL at 21:29

## 2022-02-17 RX ADMIN — SODIUM CHLORIDE 125 ML/HR: 9 INJECTION, SOLUTION INTRAVENOUS at 23:24

## 2022-02-17 RX ADMIN — POTASSIUM CHLORIDE 40 MEQ: 1500 TABLET, EXTENDED RELEASE ORAL at 17:09

## 2022-02-17 NOTE — ED NOTES
Patient reports using a walker at home due to fatigue and weakness     Joce Mark RN  02/17/22 [id number]

## 2022-02-17 NOTE — ED NOTES
Pt. Denied covid swab RN notified     Paul Lorenzana, PCT  02/17/22 1521       Paul Lorenzana, PCT  02/17/22 1523

## 2022-02-17 NOTE — TELEPHONE ENCOUNTER
----- Message from KEVIN Jensen sent at 2/17/2022  9:51 AM EST -----  Call patient regarding low hgb.  He will likely need transfused.  Will need to go to ED for further work up.  Complaints yesterday couple week history intermittent lightheadedness, dizziness with standing.  No noted blood loss with bowel movements.  He denied any chest pain or shortness of breath.         Spoke with patient & he verbalized understanding,agreeable to going to the ED.

## 2022-02-17 NOTE — TELEPHONE ENCOUNTER
Please see if you can get ahold of patient and let him know his hgb is 6.5. He needs to be transfused. I recommend him going to the ER to receive blood and work up for blood loss. Given he was seen with fatigue yesterday and we are unsure of why or how fast he has lost blood.

## 2022-02-17 NOTE — ED PROVIDER NOTES
[facility]  emergency department encounter        Pt Name: Gurwinder Harding  MRN: [id number]  Birthdate 1960  Date of evaluation: 2/17/2022    Chief Complaint   Patient presents with   • Abnormal Bloodwork             HISTORY OF PRESENT ILLNESS:   Gurwinder Harding is a 61 y.o. male PMH significant for HTN, PVD, neuropathy, DDD, carotid stenosis.  Patient presents for anemia.  Patient had outpatient routine labs drawn yesterday with noted hemoglobin of 6.5.  Received call from primary care provider suggesting he be evaluated in the emergency department for blood transfusion.  Chart review reveals most recent hemoglobin otherwise was July 15, 2021 7 months earlier at 13.1.    Patient endorses fatigue and generalized weakness.  Wife reports this significantly worsened 3 days ago.  He endorses dark stools over the past 2 days.  Denies any prior known history of GI bleed.  Denies that the stools are tarry, denies bright red blood per rectum.  Patient denies chest pain shortness of breath.  He endorses rhinorrhea but broadly denies ROS otherwise.    Denies prior history of blood transfusion.    Iron labs from yesterday also include:  Results for GURWINDER HARDING (MRN [id number]) as of 2/17/2022 15:08   Ref. Range 2/16/2022 16:25   Iron Latest Ref Range: 59 - 158 mcg/dL 15 (L)   Iron Saturation Latest Ref Range: 20 - 50 % 3 (L)   Transferrin Latest Ref Range: 200 - 360 mg/dL 339   TIBC Latest Ref Range: 298 - 536 mcg/dL 505       No other exacerbating or alleviating factors other than as noted above.  Severity: Severe    PCP: Pastora Person APRN          REVIEW OF SYSTEMS:     Review of Systems   Constitutional: Positive for fatigue. Negative for fever.   HENT: Positive for rhinorrhea. Negative for congestion.    Eyes: Negative for visual disturbance.   Respiratory: Negative for cough and shortness of breath.    Cardiovascular: Negative for chest pain.   Gastrointestinal: Negative for abdominal pain, nausea and  vomiting.   Genitourinary: Negative for dysuria.   Musculoskeletal: Negative for myalgias.   Skin: Negative for rash.   Neurological: Positive for weakness (Generalized nonfocal). Negative for headaches.   Psychiatric/Behavioral: Negative for confusion.       Review of systems otherwise as per HPI.          PREVIOUS HISTORY:         Past Medical History:   Diagnosis Date   • Alcohol abuse    • Arthritis    • Carotid stenosis    • ED (erectile dysfunction)    • History of degenerative disc disease    • Hypertension    • Neuropathy    • Peripheral vascular disease (HCC)     s/p arthrectomy follow by balloon angioplasty and stents of right and left SFA   • Tobacco abuse    • Vitamin D deficiency            Past Surgical History:   Procedure Laterality Date   • APPENDECTOMY     • ARTERIOGRAM N/A 9/19/2019    Procedure: Arteriogram;  Surgeon: Tong Azar MD;  Location:  [facility] CATH INVASIVE LOCATION;  Service: Cardiology   • BACK SURGERY      DISC RUPTURE REPAIR, L5   • CARDIAC CATHETERIZATION Right 10/29/2019    Procedure: Peripheral angiography;  Surgeon: Tong Azar MD;  Location:  [facility] CATH INVASIVE LOCATION;  Service: Cardiovascular   • VASCULAR SURGERY Bilateral            Social History     Socioeconomic History   • Marital status:    • Number of children: 1   Tobacco Use   • Smoking status: Current Every Day Smoker     Packs/day: 0.50     Years: 30.00     Pack years: 15.00     Types: Cigarettes   • Smokeless tobacco: Never Used   Vaping Use   • Vaping Use: Never used   Substance and Sexual Activity   • Alcohol use: Not Currently     Alcohol/week: 24.0 standard drinks     Types: 24 Cans of beer per week   • Drug use: No   • Sexual activity: Defer           Family History   Problem Relation Age of Onset   • Hypertension Mother    • Cancer Father         LUNG   • Diabetes Father    • Hypertension Father    • Heart attack Other         GRANDFATHER         Current Outpatient Medications   Medication  Instructions   • aspirin 81 mg, Oral, Daily   • atenolol (TENORMIN) 25 mg, Oral, Daily   • cilostazol (PLETAL) 100 mg, Oral, Daily   • clopidogrel (PLAVIX) 75 mg, Oral, Daily   • folic acid-pyridoxine-cyanocobalamin (Folbic) 2.5-25-2 MG tablet tablet 1 tablet, Oral, Daily   • gabapentin (NEURONTIN) 800 mg, Oral, 3 Times Daily   • isosorbide mononitrate (IMDUR) 30 mg, Oral, Daily   • methocarbamol (ROBAXIN) 750 mg, Oral, 4 Times Daily   • traZODone (DESYREL) 50 mg, Oral, Nightly         Allergies:  Penicillins and Novocain [procaine]    Medications, allergies and past medical, surgical, family, and social history reviewed.        PHYSICAL EXAM:     Physical Exam  Vitals and nursing note reviewed.   Constitutional:       General: He is not in acute distress.     Appearance: Normal appearance. He is not toxic-appearing.   HENT:      Head: Normocephalic and atraumatic.   Eyes:      Extraocular Movements: Extraocular movements intact.      Conjunctiva/sclera: Conjunctivae normal.   Cardiovascular:      Rate and Rhythm: Normal rate and regular rhythm.   Pulmonary:      Effort: Pulmonary effort is normal. No respiratory distress.   Abdominal:      General: Abdomen is flat. There is no distension.   Genitourinary:     Comments: RAMESH without notable palpable or visual abnormalities. Stool moist brown, no bright red blood or dark tarry discoloration.  Musculoskeletal:         General: No deformity. Normal range of motion.      Cervical back: Normal range of motion. No rigidity.   Neurological:      General: No focal deficit present.      Mental Status: He is alert and oriented to person, place, and time.   Psychiatric:         Mood and Affect: Mood normal.         Behavior: Behavior normal.             COMPLETED DIAGNOSTIC STUDIES AND INTERVENTIONS:     Lab Results (last 24 hours)     Procedure Component Value Units Date/Time    Comprehensive Metabolic Panel [[id number]]  (Abnormal) Collected: 02/17/22 1351    Specimen: Blood  from Arm, Right Updated: 02/17/22 1427     Glucose 118 mg/dL      BUN 30 mg/dL      Creatinine 2.11 mg/dL      Sodium 138 mmol/L      Potassium 3.3 mmol/L      Chloride 107 mmol/L      CO2 22.0 mmol/L      Calcium 11.9 mg/dL      Total Protein 7.0 g/dL      Albumin 3.48 g/dL      ALT (SGPT) 11 U/L      AST (SGOT) 15 U/L      Alkaline Phosphatase 46 U/L      Total Bilirubin <0.2 mg/dL      eGFR Non African Amer 32 mL/min/1.73      Globulin 3.5 gm/dL      A/G Ratio 1.0 g/dL      BUN/Creatinine Ratio 14.2     Anion Gap 9.0 mmol/L     Narrative:      GFR Normal >60  Chronic Kidney Disease <60  Kidney Failure <15      CBC & Differential [[id number]]  (Abnormal) Collected: 02/17/22 1357    Specimen: Blood Updated: 02/17/22 1438    Narrative:      The following orders were created for panel order CBC & Differential.  Procedure                               Abnormality         Status                     ---------                               -----------         ------                     CBC Auto Differential[[id number]]        Abnormal            Final result                 Please view results for these tests on the individual orders.    CBC Auto Differential [[id number]]  (Abnormal) Collected: 02/17/22 1357    Specimen: Blood Updated: 02/17/22 1438     WBC 9.39 10*3/mm3      RBC 2.59 10*6/mm3      Hemoglobin 6.3 g/dL      Hematocrit 21.1 %      MCV 81.5 fL      MCH 24.3 pg      MCHC 29.9 g/dL      RDW 20.3 %      RDW-SD 60.5 fl      MPV 10.7 fL      Platelets 302 10*3/mm3      Neutrophil % 71.8 %      Lymphocyte % 19.7 %      Monocyte % 5.5 %      Eosinophil % 1.8 %      Basophil % 0.9 %      Immature Grans % 0.3 %      Neutrophils, Absolute 6.74 10*3/mm3      Lymphocytes, Absolute 1.85 10*3/mm3      Monocytes, Absolute 0.52 10*3/mm3      Eosinophils, Absolute 0.17 10*3/mm3      Basophils, Absolute 0.08 10*3/mm3      Immature Grans, Absolute 0.03 10*3/mm3      nRBC 0.0 /100 WBC     Narrative:      Verified by Repeat  Analysis      COVID PRE-OP / PRE-PROCEDURE SCREENING ORDER (NO ISOLATION) - Swab, Nasopharynx [[id number]]  (Normal) Collected: 02/17/22 1626    Specimen: Swab from Nasopharynx Updated: 02/17/22 1657    Narrative:      The following orders were created for panel order COVID PRE-OP / PRE-PROCEDURE SCREENING ORDER (NO ISOLATION) - Swab, Nasopharynx.  Procedure                               Abnormality         Status                     ---------                               -----------         ------                     COVID-19 and FLU A/B PCR...[[id number]]  Normal              Final result                 Please view results for these tests on the individual orders.    COVID-19 and FLU A/B PCR - Swab, Nasopharynx [[id number]]  (Normal) Collected: 02/17/22 1626    Specimen: Swab from Nasopharynx Updated: 02/17/22 1657     COVID19 Not Detected     Influenza A PCR Not Detected     Influenza B PCR Not Detected    Narrative:      Fact sheet for providers: https://www.fda.gov/media/196283/download    Fact sheet for patients: https://www.fda.gov/media/073149/download    Test performed by PCR.            CT Abdomen Pelvis With Contrast    (Results Pending)         New Medications Ordered This Visit   Medications   • nicotine (NICODERM CQ) 21 MG/24HR patch 1 patch   • sodium chloride 0.9 % infusion   • potassium chloride (K-DUR,KLOR-CON) CR tablet 40 mEq         Procedures            MEDICAL DECISION-MAKING AND ED COURSE:     ED Course as of 02/17/22 1909   Thu Feb 17, 2022   1514 MDM: 61-year-old male presents for hemoglobin of 6.5 with recent fatigue and weakness. Last hemoglobin 7 months ago was 13.1. Endorses dark stools though no abnormality noted on RAMESH. Patient has significant PAVITHRA, denies prior history of kidney disease. Creatinine 7 months ago was 1.2, now 2.1. Patient is not tachycardic, heart rate 89, not on AV jagjit blockers, likely that bleed is rather slow progression. Patient also has severely low iron stores  based on labs yesterday. Will transfuse 2 units PRBCs and start  mL/h. Plan for admit. [KP]   1534 Creatinine(!): 2.11 [KP]   1534 Potassium(!): 3.3  Replete potassium 40 mEq p.o. [KP]   1534 Hemoglobin(!!): 6.3 [KP]   1602 EKG at 1557 hrs. NSR 70 bpm, , QRS 92, QTc 4/3/1993, regular axis, no significant ST deviation or T wave abnormalities concerning for acute ischemia. [KP]   1717 COVID19: Not Detected [KP]   1909 Admit to hospitalist. [KP]      ED Course User Index  [KP] Neal Cline MD       ?      FINAL IMPRESSION:       1. Gastrointestinal hemorrhage, unspecified gastrointestinal hemorrhage type    2. Anemia, unspecified type    3. PAVITHRA (acute kidney injury) (HCC)    4. Hypokalemia         The complaints listed here are new problems to this examiner.      FOLLOW-UP  No follow-up provider specified.    DISPOSITION  ED Disposition     ED Disposition Condition Comment    Decision to Admit  Level of Care: Telemetry [5]   Diagnosis: Gastrointestinal hemorrhage, unspecified gastrointestinal hemorrhage type [[id number]]   Certification: I Certify That Inpatient Hospital Services Are Medically Necessary For Greater Than 2 Midnights                  This care is provided during an unprecedented national emergency due to the Novel Coronavirus (COVID-19). COVID-19 infections and transmission risks place heavy strains on healthcare resources. As this pandemic evolves, the Hospital and providers strive to respond fluidly, to remain operational, and to provide care relative to available resources and information. Outcomes are unpredictable and treatments are without well-defined guidelines. Further, the impact of COVID-19 on all aspects of emergency care, including the impact to patients seeking care for reasons other than COVID-19, is unavoidable during this national emergency.    This note was dictated using a [id number] tool. Occasional wrong-word or 'sound-a-like' substitutions may have occurred due to  the inherent limitations of voice recognition software. ?Read the chart carefully and recognize, using context, where substitutions have occurred.    Neal Cline MD  19:09 EST  2/17/2022             Neal Cline MD  02/17/22 1909

## 2022-02-17 NOTE — ED NOTES
MEDICAL SCREENING:    Reason for Visit: Sent by primary care due to abnormal hemoglobin.    Patient initially seen in triage.  The patient was advised further evaluation and diagnostic testing will be needed, some of the treatment and testing will be initiated in the lobby in order to begin the process.  The patient will be returned to the waiting area for the time being and possibly be re-assessed by a subsequent ED provider.  The patient will be brought back to the treatment area in as timely manner as possible.         Oscar Wang, PA  02/17/22 1319

## 2022-02-17 NOTE — TELEPHONE ENCOUNTER
Called pt. No answer.     Left a message to return call.      Patient notified & verbalized understanding.

## 2022-02-18 ENCOUNTER — ANESTHESIA (OUTPATIENT)
Dept: PERIOP | Facility: HOSPITAL | Age: 62
End: 2022-02-18

## 2022-02-18 ENCOUNTER — ANESTHESIA EVENT (OUTPATIENT)
Dept: PERIOP | Facility: HOSPITAL | Age: 62
End: 2022-02-18

## 2022-02-18 ENCOUNTER — APPOINTMENT (OUTPATIENT)
Dept: ULTRASOUND IMAGING | Facility: HOSPITAL | Age: 62
End: 2022-02-18

## 2022-02-18 PROBLEM — R10.12 LEFT UPPER QUADRANT ABDOMINAL PAIN: Status: RESOLVED | Noted: 2021-04-01 | Resolved: 2022-02-18

## 2022-02-18 PROBLEM — R10.11 RIGHT UPPER QUADRANT ABDOMINAL PAIN: Status: RESOLVED | Noted: 2021-04-01 | Resolved: 2022-02-18

## 2022-02-18 LAB
ANION GAP SERPL CALCULATED.3IONS-SCNC: 10.1 MMOL/L (ref 5–15)
BASOPHILS # BLD AUTO: 0.1 10*3/MM3 (ref 0–0.2)
BASOPHILS NFR BLD AUTO: 1.4 % (ref 0–1.5)
BH BB BLOOD EXPIRATION DATE: NORMAL
BH BB BLOOD EXPIRATION DATE: NORMAL
BH BB BLOOD TYPE BARCODE: 6200
BH BB BLOOD TYPE BARCODE: 6200
BH BB DISPENSE STATUS: NORMAL
BH BB DISPENSE STATUS: NORMAL
BH BB PRODUCT CODE: NORMAL
BH BB PRODUCT CODE: NORMAL
BH BB UNIT NUMBER: NORMAL
BH BB UNIT NUMBER: NORMAL
BUN SERPL-MCNC: 25 MG/DL (ref 8–23)
BUN/CREAT SERPL: 13.6 (ref 7–25)
CALCIUM SPEC-SCNC: 10.8 MG/DL (ref 8.6–10.5)
CHLORIDE SERPL-SCNC: 107 MMOL/L (ref 98–107)
CO2 SERPL-SCNC: 21.9 MMOL/L (ref 22–29)
CREAT SERPL-MCNC: 1.84 MG/DL (ref 0.76–1.27)
CROSSMATCH INTERPRETATION: NORMAL
CROSSMATCH INTERPRETATION: NORMAL
DEPRECATED RDW RBC AUTO: 55.3 FL (ref 37–54)
EOSINOPHIL # BLD AUTO: 0.19 10*3/MM3 (ref 0–0.4)
EOSINOPHIL NFR BLD AUTO: 2.6 % (ref 0.3–6.2)
ERYTHROCYTE [DISTWIDTH] IN BLOOD BY AUTOMATED COUNT: 18.2 % (ref 12.3–15.4)
GFR SERPL CREATININE-BSD FRML MDRD: 38 ML/MIN/1.73
GLUCOSE SERPL-MCNC: 138 MG/DL (ref 65–99)
HCT VFR BLD AUTO: 24.7 % (ref 37.5–51)
HCT VFR BLD AUTO: 25.4 % (ref 37.5–51)
HCT VFR BLD AUTO: 26.9 % (ref 37.5–51)
HCT VFR BLD AUTO: 26.9 % (ref 37.5–51)
HGB BLD-MCNC: 7.8 G/DL (ref 13–17.7)
HGB BLD-MCNC: 7.9 G/DL (ref 13–17.7)
HGB BLD-MCNC: 8.3 G/DL (ref 13–17.7)
HGB BLD-MCNC: 8.3 G/DL (ref 13–17.7)
IMM GRANULOCYTES # BLD AUTO: 0.02 10*3/MM3 (ref 0–0.05)
IMM GRANULOCYTES NFR BLD AUTO: 0.3 % (ref 0–0.5)
LYMPHOCYTES # BLD AUTO: 1.98 10*3/MM3 (ref 0.7–3.1)
LYMPHOCYTES NFR BLD AUTO: 26.9 % (ref 19.6–45.3)
MAGNESIUM SERPL-MCNC: 1.6 MG/DL (ref 1.6–2.4)
MCH RBC QN AUTO: 25.4 PG (ref 26.6–33)
MCHC RBC AUTO-ENTMCNC: 30.9 G/DL (ref 31.5–35.7)
MCV RBC AUTO: 82.3 FL (ref 79–97)
MONOCYTES # BLD AUTO: 0.55 10*3/MM3 (ref 0.1–0.9)
MONOCYTES NFR BLD AUTO: 7.5 % (ref 5–12)
NEUTROPHILS NFR BLD AUTO: 4.52 10*3/MM3 (ref 1.7–7)
NEUTROPHILS NFR BLD AUTO: 61.3 % (ref 42.7–76)
NRBC BLD AUTO-RTO: 0 /100 WBC (ref 0–0.2)
PHOSPHATE SERPL-MCNC: 1.8 MG/DL (ref 2.5–4.5)
PHOSPHATE SERPL-MCNC: 2.8 MG/DL (ref 2.5–4.5)
PLATELET # BLD AUTO: 269 10*3/MM3 (ref 140–450)
PMV BLD AUTO: 10.4 FL (ref 6–12)
POTASSIUM SERPL-SCNC: 3.6 MMOL/L (ref 3.5–5.2)
POTASSIUM SERPL-SCNC: 3.6 MMOL/L (ref 3.5–5.2)
PTH-INTACT SERPL-MCNC: 5.6 PG/ML (ref 15–65)
RBC # BLD AUTO: 3.27 10*6/MM3 (ref 4.14–5.8)
SODIUM SERPL-SCNC: 139 MMOL/L (ref 136–145)
UNIT  ABO: NORMAL
UNIT  ABO: NORMAL
UNIT  RH: NORMAL
UNIT  RH: NORMAL
WBC NRBC COR # BLD: 7.36 10*3/MM3 (ref 3.4–10.8)

## 2022-02-18 PROCEDURE — 82397 CHEMILUMINESCENT ASSAY: CPT | Performed by: HOSPITALIST

## 2022-02-18 PROCEDURE — 99233 SBSQ HOSP IP/OBS HIGH 50: CPT | Performed by: INTERNAL MEDICINE

## 2022-02-18 PROCEDURE — 85018 HEMOGLOBIN: CPT | Performed by: SURGERY

## 2022-02-18 PROCEDURE — 84100 ASSAY OF PHOSPHORUS: CPT | Performed by: HOSPITALIST

## 2022-02-18 PROCEDURE — 85025 COMPLETE CBC W/AUTO DIFF WBC: CPT | Performed by: HOSPITALIST

## 2022-02-18 PROCEDURE — 80048 BASIC METABOLIC PNL TOTAL CA: CPT | Performed by: HOSPITALIST

## 2022-02-18 PROCEDURE — 0DB68ZX EXCISION OF STOMACH, VIA NATURAL OR ARTIFICIAL OPENING ENDOSCOPIC, DIAGNOSTIC: ICD-10-PCS | Performed by: SURGERY

## 2022-02-18 PROCEDURE — 0DB58ZX EXCISION OF ESOPHAGUS, VIA NATURAL OR ARTIFICIAL OPENING ENDOSCOPIC, DIAGNOSTIC: ICD-10-PCS | Performed by: SURGERY

## 2022-02-18 PROCEDURE — 76775 US EXAM ABDO BACK WALL LIM: CPT

## 2022-02-18 PROCEDURE — 84100 ASSAY OF PHOSPHORUS: CPT | Performed by: PHYSICIAN ASSISTANT

## 2022-02-18 PROCEDURE — 43239 EGD BIOPSY SINGLE/MULTIPLE: CPT | Performed by: SURGERY

## 2022-02-18 PROCEDURE — 83970 ASSAY OF PARATHORMONE: CPT | Performed by: HOSPITALIST

## 2022-02-18 PROCEDURE — 85014 HEMATOCRIT: CPT | Performed by: HOSPITALIST

## 2022-02-18 PROCEDURE — 83735 ASSAY OF MAGNESIUM: CPT | Performed by: HOSPITALIST

## 2022-02-18 PROCEDURE — 85014 HEMATOCRIT: CPT | Performed by: SURGERY

## 2022-02-18 PROCEDURE — 25010000002 MAGNESIUM SULFATE 2 GM/50ML SOLUTION: Performed by: HOSPITALIST

## 2022-02-18 PROCEDURE — 99232 SBSQ HOSP IP/OBS MODERATE 35: CPT | Performed by: SURGERY

## 2022-02-18 PROCEDURE — 25010000002 PROPOFOL 10 MG/ML EMULSION: Performed by: NURSE ANESTHETIST, CERTIFIED REGISTERED

## 2022-02-18 PROCEDURE — 76775 US EXAM ABDO BACK WALL LIM: CPT | Performed by: RADIOLOGY

## 2022-02-18 PROCEDURE — 94799 UNLISTED PULMONARY SVC/PX: CPT

## 2022-02-18 PROCEDURE — 84132 ASSAY OF SERUM POTASSIUM: CPT | Performed by: PHYSICIAN ASSISTANT

## 2022-02-18 PROCEDURE — 85018 HEMOGLOBIN: CPT | Performed by: HOSPITALIST

## 2022-02-18 RX ORDER — SODIUM CHLORIDE 0.9 % (FLUSH) 0.9 %
10 SYRINGE (ML) INJECTION AS NEEDED
Status: DISCONTINUED | OUTPATIENT
Start: 2022-02-18 | End: 2022-02-18 | Stop reason: HOSPADM

## 2022-02-18 RX ORDER — MAGNESIUM SULFATE HEPTAHYDRATE 40 MG/ML
2 INJECTION, SOLUTION INTRAVENOUS ONCE
Status: COMPLETED | OUTPATIENT
Start: 2022-02-18 | End: 2022-02-18

## 2022-02-18 RX ORDER — GABAPENTIN 300 MG/1
300 CAPSULE ORAL EVERY 8 HOURS SCHEDULED
Status: CANCELLED | OUTPATIENT
Start: 2022-02-18

## 2022-02-18 RX ORDER — POTASSIUM CHLORIDE 20 MEQ/1
40 TABLET, EXTENDED RELEASE ORAL EVERY 4 HOURS
Status: DISCONTINUED | OUTPATIENT
Start: 2022-02-18 | End: 2022-02-18 | Stop reason: HOSPADM

## 2022-02-18 RX ORDER — PROPOFOL 10 MG/ML
VIAL (ML) INTRAVENOUS AS NEEDED
Status: DISCONTINUED | OUTPATIENT
Start: 2022-02-18 | End: 2022-02-18 | Stop reason: SURG

## 2022-02-18 RX ORDER — POTASSIUM CHLORIDE 750 MG/1
40 CAPSULE, EXTENDED RELEASE ORAL AS NEEDED
Status: DISCONTINUED | OUTPATIENT
Start: 2022-02-18 | End: 2022-02-19 | Stop reason: HOSPADM

## 2022-02-18 RX ORDER — ONDANSETRON 2 MG/ML
4 INJECTION INTRAMUSCULAR; INTRAVENOUS AS NEEDED
Status: DISCONTINUED | OUTPATIENT
Start: 2022-02-18 | End: 2022-02-18 | Stop reason: HOSPADM

## 2022-02-18 RX ORDER — DROPERIDOL 2.5 MG/ML
0.62 INJECTION, SOLUTION INTRAMUSCULAR; INTRAVENOUS ONCE AS NEEDED
Status: DISCONTINUED | OUTPATIENT
Start: 2022-02-18 | End: 2022-02-18 | Stop reason: HOSPADM

## 2022-02-18 RX ORDER — IPRATROPIUM BROMIDE AND ALBUTEROL SULFATE 2.5; .5 MG/3ML; MG/3ML
3 SOLUTION RESPIRATORY (INHALATION) ONCE AS NEEDED
Status: DISCONTINUED | OUTPATIENT
Start: 2022-02-18 | End: 2022-02-18 | Stop reason: HOSPADM

## 2022-02-18 RX ORDER — SODIUM CHLORIDE, SODIUM LACTATE, POTASSIUM CHLORIDE, CALCIUM CHLORIDE 600; 310; 30; 20 MG/100ML; MG/100ML; MG/100ML; MG/100ML
100 INJECTION, SOLUTION INTRAVENOUS ONCE AS NEEDED
Status: DISCONTINUED | OUTPATIENT
Start: 2022-02-18 | End: 2022-02-18 | Stop reason: HOSPADM

## 2022-02-18 RX ORDER — SODIUM CHLORIDE, SODIUM LACTATE, POTASSIUM CHLORIDE, CALCIUM CHLORIDE 600; 310; 30; 20 MG/100ML; MG/100ML; MG/100ML; MG/100ML
125 INJECTION, SOLUTION INTRAVENOUS ONCE
Status: COMPLETED | OUTPATIENT
Start: 2022-02-18 | End: 2022-02-18

## 2022-02-18 RX ORDER — POTASSIUM CHLORIDE 7.45 MG/ML
10 INJECTION INTRAVENOUS
Status: DISCONTINUED | OUTPATIENT
Start: 2022-02-18 | End: 2022-02-19 | Stop reason: HOSPADM

## 2022-02-18 RX ORDER — FENTANYL CITRATE 50 UG/ML
50 INJECTION, SOLUTION INTRAMUSCULAR; INTRAVENOUS
Status: DISCONTINUED | OUTPATIENT
Start: 2022-02-18 | End: 2022-02-18 | Stop reason: HOSPADM

## 2022-02-18 RX ORDER — POTASSIUM CHLORIDE 1.5 G/1.77G
40 POWDER, FOR SOLUTION ORAL AS NEEDED
Status: DISCONTINUED | OUTPATIENT
Start: 2022-02-18 | End: 2022-02-19 | Stop reason: HOSPADM

## 2022-02-18 RX ORDER — SODIUM CHLORIDE 0.9 % (FLUSH) 0.9 %
10 SYRINGE (ML) INJECTION EVERY 12 HOURS SCHEDULED
Status: DISCONTINUED | OUTPATIENT
Start: 2022-02-18 | End: 2022-02-18 | Stop reason: HOSPADM

## 2022-02-18 RX ADMIN — PROPOFOL 100 MG: 10 INJECTION, EMULSION INTRAVENOUS at 13:49

## 2022-02-18 RX ADMIN — NICOTINE 1 PATCH: 14 PATCH, EXTENDED RELEASE TRANSDERMAL at 17:20

## 2022-02-18 RX ADMIN — PANTOPRAZOLE SODIUM 40 MG: 40 INJECTION, POWDER, FOR SOLUTION INTRAVENOUS at 00:09

## 2022-02-18 RX ADMIN — POTASSIUM PHOSPHATE, MONOBASIC AND POTASSIUM PHOSPHATE, DIBASIC 15 MMOL: 224; 236 INJECTION, SOLUTION INTRAVENOUS at 05:36

## 2022-02-18 RX ADMIN — GABAPENTIN 200 MG: 100 CAPSULE ORAL at 21:23

## 2022-02-18 RX ADMIN — PROPOFOL 100 MG: 10 INJECTION, EMULSION INTRAVENOUS at 13:39

## 2022-02-18 RX ADMIN — SODIUM CHLORIDE, POTASSIUM CHLORIDE, SODIUM LACTATE AND CALCIUM CHLORIDE: 600; 310; 30; 20 INJECTION, SOLUTION INTRAVENOUS at 13:39

## 2022-02-18 RX ADMIN — TRAZODONE HYDROCHLORIDE 25 MG: 50 TABLET ORAL at 21:23

## 2022-02-18 RX ADMIN — SODIUM CHLORIDE, PRESERVATIVE FREE 10 ML: 5 INJECTION INTRAVENOUS at 21:23

## 2022-02-18 RX ADMIN — ATENOLOL 25 MG: 25 TABLET ORAL at 17:20

## 2022-02-18 RX ADMIN — SODIUM CHLORIDE, PRESERVATIVE FREE 10 ML: 5 INJECTION INTRAVENOUS at 09:54

## 2022-02-18 RX ADMIN — MAGNESIUM SULFATE 2 G: 2 INJECTION INTRAVENOUS at 03:15

## 2022-02-18 RX ADMIN — ISOSORBIDE MONONITRATE 30 MG: 30 TABLET, EXTENDED RELEASE ORAL at 17:21

## 2022-02-18 RX ADMIN — PROPOFOL 100 MG: 10 INJECTION, EMULSION INTRAVENOUS at 13:44

## 2022-02-18 RX ADMIN — SODIUM CHLORIDE 75 ML/HR: 9 INJECTION, SOLUTION INTRAVENOUS at 17:20

## 2022-02-18 RX ADMIN — PANTOPRAZOLE SODIUM 40 MG: 40 INJECTION, POWDER, FOR SOLUTION INTRAVENOUS at 21:23

## 2022-02-18 NOTE — CONSULTS
Consulting physician:  Dr. Duran    Referring physician: Hospitalist    Date of consultation: 02/18/22     Chief complaint evaluation for EGD for possible upper GI bleed    Subjective     Patient is a 61 y.o. male who presented to the ED feeling lightheaded and with a several day history of dark tarry stools.  Patient was found to the anemic and received 2 units transfusion.  Patient states he has some mild left upper quadrant discomfort but no abdominal pain.  He has no history of peptic ulcer disease.  He has no history of recent NSAID use.  Patient is on Plavix and aspirin.  Patient reports no prior history of endoscopy or colonoscopy.  Patient is on antiplatelet therapy for his peripheral vascular disease.      Review of Systems  Review of Systems - General ROS: negative for - weight loss  Psychological ROS: negative for - behavioral disorder  Ophthalmic ROS: negative for - dry eyes  ENT ROS: negative for - vertigo or vocal changes  Hematological and Lymphatic ROS: negative for - swollen lymph nodes, DVT, PE.   Respiratory ROS: negative for - sputum changes or stridor.  Cardiovascular ROS: negative for - irregular heartbeat or murmur  Gastrointestinal ROS: Positive for dark stools  Genitourinary ROS: negative for - hematuria or incontinence  Musculoskeletal ROS: negative for - gait disturbance      History  Past Medical History:   Diagnosis Date   • Alcohol abuse    • Arthritis    • Carotid stenosis    • Cerebrovascular accident (CVA) due to occlusion of left carotid artery (HCC) 7/15/2021   • ED (erectile dysfunction)    • History of degenerative disc disease    • Hydrocele in adult 12/29/2020   • Hypertension    • Neuropathy    • Peripheral vascular disease (HCC)     s/p arthrectomy follow by balloon angioplasty and stents of right and left SFA   • Tobacco abuse    • Vitamin D deficiency      Past Surgical History:   Procedure Laterality Date   • APPENDECTOMY     • ARTERIOGRAM N/A 9/19/2019    Procedure:  Arteriogram;  Surgeon: Tong Azar MD;  Location:  [facility] CATH INVASIVE LOCATION;  Service: Cardiology   • BACK SURGERY      DISC RUPTURE REPAIR, L5   • CARDIAC CATHETERIZATION Right 10/29/2019    Procedure: Peripheral angiography;  Surgeon: Tong Azar MD;  Location:  [facility]ATION;  Service: Cardiovascular   • VASCULAR SURGERY Bilateral      Family History   Problem Relation Age of Onset   • Hypertension Mother    • Cancer Father         LUNG   • Diabetes Father    • Hypertension Father    • Heart attack Other         GRANDFATHER     Social History     Tobacco Use   • Smoking status: Current Every Day Smoker     Packs/day: 0.50     Years: 30.00     Pack years: 15.00     Types: Cigarettes   • Smokeless tobacco: Never Used   Vaping Use   • Vaping Use: Never used   Substance Use Topics   • Alcohol use: Not Currently     Alcohol/week: 24.0 standard drinks     Types: 24 Cans of beer per week   • Drug use: No     Medications Prior to Admission   Medication Sig Dispense Refill Last Dose   • aspirin 81 MG EC tablet Take 81 mg by mouth Daily.   2/17/2022 at Unknown time   • atenolol (Tenormin) 25 MG tablet Take 1 tablet by mouth Daily. 90 tablet 1 2/17/2022 at Unknown time   • cilostazol (PLETAL) 100 MG tablet Take 1 tablet by mouth Daily. 60 tablet 5 2/17/2022 at Unknown time   • clopidogrel (PLAVIX) 75 MG tablet Take 1 tablet by mouth Daily. 30 tablet 6 2/17/2022 at Unknown time   • folic acid-pyridoxine-cyanocobalamin (Folbic) 2.5-25-2 MG tablet tablet Take 1 tablet by mouth Daily. 90 tablet 1 2/17/2022 at Unknown time   • gabapentin (NEURONTIN) 800 MG tablet Take 1 tablet by mouth 3 (Three) Times a Day. 135 tablet 2 2/17/2022 at Unknown time   • isosorbide mononitrate (IMDUR) 30 MG 24 hr tablet Take 1 tablet by mouth Daily. 90 tablet 1 2/17/2022 at Unknown time   • methocarbamol (ROBAXIN) 750 MG tablet Take 1 tablet by mouth 4 (Four) Times a Day. 120 tablet 5 2/17/2022 at Unknown time   •  traZODone (DESYREL) 50 MG tablet Take 1 tablet by mouth Every Night. 90 tablet 1 2/16/2022 at Unknown time     Allergies:  Penicillins and Novocain [procaine]    Objective     Vital Signs  Temp:  [97 °F (36.1 °C)-98.4 °F (36.9 °C)] 98 °F (36.7 °C)  Heart Rate:  [68-92] 74  Resp:  [12-21] 20  BP: (113-182)/() 146/74    Physical Exam:  General:  This is a WD WN white male in no acute distress  Vital signs: Stable, afebrile  HEENT exam:  WNL. Sclerae are anicteric.  EOMI  Neck:  Supple, FROM.  No JVD.  Trachea midline  Lungs:  Respiratory effort normal. Auscultation: Clear, without wheezes, rhonchi, rales  Heart:  Regular rate and rhythm, without murmur, gallop, rub.  No pedal edema  Abdomen: Bowel sounds are present.  Patient reports tenderness in the left upper quadrant but there is no tenderness to palpation.  Initially had some tenderness on the right side but this was not reproduced with repeat palpation of the area.  There is no palpable mass  Musculoskeletal:  Muscle strength/tone is normal.    Psych:  Alert, oriented x 3.  Mood and affect are appropriate  Skin:  Warm with good turgor.  Without rash or lesion      Results Review:       Results from last 7 days   Lab Units 02/18/22  0550 02/18/22  0014 02/17/22  2251 02/17/22  1357 02/17/22  1357   WBC 10*3/mm3  --  7.36 7.94  --  9.39   HEMOGLOBIN g/dL 7.9* 8.3*  8.3* 8.8*   < > 6.3*   HEMATOCRIT % 25.4* 26.9*  26.9* 28.7*   < > 21.1*   PLATELETS 10*3/mm3  --  269 254  --  302   INR   --   --  1.04  --   --     < > = values in this interval not displayed.         Results from last 7 days   Lab Units 02/18/22  0830 02/18/22  0014 02/17/22  2251 02/17/22  1351 02/17/22  1351 02/16/22  1625 02/16/22  1625   SODIUM mmol/L  --  139 137  --  138   < > 137   POTASSIUM mmol/L 3.6 3.6 3.3*   < > 3.3*   < > 3.3*   MAGNESIUM mg/dL  --  1.6 1.7  --  1.6  --   --    CHLORIDE mmol/L  --  107 106  --  107   < > 102   CO2 mmol/L  --  21.9* 20.8*  --  22.0   < > 24.0    BUN mg/dL  --  25* 26*  --  30*   < > 31*   CREATININE mg/dL  --  1.84* 1.89*  --  2.11*   < > 1.93*   EGFR IF NONAFRICN AM mL/min/1.73  --  38* 37*  --  32*   < > 36*   CALCIUM mg/dL  --  10.8* 11.4*  --  11.9*   < > 13.4*   GLUCOSE mg/dL  --  138* 100*  --  118*   < > 115*   ALBUMIN g/dL  --   --   --   --  3.48*  --  4.20   BILIRUBIN mg/dL  --   --   --   --  <0.2  --  <0.2   ALK PHOS U/L  --   --   --   --  46  --  44   AST (SGOT) U/L  --   --   --   --  15  --  14   ALT (SGPT) U/L  --   --   --   --  11  --  8    < > = values in this interval not displayed.   Estimated Creatinine Clearance: 47.6 mL/min (A) (by C-G formula based on SCr of 1.84 mg/dL (H)).  No results found for: AMMONIA      No results found for: BLOODCX  No results found for: URINECX  No results found for: WOUNDCX  No results found for: STOOLCX    Imaging:  Imaging Results (Last 24 Hours)     Procedure Component Value Units Date/Time    US Renal Bilateral [[id number]] Resulted: 02/18/22 1038     Updated: 02/18/22 1038    CT Abdomen Pelvis Without Contrast [[id number]] Collected: 02/17/22 2250     Updated: 02/17/22 2252    Narrative:      CT Abdomen Pelvis WO    INDICATION:   Gastrointestinal hemorrhage. Anemia. Acute renal failure and hypokalemia.    TECHNIQUE:   CT of the abdomen and pelvis without IV contrast. Coronal and sagittal reconstructions were obtained.  Radiation dose reduction techniques included automated exposure control or exposure modulation based on body size. Count of known CT and cardiac nuc  med studies performed in previous 12 months: 5.     COMPARISON:   3/29/2021    FINDINGS:  Abdomen: Minimal left basilar atelectasis. There is no effusion. Small hiatal hernia. Aorta demonstrates moderate atherosclerotic change. Negative spleen and stable adrenal hyperplasia bilaterally. The pancreas is negative and gallbladder are  unremarkable. Liver negative. There is no radiopaque stone or hydronephrosis of either kidney. There is a  left renal cyst. Nonspecific perinephric stranding bilaterally and increased from prior study. There is no adenopathy.    Pelvis: The bladder is negative and prostate unremarkable. No drainable fluid collection. Trace free fluid in the pelvis. The bowel is nonobstructed. Appendix not identified but no secondary sign of appendicitis. Negative inguinal canals. No new  suspicious bone lesion. Degenerative change in the thoracolumbar spine. Chronic grade 1 anterolisthesis of L4 on L5.  negative.      Impression:        1. No clearly acute process in the abdomen or pelvis. Interval increase in nonspecific perinephric stranding bilaterally but no hydronephrosis or radiopaque stone. Trace nonspecific free fluid in the pelvis, etiology unclear.  2. Stable adrenal hyperplasia.  3. Degenerative changes in the lumbar spine..          Signer Name: Artemio Oviedo MD   Signed: 2/17/2022 10:50 PM   Workstation Name: [facility]    [facility]          CT report and images were reviewed.  I agree with the assessment      Impression:  Patient Active Problem List   Diagnosis Code   • Tobacco abuse Z72.0   • Alcohol abuse F10.10   • ED (erectile dysfunction) N52.9   • History of degenerative disc disease Z87.39   • Neuropathy G62.9   • Hypertension I10   • PVD (peripheral vascular disease) with claudication ([facility]) I73.9   • Carotid artery stenosis I65.29   • Hyponatremia E87.1   • Obesity (BMI 30-39.9) E66.9   • Hydrocele in adult N43.3   • Flank pain R10.9   • Gross hematuria R31.0   • Constipation K59.00   • Blurry vision, left eye H53.8   • Cerebrovascular accident (CVA) due to occlusion of left carotid artery (HCC) I63.232   • Gastrointestinal hemorrhage K92.2       Impression:  Iron deficiency anemia with history of black tarry stools    Plan: Proceed with EGD      Discussion:  Procedure and indications discussed with the patient    Christine Duran MD  02/18/22  10:44 EST    Time: Time  spent:    Please note that portions of this note were completed with a voice recognition program.

## 2022-02-18 NOTE — ANESTHESIA POSTPROCEDURE EVALUATION
Patient: Gurwinder Harding    Procedure Summary     Date: 02/18/22 Room / Location:  [facility] OR 09 / [facility] OR    Anesthesia Start: 1336 Anesthesia Stop: 1359    Procedure: ESOPHAGOGASTRODUODENOSCOPY (N/A Esophagus) Diagnosis:       Gastrointestinal hemorrhage, unspecified gastrointestinal hemorrhage type      (Gastrointestinal hemorrhage, unspecified gastrointestinal hemorrhage type [K92.2])    Surgeons: Christine Duran MD Provider: Rafael Robbins MD    Anesthesia Type: general ASA Status: 3          Anesthesia Type: general    Vitals  Vitals Value Taken Time   /88 02/18/22 1428   Temp 97.9 °F (36.6 °C) 02/18/22 1359   Pulse 78 02/18/22 1428   Resp 20 02/18/22 1428   SpO2 98 % 02/18/22 1428           Post Anesthesia Care and Evaluation    Patient location during evaluation: PHASE II  Patient participation: complete - patient participated  Level of consciousness: awake  Pain score: 0  Pain management: adequate  Airway patency: patent  Anesthetic complications: No anesthetic complications  PONV Status: none  Cardiovascular status: hemodynamically stable  Respiratory status: room air  Hydration status: acceptable

## 2022-02-18 NOTE — PROGRESS NOTES
[facility] HOSPITALIST PROGRESS NOTE     Patient Identification:  Name:  Gurwinder Harding  Age:  61 y.o.  Sex:  male  :  1960  MRN:  [id number]  Visit Number:  [id number]  ROOM: [address] [place] [address]     Primary Care Provider:  Pastora Person APRN     Date of Admission: 2022    Length of stay in inpatient status:  1    Subjective     Chief Compliant:    Chief Complaint   Patient presents with   • Abnormal Bloodwork     History of Presenting Illness: 61-year-old male admitted on 2022 with hematemesis and dark stools.  The patient has a history of alcohol abuse but no history of esophageal varices.  His initial hemoglobin was 6.3; he has received 2 units of packed red blood cells so far.  The patient tells me today that he is not had any hematemesis nor black stools for 2 days.  He denies chest pain, trouble breathing, coughing.  He also denies nausea, vomiting, and diarrhea.  He is upset that he has not been able to eat.  I saw the patient with bedside nurse Caitlyn; she states that surgery had already been in there today to evaluate the patient and now the patient is to have an EGD today.    Objective     Current Hospital Meds:atenolol, 25 mg, Oral, Daily  gabapentin, 200 mg, Oral, Q8H  isosorbide mononitrate, 30 mg, Oral, Daily  nicotine, 1 patch, Transdermal, Q24H  nicotine, 1 patch, Transdermal, Once  pantoprazole, 40 mg, Intravenous, Q12H  potassium phosphate, 15 mmol, Intravenous, Once  sodium chloride, 10 mL, Intravenous, Q12H    sodium chloride, 125 mL/hr, Last Rate: 125 mL/hr (22 2042)      Current Antimicrobial Therapy:  Anti-Infectives (From admission, onward)    None        Current Diuretic Therapy:  Diuretics (From admission, onward)    None        ----------------------------------------------------------------------------------------------------------------------  Vital Signs:  Temp:  [97 °F (36.1 °C)-98.4 °F (36.9 °C)] 98.4 °F (36.9 °C)  Heart Rate:  [68-92] 68  Resp:  [12-21]  20  BP: (113-182)/() 143/75  SpO2:  [95 %-100 %] 98 %  on   ;   Device (Oxygen Therapy): room air  Body mass index is 24.97 kg/m².    Wt Readings from Last 3 Encounters:   02/18/22 78.9 kg (174 lb)   02/16/22 78 kg (172 lb)   11/09/21 81 kg (178 lb 9.6 oz)     Intake & Output (last 3 days)       02/15 0701  02/16 0700 02/16 0701  02/17 0700 02/17 0701  02/18 0700 02/18 0701 02/19 0700    Blood   918.8     Total Intake(mL/kg)   918.8 (11.6)     Net   +918.8                 NPO Diet  ----------------------------------------------------------------------------------------------------------------------  Physical Exam  Vitals reviewed.   Constitutional:       Appearance: He is well-developed and normal weight.   HENT:      Head: Normocephalic and atraumatic.      Right Ear: External ear normal.      Left Ear: External ear normal.      Nose: Nose normal.   Eyes:      General: No scleral icterus.        Right eye: No discharge.         Left eye: No discharge.      Pupils: Pupils are equal, round, and reactive to light.   Cardiovascular:      Rate and Rhythm: Normal rate and regular rhythm.      Pulses: Normal pulses.      Heart sounds: No murmur heard.      Pulmonary:      Effort: Pulmonary effort is normal. No respiratory distress.      Breath sounds: Normal breath sounds. No wheezing or rales.   Abdominal:      General: Abdomen is flat. Bowel sounds are normal. There is no distension.      Palpations: Abdomen is soft.   Musculoskeletal:         General: No swelling, deformity or signs of injury.   Skin:     Capillary Refill: Capillary refill takes less than 2 seconds.      Coloration: Skin is not jaundiced or pale.      Findings: No bruising.   Neurological:      Mental Status: He is alert and oriented to person, place, and time. Mental status is at baseline.      Cranial Nerves: No cranial nerve deficit.   Psychiatric:         Attention and Perception: Attention normal.         Mood and Affect: Affect is angry.          Speech: Speech normal.         Behavior: Behavior normal. Behavior is cooperative.         Thought Content: Thought content normal.         Cognition and Memory: Cognition normal.         Judgment: Judgment normal.       ----------------------------------------------------------------------------------------------------------------------  Tele: Normal sinus rhythm with heart rates in the 70's with multiple PVCs.  I personally reviewed the telemetry strips.      Last echocardiogram:  Results for orders placed during the hospital encounter of 08/31/20    Transthoracic Echo Complete With Contrast if Necessary Per Protocol    Interpretation Summary  · Left ventricular wall thickness is consistent with mild concentric hypertrophy.  · Left ventricular systolic function is normal, EF 56-60%.  · Left ventricular diastolic dysfunction (grade I) consistent with impaired relaxation.    I have personally read the ECHO final report.    ----------------------------------------------------------------------------------------------------------------------  LABS:    CBC and coagulation:  Results from last 7 days   Lab Units 02/18/22  0550 02/18/22  0014 02/17/22  2251 02/17/22  1357 02/16/22  1625   WBC 10*3/mm3  --  7.36 7.94 9.39 9.06   HEMOGLOBIN g/dL 7.9* 8.3*  8.3* 8.8* 6.3* 6.5*   HEMATOCRIT % 25.4* 26.9*  26.9* 28.7* 21.1* 20.9*   MCV fL  --  82.3 83.2 81.5 78.0*   MCHC g/dL  --  30.9* 30.7* 29.9* 31.1*   PLATELETS 10*3/mm3  --  269 254 302 293   INR   --   --  1.04  --   --      Renal and electrolytes:  Results from last 7 days   Lab Units 02/18/22  0014 02/17/22  2251 02/17/22  1351 02/16/22  1625   SODIUM mmol/L 139 137 138 137   POTASSIUM mmol/L 3.6 3.3* 3.3* 3.3*   MAGNESIUM mg/dL 1.6 1.7 1.6  --    CHLORIDE mmol/L 107 106 107 102   CO2 mmol/L 21.9* 20.8* 22.0 24.0   BUN mg/dL 25* 26* 30* 31*   CREATININE mg/dL 1.84* 1.89* 2.11* 1.93*   EGFR IF NONAFRICN AM mL/min/1.73 38* 37* 32* 36*   CALCIUM mg/dL 10.8* 11.4*  11.9* 13.4*   PHOSPHORUS mg/dL 1.8* 2.2*  --   --    GLUCOSE mg/dL 138* 100* 118* 115*     Estimated Creatinine Clearance: 47.6 mL/min (A) (by C-G formula based on SCr of 1.84 mg/dL (H)).    Liver and pancreatic function:  Results from last 7 days   Lab Units 02/17/22  1351 02/16/22  1625   ALBUMIN g/dL 3.48* 4.20   BILIRUBIN mg/dL <0.2 <0.2   ALK PHOS U/L 46 44   AST (SGOT) U/L 15 14   ALT (SGPT) U/L 11 8     Endocrine function:  Lab Results   Component Value Date    HGBA1C 5.60 08/13/2020     Glucose levels from the CMP:  Results from last 7 days   Lab Units 02/18/22  0014 02/17/22  2251 02/17/22  1351 02/16/22  1625   GLUCOSE mg/dL 138* 100* 118* 115*     Lab Results   Component Value Date    TSH 1.030 06/29/2021    FREET4 1.11 02/07/2020     Cultures:  Lab Results   Component Value Date    COLORU Yellow 07/15/2021    CLARITYU Clear 07/15/2021    PHUR 7.0 07/15/2021    GLUCOSEU Negative 07/15/2021    KETONESU Negative 07/15/2021    BLOODU Negative 07/15/2021    NITRITEU Positive (A) 07/15/2021    LEUKOCYTESUR Negative 07/15/2021    BILIRUBINUR Negative 07/15/2021    UROBILINOGEN 0.2 E.U./dL 07/15/2021    RBCUA None Seen 07/15/2021    WBCUA 0-2 07/15/2021    BACTERIA 3+ (A) 07/15/2021     Microbiology Results (last 10 days)     Procedure Component Value - Date/Time    COVID PRE-OP / PRE-PROCEDURE SCREENING ORDER (NO ISOLATION) - Swab, Nasopharynx [[id number]]  (Normal) Collected: 02/17/22 1626    Lab Status: Final result Specimen: Swab from Nasopharynx Updated: 02/17/22 1657    Narrative:      The following orders were created for panel order COVID PRE-OP / PRE-PROCEDURE SCREENING ORDER (NO ISOLATION) - Swab, Nasopharynx.  Procedure                               Abnormality         Status                     ---------                               -----------         ------                     COVID-19 and FLU A/B PCR...[[id number]]  Normal              Final result                 Please view results for these  tests on the individual orders.    COVID-19 and FLU A/B PCR - Swab, Nasopharynx [[id number]]  (Normal) Collected: 02/17/22 1626    Lab Status: Final result Specimen: Swab from Nasopharynx Updated: 02/17/22 1657     COVID19 Not Detected     Influenza A PCR Not Detected     Influenza B PCR Not Detected    Narrative:      Fact sheet for providers: https://www.fda.gov/media/328982/download    Fact sheet for patients: https://www.fda.gov/media/751389/download    Test performed by PCR.          I have personally looked at the labs and they are summarized above.  ----------------------------------------------------------------------------------------------------------------------  Detailed radiology reports for the last 24 hours:    Imaging Results (Last 24 Hours)     Procedure Component Value Units Date/Time    CT Abdomen Pelvis Without Contrast [[id number]] Collected: 02/17/22 2250     Updated: 02/17/22 2252    Narrative:      CT Abdomen Pelvis WO    INDICATION:   Gastrointestinal hemorrhage. Anemia. Acute renal failure and hypokalemia.    TECHNIQUE:   CT of the abdomen and pelvis without IV contrast. Coronal and sagittal reconstructions were obtained.  Radiation dose reduction techniques included automated exposure control or exposure modulation based on body size. Count of known CT and cardiac nuc  med studies performed in previous 12 months: 5.     COMPARISON:   3/29/2021    FINDINGS:  Abdomen: Minimal left basilar atelectasis. There is no effusion. Small hiatal hernia. Aorta demonstrates moderate atherosclerotic change. Negative spleen and stable adrenal hyperplasia bilaterally. The pancreas is negative and gallbladder are  unremarkable. Liver negative. There is no radiopaque stone or hydronephrosis of either kidney. There is a left renal cyst. Nonspecific perinephric stranding bilaterally and increased from prior study. There is no adenopathy.    Pelvis: The bladder is negative and prostate unremarkable. No drainable  fluid collection. Trace free fluid in the pelvis. The bowel is nonobstructed. Appendix not identified but no secondary sign of appendicitis. Negative inguinal canals. No new  suspicious bone lesion. Degenerative change in the thoracolumbar spine. Chronic grade 1 anterolisthesis of L4 on L5.  negative.      Impression:        1. No clearly acute process in the abdomen or pelvis. Interval increase in nonspecific perinephric stranding bilaterally but no hydronephrosis or radiopaque stone. Trace nonspecific free fluid in the pelvis, etiology unclear.  2. Stable adrenal hyperplasia.  3. Degenerative changes in the lumbar spine..          Signer Name: Artemio Oviedo MD   Signed: 2/17/2022 10:50 PM   Workstation Name: [email or web address]    [facility]        I have personally looked at the radiology images and I have read the available final report.    Assessment & Plan      -Critical iron deficiency, borderline microcytic, anemia, secondary to acute vs subacute blood loss from upper GI bleed in a patient with known alcohol use  -Acute kidney injury on top of suspected chronic kidney disease stage IIIa with baseline creatinine 1.2-1.3 and admission creatinine of 1.93-2.11  -Acute hypokalemia, acute hypomagnesemia, and acute hypophosphatemia  -Heart failure with preserved ejection fraction found on echocardiogram from 2020, currently not in an acute exacerbation  -Hypercalcemia, suspect related to dehydration/acute kidney injury with return to normal levels after IV fluid administration  -History of carotid artery stenosis, PVD s/p angioplasty, stenting, mini strokes: per patient  -History of hypertension  -History alcoho and tobacco abuse    I appreciate general surgery seeing the patient and I await the results of the EGD today.  If the EGD shows no active bleeding, then I anticipate that we will be able to start him on a diet.  Since his acute kidney injury is improving, I will cut the IV fluids in  half in anticipation of him being able to eat today.  His calcium level has improved with the IV fluids we will continue to monitor this while he is hospitalized.  His blood pressures are somewhat controlled; we will continue to monitor these closely and possibly make medicine adjustments tomorrow.  Thus, we will continue with the same home doses of Imdur and atenolol.  Please note that his glucose levels have been controlled and thus we will check these once a day with his morning labs.  Because the patient's hemoglobin has not dropped since admission very much and the patient does not have any active bleeding symptoms, I will decrease the hemoglobin and hematocrit determinations to every 12 hours.  CIWA so far has been 3; no prn meds have been given nor have been ordered at this time.  We will continue monitoring him using the CIWA scores.  Because of his alcohol use, I will start him on thiamine and folate.    VTE Prophylaxis:   Mechanical Order History:      Ordered        02/17/22 2236  Place Sequential Compression Device  Once            02/17/22 2236  Maintain Sequential Compression Device  Continuous                    Pharmalogical Order History:     None        The patient is high risk due to the following diagnoses/reasons: Alcoholic with severe anemia on admission    Disposition: Undetermined at this time    Fred Wilks MD  [facility] Hospitalist  02/18/22  09:12 EST

## 2022-02-18 NOTE — PLAN OF CARE
Goal Outcome Evaluation:  Received Pt from ER after receiving 2 units of blood. Pt has been resting in bed. Held NPO since midnight. Electrolytes being replaced per protocol. No complaints at this time. Will continue to follow plan of care.

## 2022-02-18 NOTE — PAYOR COMM NOTE
"[facility]  NPI:[id number]    Utilization Review  Contact: Lisa Rhoades RN  Phone: [phone]  Fax:[phone]    INITIATE INPATIENT AUTHORIZATION   K92.2    Janine Harding (61 y.o. Male)             Date of Birth Social Security Number Address Home Phone MRN    1960  [address] [place] [address] [phone] [id number]    Yazidi Marital Status             Religious        Admission Date Admission Type Admitting Provider Attending Provider Department, Room/Bed    22 Emergency  Fred Wilks MD [facility] 3 SOUTH, 3313/2S    Discharge Date Discharge Disposition Discharge Destination                         Attending Provider: Fred Wilks MD    Allergies: Penicillins, Novocain [Procaine]    Isolation: None   Infection: None   Code Status: CPR   Advance Care Planning Activity    Ht: 177.8 cm (70\")   Wt: 78.9 kg (174 lb)    Admission Cmt: None   Principal Problem: None                Active Insurance as of 2022     Primary Coverage     Payor Plan Insurance Group Employer/Plan Group    [employer] [place] [employer] [place]ICA[place]      Payor Plan Address Payor Plan Phone Number Payor Plan Fax Number Effective Dates    [address] [address] [phone]  2021 - None Entered    [facility] [id number]       Subscriber Name Subscriber Birth Date Member ID       JANINE HARDING 1960 [id number]                 Emergency Contacts      (Rel.) Home Phone Work Phone Mobile Phone    Patsy Harding (Spouse) [phone] -- [phone]               History & Physical      Javy Dupont MD at 22          Hospitalist History and Physical        Patient Identification  Name: Janine Harding  Age/Sex: 61 y.o. male  :  1960        MRN: [id number]  Visit Number: [id number]  Admit Date: 2022   PCP: Pastora Person APRN          Chief complaint abnormal labs, feeling lightheaded, dark tarry stools    History of Present " Illness:  Patient is a 61 y.o. male with history of alcohol abuse, arthritis, carotid stenosis followed by specialist in [facility], erectile dysfunction, HTN, neuropathy following back surgery 20+ years ago, PVD s/p stent right leg and angioplasty left leg, tobacco abuse, mini strokes and vitamin D deficiency, who initially presented to his PCP's office yesterday for routine checkup. While there, he informed her of feeling more lightheaded and generally weak for the last 4-5 days, especially upon standing. He also reported development of dark, tarry stools during this period of time. Upon further questioning, he reports to me that he has had some midline abdominal discomfort as well, but this was earlier today and has since resolved. His PCP ordered lab work yesterday; today her office called the patient with results, showing critical anemia with hemoglobin of 6.5. He was instructed to come to the ED for blood transfusion. Upon arrival, vitals were stable. Labs confirmed critical hemoglobin of 6.3, with MCV 81 (78 the day prior), normal platelets 302K. In addition, K+ was mildly low at 3.3 and creatinine was elevated at 2.11 (baseline appears to be around 1.1-1.4). BUN was 30, with ratio of 14.2. LFTs were normal. Iron profile showed low iron 15 and iron sat 3, with TIBC 505. Magnesium was 1.6. Patient is being admitted to the telemetry unit for further management.     Review of Systems  Review of Systems   Constitutional: Positive for activity change and fatigue. Negative for appetite change, chills, diaphoresis, fever and unexpected weight change.   HENT: Negative for congestion, postnasal drip, rhinorrhea, sinus pressure, sinus pain and sore throat.    Eyes: Negative for photophobia, pain, discharge, redness, itching and visual disturbance.   Respiratory: Negative for cough, shortness of breath and wheezing.    Cardiovascular: Negative for chest pain, palpitations and leg swelling.   Gastrointestinal: Positive  "for abdominal pain. Negative for abdominal distention, constipation, diarrhea, nausea and vomiting.        Dark tarry stool x5 days   Genitourinary: Negative for difficulty urinating, dysuria, flank pain, frequency and hematuria.   Musculoskeletal: Negative for arthralgias, back pain, joint swelling, myalgias, neck pain and neck stiffness.   Skin: Negative for color change, pallor, rash and wound.   Neurological: Positive for dizziness, weakness (generalized), light-headedness and numbness (chronic, in lower extremities R>L). Negative for tremors, syncope and headaches.   Hematological: Negative for adenopathy. Does not bruise/bleed easily.   Psychiatric/Behavioral: Negative for agitation, behavioral problems and confusion.       History  Past Medical History:   Diagnosis Date   • Alcohol abuse    • Arthritis    • Carotid stenosis    • ED (erectile dysfunction)    • History of degenerative disc disease    • Hypertension    • Neuropathy    • Peripheral vascular disease (HCC)     s/p arthrectomy follow by balloon angioplasty and stents of right and left SFA   • Tobacco abuse    • Vitamin D deficiency      *  \"mini strokes\"    Past Surgical History:   Procedure Laterality Date   • APPENDECTOMY     • ARTERIOGRAM N/A 9/19/2019    Procedure: Arteriogram;  Surgeon: Tong Azar MD;  Location:  [facility] CATH INVASIVE LOCATION;  Service: Cardiology   • BACK SURGERY      DISC RUPTURE REPAIR, L5   • CARDIAC CATHETERIZATION Right 10/29/2019    Procedure: Peripheral angiography;  Surgeon: Tong Azar MD;  Location:  [facility] CATH INVASIVE LOCATION;  Service: Cardiovascular   • VASCULAR SURGERY Bilateral      Family History   Problem Relation Age of Onset   • Hypertension Mother    • Cancer Father         LUNG   • Diabetes Father    • Hypertension Father    • Heart attack Other         GRANDFATHER     Social History     Tobacco Use   • Smoking status: Current Every Day Smoker     Packs/day: 0.50     Years: 30.00     Pack years: " 15.00     Types: Cigarettes   • Smokeless tobacco: Never Used   Vaping Use   • Vaping Use: Never used   Substance Use Topics   • Alcohol use: Not Currently     Alcohol/week: 24.0 standard drinks     Types: 24 Cans of beer per week   • Drug use: No     (Not in a hospital admission)    Allergies:  Penicillins and Novocain [procaine]    Objective     Vital Signs  Temp:  [97 °F (36.1 °C)-98.2 °F (36.8 °C)] 98.2 °F (36.8 °C)  Heart Rate:  [75-89] 75  Resp:  [12-20] 20  BP: (113-158)/(62-93) 157/81  Body mass index is 24.68 kg/m².    Physical Exam:  Physical Exam  Constitutional:       General: He is not in acute distress.     Appearance: Normal appearance. He is ill-appearing.      Comments: Appears pale   HENT:      Head: Normocephalic and atraumatic.      Right Ear: External ear normal.      Left Ear: External ear normal.      Nose: Nose normal.      Mouth/Throat:      Mouth: Mucous membranes are moist.      Pharynx: Oropharynx is clear.   Eyes:      Extraocular Movements: Extraocular movements intact.      Conjunctiva/sclera: Conjunctivae normal.      Pupils: Pupils are equal, round, and reactive to light.   Cardiovascular:      Rate and Rhythm: Normal rate and regular rhythm.      Heart sounds: Normal heart sounds. No murmur heard.       Comments: Pedal pulses palpable but diminished  Pulmonary:      Effort: Pulmonary effort is normal. No respiratory distress.      Breath sounds: Normal breath sounds. No wheezing or rales.   Abdominal:      General: Abdomen is flat. Bowel sounds are normal. There is no distension.      Palpations: Abdomen is soft.      Tenderness: There is no abdominal tenderness.   Musculoskeletal:         General: Normal range of motion.      Cervical back: Normal range of motion and neck supple. No tenderness.      Right lower leg: Edema (trace; reportedly chronic) present.      Left lower leg: No edema.   Lymphadenopathy:      Cervical: No cervical adenopathy.   Skin:     General: Skin is warm  and dry.      Capillary Refill: Capillary refill takes less than 2 seconds.      Coloration: Skin is pale. Skin is not jaundiced.      Findings: No bruising or lesion.   Neurological:      General: No focal deficit present.      Mental Status: He is alert and oriented to person, place, and time.   Psychiatric:         Mood and Affect: Mood normal.         Behavior: Behavior normal.         Thought Content: Thought content normal.         Judgment: Judgment normal.           Results Review:       Lab Results:  Results from last 7 days   Lab Units 02/17/22  1357 02/16/22  1625   WBC 10*3/mm3 9.39 9.06   HEMOGLOBIN g/dL 6.3* 6.5*   PLATELETS 10*3/mm3 302 293         Results from last 7 days   Lab Units 02/17/22  1351 02/16/22  1625   SODIUM mmol/L 138 137   POTASSIUM mmol/L 3.3* 3.3*   CHLORIDE mmol/L 107 102   CO2 mmol/L 22.0 24.0   BUN mg/dL 30* 31*   CREATININE mg/dL 2.11* 1.93*   CALCIUM mg/dL 11.9* 13.4*   GLUCOSE mg/dL 118* 115*     Results from last 7 days   Lab Units 02/17/22  1351   MAGNESIUM mg/dL 1.6     No results found for: HGBA1C  Results from last 7 days   Lab Units 02/17/22  1351 02/16/22  1625   BILIRUBIN mg/dL <0.2 <0.2   ALK PHOS U/L 46 44   AST (SGOT) U/L 15 14   ALT (SGPT) U/L 11 8                       I have reviewed the patient's laboratory results.    Imaging:  Imaging Results (Last 72 Hours)     Procedure Component Value Units Date/Time    CT Abdomen Pelvis Without Contrast [[id number]] Resulted: 02/17/22 2005     Updated: 02/17/22 2005          I have personally reviewed the patient's radiologic imaging.        EKG:   Normal sinus rhythm, HR 70, QTc 393  Normal ECG  Confirmed by Cathleen Vivas ([id number]) on 2/17/2022 5:21:47 PM    I have personally reviewed the patient's EKG.        Assessment/Plan     - Critical iron deficiency, borderline microcytic, anemia, suspect secondary to acute vs subacute blood loss from upper GI bleed: 2 units pRBC ordered in ED. Monitor H/H q6h. Clear liquid diet for  now; NPO after midnight. IV protonix ordered BID. General surgery consulted for consideration of EGD in the morning. For now, hold ASA, plavix and pletal given concerns for upper GI bleed.   - Acute on borderline stage III CKD: creatinine 2.11, baseline around 1.1-1.4, with baseline GFR 57-64. Hydrate with IV fluids. Renally dose gabapentin.   - Hypercalcemia: possibly related simply to dehydration/PAVITHRA. Ca actually 13 yesterday's labs, but 11.9 today. Monitor response to IV fluid hydration. Check PTH, PTHrp.   - Carotid artery stenosis, PVD s/p angioplasty, stenting, mini strokes: per patient, has been 2 years or so since last intervention (found cath report, actually in 10/2019). Holding ASA, plavix and pletal in light of critical anemia and concern for upper GI bleed as noted above.   - Hypertension: continue atenolol with hold parameters in place.  - History alcohol, tobacco abuse: nicotine patch already ordered in ED which will be continued. Patient reports only drinks about once a month now for the last year; his last intake was on [employer] Sunday, 4 days ago, at which time he consumed 4 mixed drinks. If this is so, then he should not be in danger of developing withdrawal, but given history will place on CIWA monitoring in case he is not being truthful.     DVT/GI Prophylaxis: SCDs, IV protonix as noted above    Estimated Length of Stay >2 midnights    I discussed the patient's findings, assessment and plan with the patient and ED staff.    * patient is high risk due to critical anemia suspect acute blood loss, suspect upper GI bleed, acute on borderline stage III CKD, PVD    Javy Dupont MD  02/17/22  20:35 EST      Electronically signed by Javy Dupont MD at 02/17/22 4388          Emergency Department Notes      Oscar Wang PA at 02/17/22 1318                 MEDICAL SCREENING:    Reason for Visit: Sent by primary care due to abnormal hemoglobin.    Patient initially seen in triage.   The patient was advised further evaluation and diagnostic testing will be needed, some of the treatment and testing will be initiated in the lobby in order to begin the process.  The patient will be returned to the waiting area for the time being and possibly be re-assessed by a subsequent ED provider.  The patient will be brought back to the treatment area in as timely manner as possible.         Oscar Wang PA  02/17/22 1318      Electronically signed by Oscar Wang PA at 02/17/22 1318     Joce Mark RN at 02/17/22 1439        Patient reports using a walker at home due to fatigue and weakness     Joce Mark RN  02/17/22 1439      Electronically signed by Joce Mark RN at 02/17/22 1439     Neal Cline MD at 02/17/22 1441            [facility]  emergency department encounter        Pt Name: Gurwinder Harding  MRN: [id number]  Birthdate 1960  Date of evaluation: 2/17/2022    Chief Complaint   Patient presents with   • Abnormal Bloodwork             HISTORY OF PRESENT ILLNESS:   Gurwinder Harding is a 61 y.o. male PMH significant for HTN, PVD, neuropathy, DDD, carotid stenosis.  Patient presents for anemia.  Patient had outpatient routine labs drawn yesterday with noted hemoglobin of 6.5.  Received call from primary care provider suggesting he be evaluated in the emergency department for blood transfusion.  Chart review reveals most recent hemoglobin otherwise was July 15, 2021 7 months earlier at 13.1.    Patient endorses fatigue and generalized weakness.  Wife reports this significantly worsened 3 days ago.  He endorses dark stools over the past 2 days.  Denies any prior known history of GI bleed.  Denies that the stools are tarry, denies bright red blood per rectum.  Patient denies chest pain shortness of breath.  He endorses rhinorrhea but broadly denies ROS otherwise.    Denies prior history of blood transfusion.    Iron labs from yesterday also include:  Results for  JANINE MSOS (MRN [id number]) as of 2/17/2022 15:08   Ref. Range 2/16/2022 16:25   Iron Latest Ref Range: 59 - 158 mcg/dL 15 (L)   Iron Saturation Latest Ref Range: 20 - 50 % 3 (L)   Transferrin Latest Ref Range: 200 - 360 mg/dL 339   TIBC Latest Ref Range: 298 - 536 mcg/dL 505       No other exacerbating or alleviating factors other than as noted above.  Severity: Severe    PCP: Pastora Person APRN          REVIEW OF SYSTEMS:     Review of Systems   Constitutional: Positive for fatigue. Negative for fever.   HENT: Positive for rhinorrhea. Negative for congestion.    Eyes: Negative for visual disturbance.   Respiratory: Negative for cough and shortness of breath.    Cardiovascular: Negative for chest pain.   Gastrointestinal: Negative for abdominal pain, nausea and vomiting.   Genitourinary: Negative for dysuria.   Musculoskeletal: Negative for myalgias.   Skin: Negative for rash.   Neurological: Positive for weakness (Generalized nonfocal). Negative for headaches.   Psychiatric/Behavioral: Negative for confusion.       Review of systems otherwise as per HPI.          PREVIOUS HISTORY:         Past Medical History:   Diagnosis Date   • Alcohol abuse    • Arthritis    • Carotid stenosis    • ED (erectile dysfunction)    • History of degenerative disc disease    • Hypertension    • Neuropathy    • Peripheral vascular disease (HCC)     s/p arthrectomy follow by balloon angioplasty and stents of right and left SFA   • Tobacco abuse    • Vitamin D deficiency            Past Surgical History:   Procedure Laterality Date   • APPENDECTOMY     • ARTERIOGRAM N/A 9/19/2019    Procedure: Arteriogram;  Surgeon: Tong Azar MD;  Location:  [facility] CATH INVASIVE LOCATION;  Service: Cardiology   • BACK SURGERY      DISC RUPTURE REPAIR, L5   • CARDIAC CATHETERIZATION Right 10/29/2019    Procedure: Peripheral angiography;  Surgeon: Tong Azar MD;  Location:  [facility] CATH INVASIVE LOCATION;  Service: Cardiovascular   • VASCULAR  SURGERY Bilateral            Social History     Socioeconomic History   • Marital status:    • Number of children: 1   Tobacco Use   • Smoking status: Current Every Day Smoker     Packs/day: 0.50     Years: 30.00     Pack years: 15.00     Types: Cigarettes   • Smokeless tobacco: Never Used   Vaping Use   • Vaping Use: Never used   Substance and Sexual Activity   • Alcohol use: Not Currently     Alcohol/week: 24.0 standard drinks     Types: 24 Cans of beer per week   • Drug use: No   • Sexual activity: Defer           Family History   Problem Relation Age of Onset   • Hypertension Mother    • Cancer Father         LUNG   • Diabetes Father    • Hypertension Father    • Heart attack Other         GRANDFATHER         Current Outpatient Medications   Medication Instructions   • aspirin 81 mg, Oral, Daily   • atenolol (TENORMIN) 25 mg, Oral, Daily   • cilostazol (PLETAL) 100 mg, Oral, Daily   • clopidogrel (PLAVIX) 75 mg, Oral, Daily   • folic acid-pyridoxine-cyanocobalamin (Folbic) 2.5-25-2 MG tablet tablet 1 tablet, Oral, Daily   • gabapentin (NEURONTIN) 800 mg, Oral, 3 Times Daily   • isosorbide mononitrate (IMDUR) 30 mg, Oral, Daily   • methocarbamol (ROBAXIN) 750 mg, Oral, 4 Times Daily   • traZODone (DESYREL) 50 mg, Oral, Nightly         Allergies:  Penicillins and Novocain [procaine]    Medications, allergies and past medical, surgical, family, and social history reviewed.        PHYSICAL EXAM:     Physical Exam  Vitals and nursing note reviewed.   Constitutional:       General: He is not in acute distress.     Appearance: Normal appearance. He is not toxic-appearing.   HENT:      Head: Normocephalic and atraumatic.   Eyes:      Extraocular Movements: Extraocular movements intact.      Conjunctiva/sclera: Conjunctivae normal.   Cardiovascular:      Rate and Rhythm: Normal rate and regular rhythm.   Pulmonary:      Effort: Pulmonary effort is normal. No respiratory distress.   Abdominal:      General: Abdomen  is flat. There is no distension.   Genitourinary:     Comments: RAMESH without notable palpable or visual abnormalities. Stool moist brown, no bright red blood or dark tarry discoloration.  Musculoskeletal:         General: No deformity. Normal range of motion.      Cervical back: Normal range of motion. No rigidity.   Neurological:      General: No focal deficit present.      Mental Status: He is alert and oriented to person, place, and time.   Psychiatric:         Mood and Affect: Mood normal.         Behavior: Behavior normal.             COMPLETED DIAGNOSTIC STUDIES AND INTERVENTIONS:     Lab Results (last 24 hours)     Procedure Component Value Units Date/Time    Comprehensive Metabolic Panel [[id number]]  (Abnormal) Collected: 02/17/22 1351    Specimen: Blood from Arm, Right Updated: 02/17/22 1427     Glucose 118 mg/dL      BUN 30 mg/dL      Creatinine 2.11 mg/dL      Sodium 138 mmol/L      Potassium 3.3 mmol/L      Chloride 107 mmol/L      CO2 22.0 mmol/L      Calcium 11.9 mg/dL      Total Protein 7.0 g/dL      Albumin 3.48 g/dL      ALT (SGPT) 11 U/L      AST (SGOT) 15 U/L      Alkaline Phosphatase 46 U/L      Total Bilirubin <0.2 mg/dL      eGFR Non African Amer 32 mL/min/1.73      Globulin 3.5 gm/dL      A/G Ratio 1.0 g/dL      BUN/Creatinine Ratio 14.2     Anion Gap 9.0 mmol/L     Narrative:      GFR Normal >60  Chronic Kidney Disease <60  Kidney Failure <15      CBC & Differential [[id number]]  (Abnormal) Collected: 02/17/22 1357    Specimen: Blood Updated: 02/17/22 9999    Narrative:      The following orders were created for panel order CBC & Differential.  Procedure                               Abnormality         Status                     ---------                               -----------         ------                     CBC Auto Differential[[id number]]        Abnormal            Final result                 Please view results for these tests on the individual orders.    CBC Auto Differential  [[id number]]  (Abnormal) Collected: 02/17/22 1357    Specimen: Blood Updated: 02/17/22 1438     WBC 9.39 10*3/mm3      RBC 2.59 10*6/mm3      Hemoglobin 6.3 g/dL      Hematocrit 21.1 %      MCV 81.5 fL      MCH 24.3 pg      MCHC 29.9 g/dL      RDW 20.3 %      RDW-SD 60.5 fl      MPV 10.7 fL      Platelets 302 10*3/mm3      Neutrophil % 71.8 %      Lymphocyte % 19.7 %      Monocyte % 5.5 %      Eosinophil % 1.8 %      Basophil % 0.9 %      Immature Grans % 0.3 %      Neutrophils, Absolute 6.74 10*3/mm3      Lymphocytes, Absolute 1.85 10*3/mm3      Monocytes, Absolute 0.52 10*3/mm3      Eosinophils, Absolute 0.17 10*3/mm3      Basophils, Absolute 0.08 10*3/mm3      Immature Grans, Absolute 0.03 10*3/mm3      nRBC 0.0 /100 WBC     Narrative:      Verified by Repeat Analysis      COVID PRE-OP / PRE-PROCEDURE SCREENING ORDER (NO ISOLATION) - Swab, Nasopharynx [[id number]]  (Normal) Collected: 02/17/22 1626    Specimen: Swab from Nasopharynx Updated: 02/17/22 1657    Narrative:      The following orders were created for panel order COVID PRE-OP / PRE-PROCEDURE SCREENING ORDER (NO ISOLATION) - Swab, Nasopharynx.  Procedure                               Abnormality         Status                     ---------                               -----------         ------                     COVID-19 and FLU A/B PCR...[[id number]]  Normal              Final result                 Please view results for these tests on the individual orders.    COVID-19 and FLU A/B PCR - Swab, Nasopharynx [[id number]]  (Normal) Collected: 02/17/22 1626    Specimen: Swab from Nasopharynx Updated: 02/17/22 1657     COVID19 Not Detected     Influenza A PCR Not Detected     Influenza B PCR Not Detected    Narrative:      Fact sheet for providers: https://www.fda.gov/media/441890/download    Fact sheet for patients: https://www.fda.gov/media/514863/download    Test performed by PCR.            CT Abdomen Pelvis With Contrast    (Results Pending)         New  Medications Ordered This Visit   Medications   • nicotine (NICODERM CQ) 21 MG/24HR patch 1 patch   • sodium chloride 0.9 % infusion   • potassium chloride (K-DUR,KLOR-CON) CR tablet 40 mEq         Procedures            MEDICAL DECISION-MAKING AND ED COURSE:     ED Course as of 02/17/22 1909   Thu Feb 17, 2022   1515 MDM: 61-year-old male presents for hemoglobin of 6.5 with recent fatigue and weakness. Last hemoglobin 7 months ago was 13.1. Endorses dark stools though no abnormality noted on RAMESH. Patient has significant PAVITHRA, denies prior history of kidney disease. Creatinine 7 months ago was 1.2, now 2.1. Patient is not tachycardic, heart rate 89, not on AV jagjit blockers, likely that bleed is rather slow progression. Patient also has severely low iron stores based on labs yesterday. Will transfuse 2 units PRBCs and start  mL/h. Plan for admit. [KP]   1534 Creatinine(!): 2.11 []   1534 Potassium(!): 3.3  Replete potassium 40 mEq p.o. [KP]   1534 Hemoglobin(!!): 6.3 [KP]   1602 EKG at 1557 hrs. NSR 70 bpm, , QRS 92, QTc 4/3/1993, regular axis, no significant ST deviation or T wave abnormalities concerning for acute ischemia. []   1717 COVID19: Not Detected []   1909 Admit to hospitalist. [KP]      ED Course User Index  [] Neal Cline MD       ?      FINAL IMPRESSION:       1. Gastrointestinal hemorrhage, unspecified gastrointestinal hemorrhage type    2. Anemia, unspecified type    3. PAVITHRA (acute kidney injury) (HCC)    4. Hypokalemia         The complaints listed here are new problems to this examiner.      FOLLOW-UP  No follow-up provider specified.    DISPOSITION  ED Disposition     ED Disposition Condition Comment    Decision to Admit  Level of Care: Telemetry [5]   Diagnosis: Gastrointestinal hemorrhage, unspecified gastrointestinal hemorrhage type [[id number]]   Certification: I Certify That Inpatient Hospital Services Are Medically Necessary For Greater Than 2 Midnights                   This care is provided during an unprecedented national emergency due to the Novel Coronavirus (COVID-19). COVID-19 infections and transmission risks place heavy strains on healthcare resources. As this pandemic evolves, the Hospital and providers strive to respond fluidly, to remain operational, and to provide care relative to available resources and information. Outcomes are unpredictable and treatments are without well-defined guidelines. Further, the impact of COVID-19 on all aspects of emergency care, including the impact to patients seeking care for reasons other than COVID-19, is unavoidable during this national emergency.    This note was dictated using a [id number]-[id number] tool. Occasional wrong-word or 'sound-a-like' substitutions may have occurred due to the inherent limitations of voice recognition software. ?Read the chart carefully and recognize, using context, where substitutions have occurred.    Neal Cline MD  19:09 EST  2/17/2022             Neal Cline MD  02/17/22 1909      Electronically signed by Neal Cline MD at 02/17/22 1909     Joce Mark RN at 02/17/22 1444        Patient reports abdominal pain and recent dark stool.       Joce Mark RN  02/17/22 1446      Electronically signed by Joce Mark RN at 02/17/22 1446     Paul Lorenzana, PCT at 02/17/22 1521        Pt. Denied covid swab RN notified     Paul Lorenzana, HODA  02/17/22 1521       Paul Lorenzana PCT  02/17/22 1522      Electronically signed by Paul Lorenzana PCT at 02/17/22 1522     Oswald Winter RN at 02/17/22 1908        Report taken. Assumed care. Blood infusing per order. No s/s of reaction noted. Will monitor.      Oswald Winter RN  02/17/22 1909       Oswald Winter RN  02/17/22 1912       Oswald Winter RN  02/17/22 1913      Electronically signed by Oswald Winter RN at 02/17/22 1913         Facility-Administered Medications as of 2/18/2022   Medication Dose Route Frequency Provider  Last Rate Last Admin   • atenolol (TENORMIN) tablet 25 mg  25 mg Oral Daily Javy Dupont MD       • gabapentin (NEURONTIN) capsule 200 mg  200 mg Oral Q8H Javy Dupont MD   200 mg at 02/17/22 2129   • isosorbide mononitrate (IMDUR) 24 hr tablet 30 mg  30 mg Oral Daily Javy Dupont MD       • magnesium sulfate 4 gram infusion - Mg less than or equal to 1mg/dL  4 g Intravenous PRN Javy Dupont MD        Or   • magnesium sulfate 3 gram infusion (1gm x 3) - Mg 1.1 - 1.5 mg/dL  1 g Intravenous PRN Javy Dupont MD        Or   • Magnesium Sulfate 2 gram infusion- Mg 1.6 - 1.9 mg/dL  2 g Intravenous PRN Javy Dupont MD       • [COMPLETED] Magnesium Sulfate 2 gram infusion- Mg 1.6 - 1.9 mg/dL  2 g Intravenous Once Kely Posada MD 25 mL/hr at 02/18/22 0315 2 g at 02/18/22 0315   • methocarbamol (ROBAXIN) tablet 500 mg  500 mg Oral Q8H PRN Javy Dupont MD       • nicotine (NICODERM CQ) 14 MG/24HR patch 1 patch  1 patch Transdermal Q24H Javy Dupont MD       • nicotine (NICODERM CQ) 21 MG/24HR patch 1 patch  1 patch Transdermal Once Neal Cline MD   1 patch at 02/17/22 1510   • nitroglycerin (NITROSTAT) SL tablet 0.4 mg  0.4 mg Sublingual Q5 Min PRN Kely Posada MD       • pantoprazole (PROTONIX) injection 40 mg  40 mg Intravenous Q12H Kely Posada MD   40 mg at 02/18/22 0009   • [COMPLETED] potassium chloride (K-DUR,KLOR-CON) CR tablet 40 mEq  40 mEq Oral Once Neal Cline MD   40 mEq at 02/17/22 1709   • potassium phosphate 15 mmol in sodium chloride 0.9 % 100 mL infusion  15 mmol Intravenous Once Sue Chow PA-C   15 mmol at 02/18/22 0536   • sodium chloride 0.9 % flush 10 mL  10 mL Intravenous Q12H Oculam, Kely Chin, MD   10 mL at 02/17/22 3564   • sodium chloride 0.9 % flush 10 mL  10 mL Intravenous PRN Kely Posada MD       • sodium chloride 0.9 % infusion  125 mL/hr Intravenous Continuous  Kely Posada  mL/hr at 02/17/22 2324 125 mL/hr at 02/17/22 2324   • traZODone (DESYREL) tablet 25 mg  25 mg Oral Nightly Javy Longoria MD

## 2022-02-18 NOTE — ED NOTES
Report taken. Assumed care. Blood infusing per order. No s/s of reaction noted. Will monitor.      Oswald Winter RN  02/17/22 1909       Oswald Winter RN  02/17/22 1912       Oswald Winter RN  02/17/22 1913

## 2022-02-18 NOTE — H&P
Hospitalist History and Physical        Patient Identification  Name: Gurwinder Harding  Age/Sex: 61 y.o. male  :  1960        MRN: [id number]  Visit Number: [id number]  Admit Date: 2022   PCP: Pastora Person APRN          Chief complaint abnormal labs, feeling lightheaded, dark tarry stools    History of Present Illness:  Patient is a 61 y.o. male with history of alcohol abuse, arthritis, carotid stenosis followed by specialist in [place], erectile dysfunction, HTN, neuropathy following back surgery 20+ years ago, PVD s/p stent right leg and angioplasty left leg, tobacco abuse, mini strokes and vitamin D deficiency, who initially presented to his PCP's office yesterday for routine checkup. While there, he informed her of feeling more lightheaded and generally weak for the last 4-5 days, especially upon standing. He also reported development of dark, tarry stools during this period of time. Upon further questioning, he reports to me that he has had some midline abdominal discomfort as well, but this was earlier today and has since resolved. His PCP ordered lab work yesterday; today her office called the patient with results, showing critical anemia with hemoglobin of 6.5. He was instructed to come to the ED for blood transfusion. Upon arrival, vitals were stable. Labs confirmed critical hemoglobin of 6.3, with MCV 81 (78 the day prior), normal platelets 302K. In addition, K+ was mildly low at 3.3 and creatinine was elevated at 2.11 (baseline appears to be around 1.1-1.4). BUN was 30, with ratio of 14.2. LFTs were normal. Iron profile showed low iron 15 and iron sat 3, with TIBC 505. Magnesium was 1.6. Patient is being admitted to the telemetry unit for further management.     Review of Systems  Review of Systems   Constitutional: Positive for activity change and fatigue. Negative for appetite change, chills, diaphoresis, fever and unexpected weight change.   HENT: Negative for congestion, postnasal drip,  "rhinorrhea, sinus pressure, sinus pain and sore throat.    Eyes: Negative for photophobia, pain, discharge, redness, itching and visual disturbance.   Respiratory: Negative for cough, shortness of breath and wheezing.    Cardiovascular: Negative for chest pain, palpitations and leg swelling.   Gastrointestinal: Positive for abdominal pain. Negative for abdominal distention, constipation, diarrhea, nausea and vomiting.        Dark tarry stool x5 days   Genitourinary: Negative for difficulty urinating, dysuria, flank pain, frequency and hematuria.   Musculoskeletal: Negative for arthralgias, back pain, joint swelling, myalgias, neck pain and neck stiffness.   Skin: Negative for color change, pallor, rash and wound.   Neurological: Positive for dizziness, weakness (generalized), light-headedness and numbness (chronic, in lower extremities R>L). Negative for tremors, syncope and headaches.   Hematological: Negative for adenopathy. Does not bruise/bleed easily.   Psychiatric/Behavioral: Negative for agitation, behavioral problems and confusion.       History  Past Medical History:   Diagnosis Date   • Alcohol abuse    • Arthritis    • Carotid stenosis    • ED (erectile dysfunction)    • History of degenerative disc disease    • Hypertension    • Neuropathy    • Peripheral vascular disease (HCC)     s/p arthrectomy follow by balloon angioplasty and stents of right and left SFA   • Tobacco abuse    • Vitamin D deficiency      *  \"mini strokes\"    Past Surgical History:   Procedure Laterality Date   • APPENDECTOMY     • ARTERIOGRAM N/A 9/19/2019    Procedure: Arteriogram;  Surgeon: Tong Azar MD;  Location: [facility] CATH INVASIVE LOCATION;  Service: Cardiology   • BACK SURGERY      DISC RUPTURE REPAIR, L5   • CARDIAC CATHETERIZATION Right 10/29/2019    Procedure: Peripheral angiography;  Surgeon: Tong Azar MD;  Location:  [facility] CATH INVASIVE LOCATION;  Service: Cardiovascular   • VASCULAR SURGERY Bilateral  "     Family History   Problem Relation Age of Onset   • Hypertension Mother    • Cancer Father         LUNG   • Diabetes Father    • Hypertension Father    • Heart attack Other         GRANDFATHER     Social History     Tobacco Use   • Smoking status: Current Every Day Smoker     Packs/day: 0.50     Years: 30.00     Pack years: 15.00     Types: Cigarettes   • Smokeless tobacco: Never Used   Vaping Use   • Vaping Use: Never used   Substance Use Topics   • Alcohol use: Not Currently     Alcohol/week: 24.0 standard drinks     Types: 24 Cans of beer per week   • Drug use: No     (Not in a hospital admission)    Allergies:  Penicillins and Novocain [procaine]    Objective     Vital Signs  Temp:  [97 °F (36.1 °C)-98.2 °F (36.8 °C)] 98.2 °F (36.8 °C)  Heart Rate:  [75-89] 75  Resp:  [12-20] 20  BP: (113-158)/(62-93) 157/81  Body mass index is 24.68 kg/m².    Physical Exam:  Physical Exam  Constitutional:       General: He is not in acute distress.     Appearance: Normal appearance. He is ill-appearing.      Comments: Appears pale   HENT:      Head: Normocephalic and atraumatic.      Right Ear: External ear normal.      Left Ear: External ear normal.      Nose: Nose normal.      Mouth/Throat:      Mouth: Mucous membranes are moist.      Pharynx: Oropharynx is clear.   Eyes:      Extraocular Movements: Extraocular movements intact.      Conjunctiva/sclera: Conjunctivae normal.      Pupils: Pupils are equal, round, and reactive to light.   Cardiovascular:      Rate and Rhythm: Normal rate and regular rhythm.      Heart sounds: Normal heart sounds. No murmur heard.       Comments: Pedal pulses palpable but diminished  Pulmonary:      Effort: Pulmonary effort is normal. No respiratory distress.      Breath sounds: Normal breath sounds. No wheezing or rales.   Abdominal:      General: Abdomen is flat. Bowel sounds are normal. There is no distension.      Palpations: Abdomen is soft.      Tenderness: There is no abdominal  tenderness.   Musculoskeletal:         General: Normal range of motion.      Cervical back: Normal range of motion and neck supple. No tenderness.      Right lower leg: Edema (trace; reportedly chronic) present.      Left lower leg: No edema.   Lymphadenopathy:      Cervical: No cervical adenopathy.   Skin:     General: Skin is warm and dry.      Capillary Refill: Capillary refill takes less than 2 seconds.      Coloration: Skin is pale. Skin is not jaundiced.      Findings: No bruising or lesion.   Neurological:      General: No focal deficit present.      Mental Status: He is alert and oriented to person, place, and time.   Psychiatric:         Mood and Affect: Mood normal.         Behavior: Behavior normal.         Thought Content: Thought content normal.         Judgment: Judgment normal.           Results Review:       Lab Results:  Results from last 7 days   Lab Units 02/17/22  1357 02/16/22  1625   WBC 10*3/mm3 9.39 9.06   HEMOGLOBIN g/dL 6.3* 6.5*   PLATELETS 10*3/mm3 302 293         Results from last 7 days   Lab Units 02/17/22  1351 02/16/22  1625   SODIUM mmol/L 138 137   POTASSIUM mmol/L 3.3* 3.3*   CHLORIDE mmol/L 107 102   CO2 mmol/L 22.0 24.0   BUN mg/dL 30* 31*   CREATININE mg/dL 2.11* 1.93*   CALCIUM mg/dL 11.9* 13.4*   GLUCOSE mg/dL 118* 115*     Results from last 7 days   Lab Units 02/17/22  1351   MAGNESIUM mg/dL 1.6     No results found for: HGBA1C  Results from last 7 days   Lab Units 02/17/22  1351 02/16/22  1625   BILIRUBIN mg/dL <0.2 <0.2   ALK PHOS U/L 46 44   AST (SGOT) U/L 15 14   ALT (SGPT) U/L 11 8                       I have reviewed the patient's laboratory results.    Imaging:  Imaging Results (Last 72 Hours)     Procedure Component Value Units Date/Time    CT Abdomen Pelvis Without Contrast [[id number]] Resulted: 02/17/22 2005     Updated: 02/17/22 2005          I have personally reviewed the patient's radiologic imaging.        EKG:   Normal sinus rhythm, HR 70, QTc 393  Normal  ECG  Confirmed by Cathleen Vivas ([id number]) on 2/17/2022 5:21:47 PM    I have personally reviewed the patient's EKG.        Assessment/Plan     - Critical iron deficiency, borderline microcytic, anemia, suspect secondary to acute vs subacute blood loss from upper GI bleed: 2 units pRBC ordered in ED. Monitor H/H q6h. Clear liquid diet for now; NPO after midnight. IV protonix ordered BID. General surgery consulted for consideration of EGD in the morning. For now, hold ASA, plavix and pletal given concerns for upper GI bleed.   - Acute on borderline stage III CKD: creatinine 2.11, baseline around 1.1-1.4, with baseline GFR 57-64. Hydrate with IV fluids. Renally dose gabapentin.   - Hypercalcemia: possibly related simply to dehydration/PAVITHRA. Ca actually 13 yesterday's labs, but 11.9 today. Monitor response to IV fluid hydration. Check PTH, PTHrp.   - Carotid artery stenosis, PVD s/p angioplasty, stenting, mini strokes: per patient, has been 2 years or so since last intervention (found cath report, actually in 10/2019). Holding ASA, plavix and pletal in light of critical anemia and concern for upper GI bleed as noted above.   - Hypertension: continue atenolol with hold parameters in place.  - History alcohol, tobacco abuse: nicotine patch already ordered in ED which will be continued. Patient reports only drinks about once a month now for the last year; his last intake was on [employer] Sunday, 4 days ago, at which time he consumed 4 mixed drinks. If this is so, then he should not be in danger of developing withdrawal, but given history will place on CIWA monitoring in case he is not being truthful.     DVT/GI Prophylaxis: SCDs, IV protonix as noted above    Estimated Length of Stay >2 midnights    I discussed the patient's findings, assessment and plan with the patient and ED staff.    * patient is high risk due to critical anemia suspect acute blood loss, suspect upper GI bleed, acute on borderline stage III CKD,  PVD    Javy uDpont MD  02/17/22  20:35 EST

## 2022-02-18 NOTE — OP NOTE
Endoscopic Gastroduodenoscopy with biopsy    Procedure: Endoscopic Gastroduodenoscopy --diagnostic, with biopsy    Surgeon:  Christine Duran M.D., ANURADHA    Assistant:  none    Pre-operative Diagnosis: black stools    Post-operative Diagnosis: same    Indications: hgb drop, black stools    Sedation: IV general    Procedure Details     Informed consent was obtained for the procedure, including conscious sedation. Risks of pancreatitis, infection, perforation, hemorrhage, adverse drug reaction and aspiration were discussed. The patient was placed in the left lateral decubitus position.  Based on the pre-procedure assessment, including review of the patient's medical history, medications, allergies, and review of systems, he had been deemed to be an appropriate candidate for conscious sedation;  He was monitored continuously with ECG tracing, pulse oximetry, blood pressure monitoring, and direct observation.      The gastroscope was inserted into the mouth and advanced under direct vision to second portion duodenum>  A careful inspection was made as the gastroscope was withdrawn, including a retroflexed view of the proximal stomach.       Findings:  Stomach normal  Pylorus normal  Duodenum normal  Esophagus at 28 cm small ulceration identified    Grafts and implants:  no    Specimens: Antrum and esophageal ulcer of the laparoscopic    Estimated blood loss:  None    Blood administered:  none           Complications:  None; patient tolerated the procedure well.           Disposition: PACU - hemodynamically stable.           Condition: stable    Attending Attestation: I performed the procedure.

## 2022-02-18 NOTE — ANESTHESIA PREPROCEDURE EVALUATION
Anesthesia Evaluation     Patient summary reviewed and Nursing notes reviewed   no history of anesthetic complications:  NPO Solid Status: > 8 hours  NPO Liquid Status: > 8 hours           Airway   Mallampati: II  TM distance: >3 FB  Neck ROM: full  Dental    (+) poor dentition        Pulmonary     breath sounds clear to auscultation  Cardiovascular   Exercise tolerance: poor (<4 METS)    Rhythm: regular  Rate: normal    (+) hypertension, PVD,  carotid artery disease      Neuro/Psych  (+) CVA, psychiatric history,    GI/Hepatic/Renal/Endo    (+) obesity,  GI bleeding ,     Musculoskeletal     Abdominal     Abdomen: soft.   Substance History   (+) alcohol use,      OB/GYN          Other   arthritis,                      Anesthesia Plan    ASA 3     general     intravenous induction     Anesthetic plan, all risks, benefits, and alternatives have been provided, discussed and informed consent has been obtained with: patient.    Plan discussed with CRNA.        CODE STATUS:    Level Of Support Discussed With: Patient  Code Status (Patient has no pulse and is not breathing): CPR (Attempt to Resuscitate)  Medical Interventions (Patient has pulse or is breathing): Full Support

## 2022-02-18 NOTE — PLAN OF CARE
Goal Outcome Evaluation:  Plan of Care Reviewed With: patient        Progress: no change  Outcome Summary: Pt currently lying quietly in bed with no complaints noted at this time. V/S stable with no signs of respiratory distress present. Pt underwent esophagoduodenoscopy today; hernia revealed and biopsies taken. Will continue to monitor pt and follow plan of care.

## 2022-02-18 NOTE — NURSING NOTE
"Spoke with Dr. Wilks via epic messenger and informed her that pt is requesting his normal dose of \"1200 mg gabapentin 3x/day\" and he reports that the scheduled 200mg Q8 hours \"doesn't work\" for him. Dr. Wilks stated that dosage had to be reduced due to pt's kidney failure and once pt's kidneys are back to normal function we can increase dosage back to normal.   "

## 2022-02-19 ENCOUNTER — READMISSION MANAGEMENT (OUTPATIENT)
Dept: CALL CENTER | Facility: HOSPITAL | Age: 62
End: 2022-02-19

## 2022-02-19 VITALS
HEIGHT: 70 IN | WEIGHT: 174 LBS | HEART RATE: 76 BPM | TEMPERATURE: 98 F | DIASTOLIC BLOOD PRESSURE: 64 MMHG | SYSTOLIC BLOOD PRESSURE: 124 MMHG | OXYGEN SATURATION: 97 % | BODY MASS INDEX: 24.91 KG/M2 | RESPIRATION RATE: 18 BRPM

## 2022-02-19 LAB
ANION GAP SERPL CALCULATED.3IONS-SCNC: 10.1 MMOL/L (ref 5–15)
BACTERIA UR QL AUTO: NORMAL /HPF
BASOPHILS # BLD AUTO: 0.05 10*3/MM3 (ref 0–0.2)
BASOPHILS NFR BLD AUTO: 0.7 % (ref 0–1.5)
BILIRUB UR QL STRIP: NEGATIVE
BUN SERPL-MCNC: 18 MG/DL (ref 8–23)
BUN/CREAT SERPL: 10.8 (ref 7–25)
CALCIUM SPEC-SCNC: 8.9 MG/DL (ref 8.6–10.5)
CHLORIDE SERPL-SCNC: 110 MMOL/L (ref 98–107)
CLARITY UR: CLEAR
CO2 SERPL-SCNC: 18.9 MMOL/L (ref 22–29)
COLOR UR: YELLOW
CREAT SERPL-MCNC: 1.67 MG/DL (ref 0.76–1.27)
DEPRECATED RDW RBC AUTO: 57 FL (ref 37–54)
EOSINOPHIL # BLD AUTO: 0.22 10*3/MM3 (ref 0–0.4)
EOSINOPHIL NFR BLD AUTO: 3 % (ref 0.3–6.2)
ERYTHROCYTE [DISTWIDTH] IN BLOOD BY AUTOMATED COUNT: 18.6 % (ref 12.3–15.4)
GFR SERPL CREATININE-BSD FRML MDRD: 42 ML/MIN/1.73
GLUCOSE SERPL-MCNC: 88 MG/DL (ref 65–99)
GLUCOSE UR STRIP-MCNC: NEGATIVE MG/DL
HCT VFR BLD AUTO: 25.2 % (ref 37.5–51)
HGB BLD-MCNC: 7.6 G/DL (ref 13–17.7)
HGB UR QL STRIP.AUTO: NEGATIVE
HYALINE CASTS UR QL AUTO: NORMAL /LPF
IMM GRANULOCYTES # BLD AUTO: 0.03 10*3/MM3 (ref 0–0.05)
IMM GRANULOCYTES NFR BLD AUTO: 0.4 % (ref 0–0.5)
KETONES UR QL STRIP: NEGATIVE
LEUKOCYTE ESTERASE UR QL STRIP.AUTO: ABNORMAL
LYMPHOCYTES # BLD AUTO: 1.76 10*3/MM3 (ref 0.7–3.1)
LYMPHOCYTES NFR BLD AUTO: 24.2 % (ref 19.6–45.3)
MAGNESIUM SERPL-MCNC: 1.8 MG/DL (ref 1.6–2.4)
MCH RBC QN AUTO: 25.2 PG (ref 26.6–33)
MCHC RBC AUTO-ENTMCNC: 30.2 G/DL (ref 31.5–35.7)
MCV RBC AUTO: 83.7 FL (ref 79–97)
MONOCYTES # BLD AUTO: 0.61 10*3/MM3 (ref 0.1–0.9)
MONOCYTES NFR BLD AUTO: 8.4 % (ref 5–12)
NEUTROPHILS NFR BLD AUTO: 4.61 10*3/MM3 (ref 1.7–7)
NEUTROPHILS NFR BLD AUTO: 63.3 % (ref 42.7–76)
NITRITE UR QL STRIP: NEGATIVE
NRBC BLD AUTO-RTO: 0 /100 WBC (ref 0–0.2)
PH UR STRIP.AUTO: 6.5 [PH] (ref 5–8)
PHOSPHATE SERPL-MCNC: 1.8 MG/DL (ref 2.5–4.5)
PLATELET # BLD AUTO: 231 10*3/MM3 (ref 140–450)
PMV BLD AUTO: 10.5 FL (ref 6–12)
POTASSIUM SERPL-SCNC: 3.2 MMOL/L (ref 3.5–5.2)
PROT UR QL STRIP: NEGATIVE
RBC # BLD AUTO: 3.01 10*6/MM3 (ref 4.14–5.8)
RBC # UR STRIP: NORMAL /HPF
REF LAB TEST METHOD: NORMAL
SODIUM SERPL-SCNC: 139 MMOL/L (ref 136–145)
SP GR UR STRIP: 1.01 (ref 1–1.03)
SQUAMOUS #/AREA URNS HPF: NORMAL /HPF
UROBILINOGEN UR QL STRIP: ABNORMAL
WBC # UR STRIP: NORMAL /HPF
WBC NRBC COR # BLD: 7.28 10*3/MM3 (ref 3.4–10.8)

## 2022-02-19 PROCEDURE — 83735 ASSAY OF MAGNESIUM: CPT | Performed by: SURGERY

## 2022-02-19 PROCEDURE — 84100 ASSAY OF PHOSPHORUS: CPT | Performed by: SURGERY

## 2022-02-19 PROCEDURE — 85025 COMPLETE CBC W/AUTO DIFF WBC: CPT | Performed by: SURGERY

## 2022-02-19 PROCEDURE — 80048 BASIC METABOLIC PNL TOTAL CA: CPT | Performed by: SURGERY

## 2022-02-19 PROCEDURE — 25010000002 MAGNESIUM SULFATE 2 GM/50ML SOLUTION: Performed by: INTERNAL MEDICINE

## 2022-02-19 PROCEDURE — 99239 HOSP IP/OBS DSCHRG MGMT >30: CPT | Performed by: INTERNAL MEDICINE

## 2022-02-19 PROCEDURE — 81001 URINALYSIS AUTO W/SCOPE: CPT | Performed by: SURGERY

## 2022-02-19 RX ORDER — POTASSIUM CHLORIDE 20 MEQ/1
40 TABLET, EXTENDED RELEASE ORAL EVERY 4 HOURS
Status: DISCONTINUED | OUTPATIENT
Start: 2022-02-19 | End: 2022-02-19 | Stop reason: HOSPADM

## 2022-02-19 RX ORDER — MAGNESIUM SULFATE HEPTAHYDRATE 40 MG/ML
2 INJECTION, SOLUTION INTRAVENOUS ONCE
Status: COMPLETED | OUTPATIENT
Start: 2022-02-19 | End: 2022-02-19

## 2022-02-19 RX ORDER — FERROUS SULFATE 325(65) MG
325 TABLET ORAL
Qty: 90 TABLET | Refills: 0 | Status: SHIPPED | OUTPATIENT
Start: 2022-02-19

## 2022-02-19 RX ORDER — PANTOPRAZOLE SODIUM 40 MG/1
40 TABLET, DELAYED RELEASE ORAL DAILY
Qty: 30 TABLET | Refills: 0 | Status: SHIPPED | OUTPATIENT
Start: 2022-02-19

## 2022-02-19 RX ORDER — LORAZEPAM 1 MG/1
1 TABLET ORAL EVERY 6 HOURS PRN
Status: DISCONTINUED | OUTPATIENT
Start: 2022-02-19 | End: 2022-02-19 | Stop reason: HOSPADM

## 2022-02-19 RX ADMIN — ISOSORBIDE MONONITRATE 30 MG: 30 TABLET, EXTENDED RELEASE ORAL at 08:42

## 2022-02-19 RX ADMIN — SODIUM CHLORIDE 75 ML/HR: 9 INJECTION, SOLUTION INTRAVENOUS at 08:42

## 2022-02-19 RX ADMIN — ATENOLOL 25 MG: 25 TABLET ORAL at 08:42

## 2022-02-19 RX ADMIN — POTASSIUM & SODIUM PHOSPHATES POWDER PACK 280-160-250 MG 2 PACKET: 280-160-250 PACK at 08:42

## 2022-02-19 RX ADMIN — GABAPENTIN 200 MG: 100 CAPSULE ORAL at 05:19

## 2022-02-19 RX ADMIN — MAGNESIUM SULFATE HEPTAHYDRATE 2 G: 40 INJECTION, SOLUTION INTRAVENOUS at 08:42

## 2022-02-19 RX ADMIN — POTASSIUM CHLORIDE 40 MEQ: 1500 TABLET, EXTENDED RELEASE ORAL at 08:42

## 2022-02-19 RX ADMIN — PANTOPRAZOLE SODIUM 40 MG: 40 INJECTION, POWDER, FOR SOLUTION INTRAVENOUS at 08:42

## 2022-02-19 RX ADMIN — SODIUM CHLORIDE, PRESERVATIVE FREE 10 ML: 5 INJECTION INTRAVENOUS at 09:25

## 2022-02-19 NOTE — NURSING NOTE
Patient given discharge instructions per primary RN, patient IV and tele removed, and he is waiting for wife to arrive for transportation.

## 2022-02-19 NOTE — DISCHARGE INSTR - APPOINTMENTS
Pastora guajardo     apt  in  1 week  and  dr urbano  in  2  weeks  offcies  closed on  the weekend  will acall on  Monday  and  get  atp and  call pt  with  apts

## 2022-02-19 NOTE — OUTREACH NOTE
Prep Survey      Responses   [facility] patient discharged from? [place]   Is LACE score < 7 ? No   Emergency Room discharge w/ pulse ox? No   Eligibility [facility]   Date of Admission 02/17/22   Date of Discharge 02/19/22   Discharge Disposition Home or Self Care   Discharge diagnosis Critical iron deficiency, borderline microcytic, anemia, secondary to acute vs subacute blood loss from upper GI bleed    Does the patient have one of the following disease processes/diagnoses(primary or secondary)? Other   Does the patient have Home health ordered? No   Is there a DME ordered? No   Prep survey completed? Yes          Stephanie Álvarez RN

## 2022-02-19 NOTE — DISCHARGE SUMMARY
[facility] HOSPITALISTS DISCHARGE SUMMARY    Patient Identification:  Name:  Gurwinder Harding  Age:  61 y.o.  Sex:  male  :  1960  MRN:  [id number]  Visit Number:  [id number]    Date of Admission: 2022  Date of Discharge: 2022    PCP: Pastora Person APRN    DISCHARGE DIAGNOSES  -Critical iron deficiency, borderline microcytic, anemia, secondary to acute vs subacute blood loss from upper GI bleed in a patient with known alcohol use  -Acute kidney injury on top of suspected chronic kidney disease stage IIIa with baseline creatinine 1.2-1.3 and admission creatinine of 1.93-2.11  -Acute hypokalemia, acute hypomagnesemia, and acute hypophosphatemia  -Heart failure with preserved ejection fraction found on echocardiogram from , currently not in an acute exacerbation  -Hypercalcemia, suspect related to dehydration/acute kidney injury with return to normal levels after IV fluid administration  -History of carotid artery stenosis, PVD s/p angioplasty, stenting, and mini strokes per patient recollection of his past medical history  -History of hypertension  -History alcohol and tobacco abuse    CONSULTS   Dr. Duran with general surgery    PROCEDURES PERFORMED  2022:  EGD    HOSPITAL COURSE  Patient is a 61 y.o. male presented to [facility] on 2022 with hematemesis and dark stools; the patient had blood work and his primary care provider's office on 2021 that came back at 6.5 g/dL and thus his PCP instructed him to come to the emergency department.  The patient has a history of alcohol abuse but no history of esophageal varices.  His initial hemoglobin was 6.3 g/dL.  Thus, patient was admitted to the telemetry service for further evaluation and treatment.  Please see the admitting history and physical for further details.  Patient was given a total of 2 units of packed red blood cells; his hemoglobin stabilized to the mid 7 range.  General surgery was consulted and an  EGD was recommended due to the patient's high risk of having esophageal varices.  EGD demonstrated a distal esophageal ulcer that was not acutely bleeding.  Biopsies of the ulcer were obtained but the pathology is still pending at the time of discharge.  Please note that the patient did not have any hematemesis nor any black stools while admitted.  The patient was placed on IV Protonix while hospitalized; I have switched this to oral formulation and I have encouraged patient to take this for the next month.  Due to the patient's alcohol use, he may need continued follow-up with general surgery or gastroenterology after this acute upper GI bleed.  Patient will follow up with general surgery in the next 2 weeks.  He is encouraged to follow-up with his primary care provider to have blood work soon to make sure that his hemoglobin remained stable.  I have placed him on iron supplementation for the next month to help hematopoiesis.  Because of his prior alcohol history, we have also started him on vitamins.    On the day of discharge, the patient was feeling better.  He was able to perform all of his activities of daily living.  He was ambulating without any difficulty.  He was tolerating his normal diet.      VITAL SIGNS:  Temp:  [96.8 °F (36 °C)-98.2 °F (36.8 °C)] 98 °F (36.7 °C)  Heart Rate:  [70-82] 76  Resp:  [18-20] 18  BP: (101-159)/(60-91) 124/64  SpO2:  [97 %-99 %] 97 %  on  Flow (L/min):  [2] 2;   Device (Oxygen Therapy): room air    Body mass index is 24.97 kg/m².  Wt Readings from Last 3 Encounters:   02/18/22 78.9 kg (174 lb)   02/16/22 78 kg (172 lb)   11/09/21 81 kg (178 lb 9.6 oz)       PHYSICAL EXAM:  Vitals reviewed.   Constitutional:       Appearance: He is well-developed and normal weight. He is not in acute distress.  HENT:      Head: Normocephalic and atraumatic.      Right Ear: External ear normal.      Left Ear: External ear normal.      Nose: Nose normal.   Eyes:      General: No scleral icterus.         Right eye: No discharge.         Left eye: No discharge.      Pupils: Pupils are equal, round, and reactive to light.   Cardiovascular:      Rate and Rhythm: Normal rate and regular rhythm.      Pulses: Normal pulses.      Heart sounds: No murmur heard.   Pulmonary:      Effort: Pulmonary effort is normal. No respiratory distress.      Breath sounds: Normal breath sounds. No wheezing or rales.   Abdominal:      General: Abdomen is flat. Bowel sounds are normal. There is no distension.      Palpations: Abdomen is soft.   Musculoskeletal:         General: No swelling, deformity or signs of injury.   Skin:     Capillary Refill: Capillary refill takes less than 2 seconds.      Coloration: Skin is not jaundiced or pale.      Findings: No bruising.   Neurological:      Mental Status: He is alert and oriented to person, place, and time. Mental status is at baseline.      Cranial Nerves: No cranial nerve deficit.   Psychiatric:         Attention and Perception: Attention normal.         Mood and Affect: Affect is normal.         Speech: Speech normal.         Behavior: Behavior normal. Behavior is cooperative.         Thought Content: Thought content normal.         Cognition and Memory: Cognition normal.         Judgment: Judgment normal.       DISCHARGE DISPOSITION   Stable       Discharge Medications      New Medications      Instructions Start Date   ferrous sulfate 325 (65 FE) MG tablet  Commonly known as: FerrouSul   325 mg, Oral, 3 Times Daily With Meals      pantoprazole 40 MG EC tablet  Commonly known as: Protonix   40 mg, Oral, Daily         Continue These Medications      Instructions Start Date   atenolol 25 MG tablet  Commonly known as: Tenormin   25 mg, Oral, Daily      cilostazol 100 MG tablet  Commonly known as: PLETAL   100 mg, Oral, Daily      Folbic 2.5-25-2 MG tablet tablet  Generic drug: folic acid-pyridoxine-cyanocobalamin   1 tablet, Oral, Daily      gabapentin 800 MG tablet  Commonly known as:  NEURONTIN   800 mg, Oral, 3 Times Daily      isosorbide mononitrate 30 MG 24 hr tablet  Commonly known as: IMDUR   30 mg, Oral, Daily      methocarbamol 750 MG tablet  Commonly known as: ROBAXIN   750 mg, Oral, 4 Times Daily      traZODone 50 MG tablet  Commonly known as: DESYREL   50 mg, Oral, Nightly         Stop These Medications    aspirin 81 MG EC tablet     clopidogrel 75 MG tablet  Commonly known as: PLAVIX            Diet Instructions     Diet: Regular      Discharge Diet: Regular        Activity Instructions     Activity as Tolerated          Additional Instructions for the Follow-ups that You Need to Schedule     Discharge Follow-up with PCP   As directed     Currently Documented PCP:    Pastora Person APRN    PCP Phone Number:    [phone]     Follow Up Details: 2-7 days             Discharge Follow-up with Specified Provider: Dr. Duran; 2 Weeks   As directed    To: Dr. Duran    Follow Up: 2 Weeks             TEST  RESULTS PENDING AT DISCHARGE  Pending Labs     Order Current Status    PTH-related Peptide In process    Tissue Pathology Exam In process           CODE STATUS  Code Status and Medical Interventions:   Ordered at: 02/17/22 9036     Level Of Support Discussed With:    Patient     Code Status (Patient has no pulse and is not breathing):    CPR (Attempt to Resuscitate)     Medical Interventions (Patient has pulse or is breathing):    Full Support       Fred Wilks MD  [facility] Hospital[facility]  02/19/22  09:28 EST    Please note that this discharge summary required more than 30 minutes to complete.

## 2022-02-19 NOTE — PLAN OF CARE
Goal Outcome Evaluation:  Plan of Care Reviewed With: patient        Progress: no change  Outcome Summary: Covid 19 Surge Documentation Reduction -- Pt RA. Resting with no complaints. No s/s of bleeding present.

## 2022-02-20 NOTE — PAYOR COMM NOTE
"[facility]  HARJINDER ARZATE  PHONE  [phone]  FAX  [phone]  NPI:  [id number]    PATIENT D/C 2/19/2022      HardingJanine (61 y.o. Male)             Date of Birth Social Security Number Address Home Phone MRN    1960  [address]  [facility] [phone] [id number]    Quaker Marital Status             Adventism        Admission Date Admission Type Admitting Provider Attending Provider Department, Room/Bed    2/17/22 Emergency   [facility] 3 [facility], 3313/2S    Discharge Date Discharge Disposition Discharge Destination          2/19/2022 Home or Self Care              Attending Provider: (none)   Allergies: Penicillins, Novocain [Procaine]    Isolation: None   Infection: None   Code Status: Prior   Advance Care Planning Activity    Ht: 177.8 cm (70\")   Wt: 78.9 kg (174 lb)    Admission Cmt: None   Principal Problem: Gastrointestinal hemorrhage [K92.2]                 Active Insurance as of 2/17/2022     Primary Coverage     Payor Plan Insurance Group Employer/Plan Group    [employer] [place] [employer] [place]ICA[place]      Payor Plan Address Payor Plan Phone Number Payor Plan Fax Number Effective Dates    [address] [address] [phone]  1/1/2021 - None Entered    [facility] [id number]       Subscriber Name Subscriber Birth Date Member ID       JANINE HARDING IRA [id number]                 Emergency Contacts      (Rel.) Home Phone Work Phone Mobile Phone    MeaghanPatsy (Spouse) [phone] -- [phone]              "

## 2022-02-20 NOTE — PROGRESS NOTES
Chief Complaint  Dizziness and Follow-up    Subjective          Gurwinder Harding presents to [facility]  Hypertension  This is a chronic (carotid stenosis) problem. The current episode started more than 1 year ago. The problem is controlled. Associated symptoms include malaise/fatigue (improved with medication changes) and shortness of breath. Pertinent negatives include no chest pain or peripheral edema. (  ) Risk factors for coronary artery disease include smoking/tobacco exposure and sedentary lifestyle. Past treatments include beta blockers and ACE inhibitors. Current antihypertension treatment includes ACE inhibitors and beta blockers. The current treatment provides moderate improvement. Compliance problems include diet and exercise.    Fatigue  This is a chronic problem. The current episode started more than 1 year ago. The problem occurs constantly. The problem has been unchanged. Associated symptoms include fatigue. Pertinent negatives include no chest pain.   Extremity Weakness   Pain location: bilateral lower extremity. Chronicity: Known PAD post stenting with chronic Neuralgia.  Following second stenting unable to return to work.  Difficulty standing, walking.  Gait is slow and unsteady.  Increased swelling and cold lower extremeity for a few wekks. The current episode started more than 1 year ago. The problem occurs daily. The problem has been gradually worsening. The pain is moderate. Associated symptoms include a limited range of motion and stiffness. Associated symptoms comments: Tremors of hands new onset in the past several months  . Family history includes gout. Parkinsons in the family His past medical history is significant for gout.   Dizziness  This is a recurrent problem. The current episode started 1 to 4 weeks ago. Associated symptoms include fatigue. Pertinent negatives include no chest pain. Associated symptoms comments: Dizzy upon standing . .  "      Objective   Vital Signs:   /60 (BP Location: Left arm, Patient Position: Sitting)   Pulse 60   Temp 98 °F (36.7 °C) (Temporal)   Ht 175.3 cm (69\")   Wt 78 kg (172 lb)   SpO2 100%   BMI 25.40 kg/m²     Physical Exam  Vitals and nursing note reviewed.   Constitutional:       General: He is not in acute distress.     Appearance: He is well-developed.   HENT:      Head: Normocephalic.   Eyes:      Conjunctiva/sclera: Conjunctivae normal.   Cardiovascular:      Rate and Rhythm: Normal rate and regular rhythm.      Heart sounds: Normal heart sounds. No murmur heard.      Pulmonary:      Effort: Pulmonary effort is normal. No respiratory distress.      Breath sounds: Normal breath sounds. No wheezing.   Abdominal:      General: Bowel sounds are normal.      Tenderness: There is abdominal tenderness.   Musculoskeletal:         General: Tenderness present. No swelling.      Right lower leg: No edema.      Left lower leg: No edema.   Skin:     General: Skin is warm and dry.   Neurological:      Mental Status: He is alert and oriented to person, place, and time.      Cranial Nerves: No cranial nerve deficit.      Sensory: Sensory deficit present.      Motor: Weakness present.      Coordination: Coordination abnormal.      Gait: Gait abnormal.      Deep Tendon Reflexes: Reflexes normal.   Psychiatric:         Mood and Affect: Mood normal.         Behavior: Behavior normal.         Thought Content: Thought content normal.         Judgment: Judgment normal.        During this visit the following were done:  Labs Reviewed [x]    Labs Ordered [x]    Radiology Reports Reviewed [x]    Radiology Ordered [x]    PCP Records Reviewed []    Referring Provider Records Reviewed []    ER Records Reviewed []    Hospital Records Reviewed []    History Obtained From Family []    Radiology Images Reviewed []    Other Reviewed []    Records Requested []              Diagnoses and all orders for this visit:    1. Fatigue, " unspecified type (Primary)  -     CBC Auto Differential; Future  -     Comprehensive Metabolic Panel; Future  -     Cancel: Iron and TIBC; Future  -     Iron and TIBC; Future  -     Vitamin B12; Future  -     Vitamin D 25 hydroxy; Future  -     CBC Auto Differential  -     Comprehensive Metabolic Panel  -     Iron and TIBC  -     Vitamin B12  -     Vitamin D 25 hydroxy    2. Essential hypertension  -     atenolol (Tenormin) 25 MG tablet; Take 1 tablet by mouth Daily.  Dispense: 90 tablet; Refill: 1  -     folic acid-pyridoxine-cyanocobalamin (Folbic) 2.5-25-2 MG tablet tablet; Take 1 tablet by mouth Daily.  Dispense: 90 tablet; Refill: 1  -     isosorbide mononitrate (IMDUR) 30 MG 24 hr tablet; Take 1 tablet by mouth Daily.  Dispense: 90 tablet; Refill: 1    3. Neuropathy  -     cilostazol (PLETAL) 100 MG tablet; Take 1 tablet by mouth Daily.  Dispense: 60 tablet; Refill: 5  -     gabapentin (NEURONTIN) 800 MG tablet; Take 1 tablet by mouth 3 (Three) Times a Day.  Dispense: 135 tablet; Refill: 2    4. Major depressive disorder, recurrent, moderate (HCC)  -     traZODone (DESYREL) 50 MG tablet; Take 1 tablet by mouth Every Night.  Dispense: 90 tablet; Refill: 1    5. PAD (peripheral artery disease) (HCC)    6. Vitamin D deficiency  -     Iron and TIBC; Future  -     Vitamin B12; Future  -     Vitamin D 25 hydroxy; Future  -     Iron and TIBC  -     Vitamin B12  -     Vitamin D 25 hydroxy    7. Dizziness  -     CBC Auto Differential  -     Comprehensive Metabolic Panel  -     Iron and TIBC  -     Vitamin B12  -     Vitamin D 25 hydroxy            Follow Up   Return in about 3 months (around 5/16/2022), or if symptoms worsen or fail to improve, for labs today, Recheck.  Patient was given instructions and counseling regarding his condition or for health maintenance advice. Please see specific information pulled into the AVS if appropriate.     Michael Reviewed and is consistent.  HEATHER reviewed and is consistent.  Patient  comfort assessment guide reviewed and updated today.    As part of the patient's treatment plan they are being prescribed a controlled substance/ substances with potential for abuse.  This patient has been made aware of the appropriate use of such medications, including potential risk of somnolence, limited ability to drive and/or work safely, and potential for overdose.  It has also been made clear these medications are for use by the patient only, without concomitant use of alcohol or other substances unless prescribed/advised by medical provider.    Patient has completed prescribing agreement detailing terms of continued prescribing of controlled substances including monitoring HERON reports, urine drug screens, and pill counts.  The patient is aware HERON reports are reviewed on a regular basis and scanned into the chart.    History and physical exam exhibit continued safe and appropriate use of controlled substances.

## 2022-02-21 ENCOUNTER — TRANSITIONAL CARE MANAGEMENT TELEPHONE ENCOUNTER (OUTPATIENT)
Dept: CALL CENTER | Facility: HOSPITAL | Age: 62
End: 2022-02-21

## 2022-02-21 ENCOUNTER — TELEPHONE (OUTPATIENT)
Dept: TELEMETRY | Facility: HOSPITAL | Age: 62
End: 2022-02-21

## 2022-02-21 NOTE — OUTREACH NOTE
Call Center TCM Note      Responses   [facility] patient discharged from? [facility]   Does the patient have one of the following disease processes/diagnoses(primary or secondary)? Other   TCM attempt successful? No  [verbal release- wife]   Unsuccessful attempts Attempt 1          Cira Montiel RN    2/21/2022, 13:35 EST

## 2022-02-21 NOTE — OUTREACH NOTE
Call Center TCM Note      Responses   [facility] patient discharged from? [facility]   Does the patient have one of the following disease processes/diagnoses(primary or secondary)? Other   TCM attempt successful? No   Unsuccessful attempts Attempt 2          Cira Montiel RN    2/21/2022, 16:57 EST

## 2022-02-22 ENCOUNTER — TRANSITIONAL CARE MANAGEMENT TELEPHONE ENCOUNTER (OUTPATIENT)
Dept: CALL CENTER | Facility: HOSPITAL | Age: 62
End: 2022-02-22

## 2022-02-22 LAB
LAB AP CASE REPORT: NORMAL
PATH REPORT.FINAL DX SPEC: NORMAL

## 2022-02-22 NOTE — OUTREACH NOTE
Call Center TCM Note      Responses   [facility] patient discharged from? [facility]   Does the patient have one of the following disease processes/diagnoses(primary or secondary)? Other   TCM attempt successful? No   Unsuccessful attempts Attempt 3          Cira Montiel RN    2/22/2022, 09:09 EST

## 2022-02-25 LAB — PTH RELATED PROT SERPL-SCNC: <2 PMOL/L

## 2022-02-28 ENCOUNTER — READMISSION MANAGEMENT (OUTPATIENT)
Dept: CALL CENTER | Facility: HOSPITAL | Age: 62
End: 2022-02-28

## 2022-02-28 NOTE — OUTREACH NOTE
Medical Week 2 Survey      Responses   [facility] patient discharged from? [facility]   Does the patient have one of the following disease processes/diagnoses(primary or secondary)? Other   Week 2 attempt successful? No   Unsuccessful attempts Attempt 1          Eliza Lagunas RN

## 2022-03-01 ENCOUNTER — READMISSION MANAGEMENT (OUTPATIENT)
Dept: CALL CENTER | Facility: HOSPITAL | Age: 62
End: 2022-03-01

## 2022-03-01 NOTE — OUTREACH NOTE
Medical Week 2 Survey      Responses   [facility] patient discharged from? [facility]   Does the patient have one of the following disease processes/diagnoses(primary or secondary)? Other   Week 2 attempt successful? No   Unsuccessful attempts Attempt 2   Rescheduled Revoked  [unable to reach patient after multiple calls]          Jyothi Lorenzana RN

## 2022-03-01 NOTE — OUTREACH NOTE
Medical Week 2 Survey      Responses   [facility] patient discharged from? [facility]   Does the patient have one of the following disease processes/diagnoses(primary or secondary)? Other   Week 2 attempt successful? No   Unsuccessful attempts Attempt 2          Jyothi Lorenzana RN

## 2022-03-02 ENCOUNTER — OFFICE VISIT (OUTPATIENT)
Dept: FAMILY MEDICINE CLINIC | Facility: CLINIC | Age: 62
End: 2022-03-02

## 2022-03-02 VITALS
WEIGHT: 173.4 LBS | OXYGEN SATURATION: 100 % | HEIGHT: 70 IN | SYSTOLIC BLOOD PRESSURE: 102 MMHG | BODY MASS INDEX: 24.82 KG/M2 | HEART RATE: 83 BPM | TEMPERATURE: 98 F | DIASTOLIC BLOOD PRESSURE: 58 MMHG

## 2022-03-02 DIAGNOSIS — Z87.898 HISTORY OF ALCOHOL USE: ICD-10-CM

## 2022-03-02 DIAGNOSIS — K25.4 GASTROINTESTINAL HEMORRHAGE ASSOCIATED WITH GASTRIC ULCER: ICD-10-CM

## 2022-03-02 DIAGNOSIS — D64.9 ANEMIA, UNSPECIFIED TYPE: Primary | ICD-10-CM

## 2022-03-02 PROCEDURE — 85025 COMPLETE CBC W/AUTO DIFF WBC: CPT | Performed by: NURSE PRACTITIONER

## 2022-03-02 PROCEDURE — 80048 BASIC METABOLIC PNL TOTAL CA: CPT | Performed by: NURSE PRACTITIONER

## 2022-03-02 PROCEDURE — 36415 COLL VENOUS BLD VENIPUNCTURE: CPT | Performed by: NURSE PRACTITIONER

## 2022-03-02 PROCEDURE — 99214 OFFICE O/P EST MOD 30 MIN: CPT | Performed by: NURSE PRACTITIONER

## 2022-03-02 NOTE — PROGRESS NOTES
Transitional Care Follow Up Visit  Subjective     Gurwinder Harding is a 61 y.o. male who presents for a transitional care management visit.    Within 48 business hours after discharge our office contacted him via telephone to coordinate his care and needs.      I reviewed and discussed the details of that call along with the discharge summary, hospital problems, inpatient lab results, inpatient diagnostic studies, and consultation reports with Gurwinder.     Current outpatient and discharge medications have been reconciled for the patient.  Reviewed by: KEVIN Jensen      Date of TCM Phone Call 2/19/2022   [facility]   Date of Admission 2/17/2022   Date of Discharge 2/19/2022   Discharge Disposition Home or Self Care     Risk for Readmission (LACE) Score: 7 (2/19/2022  6:02 AM)      History of Present Illness Returns today follow up admits he is feeling much better since hospital stay.    Course During Hospital Stay:  Hospital records reviewed and copied below:  HOSPITAL COURSE  Patient is a 61 y.o. male presented to [facility] on 2/17/2022 with hematemesis and dark stools; the patient had blood work and his primary care provider's office on 2/16/2021 that came back at 6.5 g/dL and thus his PCP instructed him to come to the emergency department.  The patient has a history of alcohol abuse but no history of esophageal varices.  His initial hemoglobin was 6.3 g/dL.  Thus, patient was admitted to the telemetry service for further evaluation and treatment.  Please see the admitting history and physical for further details.  Patient was given a total of 2 units of packed red blood cells; his hemoglobin stabilized to the mid 7 range.  General surgery was consulted and an EGD was recommended due to the patient's high risk of having esophageal varices.  EGD demonstrated a distal esophageal ulcer that was not acutely bleeding.  Biopsies of the ulcer were obtained but the pathology is still pending  at the time of discharge.  Please note that the patient did not have any hematemesis nor any black stools while admitted.  The patient was placed on IV Protonix while hospitalized; I have switched this to oral formulation and I have encouraged patient to take this for the next month.  Due to the patient's alcohol use, he may need continued follow-up with general surgery or gastroenterology after this acute upper GI bleed.  Patient will follow up with general surgery in the next 2 weeks.  He is encouraged to follow-up with his primary care provider to have blood work soon to make sure that his hemoglobin remained stable.  I have placed him on iron supplementation for the next month to help hematopoiesis.  Because of his prior alcohol history, we have also started him on vitamins.     The following portions of the patient's history were reviewed and updated as appropriate: allergies, current medications, past family history, past medical history, past social history, past surgical history and problem list.    Review of Systems    Objective   Physical Exam  Vitals and nursing note reviewed.   Constitutional:       Appearance: He is well-developed.   Eyes:      General:         Right eye: No discharge.         Left eye: No discharge.      Conjunctiva/sclera: Conjunctivae normal.   Cardiovascular:      Rate and Rhythm: Normal rate and regular rhythm.      Heart sounds: Normal heart sounds. No murmur heard.      Pulmonary:      Effort: Pulmonary effort is normal.      Breath sounds: Normal breath sounds.   Skin:     General: Skin is warm and dry.   Neurological:      Mental Status: He is alert and oriented to person, place, and time.   Psychiatric:         Behavior: Behavior normal.       During this visit the following were done:  Labs Reviewed [x]    Labs Ordered [x]    Radiology Reports Reviewed []    Radiology Ordered []    PCP Records Reviewed []    Referring Provider Records Reviewed []    ER Records Reviewed []     Hospital Records Reviewed [x]    History Obtained From Family []    Radiology Images Reviewed []    Other Reviewed []    Records Requested []      Assessment/Plan   Diagnoses and all orders for this visit:    1. Anemia, unspecified type (Primary)  -     CBC Auto Differential; Future  -     Basic Metabolic Panel; Future  -     Ambulatory Referral to Gastroenterology  -     CBC Auto Differential  -     Basic Metabolic Panel    2. Gastrointestinal hemorrhage associated with gastric ulcer  -     CBC Auto Differential; Future  -     Basic Metabolic Panel; Future  -     Ambulatory Referral to Gastroenterology  -     CBC Auto Differential  -     Basic Metabolic Panel    3. History of alcohol use

## 2022-03-03 LAB
ANION GAP SERPL CALCULATED.3IONS-SCNC: 12 MMOL/L (ref 5–15)
BASOPHILS # BLD AUTO: 0.09 10*3/MM3 (ref 0–0.2)
BASOPHILS NFR BLD AUTO: 1 % (ref 0–1.5)
BUN SERPL-MCNC: 17 MG/DL (ref 8–23)
BUN/CREAT SERPL: 8.9 (ref 7–25)
CALCIUM SPEC-SCNC: 9.1 MG/DL (ref 8.6–10.5)
CHLORIDE SERPL-SCNC: 107 MMOL/L (ref 98–107)
CO2 SERPL-SCNC: 17 MMOL/L (ref 22–29)
CREAT SERPL-MCNC: 1.9 MG/DL (ref 0.76–1.27)
DEPRECATED RDW RBC AUTO: 56.7 FL (ref 37–54)
EGFRCR SERPLBLD CKD-EPI 2021: 39.9 ML/MIN/1.73
EOSINOPHIL # BLD AUTO: 0.17 10*3/MM3 (ref 0–0.4)
EOSINOPHIL NFR BLD AUTO: 1.9 % (ref 0.3–6.2)
ERYTHROCYTE [DISTWIDTH] IN BLOOD BY AUTOMATED COUNT: 18.5 % (ref 12.3–15.4)
GLUCOSE SERPL-MCNC: 90 MG/DL (ref 65–99)
HCT VFR BLD AUTO: 30.7 % (ref 37.5–51)
HGB BLD-MCNC: 9.5 G/DL (ref 13–17.7)
IMM GRANULOCYTES # BLD AUTO: 0.02 10*3/MM3 (ref 0–0.05)
IMM GRANULOCYTES NFR BLD AUTO: 0.2 % (ref 0–0.5)
LYMPHOCYTES # BLD AUTO: 1.65 10*3/MM3 (ref 0.7–3.1)
LYMPHOCYTES NFR BLD AUTO: 18.3 % (ref 19.6–45.3)
MCH RBC QN AUTO: 26.2 PG (ref 26.6–33)
MCHC RBC AUTO-ENTMCNC: 30.9 G/DL (ref 31.5–35.7)
MCV RBC AUTO: 84.6 FL (ref 79–97)
MONOCYTES # BLD AUTO: 0.56 10*3/MM3 (ref 0.1–0.9)
MONOCYTES NFR BLD AUTO: 6.2 % (ref 5–12)
NEUTROPHILS NFR BLD AUTO: 6.53 10*3/MM3 (ref 1.7–7)
NEUTROPHILS NFR BLD AUTO: 72.4 % (ref 42.7–76)
NRBC BLD AUTO-RTO: 0 /100 WBC (ref 0–0.2)
PLATELET # BLD AUTO: 298 10*3/MM3 (ref 140–450)
PMV BLD AUTO: 11 FL (ref 6–12)
POTASSIUM SERPL-SCNC: 4.8 MMOL/L (ref 3.5–5.2)
RBC # BLD AUTO: 3.63 10*6/MM3 (ref 4.14–5.8)
SODIUM SERPL-SCNC: 136 MMOL/L (ref 136–145)
WBC NRBC COR # BLD: 9.02 10*3/MM3 (ref 3.4–10.8)

## 2022-03-14 ENCOUNTER — TELEPHONE (OUTPATIENT)
Dept: FAMILY MEDICINE CLINIC | Facility: CLINIC | Age: 62
End: 2022-03-14

## 2022-03-14 DIAGNOSIS — N19 RENAL FAILURE, UNSPECIFIED CHRONICITY: Primary | ICD-10-CM

## 2022-03-14 NOTE — TELEPHONE ENCOUNTER
----- Message from KEVIN Jensen sent at 3/14/2022  4:38 PM EDT -----  I am placing a referral to nephrology.  Kidney function has not returned to normal.    Left voicemail for pt to return call.     Left second voicemail.     Left 3rd voicemail, mailing letter.

## 2022-03-15 ENCOUNTER — TELEPHONE (OUTPATIENT)
Dept: FAMILY MEDICINE CLINIC | Facility: CLINIC | Age: 62
End: 2022-03-15

## 2022-03-15 NOTE — TELEPHONE ENCOUNTER
----- Message from KEVIN Jensen sent at 3/14/2022  4:38 PM EDT -----  I am placing a referral to nephrology.  Kidney function has not returned to normal.    Left a message to return call.    Left a second message to return call.      Left a third message to return call.    Letter mailed.

## 2022-03-29 ENCOUNTER — TELEPHONE (OUTPATIENT)
Dept: FAMILY MEDICINE CLINIC | Facility: CLINIC | Age: 62
End: 2022-03-29

## 2022-04-07 ENCOUNTER — TELEPHONE (OUTPATIENT)
Dept: CARDIAC SURGERY | Facility: CLINIC | Age: 62
End: 2022-04-07

## 2022-04-07 NOTE — TELEPHONE ENCOUNTER
Mr. Harding has not had his carotid duplex, I have left him several messages to contact us with no return call.  I have cancelled his 4/13/22 Lx appt and left a voice mail as well as mailed a letter for him to call us back if he wishes to reschedule.

## 2022-04-18 ENCOUNTER — TELEPHONE (OUTPATIENT)
Dept: FAMILY MEDICINE CLINIC | Facility: CLINIC | Age: 62
End: 2022-04-18

## 2022-04-18 ENCOUNTER — OFFICE VISIT (OUTPATIENT)
Dept: SURGERY | Facility: CLINIC | Age: 62
End: 2022-04-18

## 2022-04-18 VITALS
HEIGHT: 70 IN | SYSTOLIC BLOOD PRESSURE: 102 MMHG | BODY MASS INDEX: 24.79 KG/M2 | HEART RATE: 52 BPM | WEIGHT: 173.2 LBS | DIASTOLIC BLOOD PRESSURE: 64 MMHG

## 2022-04-18 DIAGNOSIS — G62.9 NEUROPATHY: ICD-10-CM

## 2022-04-18 DIAGNOSIS — K92.2 UPPER GI BLEED: Primary | ICD-10-CM

## 2022-04-18 PROCEDURE — 99212 OFFICE O/P EST SF 10 MIN: CPT | Performed by: SURGERY

## 2022-04-18 RX ORDER — OMEPRAZOLE 40 MG/1
40 CAPSULE, DELAYED RELEASE ORAL DAILY
Qty: 30 CAPSULE | Refills: 1 | Status: SHIPPED | OUTPATIENT
Start: 2022-04-18 | End: 2022-05-18

## 2022-04-18 RX ORDER — GABAPENTIN 800 MG/1
1200 TABLET ORAL 3 TIMES DAILY
Qty: 135 TABLET | Refills: 2 | Status: SHIPPED | OUTPATIENT
Start: 2022-04-18 | End: 2022-07-15

## 2022-04-18 NOTE — TELEPHONE ENCOUNTER
Patient called he is pout of Gabapentin & pharmacy will not fill because the directions say 1 tid so he would have some left but he takes 1.5 tid can you change the directions & just send as a direction change so he can fill it?

## 2022-04-18 NOTE — PROGRESS NOTES
"Subjective   Gurwinder Harding is a 61 y.o. male here today for pathology review.    History of Present Illness  Mr. Harding was seen in the office today for scheduled follow-up following EGD with biopsy on 2/18/2022 when he was admitted with an upper GI bleed.  Subsequent to that admission patient has had follow-up CBC which demonstrated his hemoglobin had come up from 7.9 to the 9 range.  She reports no clinical episodes of bleeding.  Patient states he has not been taking his Protonix or equivalent.  Allergies   Allergen Reactions   • Penicillins Hives and Shortness Of Breath     As A child   • Novocain [Procaine] Nausea And Vomiting         Current Outpatient Medications   Medication Sig Dispense Refill   • atenolol (Tenormin) 25 MG tablet Take 1 tablet by mouth Daily. 90 tablet 1   • cilostazol (PLETAL) 100 MG tablet Take 1 tablet by mouth Daily. 60 tablet 5   • ferrous sulfate (FerrouSul) 325 (65 FE) MG tablet Take 1 tablet by mouth 3 (Three) Times a Day With Meals. 90 tablet 0   • gabapentin (NEURONTIN) 800 MG tablet Take 1.5 tablets by mouth 3 (Three) Times a Day. 135 tablet 2   • isosorbide mononitrate (IMDUR) 30 MG 24 hr tablet Take 1 tablet by mouth Daily. 90 tablet 1   • methocarbamol (ROBAXIN) 750 MG tablet Take 1 tablet by mouth 4 (Four) Times a Day. 120 tablet 5   • pantoprazole (Protonix) 40 MG EC tablet Take 1 tablet by mouth Daily. 30 tablet 0   • traZODone (DESYREL) 50 MG tablet Take 1 tablet by mouth Every Night. 90 tablet 1   • folic acid-pyridoxine-cyanocobalamin (Folbic) 2.5-25-2 MG tablet tablet Take 1 tablet by mouth Daily. 90 tablet 1   • omeprazole (priLOSEC) 40 MG capsule Take 1 capsule by mouth Daily for 30 days. 30 capsule 1     No current facility-administered medications for this visit.       Objective   /64 (BP Location: Left arm)   Pulse 52   Ht 177.8 cm (70\")   Wt 78.6 kg (173 lb 3.2 oz)   BMI 24.85 kg/m²    Physical Exam    Results/Data  Pathology result from 2/18/2022 was " reviewed and discussed with the patient    Procedures     Assessment/Plan   History of GI bleed with esophageal ulceration on endoscopy.    Omeprazole ordered for patient and refill given.  He will discuss need for continued use of this medication and follow-up with his primary provider.  Follow-up with me as needed       Discussion/Summary  Patient's Body mass index is 24.85 kg/m². indicating that he is within normal range (BMI 18.5-24.9). No BMI management plan needed..       Future Appointments   Date Time Provider Department Center   5/16/2022  3:00 PM Pastora Person APRN [facility]         Please note that portions of this note were completed with a voice recognition program.

## 2022-05-16 ENCOUNTER — TELEPHONE (OUTPATIENT)
Dept: FAMILY MEDICINE CLINIC | Facility: CLINIC | Age: 62
End: 2022-05-16

## 2022-05-18 ENCOUNTER — OFFICE VISIT (OUTPATIENT)
Dept: FAMILY MEDICINE CLINIC | Facility: CLINIC | Age: 62
End: 2022-05-18

## 2022-05-18 VITALS
WEIGHT: 177.8 LBS | TEMPERATURE: 97.3 F | HEART RATE: 81 BPM | HEIGHT: 70 IN | SYSTOLIC BLOOD PRESSURE: 112 MMHG | BODY MASS INDEX: 25.45 KG/M2 | OXYGEN SATURATION: 99 % | DIASTOLIC BLOOD PRESSURE: 60 MMHG

## 2022-05-18 DIAGNOSIS — D64.9 ANEMIA, UNSPECIFIED TYPE: Primary | ICD-10-CM

## 2022-05-18 PROCEDURE — 83540 ASSAY OF IRON: CPT | Performed by: NURSE PRACTITIONER

## 2022-05-18 PROCEDURE — 80048 BASIC METABOLIC PNL TOTAL CA: CPT | Performed by: NURSE PRACTITIONER

## 2022-05-18 PROCEDURE — 85025 COMPLETE CBC W/AUTO DIFF WBC: CPT | Performed by: NURSE PRACTITIONER

## 2022-05-18 PROCEDURE — 84466 ASSAY OF TRANSFERRIN: CPT | Performed by: NURSE PRACTITIONER

## 2022-05-18 PROCEDURE — 36415 COLL VENOUS BLD VENIPUNCTURE: CPT | Performed by: NURSE PRACTITIONER

## 2022-05-18 PROCEDURE — 99213 OFFICE O/P EST LOW 20 MIN: CPT | Performed by: NURSE PRACTITIONER

## 2022-05-18 NOTE — PROGRESS NOTES
"Chief Complaint  Anemia and Follow-up    Subjective          Gurwinder Harding presents to [facility]  Anemia  Presents for follow-up (Returns today follow up anemia.  Admits he is feeling much better.  ) visit. Symptoms include malaise/fatigue. There are no compliance problems.  Side effects of medications include fatigue.       Objective   Vital Signs:  /60 (BP Location: Right arm, Patient Position: Sitting)   Pulse 81   Temp 97.3 °F (36.3 °C) (Temporal)   Ht 177.8 cm (70\")   Wt 80.6 kg (177 lb 12.8 oz)   SpO2 99%   BMI 25.51 kg/m²         Physical Exam  Vitals and nursing note reviewed.   Constitutional:       General: He is not in acute distress.     Appearance: He is well-developed and normal weight. He is not ill-appearing.   HENT:      Head: Normocephalic.   Eyes:      General:         Right eye: No discharge.         Left eye: No discharge.      Conjunctiva/sclera: Conjunctivae normal.   Cardiovascular:      Rate and Rhythm: Normal rate and regular rhythm.      Heart sounds: Normal heart sounds. No murmur heard.  Pulmonary:      Effort: Pulmonary effort is normal.      Breath sounds: Normal breath sounds.   Skin:     General: Skin is warm and dry.   Neurological:      Mental Status: He is alert and oriented to person, place, and time.   Psychiatric:         Behavior: Behavior normal.        Result Review :                 Assessment and Plan    Diagnoses and all orders for this visit:    1. Anemia, unspecified type (Primary)  -     CBC Auto Differential; Future  -     Basic Metabolic Panel; Future  -     Iron and TIBC; Future  -     CBC Auto Differential  -     Basic Metabolic Panel  -     Iron and TIBC    BMI is >= 25 and < 30. (Overweight) The following options were offered after discussion: exercise counseling/recommendations and nutrition counseling/recommendations           Follow Up   Return in about 3 months (around 8/18/2022), or if symptoms worsen or fail " to improve, for Recheck labs today.  Patient was given instructions and counseling regarding his condition or for health maintenance advice. Please see specific information pulled into the AVS if appropriate.

## 2022-05-19 LAB
ANION GAP SERPL CALCULATED.3IONS-SCNC: 10 MMOL/L (ref 5–15)
BASOPHILS # BLD AUTO: 0.12 10*3/MM3 (ref 0–0.2)
BASOPHILS NFR BLD AUTO: 1.6 % (ref 0–1.5)
BUN SERPL-MCNC: 17 MG/DL (ref 8–23)
BUN/CREAT SERPL: 10.8 (ref 7–25)
CALCIUM SPEC-SCNC: 9.6 MG/DL (ref 8.6–10.5)
CHLORIDE SERPL-SCNC: 106 MMOL/L (ref 98–107)
CO2 SERPL-SCNC: 22 MMOL/L (ref 22–29)
CREAT SERPL-MCNC: 1.57 MG/DL (ref 0.76–1.27)
DEPRECATED RDW RBC AUTO: 40.3 FL (ref 37–54)
EGFRCR SERPLBLD CKD-EPI 2021: 50.1 ML/MIN/1.73
EOSINOPHIL # BLD AUTO: 0.19 10*3/MM3 (ref 0–0.4)
EOSINOPHIL NFR BLD AUTO: 2.5 % (ref 0.3–6.2)
ERYTHROCYTE [DISTWIDTH] IN BLOOD BY AUTOMATED COUNT: 13 % (ref 12.3–15.4)
GLUCOSE SERPL-MCNC: 104 MG/DL (ref 65–99)
HCT VFR BLD AUTO: 34.9 % (ref 37.5–51)
HGB BLD-MCNC: 11.5 G/DL (ref 13–17.7)
IMM GRANULOCYTES # BLD AUTO: 0.03 10*3/MM3 (ref 0–0.05)
IMM GRANULOCYTES NFR BLD AUTO: 0.4 % (ref 0–0.5)
IRON 24H UR-MRATE: 29 MCG/DL (ref 59–158)
IRON SATN MFR SERPL: 5 % (ref 20–50)
LYMPHOCYTES # BLD AUTO: 1.79 10*3/MM3 (ref 0.7–3.1)
LYMPHOCYTES NFR BLD AUTO: 23.2 % (ref 19.6–45.3)
MCH RBC QN AUTO: 28.3 PG (ref 26.6–33)
MCHC RBC AUTO-ENTMCNC: 33 G/DL (ref 31.5–35.7)
MCV RBC AUTO: 86 FL (ref 79–97)
MONOCYTES # BLD AUTO: 0.61 10*3/MM3 (ref 0.1–0.9)
MONOCYTES NFR BLD AUTO: 7.9 % (ref 5–12)
NEUTROPHILS NFR BLD AUTO: 4.96 10*3/MM3 (ref 1.7–7)
NEUTROPHILS NFR BLD AUTO: 64.4 % (ref 42.7–76)
NRBC BLD AUTO-RTO: 0 /100 WBC (ref 0–0.2)
PLATELET # BLD AUTO: 281 10*3/MM3 (ref 140–450)
PMV BLD AUTO: 10.6 FL (ref 6–12)
POTASSIUM SERPL-SCNC: 4.6 MMOL/L (ref 3.5–5.2)
RBC # BLD AUTO: 4.06 10*6/MM3 (ref 4.14–5.8)
SODIUM SERPL-SCNC: 138 MMOL/L (ref 136–145)
TIBC SERPL-MCNC: 529 MCG/DL (ref 298–536)
TRANSFERRIN SERPL-MCNC: 355 MG/DL (ref 200–360)
WBC NRBC COR # BLD: 7.7 10*3/MM3 (ref 3.4–10.8)

## 2022-05-23 ENCOUNTER — OFFICE VISIT (OUTPATIENT)
Dept: FAMILY MEDICINE CLINIC | Facility: CLINIC | Age: 62
End: 2022-05-23

## 2022-05-23 ENCOUNTER — TELEPHONE (OUTPATIENT)
Dept: FAMILY MEDICINE CLINIC | Facility: CLINIC | Age: 62
End: 2022-05-23

## 2022-05-23 VITALS
HEIGHT: 70 IN | HEART RATE: 85 BPM | TEMPERATURE: 96.8 F | SYSTOLIC BLOOD PRESSURE: 134 MMHG | DIASTOLIC BLOOD PRESSURE: 60 MMHG | OXYGEN SATURATION: 98 % | WEIGHT: 178 LBS | BODY MASS INDEX: 25.48 KG/M2

## 2022-05-23 DIAGNOSIS — L03.116 CELLULITIS OF LEFT LOWER EXTREMITY: Primary | ICD-10-CM

## 2022-05-23 DIAGNOSIS — I10 ESSENTIAL HYPERTENSION: ICD-10-CM

## 2022-05-23 PROCEDURE — 99213 OFFICE O/P EST LOW 20 MIN: CPT | Performed by: FAMILY MEDICINE

## 2022-05-23 RX ORDER — SULFAMETHOXAZOLE AND TRIMETHOPRIM 800; 160 MG/1; MG/1
1 TABLET ORAL 2 TIMES DAILY
Qty: 14 TABLET | Refills: 0 | Status: SHIPPED | OUTPATIENT
Start: 2022-05-23 | End: 2022-05-30

## 2022-05-23 RX ORDER — ISOSORBIDE MONONITRATE 30 MG/1
30 TABLET, EXTENDED RELEASE ORAL DAILY
Qty: 90 TABLET | Refills: 1 | Status: SHIPPED | OUTPATIENT
Start: 2022-05-23 | End: 2022-11-07 | Stop reason: SDUPTHER

## 2022-05-23 NOTE — TELEPHONE ENCOUNTER
Caller: Patsy Harding    Relationship: Emergency Contact    Best call back number: [phone]    What is the best time to reach you: ANYTIME    Who are you requesting to speak with (clinical staff, provider,  specific staff member): ALBERTO THACKER    Do you know the name of the person who called: PATSY HARDING    What was the call regarding: PATIENT FELL AND CUT HIS SHIN, DEEPLY, ON 05/15/22, THE AREA IS SWOLLEN AND RED, PATIENTS WIFE DOES NOT KNOW IF HE NEEDS TO BE SEENIN THE OFFICE OR IF HE NEEDS AN ANTIBIOTIC?     PLEASE ADVISE    Do you require a callback: YES

## 2022-05-23 NOTE — TELEPHONE ENCOUNTER
Caller: Patsy Harding    Relationship: Emergency Contact    Best call back number: [phone]    What is the best time to reach you: ANYTIME    Who are you requesting to speak with (clinical staff, provider,  specific staff member): ALBERTO THACKER    Do you know the name of the person who called: PATSY HARDING    What was the call regarding: PATIENT FELL AND CUT HIS SHIN, DEEPLY, ON 05/15/22, THE AREA IS SWOLLEN AND RED, PATIENTS WIFE DOES NOT KNOW IF HE NEEDS TO BE SEENIN THE OFFICE OR IF HE NEEDS AN ANTIBIOTIC?     PLEASE ADVISE    Do you require a callback: YES        Spoke with wife & scheduled a same day.

## 2022-05-24 ENCOUNTER — TELEPHONE (OUTPATIENT)
Dept: FAMILY MEDICINE CLINIC | Facility: CLINIC | Age: 62
End: 2022-05-24

## 2022-05-24 NOTE — PROGRESS NOTES
"Chief Complaint  Skin Lesion (L leg)    Subjective          Gurwinder Harding presents to [facility]  Leg Injury  This is a new problem. The current episode started 1 to 4 weeks ago (8 days ago). The problem has been gradually worsening. Pertinent negatives include no fever or myalgias. Associated symptoms comments: States he fell walking into his building. The area is getting red and spreading around the wound. . He has tried rest and sleep for the symptoms. The treatment provided moderate relief.   Will have patient follow up on Thursday for wound recheck.     Review of Systems   Constitutional: Negative for fever.   Musculoskeletal: Negative for myalgias.         Objective   Vital Signs:   /60 (BP Location: Right arm, Patient Position: Sitting, Cuff Size: Large Adult)   Pulse 85   Temp 96.8 °F (36 °C) (Temporal)   Ht 177.8 cm (70\")   Wt 80.7 kg (178 lb)   SpO2 98%   BMI 25.54 kg/m²     Physical Exam  Constitutional:       General: He is not in acute distress.     Appearance: Normal appearance. He is well-developed and well-groomed. He is not ill-appearing, toxic-appearing or diaphoretic.   HENT:      Head: Normocephalic.      Right Ear: Tympanic membrane, ear canal and external ear normal.      Left Ear: Tympanic membrane, ear canal and external ear normal.      Nose: Nose normal. No congestion or rhinorrhea.      Mouth/Throat:      Mouth: Mucous membranes are moist.      Pharynx: Oropharynx is clear. No oropharyngeal exudate or posterior oropharyngeal erythema.   Eyes:      General: Lids are normal.         Right eye: No discharge.         Left eye: No discharge.      Extraocular Movements: Extraocular movements intact.      Pupils: Pupils are equal, round, and reactive to light.   Neck:      Vascular: No carotid bruit.   Cardiovascular:      Rate and Rhythm: Normal rate and regular rhythm.      Pulses: Normal pulses.      Heart sounds: Normal heart sounds. No murmur " heard.    No friction rub. No gallop.   Pulmonary:      Effort: Pulmonary effort is normal. No respiratory distress.      Breath sounds: Normal breath sounds. No stridor. No wheezing, rhonchi or rales.   Chest:      Chest wall: No tenderness.   Abdominal:      General: Bowel sounds are normal. There is no distension.      Palpations: Abdomen is soft. There is no mass.      Tenderness: There is no abdominal tenderness. There is no right CVA tenderness, left CVA tenderness, guarding or rebound.      Hernia: No hernia is present.   Musculoskeletal:         General: No swelling or tenderness. Normal range of motion.      Cervical back: Normal range of motion and neck supple. No rigidity or tenderness.      Right lower leg: No edema.      Left lower leg: Laceration present. No edema.   Lymphadenopathy:      Cervical: No cervical adenopathy.   Skin:     General: Skin is warm.      Capillary Refill: Capillary refill takes less than 2 seconds.      Coloration: Skin is not jaundiced.      Findings: Erythema present. No bruising or rash.      Comments: Redness to right lower leg around wound going down toward ankle   Neurological:      General: No focal deficit present.      Mental Status: He is alert and oriented to person, place, and time.      Motor: Motor function is intact. No weakness.      Coordination: Coordination is intact.      Gait: Gait is intact. Gait normal.   Psychiatric:         Attention and Perception: Attention normal.         Mood and Affect: Mood normal.         Speech: Speech normal.         Behavior: Behavior normal.         Cognition and Memory: Cognition normal.         Judgment: Judgment normal.        Result Review :                 Assessment and Plan    Diagnoses and all orders for this visit:    1. Cellulitis of left lower extremity (Primary)  -     sulfamethoxazole-trimethoprim (Bactrim DS) 800-160 MG per tablet; Take 1 tablet by mouth 2 (Two) Times a Day for 7 days.  Dispense: 14 tablet;  Refill: 0  -     silver sulfadiazine (Silvadene) 1 % cream; Apply 1 application topically to the appropriate area as directed 2 (Two) Times a Day for 7 days.  Dispense: 25 g; Refill: 0    2. Essential hypertension  -     isosorbide mononitrate (IMDUR) 30 MG 24 hr tablet; Take 1 tablet by mouth Daily.  Dispense: 90 tablet; Refill: 1      Patient's Body mass index is 25.54 kg/m². indicating that he is overweight (BMI 25-29.9). Patient's (Body mass index is 25.54 kg/m².) indicates that they are overweight with health conditions that include hypertension and GERD . Weight is unchanged. BMI is is above average; BMI management plan is completed. We discussed low calorie, low carb based diet program, portion control and increasing exercise. .    Follow Up   Return follow up on friday.  Patient was given instructions and counseling regarding his condition or for health maintenance advice. Please see specific information pulled into the AVS if appropriate.

## 2022-05-26 ENCOUNTER — OFFICE VISIT (OUTPATIENT)
Dept: FAMILY MEDICINE CLINIC | Facility: CLINIC | Age: 62
End: 2022-05-26

## 2022-05-26 VITALS
HEIGHT: 70 IN | SYSTOLIC BLOOD PRESSURE: 100 MMHG | OXYGEN SATURATION: 98 % | HEART RATE: 81 BPM | WEIGHT: 181 LBS | DIASTOLIC BLOOD PRESSURE: 58 MMHG | BODY MASS INDEX: 25.91 KG/M2 | TEMPERATURE: 96.9 F

## 2022-05-26 DIAGNOSIS — I73.9 PVD (PERIPHERAL VASCULAR DISEASE) WITH CLAUDICATION: Primary | ICD-10-CM

## 2022-05-26 DIAGNOSIS — L03.90 WOUND CELLULITIS: ICD-10-CM

## 2022-05-26 PROCEDURE — 99213 OFFICE O/P EST LOW 20 MIN: CPT | Performed by: FAMILY MEDICINE

## 2022-05-26 NOTE — PROGRESS NOTES
"Chief Complaint  Cellulitis of left lower extremity    Subjective          Gurwinder Harding presents to [facility]  Wound Check  He was originally treated 5 to 10 days ago. Previous treatment included oral antibiotics and wound cleansing or irrigation. There has been clear discharge from the wound. The redness has worsened. The swelling has not changed. The pain has worsened. There is difficulty moving the extremity or digit due to pain.   Wound has gotten more red around the area. Seems more due to PVD rather than infection. States he had some stenting in his legs last year. He missed his last appointment with Dr. Azar. We will get a follow up with him asap. Will order a new aleyda u/s to evaluate blood flow in lower extremities. Patient is to call on Tuesday when he finishes antibiotics. If the wound is not looking better we will refer to wound care clinic.     Review of Systems      Objective   Vital Signs:   /58 (BP Location: Right arm, Patient Position: Sitting, Cuff Size: Adult)   Pulse 81   Temp 96.9 °F (36.1 °C)   Ht 177.8 cm (70\")   Wt 82.1 kg (181 lb)   SpO2 98%   BMI 25.97 kg/m²     Physical Exam  Constitutional:       General: He is not in acute distress.     Appearance: Normal appearance. He is well-developed and well-groomed. He is not ill-appearing, toxic-appearing or diaphoretic.   HENT:      Head: Normocephalic.      Nose: Nose normal. No congestion or rhinorrhea.      Mouth/Throat:      Mouth: Mucous membranes are moist.      Pharynx: Oropharynx is clear. No oropharyngeal exudate or posterior oropharyngeal erythema.   Eyes:      General: Lids are normal.         Right eye: No discharge.         Left eye: No discharge.      Extraocular Movements: Extraocular movements intact.      Pupils: Pupils are equal, round, and reactive to light.   Neck:      Vascular: No carotid bruit.   Cardiovascular:      Rate and Rhythm: Normal rate and regular rhythm.      " Pulses: Normal pulses.      Heart sounds: Normal heart sounds. No murmur heard.    No friction rub. No gallop.   Pulmonary:      Effort: Pulmonary effort is normal. No respiratory distress.      Breath sounds: Normal breath sounds. No stridor. No wheezing, rhonchi or rales.   Chest:      Chest wall: No tenderness.   Abdominal:      General: Bowel sounds are normal. There is no distension.      Palpations: Abdomen is soft. There is no mass.      Tenderness: There is no abdominal tenderness. There is no right CVA tenderness, left CVA tenderness, guarding or rebound.      Hernia: No hernia is present.   Musculoskeletal:         General: No swelling or tenderness. Normal range of motion.      Cervical back: Normal range of motion and neck supple. No rigidity or tenderness.      Right lower leg: No edema.      Left lower leg: No edema.   Lymphadenopathy:      Cervical: No cervical adenopathy.   Skin:     General: Skin is warm.      Capillary Refill: Capillary refill takes less than 2 seconds.      Coloration: Skin is not jaundiced.      Findings: Erythema and wound present. No bruising or rash.   Neurological:      General: No focal deficit present.      Mental Status: He is alert and oriented to person, place, and time.      Motor: Motor function is intact. No weakness.      Coordination: Coordination is intact.      Gait: Gait is intact. Gait normal.   Psychiatric:         Attention and Perception: Attention normal.         Mood and Affect: Mood normal.         Speech: Speech normal.         Behavior: Behavior normal.         Cognition and Memory: Cognition normal.         Judgment: Judgment normal.        Result Review :                 Assessment and Plan    Diagnoses and all orders for this visit:    1. PVD (peripheral vascular disease) with claudication (HCC) (Primary)  -     US arterial doppler lower extremity  bilateral; Future    2. Wound cellulitis      Patient's Body mass index is 25.97 kg/m². indicating that  he is overweight (BMI 25-29.9). Patient's (Body mass index is 25.97 kg/m².) indicates that they are overweight with health conditions that include GERD and peripheral vascular disease . Weight is unchanged. BMI is is above average; BMI management plan is completed. We discussed low calorie, low carb based diet program, portion control and increasing exercise. .    Follow Up   Return if symptoms worsen or fail to improve.  Patient was given instructions and counseling regarding his condition or for health maintenance advice. Please see specific information pulled into the AVS if appropriate.

## 2022-06-01 DIAGNOSIS — L03.90 WOUND CELLULITIS: Primary | ICD-10-CM

## 2022-06-06 DIAGNOSIS — G62.9 NEUROPATHY: ICD-10-CM

## 2022-06-06 RX ORDER — METHOCARBAMOL 750 MG/1
TABLET, FILM COATED ORAL
Qty: 120 TABLET | Refills: 0 | Status: SHIPPED | OUTPATIENT
Start: 2022-06-06 | End: 2022-07-05

## 2022-06-10 ENCOUNTER — HOSPITAL ENCOUNTER (OUTPATIENT)
Dept: WOUND CARE | Facility: HOSPITAL | Age: 62
Discharge: HOME | End: 2022-06-10
Admitting: NURSE PRACTITIONER

## 2022-06-10 VITALS
BODY MASS INDEX: 25.83 KG/M2 | TEMPERATURE: 97.7 F | HEART RATE: 86 BPM | SYSTOLIC BLOOD PRESSURE: 148 MMHG | DIASTOLIC BLOOD PRESSURE: 77 MMHG | RESPIRATION RATE: 17 BRPM | WEIGHT: 180 LBS

## 2022-06-10 DIAGNOSIS — L97.822 NON-PRESSURE CHRONIC ULCER OF OTHER PART OF LEFT LOWER LEG WITH FAT LAYER EXPOSED: ICD-10-CM

## 2022-06-10 DIAGNOSIS — I73.9 PVD (PERIPHERAL VASCULAR DISEASE) WITH CLAUDICATION: Chronic | ICD-10-CM

## 2022-06-10 DIAGNOSIS — E66.9 OBESITY (BMI 30-39.9): Primary | Chronic | ICD-10-CM

## 2022-06-10 LAB
ALBUMIN SERPL-MCNC: 3.7 G/DL (ref 3.5–5.2)
ALBUMIN/GLOB SERPL: 1.2 G/DL
ALP SERPL-CCNC: 71 U/L (ref 39–117)
ALT SERPL W P-5'-P-CCNC: 7 U/L (ref 1–41)
ANION GAP SERPL CALCULATED.3IONS-SCNC: 8.3 MMOL/L (ref 5–15)
AST SERPL-CCNC: 18 U/L (ref 1–40)
BASOPHILS # BLD AUTO: 0.09 10*3/MM3 (ref 0–0.2)
BASOPHILS NFR BLD AUTO: 1 % (ref 0–1.5)
BILIRUB SERPL-MCNC: 0.2 MG/DL (ref 0–1.2)
BUN SERPL-MCNC: 24 MG/DL (ref 8–23)
BUN/CREAT SERPL: 15.9 (ref 7–25)
CALCIUM SPEC-SCNC: 9 MG/DL (ref 8.6–10.5)
CHLORIDE SERPL-SCNC: 103 MMOL/L (ref 98–107)
CO2 SERPL-SCNC: 22.7 MMOL/L (ref 22–29)
CREAT SERPL-MCNC: 1.51 MG/DL (ref 0.76–1.27)
CRP SERPL-MCNC: <0.3 MG/DL (ref 0–0.5)
DEPRECATED RDW RBC AUTO: 44.8 FL (ref 37–54)
EGFRCR SERPLBLD CKD-EPI 2021: 52.5 ML/MIN/1.73
EOSINOPHIL # BLD AUTO: 0.19 10*3/MM3 (ref 0–0.4)
EOSINOPHIL NFR BLD AUTO: 2.2 % (ref 0.3–6.2)
ERYTHROCYTE [DISTWIDTH] IN BLOOD BY AUTOMATED COUNT: 13.8 % (ref 12.3–15.4)
ERYTHROCYTE [SEDIMENTATION RATE] IN BLOOD: 41 MM/HR (ref 0–20)
GLOBULIN UR ELPH-MCNC: 3.2 GM/DL
GLUCOSE SERPL-MCNC: 102 MG/DL (ref 65–99)
HBA1C MFR BLD: 4.9 % (ref 4.8–5.6)
HCT VFR BLD AUTO: 31.8 % (ref 37.5–51)
HGB BLD-MCNC: 9.7 G/DL (ref 13–17.7)
IMM GRANULOCYTES # BLD AUTO: 0.04 10*3/MM3 (ref 0–0.05)
IMM GRANULOCYTES NFR BLD AUTO: 0.5 % (ref 0–0.5)
LYMPHOCYTES # BLD AUTO: 1.73 10*3/MM3 (ref 0.7–3.1)
LYMPHOCYTES NFR BLD AUTO: 19.7 % (ref 19.6–45.3)
MCH RBC QN AUTO: 27.2 PG (ref 26.6–33)
MCHC RBC AUTO-ENTMCNC: 30.5 G/DL (ref 31.5–35.7)
MCV RBC AUTO: 89.3 FL (ref 79–97)
MONOCYTES # BLD AUTO: 0.79 10*3/MM3 (ref 0.1–0.9)
MONOCYTES NFR BLD AUTO: 9 % (ref 5–12)
NEUTROPHILS NFR BLD AUTO: 5.95 10*3/MM3 (ref 1.7–7)
NEUTROPHILS NFR BLD AUTO: 67.6 % (ref 42.7–76)
NRBC BLD AUTO-RTO: 0 /100 WBC (ref 0–0.2)
PLATELET # BLD AUTO: 318 10*3/MM3 (ref 140–450)
PMV BLD AUTO: 9.4 FL (ref 6–12)
POTASSIUM SERPL-SCNC: 4.4 MMOL/L (ref 3.5–5.2)
PROT SERPL-MCNC: 6.9 G/DL (ref 6–8.5)
RBC # BLD AUTO: 3.56 10*6/MM3 (ref 4.14–5.8)
SODIUM SERPL-SCNC: 134 MMOL/L (ref 136–145)
WBC NRBC COR # BLD: 8.79 10*3/MM3 (ref 3.4–10.8)

## 2022-06-10 PROCEDURE — 80053 COMPREHEN METABOLIC PANEL: CPT | Performed by: NURSE PRACTITIONER

## 2022-06-10 PROCEDURE — 83036 HEMOGLOBIN GLYCOSYLATED A1C: CPT | Performed by: NURSE PRACTITIONER

## 2022-06-10 PROCEDURE — 97602 WOUND(S) CARE NON-SELECTIVE: CPT

## 2022-06-10 PROCEDURE — 86140 C-REACTIVE PROTEIN: CPT | Performed by: NURSE PRACTITIONER

## 2022-06-10 PROCEDURE — G0463 HOSPITAL OUTPT CLINIC VISIT: HCPCS

## 2022-06-10 PROCEDURE — 84134 ASSAY OF PREALBUMIN: CPT | Performed by: NURSE PRACTITIONER

## 2022-06-10 PROCEDURE — 85025 COMPLETE CBC W/AUTO DIFF WBC: CPT | Performed by: NURSE PRACTITIONER

## 2022-06-10 PROCEDURE — 85652 RBC SED RATE AUTOMATED: CPT | Performed by: NURSE PRACTITIONER

## 2022-06-10 RX ORDER — LIDOCAINE HYDROCHLORIDE 20 MG/ML
JELLY TOPICAL AS NEEDED
Status: DISCONTINUED | OUTPATIENT
Start: 2022-06-10 | End: 2022-06-11 | Stop reason: HOSPADM

## 2022-06-10 RX ADMIN — LIDOCAINE HYDROCHLORIDE: 20 JELLY TOPICAL at 15:15

## 2022-06-10 NOTE — PROGRESS NOTES
Wound Clinic Note  Patient Identification:  Name:  Gurwinder Harding  Age:  61 y.o.  Sex:  male  :  1960  MRN:  [id number]   Visit Number:  [id number]  Primary Care Physician:  Pastora Person APRN     Subjective     Chief complaint:     Left shin wound    History of presenting illness:     Patient is a 61 y.o. male with past medical history significant for PVD, and current everyday smoker. Presented today for evaluation of left shin wound. Reports area has been present for approximately 1 month. He had a fall hitting a metal box. Has been applying silvadene to the area. Completed course of Bactrim. Known history of PVD with intervention in 2019. He is scheduled for US at the end of the month. Reports increase in swelling to the distal leg and foot. Denies any fever or chills. No other associated signs or symptoms reported. Minimal drainage reported. Positive for claudication.   ---------------------------------------------------------------------------------------------------------------------   Review of Systems   Constitutional: Negative for chills and fever.   HENT: Negative for congestion and rhinorrhea.    Eyes: Negative for pain and redness.   Respiratory: Negative for cough and shortness of breath.    Cardiovascular: Positive for leg swelling. Negative for chest pain.   Gastrointestinal: Negative for diarrhea, nausea and vomiting.   Endocrine: Negative for cold intolerance and heat intolerance.   Genitourinary: Negative for difficulty urinating and frequency.   Musculoskeletal: Negative for gait problem.   Skin: Positive for wound.   Neurological: Negative for dizziness and weakness.   Hematological: Does not bruise/bleed easily.   Psychiatric/Behavioral: Negative for confusion. The patient is not nervous/anxious.       ---------------------------------------------------------------------------------------------------------------------   Past Medical History:   Diagnosis Date   • Alcohol abuse    •  Arthritis    • Carotid stenosis    • Cerebrovascular accident (CVA) due to occlusion of left carotid artery (HCC) 7/15/2021   • ED (erectile dysfunction)    • History of degenerative disc disease    • Hydrocele in adult 12/29/2020   • Hypertension    • Neuropathy    • Peripheral vascular disease (HCC)     s/p arthrectomy follow by balloon angioplasty and stents of right and left SFA   • Tobacco abuse    • Vitamin D deficiency      Past Surgical History:   Procedure Laterality Date   • APPENDECTOMY     • ARTERIOGRAM N/A 9/19/2019    Procedure: Arteriogram;  Surgeon: Tong Azar MD;  Location: [facility] CATH INVASIVE LOCATION;  Service: Cardiology   • BACK SURGERY      DISC RUPTURE REPAIR, L5   • CARDIAC CATHETERIZATION Right 10/29/2019    Procedure: Peripheral angiography;  Surgeon: Tong Azar MD;  Location: [facility] CATH INVASIVE LOCATION;  Service: Cardiovascular   • ENDOSCOPY N/A 2/18/2022    Procedure: ESOPHAGOGASTRODUODENOSCOPY;  Surgeon: Christine Duran MD;  Location: [facility] OR;  Service: General;  Laterality: N/A;   • VASCULAR SURGERY Bilateral      Family History   Problem Relation Age of Onset   • Hypertension Mother    • Cancer Father         LUNG   • Diabetes Father    • Hypertension Father    • Heart attack Other         GRANDFATHER     Social History     Socioeconomic History   • Marital status:    • Number of children: 1   Tobacco Use   • Smoking status: Current Every Day Smoker     Packs/day: 0.50     Years: 30.00     Pack years: 15.00     Types: Cigarettes   • Smokeless tobacco: Never Used   Vaping Use   • Vaping Use: Never used   Substance and Sexual Activity   • Alcohol use: Not Currently     Alcohol/week: 24.0 standard drinks     Types: 24 Cans of beer per week   • Drug use: No   • Sexual activity: Defer     ---------------------------------------------------------------------------------------------------------------------   Allergies:  Penicillins and Novocain  [procaine]  ---------------------------------------------------------------------------------------------------------------------  Objective     ---------------------------------------------------------------------------------------------------------------------   Vital Signs:  Temp:  [97.7 °F (36.5 °C)] 97.7 °F (36.5 °C)  Heart Rate:  [86] 86  Resp:  [17] 17  BP: (148)/(77) 148/77  No data found.  There were no vitals filed for this visit.     on   ;      Body mass index is 25.83 kg/m².  Wt Readings from Last 3 Encounters:   06/10/22 81.6 kg (180 lb)   05/26/22 82.1 kg (181 lb)   05/23/22 80.7 kg (178 lb)       ---------------------------------------------------------------------------------------------------------------------   Physical Exam  Constitutional: Vital sign were reviewed (temperature, pulse, respiration, and blood pressure) and found to be within expected limits, general appearance was assessed and the patient was found to be in mild distress and calm and comfortable appears  Eyes:lids and lashes normal, pupils equal, round, reactive to light  Ears, Nose, Mouth, Throat:hearing intact and neck normal  Neck: shows normal range of motion without pain, and no evidence of trauma or deformity  and trachea is midline   Respiratory: Normal respiratory effort and symmetrical chest expansion  Cardiovascular:pulses normal, and intact distal pulses dorsal-pedis  palpable and posteria-tib   palpable1+ lower bilateral  Gastrointestinal:no masses and no tenderness  Musculoskeletal: inspection of digits and nails shows normal findings  and no edema   Neurologic: Mental Status orientated to person, place, time and situation, Speech normal content and execusion  Psychiatric: Normal.  Skin: Temperature:normal turgor and temperatureColor: normal, no cyanosis, jaundice, pallor or bruising, Moisture: dry,Nails: thickened yellow toenails bed, Hair:thinning to lower extremities .  Physical Exam  Wound Assessment: Location:  left anterior, lower, leg  Changes since last exam: this is initial exam  Etiology and classification: non-pressure chronic ulcer   Wound bed structures/characteristics: full-thickness (subcutaneous tissue is exposed in at least a portion of the wound)  Drainage characteristics: moderate, serous drainage  Edges moist  Periwound characteristics: some mositure-realted inflammation  Perfusion characteristics: suggest some degree of PAD  Bioburden characteristics:slough, soft eschar is present , 50-75% of wound bed     Wound Goal (s):Free of infection, No further symptoms and Reduction of contamination  Assessment & Plan      Non-pressure chronic ulcer of other part of left lower leg with fat layer exposed (HCC) [L97.822]- Honeygel to base, secure with kerlix and short stretch.     Venous US and DONNIE have been ordered to assess for any vascular disease.     Obesity (BMI 30-39.9) [E66.9]- An obese person?is at greater risk for wound infection and dehiscence or evisceration.    PVD (peripheral vascular disease) with claudication (HCC) [I73.9]- Venous US and DONNIE have been ordered to assess for any vascular disease.     Tobacco abuse [Z72.0]- Tissue perfusion is lower in users of tobacco and other forms of nicotine. Reduced tissue perfusion strongly inhibits wound healing, increases risk of infection, and dramatically increases risk of limb loss.    Labs ordered: CBC, CMP, CRP, sed rate, prealbumin, and hemoglobain A1C to assess inflammatory markers and nutritional status as this both and negatively impact wound healing if not properly treated.     Clinical Impression:Moderate Complexity    Follow-up: 1 week    KEVIN Camargo   [facility]-   06/10/22  15:02 EDT

## 2022-06-11 LAB — PREALB SERPL-MCNC: 31.5 MG/DL (ref 20–40)

## 2022-06-16 ENCOUNTER — HOSPITAL ENCOUNTER (OUTPATIENT)
Dept: ULTRASOUND IMAGING | Facility: HOSPITAL | Age: 62
Discharge: HOME | End: 2022-06-16
Admitting: NURSE PRACTITIONER

## 2022-06-16 DIAGNOSIS — L97.822 NON-PRESSURE CHRONIC ULCER OF OTHER PART OF LEFT LOWER LEG WITH FAT LAYER EXPOSED: ICD-10-CM

## 2022-06-16 PROCEDURE — 93922 UPR/L XTREMITY ART 2 LEVELS: CPT | Performed by: RADIOLOGY

## 2022-06-16 PROCEDURE — 93922 UPR/L XTREMITY ART 2 LEVELS: CPT

## 2022-06-17 ENCOUNTER — HOSPITAL ENCOUNTER (OUTPATIENT)
Dept: GENERAL RADIOLOGY | Facility: HOSPITAL | Age: 62
Discharge: HOME | End: 2022-06-17

## 2022-06-17 ENCOUNTER — HOSPITAL ENCOUNTER (OUTPATIENT)
Dept: WOUND CARE | Facility: HOSPITAL | Age: 62
Discharge: HOME | End: 2022-06-17

## 2022-06-17 VITALS
RESPIRATION RATE: 18 BRPM | DIASTOLIC BLOOD PRESSURE: 74 MMHG | SYSTOLIC BLOOD PRESSURE: 150 MMHG | HEART RATE: 94 BPM | TEMPERATURE: 98.2 F

## 2022-06-17 DIAGNOSIS — L97.822 NON-PRESSURE CHRONIC ULCER OF OTHER PART OF LEFT LOWER LEG WITH FAT LAYER EXPOSED: Primary | ICD-10-CM

## 2022-06-17 PROCEDURE — 73590 X-RAY EXAM OF LOWER LEG: CPT

## 2022-06-17 PROCEDURE — 73590 X-RAY EXAM OF LOWER LEG: CPT | Performed by: RADIOLOGY

## 2022-06-17 RX ORDER — LIDOCAINE HYDROCHLORIDE AND EPINEPHRINE BITARTRATE 20; .01 MG/ML; MG/ML
20 INJECTION, SOLUTION SUBCUTANEOUS ONCE
Status: COMPLETED | OUTPATIENT
Start: 2022-06-17 | End: 2022-06-17

## 2022-06-17 RX ORDER — LIDOCAINE HYDROCHLORIDE 20 MG/ML
JELLY TOPICAL AS NEEDED
Status: DISCONTINUED | OUTPATIENT
Start: 2022-06-17 | End: 2022-06-18 | Stop reason: HOSPADM

## 2022-06-17 RX ORDER — SODIUM HYPOCHLORITE 2.5 MG/ML
SOLUTION TOPICAL ONCE
Status: COMPLETED | OUTPATIENT
Start: 2022-06-17 | End: 2022-06-17

## 2022-06-17 RX ADMIN — HYOSCYAMINE SULFATE: 16 SOLUTION at 15:41

## 2022-06-17 RX ADMIN — LIDOCAINE HYDROCHLORIDE: 20 JELLY TOPICAL at 15:03

## 2022-06-17 RX ADMIN — LIDOCAINE HYDROCHLORIDE,EPINEPHRINE BITARTRATE 20 ML: 20; .01 INJECTION, SOLUTION INFILTRATION; PERINEURAL at 15:03

## 2022-06-20 ENCOUNTER — HOSPITAL ENCOUNTER (OUTPATIENT)
Dept: WOUND CARE | Facility: HOSPITAL | Age: 62
Discharge: HOME | End: 2022-06-20
Admitting: NURSE PRACTITIONER

## 2022-06-20 VITALS
DIASTOLIC BLOOD PRESSURE: 70 MMHG | TEMPERATURE: 98 F | RESPIRATION RATE: 18 BRPM | SYSTOLIC BLOOD PRESSURE: 157 MMHG | HEART RATE: 72 BPM

## 2022-06-20 DIAGNOSIS — L97.822 NON-PRESSURE CHRONIC ULCER OF OTHER PART OF LEFT LOWER LEG WITH FAT LAYER EXPOSED: Primary | ICD-10-CM

## 2022-06-20 LAB
ALBUMIN SERPL-MCNC: 3.75 G/DL (ref 3.5–5.2)
ALBUMIN/GLOB SERPL: 1.1 G/DL
ALP SERPL-CCNC: 81 U/L (ref 39–117)
ALT SERPL W P-5'-P-CCNC: 8 U/L (ref 1–41)
ANION GAP SERPL CALCULATED.3IONS-SCNC: 11.5 MMOL/L (ref 5–15)
AST SERPL-CCNC: 16 U/L (ref 1–40)
BASOPHILS # BLD AUTO: 0.1 10*3/MM3 (ref 0–0.2)
BASOPHILS NFR BLD AUTO: 0.9 % (ref 0–1.5)
BILIRUB SERPL-MCNC: 0.3 MG/DL (ref 0–1.2)
BUN SERPL-MCNC: 20 MG/DL (ref 8–23)
BUN/CREAT SERPL: 12.7 (ref 7–25)
CALCIUM SPEC-SCNC: 9.9 MG/DL (ref 8.6–10.5)
CHLORIDE SERPL-SCNC: 101 MMOL/L (ref 98–107)
CO2 SERPL-SCNC: 22.5 MMOL/L (ref 22–29)
CREAT SERPL-MCNC: 1.57 MG/DL (ref 0.76–1.27)
CRP SERPL-MCNC: 2.31 MG/DL (ref 0–0.5)
DEPRECATED RDW RBC AUTO: 51.2 FL (ref 37–54)
EGFRCR SERPLBLD CKD-EPI 2021: 49.8 ML/MIN/1.73
EOSINOPHIL # BLD AUTO: 0.2 10*3/MM3 (ref 0–0.4)
EOSINOPHIL NFR BLD AUTO: 1.9 % (ref 0.3–6.2)
ERYTHROCYTE [DISTWIDTH] IN BLOOD BY AUTOMATED COUNT: 16 % (ref 12.3–15.4)
ERYTHROCYTE [SEDIMENTATION RATE] IN BLOOD: 89 MM/HR (ref 0–20)
GLOBULIN UR ELPH-MCNC: 3.5 GM/DL
GLUCOSE SERPL-MCNC: 128 MG/DL (ref 65–99)
HCT VFR BLD AUTO: 33.9 % (ref 37.5–51)
HGB BLD-MCNC: 10.6 G/DL (ref 13–17.7)
IMM GRANULOCYTES # BLD AUTO: 0.06 10*3/MM3 (ref 0–0.05)
IMM GRANULOCYTES NFR BLD AUTO: 0.6 % (ref 0–0.5)
LYMPHOCYTES # BLD AUTO: 1.82 10*3/MM3 (ref 0.7–3.1)
LYMPHOCYTES NFR BLD AUTO: 16.8 % (ref 19.6–45.3)
MCH RBC QN AUTO: 28.1 PG (ref 26.6–33)
MCHC RBC AUTO-ENTMCNC: 31.3 G/DL (ref 31.5–35.7)
MCV RBC AUTO: 89.9 FL (ref 79–97)
MONOCYTES # BLD AUTO: 0.81 10*3/MM3 (ref 0.1–0.9)
MONOCYTES NFR BLD AUTO: 7.5 % (ref 5–12)
NEUTROPHILS NFR BLD AUTO: 7.82 10*3/MM3 (ref 1.7–7)
NEUTROPHILS NFR BLD AUTO: 72.3 % (ref 42.7–76)
NRBC BLD AUTO-RTO: 0 /100 WBC (ref 0–0.2)
PLATELET # BLD AUTO: 266 10*3/MM3 (ref 140–450)
PMV BLD AUTO: 10.1 FL (ref 6–12)
POTASSIUM SERPL-SCNC: 4 MMOL/L (ref 3.5–5.2)
PROT SERPL-MCNC: 7.2 G/DL (ref 6–8.5)
RBC # BLD AUTO: 3.77 10*6/MM3 (ref 4.14–5.8)
SODIUM SERPL-SCNC: 135 MMOL/L (ref 136–145)
WBC NRBC COR # BLD: 10.81 10*3/MM3 (ref 3.4–10.8)

## 2022-06-20 PROCEDURE — 87147 CULTURE TYPE IMMUNOLOGIC: CPT | Performed by: NURSE PRACTITIONER

## 2022-06-20 PROCEDURE — 85025 COMPLETE CBC W/AUTO DIFF WBC: CPT | Performed by: NURSE PRACTITIONER

## 2022-06-20 PROCEDURE — 87070 CULTURE OTHR SPECIMN AEROBIC: CPT | Performed by: NURSE PRACTITIONER

## 2022-06-20 PROCEDURE — 97602 WOUND(S) CARE NON-SELECTIVE: CPT

## 2022-06-20 PROCEDURE — 87186 SC STD MICRODIL/AGAR DIL: CPT | Performed by: NURSE PRACTITIONER

## 2022-06-20 PROCEDURE — 86140 C-REACTIVE PROTEIN: CPT | Performed by: NURSE PRACTITIONER

## 2022-06-20 PROCEDURE — 85652 RBC SED RATE AUTOMATED: CPT | Performed by: NURSE PRACTITIONER

## 2022-06-20 PROCEDURE — 80053 COMPREHEN METABOLIC PANEL: CPT | Performed by: NURSE PRACTITIONER

## 2022-06-20 PROCEDURE — 87205 SMEAR GRAM STAIN: CPT | Performed by: NURSE PRACTITIONER

## 2022-06-20 RX ORDER — SULFAMETHOXAZOLE AND TRIMETHOPRIM 800; 160 MG/1; MG/1
1 TABLET ORAL 2 TIMES DAILY
Qty: 20 TABLET | Refills: 0 | Status: SHIPPED | OUTPATIENT
Start: 2022-06-20 | End: 2022-06-30

## 2022-06-22 ENCOUNTER — HOSPITAL ENCOUNTER (OUTPATIENT)
Dept: WOUND CARE | Facility: HOSPITAL | Age: 62
Discharge: HOME | End: 2022-06-22
Admitting: NURSE PRACTITIONER

## 2022-06-22 VITALS
HEART RATE: 91 BPM | DIASTOLIC BLOOD PRESSURE: 100 MMHG | TEMPERATURE: 98 F | RESPIRATION RATE: 17 BRPM | SYSTOLIC BLOOD PRESSURE: 148 MMHG

## 2022-06-22 PROCEDURE — 87147 CULTURE TYPE IMMUNOLOGIC: CPT | Performed by: NURSE PRACTITIONER

## 2022-06-22 PROCEDURE — 87205 SMEAR GRAM STAIN: CPT | Performed by: NURSE PRACTITIONER

## 2022-06-22 PROCEDURE — 87176 TISSUE HOMOGENIZATION CULTR: CPT | Performed by: NURSE PRACTITIONER

## 2022-06-22 PROCEDURE — 87070 CULTURE OTHR SPECIMN AEROBIC: CPT | Performed by: NURSE PRACTITIONER

## 2022-06-22 RX ORDER — SODIUM HYPOCHLORITE 2.5 MG/ML
SOLUTION TOPICAL ONCE
Status: DISCONTINUED | OUTPATIENT
Start: 2022-06-22 | End: 2022-06-23 | Stop reason: HOSPADM

## 2022-06-23 LAB
BACTERIA SPEC AEROBE CULT: ABNORMAL
BACTERIA SPEC AEROBE CULT: ABNORMAL
GRAM STN SPEC: ABNORMAL

## 2022-06-24 ENCOUNTER — APPOINTMENT (OUTPATIENT)
Dept: WOUND CARE | Facility: HOSPITAL | Age: 62
End: 2022-06-24

## 2022-06-24 LAB
BACTERIA SPEC AEROBE CULT: ABNORMAL
GRAM STN SPEC: ABNORMAL

## 2022-06-27 ENCOUNTER — HOSPITAL ENCOUNTER (OUTPATIENT)
Dept: WOUND CARE | Facility: HOSPITAL | Age: 62
Discharge: HOME | End: 2022-06-27
Admitting: NURSE PRACTITIONER

## 2022-06-27 ENCOUNTER — INFUSION (OUTPATIENT)
Dept: ONCOLOGY | Facility: HOSPITAL | Age: 62
End: 2022-06-27

## 2022-06-27 VITALS
HEART RATE: 84 BPM | TEMPERATURE: 97.8 F | SYSTOLIC BLOOD PRESSURE: 135 MMHG | OXYGEN SATURATION: 99 % | DIASTOLIC BLOOD PRESSURE: 68 MMHG | RESPIRATION RATE: 18 BRPM

## 2022-06-27 VITALS
SYSTOLIC BLOOD PRESSURE: 131 MMHG | DIASTOLIC BLOOD PRESSURE: 65 MMHG | TEMPERATURE: 97.9 F | RESPIRATION RATE: 18 BRPM | HEART RATE: 100 BPM

## 2022-06-27 DIAGNOSIS — L97.822 NON-PRESSURE CHRONIC ULCER OF OTHER PART OF LEFT LOWER LEG WITH FAT LAYER EXPOSED: Primary | ICD-10-CM

## 2022-06-27 LAB — REF LAB TEST METHOD: NORMAL

## 2022-06-27 PROCEDURE — 36410 VNPNXR 3YR/> PHY/QHP DX/THER: CPT

## 2022-06-27 PROCEDURE — 25010000002 DAPTOMYCIN PER 1 MG: Performed by: NURSE PRACTITIONER

## 2022-06-27 PROCEDURE — 96365 THER/PROPH/DIAG IV INF INIT: CPT

## 2022-06-27 PROCEDURE — C1751 CATH, INF, PER/CENT/MIDLINE: HCPCS

## 2022-06-27 PROCEDURE — 97602 WOUND(S) CARE NON-SELECTIVE: CPT

## 2022-06-27 RX ADMIN — DAPTOMYCIN 350 MG: 500 INJECTION, POWDER, LYOPHILIZED, FOR SOLUTION INTRAVENOUS at 14:03

## 2022-06-27 NOTE — ADDENDUM NOTE
Encounter addended by: Caitlyn Ayers APRN on: 6/27/2022 7:07 AM   Actions taken: Clinical Note Signed

## 2022-06-27 NOTE — PROGRESS NOTES
Wound Clinic Note  Patient Identification:  Name:  Gurwinder Harding  Age:  62 y.o.  Sex:  male  :  1960  MRN:  [id number]   Visit Number:  [id number]  Primary Care Physician:  Pastora Person APRN     Subjective     Chief complaint:     Left shin wound    History of presenting illness:     Patient is a 62 y.o. male with past medical history significant for PVD, and current everyday smoker. Presented today for evaluation of left shin wound. Reports area has been present for approximately 1 month. He had a fall hitting a metal box. Has been applying silvadene to the area. Completed course of Bactrim. Known history of PVD with intervention in 2019. He is scheduled for US at the end of the month. Reports increase in swelling to the distal leg and foot. Denies any fever or chills. No other associated signs or symptoms reported. Minimal drainage reported. Positive for claudication.     Interval history:     2022: Patient seen in clinic today for follow-up to left shin wound. Wound covered with eschar. Small amount of drainage without odor.   Reports increase in pain. Denies any fever or chills. States compliance with treatment regimen. DONNIE: Composite Right DONNIE: 1.14 Composite Left DONNIE: 0.89. X- ray: 1.  Soft tissue edema and probable ulceration in the left mid anterior lower leg. No acute bony findings. Glucsoe 128, albumin 3.75, prealbummin 31.5, CRP <0.30, WBC 8.79, sed rate 41.     2022: Patient seen in clinic today for follow up to left shin wound. Wound vac was applied last visit. He reports increase in pain to area. Wound with increase in slough. Bone exposed. Increase in pain. Denies any fever or chills.   ---------------------------------------------------------------------------------------------------------------------   Review of Systems   Constitutional: Negative for chills and fever.   HENT: Negative for congestion and rhinorrhea.    Respiratory: Negative for cough and shortness of breath.     Cardiovascular: Positive for leg swelling. Negative for chest pain.   Gastrointestinal: Negative for diarrhea, nausea and vomiting.   Musculoskeletal: Negative for gait problem.   Skin: Positive for wound.   Neurological: Negative for dizziness and weakness.      ---------------------------------------------------------------------------------------------------------------------   Past Medical History:   Diagnosis Date   • Alcohol abuse    • Arthritis    • Carotid stenosis    • Cerebrovascular accident (CVA) due to occlusion of left carotid artery ([facility]) 7/15/2021   • ED (erectile dysfunction)    • History of degenerative disc disease    • Hydrocele in adult 12/29/2020   • Hypertension    • Neuropathy    • Peripheral vascular disease ([facility])     s/p arthrectomy follow by balloon angioplasty and stents of right and left SFA   • Tobacco abuse    • Vitamin D deficiency      Past Surgical History:   Procedure Laterality Date   • APPENDECTOMY     • ARTERIOGRAM N/A 9/19/2019    Procedure: Arteriogram;  Surgeon: Tong Azar MD;  Location: [facility] INVASIVE LOCATION;  Service: Cardiology   • BACK SURGERY      DISC RUPTURE REPAIR, L5   • CARDIAC CATHETERIZATION Right 10/29/2019    Procedure: Peripheral angiography;  Surgeon: Tong Azar MD;  Location: [facility] INVASIVE LOCATION;  Service: Cardiovascular   • ENDOSCOPY N/A 2/18/2022    Procedure: ESOPHAGOGASTRODUODENOSCOPY;  Surgeon: Christine Duran MD;  Location: [facility];  Service: General;  Laterality: N/A;   • VASCULAR SURGERY Bilateral      Family History   Problem Relation Age of Onset   • Hypertension Mother    • Cancer Father         LUNG   • Diabetes Father    • Hypertension Father    • Heart attack Other         GRANDFATHER     Social History     Socioeconomic History   • Marital status:    • Number of children: 1   Tobacco Use   • Smoking status: Current Every Day Smoker     Packs/day: 0.50     Years: 30.00     Pack years: 15.00     Types:  Cigarettes   • Smokeless tobacco: Never Used   Vaping Use   • Vaping Use: Never used   Substance and Sexual Activity   • Alcohol use: Not Currently     Alcohol/week: 24.0 standard drinks     Types: 24 Cans of beer per week   • Drug use: No   • Sexual activity: Defer     ---------------------------------------------------------------------------------------------------------------------   Allergies:  Penicillins and Novocain [procaine]  ---------------------------------------------------------------------------------------------------------------------  Objective     ---------------------------------------------------------------------------------------------------------------------   Vital Signs:     No data found.  There were no vitals filed for this visit.     on   ;      There is no height or weight on file to calculate BMI.  Wt Readings from Last 3 Encounters:   06/10/22 81.6 kg (180 lb)   05/26/22 82.1 kg (181 lb)   05/23/22 80.7 kg (178 lb)       ---------------------------------------------------------------------------------------------------------------------   Physical Exam  Constitutional: Vital sign were reviewed (temperature, pulse, respiration, and blood pressure) and found to be within expected limits, general appearance was assessed and the patient was found to be in mild distress and calm and comfortable appears  Skin: Temperature:normal turgor and temperatureColor: normal, no cyanosis, jaundice, pallor or bruising, Moisture: dry,Nails: thickened yellow toenails bed, Hair:thinning to lower extremities .  Physical Exam  Wound Assessment: Location: left anterior, lower, leg  Changes since last exam: depth has increased  and bioburden has increased   Etiology and classification: non-pressure chronic ulcer   Wound bed structures/characteristics: full-thickness (subcutaneous tissue is exposed in at least a portion of the wound), bone is exposed   Drainage characteristics: moderate, serous drainage  Edges  moist  Periwound characteristics: some mositure-realted inflammation  Perfusion characteristics: suggest some degree of PAD  Bioburden characteristics:slough, soft eschar is present , 50-75% of wound bed     Wound Goal (s):Free of infection, No further symptoms and Reduction of contamination  Assessment & Plan      Non-pressure chronic ulcer of other part of left lower leg with fat layer exposed (HCC) [L97.822]- Refused debridement today. Will pack with hydrogel moistened gauze and secure with kerlix and ACE    Reviewed with patient 06/17/2022:  A/BI: Composite Right DONNIE: 1.14 Composite Left DONNIE: 0.89. X- ray: 1.  Soft tissue edema and probable ulceration in the left mid anterior lower leg. No acute bony findings. Glucsoe 128, albumin 3.75, prealbummin 31.5, CRP <0.30, WBC 8.79, sed rate 41.     Obesity (BMI 30-39.9) [E66.9]- An obese person?is at greater risk for wound infection and dehiscence or evisceration.    PVD (peripheral vascular disease) with claudication (HCC) [I73.9]- Venous US and DONNIE have been ordered to assess for any vascular disease.     Tobacco abuse [Z72.0]- Tissue perfusion is lower in users of tobacco and other forms of nicotine. Reduced tissue perfusion strongly inhibits wound healing, increases risk of infection, and dramatically increases risk of limb loss.    Clinical Impression:Moderate Complexity    Follow-up: 1 week    KEVIN Camargo   [facility]  06/20/2022  [phone]

## 2022-06-27 NOTE — PROGRESS NOTES
Wound Clinic Note  Patient Identification:  Name:  Gurwinder Harding  Age:  62 y.o.  Sex:  male  :  1960  MRN:  [id number]   Visit Number:  [id number]  Primary Care Physician:  Pastora Person APRN     Subjective     Chief complaint:     Left shin wound    History of presenting illness:     Patient is a 62 y.o. male with past medical history significant for PVD, and current everyday smoker. Presented today for evaluation of left shin wound. Reports area has been present for approximately 1 month. He had a fall hitting a metal box. Has been applying silvadene to the area. Completed course of Bactrim. Known history of PVD with intervention in 2019. He is scheduled for US at the end of the month. Reports increase in swelling to the distal leg and foot. Denies any fever or chills. No other associated signs or symptoms reported. Minimal drainage reported. Positive for claudication.     Interval history:     2022: Patient seen in clinic today for follow-up to left shin wound. Wound covered with eschar. Small amount of drainage without odor.   Reports increase in pain. Denies any fever or chills. States compliance with treatment regimen. DONNIE: Composite Right DONNIE: 1.14 Composite Left DONNIE: 0.89. X- ray: 1.  Soft tissue edema and probable ulceration in the left mid anterior lower leg. No acute bony findings. Glucsoe 128, albumin 3.75, prealbummin 31.5, CRP <0.30, WBC 8.79, sed rate 41.   ---------------------------------------------------------------------------------------------------------------------   Review of Systems   Constitutional: Negative for chills and fever.   HENT: Negative for congestion and rhinorrhea.    Respiratory: Negative for cough and shortness of breath.    Cardiovascular: Positive for leg swelling. Negative for chest pain.   Gastrointestinal: Negative for diarrhea, nausea and vomiting.   Musculoskeletal: Negative for gait problem.   Skin: Positive for wound.   Neurological: Negative for  dizziness and weakness.      ---------------------------------------------------------------------------------------------------------------------   Past Medical History:   Diagnosis Date   • Alcohol abuse    • Arthritis    • Carotid stenosis    • Cerebrovascular accident (CVA) due to occlusion of left carotid artery ([facility]) 7/15/2021   • ED (erectile dysfunction)    • History of degenerative disc disease    • Hydrocele in adult 12/29/2020   • Hypertension    • Neuropathy    • Peripheral vascular disease ([facility])     s/p arthrectomy follow by balloon angioplasty and stents of right and left SFA   • Tobacco abuse    • Vitamin D deficiency      Past Surgical History:   Procedure Laterality Date   • APPENDECTOMY     • ARTERIOGRAM N/A 9/19/2019    Procedure: Arteriogram;  Surgeon: Tong Azar MD;  Location: [facility] CATH INVASIVE LOCATION;  Service: Cardiology   • BACK SURGERY      DISC RUPTURE REPAIR, L5   • CARDIAC CATHETERIZATION Right 10/29/2019    Procedure: Peripheral angiography;  Surgeon: Tong Azar MD;  Location: [facility] CATH INVASIVE LOCATION;  Service: Cardiovascular   • ENDOSCOPY N/A 2/18/2022    Procedure: ESOPHAGOGASTRODUODENOSCOPY;  Surgeon: Christine Duran MD;  Location: [facility] OR;  Service: General;  Laterality: N/A;   • VASCULAR SURGERY Bilateral      Family History   Problem Relation Age of Onset   • Hypertension Mother    • Cancer Father         LUNG   • Diabetes Father    • Hypertension Father    • Heart attack Other         GRANDFATHER     Social History     Socioeconomic History   • Marital status:    • Number of children: 1   Tobacco Use   • Smoking status: Current Every Day Smoker     Packs/day: 0.50     Years: 30.00     Pack years: 15.00     Types: Cigarettes   • Smokeless tobacco: Never Used   Vaping Use   • Vaping Use: Never used   Substance and Sexual Activity   • Alcohol use: Not Currently     Alcohol/week: 24.0 standard drinks     Types: 24 Cans of beer per week   • Drug use: No    • Sexual activity: Defer     ---------------------------------------------------------------------------------------------------------------------   Allergies:  Penicillins and Novocain [procaine]  ---------------------------------------------------------------------------------------------------------------------  Objective     ---------------------------------------------------------------------------------------------------------------------   Vital Signs:     No data found.  There were no vitals filed for this visit.     on   ;      There is no height or weight on file to calculate BMI.  Wt Readings from Last 3 Encounters:   06/10/22 81.6 kg (180 lb)   05/26/22 82.1 kg (181 lb)   05/23/22 80.7 kg (178 lb)       ---------------------------------------------------------------------------------------------------------------------   Physical Exam  Constitutional: Vital sign were reviewed (temperature, pulse, respiration, and blood pressure) and found to be within expected limits, general appearance was assessed and the patient was found to be in mild distress and calm and comfortable appears  Skin: Temperature:normal turgor and temperatureColor: normal, no cyanosis, jaundice, pallor or bruising, Moisture: dry,Nails: thickened yellow toenails bed, Hair:thinning to lower extremities .  Physical Exam  Wound Assessment: Location: left anterior, lower, leg  Changes since last exam: no changes  Etiology and classification: non-pressure chronic ulcer   Wound bed structures/characteristics: full-thickness (subcutaneous tissue is exposed in at least a portion of the wound)  Drainage characteristics: moderate, serous drainage  Edges moist  Periwound characteristics: some mositure-realted inflammation  Perfusion characteristics: suggest some degree of PAD  Bioburden characteristics:slough, soft eschar is present , 50-75% of wound bed     Wound Goal (s):Free of infection, No further symptoms and Reduction of  contamination  Assessment & Plan      Non-pressure chronic ulcer of other part of left lower leg with fat layer exposed (HCC) [L97.822]- Debridement completed, see below for procedure details. Wound vac applied, see below for application details.    Reviewed with patient 06/17/2022:  A/BI: Composite Right DONNIE: 1.14 Composite Left DONNIE: 0.89. X- ray: 1.  Soft tissue edema and probable ulceration in the left mid anterior lower leg. No acute bony findings. Glucsoe 128, albumin 3.75, prealbummin 31.5, CRP <0.30, WBC 8.79, sed rate 41.     Obesity (BMI 30-39.9) [E66.9]- An obese person?is at greater risk for wound infection and dehiscence or evisceration.    PVD (peripheral vascular disease) with claudication (HCC) [I73.9]- Venous US and DONNIE have been ordered to assess for any vascular disease.     Tobacco abuse [Z72.0]- Tissue perfusion is lower in users of tobacco and other forms of nicotine. Reduced tissue perfusion strongly inhibits wound healing, increases risk of infection, and dramatically increases risk of limb loss.    Clinical Impression:Moderate Complexity    Follow-up: 1 week  Wound Care Procedure Note   Pre-Procedure  Pre-Procedure Diagnosis: Non-pressure chronic ulcer of other part of left lower leg with fat layer exposed (HCC) [L97.822]  Checked for Allergies: yes  Consent:Consent obtained, consent given by Patient,Risks Discussed, Alternatives Discussed  Indication: slough and necrotic tissue  Vascular status:DONNIE testing was reviewed, and value is >0.6.  Time out was called prior to procedure.   Pre procedure Pain assessment: moderate  Pre debridement measurements: Length 2cm,Width 4cm, depth 0.2cm, sinus/tunnelNo, undermining No    Post Procedure  Post-Procedure Diagnosis: Non-pressure chronic ulcer of other part of left lower leg with fat layer exposed (HCC) [L97.822]  Post debridement measurements: Length 2.1cm,Width 4.6cm, depth 0.3cm, sinus/tunnelNo, undermining No  Post procedure Pain assessment:  mild  Graft/Implant/Prosthetics/Implanted Device/Transplants:  None  Complication(s):  None    Procedure details:  Method of Debridement: excissional (Surgical removal or cutting away, outside or beyond the wound margin devitalized tissue, necrosis or slough.)  Procedure: The site was prepared using clean techniques, subcutaneous tissue was removed by surgical excision.  Instrument(s) used: Curette 4mm  Anesthesia:After checking patient allergies,lidocaine topical 2% was administered to provide anesthesia.  Tissue removed: subuctaneous, Percent Removed 100%  Culture or Biopsy: None  Estimated Blood Loss: Small  Hemostasis Obtained: pressure    Wound Vac Application  Today's procedure is for the application of NPWT to Non-pressure chronic ulcer of other part of left lower leg with fat layer exposed (HCC) [L97.822] . The area was prepped and draped in the usual manner. Antiseptic skin prep and aseptic techniques were utilized. The wound vac was placed using the 's specific instructions and was placed using clean techniques , The wound Measures 2.1cm x 4.6cm x 0.3cm , There is small. The drainage is serous. The wound bed has 80% red, granulation tissue.  The wound bed has 20% pink granulation tissue , The wound bed has 0% slough/necrotic tissue.  There is bone exposed.  Appropriate consent obtained. I have informed patient of the risks and benefits of this procedure including retained dressings which can require surgical debridement, bleeding, and infection , The existing dressing was removed and the wound assessed for infection. Wound was irrigated with normal saline. Skin prep applied to periwound skin to prevent skin stripping , Wound filled with black foam 1 piece(s) , Wound was filled with white foam x 0 pieces(s) , White VAC foam used to fill tunneling/undermining , Bridging technique used to treat multiple wounds or bridge away from iglesia prominences/to offload , Dressing sealed with VAC drape ,  Canister changed and dated , Suction set at 125 mmHg continously. Patient educated on operation of the NPWT, how to disconnect, 2 hour off limit, active bleeding, trouble shooting for blockage and leak alarms.8    KEVIN Camargo   [facility]- [facility]  06/17/2022  [id number]

## 2022-06-27 NOTE — PROGRESS NOTES
Wound Clinic Note  Patient Identification:  Name:  Gurwinder Harding  Age:  62 y.o.  Sex:  male  :  1960  MRN:  [id number]   Visit Number:  [id number]  Primary Care Physician:  Pastora Person APRN     Subjective     Chief complaint:     Left shin wound    History of presenting illness:     Patient is a 62 y.o. male with past medical history significant for PVD, and current everyday smoker. Presented today for evaluation of left shin wound. Reports area has been present for approximately 1 month. He had a fall hitting a metal box. Has been applying silvadene to the area. Completed course of Bactrim. Known history of PVD with intervention in 2019. He is scheduled for US at the end of the month. Reports increase in swelling to the distal leg and foot. Denies any fever or chills. No other associated signs or symptoms reported. Minimal drainage reported. Positive for claudication.     Interval history:     2022: Patient seen in clinic today for follow-up to left shin wound. Wound covered with eschar. Small amount of drainage without odor.   Reports increase in pain. Denies any fever or chills. States compliance with treatment regimen. DONNIE: Composite Right DONNIE: 1.14 Composite Left DONNIE: 0.89. X- ray: 1.  Soft tissue edema and probable ulceration in the left mid anterior lower leg. No acute bony findings. Glucsoe 128, albumin 3.75, prealbummin 31.5, CRP <0.30, WBC 8.79, sed rate 41.     2022: Patient seen in clinic today for follow up to left shin wound. Wound vac was applied last visit. He reports increase in pain to area. Wound with increase in slough. Bone exposed. Increase in pain. Denies any fever or chills.     2022:Patient seen in clinic today for follow up to left shin wound. Wound without significant changes. He continues to report moderate to severe pain. Biopsy results concerning for acute osteomyelitis. Wound culture reports MRSA. He denies any fever or chills. Reports compliance with  treatment regimen without complications. Swelling remains present.   ---------------------------------------------------------------------------------------------------------------------   Review of Systems   Constitutional: Negative for chills and fever.   HENT: Negative for congestion and rhinorrhea.    Respiratory: Negative for cough and shortness of breath.    Cardiovascular: Positive for leg swelling. Negative for chest pain.   Gastrointestinal: Negative for diarrhea, nausea and vomiting.   Musculoskeletal: Negative for gait problem.   Skin: Positive for wound.   Neurological: Negative for dizziness and weakness.      ---------------------------------------------------------------------------------------------------------------------   Past Medical History:   Diagnosis Date   • Alcohol abuse    • Arthritis    • Carotid stenosis    • Cerebrovascular accident (CVA) due to occlusion of left carotid artery (HCC) 7/15/2021   • ED (erectile dysfunction)    • History of degenerative disc disease    • Hydrocele in adult 12/29/2020   • Hypertension    • Neuropathy    • Peripheral vascular disease (HCC)     s/p arthrectomy follow by balloon angioplasty and stents of right and left SFA   • Tobacco abuse    • Vitamin D deficiency      Past Surgical History:   Procedure Laterality Date   • APPENDECTOMY     • ARTERIOGRAM N/A 9/19/2019    Procedure: Arteriogram;  Surgeon: Tong Azar MD;  Location: [facility] CATH INVASIVE LOCATION;  Service: Cardiology   • BACK SURGERY      DISC RUPTURE REPAIR, L5   • CARDIAC CATHETERIZATION Right 10/29/2019    Procedure: Peripheral angiography;  Surgeon: Tong Azar MD;  Location:  [facility] CATH INVASIVE LOCATION;  Service: Cardiovascular   • ENDOSCOPY N/A 2/18/2022    Procedure: ESOPHAGOGASTRODUODENOSCOPY;  Surgeon: Christine Duran MD;  Location: [facility] OR;  Service: General;  Laterality: N/A;   • VASCULAR SURGERY Bilateral      Family History   Problem Relation Age of Onset   •  Hypertension Mother    • Cancer Father         LUNG   • Diabetes Father    • Hypertension Father    • Heart attack Other         GRANDFATHER     Social History     Socioeconomic History   • Marital status:    • Number of children: 1   Tobacco Use   • Smoking status: Current Every Day Smoker     Packs/day: 0.50     Years: 30.00     Pack years: 15.00     Types: Cigarettes   • Smokeless tobacco: Never Used   Vaping Use   • Vaping Use: Never used   Substance and Sexual Activity   • Alcohol use: Not Currently     Alcohol/week: 24.0 standard drinks     Types: 24 Cans of beer per week   • Drug use: No   • Sexual activity: Defer     ---------------------------------------------------------------------------------------------------------------------   Allergies:  Penicillins and Novocain [procaine]  ---------------------------------------------------------------------------------------------------------------------  Objective     ---------------------------------------------------------------------------------------------------------------------   Vital Signs:  Temp:  [97.9 °F (36.6 °C)] 97.9 °F (36.6 °C)  Heart Rate:  [100] 100  Resp:  [18] 18  BP: (131)/(65) 131/65  No data found.  There were no vitals filed for this visit.     on   ;      There is no height or weight on file to calculate BMI.  Wt Readings from Last 3 Encounters:   06/10/22 81.6 kg (180 lb)   05/26/22 82.1 kg (181 lb)   05/23/22 80.7 kg (178 lb)       ---------------------------------------------------------------------------------------------------------------------   Physical Exam  Constitutional: Vital sign were reviewed (temperature, pulse, respiration, and blood pressure) and found to be within expected limits, general appearance was assessed and the patient was found to be in mild distress and calm and comfortable appears  Skin: Temperature:normal turgor and temperatureColor: normal, no cyanosis, jaundice, pallor or bruising, Moisture:  dry,Nails: thickened yellow toenails bed, Hair:thinning to lower extremities .  Physical Exam  Wound Assessment: Location: left anterior, lower, leg  Changes since last exam: depth has increased  and bioburden has increased   Etiology and classification: non-pressure chronic ulcer   Wound bed structures/characteristics: full-thickness (subcutaneous tissue is exposed in at least a portion of the wound), bone is exposed   Drainage characteristics: moderate, serous drainage  Edges moist  Periwound characteristics: some mositure-realted inflammation  Perfusion characteristics: suggest some degree of PAD  Bioburden characteristics:slough, soft eschar is present , 50-75% of wound bed     Wound Goal (s):Free of infection, No further symptoms and Reduction of contamination  Assessment & Plan      Non-pressure chronic ulcer of other part of left lower leg with fat layer exposed (HCC) [L97.822]- Will pack with hydrogel moistened gauze and secure with kerlix and ACE    Will set up IV antibiotics with Daptomycin pharmacy to dose. Will complete first dose in infusion clinic. Will attempt to setup home health to complete 6 weeks of antibiotic treatment.     Reviewed with patient 06/17/2022:  A/BI: Composite Right DONNIE: 1.14 Composite Left DONNIE: 0.89. X- ray: 1.  Soft tissue edema and probable ulceration in the left mid anterior lower leg. No acute bony findings. Glucsoe 128, albumin 3.75, prealbummin 31.5, CRP <0.30, WBC 8.79, sed rate 41.     Obesity (BMI 30-39.9) [E66.9]- An obese person?is at greater risk for wound infection and dehiscence or evisceration.    PVD (peripheral vascular disease) with claudication (HCC) [I73.9]- Venous US and DONNIE have been ordered to assess for any vascular disease.     Tobacco abuse [Z72.0]- Tissue perfusion is lower in users of tobacco and other forms of nicotine. Reduced tissue perfusion strongly inhibits wound healing, increases risk of infection, and dramatically increases risk of limb  loss.    Clinical Impression:Moderate Complexity    Follow-up: 1 week    KEVIN Camargo   [facility]  06/27/2022  [id number]

## 2022-06-28 ENCOUNTER — INFUSION (OUTPATIENT)
Dept: ONCOLOGY | Facility: HOSPITAL | Age: 62
End: 2022-06-28

## 2022-06-28 VITALS
OXYGEN SATURATION: 98 % | DIASTOLIC BLOOD PRESSURE: 76 MMHG | HEART RATE: 90 BPM | SYSTOLIC BLOOD PRESSURE: 132 MMHG | TEMPERATURE: 97.8 F | RESPIRATION RATE: 18 BRPM

## 2022-06-28 DIAGNOSIS — L97.822 NON-PRESSURE CHRONIC ULCER OF OTHER PART OF LEFT LOWER LEG WITH FAT LAYER EXPOSED: Primary | ICD-10-CM

## 2022-06-28 PROCEDURE — 25010000002 DAPTOMYCIN PER 1 MG

## 2022-06-28 PROCEDURE — 25010000002 DAPTOMYCIN PER 1 MG: Performed by: NURSE PRACTITIONER

## 2022-06-28 PROCEDURE — 96365 THER/PROPH/DIAG IV INF INIT: CPT

## 2022-06-29 ENCOUNTER — HOSPITAL ENCOUNTER (OUTPATIENT)
Dept: CARDIOLOGY | Facility: HOSPITAL | Age: 62
Discharge: HOME | End: 2022-06-29

## 2022-06-29 ENCOUNTER — HOSPITAL ENCOUNTER (OUTPATIENT)
Dept: WOUND CARE | Facility: HOSPITAL | Age: 62
Discharge: HOME | End: 2022-06-29

## 2022-06-29 ENCOUNTER — INFUSION (OUTPATIENT)
Dept: ONCOLOGY | Facility: HOSPITAL | Age: 62
End: 2022-06-29

## 2022-06-29 VITALS
RESPIRATION RATE: 18 BRPM | HEART RATE: 82 BPM | DIASTOLIC BLOOD PRESSURE: 63 MMHG | SYSTOLIC BLOOD PRESSURE: 131 MMHG | TEMPERATURE: 97.5 F | OXYGEN SATURATION: 98 %

## 2022-06-29 VITALS
RESPIRATION RATE: 18 BRPM | HEART RATE: 68 BPM | DIASTOLIC BLOOD PRESSURE: 70 MMHG | SYSTOLIC BLOOD PRESSURE: 147 MMHG | TEMPERATURE: 97.3 F

## 2022-06-29 DIAGNOSIS — I73.9 PVD (PERIPHERAL VASCULAR DISEASE) WITH CLAUDICATION: ICD-10-CM

## 2022-06-29 DIAGNOSIS — L97.822 NON-PRESSURE CHRONIC ULCER OF OTHER PART OF LEFT LOWER LEG WITH FAT LAYER EXPOSED: Primary | ICD-10-CM

## 2022-06-29 PROCEDURE — 93923 UPR/LXTR ART STDY 3+ LVLS: CPT

## 2022-06-29 PROCEDURE — 96365 THER/PROPH/DIAG IV INF INIT: CPT

## 2022-06-29 PROCEDURE — 25010000002 DAPTOMYCIN PER 1 MG: Performed by: NURSE PRACTITIONER

## 2022-06-29 PROCEDURE — 97602 WOUND(S) CARE NON-SELECTIVE: CPT

## 2022-06-29 PROCEDURE — 25010000002 DAPTOMYCIN PER 1 MG

## 2022-06-29 PROCEDURE — 93923 UPR/LXTR ART STDY 3+ LVLS: CPT | Performed by: RADIOLOGY

## 2022-06-29 RX ORDER — LIDOCAINE HYDROCHLORIDE 20 MG/ML
JELLY TOPICAL AS NEEDED
Status: DISCONTINUED | OUTPATIENT
Start: 2022-06-29 | End: 2022-06-30 | Stop reason: HOSPADM

## 2022-06-29 RX ADMIN — LIDOCAINE HYDROCHLORIDE: 20 JELLY TOPICAL at 13:25

## 2022-06-29 RX ADMIN — DAPTOMYCIN 350 MG: 500 INJECTION, POWDER, LYOPHILIZED, FOR SOLUTION INTRAVENOUS at 14:43

## 2022-06-30 ENCOUNTER — APPOINTMENT (OUTPATIENT)
Dept: ONCOLOGY | Facility: HOSPITAL | Age: 62
End: 2022-06-30

## 2022-07-01 ENCOUNTER — APPOINTMENT (OUTPATIENT)
Dept: WOUND CARE | Facility: HOSPITAL | Age: 62
End: 2022-07-01

## 2022-07-01 ENCOUNTER — APPOINTMENT (OUTPATIENT)
Dept: ONCOLOGY | Facility: HOSPITAL | Age: 62
End: 2022-07-01

## 2022-07-04 DIAGNOSIS — G62.9 NEUROPATHY: ICD-10-CM

## 2022-07-05 RX ORDER — METHOCARBAMOL 750 MG/1
TABLET, FILM COATED ORAL
Qty: 120 TABLET | Refills: 0 | Status: SHIPPED | OUTPATIENT
Start: 2022-07-05 | End: 2022-08-05

## 2022-07-06 ENCOUNTER — HOSPITAL ENCOUNTER (OUTPATIENT)
Dept: WOUND CARE | Facility: HOSPITAL | Age: 62
Discharge: HOME | End: 2022-07-06
Admitting: NURSE PRACTITIONER

## 2022-07-06 VITALS
HEART RATE: 84 BPM | SYSTOLIC BLOOD PRESSURE: 158 MMHG | DIASTOLIC BLOOD PRESSURE: 79 MMHG | TEMPERATURE: 97.6 F | RESPIRATION RATE: 18 BRPM

## 2022-07-06 DIAGNOSIS — L97.822 NON-PRESSURE CHRONIC ULCER OF OTHER PART OF LEFT LOWER LEG WITH FAT LAYER EXPOSED: Primary | ICD-10-CM

## 2022-07-06 RX ORDER — LIDOCAINE HYDROCHLORIDE 20 MG/ML
JELLY TOPICAL AS NEEDED
Status: DISCONTINUED | OUTPATIENT
Start: 2022-07-06 | End: 2022-07-07 | Stop reason: HOSPADM

## 2022-07-06 RX ADMIN — LIDOCAINE HYDROCHLORIDE: 20 JELLY TOPICAL at 15:35

## 2022-07-06 NOTE — PROGRESS NOTES
Wound Clinic Note  Patient Identification:  Name:  Gurwinder Harding  Age:  62 y.o.  Sex:  male  :  1960  MRN:  [id number]   Visit Number:  [id number]  Primary Care Physician:  Pastora Person APRN     Subjective     Chief complaint:     Left shin wound    History of presenting illness:     Patient is a 62 y.o. male with past medical history significant for PVD, and current everyday smoker. Presented today for evaluation of left shin wound. Reports area has been present for approximately 1 month. He had a fall hitting a metal box. Has been applying silvadene to the area. Completed course of Bactrim. Known history of PVD with intervention in 2019. He is scheduled for US at the end of the month. Reports increase in swelling to the distal leg and foot. Denies any fever or chills. No other associated signs or symptoms reported. Minimal drainage reported. Positive for claudication.     Interval history:     2022: Patient seen in clinic today for follow-up to left shin wound. Wound covered with eschar. Small amount of drainage without odor.   Reports increase in pain. Denies any fever or chills. States compliance with treatment regimen. DONNIE: Composite Right DONNIE: 1.14 Composite Left DONNIE: 0.89. X- ray: 1.  Soft tissue edema and probable ulceration in the left mid anterior lower leg. No acute bony findings. Glucsoe 128, albumin 3.75, prealbummin 31.5, CRP <0.30, WBC 8.79, sed rate 41.     2022: Patient seen in clinic today for follow up to left shin wound. Wound vac was applied last visit. He reports increase in pain to area. Wound with increase in slough. Bone exposed. Increase in pain. Denies any fever or chills.     2022:Patient seen in clinic today for follow up to left shin wound. Wound without significant changes. He continues to report moderate to severe pain. Biopsy results concerning for acute osteomyelitis. Wound culture reports MRSA. He denies any fever or chills. Reports compliance with  treatment regimen without complications. Swelling remains present.     07/06/2022: Patient seen in clinic today for follow up to left shin wound. He continues to report pain to the area. Wound with increase in slough. Bone exposed.  Denies any fever or chills.  He is receiving Daptomycin IV at this time to treat concerns of acute osteomyelitis. Swelling remains present to left foot and ankle. No new issues or concerns reported.  ---------------------------------------------------------------------------------------------------------------------   Review of Systems   Constitutional: Negative for chills and fever.   HENT: Negative for congestion and rhinorrhea.    Respiratory: Negative for cough and shortness of breath.    Cardiovascular: Positive for leg swelling. Negative for chest pain.   Gastrointestinal: Negative for diarrhea, nausea and vomiting.   Musculoskeletal: Negative for gait problem.   Skin: Positive for wound.   Neurological: Negative for dizziness and weakness.      ---------------------------------------------------------------------------------------------------------------------   Past Medical History:   Diagnosis Date   • Alcohol abuse    • Arthritis    • Carotid stenosis    • Cerebrovascular accident (CVA) due to occlusion of left carotid artery (HCC) 7/15/2021   • ED (erectile dysfunction)    • History of degenerative disc disease    • Hydrocele in adult 12/29/2020   • Hypertension    • Neuropathy    • Peripheral vascular disease (HCC)     s/p arthrectomy follow by balloon angioplasty and stents of right and left SFA   • Tobacco abuse    • Vitamin D deficiency      Past Surgical History:   Procedure Laterality Date   • APPENDECTOMY     • ARTERIOGRAM N/A 9/19/2019    Procedure: Arteriogram;  Surgeon: Tong Azar MD;  Location: [facility] CATH INVASIVE LOCATION;  Service: Cardiology   • BACK SURGERY      DISC RUPTURE REPAIR, L5   • CARDIAC CATHETERIZATION Right 10/29/2019    Procedure: Peripheral  angiography;  Surgeon: Tong Azar MD;  Location: [facility] CATH INVASIVE LOCATION;  Service: Cardiovascular   • ENDOSCOPY N/A 2/18/2022    Procedure: ESOPHAGOGASTRODUODENOSCOPY;  Surgeon: Christine Duran MD;  Location: [facility] OR;  Service: General;  Laterality: N/A;   • VASCULAR SURGERY Bilateral      Family History   Problem Relation Age of Onset   • Hypertension Mother    • Cancer Father         LUNG   • Diabetes Father    • Hypertension Father    • Heart attack Other         GRANDFATHER     Social History     Socioeconomic History   • Marital status:    • Number of children: 1   Tobacco Use   • Smoking status: Current Every Day Smoker     Packs/day: 0.50     Years: 30.00     Pack years: 15.00     Types: Cigarettes   • Smokeless tobacco: Never Used   Vaping Use   • Vaping Use: Never used   Substance and Sexual Activity   • Alcohol use: Not Currently     Alcohol/week: 24.0 standard drinks     Types: 24 Cans of beer per week   • Drug use: No   • Sexual activity: Defer     ---------------------------------------------------------------------------------------------------------------------   Allergies:  Penicillins and Novocain [procaine]  ---------------------------------------------------------------------------------------------------------------------  Objective     ---------------------------------------------------------------------------------------------------------------------   Vital Signs:  Temp:  [97.6 °F (36.4 °C)] 97.6 °F (36.4 °C)  Heart Rate:  [84] 84  Resp:  [18] 18  BP: (158)/(79) 158/79  No data found.  There were no vitals filed for this visit.     on   ;      There is no height or weight on file to calculate BMI.  Wt Readings from Last 3 Encounters:   06/10/22 81.6 kg (180 lb)   05/26/22 82.1 kg (181 lb)   05/23/22 80.7 kg (178 lb)       ---------------------------------------------------------------------------------------------------------------------   Physical Exam  Constitutional:  Vital sign were reviewed (temperature, pulse, respiration, and blood pressure) and found to be within expected limits, general appearance was assessed and the patient was found to be in mild distress and calm and comfortable appears  Skin: Temperature:normal turgor and temperatureColor: normal, no cyanosis, jaundice, pallor or bruising, Moisture: dry,Nails: thickened yellow toenails bed, Hair:thinning to lower extremities .  Physical Exam  Wound Assessment: Location: left anterior, lower, leg  Changes since last exam: depth has increased  and bioburden has increased   Etiology and classification: non-pressure chronic ulcer   Wound bed structures/characteristics: full-thickness (subcutaneous tissue is exposed in at least a portion of the wound), necrotic bone is visible or palpable in the wound bed)  Drainage characteristics: moderate, serous drainage  Edges moist  Periwound characteristics: some mositure-realted inflammation  Perfusion characteristics: suggest some degree of PAD  Bioburden characteristics:slough, soft eschar is present , 50-75% of wound bed     Wound Goal (s):Free of infection, No further symptoms and Reduction of contamination  Assessment & Plan      Non-pressure chronic ulcer of other part of left lower leg with fat layer exposed (HCC) [L97.822]- Will pack with hydrogel moistened gauze and secure with kerlix and ACE    Will set up IV antibiotics with Daptomycin pharmacy to dose. Will complete first dose in infusion clinic. Will attempt to setup home health to complete 6 weeks of antibiotic treatment.     Reviewed with patient 06/17/2022:  A/BI: Composite Right DONNIE: 1.14 Composite Left DONNIE: 0.89. X- ray: 1.  Soft tissue edema and probable ulceration in the left mid anterior lower leg. No acute bony findings. Glucsoe 128, albumin 3.75, prealbummin 31.5, CRP <0.30, WBC 8.79, sed rate 41.     Obesity (BMI 30-39.9) [E66.9]- An obese person?is at greater risk for wound infection and dehiscence or  evisceration.    PVD (peripheral vascular disease) with claudication (HCC) [I73.9]- Venous US and DONNIE have been ordered to assess for any vascular disease.     Tobacco abuse [Z72.0]- Tissue perfusion is lower in users of tobacco and other forms of nicotine. Reduced tissue perfusion strongly inhibits wound healing, increases risk of infection, and dramatically increases risk of limb loss.    Clinical Impression:Moderate Complexity    Follow-up: 1 week    Wound Care Procedure Note   Pre-Procedure  Pre-Procedure Diagnosis: Non-pressure chronic ulcer of other part of left lower leg with fat layer exposed (HCC) [L97.822]  Checked for Allergies: yes  Consent:Consent obtained, consent given by Patient,Risks Discussed, Alternatives Discussed  Indication: slough and necrotic tissue  Vascular status:DONNIE testing was reviewed, and value is >0.6.  Time out was called prior to procedure.   Pre procedure Pain assessment: moderate  Pre debridement measurements: Length 3.6cm,Width 4.5cm, depth 0.2cm, sinus/tunnelNo, undermining No    Post Procedure  Post-Procedure Diagnosis: Non-pressure chronic ulcer of other part of left lower leg with fat layer exposed (HCC) [L97.822]  Post debridement measurements: Length 4cm,Width 4.6cm, depth 0.3cm, sinus/tunnelNo, undermining No  Post procedure Pain assessment: mild  Graft/Implant/Prosthetics/Implanted Device/Transplants:  None  Complication(s):  None    Procedure details:  Method of Debridement: excissional (Surgical removal or cutting away, outside or beyond the wound margin devitalized tissue, necrosis or slough.)  Procedure: The site was prepared using clean techniques, subcutaneous tissue was removed by surgical excision.  Instrument(s) used: Curette 4mm  Anesthesia:After checking patient allergies,lidocaine topical 2% was administered to provide anesthesia.  Tissue removed: subuctaneous, Percent Removed 100%  Culture or Biopsy: None  Estimated Blood Loss: Small  Hemostasis Obtained:  pressure    KEVIN Camargo   [facility]  07/06/2022  [id number]

## 2022-07-07 ENCOUNTER — LAB REQUISITION (OUTPATIENT)
Dept: LAB | Facility: HOSPITAL | Age: 62
End: 2022-07-07

## 2022-07-07 DIAGNOSIS — S81.802D UNSPECIFIED OPEN WOUND, LEFT LOWER LEG, SUBSEQUENT ENCOUNTER: ICD-10-CM

## 2022-07-07 DIAGNOSIS — R79.9 ABNORMAL FINDING OF BLOOD CHEMISTRY, UNSPECIFIED: ICD-10-CM

## 2022-07-07 DIAGNOSIS — I10 ESSENTIAL (PRIMARY) HYPERTENSION: ICD-10-CM

## 2022-07-07 LAB
ALBUMIN SERPL-MCNC: 3.86 G/DL (ref 3.5–5.2)
ALBUMIN/GLOB SERPL: 1.4 G/DL
ALP SERPL-CCNC: 81 U/L (ref 39–117)
ALT SERPL W P-5'-P-CCNC: 14 U/L (ref 1–41)
ANION GAP SERPL CALCULATED.3IONS-SCNC: 8.9 MMOL/L (ref 5–15)
AST SERPL-CCNC: 23 U/L (ref 1–40)
BASOPHILS # BLD AUTO: 0.12 10*3/MM3 (ref 0–0.2)
BASOPHILS NFR BLD AUTO: 1.8 % (ref 0–1.5)
BILIRUB SERPL-MCNC: 0.2 MG/DL (ref 0–1.2)
BUN SERPL-MCNC: 20 MG/DL (ref 8–23)
BUN/CREAT SERPL: 13.8 (ref 7–25)
CALCIUM SPEC-SCNC: 9.7 MG/DL (ref 8.6–10.5)
CHLORIDE SERPL-SCNC: 106 MMOL/L (ref 98–107)
CK SERPL-CCNC: 133 U/L (ref 20–200)
CO2 SERPL-SCNC: 22.1 MMOL/L (ref 22–29)
CREAT SERPL-MCNC: 1.45 MG/DL (ref 0.76–1.27)
CRP SERPL-MCNC: <0.3 MG/DL (ref 0–0.5)
DEPRECATED RDW RBC AUTO: 61.7 FL (ref 37–54)
EGFRCR SERPLBLD CKD-EPI 2021: 54.8 ML/MIN/1.73
EOSINOPHIL # BLD AUTO: 0.29 10*3/MM3 (ref 0–0.4)
EOSINOPHIL NFR BLD AUTO: 4.3 % (ref 0.3–6.2)
ERYTHROCYTE [DISTWIDTH] IN BLOOD BY AUTOMATED COUNT: 18.2 % (ref 12.3–15.4)
GLOBULIN UR ELPH-MCNC: 2.8 GM/DL
GLUCOSE SERPL-MCNC: 105 MG/DL (ref 65–99)
HCT VFR BLD AUTO: 34.6 % (ref 37.5–51)
HGB BLD-MCNC: 10.9 G/DL (ref 13–17.7)
IMM GRANULOCYTES # BLD AUTO: 0.02 10*3/MM3 (ref 0–0.05)
IMM GRANULOCYTES NFR BLD AUTO: 0.3 % (ref 0–0.5)
LYMPHOCYTES # BLD AUTO: 1.92 10*3/MM3 (ref 0.7–3.1)
LYMPHOCYTES NFR BLD AUTO: 28.7 % (ref 19.6–45.3)
MCH RBC QN AUTO: 29.1 PG (ref 26.6–33)
MCHC RBC AUTO-ENTMCNC: 31.5 G/DL (ref 31.5–35.7)
MCV RBC AUTO: 92.3 FL (ref 79–97)
MONOCYTES # BLD AUTO: 0.56 10*3/MM3 (ref 0.1–0.9)
MONOCYTES NFR BLD AUTO: 8.4 % (ref 5–12)
NEUTROPHILS NFR BLD AUTO: 3.78 10*3/MM3 (ref 1.7–7)
NEUTROPHILS NFR BLD AUTO: 56.5 % (ref 42.7–76)
NRBC BLD AUTO-RTO: 0 /100 WBC (ref 0–0.2)
PLATELET # BLD AUTO: 334 10*3/MM3 (ref 140–450)
PMV BLD AUTO: 10.3 FL (ref 6–12)
POTASSIUM SERPL-SCNC: 4.8 MMOL/L (ref 3.5–5.2)
PROT SERPL-MCNC: 6.7 G/DL (ref 6–8.5)
RBC # BLD AUTO: 3.75 10*6/MM3 (ref 4.14–5.8)
SODIUM SERPL-SCNC: 137 MMOL/L (ref 136–145)
WBC NRBC COR # BLD: 6.69 10*3/MM3 (ref 3.4–10.8)

## 2022-07-07 PROCEDURE — 80053 COMPREHEN METABOLIC PANEL: CPT | Performed by: NURSE PRACTITIONER

## 2022-07-07 PROCEDURE — 82550 ASSAY OF CK (CPK): CPT | Performed by: NURSE PRACTITIONER

## 2022-07-07 PROCEDURE — 86140 C-REACTIVE PROTEIN: CPT | Performed by: NURSE PRACTITIONER

## 2022-07-07 PROCEDURE — 85025 COMPLETE CBC W/AUTO DIFF WBC: CPT | Performed by: NURSE PRACTITIONER

## 2022-07-09 NOTE — ADDENDUM NOTE
Encounter addended by: Caitlyn Ayers APRN on: 7/9/2022 7:19 AM   Actions taken: Clinical Note Signed

## 2022-07-09 NOTE — PROGRESS NOTES
Patient here for nurse visit for dressing change. No new issues or concerns reported. He is tolerating treatment.

## 2022-07-13 ENCOUNTER — LAB REQUISITION (OUTPATIENT)
Dept: LAB | Facility: HOSPITAL | Age: 62
End: 2022-07-13

## 2022-07-13 DIAGNOSIS — Z79.899 OTHER LONG TERM (CURRENT) DRUG THERAPY: ICD-10-CM

## 2022-07-13 LAB
ALBUMIN SERPL-MCNC: 4.15 G/DL (ref 3.5–5.2)
ALBUMIN/GLOB SERPL: 1.4 G/DL
ALP SERPL-CCNC: 90 U/L (ref 39–117)
ALT SERPL W P-5'-P-CCNC: 19 U/L (ref 1–41)
ANION GAP SERPL CALCULATED.3IONS-SCNC: 10.1 MMOL/L (ref 5–15)
AST SERPL-CCNC: 32 U/L (ref 1–40)
BASOPHILS # BLD AUTO: 0.1 10*3/MM3 (ref 0–0.2)
BASOPHILS NFR BLD AUTO: 1.3 % (ref 0–1.5)
BILIRUB SERPL-MCNC: 0.2 MG/DL (ref 0–1.2)
BUN SERPL-MCNC: 20 MG/DL (ref 8–23)
BUN/CREAT SERPL: 14.8 (ref 7–25)
CALCIUM SPEC-SCNC: 10.1 MG/DL (ref 8.6–10.5)
CHLORIDE SERPL-SCNC: 101 MMOL/L (ref 98–107)
CK SERPL-CCNC: 376 U/L (ref 20–200)
CO2 SERPL-SCNC: 24.9 MMOL/L (ref 22–29)
CREAT SERPL-MCNC: 1.35 MG/DL (ref 0.76–1.27)
DEPRECATED RDW RBC AUTO: 66.4 FL (ref 37–54)
EGFRCR SERPLBLD CKD-EPI 2021: 59.7 ML/MIN/1.73
EOSINOPHIL # BLD AUTO: 0.39 10*3/MM3 (ref 0–0.4)
EOSINOPHIL NFR BLD AUTO: 4.9 % (ref 0.3–6.2)
ERYTHROCYTE [DISTWIDTH] IN BLOOD BY AUTOMATED COUNT: 19.2 % (ref 12.3–15.4)
GLOBULIN UR ELPH-MCNC: 3 GM/DL
GLUCOSE SERPL-MCNC: 94 MG/DL (ref 65–99)
HCT VFR BLD AUTO: 38.3 % (ref 37.5–51)
HGB BLD-MCNC: 12.1 G/DL (ref 13–17.7)
IMM GRANULOCYTES # BLD AUTO: 0.04 10*3/MM3 (ref 0–0.05)
IMM GRANULOCYTES NFR BLD AUTO: 0.5 % (ref 0–0.5)
LYMPHOCYTES # BLD AUTO: 1.51 10*3/MM3 (ref 0.7–3.1)
LYMPHOCYTES NFR BLD AUTO: 19 % (ref 19.6–45.3)
MCH RBC QN AUTO: 30 PG (ref 26.6–33)
MCHC RBC AUTO-ENTMCNC: 31.6 G/DL (ref 31.5–35.7)
MCV RBC AUTO: 95 FL (ref 79–97)
MONOCYTES # BLD AUTO: 0.67 10*3/MM3 (ref 0.1–0.9)
MONOCYTES NFR BLD AUTO: 8.4 % (ref 5–12)
NEUTROPHILS NFR BLD AUTO: 5.24 10*3/MM3 (ref 1.7–7)
NEUTROPHILS NFR BLD AUTO: 65.9 % (ref 42.7–76)
NRBC BLD AUTO-RTO: 0 /100 WBC (ref 0–0.2)
PLATELET # BLD AUTO: 273 10*3/MM3 (ref 140–450)
PMV BLD AUTO: 10.6 FL (ref 6–12)
POTASSIUM SERPL-SCNC: 4.7 MMOL/L (ref 3.5–5.2)
PROT SERPL-MCNC: 7.1 G/DL (ref 6–8.5)
RBC # BLD AUTO: 4.03 10*6/MM3 (ref 4.14–5.8)
SODIUM SERPL-SCNC: 136 MMOL/L (ref 136–145)
WBC NRBC COR # BLD: 7.95 10*3/MM3 (ref 3.4–10.8)

## 2022-07-13 PROCEDURE — 80053 COMPREHEN METABOLIC PANEL: CPT | Performed by: NURSE PRACTITIONER

## 2022-07-13 PROCEDURE — 85025 COMPLETE CBC W/AUTO DIFF WBC: CPT | Performed by: NURSE PRACTITIONER

## 2022-07-13 PROCEDURE — 82550 ASSAY OF CK (CPK): CPT | Performed by: NURSE PRACTITIONER

## 2022-07-14 DIAGNOSIS — G62.9 NEUROPATHY: ICD-10-CM

## 2022-07-14 NOTE — TELEPHONE ENCOUNTER
----- Message from KEVIN Syed sent at 7/14/2022  4:41 PM EDT -----  Continue follow up with interventional tomorrow    Patient notified and expressed understanding.

## 2022-07-15 ENCOUNTER — HOSPITAL ENCOUNTER (OUTPATIENT)
Dept: WOUND CARE | Facility: HOSPITAL | Age: 62
Discharge: HOME | End: 2022-07-15
Admitting: NURSE PRACTITIONER

## 2022-07-15 ENCOUNTER — OFFICE VISIT (OUTPATIENT)
Dept: CARDIOLOGY | Facility: CLINIC | Age: 62
End: 2022-07-15

## 2022-07-15 VITALS
BODY MASS INDEX: 27.86 KG/M2 | WEIGHT: 194.6 LBS | DIASTOLIC BLOOD PRESSURE: 76 MMHG | SYSTOLIC BLOOD PRESSURE: 129 MMHG | HEART RATE: 84 BPM | OXYGEN SATURATION: 99 % | HEIGHT: 70 IN

## 2022-07-15 VITALS
DIASTOLIC BLOOD PRESSURE: 80 MMHG | RESPIRATION RATE: 18 BRPM | SYSTOLIC BLOOD PRESSURE: 148 MMHG | HEART RATE: 84 BPM | TEMPERATURE: 97.5 F

## 2022-07-15 DIAGNOSIS — I73.9 PVD (PERIPHERAL VASCULAR DISEASE) WITH CLAUDICATION: Primary | Chronic | ICD-10-CM

## 2022-07-15 DIAGNOSIS — I10 PRIMARY HYPERTENSION: Chronic | ICD-10-CM

## 2022-07-15 DIAGNOSIS — I65.23 BILATERAL CAROTID ARTERY STENOSIS: Chronic | ICD-10-CM

## 2022-07-15 PROCEDURE — 99214 OFFICE O/P EST MOD 30 MIN: CPT | Performed by: NURSE PRACTITIONER

## 2022-07-15 RX ORDER — GABAPENTIN 800 MG/1
TABLET ORAL
Qty: 135 TABLET | Refills: 2 | Status: SHIPPED | OUTPATIENT
Start: 2022-07-15 | End: 2022-10-17

## 2022-07-15 RX ORDER — DAPTOMYCIN 50 MG/ML
500 INJECTION, POWDER, LYOPHILIZED, FOR SOLUTION INTRAVENOUS
COMMUNITY
End: 2022-11-07

## 2022-07-15 RX ORDER — LIDOCAINE HYDROCHLORIDE 20 MG/ML
JELLY TOPICAL AS NEEDED
Status: DISCONTINUED | OUTPATIENT
Start: 2022-07-15 | End: 2022-07-16 | Stop reason: HOSPADM

## 2022-07-15 RX ORDER — LIDOCAINE HYDROCHLORIDE 20 MG/ML
5 INJECTION, SOLUTION INFILTRATION; PERINEURAL ONCE
Status: DISCONTINUED | OUTPATIENT
Start: 2022-07-15 | End: 2022-07-16 | Stop reason: HOSPADM

## 2022-07-15 RX ADMIN — LIDOCAINE HYDROCHLORIDE: 20 JELLY TOPICAL at 12:20

## 2022-07-15 NOTE — PROGRESS NOTES
"Chief Complaint  Leg Pain (Patient states leg pain and swelling, Patient also states he is currently going to wound care for his left leg )    Subjective          Gurwinder Harding presents to [facility] CARDIOLOGY for follow-up.    History of Present Illness    Mr. Harding was last seen in clinic on 4/13/2021 by Dr. Azar.     Mr. Harding is accompanied by his wife for today's visit.  He reports that he fell in may and cut open in his left shin.  He has been seeing Caitlyn in wound care but has not healed well.  Arterial Doppler was concerning and she wanted him to be seen earlier than his usual follow-up date.    On 10/29/2019 Mr. Harding had a peripheral arteriogram.  He had successful intervention of the  of the right SFA.  The left lower extremity showed no evidence of occlusion.    Objective     Vital Signs:   /76 (BP Location: Left arm, Patient Position: Sitting, Cuff Size: Adult)   Pulse 84   Ht 177.8 cm (70\")   Wt 88.3 kg (194 lb 9.6 oz)   SpO2 99%   BMI 27.92 kg/m²       Physical Exam  Constitutional:       Appearance: Normal appearance. He is well-developed.   Cardiovascular:      Rate and Rhythm: Normal rate and regular rhythm.      Pulses:           Dorsalis pedis pulses are detected w/ Doppler on the right side and detected w/ Doppler on the left side.        Posterior tibial pulses are detected w/ Doppler on the right side and detected w/ Doppler on the left side.      Heart sounds: No murmur heard.    No friction rub. No gallop.      Comments: Left LE is swollen.  Pulmonary:      Effort: Pulmonary effort is normal. No respiratory distress.      Breath sounds: Normal breath sounds. No wheezing or rales.   Skin:     General: Skin is warm and dry.   Neurological:      Mental Status: He is alert and oriented to person, place, and time.   Psychiatric:         Mood and Affect: Mood normal.         Behavior: Behavior normal.          Result Review :       Data reviewed: Cardiology " studies Peripheral arteriogram and arterial Doppler        6/29/2022 lower extremity arterial Doppler  US ARTERIAL DOPPLER LOWER EXTREMITY  BILATERAL     REASON FOR EXAM:  PVD; I73.9-Peripheral vascular disease, unspecified.      COMPARISON: Previous DONNIE exam from 06/16/2022.     TECHNIQUE:  Multiple real-time color doppler images were obtained.  M-mode Doppler was used for velocity determination and spectral pattern.  Stenosis was evaluated using the NASCET or similar measurement  technique.     RIGHT LEG: Triphasic flow was noted in the distal external iliac and  common femoral arteries. Peak systolic velocities were 211 and 163 cm/s.  Color Doppler flow was noted in the profunda femoral and superficial  femoral artery. There is triphasic flow in the superficial femoral  artery throughout the thigh. The popliteal artery has a triphasic wave  pattern with velocity of 57 cm/s. Below the knee there was monophasic  flow in the posterior tibial artery at the ankle and the velocity was  only 15 cm/s. There is plaque along the course of the posterior tibial  artery.     LEFT LEG: The distal external iliac and common femoral arteries show  monophasic flow patterns. Peak systolic velocities were 205 and 146  cm/s. Color Doppler flow is noted in the profunda femoral and  superficial femoral artery. Superficial femoral artery shows monophasic  flow throughout the thigh. There were multiple areas of plaquing. There  is elevated velocity in the superficial femoral artery proximally  measuring 621 cm/s. No focal areas of occlusion were demonstrated. The  popliteal artery has a monophasic flow pattern with velocity of 57 cm/s.  There is monophasic flow in the posterior tibial artery.  The velocity  at the ankle was 73 cm/s.     IMPRESSION:  1.  There was good inflow in the right leg down to the popliteal. Below  the knee in the posterior tibial artery there was only monophasic flow  with slow velocity of 15 cm/s at the ankle  consistent with small vessel  disease.  2.  In the left leg there was only monophasic flow demonstrated. This  suggests more proximal disease. There is a focal area of high-grade  stenosis in the proximal superficial femoral artery where the velocity  was 621 cm/s..     This report was finalized on 6/29/2022 1:30 PM by Dr. Ki Dale II, MD.      10/29/2019 peripheral arteriogram    PROCEDURE PERFORMED:   LEFT femoral artery access with 5 Eritrean sheath  Bilateral lower extremity angiogram  Atherectomy followed by Balloon angioplasty and stenting of the R SFA     PROCEDURE COMMENTS:     Gurwinder Harding was brought to the cath lab and placed on the cardiac catherization table in the postabsortive state. The patient was prepped and draped according to the cath lab protocol under strict aseptic and sterile condition. Patient's right femoral artery sight was prepped and draped. Under fluoroscopic guidance theright femoral artery was punctured using a 21 gauge needle utilizing the modified Seldinger technique. 5 Sinhala sheath was introduced over a wire. After aspirating for blood it was flushed with heparinized saline.     LEFT extremity imaging was performed via the sheath. A 5F RIM catheter was then advanced to the bifurcation at L5 and engaged in the contralateral CLEMENTE. RIGHT extremity imaging was then performed. After the procedure was completed the sheath was removed in the cath lab and hemostasis achieved via manual compression. No complications occurred.     Findings:  Right Lower Extremity:   Common  Iliac artery was normal  SFA shows 100% short occlusion in the distal third  Popliteal Artery was patent with no disease  Tibio-peroneal trunk patent with no disease  Anterior tibial artery was patent  Posterior tibial artery was patent  3 Vessel run off present     Left Lower Extremity:  Common iliac artery shows mild disease and calcific changes   SFA is patent at the site of previous atherectomy and DCB with  excellent flow  Popliteal Artery is patent with no disease   Tibio-peroneal trunk patent with no significant disease  Anterior tibial artery was patent  Posterior tibial artery is patent   3 Vessel run off present     Recommendations:  Intervention to R SFA     6x45 straight sheath used   Angled NaviCross with glidewire 0.035  Crossed successfully and a Stabilizer wire was used for Atherectomy with HawkOne 6F  DCB with 5x120 Admiral performed  There was residual stenosis which was flow limiting and thus stenting was elected.  6x120 Everflex stent was delivered with excellent results and flow was fully re-established.        FINAL RESULTS:  Successful Intervention to the Right SFA Chronic Total occlusion  beginning at the ostium and extending to the Adductor canal using:     Atherectomy HawkOne device 6F  Balloon angioplasty with Admiral DCB 5x120 followed by stenting of the right SFA using 6x120 Everflex stent  From 100% to 20% residual with brisk flow to the distal vessel.        EBL: 50 ML  No specimens were removed.     Plan  Continue medical therapy.       Tong Azar MD  10/29/19      Current Outpatient Medications   Medication Sig Dispense Refill   • atenolol (Tenormin) 25 MG tablet Take 1 tablet by mouth Daily. 90 tablet 1   • cilostazol (PLETAL) 100 MG tablet Take 1 tablet by mouth Daily. 60 tablet 5   • DAPTOmycin (CUBICIN) 500 MG injection 500 mg Daily.     • folic acid-pyridoxine-cyanocobalamin (Folbic) 2.5-25-2 MG tablet tablet Take 1 tablet by mouth Daily. 90 tablet 1   • gabapentin (NEURONTIN) 800 MG tablet TAKE ONE AND ONE-HALF TABLET BY MOUTH THREE TIMES A  tablet 2   • isosorbide mononitrate (IMDUR) 30 MG 24 hr tablet Take 1 tablet by mouth Daily. 90 tablet 1   • methocarbamol (ROBAXIN) 750 MG tablet TAKE ONE TABLET BY MOUTH FOUR TIMES A  tablet 0   • pantoprazole (Protonix) 40 MG EC tablet Take 1 tablet by mouth Daily. 30 tablet 0   • ferrous sulfate (FerrouSul) 325 (65 FE) MG  tablet Take 1 tablet by mouth 3 (Three) Times a Day With Meals. 90 tablet 0   • traZODone (DESYREL) 50 MG tablet Take 1 tablet by mouth Every Night. 90 tablet 1     No current facility-administered medications for this visit.     Facility-Administered Medications Ordered in Other Visits   Medication Dose Route Frequency Provider Last Rate Last Admin   • lidocaine (XYLOCAINE) 2% injection 5 mL  5 mL Injection Once Caitlyn Ayers APRN       • Lidocaine HCl gel (XYLOCAINE) urethral/mucosal syringe   Topical PRN Caitlyn Ayers APRN   Given at 07/15/22 1220            Assessment and Plan    Problem List Items Addressed This Visit        Cardiac and Vasculature    Hypertension (Chronic)    PVD (peripheral vascular disease) with claudication (HCC) - Primary (Chronic)    Carotid artery stenosis (Chronic)              Follow Up     Medications were reviewed with the patient.    With regard to Mr. Davenport nonhealing wound of the left shin and the findings on an arterial Doppler, I have been able to auscultate per Doppler pulses both posterior tibial and pedal on the left lower extremity.  I have told Mr. Harding that I will talk with Dr. Azar regarding these findings and allow him to make recommendation as to further tests or procedures.    Risk factor modification: Smoking cessation counseling was given.  Patient advised regarding the correlation between cigarette smoking and coronary artery disease, as well as lung disease, cancer.  Patient was offered strategies to quit, including medication.  The patient is not ready to quit    Patient is to continue Pletal.    Continue atenolol for blood pressure, which is well controlled.    Continue isosorbide mononitrate.    No follow-ups on file.    Patient was given instructions and counseling regarding his condition or for health maintenance advice. Please see specific information pulled into the AVS if appropriate.

## 2022-07-19 ENCOUNTER — LAB REQUISITION (OUTPATIENT)
Dept: LAB | Facility: HOSPITAL | Age: 62
End: 2022-07-19

## 2022-07-19 DIAGNOSIS — Z13.228 ENCOUNTER FOR SCREENING FOR OTHER METABOLIC DISORDERS: ICD-10-CM

## 2022-07-19 LAB
ALBUMIN SERPL-MCNC: 3.72 G/DL (ref 3.5–5.2)
ALBUMIN/GLOB SERPL: 1.3 G/DL
ALP SERPL-CCNC: 81 U/L (ref 39–117)
ALT SERPL W P-5'-P-CCNC: 16 U/L (ref 1–41)
ANION GAP SERPL CALCULATED.3IONS-SCNC: 12.3 MMOL/L (ref 5–15)
AST SERPL-CCNC: 24 U/L (ref 1–40)
BASOPHILS # BLD AUTO: 0.09 10*3/MM3 (ref 0–0.2)
BASOPHILS NFR BLD AUTO: 1.1 % (ref 0–1.5)
BILIRUB SERPL-MCNC: <0.2 MG/DL (ref 0–1.2)
BUN SERPL-MCNC: 22 MG/DL (ref 8–23)
BUN/CREAT SERPL: 15 (ref 7–25)
CALCIUM SPEC-SCNC: 9 MG/DL (ref 8.6–10.5)
CHLORIDE SERPL-SCNC: 106 MMOL/L (ref 98–107)
CK SERPL-CCNC: 171 U/L (ref 20–200)
CO2 SERPL-SCNC: 23.7 MMOL/L (ref 22–29)
CREAT SERPL-MCNC: 1.47 MG/DL (ref 0.76–1.27)
CRP SERPL-MCNC: <0.3 MG/DL (ref 0–0.5)
DEPRECATED RDW RBC AUTO: 68.2 FL (ref 37–54)
EGFRCR SERPLBLD CKD-EPI 2021: 53.9 ML/MIN/1.73
EOSINOPHIL # BLD AUTO: 0.44 10*3/MM3 (ref 0–0.4)
EOSINOPHIL NFR BLD AUTO: 5.5 % (ref 0.3–6.2)
ERYTHROCYTE [DISTWIDTH] IN BLOOD BY AUTOMATED COUNT: 19.4 % (ref 12.3–15.4)
GLOBULIN UR ELPH-MCNC: 2.9 GM/DL
GLUCOSE SERPL-MCNC: 107 MG/DL (ref 65–99)
HCT VFR BLD AUTO: 36.5 % (ref 37.5–51)
HGB BLD-MCNC: 11.3 G/DL (ref 13–17.7)
IMM GRANULOCYTES # BLD AUTO: 0.03 10*3/MM3 (ref 0–0.05)
IMM GRANULOCYTES NFR BLD AUTO: 0.4 % (ref 0–0.5)
LYMPHOCYTES # BLD AUTO: 1.39 10*3/MM3 (ref 0.7–3.1)
LYMPHOCYTES NFR BLD AUTO: 17.5 % (ref 19.6–45.3)
MCH RBC QN AUTO: 29.6 PG (ref 26.6–33)
MCHC RBC AUTO-ENTMCNC: 31 G/DL (ref 31.5–35.7)
MCV RBC AUTO: 95.5 FL (ref 79–97)
MONOCYTES # BLD AUTO: 0.67 10*3/MM3 (ref 0.1–0.9)
MONOCYTES NFR BLD AUTO: 8.4 % (ref 5–12)
NEUTROPHILS NFR BLD AUTO: 5.32 10*3/MM3 (ref 1.7–7)
NEUTROPHILS NFR BLD AUTO: 67.1 % (ref 42.7–76)
NRBC BLD AUTO-RTO: 0 /100 WBC (ref 0–0.2)
PLATELET # BLD AUTO: 234 10*3/MM3 (ref 140–450)
PMV BLD AUTO: 10.4 FL (ref 6–12)
POTASSIUM SERPL-SCNC: 4.6 MMOL/L (ref 3.5–5.2)
PROT SERPL-MCNC: 6.6 G/DL (ref 6–8.5)
RBC # BLD AUTO: 3.82 10*6/MM3 (ref 4.14–5.8)
SODIUM SERPL-SCNC: 142 MMOL/L (ref 136–145)
WBC NRBC COR # BLD: 7.94 10*3/MM3 (ref 3.4–10.8)

## 2022-07-19 PROCEDURE — 80053 COMPREHEN METABOLIC PANEL: CPT | Performed by: NURSE PRACTITIONER

## 2022-07-19 PROCEDURE — 82550 ASSAY OF CK (CPK): CPT | Performed by: NURSE PRACTITIONER

## 2022-07-19 PROCEDURE — 85025 COMPLETE CBC W/AUTO DIFF WBC: CPT | Performed by: NURSE PRACTITIONER

## 2022-07-19 PROCEDURE — 86140 C-REACTIVE PROTEIN: CPT | Performed by: NURSE PRACTITIONER

## 2022-07-20 DIAGNOSIS — G62.9 NEUROPATHY: ICD-10-CM

## 2022-07-20 RX ORDER — GABAPENTIN 800 MG/1
TABLET ORAL
Qty: 135 TABLET | OUTPATIENT
Start: 2022-07-20

## 2022-07-22 ENCOUNTER — HOSPITAL ENCOUNTER (OUTPATIENT)
Dept: WOUND CARE | Facility: HOSPITAL | Age: 62
Discharge: HOME | End: 2022-07-22
Admitting: NURSE PRACTITIONER

## 2022-07-22 VITALS
SYSTOLIC BLOOD PRESSURE: 162 MMHG | TEMPERATURE: 98.1 F | RESPIRATION RATE: 16 BRPM | DIASTOLIC BLOOD PRESSURE: 92 MMHG | HEART RATE: 84 BPM

## 2022-07-22 DIAGNOSIS — L97.822 NON-PRESSURE CHRONIC ULCER OF OTHER PART OF LEFT LOWER LEG WITH FAT LAYER EXPOSED: ICD-10-CM

## 2022-07-22 PROCEDURE — 97602 WOUND(S) CARE NON-SELECTIVE: CPT

## 2022-07-25 NOTE — PROGRESS NOTES
Wound Clinic Note  Patient Identification:  Name:  Gurwinder Harding  Age:  62 y.o.  Sex:  male  :  1960  MRN:  [id number]   Visit Number:  [id number]  Primary Care Physician:  Pastora Person APRN     Subjective     Chief complaint:     Left shin wound    History of presenting illness:     Patient is a 62 y.o. male with past medical history significant for PVD, and current everyday smoker. Presented today for evaluation of left shin wound. Reports area has been present for approximately 1 month. He had a fall hitting a metal box. Has been applying silvadene to the area. Completed course of Bactrim. Known history of PVD with intervention in 2019. He is scheduled for US at the end of the month. Reports increase in swelling to the distal leg and foot. Denies any fever or chills. No other associated signs or symptoms reported. Minimal drainage reported. Positive for claudication.     Interval history:     2022: Patient seen in clinic today for follow-up to left shin wound. Wound covered with eschar. Small amount of drainage without odor.   Reports increase in pain. Denies any fever or chills. States compliance with treatment regimen. DONNIE: Composite Right DONNIE: 1.14 Composite Left DONNIE: 0.89. X- ray: 1.  Soft tissue edema and probable ulceration in the left mid anterior lower leg. No acute bony findings. Glucsoe 128, albumin 3.75, prealbummin 31.5, CRP <0.30, WBC 8.79, sed rate 41.     2022: Patient seen in clinic today for follow up to left shin wound. Wound vac was applied last visit. He reports increase in pain to area. Wound with increase in slough. Bone exposed. Increase in pain. Denies any fever or chills.     2022:Patient seen in clinic today for follow up to left shin wound. Wound without significant changes. He continues to report moderate to severe pain. Biopsy results concerning for acute osteomyelitis. Wound culture reports MRSA. He denies any fever or chills. Reports compliance with  treatment regimen without complications. Swelling remains present.     07/06/2022: Patient seen in clinic today for follow up to left shin wound. He continues to report pain to the area. Wound with increase in slough. Bone exposed.  Denies any fever or chills.  He is receiving Daptomycin IV at this time to treat concerns of acute osteomyelitis. Swelling remains present to left foot and ankle. No new issues or concerns reported.    07/15/2022: Patient seen in clinic today for follow up to left shin wound. He continues to report pain to the area. Wound continues with slough Bone exposed does appear to have some areas of granulation present.  Denies any fever or chills.  He is receiving Daptomycin IV at this time to treat acute osteomyelitis. Swelling remains present to left foot and ankle. No new issues or concerns reported.  ---------------------------------------------------------------------------------------------------------------------   Review of Systems   Constitutional: Negative for chills and fever.   HENT: Negative for congestion and rhinorrhea.    Respiratory: Negative for cough and shortness of breath.    Cardiovascular: Positive for leg swelling. Negative for chest pain.   Gastrointestinal: Negative for diarrhea, nausea and vomiting.   Musculoskeletal: Negative for gait problem.   Skin: Positive for wound.   Neurological: Negative for dizziness and weakness.      ---------------------------------------------------------------------------------------------------------------------   Past Medical History:   Diagnosis Date   • Alcohol abuse    • Arthritis    • Carotid stenosis    • Cerebrovascular accident (CVA) due to occlusion of left carotid artery (HCC) 07/15/2021   • ED (erectile dysfunction)    • History of degenerative disc disease    • Hydrocele in adult 12/29/2020   • Hypertension    • MRSA (methicillin resistant staph aureus) culture positive     patient states diagnosed approx 2 wks ago   •  Neuropathy    • Peripheral vascular disease (HCC)     s/p arthrectomy follow by balloon angioplasty and stents of right and left SFA   • Tobacco abuse    • Vitamin D deficiency      Past Surgical History:   Procedure Laterality Date   • APPENDECTOMY     • ARTERIOGRAM N/A 9/19/2019    Procedure: Arteriogram;  Surgeon: Tong Azar MD;  Location: [facility] CATH INVASIVE LOCATION;  Service: Cardiology   • BACK SURGERY      DISC RUPTURE REPAIR, L5   • CARDIAC CATHETERIZATION Right 10/29/2019    Procedure: Peripheral angiography;  Surgeon: Tong Azar MD;  Location: [facility] CATH INVASIVE LOCATION;  Service: Cardiovascular   • ENDOSCOPY N/A 2/18/2022    Procedure: ESOPHAGOGASTRODUODENOSCOPY;  Surgeon: Christine Duran MD;  Location: [facility] OR;  Service: General;  Laterality: N/A;   • VASCULAR SURGERY Bilateral      Family History   Problem Relation Age of Onset   • Hypertension Mother    • Cancer Father         LUNG   • Diabetes Father    • Hypertension Father    • Heart attack Other         GRANDFATHER     Social History     Socioeconomic History   • Marital status:    • Number of children: 1   Tobacco Use   • Smoking status: Current Every Day Smoker     Packs/day: 0.50     Years: 30.00     Pack years: 15.00     Types: Cigarettes   • Smokeless tobacco: Never Used   Vaping Use   • Vaping Use: Never used   Substance and Sexual Activity   • Alcohol use: Not Currently     Alcohol/week: 24.0 standard drinks     Types: 24 Cans of beer per week   • Drug use: No   • Sexual activity: Defer     ---------------------------------------------------------------------------------------------------------------------   Allergies:  Penicillins and Novocain [procaine]  ---------------------------------------------------------------------------------------------------------------------  Objective     ---------------------------------------------------------------------------------------------------------------------   Vital  Signs:     No data found.  There were no vitals filed for this visit.     on   ;      There is no height or weight on file to calculate BMI.  Wt Readings from Last 3 Encounters:   07/15/22 88.3 kg (194 lb 9.6 oz)   06/10/22 81.6 kg (180 lb)   05/26/22 82.1 kg (181 lb)       ---------------------------------------------------------------------------------------------------------------------   Physical Exam  Constitutional: Vital sign were reviewed (temperature, pulse, respiration, and blood pressure) and found to be within expected limits, general appearance was assessed and the patient was found to be in mild distress and calm and comfortable appears  Skin: Temperature:normal turgor and temperatureColor: normal, no cyanosis, jaundice, pallor or bruising, Moisture: dry,Nails: thickened yellow toenails bed, Hair:thinning to lower extremities .  Physical Exam  Wound Assessment: Location: left anterior, lower, leg  Changes since last exam: bioburden coverage has diminished  and granulation has improved   Etiology and classification: non-pressure chronic ulcer   Wound bed structures/characteristics: full-thickness (subcutaneous tissue is exposed in at least a portion of the wound), necrotic bone is visible or palpable in the wound bed)  Drainage characteristics: moderate, serous drainage  Edges moist  Periwound characteristics: some mositure-realted inflammation  Perfusion characteristics: suggest some degree of PAD  Bioburden characteristics:slough, soft eschar is present , 50-75% of wound bed     Wound Goal (s):Free of infection, No further symptoms and Reduction of contamination  Assessment & Plan      Non-pressure chronic ulcer of other part of left lower leg with fat layer exposed (HCC) [L97.677]- debridement completed, see below for procedure details. Hydrofera blue to base and secure with kerlix and short stretch.     Will set up IV antibiotics with Daptomycin pharmacy to dose. Will complete first dose in infusion  clinic. Will attempt to setup home health to complete 6 weeks of antibiotic treatment.     Reviewed with patient 06/17/2022:  A/BI: Composite Right DONNIE: 1.14 Composite Left DONNIE: 0.89. X- ray: 1.  Soft tissue edema and probable ulceration in the left mid anterior lower leg. No acute bony findings. Glucsoe 128, albumin 3.75, prealbummin 31.5, CRP <0.30, WBC 8.79, sed rate 41.     Obesity (BMI 30-39.9) [E66.9]- An obese person?is at greater risk for wound infection and dehiscence or evisceration.    PVD (peripheral vascular disease) with claudication (HCC) [I73.9]- DONNIE of left slightly decreased. He is following with vascular.    Tobacco abuse [Z72.0]- Tissue perfusion is lower in users of tobacco and other forms of nicotine. Reduced tissue perfusion strongly inhibits wound healing, increases risk of infection, and dramatically increases risk of limb loss.    Clinical Impression:Moderate Complexity    Follow-up: 1 week    Wound Care Procedure Note   Pre-Procedure  Pre-Procedure Diagnosis: Non-pressure chronic ulcer of other part of left lower leg with fat layer exposed (HCC) [L97.822]  Checked for Allergies: yes  Consent:Consent obtained, consent given by Patient,Risks Discussed, Alternatives Discussed  Indication: slough and necrotic tissue  Vascular status:DONNIE testing was reviewed, and value is >0.6.  Time out was called prior to procedure.   Pre procedure Pain assessment: moderate  Pre debridement measurements: Length 3.6cm,Width 4cm, depth 0.3cm, sinus/tunnelNo, undermining No    Post Procedure  Post-Procedure Diagnosis: Non-pressure chronic ulcer of other part of left lower leg with fat layer exposed (HCC) [L97.822]  Post debridement measurements: Length 3.7cm,Width 4.1cm, depth 0.4cm, sinus/tunnelNo, undermining No  Post procedure Pain assessment: mild  Graft/Implant/Prosthetics/Implanted Device/Transplants:  None  Complication(s):  None    Procedure details:  Method of Debridement: excissional (Surgical removal  or cutting away, outside or beyond the wound margin devitalized tissue, necrosis or slough.)  Procedure: The site was prepared using clean techniques, subcutaneous tissue was removed by surgical excision.  Instrument(s) used: Curette 4mm  Anesthesia:After checking patient allergies,lidocaine topical 2% was administered to provide anesthesia.  Tissue removed: subuctaneous, Percent Removed 100%  Culture or Biopsy: None  Estimated Blood Loss: Small  Hemostasis Obtained: pressure    KEVIN Camargo   [facility]  07/15/2022  1510

## 2022-07-28 ENCOUNTER — LAB REQUISITION (OUTPATIENT)
Dept: LAB | Facility: HOSPITAL | Age: 62
End: 2022-07-28

## 2022-07-28 DIAGNOSIS — Z79.899 OTHER LONG TERM (CURRENT) DRUG THERAPY: ICD-10-CM

## 2022-07-28 LAB
ALBUMIN SERPL-MCNC: 4.41 G/DL (ref 3.5–5.2)
ALBUMIN/GLOB SERPL: 1.5 G/DL
ALP SERPL-CCNC: 88 U/L (ref 39–117)
ALT SERPL W P-5'-P-CCNC: 20 U/L (ref 1–41)
ANION GAP SERPL CALCULATED.3IONS-SCNC: 11.5 MMOL/L (ref 5–15)
AST SERPL-CCNC: 28 U/L (ref 1–40)
BASOPHILS # BLD AUTO: 0.13 10*3/MM3 (ref 0–0.2)
BASOPHILS NFR BLD AUTO: 1.6 % (ref 0–1.5)
BILIRUB SERPL-MCNC: 0.2 MG/DL (ref 0–1.2)
BUN SERPL-MCNC: 20 MG/DL (ref 8–23)
BUN/CREAT SERPL: 12.5 (ref 7–25)
CALCIUM SPEC-SCNC: 9.5 MG/DL (ref 8.6–10.5)
CHLORIDE SERPL-SCNC: 106 MMOL/L (ref 98–107)
CK SERPL-CCNC: 275 U/L (ref 20–200)
CO2 SERPL-SCNC: 22.5 MMOL/L (ref 22–29)
CREAT SERPL-MCNC: 1.6 MG/DL (ref 0.76–1.27)
CRP SERPL-MCNC: <0.3 MG/DL (ref 0–0.5)
DEPRECATED RDW RBC AUTO: 62.4 FL (ref 37–54)
EGFRCR SERPLBLD CKD-EPI 2021: 48.7 ML/MIN/1.73
EOSINOPHIL # BLD AUTO: 0.84 10*3/MM3 (ref 0–0.4)
EOSINOPHIL NFR BLD AUTO: 10.1 % (ref 0.3–6.2)
ERYTHROCYTE [DISTWIDTH] IN BLOOD BY AUTOMATED COUNT: 17.9 % (ref 12.3–15.4)
GLOBULIN UR ELPH-MCNC: 3 GM/DL
GLUCOSE SERPL-MCNC: 114 MG/DL (ref 65–99)
HCT VFR BLD AUTO: 41.7 % (ref 37.5–51)
HGB BLD-MCNC: 13.4 G/DL (ref 13–17.7)
IMM GRANULOCYTES # BLD AUTO: 0.04 10*3/MM3 (ref 0–0.05)
IMM GRANULOCYTES NFR BLD AUTO: 0.5 % (ref 0–0.5)
LYMPHOCYTES # BLD AUTO: 1.38 10*3/MM3 (ref 0.7–3.1)
LYMPHOCYTES NFR BLD AUTO: 16.5 % (ref 19.6–45.3)
MCH RBC QN AUTO: 30.4 PG (ref 26.6–33)
MCHC RBC AUTO-ENTMCNC: 32.1 G/DL (ref 31.5–35.7)
MCV RBC AUTO: 94.6 FL (ref 79–97)
MONOCYTES # BLD AUTO: 0.64 10*3/MM3 (ref 0.1–0.9)
MONOCYTES NFR BLD AUTO: 7.7 % (ref 5–12)
NEUTROPHILS NFR BLD AUTO: 5.31 10*3/MM3 (ref 1.7–7)
NEUTROPHILS NFR BLD AUTO: 63.6 % (ref 42.7–76)
NRBC BLD AUTO-RTO: 0 /100 WBC (ref 0–0.2)
PLATELET # BLD AUTO: 268 10*3/MM3 (ref 140–450)
PMV BLD AUTO: 10.6 FL (ref 6–12)
POTASSIUM SERPL-SCNC: 4.9 MMOL/L (ref 3.5–5.2)
PROT SERPL-MCNC: 7.4 G/DL (ref 6–8.5)
RBC # BLD AUTO: 4.41 10*6/MM3 (ref 4.14–5.8)
SODIUM SERPL-SCNC: 140 MMOL/L (ref 136–145)
WBC NRBC COR # BLD: 8.34 10*3/MM3 (ref 3.4–10.8)

## 2022-07-28 PROCEDURE — 80053 COMPREHEN METABOLIC PANEL: CPT | Performed by: NURSE PRACTITIONER

## 2022-07-28 PROCEDURE — 86140 C-REACTIVE PROTEIN: CPT | Performed by: NURSE PRACTITIONER

## 2022-07-28 PROCEDURE — 85025 COMPLETE CBC W/AUTO DIFF WBC: CPT | Performed by: NURSE PRACTITIONER

## 2022-07-28 PROCEDURE — 82550 ASSAY OF CK (CPK): CPT | Performed by: NURSE PRACTITIONER

## 2022-07-29 ENCOUNTER — HOSPITAL ENCOUNTER (OUTPATIENT)
Dept: WOUND CARE | Facility: HOSPITAL | Age: 62
Discharge: HOME | End: 2022-07-29
Admitting: NURSE PRACTITIONER

## 2022-07-29 ENCOUNTER — INFUSION (OUTPATIENT)
Dept: ONCOLOGY | Facility: HOSPITAL | Age: 62
End: 2022-07-29

## 2022-07-29 VITALS
RESPIRATION RATE: 20 BRPM | HEART RATE: 82 BPM | DIASTOLIC BLOOD PRESSURE: 84 MMHG | SYSTOLIC BLOOD PRESSURE: 164 MMHG | TEMPERATURE: 97.7 F

## 2022-07-29 DIAGNOSIS — L97.822 NON-PRESSURE CHRONIC ULCER OF OTHER PART OF LEFT LOWER LEG WITH FAT LAYER EXPOSED: Primary | ICD-10-CM

## 2022-07-29 DIAGNOSIS — L97.822 NON-PRESSURE CHRONIC ULCER OF OTHER PART OF LEFT LOWER LEG WITH FAT LAYER EXPOSED: ICD-10-CM

## 2022-07-29 PROCEDURE — 97602 WOUND(S) CARE NON-SELECTIVE: CPT

## 2022-07-29 PROCEDURE — 36410 VNPNXR 3YR/> PHY/QHP DX/THER: CPT

## 2022-07-29 PROCEDURE — C1751 CATH, INF, PER/CENT/MIDLINE: HCPCS

## 2022-07-29 RX ORDER — LIDOCAINE HYDROCHLORIDE 20 MG/ML
JELLY TOPICAL AS NEEDED
Status: DISCONTINUED | OUTPATIENT
Start: 2022-07-29 | End: 2022-07-30 | Stop reason: HOSPADM

## 2022-07-29 RX ADMIN — LIDOCAINE HYDROCHLORIDE: 20 JELLY TOPICAL at 14:42

## 2022-08-01 NOTE — PROGRESS NOTES
Wound Clinic Note  Patient Identification:  Name:  Gurwinder Harding  Age:  62 y.o.  Sex:  male  :  1960  MRN:  [id number]   Visit Number:  [id number]  Primary Care Physician:  Pastora Person APRN     Subjective     Chief complaint:     Left shin wound    History of presenting illness:     Patient is a 62 y.o. male with past medical history significant for PVD, and current everyday smoker. Presented today for evaluation of left shin wound. Reports area has been present for approximately 1 month. He had a fall hitting a metal box. Has been applying silvadene to the area. Completed course of Bactrim. Known history of PVD with intervention in 2019. He is scheduled for US at the end of the month. Reports increase in swelling to the distal leg and foot. Denies any fever or chills. No other associated signs or symptoms reported. Minimal drainage reported. Positive for claudication.     Interval history:     2022: Patient seen in clinic today for follow-up to left shin wound. Wound covered with eschar. Small amount of drainage without odor.   Reports increase in pain. Denies any fever or chills. States compliance with treatment regimen. DONNIE: Composite Right DONNIE: 1.14 Composite Left DONNIE: 0.89. X- ray: 1.  Soft tissue edema and probable ulceration in the left mid anterior lower leg. No acute bony findings. Glucsoe 128, albumin 3.75, prealbummin 31.5, CRP <0.30, WBC 8.79, sed rate 41.     2022: Patient seen in clinic today for follow up to left shin wound. Wound vac was applied last visit. He reports increase in pain to area. Wound with increase in slough. Bone exposed. Increase in pain. Denies any fever or chills.     2022:Patient seen in clinic today for follow up to left shin wound. Wound without significant changes. He continues to report moderate to severe pain. Biopsy results concerning for acute osteomyelitis. Wound culture reports MRSA. He denies any fever or chills. Reports compliance with  treatment regimen without complications. Swelling remains present.     07/06/2022: Patient seen in clinic today for follow up to left shin wound. He continues to report pain to the area. Wound with increase in slough. Bone exposed.  Denies any fever or chills.  He is receiving Daptomycin IV at this time to treat concerns of acute osteomyelitis. Swelling remains present to left foot and ankle. No new issues or concerns reported.    07/15/2022: Patient seen in clinic today for follow up to left shin wound. He continues to report pain to the area. Wound continues with slough Bone exposed does appear to have some areas of granulation present.  Denies any fever or chills.  He is receiving Daptomycin IV at this time to treat acute osteomyelitis. Swelling remains present to left foot and ankle. No new issues or concerns reported.    07/22/2022: Patient seen in clinic today for follow up to left shin wound. Wound without significant changes. He continues to report moderate to severe pain. . He denies any fever or chills. Reports compliance with treatment regimen without complications. Swelling remains present. Continues with IV daptomycin, no complications or concerns.   ---------------------------------------------------------------------------------------------------------------------   Review of Systems   Constitutional: Negative for chills and fever.   HENT: Negative for congestion and rhinorrhea.    Respiratory: Negative for cough and shortness of breath.    Cardiovascular: Positive for leg swelling. Negative for chest pain.   Gastrointestinal: Negative for diarrhea, nausea and vomiting.   Musculoskeletal: Negative for gait problem.   Skin: Positive for wound.   Neurological: Negative for dizziness and weakness.      ---------------------------------------------------------------------------------------------------------------------   Past Medical History:   Diagnosis Date   • Alcohol abuse    • Arthritis    • Carotid  stenosis    • Cerebrovascular accident (CVA) due to occlusion of left carotid artery (HCC) 07/15/2021   • ED (erectile dysfunction)    • History of degenerative disc disease    • Hydrocele in adult 12/29/2020   • Hypertension    • MRSA (methicillin resistant staph aureus) culture positive     patient states diagnosed approx 2 wks ago   • Neuropathy    • Peripheral vascular disease (HCC)     s/p arthrectomy follow by balloon angioplasty and stents of right and left SFA   • Tobacco abuse    • Vitamin D deficiency      Past Surgical History:   Procedure Laterality Date   • APPENDECTOMY     • ARTERIOGRAM N/A 9/19/2019    Procedure: Arteriogram;  Surgeon: Tong Azar MD;  Location: [facility] CATH INVASIVE LOCATION;  Service: Cardiology   • BACK SURGERY      DISC RUPTURE REPAIR, L5   • CARDIAC CATHETERIZATION Right 10/29/2019    Procedure: Peripheral angiography;  Surgeon: Tong Azar MD;  Location: [facility] CATH INVASIVE LOCATION;  Service: Cardiovascular   • ENDOSCOPY N/A 2/18/2022    Procedure: ESOPHAGOGASTRODUODENOSCOPY;  Surgeon: Christine Duran MD;  Location: [facility] OR;  Service: General;  Laterality: N/A;   • VASCULAR SURGERY Bilateral      Family History   Problem Relation Age of Onset   • Hypertension Mother    • Cancer Father         LUNG   • Diabetes Father    • Hypertension Father    • Heart attack Other         GRANDFATHER     Social History     Socioeconomic History   • Marital status:    • Number of children: 1   Tobacco Use   • Smoking status: Current Every Day Smoker     Packs/day: 0.50     Years: 30.00     Pack years: 15.00     Types: Cigarettes   • Smokeless tobacco: Never Used   Vaping Use   • Vaping Use: Never used   Substance and Sexual Activity   • Alcohol use: Not Currently     Alcohol/week: 24.0 standard drinks     Types: 24 Cans of beer per week   • Drug use: No   • Sexual activity: Defer      ---------------------------------------------------------------------------------------------------------------------   Allergies:  Penicillins and Novocain [procaine]  ---------------------------------------------------------------------------------------------------------------------  Objective     ---------------------------------------------------------------------------------------------------------------------   Vital Signs:     No data found.  There were no vitals filed for this visit.     on   ;      There is no height or weight on file to calculate BMI.  Wt Readings from Last 3 Encounters:   07/15/22 88.3 kg (194 lb 9.6 oz)   06/10/22 81.6 kg (180 lb)   05/26/22 82.1 kg (181 lb)       ---------------------------------------------------------------------------------------------------------------------   Physical Exam  Constitutional: Vital sign were reviewed (temperature, pulse, respiration, and blood pressure) and found to be within expected limits, general appearance was assessed and the patient was found to be in mild distress and calm and comfortable appears  Skin: Temperature:normal turgor and temperatureColor: normal, no cyanosis, jaundice, pallor or bruising, Moisture: dry,Nails: thickened yellow toenails bed, Hair:thinning to lower extremities .  Physical Exam  Wound Assessment: Location: left anterior, lower, leg  Changes since last exam: bioburden coverage has diminished stable.   Etiology and classification: non-pressure chronic ulcer   Wound bed structures/characteristics: full-thickness (subcutaneous tissue is exposed in at least a portion of the wound), necrotic bone is visible or palpable in the wound bed)  Drainage characteristics: moderate, serous drainage  Edges moist  Periwound characteristics: some mositure-realted inflammation  Perfusion characteristics: suggest some degree of PAD  Bioburden characteristics:slough, soft eschar is present , 50-75% of wound bed     Wound Goal (s):Free  of infection, No further symptoms and Reduction of contamination  Assessment & Plan      Non-pressure chronic ulcer of other part of left lower leg with fat layer exposed (HCC) [L97.822]-  Hydrofera blue to base and secure with kerlix and short stretch.     Will set up IV antibiotics with Daptomycin pharmacy to dose.     Reviewed with patient 06/17/2022:  A/BI: Composite Right DONNIE: 1.14 Composite Left DONNIE: 0.89. X- ray: 1.  Soft tissue edema and probable ulceration in the left mid anterior lower leg. No acute bony findings. Glucsoe 128, albumin 3.75, prealbummin 31.5, CRP <0.30, WBC 8.79, sed rate 41.     Obesity (BMI 30-39.9) [E66.9]- An obese person?is at greater risk for wound infection and dehiscence or evisceration.    PVD (peripheral vascular disease) with claudication (HCC) [I73.9]- DONNIE of left slightly decreased. He is following with vascular.    Tobacco abuse [Z72.0]- Tissue perfusion is lower in users of tobacco and other forms of nicotine. Reduced tissue perfusion strongly inhibits wound healing, increases risk of infection, and dramatically increases risk of limb loss.    Clinical Impression:Moderate Complexity    Follow-up: 1 week    KEVIN Camargo   [facility]  07/22/2022  1530

## 2022-08-04 DIAGNOSIS — G62.9 NEUROPATHY: ICD-10-CM

## 2022-08-05 ENCOUNTER — HOSPITAL ENCOUNTER (OUTPATIENT)
Dept: WOUND CARE | Facility: HOSPITAL | Age: 62
Discharge: HOME | End: 2022-08-05
Admitting: NURSE PRACTITIONER

## 2022-08-05 DIAGNOSIS — L97.822 NON-PRESSURE CHRONIC ULCER OF OTHER PART OF LEFT LOWER LEG WITH FAT LAYER EXPOSED: ICD-10-CM

## 2022-08-05 PROCEDURE — 97602 WOUND(S) CARE NON-SELECTIVE: CPT

## 2022-08-05 RX ORDER — METHOCARBAMOL 750 MG/1
TABLET, FILM COATED ORAL
Qty: 120 TABLET | Refills: 0 | Status: SHIPPED | OUTPATIENT
Start: 2022-08-05 | End: 2022-08-10

## 2022-08-09 NOTE — PROGRESS NOTES
Wound Clinic Note  Patient Identification:  Name:  Gurwinder Harding  Age:  62 y.o.  Sex:  male  :  1960  MRN:  [id number]   Visit Number:  [id number]  Primary Care Physician:  Pastora Person APRN     Subjective     Chief complaint:     Left shin wound    History of presenting illness:     Patient is a 62 y.o. male with past medical history significant for PVD, and current everyday smoker. Presented today for evaluation of left shin wound. Reports area has been present for approximately 1 month. He had a fall hitting a metal box. Has been applying silvadene to the area. Completed course of Bactrim. Known history of PVD with intervention in 2019. He is scheduled for US at the end of the month. Reports increase in swelling to the distal leg and foot. Denies any fever or chills. No other associated signs or symptoms reported. Minimal drainage reported. Positive for claudication.     Interval history:     2022: Patient seen in clinic today for follow-up to left shin wound. Wound covered with eschar. Small amount of drainage without odor.   Reports increase in pain. Denies any fever or chills. States compliance with treatment regimen. DONNIE: Composite Right DONNIE: 1.14 Composite Left DONNIE: 0.89. X- ray: 1.  Soft tissue edema and probable ulceration in the left mid anterior lower leg. No acute bony findings. Glucsoe 128, albumin 3.75, prealbummin 31.5, CRP <0.30, WBC 8.79, sed rate 41.     2022: Patient seen in clinic today for follow up to left shin wound. Wound vac was applied last visit. He reports increase in pain to area. Wound with increase in slough. Bone exposed. Increase in pain. Denies any fever or chills.     2022:Patient seen in clinic today for follow up to left shin wound. Wound without significant changes. He continues to report moderate to severe pain. Biopsy results concerning for acute osteomyelitis. Wound culture reports MRSA. He denies any fever or chills. Reports compliance with  treatment regimen without complications. Swelling remains present.     07/06/2022: Patient seen in clinic today for follow up to left shin wound. He continues to report pain to the area. Wound with increase in slough. Bone exposed.  Denies any fever or chills.  He is receiving Daptomycin IV at this time to treat concerns of acute osteomyelitis. Swelling remains present to left foot and ankle. No new issues or concerns reported.    07/15/2022: Patient seen in clinic today for follow up to left shin wound. He continues to report pain to the area. Wound continues with slough Bone exposed does appear to have some areas of granulation present.  Denies any fever or chills.  He is receiving Daptomycin IV at this time to treat acute osteomyelitis. Swelling remains present to left foot and ankle. No new issues or concerns reported.    07/29/2022: Patient seen in clinic today for follow up to left shin wound. He continues to report pain to the area. Wound continues with slough Bone remains exposed increased granulation tissue. Denies any fever or chills.  He is receiving Daptomycin IV at this time to treat acute osteomyelitis 2 more weeks of antibiotics. Swelling remains present to left foot and ankle. No new issues or concerns reported.    08/05/2022: Patient seen in clinic today for follow-up to left shin wound. Wound continues with exposed bone, does appear to have granulation tissue present. He continues to report pain, this is unchanged from prior. Continues with IV antibiotics at this time. Denies any fever or chills. No new issues or concerns reported. Tolerating current wound treatment.   ---------------------------------------------------------------------------------------------------------------------   Review of Systems   Constitutional: Negative for chills and fever.   HENT: Negative for congestion and rhinorrhea.    Respiratory: Negative for cough and shortness of breath.    Cardiovascular: Positive for leg  swelling. Negative for chest pain.   Gastrointestinal: Negative for diarrhea, nausea and vomiting.   Musculoskeletal: Negative for gait problem.   Skin: Positive for wound.   Neurological: Negative for dizziness and weakness.      ---------------------------------------------------------------------------------------------------------------------   Past Medical History:   Diagnosis Date   • Alcohol abuse    • Arthritis    • Carotid stenosis    • Cerebrovascular accident (CVA) due to occlusion of left carotid artery (HCC) 07/15/2021   • ED (erectile dysfunction)    • History of degenerative disc disease    • Hydrocele in adult 12/29/2020   • Hypertension    • MRSA (methicillin resistant staph aureus) culture positive     patient states diagnosed approx 2 wks ago   • Neuropathy    • Peripheral vascular disease (HCC)     s/p arthrectomy follow by balloon angioplasty and stents of right and left SFA   • Tobacco abuse    • Vitamin D deficiency      Past Surgical History:   Procedure Laterality Date   • APPENDECTOMY     • ARTERIOGRAM N/A 9/19/2019    Procedure: Arteriogram;  Surgeon: Tong Azar MD;  Location: [facility] INVASIVE LOCATION;  Service: Cardiology   • BACK SURGERY      DISC RUPTURE REPAIR, L5   • CARDIAC CATHETERIZATION Right 10/29/2019    Procedure: Peripheral angiography;  Surgeon: Tong Azar MD;  Location: [facility] INVASIVE LOCATION;  Service: Cardiovascular   • ENDOSCOPY N/A 2/18/2022    Procedure: ESOPHAGOGASTRODUODENOSCOPY;  Surgeon: Christine Duran MD;  Location: [facility];  Service: General;  Laterality: N/A;   • VASCULAR SURGERY Bilateral      Family History   Problem Relation Age of Onset   • Hypertension Mother    • Cancer Father         LUNG   • Diabetes Father    • Hypertension Father    • Heart attack Other         GRANDFATHER     Social History     Socioeconomic History   • Marital status:    • Number of children: 1   Tobacco Use   • Smoking status: Current Every Day  Smoker     Packs/day: 0.50     Years: 30.00     Pack years: 15.00     Types: Cigarettes   • Smokeless tobacco: Never Used   Vaping Use   • Vaping Use: Never used   Substance and Sexual Activity   • Alcohol use: Not Currently     Alcohol/week: 24.0 standard drinks     Types: 24 Cans of beer per week   • Drug use: No   • Sexual activity: Defer     ---------------------------------------------------------------------------------------------------------------------   Allergies:  Penicillins and Novocain [procaine]  ---------------------------------------------------------------------------------------------------------------------  Objective     ---------------------------------------------------------------------------------------------------------------------   Vital Signs:     No data found.  There were no vitals filed for this visit.     on   ;      There is no height or weight on file to calculate BMI.  Wt Readings from Last 3 Encounters:   07/15/22 88.3 kg (194 lb 9.6 oz)   06/10/22 81.6 kg (180 lb)   05/26/22 82.1 kg (181 lb)       ---------------------------------------------------------------------------------------------------------------------   Physical Exam  Constitutional: Vital sign were reviewed (temperature, pulse, respiration, and blood pressure) and found to be within expected limits, general appearance was assessed and the patient was found to be in mild distress and calm and comfortable appears  Skin: Temperature:normal turgor and temperatureColor: normal, no cyanosis, jaundice, pallor or bruising, Moisture: dry,Nails: thickened yellow toenails bed, Hair:thinning to lower extremities .  Physical Exam  Wound Assessment: Location: left anterior, lower, leg  Changes since last exam: bioburden coverage has diminished  and granulation has improved   Etiology and classification: non-pressure chronic ulcer   Wound bed structures/characteristics: full-thickness (subcutaneous tissue is exposed in at least a  portion of the wound), necrotic bone is visible or palpable in the wound bed) Increase in granulation tissue  Drainage characteristics: moderate, serous drainage  Edges moist  Periwound characteristics: some mositure-realted inflammation  Perfusion characteristics: suggest some degree of PAD  Bioburden characteristics:slough, soft eschar is present , 50-75% of wound bed     Wound Goal (s):Free of infection, No further symptoms and Reduction of contamination  Assessment & Plan      Non-pressure chronic ulcer of other part of left lower leg with fat layer exposed (HCC) [L97.822]- Hydrofera blue to base and secure with kerlix and short stretch.     Will set up IV antibiotics with Daptomycin pharmacy to dose, 2 more weeks of treatment.    Reviewed with patient 06/17/2022:  A/BI: Composite Right DONNIE: 1.14 Composite Left DONNIE: 0.89. X- ray: 1.  Soft tissue edema and probable ulceration in the left mid anterior lower leg. No acute bony findings. Glucsoe 128, albumin 3.75, prealbummin 31.5, CRP <0.30, WBC 8.79, sed rate 41.     Obesity (BMI 30-39.9) [E66.9]- An obese person?is at greater risk for wound infection and dehiscence or evisceration.    PVD (peripheral vascular disease) with claudication (HCC) [I73.9]- DONNIE of left slightly decreased. He is following with vascular.    Tobacco abuse [Z72.0]- Tissue perfusion is lower in users of tobacco and other forms of nicotine. Reduced tissue perfusion strongly inhibits wound healing, increases risk of infection, and dramatically increases risk of limb loss.    Clinical Impression:Moderate Complexity    Follow-up: 1 week    KEVIN Camargo   [facility]  08/05/2022  [id number]

## 2022-08-10 ENCOUNTER — APPOINTMENT (OUTPATIENT)
Dept: WOUND CARE | Facility: HOSPITAL | Age: 62
End: 2022-08-10

## 2022-08-10 DIAGNOSIS — G62.9 NEUROPATHY: ICD-10-CM

## 2022-08-10 RX ORDER — METHOCARBAMOL 750 MG/1
TABLET, FILM COATED ORAL
Qty: 120 TABLET | Refills: 1 | Status: SHIPPED | OUTPATIENT
Start: 2022-08-10 | End: 2022-10-17

## 2022-08-11 ENCOUNTER — HOSPITAL ENCOUNTER (OUTPATIENT)
Dept: WOUND CARE | Facility: HOSPITAL | Age: 62
Discharge: HOME | End: 2022-08-11
Admitting: NURSE PRACTITIONER

## 2022-08-11 VITALS
TEMPERATURE: 97.7 F | HEART RATE: 80 BPM | RESPIRATION RATE: 16 BRPM | DIASTOLIC BLOOD PRESSURE: 86 MMHG | SYSTOLIC BLOOD PRESSURE: 157 MMHG

## 2022-08-11 DIAGNOSIS — L97.822 NON-PRESSURE CHRONIC ULCER OF OTHER PART OF LEFT LOWER LEG WITH FAT LAYER EXPOSED: ICD-10-CM

## 2022-08-11 PROCEDURE — 97602 WOUND(S) CARE NON-SELECTIVE: CPT

## 2022-08-17 ENCOUNTER — OFFICE VISIT (OUTPATIENT)
Dept: FAMILY MEDICINE CLINIC | Facility: CLINIC | Age: 62
End: 2022-08-17

## 2022-08-17 VITALS
DIASTOLIC BLOOD PRESSURE: 78 MMHG | WEIGHT: 198.6 LBS | HEART RATE: 93 BPM | TEMPERATURE: 97.3 F | BODY MASS INDEX: 28.43 KG/M2 | HEIGHT: 70 IN | OXYGEN SATURATION: 97 % | SYSTOLIC BLOOD PRESSURE: 126 MMHG

## 2022-08-17 DIAGNOSIS — G62.9 NEUROPATHY: ICD-10-CM

## 2022-08-17 DIAGNOSIS — E66.3 OVERWEIGHT (BMI 25.0-29.9): ICD-10-CM

## 2022-08-17 DIAGNOSIS — I10 ESSENTIAL HYPERTENSION: Primary | ICD-10-CM

## 2022-08-17 DIAGNOSIS — F33.1 MAJOR DEPRESSIVE DISORDER, RECURRENT, MODERATE: ICD-10-CM

## 2022-08-17 PROCEDURE — 99214 OFFICE O/P EST MOD 30 MIN: CPT | Performed by: NURSE PRACTITIONER

## 2022-08-17 RX ORDER — ATENOLOL 25 MG/1
25 TABLET ORAL DAILY
Qty: 90 TABLET | Refills: 1 | Status: SHIPPED | OUTPATIENT
Start: 2022-08-17 | End: 2022-11-07 | Stop reason: SDUPTHER

## 2022-08-17 RX ORDER — METHOCARBAMOL 750 MG/1
750 TABLET, FILM COATED ORAL 4 TIMES DAILY
Qty: 120 TABLET | Refills: 1 | Status: CANCELLED | OUTPATIENT
Start: 2022-08-17

## 2022-08-17 RX ORDER — TRAZODONE HYDROCHLORIDE 50 MG/1
50 TABLET ORAL NIGHTLY
Qty: 90 TABLET | Refills: 1 | Status: SHIPPED | OUTPATIENT
Start: 2022-08-17 | End: 2022-11-07 | Stop reason: SDUPTHER

## 2022-08-17 RX ORDER — ISOSORBIDE MONONITRATE 30 MG/1
30 TABLET, EXTENDED RELEASE ORAL DAILY
Qty: 90 TABLET | Refills: 1 | Status: CANCELLED | OUTPATIENT
Start: 2022-08-17

## 2022-08-17 NOTE — PROGRESS NOTES
Chief Complaint  Peripheral Neuropathy    Subjective          Gurwinder Harding is a 62 y.o. male who presents today to [facility] for follow up    HPI:   History of Present Illness    Presents to clinic for refill of atenolol and trazodone. Reports is tolerating well with no issues or side effects. No other issues or concerns at this time.       The following portions of the patient's history were reviewed and updated as appropriate: allergies, current medications, past family history, past medical history, past social history, past surgical history and problem list.    Objective     Allergy:   Allergies   Allergen Reactions   • Penicillins Hives and Shortness Of Breath     As A child   • Novocain [Procaine] Nausea And Vomiting        Current Medications:   Current Outpatient Medications   Medication Sig Dispense Refill   • atenolol (Tenormin) 25 MG tablet Take 1 tablet by mouth Daily. 90 tablet 1   • folic acid-pyridoxine-cyanocobalamin (Folbic) 2.5-25-2 MG tablet tablet Take 1 tablet by mouth Daily. 90 tablet 1   • gabapentin (NEURONTIN) 800 MG tablet TAKE ONE AND ONE-HALF TABLET BY MOUTH THREE TIMES A  tablet 2   • isosorbide mononitrate (IMDUR) 30 MG 24 hr tablet Take 1 tablet by mouth Daily. 90 tablet 1   • methocarbamol (ROBAXIN) 750 MG tablet TAKE ONE TABLET BY MOUTH FOUR TIMES A  tablet 1   • traZODone (DESYREL) 50 MG tablet Take 1 tablet by mouth Every Night. 90 tablet 1   • cilostazol (PLETAL) 100 MG tablet Take 1 tablet by mouth Daily. 60 tablet 5   • DAPTOmycin (CUBICIN) 500 MG injection 500 mg Daily.     • ferrous sulfate (FerrouSul) 325 (65 FE) MG tablet Take 1 tablet by mouth 3 (Three) Times a Day With Meals. 90 tablet 0   • pantoprazole (Protonix) 40 MG EC tablet Take 1 tablet by mouth Daily. 30 tablet 0     No current facility-administered medications for this visit.       Past Medical History:  Past Medical History:   Diagnosis Date   • Alcohol abuse     • Arthritis    • Carotid stenosis    • Cerebrovascular accident (CVA) due to occlusion of left carotid artery (HCC) 07/15/2021   • ED (erectile dysfunction)    • History of degenerative disc disease    • Hydrocele in adult 12/29/2020   • Hypertension    • MRSA (methicillin resistant staph aureus) culture positive     patient states diagnosed approx 2 wks ago   • Neuropathy    • Peripheral vascular disease (HCC)     s/p arthrectomy follow by balloon angioplasty and stents of right and left SFA   • Tobacco abuse    • Vitamin D deficiency        Past Surgical History:  Past Surgical History:   Procedure Laterality Date   • APPENDECTOMY     • ARTERIOGRAM N/A 9/19/2019    Procedure: Arteriogram;  Surgeon: Tong Azar MD;  Location: [facility] CATH INVASIVE LOCATION;  Service: Cardiology   • BACK SURGERY      DISC RUPTURE REPAIR, L5   • CARDIAC CATHETERIZATION Right 10/29/2019    Procedure: Peripheral angiography;  Surgeon: Tong Azar MD;  Location: [facility] CATH INVASIVE LOCATION;  Service: Cardiovascular   • ENDOSCOPY N/A 2/18/2022    Procedure: ESOPHAGOGASTRODUODENOSCOPY;  Surgeon: Christine Duran MD;  Location: [facility] OR;  Service: General;  Laterality: N/A;   • VASCULAR SURGERY Bilateral        Social History:  Social History     Socioeconomic History   • Marital status:    • Number of children: 1   Tobacco Use   • Smoking status: Current Every Day Smoker     Packs/day: 0.50     Years: 30.00     Pack years: 15.00     Types: Cigarettes   • Smokeless tobacco: Never Used   Vaping Use   • Vaping Use: Never used   Substance and Sexual Activity   • Alcohol use: Not Currently     Alcohol/week: 24.0 standard drinks     Types: 24 Cans of beer per week   • Drug use: No   • Sexual activity: Defer       Family History:  Family History   Problem Relation Age of Onset   • Hypertension Mother    • Cancer Father         LUNG   • Diabetes Father    • Hypertension Father    • Heart attack Other         GRANDFATHER  "      Review of Systems:  Review of Systems   Respiratory: Negative for shortness of breath.    Cardiovascular: Negative for chest pain, palpitations and leg swelling.       Vital Signs:   /78 (BP Location: Right arm, Patient Position: Sitting, Cuff Size: Adult)   Pulse 93   Temp 97.3 °F (36.3 °C)   Ht 177.8 cm (70\")   Wt 90.1 kg (198 lb 9.6 oz)   SpO2 97%   BMI 28.50 kg/m²  RR: 16    Physical Exam:  Physical Exam  Vitals and nursing note reviewed.   Constitutional:       General: He is not in acute distress.     Appearance: Normal appearance. He is not ill-appearing or toxic-appearing.   HENT:      Head: Normocephalic.   Cardiovascular:      Rate and Rhythm: Normal rate and regular rhythm.      Heart sounds: Normal heart sounds.   Pulmonary:      Effort: Pulmonary effort is normal. No respiratory distress.      Breath sounds: Normal breath sounds.   Abdominal:      General: Bowel sounds are normal.      Palpations: Abdomen is soft.   Skin:     General: Skin is warm and dry.      Coloration: Skin is not pale.   Neurological:      General: No focal deficit present.      Mental Status: He is alert and oriented to person, place, and time.   Psychiatric:         Mood and Affect: Mood normal.         Behavior: Behavior normal.         Thought Content: Thought content normal.         Judgment: Judgment normal.                Assessment and Plan   Diagnoses and all orders for this visit:    1. Essential hypertension (Primary)  -     atenolol (Tenormin) 25 MG tablet; Take 1 tablet by mouth Daily.  Dispense: 90 tablet; Refill: 1    2. Major depressive disorder, recurrent, moderate (HCC)  -     traZODone (DESYREL) 50 MG tablet; Take 1 tablet by mouth Every Night.  Dispense: 90 tablet; Refill: 1    3. Overweight (BMI 25.0-29.9)      1/2: Continue plan of care as per PCP.  Monitor blood pressure, keep log, bring log to follow-up appointment.  Follow-up sooner for readings outside of established parameters.    3.  " Diet and lifestyle modifications to promote weight loss.    Discussed possible differential diagnoses, testing, treatment, recommended non-pharmacological interventions, risks, warning signs to monitor for that would indicate need for follow-up in clinic or ER. If no improvement with these regimens or you have new or worsening symptoms follow-up. Patient verbalizes understanding and agreement with plan of care. Denies further needs or concerns.     Patient was given instructions and counseling regarding her condition and for health maintenance advised.    BMI is >= 25 and <30. (Overweight) The following options were offered after discussion;: weight loss educational material (shared in after visit summary), exercise counseling/recommendations and nutrition counseling/recommendations       I spent 30 minutes caring for patient on this date of service. This time includes time spent by me in the following activities: preparing for the visit, reviewing tests, obtaining and/or reviewing a separately obtained history, performing a medically appropriate examination and/or evaluation, counseling and educating the patient/family/caregiver, ordering medications, tests, or procedures and documenting information in the medical record    Meds ordered during this visit:  New Medications Ordered This Visit   Medications   • traZODone (DESYREL) 50 MG tablet     Sig: Take 1 tablet by mouth Every Night.     Dispense:  90 tablet     Refill:  1   • atenolol (Tenormin) 25 MG tablet     Sig: Take 1 tablet by mouth Daily.     Dispense:  90 tablet     Refill:  1       Patient Instructions:  Patient instructions given for the following visit diagnosis:    ICD-10-CM ICD-9-CM   1. Essential hypertension  I10 401.9   2. Major depressive disorder, recurrent, moderate (HCC)  F33.1 296.32   3. Overweight (BMI 25.0-29.9)  E66.3 278.02       Follow Up   Return in about 3 months (around 11/17/2022) for Sooner if needed.        This document has been  electronically signed by KEVIN Guadalupe  August 17, 2022 15:41 EDT    Patient was given instructions and counseling regarding his condition or for health maintenance advice. Please see specific information pulled into the AVS if appropriate.     Part of this note may be an electronic transcription/translation of spoken language to printed text using the Dragon Dictation System.

## 2022-08-18 ENCOUNTER — HOSPITAL ENCOUNTER (OUTPATIENT)
Dept: WOUND CARE | Facility: HOSPITAL | Age: 62
Discharge: HOME | End: 2022-08-18
Admitting: NURSE PRACTITIONER

## 2022-08-18 RX ORDER — METHOCARBAMOL 750 MG/1
TABLET, FILM COATED ORAL
Qty: 120 TABLET | Refills: 1 | OUTPATIENT
Start: 2022-08-18

## 2022-08-18 RX ORDER — LIDOCAINE HYDROCHLORIDE 20 MG/ML
JELLY TOPICAL AS NEEDED
Status: DISCONTINUED | OUTPATIENT
Start: 2022-08-18 | End: 2022-08-19 | Stop reason: HOSPADM

## 2022-08-18 RX ADMIN — LIDOCAINE HYDROCHLORIDE: 20 JELLY TOPICAL at 15:45

## 2022-08-18 NOTE — PROGRESS NOTES
Wound Clinic Note  Patient Identification:  Name:  Gurwinder Harding  Age:  62 y.o.  Sex:  male  :  1960  MRN:  [id number]   Visit Number:  [id number]  Primary Care Physician:  Pastora Person APRN     Subjective     Chief complaint:     Left shin wound    History of presenting illness:     Patient is a 62 y.o. male with past medical history significant for PVD, and current everyday smoker. Presented today for evaluation of left shin wound. Reports area has been present for approximately 1 month. He had a fall hitting a metal box. Has been applying silvadene to the area. Completed course of Bactrim. Known history of PVD with intervention in 2019. He is scheduled for US at the end of the month. Reports increase in swelling to the distal leg and foot. Denies any fever or chills. No other associated signs or symptoms reported. Minimal drainage reported. Positive for claudication.     Interval history:     2022: Patient seen in clinic today for follow-up to left shin wound. Wound covered with eschar. Small amount of drainage without odor.   Reports increase in pain. Denies any fever or chills. States compliance with treatment regimen. DONNIE: Composite Right DONNIE: 1.14 Composite Left DONNIE: 0.89. X- ray: 1.  Soft tissue edema and probable ulceration in the left mid anterior lower leg. No acute bony findings. Glucsoe 128, albumin 3.75, prealbummin 31.5, CRP <0.30, WBC 8.79, sed rate 41.     2022: Patient seen in clinic today for follow up to left shin wound. Wound vac was applied last visit. He reports increase in pain to area. Wound with increase in slough. Bone exposed. Increase in pain. Denies any fever or chills.     2022:Patient seen in clinic today for follow up to left shin wound. Wound without significant changes. He continues to report moderate to severe pain. Biopsy results concerning for acute osteomyelitis. Wound culture reports MRSA. He denies any fever or chills. Reports compliance with  treatment regimen without complications. Swelling remains present.     07/06/2022: Patient seen in clinic today for follow up to left shin wound. He continues to report pain to the area. Wound with increase in slough. Bone exposed.  Denies any fever or chills.  He is receiving Daptomycin IV at this time to treat concerns of acute osteomyelitis. Swelling remains present to left foot and ankle. No new issues or concerns reported.    07/15/2022: Patient seen in clinic today for follow up to left shin wound. He continues to report pain to the area. Wound continues with slough Bone exposed does appear to have some areas of granulation present.  Denies any fever or chills.  He is receiving Daptomycin IV at this time to treat acute osteomyelitis. Swelling remains present to left foot and ankle. No new issues or concerns reported.    07/29/2022: Patient seen in clinic today for follow up to left shin wound. He continues to report pain to the area. Wound continues with slough Bone remains exposed increased granulation tissue. Denies any fever or chills.  He is receiving Daptomycin IV at this time to treat acute osteomyelitis 2 more weeks of antibiotics. Swelling remains present to left foot and ankle. No new issues or concerns reported.    08/05/2022: Patient seen in clinic today for follow-up to left shin wound. Wound continues with exposed bone, does appear to have granulation tissue present. He continues to report pain, this is unchanged from prior. Continues with IV antibiotics at this time. Denies any fever or chills. No new issues or concerns reported. Tolerating current wound treatment.     08/11/2022: Patient seen in clinic today for follow up to left shin wound. He continues to report pain to the area. Wound base red and moist. Bone remains exposed, with small area and granulation has increased.  Denies any fever or chills.  Completed course of Daptomycin. Swelling remains present to left foot and ankle. No new  issues or concerns reported.    08/18/2022: Patient seen in clinic today for follow-up to left shin wound. Reports pain has improved slightly. Denies any fever or chills. Reports he continues to smoke. No new issues or concerns reported. He is tolerating current treatment with hydrofera blue without complications. Swelling appears to be controlled.  ---------------------------------------------------------------------------------------------------------------------   Review of Systems   Constitutional: Negative for chills and fever.   HENT: Negative for congestion and rhinorrhea.    Respiratory: Negative for cough and shortness of breath.    Cardiovascular: Positive for leg swelling. Negative for chest pain.   Gastrointestinal: Negative for diarrhea, nausea and vomiting.   Musculoskeletal: Negative for gait problem.   Skin: Positive for wound.   Neurological: Negative for dizziness and weakness.      ---------------------------------------------------------------------------------------------------------------------   Past Medical History:   Diagnosis Date   • Alcohol abuse    • Arthritis    • Carotid stenosis    • Cerebrovascular accident (CVA) due to occlusion of left carotid artery (HCC) 07/15/2021   • ED (erectile dysfunction)    • History of degenerative disc disease    • Hydrocele in adult 12/29/2020   • Hypertension    • MRSA (methicillin resistant staph aureus) culture positive     patient states diagnosed approx 2 wks ago   • Neuropathy    • Peripheral vascular disease (HCC)     s/p arthrectomy follow by balloon angioplasty and stents of right and left SFA   • Tobacco abuse    • Vitamin D deficiency      Past Surgical History:   Procedure Laterality Date   • APPENDECTOMY     • ARTERIOGRAM N/A 9/19/2019    Procedure: Arteriogram;  Surgeon: Tong Azar MD;  Location: [facility] CATH INVASIVE LOCATION;  Service: Cardiology   • BACK SURGERY      DISC RUPTURE REPAIR, L5   • CARDIAC CATHETERIZATION Right  10/29/2019    Procedure: Peripheral angiography;  Surgeon: Tong Azar MD;  Location: [facility] CATH INVASIVE LOCATION;  Service: Cardiovascular   • ENDOSCOPY N/A 2/18/2022    Procedure: ESOPHAGOGASTRODUODENOSCOPY;  Surgeon: Christine Duran MD;  Location: [facility] OR;  Service: General;  Laterality: N/A;   • VASCULAR SURGERY Bilateral      Family History   Problem Relation Age of Onset   • Hypertension Mother    • Cancer Father         LUNG   • Diabetes Father    • Hypertension Father    • Heart attack Other         GRANDFATHER     Social History     Socioeconomic History   • Marital status:    • Number of children: 1   Tobacco Use   • Smoking status: Current Every Day Smoker     Packs/day: 0.50     Years: 30.00     Pack years: 15.00     Types: Cigarettes   • Smokeless tobacco: Never Used   Vaping Use   • Vaping Use: Never used   Substance and Sexual Activity   • Alcohol use: Not Currently     Alcohol/week: 24.0 standard drinks     Types: 24 Cans of beer per week   • Drug use: No   • Sexual activity: Defer     ---------------------------------------------------------------------------------------------------------------------   Allergies:  Penicillins and Novocain [procaine]  ---------------------------------------------------------------------------------------------------------------------  Objective     ---------------------------------------------------------------------------------------------------------------------   Vital Signs:     No data found.  There were no vitals filed for this visit.     on   ;      There is no height or weight on file to calculate BMI.  Wt Readings from Last 3 Encounters:   08/17/22 90.1 kg (198 lb 9.6 oz)   07/15/22 88.3 kg (194 lb 9.6 oz)   06/10/22 81.6 kg (180 lb)       ---------------------------------------------------------------------------------------------------------------------   Physical Exam  Constitutional: Vital sign were reviewed (temperature, pulse,  respiration, and blood pressure) and found to be within expected limits, general appearance was assessed and the patient was found to be in mild distress and calm and comfortable appears  Skin: Temperature:normal turgor and temperatureColor: normal, no cyanosis, jaundice, pallor or bruising, Moisture: dry,Nails: thickened yellow toenails bed, Hair:thinning to lower extremities .  Physical Exam  Wound Assessment: Location: left anterior, lower, leg  Changes since last exam: bioburden coverage has diminished  and granulation has improved   Etiology and classification: non-pressure chronic ulcer   Wound bed structures/characteristics: full-thickness (subcutaneous tissue is exposed in at least a portion of the wound), necrotic bone is visible or palpable in the wound bed) Increase in granulation tissue only small section with exposed bone.   Drainage characteristics: moderate, serous drainage  Edges moist  Periwound characteristics: some mositure-realted inflammation  Perfusion characteristics: suggest some degree of PAD  Bioburden characteristics:slough, soft eschar is present , 50-75% of wound bed     Wound Goal (s):Free of infection, No further symptoms and Reduction of contamination  Assessment & Plan      Non-pressure chronic ulcer of other part of left lower leg with fat layer exposed (HCC) [L97.822]- Hydrofera blue to base and secure with kerlix and short stretch. Debridement completed, see below for procedure details.     Completed 6 weeks course of Daptomycin IV     Reviewed with patient 06/17/2022:  A/BI: Composite Right DONNIE: 1.14 Composite Left DONNIE: 0.89. X- ray: 1.  Soft tissue edema and probable ulceration in the left mid anterior lower leg. No acute bony findings. Glucsoe 128, albumin 3.75, prealbummin 31.5, CRP <0.30, WBC 8.79, sed rate 41.     Obesity (BMI 30-39.9) [E66.9]- An obese person?is at greater risk for wound infection and dehiscence or evisceration.    PVD (peripheral vascular disease) with  claudication (HCC) [I73.9]- DONNIE of left slightly decreased. He is following with vascular.    Tobacco abuse [Z72.0]- Tissue perfusion is lower in users of tobacco and other forms of nicotine. Reduced tissue perfusion strongly inhibits wound healing, increases risk of infection, and dramatically increases risk of limb loss.    Wound Care Procedure Note   Pre-Procedure  Pre-Procedure Diagnosis: Non-pressure chronic ulcer of other part of left lower leg with fat layer exposed (HCC) [L97.822]-  Checked for Allergies: yes  Consent:Consent obtained, consent given by Patient,Risks Discussed, Alternatives Discussed  Indication: slough  Vascular status:Pedal pulses left were found to be adequate and safe for debridment.  Time out was called prior to procedure.   Pre procedure Pain assessment: moderate  Pre debridement measurements: Length 3cm,Width 3.5cm, depth 0.2cm, sinus/tunnelNo, undermining No    Post Procedure  Post-Procedure Diagnosis: Non-pressure chronic ulcer of other part of left lower leg with fat layer exposed (HCC) [L97.822]-  Post debridement measurements: Length 3.2cm,Width 3.6cm, depth 0.3cm, sinus/tunnelNo, undermining No  Post procedure Pain assessment: moderate  Graft/Implant/Prosthetics/Implanted Device/Transplants:  None  Complication(s):  None    Procedure details:  Method of Debridement: excissional (Surgical removal or cutting away, outside or beyond the wound margin devitalized tissue, necrosis or slough.)  Procedure: The site was prepared using clean techniques, subcutaneous tissue was removed by surgical excision.  Instrument(s) used: Curette 4mm  Anesthesia:After checking patient allergies,lidocaine topical 2% was administered to provide anesthesia.  Tissue removed: subuctaneous, Percent Removed 100%  Culture or Biopsy: None  Estimated Blood Loss: Small  Hemostasis Obtained: pressure    Clinical Impression:Moderate Complexity    Follow-up: 1 week    KEVIN Camargo   [facility] [employer]  [facility]  08/18/2022  [id number]

## 2022-09-01 ENCOUNTER — HOSPITAL ENCOUNTER (OUTPATIENT)
Dept: WOUND CARE | Facility: HOSPITAL | Age: 62
Discharge: HOME | End: 2022-09-01
Admitting: NURSE PRACTITIONER

## 2022-09-01 VITALS
SYSTOLIC BLOOD PRESSURE: 160 MMHG | RESPIRATION RATE: 20 BRPM | DIASTOLIC BLOOD PRESSURE: 91 MMHG | HEART RATE: 77 BPM | TEMPERATURE: 98.1 F

## 2022-09-01 DIAGNOSIS — L97.822 NON-PRESSURE CHRONIC ULCER OF OTHER PART OF LEFT LOWER LEG WITH FAT LAYER EXPOSED: Primary | ICD-10-CM

## 2022-09-01 RX ORDER — LIDOCAINE HYDROCHLORIDE 20 MG/ML
JELLY TOPICAL AS NEEDED
Status: DISCONTINUED | OUTPATIENT
Start: 2022-09-01 | End: 2022-09-02 | Stop reason: HOSPADM

## 2022-09-01 RX ADMIN — LIDOCAINE HYDROCHLORIDE: 20 JELLY TOPICAL at 14:50

## 2022-09-01 NOTE — PROGRESS NOTES
Wound Clinic Note  Patient Identification:  Name:  Gurwinder Harding  Age:  62 y.o.  Sex:  male  :  1960  MRN:  [id number]   Visit Number:  [id number]  Primary Care Physician:  Pastora Person APRN     Subjective     Chief complaint:     Left shin wound    History of presenting illness:     Patient is a 62 y.o. male with past medical history significant for PVD, and current everyday smoker. Presented today for evaluation of left shin wound. Reports area has been present for approximately 1 month. He had a fall hitting a metal box. Has been applying silvadene to the area. Completed course of Bactrim. Known history of PVD with intervention in 2019. He is scheduled for US at the end of the month. Reports increase in swelling to the distal leg and foot. Denies any fever or chills. No other associated signs or symptoms reported. Minimal drainage reported. Positive for claudication.     Interval history:     2022: Patient seen in clinic today for follow-up to left shin wound. Wound covered with eschar. Small amount of drainage without odor.   Reports increase in pain. Denies any fever or chills. States compliance with treatment regimen. DONNIE: Composite Right DONNIE: 1.14 Composite Left DONNIE: 0.89. X- ray: 1.  Soft tissue edema and probable ulceration in the left mid anterior lower leg. No acute bony findings. Glucsoe 128, albumin 3.75, prealbummin 31.5, CRP <0.30, WBC 8.79, sed rate 41.     2022: Patient seen in clinic today for follow up to left shin wound. Wound vac was applied last visit. He reports increase in pain to area. Wound with increase in slough. Bone exposed. Increase in pain. Denies any fever or chills.     2022:Patient seen in clinic today for follow up to left shin wound. Wound without significant changes. He continues to report moderate to severe pain. Biopsy results concerning for acute osteomyelitis. Wound culture reports MRSA. He denies any fever or chills. Reports compliance with  treatment regimen without complications. Swelling remains present.     07/06/2022: Patient seen in clinic today for follow up to left shin wound. He continues to report pain to the area. Wound with increase in slough. Bone exposed.  Denies any fever or chills.  He is receiving Daptomycin IV at this time to treat concerns of acute osteomyelitis. Swelling remains present to left foot and ankle. No new issues or concerns reported.    07/15/2022: Patient seen in clinic today for follow up to left shin wound. He continues to report pain to the area. Wound continues with slough Bone exposed does appear to have some areas of granulation present.  Denies any fever or chills.  He is receiving Daptomycin IV at this time to treat acute osteomyelitis. Swelling remains present to left foot and ankle. No new issues or concerns reported.    07/29/2022: Patient seen in clinic today for follow up to left shin wound. He continues to report pain to the area. Wound continues with slough Bone remains exposed increased granulation tissue. Denies any fever or chills.  He is receiving Daptomycin IV at this time to treat acute osteomyelitis 2 more weeks of antibiotics. Swelling remains present to left foot and ankle. No new issues or concerns reported.    08/05/2022: Patient seen in clinic today for follow-up to left shin wound. Wound continues with exposed bone, does appear to have granulation tissue present. He continues to report pain, this is unchanged from prior. Continues with IV antibiotics at this time. Denies any fever or chills. No new issues or concerns reported. Tolerating current wound treatment.     08/11/2022: Patient seen in clinic today for follow up to left shin wound. He continues to report pain to the area. Wound base red and moist. Bone remains exposed, with small area and granulation has increased.  Denies any fever or chills.  Completed course of Daptomycin. Swelling remains present to left foot and ankle. No new  issues or concerns reported.    08/18/2022: Patient seen in clinic today for follow-up to left shin wound. Reports pain has improved slightly. Denies any fever or chills. Reports he continues to smoke. No new issues or concerns reported. He is tolerating current treatment with hydrofera blue without complications. Swelling appears to be controlled.    09/01/2022: Patient seen in clinic today for follow up to left shin wound. He continues to report pain to the area. Wound base red and moist, bone no longer visible. Denies any fever or chills.  Completed course of Daptomycin. Swelling remains present to left foot and ankle. No new issues or concerns reported.  ---------------------------------------------------------------------------------------------------------------------   Review of Systems   Constitutional: Negative for chills and fever.   HENT: Negative for congestion and rhinorrhea.    Respiratory: Negative for cough and shortness of breath.    Cardiovascular: Positive for leg swelling. Negative for chest pain.   Gastrointestinal: Negative for diarrhea, nausea and vomiting.   Musculoskeletal: Negative for gait problem.   Skin: Positive for wound.   Neurological: Negative for dizziness and weakness.      ---------------------------------------------------------------------------------------------------------------------   Past Medical History:   Diagnosis Date   • Alcohol abuse    • Arthritis    • Carotid stenosis    • Cerebrovascular accident (CVA) due to occlusion of left carotid artery (HCC) 07/15/2021   • ED (erectile dysfunction)    • History of degenerative disc disease    • Hydrocele in adult 12/29/2020   • Hypertension    • MRSA (methicillin resistant staph aureus) culture positive     patient states diagnosed approx 2 wks ago   • Neuropathy    • Peripheral vascular disease (HCC)     s/p arthrectomy follow by balloon angioplasty and stents of right and left SFA   • Tobacco abuse    • Vitamin D  deficiency      Past Surgical History:   Procedure Laterality Date   • APPENDECTOMY     • ARTERIOGRAM N/A 9/19/2019    Procedure: Arteriogram;  Surgeon: Tong Azar MD;  Location: [facility] CATH INVASIVE LOCATION;  Service: Cardiology   • BACK SURGERY      DISC RUPTURE REPAIR, L5   • CARDIAC CATHETERIZATION Right 10/29/2019    Procedure: Peripheral angiography;  Surgeon: Tong Azar MD;  Location: [facility] CATH INVASIVE LOCATION;  Service: Cardiovascular   • ENDOSCOPY N/A 2/18/2022    Procedure: ESOPHAGOGASTRODUODENOSCOPY;  Surgeon: Christine Duran MD;  Location: [facility] OR;  Service: General;  Laterality: N/A;   • VASCULAR SURGERY Bilateral      Family History   Problem Relation Age of Onset   • Hypertension Mother    • Cancer Father         LUNG   • Diabetes Father    • Hypertension Father    • Heart attack Other         GRANDFATHER     Social History     Socioeconomic History   • Marital status:    • Number of children: 1   Tobacco Use   • Smoking status: Current Every Day Smoker     Packs/day: 0.50     Years: 30.00     Pack years: 15.00     Types: Cigarettes   • Smokeless tobacco: Never Used   Vaping Use   • Vaping Use: Never used   Substance and Sexual Activity   • Alcohol use: Not Currently     Alcohol/week: 24.0 standard drinks     Types: 24 Cans of beer per week   • Drug use: No   • Sexual activity: Defer     ---------------------------------------------------------------------------------------------------------------------   Allergies:  Penicillins and Novocain [procaine]  ---------------------------------------------------------------------------------------------------------------------  Objective     ---------------------------------------------------------------------------------------------------------------------   Vital Signs:  Temp:  [98.1 °F (36.7 °C)] 98.1 °F (36.7 °C)  Heart Rate:  [90] 90  Resp:  [20] 20  BP: (122)/(77) 122/77  No data found.  There were no vitals filed for this  visit.     on   ;      There is no height or weight on file to calculate BMI.  Wt Readings from Last 3 Encounters:   08/17/22 90.1 kg (198 lb 9.6 oz)   07/15/22 88.3 kg (194 lb 9.6 oz)   06/10/22 81.6 kg (180 lb)       ---------------------------------------------------------------------------------------------------------------------   Physical Exam  Constitutional: Vital sign were reviewed (temperature, pulse, respiration, and blood pressure) and found to be within expected limits, general appearance was assessed and the patient was found to be in mild distress and calm and comfortable appears  Skin: Temperature:normal turgor and temperatureColor: normal, no cyanosis, jaundice, pallor or bruising, Moisture: dry,Nails: thickened yellow toenails bed, Hair:thinning to lower extremities .  Physical Exam  Wound Assessment: Location: left anterior, lower, leg  Changes since last exam: bioburden coverage has diminished  and granulation has improved   Etiology and classification: non-pressure chronic ulcer   Wound bed structures/characteristics: full-thickness (subcutaneous tissue is exposed in at least a portion of the wound) Increase in granulation tissue   Drainage characteristics: moderate, serous drainage  Edges moist  Periwound characteristics: some mositure-realted inflammation  Perfusion characteristics: suggest some degree of PAD  Bioburden characteristics:slough, soft eschar is present , 50-75% of wound bed     Wound Goal (s):Free of infection, No further symptoms and Reduction of contamination  Assessment & Plan      Non-pressure chronic ulcer of other part of left lower leg with fat layer exposed (HCC) [L97.822]- Hydrofera blue to base and secure with kerlix and short stretch. Debridement completed, see below for procedure details.     Completed 6 weeks course of Daptomycin IV     Reviewed with patient 06/17/2022:  A/BI: Composite Right DONNIE: 1.14 Composite Left DONNIE: 0.89. X- ray: 1.  Soft tissue edema and  probable ulceration in the left mid anterior lower leg. No acute bony findings. Glucsoe 128, albumin 3.75, prealbummin 31.5, CRP <0.30, WBC 8.79, sed rate 41.     Obesity (BMI 30-39.9) [E66.9]- An obese person?is at greater risk for wound infection and dehiscence or evisceration.    PVD (peripheral vascular disease) with claudication (HCC) [I73.9]- DONNIE of left slightly decreased. He is following with vascular.    Tobacco abuse [Z72.0]- Tissue perfusion is lower in users of tobacco and other forms of nicotine. Reduced tissue perfusion strongly inhibits wound healing, increases risk of infection, and dramatically increases risk of limb loss.    Wound Care Procedure Note   Pre-Procedure  Pre-Procedure Diagnosis: Non-pressure chronic ulcer of other part of left lower leg with fat layer exposed (HCC) [L97.822]-  Checked for Allergies: yes  Consent:Consent obtained, consent given by Patient,Risks Discussed, Alternatives Discussed  Indication: slough  Vascular status:Pedal pulses left were found to be adequate and safe for debridment.  Time out was called prior to procedure.   Pre procedure Pain assessment: moderate  Pre debridement measurements: Length 2.4cm,Width 2.5cm, depth 0.1cm, sinus/tunnelNo, undermining No    Post Procedure  Post-Procedure Diagnosis: Non-pressure chronic ulcer of other part of left lower leg with fat layer exposed (HCC) [L97.822]-  Post debridement measurements: Length 2.5cm,Width 2.6cm, depth 0.2cm, sinus/tunnelNo, undermining No  Post procedure Pain assessment: moderate  Graft/Implant/Prosthetics/Implanted Device/Transplants:  None  Complication(s):  None    Procedure details:  Method of Debridement: excissional (Surgical removal or cutting away, outside or beyond the wound margin devitalized tissue, necrosis or slough.)  Procedure: The site was prepared using clean techniques, subcutaneous tissue was removed by surgical excision.  Instrument(s) used: Curette 4mm  Anesthesia:After checking  patient allergies,lidocaine topical 2% was administered to provide anesthesia.  Tissue removed: subuctaneous, Percent Removed 100%  Culture or Biopsy: None  Estimated Blood Loss: Small  Hemostasis Obtained: pressure    Clinical Impression:Moderate Complexity    Follow-up: 1 week    KEVIN Camargo   [facility]  09/01/2022  [id number]

## 2022-09-15 ENCOUNTER — HOSPITAL ENCOUNTER (OUTPATIENT)
Dept: WOUND CARE | Facility: HOSPITAL | Age: 62
Discharge: HOME | End: 2022-09-15
Admitting: NURSE PRACTITIONER

## 2022-09-15 VITALS
TEMPERATURE: 97.5 F | RESPIRATION RATE: 17 BRPM | SYSTOLIC BLOOD PRESSURE: 155 MMHG | HEART RATE: 73 BPM | DIASTOLIC BLOOD PRESSURE: 87 MMHG

## 2022-09-15 PROCEDURE — 97602 WOUND(S) CARE NON-SELECTIVE: CPT

## 2022-09-15 RX ORDER — LIDOCAINE HYDROCHLORIDE 20 MG/ML
JELLY TOPICAL AS NEEDED
Status: DISCONTINUED | OUTPATIENT
Start: 2022-09-15 | End: 2022-09-16 | Stop reason: HOSPADM

## 2022-09-15 NOTE — PROGRESS NOTES
Wound Clinic Note  Patient Identification:  Name:  Gurwinder Harding  Age:  62 y.o.  Sex:  male  :  1960  MRN:  [id number]   Visit Number:  [id number]  Primary Care Physician:  Pastora Person APRN     Subjective     Chief complaint:     Left shin wound    History of presenting illness:     Patient is a 62 y.o. male with past medical history significant for PVD, and current everyday smoker. Presented today for evaluation of left shin wound. Reports area has been present for approximately 1 month. He had a fall hitting a metal box. Has been applying silvadene to the area. Completed course of Bactrim. Known history of PVD with intervention in 2019. He is scheduled for US at the end of the month. Reports increase in swelling to the distal leg and foot. Denies any fever or chills. No other associated signs or symptoms reported. Minimal drainage reported. Positive for claudication.     Interval history:     2022: Patient seen in clinic today for follow-up to left shin wound. Wound covered with eschar. Small amount of drainage without odor.   Reports increase in pain. Denies any fever or chills. States compliance with treatment regimen. DONNIE: Composite Right DONNIE: 1.14 Composite Left DONNIE: 0.89. X- ray: 1.  Soft tissue edema and probable ulceration in the left mid anterior lower leg. No acute bony findings. Glucsoe 128, albumin 3.75, prealbummin 31.5, CRP <0.30, WBC 8.79, sed rate 41.     2022: Patient seen in clinic today for follow up to left shin wound. Wound vac was applied last visit. He reports increase in pain to area. Wound with increase in slough. Bone exposed. Increase in pain. Denies any fever or chills.     2022:Patient seen in clinic today for follow up to left shin wound. Wound without significant changes. He continues to report moderate to severe pain. Biopsy results concerning for acute osteomyelitis. Wound culture reports MRSA. He denies any fever or chills. Reports compliance with  treatment regimen without complications. Swelling remains present.     07/06/2022: Patient seen in clinic today for follow up to left shin wound. He continues to report pain to the area. Wound with increase in slough. Bone exposed.  Denies any fever or chills.  He is receiving Daptomycin IV at this time to treat concerns of acute osteomyelitis. Swelling remains present to left foot and ankle. No new issues or concerns reported.    07/15/2022: Patient seen in clinic today for follow up to left shin wound. He continues to report pain to the area. Wound continues with slough Bone exposed does appear to have some areas of granulation present.  Denies any fever or chills.  He is receiving Daptomycin IV at this time to treat acute osteomyelitis. Swelling remains present to left foot and ankle. No new issues or concerns reported.    07/29/2022: Patient seen in clinic today for follow up to left shin wound. He continues to report pain to the area. Wound continues with slough Bone remains exposed increased granulation tissue. Denies any fever or chills.  He is receiving Daptomycin IV at this time to treat acute osteomyelitis 2 more weeks of antibiotics. Swelling remains present to left foot and ankle. No new issues or concerns reported.    08/05/2022: Patient seen in clinic today for follow-up to left shin wound. Wound continues with exposed bone, does appear to have granulation tissue present. He continues to report pain, this is unchanged from prior. Continues with IV antibiotics at this time. Denies any fever or chills. No new issues or concerns reported. Tolerating current wound treatment.     08/11/2022: Patient seen in clinic today for follow up to left shin wound. He continues to report pain to the area. Wound base red and moist. Bone remains exposed, with small area and granulation has increased.  Denies any fever or chills.  Completed course of Daptomycin. Swelling remains present to left foot and ankle. No new  issues or concerns reported.    08/18/2022: Patient seen in clinic today for follow-up to left shin wound. Reports pain has improved slightly. Denies any fever or chills. Reports he continues to smoke. No new issues or concerns reported. He is tolerating current treatment with hydrofera blue without complications. Swelling appears to be controlled.    09/01/2022: Patient seen in clinic today for follow up to left shin wound. He continues to report pain to the area. Wound base red and moist, bone no longer visible. Denies any fever or chills.  Completed course of Daptomycin. Swelling remains present to left foot and ankle. No new issues or concerns reported.    09/15/2022:  Patient seen in clinic today for follow-up to left shin wound. Reports pain has improved slightly. Denies any fever or chills. Reports he continues to smoke. No new issues or concerns reported. He is tolerating current treatment with hydrofera blue without complications. Swelling appears to be controlled.  ---------------------------------------------------------------------------------------------------------------------   Review of Systems   Constitutional: Negative for chills and fever.   HENT: Negative for congestion and rhinorrhea.    Respiratory: Negative for cough and shortness of breath.    Cardiovascular: Positive for leg swelling. Negative for chest pain.   Gastrointestinal: Negative for diarrhea, nausea and vomiting.   Musculoskeletal: Negative for gait problem.   Skin: Positive for wound.   Neurological: Negative for dizziness and weakness.      ---------------------------------------------------------------------------------------------------------------------   Past Medical History:   Diagnosis Date   • Alcohol abuse    • Arthritis    • Carotid stenosis    • Cerebrovascular accident (CVA) due to occlusion of left carotid artery (HCC) 07/15/2021   • ED (erectile dysfunction)    • History of degenerative disc disease    • Hydrocele in  adult 12/29/2020   • Hypertension    • MRSA (methicillin resistant staph aureus) culture positive     patient states diagnosed approx 2 wks ago   • Neuropathy    • Peripheral vascular disease (HCC)     s/p arthrectomy follow by balloon angioplasty and stents of right and left SFA   • Tobacco abuse    • Vitamin D deficiency      Past Surgical History:   Procedure Laterality Date   • APPENDECTOMY     • ARTERIOGRAM N/A 9/19/2019    Procedure: Arteriogram;  Surgeon: Tong Azar MD;  Location: [facility] CATH INVASIVE LOCATION;  Service: Cardiology   • BACK SURGERY      DISC RUPTURE REPAIR, L5   • CARDIAC CATHETERIZATION Right 10/29/2019    Procedure: Peripheral angiography;  Surgeon: Tong Azar MD;  Location: [facility] CATH INVASIVE LOCATION;  Service: Cardiovascular   • ENDOSCOPY N/A 2/18/2022    Procedure: ESOPHAGOGASTRODUODENOSCOPY;  Surgeon: Christine Duran MD;  Location: [facility] OR;  Service: General;  Laterality: N/A;   • VASCULAR SURGERY Bilateral      Family History   Problem Relation Age of Onset   • Hypertension Mother    • Cancer Father         LUNG   • Diabetes Father    • Hypertension Father    • Heart attack Other         GRANDFATHER     Social History     Socioeconomic History   • Marital status:    • Number of children: 1   Tobacco Use   • Smoking status: Current Every Day Smoker     Packs/day: 0.50     Years: 30.00     Pack years: 15.00     Types: Cigarettes   • Smokeless tobacco: Never Used   Vaping Use   • Vaping Use: Never used   Substance and Sexual Activity   • Alcohol use: Not Currently     Alcohol/week: 24.0 standard drinks     Types: 24 Cans of beer per week   • Drug use: No   • Sexual activity: Defer     ---------------------------------------------------------------------------------------------------------------------   Allergies:  Penicillins and Novocain  [procaine]  ---------------------------------------------------------------------------------------------------------------------  Objective     ---------------------------------------------------------------------------------------------------------------------   Vital Signs:  Temp:  [97.5 °F (36.4 °C)] 97.5 °F (36.4 °C)  Heart Rate:  [73] 73  Resp:  [17] 17  BP: (155)/(87) 155/87  No data found.  There were no vitals filed for this visit.     on   ;      There is no height or weight on file to calculate BMI.  Wt Readings from Last 3 Encounters:   08/17/22 90.1 kg (198 lb 9.6 oz)   07/15/22 88.3 kg (194 lb 9.6 oz)   06/10/22 81.6 kg (180 lb)       ---------------------------------------------------------------------------------------------------------------------   Physical Exam  Constitutional: Vital sign were reviewed (temperature, pulse, respiration, and blood pressure) and found to be within expected limits, general appearance was assessed and the patient was found to be in mild distress and calm and comfortable appears  Skin: Temperature:normal turgor and temperatureColor: normal, no cyanosis, jaundice, pallor or bruising, Moisture: dry,Nails: thickened yellow toenails bed, Hair:thinning to lower extremities .  Physical Exam  Wound Assessment: Location: left anterior, lower, leg  Changes since last exam: bioburden coverage has diminished  and granulation has improved   Etiology and classification: non-pressure chronic ulcer   Wound bed structures/characteristics: full-thickness (subcutaneous tissue is exposed in at least a portion of the wound) Increase in granulation tissue   Drainage characteristics: moderate, serous drainage  Edges moist  Periwound characteristics: some mositure-realted inflammation  Perfusion characteristics: suggest some degree of PAD  Bioburden characteristics:slough, soft eschar is present , 50-75% of wound bed     Wound Goal (s):Free of infection, No further symptoms and Reduction of  contamination  Assessment & Plan      Non-pressure chronic ulcer of other part of left lower leg with fat layer exposed (HCC) [L97.822]- Hydrofera blue to base and secure with kerlix and short stretch. Debridement completed, see below for procedure details.     Completed 6 weeks course of Daptomycin IV     Reviewed with patient 06/17/2022:  A/BI: Composite Right DONNIE: 1.14 Composite Left DONNIE: 0.89. X- ray: 1.  Soft tissue edema and probable ulceration in the left mid anterior lower leg. No acute bony findings. Glucsoe 128, albumin 3.75, prealbummin 31.5, CRP <0.30, WBC 8.79, sed rate 41.     Obesity (BMI 30-39.9) [E66.9]- An obese person?is at greater risk for wound infection and dehiscence or evisceration.    PVD (peripheral vascular disease) with claudication (HCC) [I73.9]- DONNIE of left slightly decreased. He is following with vascular.    Tobacco abuse [Z72.0]- Tissue perfusion is lower in users of tobacco and other forms of nicotine. Reduced tissue perfusion strongly inhibits wound healing, increases risk of infection, and dramatically increases risk of limb loss.    Wound Care Procedure Note   Pre-Procedure  Pre-Procedure Diagnosis: Non-pressure chronic ulcer of other part of left lower leg with fat layer exposed (HCC) [L97.822]-  Checked for Allergies: yes  Consent:Consent obtained, consent given by Patient,Risks Discussed, Alternatives Discussed  Indication: slough  Vascular status:Pedal pulses left were found to be adequate and safe for debridment.  Time out was called prior to procedure.   Pre procedure Pain assessment: moderate  Pre debridement measurements: Length 2.3cm,Width 2.5cm, depth 0.1cm, sinus/tunnelNo, undermining No    Post Procedure  Post-Procedure Diagnosis: Non-pressure chronic ulcer of other part of left lower leg with fat layer exposed (HCC) [L97.822]-  Post debridement measurements: Length 2.4cm,Width 2.6cm, depth 0.2cm, sinus/tunnelNo, undermining No  Post procedure Pain assessment:  moderate  Graft/Implant/Prosthetics/Implanted Device/Transplants:  None  Complication(s):  None    Procedure details:  Method of Debridement: excissional (Surgical removal or cutting away, outside or beyond the wound margin devitalized tissue, necrosis or slough.)  Procedure: The site was prepared using clean techniques, subcutaneous tissue was removed by surgical excision.  Instrument(s) used: Curette 4mm  Anesthesia:After checking patient allergies,lidocaine topical 2% was administered to provide anesthesia.  Tissue removed: subuctaneous, Percent Removed 100%  Culture or Biopsy: None  Estimated Blood Loss: Small  Hemostasis Obtained: pressure    Clinical Impression:Moderate Complexity    Follow-up: 1 week    KEVIN Camargo   [facility]  09/15/2022  0421

## 2022-09-29 ENCOUNTER — HOSPITAL ENCOUNTER (OUTPATIENT)
Dept: WOUND CARE | Facility: HOSPITAL | Age: 62
Discharge: HOME | End: 2022-09-29
Admitting: NURSE PRACTITIONER

## 2022-09-29 VITALS
SYSTOLIC BLOOD PRESSURE: 166 MMHG | HEART RATE: 82 BPM | TEMPERATURE: 97.3 F | DIASTOLIC BLOOD PRESSURE: 89 MMHG | RESPIRATION RATE: 18 BRPM

## 2022-09-29 DIAGNOSIS — L97.822 NON-PRESSURE CHRONIC ULCER OF OTHER PART OF LEFT LOWER LEG WITH FAT LAYER EXPOSED: Primary | ICD-10-CM

## 2022-09-29 LAB
ALBUMIN SERPL-MCNC: 3.86 G/DL (ref 3.5–5.2)
ALBUMIN/GLOB SERPL: 1.1 G/DL
ALP SERPL-CCNC: 89 U/L (ref 39–117)
ALT SERPL W P-5'-P-CCNC: 19 U/L (ref 1–41)
ANION GAP SERPL CALCULATED.3IONS-SCNC: 14.1 MMOL/L (ref 5–15)
AST SERPL-CCNC: 23 U/L (ref 1–40)
BASOPHILS # BLD AUTO: 0.06 10*3/MM3 (ref 0–0.2)
BASOPHILS NFR BLD AUTO: 0.8 % (ref 0–1.5)
BILIRUB SERPL-MCNC: 0.2 MG/DL (ref 0–1.2)
BUN SERPL-MCNC: 21 MG/DL (ref 8–23)
BUN/CREAT SERPL: 15.6 (ref 7–25)
CALCIUM SPEC-SCNC: 9.4 MG/DL (ref 8.6–10.5)
CHLORIDE SERPL-SCNC: 103 MMOL/L (ref 98–107)
CO2 SERPL-SCNC: 18.9 MMOL/L (ref 22–29)
CREAT SERPL-MCNC: 1.35 MG/DL (ref 0.76–1.27)
CRP SERPL-MCNC: <0.3 MG/DL (ref 0–0.5)
DEPRECATED RDW RBC AUTO: 48.7 FL (ref 37–54)
EGFRCR SERPLBLD CKD-EPI 2021: 59.7 ML/MIN/1.73
EOSINOPHIL # BLD AUTO: 0.17 10*3/MM3 (ref 0–0.4)
EOSINOPHIL NFR BLD AUTO: 2.3 % (ref 0.3–6.2)
ERYTHROCYTE [DISTWIDTH] IN BLOOD BY AUTOMATED COUNT: 13.7 % (ref 12.3–15.4)
ERYTHROCYTE [SEDIMENTATION RATE] IN BLOOD: 38 MM/HR (ref 0–20)
GLOBULIN UR ELPH-MCNC: 3.4 GM/DL
GLUCOSE SERPL-MCNC: 110 MG/DL (ref 65–99)
HBA1C MFR BLD: 5.4 % (ref 4.8–5.6)
HCT VFR BLD AUTO: 42.8 % (ref 37.5–51)
HGB BLD-MCNC: 14.6 G/DL (ref 13–17.7)
IMM GRANULOCYTES # BLD AUTO: 0.02 10*3/MM3 (ref 0–0.05)
IMM GRANULOCYTES NFR BLD AUTO: 0.3 % (ref 0–0.5)
LYMPHOCYTES # BLD AUTO: 1.45 10*3/MM3 (ref 0.7–3.1)
LYMPHOCYTES NFR BLD AUTO: 19.5 % (ref 19.6–45.3)
MCH RBC QN AUTO: 32.7 PG (ref 26.6–33)
MCHC RBC AUTO-ENTMCNC: 34.1 G/DL (ref 31.5–35.7)
MCV RBC AUTO: 95.7 FL (ref 79–97)
MONOCYTES # BLD AUTO: 0.76 10*3/MM3 (ref 0.1–0.9)
MONOCYTES NFR BLD AUTO: 10.2 % (ref 5–12)
NEUTROPHILS NFR BLD AUTO: 4.97 10*3/MM3 (ref 1.7–7)
NEUTROPHILS NFR BLD AUTO: 66.9 % (ref 42.7–76)
NRBC BLD AUTO-RTO: 0 /100 WBC (ref 0–0.2)
PLATELET # BLD AUTO: 213 10*3/MM3 (ref 140–450)
PMV BLD AUTO: 10.3 FL (ref 6–12)
POTASSIUM SERPL-SCNC: 4.4 MMOL/L (ref 3.5–5.2)
PROT SERPL-MCNC: 7.3 G/DL (ref 6–8.5)
RBC # BLD AUTO: 4.47 10*6/MM3 (ref 4.14–5.8)
SODIUM SERPL-SCNC: 136 MMOL/L (ref 136–145)
WBC NRBC COR # BLD: 7.43 10*3/MM3 (ref 3.4–10.8)

## 2022-09-29 PROCEDURE — 85652 RBC SED RATE AUTOMATED: CPT | Performed by: NURSE PRACTITIONER

## 2022-09-29 PROCEDURE — 85025 COMPLETE CBC W/AUTO DIFF WBC: CPT | Performed by: NURSE PRACTITIONER

## 2022-09-29 PROCEDURE — 80053 COMPREHEN METABOLIC PANEL: CPT | Performed by: NURSE PRACTITIONER

## 2022-09-29 PROCEDURE — 83036 HEMOGLOBIN GLYCOSYLATED A1C: CPT | Performed by: NURSE PRACTITIONER

## 2022-09-29 PROCEDURE — 84134 ASSAY OF PREALBUMIN: CPT | Performed by: NURSE PRACTITIONER

## 2022-09-29 PROCEDURE — 86140 C-REACTIVE PROTEIN: CPT | Performed by: NURSE PRACTITIONER

## 2022-09-29 RX ORDER — LIDOCAINE HYDROCHLORIDE 20 MG/ML
JELLY TOPICAL AS NEEDED
Status: DISCONTINUED | OUTPATIENT
Start: 2022-09-29 | End: 2022-09-30 | Stop reason: HOSPADM

## 2022-09-29 RX ADMIN — LIDOCAINE HYDROCHLORIDE: 20 JELLY TOPICAL at 15:37

## 2022-09-30 LAB — PREALB SERPL-MCNC: 41.6 MG/DL (ref 20–40)

## 2022-10-06 NOTE — PROGRESS NOTES
Wound Clinic Note  Patient Identification:  Name:  Gurwinder Harding  Age:  62 y.o.  Sex:  male  :  1960  MRN:  [id number]   Visit Number:  [id number]  Primary Care Physician:  Pastora Person APRN     Subjective     Chief complaint:     Left shin wound    History of presenting illness:     Patient is a 62 y.o. male with past medical history significant for PVD, and current everyday smoker. Presented today for evaluation of left shin wound. Reports area has been present for approximately 1 month. He had a fall hitting a metal box. Has been applying silvadene to the area. Completed course of Bactrim. Known history of PVD with intervention in 2019. He is scheduled for US at the end of the month. Reports increase in swelling to the distal leg and foot. Denies any fever or chills. No other associated signs or symptoms reported. Minimal drainage reported. Positive for claudication.     Interval history:     2022: Patient seen in clinic today for follow-up to left shin wound. Wound covered with eschar. Small amount of drainage without odor.   Reports increase in pain. Denies any fever or chills. States compliance with treatment regimen. DONNIE: Composite Right DONNIE: 1.14 Composite Left DONNIE: 0.89. X- ray: 1.  Soft tissue edema and probable ulceration in the left mid anterior lower leg. No acute bony findings. Glucsoe 128, albumin 3.75, prealbummin 31.5, CRP <0.30, WBC 8.79, sed rate 41.     2022: Patient seen in clinic today for follow up to left shin wound. Wound vac was applied last visit. He reports increase in pain to area. Wound with increase in slough. Bone exposed. Increase in pain. Denies any fever or chills.     2022:Patient seen in clinic today for follow up to left shin wound. Wound without significant changes. He continues to report moderate to severe pain. Biopsy results concerning for acute osteomyelitis. Wound culture reports MRSA. He denies any fever or chills. Reports compliance with  treatment regimen without complications. Swelling remains present.     07/06/2022: Patient seen in clinic today for follow up to left shin wound. He continues to report pain to the area. Wound with increase in slough. Bone exposed.  Denies any fever or chills.  He is receiving Daptomycin IV at this time to treat concerns of acute osteomyelitis. Swelling remains present to left foot and ankle. No new issues or concerns reported.    07/15/2022: Patient seen in clinic today for follow up to left shin wound. He continues to report pain to the area. Wound continues with slough Bone exposed does appear to have some areas of granulation present.  Denies any fever or chills.  He is receiving Daptomycin IV at this time to treat acute osteomyelitis. Swelling remains present to left foot and ankle. No new issues or concerns reported.    07/29/2022: Patient seen in clinic today for follow up to left shin wound. He continues to report pain to the area. Wound continues with slough Bone remains exposed increased granulation tissue. Denies any fever or chills.  He is receiving Daptomycin IV at this time to treat acute osteomyelitis 2 more weeks of antibiotics. Swelling remains present to left foot and ankle. No new issues or concerns reported.    08/05/2022: Patient seen in clinic today for follow-up to left shin wound. Wound continues with exposed bone, does appear to have granulation tissue present. He continues to report pain, this is unchanged from prior. Continues with IV antibiotics at this time. Denies any fever or chills. No new issues or concerns reported. Tolerating current wound treatment.     08/11/2022: Patient seen in clinic today for follow up to left shin wound. He continues to report pain to the area. Wound base red and moist. Bone remains exposed, with small area and granulation has increased.  Denies any fever or chills.  Completed course of Daptomycin. Swelling remains present to left foot and ankle. No new  issues or concerns reported.    08/18/2022: Patient seen in clinic today for follow-up to left shin wound. Reports pain has improved slightly. Denies any fever or chills. Reports he continues to smoke. No new issues or concerns reported. He is tolerating current treatment with hydrofera blue without complications. Swelling appears to be controlled.    09/01/2022: Patient seen in clinic today for follow up to left shin wound. He continues to report pain to the area. Wound base red and moist, bone no longer visible. Denies any fever or chills.  Completed course of Daptomycin. Swelling remains present to left foot and ankle. No new issues or concerns reported.    09/15/2022:  Patient seen in clinic today for follow-up to left shin wound. Reports pain has improved slightly. Denies any fever or chills. Reports he continues to smoke. No new issues or concerns reported. He is tolerating current treatment with hydrofera blue without complications. Swelling appears to be controlled.    09/29/2022: Patient seen in clinic today for follow-up to left shin wound. Denies any fever or chills. Reports he continues to smoke. No new issues or concerns reported. He is tolerating current treatment with hydrofera blue without complications. Swelling appears to be controlled. Base with yellow slough and dried edges. Minimal drainage reported.   ---------------------------------------------------------------------------------------------------------------------   Review of Systems   Constitutional: Negative for chills and fever.   HENT: Negative for congestion and rhinorrhea.    Respiratory: Negative for cough and shortness of breath.    Cardiovascular: Positive for leg swelling. Negative for chest pain.   Gastrointestinal: Negative for diarrhea, nausea and vomiting.   Musculoskeletal: Negative for gait problem.   Skin: Positive for wound.   Neurological: Negative for dizziness and weakness.       ---------------------------------------------------------------------------------------------------------------------   Past Medical History:   Diagnosis Date   • Alcohol abuse    • Arthritis    • Carotid stenosis    • Cerebrovascular accident (CVA) due to occlusion of left carotid artery (HCC) 07/15/2021   • ED (erectile dysfunction)    • History of degenerative disc disease    • Hydrocele in adult 12/29/2020   • Hypertension    • MRSA (methicillin resistant staph aureus) culture positive     patient states diagnosed approx 2 wks ago   • Neuropathy    • Peripheral vascular disease (HCC)     s/p arthrectomy follow by balloon angioplasty and stents of right and left SFA   • Tobacco abuse    • Vitamin D deficiency      Past Surgical History:   Procedure Laterality Date   • APPENDECTOMY     • ARTERIOGRAM N/A 9/19/2019    Procedure: Arteriogram;  Surgeon: Tong Azar MD;  Location: [facility] CATH INVASIVE LOCATION;  Service: Cardiology   • BACK SURGERY      DISC RUPTURE REPAIR, L5   • CARDIAC CATHETERIZATION Right 10/29/2019    Procedure: Peripheral angiography;  Surgeon: Tong Azar MD;  Location: [facility] CATH INVASIVE LOCATION;  Service: Cardiovascular   • ENDOSCOPY N/A 2/18/2022    Procedure: ESOPHAGOGASTRODUODENOSCOPY;  Surgeon: Christine Duran MD;  Location: [facility] OR;  Service: General;  Laterality: N/A;   • VASCULAR SURGERY Bilateral      Family History   Problem Relation Age of Onset   • Hypertension Mother    • Cancer Father         LUNG   • Diabetes Father    • Hypertension Father    • Heart attack Other         GRANDFATHER     Social History     Socioeconomic History   • Marital status:    • Number of children: 1   Tobacco Use   • Smoking status: Current Every Day Smoker     Packs/day: 0.50     Years: 30.00     Pack years: 15.00     Types: Cigarettes   • Smokeless tobacco: Never Used   Vaping Use   • Vaping Use: Never used   Substance and Sexual Activity   • Alcohol use: Not Currently      Alcohol/week: 24.0 standard drinks     Types: 24 Cans of beer per week   • Drug use: No   • Sexual activity: Defer     ---------------------------------------------------------------------------------------------------------------------   Allergies:  Penicillins and Novocain [procaine]  ---------------------------------------------------------------------------------------------------------------------  Objective     ---------------------------------------------------------------------------------------------------------------------   Vital Signs:     No data found.  There were no vitals filed for this visit.     on   ;      There is no height or weight on file to calculate BMI.  Wt Readings from Last 3 Encounters:   08/17/22 90.1 kg (198 lb 9.6 oz)   07/15/22 88.3 kg (194 lb 9.6 oz)   06/10/22 81.6 kg (180 lb)       ---------------------------------------------------------------------------------------------------------------------   Physical Exam  Constitutional: Vital sign were reviewed (temperature, pulse, respiration, and blood pressure) and found to be within expected limits, general appearance was assessed and the patient was found to be in mild distress and calm and comfortable appears  Skin: Temperature:normal turgor and temperatureColor: normal, no cyanosis, jaundice, pallor or bruising, Moisture: dry,Nails: thickened yellow toenails bed, Hair:thinning to lower extremities .  Physical Exam  Wound Assessment: Location: left anterior, lower, leg  Changes since last exam: bioburden coverage has diminished   Etiology and classification: non-pressure chronic ulcer   Wound bed structures/characteristics: full-thickness (subcutaneous tissue is exposed in at least a portion of the wound) Increase in granulation tissue   Drainage characteristics: moderate, serous drainage  Edges dry  Periwound characteristics: some mositure-realted inflammation  Perfusion characteristics: suggest some degree of PAD  Bioburden  characteristics:slough, soft eschar is present , < 25% of wound bed     Wound Goal (s):Free of infection, No further symptoms and Reduction of contamination  Assessment & Plan      Non-pressure chronic ulcer of other part of left lower leg with fat layer exposed (HCC) [L97.822]- Hydrofera blue to base and secure with kerlix and short stretch. Debridement completed, see below for procedure details.     Recommend oscar protein diet 120g/day along with vitamin C 2000mg/day, vitamin A 5000 Units/day, vitamin D3 5000 Units/day, zinc 50mg/day to help promote wound healing     Labs ordered: CBC, CMP, CRP, sed rate, prealbumin, and hemoglobain A1C to assess inflammatory markers and nutritional status as this both and negatively impact wound healing if not properly treated.     Completed 6 weeks course of Daptomycin IV     Reviewed with patient 06/17/2022:  A/BI: Composite Right DONNIE: 1.14 Composite Left DONNIE: 0.89. X- ray: 1.  Soft tissue edema and probable ulceration in the left mid anterior lower leg. No acute bony findings. Glucsoe 128, albumin 3.75, prealbummin 31.5, CRP <0.30, WBC 8.79, sed rate 41.     Obesity (BMI 30-39.9) [E66.9]- An obese person?is at greater risk for wound infection and dehiscence or evisceration.    PVD (peripheral vascular disease) with claudication (HCC) [I73.9]- DONNIE of left slightly decreased. He is following with vascular. Will repeat DONNIE if wound healing stalls.     Tobacco abuse [Z72.0]- Tissue perfusion is lower in users of tobacco and other forms of nicotine. Reduced tissue perfusion strongly inhibits wound healing, increases risk of infection, and dramatically increases risk of limb loss.    Wound Care Procedure Note   Pre-Procedure  Pre-Procedure Diagnosis: Non-pressure chronic ulcer of other part of left lower leg with fat layer exposed (HCC) [L97.822]-  Checked for Allergies: yes  Consent:Consent obtained, consent given by Patient,Risks Discussed, Alternatives Discussed  Indication:  slough  Vascular status:Pedal pulses left were found to be adequate and safe for debridment.  Time out was called prior to procedure.   Pre procedure Pain assessment: moderate  Pre debridement measurements: Length 2.3cm,Width 2cm, depth 0.1cm, sinus/tunnelNo, undermining No    Post Procedure  Post-Procedure Diagnosis: Non-pressure chronic ulcer of other part of left lower leg with fat layer exposed ([facility]) [L97.822]-  Post debridement measurements: Length 2.6cm,Width 2.3cm, depth 0.2cm, sinus/tunnelNo, undermining No  Post procedure Pain assessment: moderate  Graft/Implant/Prosthetics/Implanted Device/Transplants:  None  Complication(s):  None    Procedure details:  Method of Debridement: excissional (Surgical removal or cutting away, outside or beyond the wound margin devitalized tissue, necrosis or slough.)  Procedure: The site was prepared using clean techniques, subcutaneous tissue was removed by surgical excision.  Instrument(s) used: Curette 4mm  Anesthesia:After checking patient allergies,lidocaine topical 2% was administered to provide anesthesia.  Tissue removed: subuctaneous, Percent Removed 100%  Culture or Biopsy: None  Estimated Blood Loss: Small  Hemostasis Obtained: pressure    Clinical Impression:Moderate Complexity    Follow-up: 1 week    KEVIN Camargo   [facility]  09/29/2022  [id number]

## 2022-10-13 ENCOUNTER — HOSPITAL ENCOUNTER (OUTPATIENT)
Dept: WOUND CARE | Facility: HOSPITAL | Age: 62
Discharge: HOME | End: 2022-10-13
Admitting: NURSE PRACTITIONER

## 2022-10-13 VITALS
HEART RATE: 88 BPM | RESPIRATION RATE: 17 BRPM | TEMPERATURE: 98.3 F | SYSTOLIC BLOOD PRESSURE: 148 MMHG | DIASTOLIC BLOOD PRESSURE: 80 MMHG

## 2022-10-13 DIAGNOSIS — L97.822 NON-PRESSURE CHRONIC ULCER OF OTHER PART OF LEFT LOWER LEG WITH FAT LAYER EXPOSED: Primary | ICD-10-CM

## 2022-10-13 RX ORDER — LIDOCAINE HYDROCHLORIDE 20 MG/ML
JELLY TOPICAL AS NEEDED
Status: DISCONTINUED | OUTPATIENT
Start: 2022-10-13 | End: 2022-10-14 | Stop reason: HOSPADM

## 2022-10-13 RX ADMIN — LIDOCAINE HYDROCHLORIDE: 20 JELLY TOPICAL at 15:26

## 2022-10-13 NOTE — PROGRESS NOTES
Wound Clinic Note  Patient Identification:  Name:  Gurwinder Harding  Age:  62 y.o.  Sex:  male  :  1960  MRN:  [id number]   Visit Number:  [id number]  Primary Care Physician:  Pastora Person APRN     Subjective     Chief complaint:     Left shin wound    History of presenting illness:     Patient is a 62 y.o. male with past medical history significant for PVD, and current everyday smoker. Presented today for evaluation of left shin wound. Reports area has been present for approximately 1 month. He had a fall hitting a metal box. Has been applying silvadene to the area. Completed course of Bactrim. Known history of PVD with intervention in 2019. He is scheduled for US at the end of the month. Reports increase in swelling to the distal leg and foot. Denies any fever or chills. No other associated signs or symptoms reported. Minimal drainage reported. Positive for claudication.     Interval history:     2022: Patient seen in clinic today for follow-up to left shin wound. Wound covered with eschar. Small amount of drainage without odor.   Reports increase in pain. Denies any fever or chills. States compliance with treatment regimen. DONNIE: Composite Right DONNIE: 1.14 Composite Left DONNIE: 0.89. X- ray: 1.  Soft tissue edema and probable ulceration in the left mid anterior lower leg. No acute bony findings. Glucsoe 128, albumin 3.75, prealbummin 31.5, CRP <0.30, WBC 8.79, sed rate 41.     2022: Patient seen in clinic today for follow up to left shin wound. Wound vac was applied last visit. He reports increase in pain to area. Wound with increase in slough. Bone exposed. Increase in pain. Denies any fever or chills.     2022:Patient seen in clinic today for follow up to left shin wound. Wound without significant changes. He continues to report moderate to severe pain. Biopsy results concerning for acute osteomyelitis. Wound culture reports MRSA. He denies any fever or chills. Reports compliance with  treatment regimen without complications. Swelling remains present.     07/06/2022: Patient seen in clinic today for follow up to left shin wound. He continues to report pain to the area. Wound with increase in slough. Bone exposed.  Denies any fever or chills.  He is receiving Daptomycin IV at this time to treat concerns of acute osteomyelitis. Swelling remains present to left foot and ankle. No new issues or concerns reported.    07/15/2022: Patient seen in clinic today for follow up to left shin wound. He continues to report pain to the area. Wound continues with slough Bone exposed does appear to have some areas of granulation present.  Denies any fever or chills.  He is receiving Daptomycin IV at this time to treat acute osteomyelitis. Swelling remains present to left foot and ankle. No new issues or concerns reported.    07/29/2022: Patient seen in clinic today for follow up to left shin wound. He continues to report pain to the area. Wound continues with slough Bone remains exposed increased granulation tissue. Denies any fever or chills.  He is receiving Daptomycin IV at this time to treat acute osteomyelitis 2 more weeks of antibiotics. Swelling remains present to left foot and ankle. No new issues or concerns reported.    08/05/2022: Patient seen in clinic today for follow-up to left shin wound. Wound continues with exposed bone, does appear to have granulation tissue present. He continues to report pain, this is unchanged from prior. Continues with IV antibiotics at this time. Denies any fever or chills. No new issues or concerns reported. Tolerating current wound treatment.     08/11/2022: Patient seen in clinic today for follow up to left shin wound. He continues to report pain to the area. Wound base red and moist. Bone remains exposed, with small area and granulation has increased.  Denies any fever or chills.  Completed course of Daptomycin. Swelling remains present to left foot and ankle. No new  issues or concerns reported.    08/18/2022: Patient seen in clinic today for follow-up to left shin wound. Reports pain has improved slightly. Denies any fever or chills. Reports he continues to smoke. No new issues or concerns reported. He is tolerating current treatment with hydrofera blue without complications. Swelling appears to be controlled.    09/01/2022: Patient seen in clinic today for follow up to left shin wound. He continues to report pain to the area. Wound base red and moist, bone no longer visible. Denies any fever or chills.  Completed course of Daptomycin. Swelling remains present to left foot and ankle. No new issues or concerns reported.    09/15/2022:  Patient seen in clinic today for follow-up to left shin wound. Reports pain has improved slightly. Denies any fever or chills. Reports he continues to smoke. No new issues or concerns reported. He is tolerating current treatment with hydrofera blue without complications. Swelling appears to be controlled.    09/29/2022: Patient seen in clinic today for follow-up to left shin wound. Denies any fever or chills. Reports he continues to smoke. No new issues or concerns reported. He is tolerating current treatment with hydrofera blue without complications. Swelling appears to be controlled. Base with yellow slough and dried edges. Minimal drainage reported.     10/13/2022: Patient seen in clinic today for follow-up to left shin wound. Denies any pain to the area. Denies any fever or chills. Reports he continues to smoke. No new issues or concerns reported. He is tolerating current treatment with hydrofera blue without complications. Swelling appears to be controlled. Labs reviewed: glucose 110, albumin 3.86, prealbumin 41.6, A1C 5.40, WBC 7.43, CRP < 0.30, sed rate 38.   ---------------------------------------------------------------------------------------------------------------------   Review of Systems   Constitutional: Negative for chills and  fever.   HENT: Negative for congestion and rhinorrhea.    Respiratory: Negative for cough and shortness of breath.    Cardiovascular: Positive for leg swelling. Negative for chest pain.   Gastrointestinal: Negative for diarrhea, nausea and vomiting.   Musculoskeletal: Negative for gait problem.   Skin: Positive for wound.   Neurological: Negative for dizziness and weakness.      ---------------------------------------------------------------------------------------------------------------------   Past Medical History:   Diagnosis Date   • Alcohol abuse    • Arthritis    • Carotid stenosis    • Cerebrovascular accident (CVA) due to occlusion of left carotid artery (HCC) 07/15/2021   • ED (erectile dysfunction)    • History of degenerative disc disease    • Hydrocele in adult 12/29/2020   • Hypertension    • MRSA (methicillin resistant staph aureus) culture positive     patient states diagnosed approx 2 wks ago   • Neuropathy    • Peripheral vascular disease (HCC)     s/p arthrectomy follow by balloon angioplasty and stents of right and left SFA   • Tobacco abuse    • Vitamin D deficiency      Past Surgical History:   Procedure Laterality Date   • APPENDECTOMY     • ARTERIOGRAM N/A 9/19/2019    Procedure: Arteriogram;  Surgeon: Tong Azar MD;  Location: [facility] INVASIVE LOCATION;  Service: Cardiology   • BACK SURGERY      DISC RUPTURE REPAIR, L5   • CARDIAC CATHETERIZATION Right 10/29/2019    Procedure: Peripheral angiography;  Surgeon: Tong Azar MD;  Location: [facility] INVASIVE LOCATION;  Service: Cardiovascular   • ENDOSCOPY N/A 2/18/2022    Procedure: ESOPHAGOGASTRODUODENOSCOPY;  Surgeon: Christine Duran MD;  Location: [facility];  Service: General;  Laterality: N/A;   • VASCULAR SURGERY Bilateral      Family History   Problem Relation Age of Onset   • Hypertension Mother    • Cancer Father         LUNG   • Diabetes Father    • Hypertension Father    • Heart attack Other         GRANDFATHER      Social History     Socioeconomic History   • Marital status:    • Number of children: 1   Tobacco Use   • Smoking status: Every Day     Packs/day: 0.50     Years: 30.00     Pack years: 15.00     Types: Cigarettes   • Smokeless tobacco: Never   Vaping Use   • Vaping Use: Never used   Substance and Sexual Activity   • Alcohol use: Not Currently     Alcohol/week: 24.0 standard drinks     Types: 24 Cans of beer per week   • Drug use: No   • Sexual activity: Defer     ---------------------------------------------------------------------------------------------------------------------   Allergies:  Penicillins and Novocain [procaine]  ---------------------------------------------------------------------------------------------------------------------  Objective     ---------------------------------------------------------------------------------------------------------------------   Vital Signs:  Temp:  [98.3 °F (36.8 °C)] 98.3 °F (36.8 °C)  Heart Rate:  [88] 88  Resp:  [17] 17  BP: (148)/(80) 148/80  No data found.  There were no vitals filed for this visit.     on   ;      There is no height or weight on file to calculate BMI.  Wt Readings from Last 3 Encounters:   08/17/22 90.1 kg (198 lb 9.6 oz)   07/15/22 88.3 kg (194 lb 9.6 oz)   06/10/22 81.6 kg (180 lb)       ---------------------------------------------------------------------------------------------------------------------   Physical Exam  Constitutional: Vital sign were reviewed (temperature, pulse, respiration, and blood pressure) and found to be within expected limits, general appearance was assessed and the patient was found to be in mild distress and calm and comfortable appears  Skin: Temperature:normal turgor and temperatureColor: normal, no cyanosis, jaundice, pallor or bruising, Moisture: dry,Nails: thickened yellow toenails bed, Hair:thinning to lower extremities .  Physical Exam  Wound Assessment: Location: left anterior, lower,  leg  Changes since last exam: bioburden coverage has diminished   Etiology and classification: non-pressure chronic ulcer   Wound bed structures/characteristics: full-thickness (subcutaneous tissue is exposed in at least a portion of the wound) Increase in granulation tissue   Drainage characteristics: moderate, serous drainage  Edges dry  Periwound characteristics: some mositure-realted inflammation  Perfusion characteristics: suggest some degree of PAD  Bioburden characteristics:slough, soft eschar is present , < 25% of wound bed     Wound Goal (s):Free of infection, No further symptoms and Reduction of contamination  Assessment & Plan      Non-pressure chronic ulcer of other part of left lower leg with fat layer exposed (HCC) [L97.822]- Hydrofera blue to base and secure with kerlix and short stretch. Debridement completed, see below for procedure details.     Recommend oscar protein diet 120g/day along with vitamin C 2000mg/day, vitamin A 5000 Units/day, vitamin D3 5000 Units/day, zinc 50mg/day to help promote wound healing     Labs ordered: CBC, CMP, CRP, sed rate, prealbumin, and hemoglobain A1C to assess inflammatory markers and nutritional status as this both and negatively impact wound healing if not properly treated.     Completed 6 weeks course of Daptomycin IV     Reviewed with patient 06/17/2022:  A/BI: Composite Right DONNIE: 1.14 Composite Left DONNIE: 0.89. X- ray: 1.  Soft tissue edema and probable ulceration in the left mid anterior lower leg. No acute bony findings. Glucsoe 128, albumin 3.75, prealbummin 31.5, CRP <0.30, WBC 8.79, sed rate 41.     Obesity (BMI 30-39.9) [E66.9]- An obese person?is at greater risk for wound infection and dehiscence or evisceration.    PVD (peripheral vascular disease) with claudication (HCC) [I73.9]- DONNIE of left slightly decreased. He is following with vascular. Will repeat DONNIE if wound healing stalls.     Tobacco abuse [Z72.0]- Tissue perfusion is lower in users of  tobacco and other forms of nicotine. Reduced tissue perfusion strongly inhibits wound healing, increases risk of infection, and dramatically increases risk of limb loss.    Wound Care Procedure Note   Pre-Procedure  Pre-Procedure Diagnosis: Non-pressure chronic ulcer of other part of left lower leg with fat layer exposed (HCC) [L97.822]-  Checked for Allergies: yes  Consent:Consent obtained, consent given by Patient,Risks Discussed, Alternatives Discussed  Indication: slough  Vascular status:Pedal pulses left were found to be adequate and safe for debridment.  Time out was called prior to procedure.   Pre procedure Pain assessment: moderate  Pre debridement measurements: Length 1cm,Width 0.9cm, depth 0.1cm, sinus/tunnelNo, undermining No    Post Procedure  Post-Procedure Diagnosis: Non-pressure chronic ulcer of other part of left lower leg with fat layer exposed (HCC) [L97.822]-  Post debridement measurements: Length 1.1cm,Width 1.1cm, depth 0.2cm, sinus/tunnelNo, undermining No  Post procedure Pain assessment: moderate  Graft/Implant/Prosthetics/Implanted Device/Transplants:  None  Complication(s):  None    Procedure details:  Method of Debridement: excissional (Surgical removal or cutting away, outside or beyond the wound margin devitalized tissue, necrosis or slough.)  Procedure: The site was prepared using clean techniques, subcutaneous tissue was removed by surgical excision.  Instrument(s) used: Curette 4mm  Anesthesia:After checking patient allergies,lidocaine topical 2% was administered to provide anesthesia.  Tissue removed: subuctaneous, Percent Removed 100%  Culture or Biopsy: None  Estimated Blood Loss: Small  Hemostasis Obtained: pressure    Clinical Impression:Moderate Complexity    Follow-up: 1 week    KEVIN Camargo   [facility]  10/13/2022  [id number]

## 2022-10-14 DIAGNOSIS — G62.9 NEUROPATHY: ICD-10-CM

## 2022-10-17 RX ORDER — METHOCARBAMOL 750 MG/1
TABLET, FILM COATED ORAL
Qty: 120 TABLET | Refills: 1 | Status: SHIPPED | OUTPATIENT
Start: 2022-10-17 | End: 2022-12-27

## 2022-10-17 RX ORDER — GABAPENTIN 800 MG/1
TABLET ORAL
Qty: 135 TABLET | Refills: 2 | Status: SHIPPED | OUTPATIENT
Start: 2022-10-17 | End: 2022-11-07 | Stop reason: SDUPTHER

## 2022-10-27 ENCOUNTER — HOSPITAL ENCOUNTER (OUTPATIENT)
Dept: WOUND CARE | Facility: HOSPITAL | Age: 62
Discharge: HOME | End: 2022-10-27
Admitting: NURSE PRACTITIONER

## 2022-10-27 VITALS
TEMPERATURE: 98.7 F | DIASTOLIC BLOOD PRESSURE: 89 MMHG | SYSTOLIC BLOOD PRESSURE: 131 MMHG | RESPIRATION RATE: 17 BRPM | HEART RATE: 112 BPM

## 2022-10-27 DIAGNOSIS — L97.822 NON-PRESSURE CHRONIC ULCER OF OTHER PART OF LEFT LOWER LEG WITH FAT LAYER EXPOSED: Primary | ICD-10-CM

## 2022-10-27 RX ORDER — LIDOCAINE HYDROCHLORIDE 20 MG/ML
JELLY TOPICAL AS NEEDED
Status: DISCONTINUED | OUTPATIENT
Start: 2022-10-27 | End: 2022-10-28 | Stop reason: HOSPADM

## 2022-10-27 RX ADMIN — LIDOCAINE HYDROCHLORIDE: 20 JELLY TOPICAL at 15:12

## 2022-10-27 NOTE — PROGRESS NOTES
10/27/2022: Patient seen in clinic today for follow-up for chronic non pressure left leg wound. No reported DM. He continues to smoke. Education about cessation discussed in detail. Reports good protein intake. Wound has some interval build up of nonviable tissue. Would benefit from sharp debridement. No other new acute issues reported at this time.       Wound Care Procedure Note   Pre-Procedure  Pre-Procedure Diagnosis: Non-pressure chronic ulcer of other part of left lower leg with fat layer exposed (HCC) [L97.822]-  Consent: Consent obtained, consent given by Patient,Risks Discussed, Alternatives Discussed  Indication: non viable tissue  Time out was called prior to procedure.   Pre procedure Pain assessment: none  Pre debridement measurements: Length 0.5cm,Width 0.5cm, depth 0.1cm, sinus/tunnelNo, undermining No    Post Procedure  Post-Procedure Diagnosis: Non-pressure chronic ulcer of other part of left lower leg with fat layer exposed (HCC) [L97.822]-  Post debridement measurements: Length 0.7cm,Width 0.7 cm, depth 0.2cm, sinus/tunnelNo, undermining No  Post procedure Pain assessment: moderate  Graft/Implant/Prosthetics/Implanted Device/Transplants:  None  Complication(s):  None    Procedure details:  Method of Debridement: excissional (Surgical removal or cutting away, outside or beyond the wound margin devitalized tissue, necrosis or slough.)  Procedure: The site was prepared using clean techniques, subcutaneous tissue was removed by surgical excision.  Instrument(s) used: Curette 4mm  Anesthesia: Lidocaine  Tissue removed: subuctaneous, Percent Removed 100%  Culture or Biopsy: None  Estimated Blood Loss: Small  Hemostasis Obtained: pressure    Follow-up: 2 weeks by patient request.To ER prior if acute issues arise. Add puracol AG collagen to base and continue HFB. Change q 1-2 days and PRN    Oscar Abernathy PA-C

## 2022-11-07 ENCOUNTER — OFFICE VISIT (OUTPATIENT)
Dept: FAMILY MEDICINE CLINIC | Facility: CLINIC | Age: 62
End: 2022-11-07

## 2022-11-07 VITALS
WEIGHT: 210 LBS | DIASTOLIC BLOOD PRESSURE: 88 MMHG | OXYGEN SATURATION: 99 % | HEIGHT: 70 IN | BODY MASS INDEX: 30.06 KG/M2 | TEMPERATURE: 97.8 F | HEART RATE: 97 BPM | SYSTOLIC BLOOD PRESSURE: 148 MMHG

## 2022-11-07 DIAGNOSIS — I73.9 PAD (PERIPHERAL ARTERY DISEASE): ICD-10-CM

## 2022-11-07 DIAGNOSIS — D64.9 ANEMIA, UNSPECIFIED TYPE: ICD-10-CM

## 2022-11-07 DIAGNOSIS — G62.9 NEUROPATHY: ICD-10-CM

## 2022-11-07 DIAGNOSIS — I10 ESSENTIAL HYPERTENSION: Primary | ICD-10-CM

## 2022-11-07 DIAGNOSIS — F33.1 MAJOR DEPRESSIVE DISORDER, RECURRENT, MODERATE: ICD-10-CM

## 2022-11-07 PROCEDURE — 83036 HEMOGLOBIN GLYCOSYLATED A1C: CPT | Performed by: NURSE PRACTITIONER

## 2022-11-07 PROCEDURE — 83540 ASSAY OF IRON: CPT | Performed by: NURSE PRACTITIONER

## 2022-11-07 PROCEDURE — 99214 OFFICE O/P EST MOD 30 MIN: CPT | Performed by: NURSE PRACTITIONER

## 2022-11-07 PROCEDURE — 82607 VITAMIN B-12: CPT | Performed by: NURSE PRACTITIONER

## 2022-11-07 PROCEDURE — 84466 ASSAY OF TRANSFERRIN: CPT | Performed by: NURSE PRACTITIONER

## 2022-11-07 PROCEDURE — 80061 LIPID PANEL: CPT | Performed by: NURSE PRACTITIONER

## 2022-11-07 PROCEDURE — 80050 GENERAL HEALTH PANEL: CPT | Performed by: NURSE PRACTITIONER

## 2022-11-07 RX ORDER — ATENOLOL 25 MG/1
25 TABLET ORAL DAILY
Qty: 90 TABLET | Refills: 1 | Status: CANCELLED | OUTPATIENT
Start: 2022-11-07

## 2022-11-07 RX ORDER — ATENOLOL 50 MG/1
50 TABLET ORAL DAILY
Qty: 30 TABLET | Refills: 5 | Status: SHIPPED | OUTPATIENT
Start: 2022-11-07 | End: 2023-02-07 | Stop reason: SDUPTHER

## 2022-11-07 RX ORDER — FOLIC ACID-PYRIDOXINE-CYANOCOBALAMIN TAB 2.5-25-2 MG 2.5-25-2 MG
1 TAB ORAL DAILY
Qty: 90 TABLET | Refills: 1 | Status: SHIPPED | OUTPATIENT
Start: 2022-11-07

## 2022-11-07 RX ORDER — TRAZODONE HYDROCHLORIDE 50 MG/1
50 TABLET ORAL NIGHTLY
Qty: 90 TABLET | Refills: 1 | Status: SHIPPED | OUTPATIENT
Start: 2022-11-07 | End: 2023-02-07 | Stop reason: SDUPTHER

## 2022-11-07 RX ORDER — GABAPENTIN 800 MG/1
1200 TABLET ORAL 3 TIMES DAILY
Qty: 135 TABLET | Refills: 2 | Status: SHIPPED | OUTPATIENT
Start: 2022-11-07 | End: 2023-02-07 | Stop reason: SDUPTHER

## 2022-11-07 RX ORDER — ISOSORBIDE MONONITRATE 30 MG/1
30 TABLET, EXTENDED RELEASE ORAL DAILY
Qty: 90 TABLET | Refills: 1 | Status: SHIPPED | OUTPATIENT
Start: 2022-11-07 | End: 2023-02-07 | Stop reason: SDUPTHER

## 2022-11-07 RX ORDER — GABAPENTIN 800 MG/1
800 TABLET ORAL 3 TIMES DAILY
Qty: 135 TABLET | Refills: 2 | Status: SHIPPED | OUTPATIENT
Start: 2022-11-07 | End: 2022-11-07 | Stop reason: SDUPTHER

## 2022-11-07 RX ORDER — CILOSTAZOL 100 MG/1
100 TABLET ORAL DAILY
Qty: 60 TABLET | Refills: 5 | Status: SHIPPED | OUTPATIENT
Start: 2022-11-07 | End: 2023-02-07 | Stop reason: SDUPTHER

## 2022-11-08 ENCOUNTER — TELEPHONE (OUTPATIENT)
Dept: FAMILY MEDICINE CLINIC | Facility: CLINIC | Age: 62
End: 2022-11-08

## 2022-11-08 LAB
ALBUMIN SERPL-MCNC: 4.3 G/DL (ref 3.5–5.2)
ALBUMIN/GLOB SERPL: 1.3 G/DL
ALP SERPL-CCNC: 83 U/L (ref 39–117)
ALT SERPL W P-5'-P-CCNC: 18 U/L (ref 1–41)
ANION GAP SERPL CALCULATED.3IONS-SCNC: 10 MMOL/L (ref 5–15)
AST SERPL-CCNC: 23 U/L (ref 1–40)
BASOPHILS # BLD AUTO: 0.08 10*3/MM3 (ref 0–0.2)
BASOPHILS NFR BLD AUTO: 1 % (ref 0–1.5)
BILIRUB SERPL-MCNC: 0.4 MG/DL (ref 0–1.2)
BUN SERPL-MCNC: 14 MG/DL (ref 8–23)
BUN/CREAT SERPL: 10.1 (ref 7–25)
CALCIUM SPEC-SCNC: 10.9 MG/DL (ref 8.6–10.5)
CHLORIDE SERPL-SCNC: 102 MMOL/L (ref 98–107)
CHOLEST SERPL-MCNC: 203 MG/DL (ref 0–200)
CO2 SERPL-SCNC: 24 MMOL/L (ref 22–29)
CREAT SERPL-MCNC: 1.39 MG/DL (ref 0.76–1.27)
DEPRECATED RDW RBC AUTO: 45.3 FL (ref 37–54)
EGFRCR SERPLBLD CKD-EPI 2021: 57.7 ML/MIN/1.73
EOSINOPHIL # BLD AUTO: 0.18 10*3/MM3 (ref 0–0.4)
EOSINOPHIL NFR BLD AUTO: 2.2 % (ref 0.3–6.2)
ERYTHROCYTE [DISTWIDTH] IN BLOOD BY AUTOMATED COUNT: 13.3 % (ref 12.3–15.4)
GLOBULIN UR ELPH-MCNC: 3.3 GM/DL
GLUCOSE SERPL-MCNC: 98 MG/DL (ref 65–99)
HBA1C MFR BLD: 5.1 % (ref 4.8–5.6)
HCT VFR BLD AUTO: 43.2 % (ref 37.5–51)
HDLC SERPL-MCNC: 53 MG/DL (ref 40–60)
HGB BLD-MCNC: 15.1 G/DL (ref 13–17.7)
IMM GRANULOCYTES # BLD AUTO: 0.03 10*3/MM3 (ref 0–0.05)
IMM GRANULOCYTES NFR BLD AUTO: 0.4 % (ref 0–0.5)
IRON 24H UR-MRATE: 123 MCG/DL (ref 59–158)
IRON SATN MFR SERPL: 26 % (ref 20–50)
LDLC SERPL CALC-MCNC: 109 MG/DL (ref 0–100)
LDLC/HDLC SERPL: 1.92 {RATIO}
LYMPHOCYTES # BLD AUTO: 1.44 10*3/MM3 (ref 0.7–3.1)
LYMPHOCYTES NFR BLD AUTO: 17.3 % (ref 19.6–45.3)
MCH RBC QN AUTO: 32.8 PG (ref 26.6–33)
MCHC RBC AUTO-ENTMCNC: 35 G/DL (ref 31.5–35.7)
MCV RBC AUTO: 93.9 FL (ref 79–97)
MONOCYTES # BLD AUTO: 0.59 10*3/MM3 (ref 0.1–0.9)
MONOCYTES NFR BLD AUTO: 7.1 % (ref 5–12)
NEUTROPHILS NFR BLD AUTO: 6 10*3/MM3 (ref 1.7–7)
NEUTROPHILS NFR BLD AUTO: 72 % (ref 42.7–76)
NRBC BLD AUTO-RTO: 0 /100 WBC (ref 0–0.2)
PLATELET # BLD AUTO: 237 10*3/MM3 (ref 140–450)
PMV BLD AUTO: 10.3 FL (ref 6–12)
POTASSIUM SERPL-SCNC: 4.4 MMOL/L (ref 3.5–5.2)
PROT SERPL-MCNC: 7.6 G/DL (ref 6–8.5)
RBC # BLD AUTO: 4.6 10*6/MM3 (ref 4.14–5.8)
SODIUM SERPL-SCNC: 136 MMOL/L (ref 136–145)
TIBC SERPL-MCNC: 478 MCG/DL (ref 298–536)
TRANSFERRIN SERPL-MCNC: 321 MG/DL (ref 200–360)
TRIGL SERPL-MCNC: 242 MG/DL (ref 0–150)
TSH SERPL DL<=0.05 MIU/L-ACNC: 1.48 UIU/ML (ref 0.27–4.2)
VIT B12 BLD-MCNC: 1825 PG/ML (ref 211–946)
VLDLC SERPL-MCNC: 41 MG/DL (ref 5–40)
WBC NRBC COR # BLD: 8.32 10*3/MM3 (ref 3.4–10.8)

## 2022-11-08 NOTE — TELEPHONE ENCOUNTER
----- Message from KEVIN Jensen sent at 11/8/2022  2:39 PM EST -----  With weight gain cholesterol has increased.  Will he agree to a statin      Spoke with patient he verbalized understanding,reports he has tried 3-4 different ones in the past & just do not agree with him.will work on diet & weight loss.

## 2022-11-08 NOTE — TELEPHONE ENCOUNTER
----- Message from KEVIN Jensen sent at 11/8/2022  2:39 PM EST -----  With weight gain cholesterol has increased.  Will he agree to a statin

## 2022-11-08 NOTE — PROGRESS NOTES
"Chief Complaint  Allergies    Subjective          Gurwinder Harding presents to [facility]  Hypertension  This is a chronic (carotid stenosis) problem. The current episode started more than 1 year ago. The problem is controlled. Associated symptoms include malaise/fatigue (improved with medication changes) and shortness of breath. Pertinent negatives include no chest pain, palpitations or peripheral edema. Risk factors for coronary artery disease include smoking/tobacco exposure and sedentary lifestyle. Past treatments include beta blockers and ACE inhibitors. Current antihypertension treatment includes ACE inhibitors and beta blockers. The current treatment provides moderate improvement. Compliance problems include diet and exercise.    Fatigue  This is a chronic problem. The current episode started more than 1 year ago. The problem occurs constantly. The problem has been unchanged. Pertinent negatives include no chest pain.   Extremity Weakness   Pain location: bilateral lower extremity. Chronicity: Known PAD post stenting with chronic Neuralgia.  Continued difficulty standing, walking.  Gait is slow and unsteady.  Continued swelling  The current episode started more than 1 year ago. The problem occurs daily. The problem has been gradually worsening. The pain is moderate. Associated symptoms include a limited range of motion and stiffness. Associated symptoms comments: Tremors of hands new onset in the past several months  . Family history includes gout. Parkinsons in the family His past medical history is significant for gout.   Depression  Visit Type: follow-up (Feels symptoms are stable)  Patient presents with the following symptoms: shortness of breath.  Patient is not experiencing: palpitations.        Objective   Vital Signs:   /88 (BP Location: Right arm, Patient Position: Sitting)   Pulse 97   Temp 97.8 °F (36.6 °C) (Temporal)   Ht 177.8 cm (70\")   Wt 95.3 kg (210 " lb)   SpO2 99%   BMI 30.13 kg/m²     Physical Exam  Vitals and nursing note reviewed.   Constitutional:       General: He is not in acute distress.     Appearance: He is well-developed.   HENT:      Head: Normocephalic.   Eyes:      Conjunctiva/sclera: Conjunctivae normal.   Cardiovascular:      Rate and Rhythm: Normal rate and regular rhythm.      Heart sounds: Normal heart sounds. No murmur heard.  Pulmonary:      Effort: Pulmonary effort is normal. No respiratory distress.      Breath sounds: Normal breath sounds. No wheezing.   Abdominal:      General: Bowel sounds are normal.      Tenderness: There is abdominal tenderness.   Musculoskeletal:         General: Tenderness present. No swelling.      Right lower leg: No edema.      Left lower leg: No edema.   Skin:     General: Skin is warm and dry.   Neurological:      Mental Status: He is alert and oriented to person, place, and time.      Cranial Nerves: No cranial nerve deficit.      Sensory: Sensory deficit present.      Motor: Weakness present.      Coordination: Coordination abnormal.      Gait: Gait abnormal.      Deep Tendon Reflexes: Reflexes normal.   Psychiatric:         Mood and Affect: Mood normal.         Behavior: Behavior normal.         Thought Content: Thought content normal.         Judgment: Judgment normal.        During this visit the following were done:  Labs Reviewed [x]    Labs Ordered [x]    Radiology Reports Reviewed []    Radiology Ordered []    PCP Records Reviewed []    Referring Provider Records Reviewed []    ER Records Reviewed []    Hospital Records Reviewed []    History Obtained From Family []    Radiology Images Reviewed []    Other Reviewed []    Records Requested []              Diagnoses and all orders for this visit:    1. Essential hypertension (Primary)  -     isosorbide mononitrate (IMDUR) 30 MG 24 hr tablet; Take 1 tablet by mouth Daily.  Dispense: 90 tablet; Refill: 1  -     folic acid-pyridoxine-cyanocobalamin  (Folbic) 2.5-25-2 MG tablet tablet; Take 1 tablet by mouth Daily.  Dispense: 90 tablet; Refill: 1  -     atenolol (Tenormin) 50 MG tablet; Take 1 tablet by mouth Daily.  Dispense: 30 tablet; Refill: 5  -     Comprehensive Metabolic Panel; Future  -     Lipid Panel; Future  -     TSH; Future  -     Comprehensive Metabolic Panel  -     Lipid Panel  -     TSH    2. Neuropathy  -     cilostazol (PLETAL) 100 MG tablet; Take 1 tablet by mouth Daily.  Dispense: 60 tablet; Refill: 5  -     Discontinue: gabapentin (NEURONTIN) 800 MG tablet; Take 1 tablet by mouth 3 (Three) Times a Day.  Dispense: 135 tablet; Refill: 2  -     Comprehensive Metabolic Panel; Future  -     Hemoglobin A1c; Future  -     Lipid Panel; Future  -     TSH; Future  -     Vitamin B12; Future  -     gabapentin (NEURONTIN) 800 MG tablet; Take 1.5 tablets by mouth 3 (Three) Times a Day.  Dispense: 135 tablet; Refill: 2  -     Comprehensive Metabolic Panel  -     Hemoglobin A1c  -     Lipid Panel  -     TSH  -     Vitamin B12    3. Major depressive disorder, recurrent, moderate (HCC)  -     traZODone (DESYREL) 50 MG tablet; Take 1 tablet by mouth Every Night.  Dispense: 90 tablet; Refill: 1    4. Anemia, unspecified type  -     Comprehensive Metabolic Panel; Future  -     CBC w AUTO Differential; Future  -     Iron and TIBC; Future  -     Comprehensive Metabolic Panel  -     CBC w AUTO Differential  -     Iron and TIBC    5. PAD (peripheral artery disease) (HCC)      BMI is >= 30 and <35. (Class 1 Obesity). The following options were offered after discussion;: exercise counseling/recommendations and nutrition counseling/recommendations        Follow Up   Return in about 3 months (around 2/7/2023), or if symptoms worsen or fail to improve, for Recheck LABS TODAY.  Patient was given instructions and counseling regarding his condition or for health maintenance advice. Please see specific information pulled into the AVS if appropriate.     Michael Reviewed and  is consistent.  UDS reviewed and is consistent.  Patient comfort assessment guide reviewed and updated today.    As part of the patient's treatment plan they are being prescribed a controlled substance/ substances with potential for abuse.  This patient has been made aware of the appropriate use of such medications, including potential risk of somnolence, limited ability to drive and/or work safely, and potential for overdose.  It has also been made clear these medications are for use by the patient only, without concomitant use of alcohol or other substances unless prescribed/advised by medical provider.    Patient has completed prescribing agreement detailing terms of continued prescribing of controlled substances including monitoring HERON reports, urine drug screens, and pill counts.  The patient is aware HERON reports are reviewed on a regular basis and scanned into the chart.    History and physical exam exhibit continued safe and appropriate use of controlled substances.

## 2022-11-10 ENCOUNTER — HOSPITAL ENCOUNTER (OUTPATIENT)
Dept: WOUND CARE | Facility: HOSPITAL | Age: 62
Discharge: HOME | End: 2022-11-10
Admitting: NURSE PRACTITIONER

## 2022-11-10 VITALS
DIASTOLIC BLOOD PRESSURE: 80 MMHG | HEART RATE: 87 BPM | TEMPERATURE: 98.9 F | RESPIRATION RATE: 18 BRPM | SYSTOLIC BLOOD PRESSURE: 163 MMHG

## 2022-11-10 PROCEDURE — G0463 HOSPITAL OUTPT CLINIC VISIT: HCPCS

## 2022-11-24 NOTE — PROGRESS NOTES
Wound Clinic Note  Patient Identification:  Name:  Gurwinder Harding  Age:  62 y.o.  Sex:  male  :  1960  MRN:  [id number]   Visit Number:  [id number]  Primary Care Physician:  Pastora Person APRN     Subjective     Chief complaint:     Left shin wound    History of presenting illness:     Patient is a 62 y.o. male with past medical history significant for PVD, and current everyday smoker. Presented today for evaluation of left shin wound. Reports area has been present for approximately 1 month. He had a fall hitting a metal box. Has been applying silvadene to the area. Completed course of Bactrim. Known history of PVD with intervention in 2019. He is scheduled for US at the end of the month. Reports increase in swelling to the distal leg and foot. Denies any fever or chills. No other associated signs or symptoms reported. Minimal drainage reported. Positive for claudication.     Interval history:     2022: Patient seen in clinic today for follow-up to left shin wound. Wound covered with eschar. Small amount of drainage without odor.   Reports increase in pain. Denies any fever or chills. States compliance with treatment regimen. DONNIE: Composite Right DONNIE: 1.14 Composite Left DONNIE: 0.89. X- ray: 1.  Soft tissue edema and probable ulceration in the left mid anterior lower leg. No acute bony findings. Glucsoe 128, albumin 3.75, prealbummin 31.5, CRP <0.30, WBC 8.79, sed rate 41.     2022: Patient seen in clinic today for follow up to left shin wound. Wound vac was applied last visit. He reports increase in pain to area. Wound with increase in slough. Bone exposed. Increase in pain. Denies any fever or chills.     2022:Patient seen in clinic today for follow up to left shin wound. Wound without significant changes. He continues to report moderate to severe pain. Biopsy results concerning for acute osteomyelitis. Wound culture reports MRSA. He denies any fever or chills. Reports compliance with  treatment regimen without complications. Swelling remains present.     07/06/2022: Patient seen in clinic today for follow up to left shin wound. He continues to report pain to the area. Wound with increase in slough. Bone exposed.  Denies any fever or chills.  He is receiving Daptomycin IV at this time to treat concerns of acute osteomyelitis. Swelling remains present to left foot and ankle. No new issues or concerns reported.    07/15/2022: Patient seen in clinic today for follow up to left shin wound. He continues to report pain to the area. Wound continues with slough Bone exposed does appear to have some areas of granulation present.  Denies any fever or chills.  He is receiving Daptomycin IV at this time to treat acute osteomyelitis. Swelling remains present to left foot and ankle. No new issues or concerns reported.    07/29/2022: Patient seen in clinic today for follow up to left shin wound. He continues to report pain to the area. Wound continues with slough Bone remains exposed increased granulation tissue. Denies any fever or chills.  He is receiving Daptomycin IV at this time to treat acute osteomyelitis 2 more weeks of antibiotics. Swelling remains present to left foot and ankle. No new issues or concerns reported.    08/05/2022: Patient seen in clinic today for follow-up to left shin wound. Wound continues with exposed bone, does appear to have granulation tissue present. He continues to report pain, this is unchanged from prior. Continues with IV antibiotics at this time. Denies any fever or chills. No new issues or concerns reported. Tolerating current wound treatment.     08/11/2022: Patient seen in clinic today for follow up to left shin wound. He continues to report pain to the area. Wound base red and moist. Bone remains exposed, with small area and granulation has increased.  Denies any fever or chills.  Completed course of Daptomycin. Swelling remains present to left foot and ankle. No new  issues or concerns reported.    08/18/2022: Patient seen in clinic today for follow-up to left shin wound. Reports pain has improved slightly. Denies any fever or chills. Reports he continues to smoke. No new issues or concerns reported. He is tolerating current treatment with hydrofera blue without complications. Swelling appears to be controlled.    09/01/2022: Patient seen in clinic today for follow up to left shin wound. He continues to report pain to the area. Wound base red and moist, bone no longer visible. Denies any fever or chills.  Completed course of Daptomycin. Swelling remains present to left foot and ankle. No new issues or concerns reported.    09/15/2022:  Patient seen in clinic today for follow-up to left shin wound. Reports pain has improved slightly. Denies any fever or chills. Reports he continues to smoke. No new issues or concerns reported. He is tolerating current treatment with hydrofera blue without complications. Swelling appears to be controlled.    09/29/2022: Patient seen in clinic today for follow-up to left shin wound. Denies any fever or chills. Reports he continues to smoke. No new issues or concerns reported. He is tolerating current treatment with hydrofera blue without complications. Swelling appears to be controlled. Base with yellow slough and dried edges. Minimal drainage reported.     10/13/2022: Patient seen in clinic today for follow-up to left shin wound. Denies any pain to the area. Denies any fever or chills. Reports he continues to smoke. No new issues or concerns reported. He is tolerating current treatment with hydrofera blue without complications. Swelling appears to be controlled. Labs reviewed: glucose 110, albumin 3.86, prealbumin 41.6, A1C 5.40, WBC 7.43, CRP < 0.30, sed rate 38.     11/10/2022: Patient seen in clinic today for follow-up to left shin wound. Wound appears to be healed at this time. Denies any new issues or concerns at this  time.  ---------------------------------------------------------------------------------------------------------------------   Review of Systems   Constitutional: Negative for chills and fever.   HENT: Negative for congestion and rhinorrhea.    Respiratory: Negative for cough and shortness of breath.    Cardiovascular: Positive for leg swelling. Negative for chest pain.   Gastrointestinal: Negative for diarrhea, nausea and vomiting.   Musculoskeletal: Negative for gait problem.   Skin: Positive for wound.   Neurological: Negative for dizziness and weakness.      ---------------------------------------------------------------------------------------------------------------------   Past Medical History:   Diagnosis Date   • Alcohol abuse    • Arthritis    • Carotid stenosis    • Cerebrovascular accident (CVA) due to occlusion of left carotid artery (HCC) 07/15/2021   • ED (erectile dysfunction)    • History of degenerative disc disease    • Hydrocele in adult 12/29/2020   • Hypertension    • MRSA (methicillin resistant staph aureus) culture positive     patient states diagnosed approx 2 wks ago   • Neuropathy    • Peripheral vascular disease (HCC)     s/p arthrectomy follow by balloon angioplasty and stents of right and left SFA   • Tobacco abuse    • Vitamin D deficiency      Past Surgical History:   Procedure Laterality Date   • APPENDECTOMY     • ARTERIOGRAM N/A 9/19/2019    Procedure: Arteriogram;  Surgeon: Tong Azar MD;  Location: [facility] INVASIVE LOCATION;  Service: Cardiology   • BACK SURGERY      DISC RUPTURE REPAIR, L5   • CARDIAC CATHETERIZATION Right 10/29/2019    Procedure: Peripheral angiography;  Surgeon: Tong Azar MD;  Location: [facility] INVASIVE LOCATION;  Service: Cardiovascular   • ENDOSCOPY N/A 2/18/2022    Procedure: ESOPHAGOGASTRODUODENOSCOPY;  Surgeon: Christine Duran MD;  Location: [facility] OR;  Service: General;  Laterality: N/A;   • VASCULAR SURGERY Bilateral      Family  History   Problem Relation Age of Onset   • Hypertension Mother    • Cancer Father         LUNG   • Diabetes Father    • Hypertension Father    • Heart attack Other         GRANDFATHER     Social History     Socioeconomic History   • Marital status:    • Number of children: 1   Tobacco Use   • Smoking status: Every Day     Packs/day: 0.50     Years: 30.00     Pack years: 15.00     Types: Cigarettes   • Smokeless tobacco: Never   Vaping Use   • Vaping Use: Never used   Substance and Sexual Activity   • Alcohol use: Not Currently     Alcohol/week: 24.0 standard drinks     Types: 24 Cans of beer per week   • Drug use: No   • Sexual activity: Defer     ---------------------------------------------------------------------------------------------------------------------   Allergies:  Penicillins and Novocain [procaine]  ---------------------------------------------------------------------------------------------------------------------  Objective     ---------------------------------------------------------------------------------------------------------------------   Vital Signs:     No data found.  There were no vitals filed for this visit.     on   ;      There is no height or weight on file to calculate BMI.  Wt Readings from Last 3 Encounters:   11/07/22 95.3 kg (210 lb)   08/17/22 90.1 kg (198 lb 9.6 oz)   07/15/22 88.3 kg (194 lb 9.6 oz)       ---------------------------------------------------------------------------------------------------------------------   Physical Exam  Constitutional: Vital sign were reviewed (temperature, pulse, respiration, and blood pressure) and found to be within expected limits, general appearance was assessed and the patient was found to be in mild distress and calm and comfortable appears  Skin: Temperature:normal turgor and temperatureColor: normal, no cyanosis, jaundice, pallor or bruising, Moisture: dry,Nails: thickened yellow toenails bed, Hair:thinning to lower  extremities .  Physical Exam  Wound Assessment: Location: left anterior, lower, leg- healed    Wound Goal (s):Free of infection, No further symptoms and Reduction of contamination  Assessment & Plan      Non-pressure chronic ulcer of other part of left lower leg with fat layer exposed (HCC) [L97.822]- Healed    Recommend oscar protein diet 120g/day along with vitamin C 2000mg/day, vitamin A 5000 Units/day, vitamin D3 5000 Units/day, zinc 50mg/day to help promote wound healing     Completed 6 weeks course of Daptomycin IV     Reviewed with patient 06/17/2022:  A/BI: Composite Right DONNIE: 1.14 Composite Left DONNIE: 0.89. X- ray: 1.  Soft tissue edema and probable ulceration in the left mid anterior lower leg. No acute bony findings. Glucsoe 128, albumin 3.75, prealbummin 31.5, CRP <0.30, WBC 8.79, sed rate 41.     Obesity (BMI 30-39.9) [E66.9]- An obese person?is at greater risk for wound infection and dehiscence or evisceration.    PVD (peripheral vascular disease) with claudication (HCC) [I73.9]- DONNIE of left slightly decreased. He is following with vascular. Will repeat DONNIE if wound healing stalls.     Tobacco abuse [Z72.0]- Tissue perfusion is lower in users of tobacco and other forms of nicotine. Reduced tissue perfusion strongly inhibits wound healing, increases risk of infection, and dramatically increases risk of limb loss.    Clinical Impression:Low Complexity    Follow-up: as needed    KEVIN Camargo   [facility]  11/10/2022  1430

## 2022-12-26 DIAGNOSIS — G62.9 NEUROPATHY: ICD-10-CM

## 2022-12-27 RX ORDER — METHOCARBAMOL 750 MG/1
TABLET, FILM COATED ORAL
Qty: 120 TABLET | Refills: 1 | Status: SHIPPED | OUTPATIENT
Start: 2022-12-27 | End: 2023-02-07 | Stop reason: SDUPTHER

## 2023-02-07 ENCOUNTER — OFFICE VISIT (OUTPATIENT)
Dept: FAMILY MEDICINE CLINIC | Facility: CLINIC | Age: 63
End: 2023-02-07
Payer: COMMERCIAL

## 2023-02-07 VITALS
HEIGHT: 70 IN | HEART RATE: 82 BPM | DIASTOLIC BLOOD PRESSURE: 76 MMHG | OXYGEN SATURATION: 99 % | BODY MASS INDEX: 30.67 KG/M2 | SYSTOLIC BLOOD PRESSURE: 122 MMHG | TEMPERATURE: 97.5 F | WEIGHT: 214.2 LBS

## 2023-02-07 DIAGNOSIS — I73.9 PAD (PERIPHERAL ARTERY DISEASE): ICD-10-CM

## 2023-02-07 DIAGNOSIS — D64.9 ANEMIA, UNSPECIFIED TYPE: ICD-10-CM

## 2023-02-07 DIAGNOSIS — F33.1 MAJOR DEPRESSIVE DISORDER, RECURRENT, MODERATE: ICD-10-CM

## 2023-02-07 DIAGNOSIS — G62.9 NEUROPATHY: Primary | ICD-10-CM

## 2023-02-07 DIAGNOSIS — I10 ESSENTIAL HYPERTENSION: ICD-10-CM

## 2023-02-07 DIAGNOSIS — E66.3 OVERWEIGHT (BMI 25.0-29.9): ICD-10-CM

## 2023-02-07 PROCEDURE — 99214 OFFICE O/P EST MOD 30 MIN: CPT | Performed by: NURSE PRACTITIONER

## 2023-02-07 RX ORDER — TRAZODONE HYDROCHLORIDE 50 MG/1
50 TABLET ORAL NIGHTLY
Qty: 90 TABLET | Refills: 1 | Status: SHIPPED | OUTPATIENT
Start: 2023-02-07

## 2023-02-07 RX ORDER — GABAPENTIN 800 MG/1
1200 TABLET ORAL 3 TIMES DAILY
Qty: 135 TABLET | Refills: 2 | Status: SHIPPED | OUTPATIENT
Start: 2023-02-07

## 2023-02-07 RX ORDER — METHOCARBAMOL 750 MG/1
750 TABLET, FILM COATED ORAL 4 TIMES DAILY
Qty: 120 TABLET | Refills: 1 | Status: SHIPPED | OUTPATIENT
Start: 2023-02-07

## 2023-02-07 RX ORDER — CILOSTAZOL 100 MG/1
100 TABLET ORAL DAILY
Qty: 60 TABLET | Refills: 5 | Status: SHIPPED | OUTPATIENT
Start: 2023-02-07

## 2023-02-07 RX ORDER — ISOSORBIDE MONONITRATE 30 MG/1
30 TABLET, EXTENDED RELEASE ORAL DAILY
Qty: 90 TABLET | Refills: 1 | Status: SHIPPED | OUTPATIENT
Start: 2023-02-07

## 2023-02-07 RX ORDER — ATENOLOL 50 MG/1
50 TABLET ORAL DAILY
Qty: 30 TABLET | Refills: 5 | Status: SHIPPED | OUTPATIENT
Start: 2023-02-07

## 2023-02-15 NOTE — PROGRESS NOTES
Chief Complaint  Hypertension (May need refills)    Subjective          Gurwinder Harding presents to [facility]  Hypertension  This is a chronic (carotid stenosis) problem. The current episode started more than 1 year ago. The problem is controlled. Associated symptoms include anxiety, malaise/fatigue (improved with medication changes) and shortness of breath. Pertinent negatives include no blurred vision, chest pain, headaches, neck pain, orthopnea, palpitations, peripheral edema, PND or sweats. Risk factors for coronary artery disease include smoking/tobacco exposure and sedentary lifestyle. Past treatments include beta blockers and ACE inhibitors. Current antihypertension treatment includes ACE inhibitors and beta blockers. The current treatment provides moderate improvement. Compliance problems include diet and exercise.    Fatigue  This is a chronic problem. The current episode started more than 1 year ago. The problem occurs constantly. The problem has been unchanged. Associated symptoms include arthralgias and fatigue. Pertinent negatives include no chest pain, headaches or neck pain. Nothing aggravates the symptoms.   Extremity Weakness   Pain location: bilateral lower extremity. Chronicity: Known PAD post stenting with chronic Neuralgia.  Continued difficulty standing, walking.  Gait is slow and unsteady.  Continued swelling.  Overall unchanged. The current episode started more than 1 year ago. The problem occurs daily. The problem has been unchanged. The pain is moderate. Associated symptoms include a limited range of motion and stiffness. Associated symptoms comments: Tremors of hands . He has tried NSAIDS, oral narcotics, acetaminophen and rest for the symptoms. Family history includes gout. Parkinsons in the family His past medical history is significant for gout and osteoarthritis.   Depression  Visit Type: follow-up (Feels symptoms are stable)  Patient presents with the  "following symptoms: shortness of breath.  Patient is not experiencing: palpitations.        Objective   Vital Signs:   /76   Pulse 82   Temp 97.5 °F (36.4 °C) (Temporal)   Ht 177.8 cm (70\")   Wt 97.2 kg (214 lb 3.2 oz)   SpO2 99%   BMI 30.73 kg/m²     Physical Exam  Vitals and nursing note reviewed.   Constitutional:       General: He is not in acute distress.     Appearance: He is well-developed.   HENT:      Head: Normocephalic.   Eyes:      Conjunctiva/sclera: Conjunctivae normal.   Cardiovascular:      Rate and Rhythm: Normal rate and regular rhythm.      Heart sounds: Normal heart sounds. No murmur heard.  Pulmonary:      Effort: Pulmonary effort is normal. No respiratory distress.      Breath sounds: Normal breath sounds. No wheezing.   Musculoskeletal:         General: Tenderness present. No swelling.      Right lower leg: No edema.      Left lower leg: No edema.   Skin:     General: Skin is warm and dry.   Neurological:      Mental Status: He is alert and oriented to person, place, and time.      Cranial Nerves: No cranial nerve deficit.      Sensory: Sensory deficit present.      Motor: Weakness present.      Coordination: Coordination abnormal.      Gait: Gait abnormal.      Deep Tendon Reflexes: Reflexes normal.   Psychiatric:         Mood and Affect: Mood normal.         Behavior: Behavior normal.         Thought Content: Thought content normal.         Judgment: Judgment normal.        During this visit the following were done:  Labs Reviewed [x]    Labs Ordered [x]    Radiology Reports Reviewed []    Radiology Ordered []    PCP Records Reviewed []    Referring Provider Records Reviewed []    ER Records Reviewed []    Hospital Records Reviewed []    History Obtained From Family []    Radiology Images Reviewed []    Other Reviewed []    Records Requested []              Diagnoses and all orders for this visit:    1. Neuropathy (Primary)  -     cilostazol (PLETAL) 100 MG tablet; Take 1 tablet " by mouth Daily.  Dispense: 60 tablet; Refill: 5  -     gabapentin (NEURONTIN) 800 MG tablet; Take 1.5 tablets by mouth 3 (Three) Times a Day.  Dispense: 135 tablet; Refill: 2  -     methocarbamol (ROBAXIN) 750 MG tablet; Take 1 tablet by mouth 4 (Four) Times a Day.  Dispense: 120 tablet; Refill: 1    2. Essential hypertension  -     atenolol (Tenormin) 50 MG tablet; Take 1 tablet by mouth Daily.  Dispense: 30 tablet; Refill: 5  -     isosorbide mononitrate (IMDUR) 30 MG 24 hr tablet; Take 1 tablet by mouth Daily.  Dispense: 90 tablet; Refill: 1    3. Major depressive disorder, recurrent, moderate (HCC)  -     traZODone (DESYREL) 50 MG tablet; Take 1 tablet by mouth Every Night.  Dispense: 90 tablet; Refill: 1    4. Anemia, unspecified type    5. PAD (peripheral artery disease) (HCC)  -     cilostazol (PLETAL) 100 MG tablet; Take 1 tablet by mouth Daily.  Dispense: 60 tablet; Refill: 5    6. Overweight (BMI 25.0-29.9)      BMI is >= 30 and <35. (Class 1 Obesity). The following options were offered after discussion;: exercise counseling/recommendations and nutrition counseling/recommendations        Follow Up   Return in about 3 months (around 5/7/2023), or if symptoms worsen or fail to improve.  Patient was given instructions and counseling regarding his condition or for health maintenance advice. Please see specific information pulled into the AVS if appropriate.     Michael Reviewed and is consistent.  UDS reviewed and is consistent.  Patient comfort assessment guide reviewed and updated today.    As part of the patient's treatment plan they are being prescribed a controlled substance/ substances with potential for abuse.  This patient has been made aware of the appropriate use of such medications, including potential risk of somnolence, limited ability to drive and/or work safely, and potential for overdose.  It has also been made clear these medications are for use by the patient only, without concomitant use of  alcohol or other substances unless prescribed/advised by medical provider.    Patient has completed prescribing agreement detailing terms of continued prescribing of controlled substances including monitoring HERON reports, urine drug screens, and pill counts.  The patient is aware HERON reports are reviewed on a regular basis and scanned into the chart.    History and physical exam exhibit continued safe and appropriate use of controlled substances.

## 2023-03-01 NOTE — OUTREACH NOTE
Medical Week 2 Survey      Responses   [facility] patient discharged from?  [facility]   COVID-19 Test Status  Not tested   Does the patient have one of the following disease processes/diagnoses(primary or secondary)?  Other   Week 2 attempt successful?  Yes   Call start time  1132   Discharge diagnosis  Hypnatremia   Call end time  1136   Meds reviewed with patient/caregiver?  Yes   Is the patient taking all medications as directed (includes completed medication regime)?  Yes   Medication comments  Meds changed per Dr Azar. Not using the nicotine patches   Has the patient kept scheduled appointments due by today?  Yes   Comments  Lab work today and Na coming back up   Has home health visited the patient within 72 hours of discharge?  N/A   Pulse Ox monitoring  None   What is the patient's perception of their health status since discharge?  Improving   Is the patient/caregiver able to teach back signs and symptoms related to disease process for when to call PCP?  Yes   Is the patient/caregiver able to teach back signs and symptoms related to disease process for when to call 911?  Yes   Is the patient/caregiver able to teach back the hierarchy of who to call/visit for symptoms/problems? PCP, Specialist, Home health nurse, Urgent Care, ED, 911  Yes   If the patient is a current smoker, are they able to teach back resources for cessation?  Smoking cessation medications   Additional teach back comments  Not using patches and still smoking. States he is not drinking ETOH.   Week 2 Call Completed?  Yes          Nilsa Gaffney RN   negative

## 2023-03-28 ENCOUNTER — TELEPHONE (OUTPATIENT)
Dept: FAMILY MEDICINE CLINIC | Facility: CLINIC | Age: 63
End: 2023-03-28
Payer: COMMERCIAL

## 2023-03-30 NOTE — TELEPHONE ENCOUNTER
03/30/2023 PA for methocarbamol (ROBAXIN) 750 MG tablet approved. [facility] [id number] Samy Allan and patient notified

## 2023-03-30 NOTE — TELEPHONE ENCOUNTER
03/30/2023 PA for gabapentin (NEURONTIN) 800 MG tablet was approved. [facility] [id number] Samy Allan and patient notified.

## 2023-03-30 NOTE — TELEPHONE ENCOUNTER
03/30/2023 Additional information required by insurance for PA for gabapentin (NEURONTIN) 800 MG tablet was submitted

## 2023-04-26 DIAGNOSIS — I10 ESSENTIAL HYPERTENSION: ICD-10-CM

## 2023-04-26 RX ORDER — FOLIC ACID-PYRIDOXINE-CYANOCOBALAMIN TAB 2.5-25-2 MG 2.5-25-2 MG
TAB ORAL
Qty: 90 TABLET | Refills: 1 | Status: SHIPPED | OUTPATIENT
Start: 2023-04-26

## 2023-05-01 DIAGNOSIS — G62.9 NEUROPATHY: ICD-10-CM

## 2023-05-01 RX ORDER — METHOCARBAMOL 750 MG/1
TABLET, FILM COATED ORAL
Qty: 120 TABLET | Refills: 1 | Status: SHIPPED | OUTPATIENT
Start: 2023-05-01

## 2023-05-09 ENCOUNTER — OFFICE VISIT (OUTPATIENT)
Dept: FAMILY MEDICINE CLINIC | Facility: CLINIC | Age: 63
End: 2023-05-09
Payer: MEDICARE

## 2023-05-09 VITALS
WEIGHT: 219.2 LBS | OXYGEN SATURATION: 97 % | BODY MASS INDEX: 31.38 KG/M2 | HEIGHT: 70 IN | TEMPERATURE: 97.3 F | HEART RATE: 89 BPM | DIASTOLIC BLOOD PRESSURE: 82 MMHG | SYSTOLIC BLOOD PRESSURE: 162 MMHG

## 2023-05-09 DIAGNOSIS — I73.9 PVD (PERIPHERAL VASCULAR DISEASE) WITH CLAUDICATION: ICD-10-CM

## 2023-05-09 DIAGNOSIS — I73.9 PAD (PERIPHERAL ARTERY DISEASE): ICD-10-CM

## 2023-05-09 DIAGNOSIS — Z00.00 MEDICARE ANNUAL WELLNESS VISIT, SUBSEQUENT: Primary | ICD-10-CM

## 2023-05-09 DIAGNOSIS — Z72.0 TOBACCO ABUSE: ICD-10-CM

## 2023-05-09 DIAGNOSIS — I10 ESSENTIAL HYPERTENSION: ICD-10-CM

## 2023-05-09 DIAGNOSIS — G62.9 NEUROPATHY: ICD-10-CM

## 2023-05-09 DIAGNOSIS — R60.0 LOCALIZED EDEMA: ICD-10-CM

## 2023-05-09 DIAGNOSIS — Z87.898 HISTORY OF ALCOHOL USE: ICD-10-CM

## 2023-05-09 RX ORDER — HYDROCHLOROTHIAZIDE 25 MG/1
25 TABLET ORAL DAILY
Qty: 90 TABLET | Refills: 1 | Status: SHIPPED | OUTPATIENT
Start: 2023-05-09

## 2023-05-09 RX ORDER — GABAPENTIN 800 MG/1
1200 TABLET ORAL 3 TIMES DAILY
Qty: 135 TABLET | Refills: 3 | Status: SHIPPED | OUTPATIENT
Start: 2023-05-09

## 2023-05-10 NOTE — PROGRESS NOTES
The ABCs of the Annual Wellness Visit  Subsequent Medicare Wellness Visit    Subjective    Gurwinder Harding is a 62 y.o. male who presents for a Subsequent Medicare Wellness Visit.    The following portions of the patient's history were reviewed and   updated as appropriate: allergies, current medications, past family history, past medical history, past social history, past surgical history and problem list.    Compared to one year ago, the patient feels his physical   health is the same.    Compared to one year ago, the patient feels his mental   health is the same.    Recent Hospitalizations:  He was not admitted to the hospital during the last year.       Current Medical Providers:  Patient Care Team:  Pastora Person APRN as PCP - General (Family Medicine)    Outpatient Medications Prior to Visit   Medication Sig Dispense Refill   • atenolol (Tenormin) 50 MG tablet Take 1 tablet by mouth Daily. 30 tablet 5   • cilostazol (PLETAL) 100 MG tablet Take 1 tablet by mouth Daily. 60 tablet 5   • ferrous sulfate (FerrouSul) 325 (65 FE) MG tablet Take 1 tablet by mouth 3 (Three) Times a Day With Meals. 90 tablet 0   • Folbic 2.5-25-2 MG tablet tablet TAKE ONE TABLET BY MOUTH DAILY 90 tablet 1   • isosorbide mononitrate (IMDUR) 30 MG 24 hr tablet Take 1 tablet by mouth Daily. 90 tablet 1   • methocarbamol (ROBAXIN) 750 MG tablet TAKE ONE TABLET BY MOUTH FOUR TIMES A  tablet 1   • traZODone (DESYREL) 50 MG tablet Take 1 tablet by mouth Every Night. 90 tablet 1   • gabapentin (NEURONTIN) 800 MG tablet Take 1.5 tablets by mouth 3 (Three) Times a Day. 135 tablet 2   • pantoprazole (Protonix) 40 MG EC tablet Take 1 tablet by mouth Daily. 30 tablet 0     No facility-administered medications prior to visit.       No opioid medication identified on active medication list. I have reviewed chart for other potential  high risk medication/s and harmful drug interactions in the elderly.          Aspirin is not on active medication  "list.  Aspirin use is not indicated based on review of current medical condition/s. Risk of harm outweighs potential benefits.  .    Patient Active Problem List   Diagnosis   • Tobacco abuse   • Alcohol abuse   • ED (erectile dysfunction)   • History of degenerative disc disease   • Neuropathy   • Hypertension   • PVD (peripheral vascular disease) with claudication   • Carotid artery stenosis   • Hyponatremia   • Obesity (BMI 30-39.9)   • Hydrocele in adult   • Flank pain   • Gross hematuria   • Constipation   • Blurry vision, left eye   • Cerebrovascular accident (CVA) due to occlusion of left carotid artery   • Upper GI bleed   • Non-pressure chronic ulcer of other part of left lower leg with fat layer exposed     Advance Care Planning   Advance Care Planning     Advance Directive is not on file.  ACP discussion was declined by the patient. Patient does not have an advance directive, information provided.     Objective    Vitals:    05/09/23 1636   BP: 162/82   BP Location: Right arm   Patient Position: Sitting   Pulse: 89   Temp: 97.3 °F (36.3 °C)   TempSrc: Temporal   SpO2: 97%   Weight: 99.4 kg (219 lb 3.2 oz)   Height: 177.8 cm (70\")     Estimated body mass index is 31.45 kg/m² as calculated from the following:    Height as of this encounter: 177.8 cm (70\").    Weight as of this encounter: 99.4 kg (219 lb 3.2 oz).    BMI is >= 30 and <35. (Class 1 Obesity). The following options were offered after discussion;: exercise counseling/recommendations and nutrition counseling/recommendations      Does the patient have evidence of cognitive impairment? No          HEALTH RISK ASSESSMENT    Smoking Status:  Social History     Tobacco Use   Smoking Status Every Day   • Packs/day: 1.00   • Years: 30.00   • Pack years: 30.00   • Types: Cigarettes   Smokeless Tobacco Never     Alcohol Consumption:  Social History     Substance and Sexual Activity   Alcohol Use Not Currently    Comment: few beers a day     Fall Risk " Screen:    BREA Fall Risk Assessment was completed, and patient is at LOW risk for falls.Assessment completed on:2023    Depression Screenin/9/2023     4:34 PM   PHQ-2/PHQ-9 Depression Screening   Little Interest or Pleasure in Doing Things 0-->not at all   Feeling Down, Depressed or Hopeless 1-->several days   PHQ-9: Brief Depression Severity Measure Score 1       Health Habits and Functional and Cognitive Screenin/9/2023     4:31 PM   Functional & Cognitive Status   Do you have difficulty preparing food and eating? Yes   Do you have difficulty bathing yourself, getting dressed or grooming yourself? No   Do you have difficulty using the toilet? No   Do you have difficulty moving around from place to place? Yes   Do you have trouble with steps or getting out of a bed or a chair? Yes   Current Diet Well Balanced Diet   Dental Exam Not up to date   Eye Exam Not up to date   Exercise (times per week) 1 times per week   Current Exercises Include Walking   Do you need help using the phone?  No   Are you deaf or do you have serious difficulty hearing?  No   Do you need help with transportation? No   Do you need help shopping? No   Do you need help preparing meals?  No   Do you need help with housework?  No   Do you need help with laundry? No   Do you need help taking your medications? No   Do you need help managing money? No   Do you ever drive or ride in a car without wearing a seat belt? No       Age-appropriate Screening Schedule:  Refer to the list below for future screening recommendations based on patient's age, sex and/or medical conditions. Orders for these recommended tests are listed in the plan section. The patient has been provided with a written plan.    Health Maintenance   Topic Date Due   • TDAP/TD VACCINES (1 - Tdap) Never done   • HEPATITIS C SCREENING  05/10/2023 (Originally 2016)   • ZOSTER VACCINE (1 of 2) 05/10/2023 (Originally 2011)   • COVID-19 Vaccine (3 - Booster  for Pfizer series) 05/11/2023 (Originally 6/9/2021)   • LUNG CANCER SCREENING  05/25/2023 (Originally 6/11/2011)   • Pneumococcal Vaccine 0-64 (1 - PCV) 05/10/2024 (Originally 6/11/1966)   • INFLUENZA VACCINE  08/01/2023   • ANNUAL PHYSICAL  05/09/2024   • COLORECTAL CANCER SCREENING  05/19/2027                  CMS Preventative Services Quick Reference  Risk Factors Identified During Encounter  Alcohol Misuse: Patient encouraged to limit alcohol use to no more than 1 standard alcoholic beverage per day. (12 ounce beer, 6 ounce wine, one shot liquor)  Chronic Pain: Natural history and expected course discussed. Questions answered.  Fall Risk-High or Moderate: Discussed Fall Prevention in the home  Immunizations Discussed/Encouraged: Declined  Inactivity/Sedentary: Encouraged to exercise as she can  Tobacco Use/Dependance Risk (use dotphrase .tobaccocessation for documentation)  Dental Screening Recommended  Vision Screening Recommended  The above risks/problems have been discussed with the patient.  Pertinent information has been shared with the patient in the After Visit Summary.  An After Visit Summary and PPPS were made available to the patient.    Follow Up:   Next Medicare Wellness visit to be scheduled in 1 year.       Additional E&M Note during same encounter follows:  Patient has multiple medical problems which are significant and separately identifiable that require additional work above and beyond the Medicare Wellness Visit.      Chief Complaint  Medicare Wellness-subsequent    Subjective        Leg Swelling  This is a recurrent (HX Neuropathy, PAD, PVD, HTN, smoker and alcohol use) problem. The current episode started 1 to 4 weeks ago. The problem occurs intermittently. The problem has been gradually worsening. Associated symptoms include arthralgias and myalgias. Pertinent negatives include no chest pain, coughing, fever, joint swelling or vertigo. Nothing aggravates the symptoms. He has tried rest for  "the symptoms. The treatment provided moderate relief.     Gurwinder Harding is also being seen today for leg swelling    Review of Systems   Constitutional: Negative for fever.   Respiratory: Negative for cough.    Cardiovascular: Negative for chest pain.   Musculoskeletal: Positive for arthralgias and myalgias. Negative for joint swelling.   Neurological: Negative for vertigo.       Objective   Vital Signs:  /82 (BP Location: Right arm, Patient Position: Sitting)   Pulse 89   Temp 97.3 °F (36.3 °C) (Temporal)   Ht 177.8 cm (70\")   Wt 99.4 kg (219 lb 3.2 oz)   SpO2 97%   BMI 31.45 kg/m²     Physical Exam  Vitals and nursing note reviewed.   Constitutional:       Appearance: He is well-developed.   Cardiovascular:      Rate and Rhythm: Normal rate and regular rhythm.      Heart sounds: Normal heart sounds. No murmur heard.  Pulmonary:      Effort: Pulmonary effort is normal.      Breath sounds: Normal breath sounds.   Skin:     General: Skin is warm and dry.   Neurological:      Mental Status: He is alert and oriented to person, place, and time.   Psychiatric:         Behavior: Behavior normal.          During this visit the following were done:  Labs Reviewed [x]    Labs Ordered [x]    Radiology Reports Reviewed []    Radiology Ordered []    PCP Records Reviewed []    Referring Provider Records Reviewed []    ER Records Reviewed []    Hospital Records Reviewed []    History Obtained From Family []    Radiology Images Reviewed []    Other Reviewed [x]    Records Requested []           Assessment and Plan   Diagnoses and all orders for this visit:    1. Medicare annual wellness visit, subsequent (Primary)  -     CBC Auto Differential; Future  -     Comprehensive Metabolic Panel; Future  -     Hemoglobin A1c; Future  -     TSH; Future  -     Lipid Panel; Future    2. Neuropathy  -     gabapentin (NEURONTIN) 800 MG tablet; Take 1.5 tablets by mouth 3 (Three) Times a Day.  Dispense: 135 tablet; Refill: 3    3. " Localized edema  -     hydroCHLOROthiazide (HYDRODIURIL) 25 MG tablet; Take 1 tablet by mouth Daily.  Dispense: 90 tablet; Refill: 1    4. Tobacco abuse    5. PAD (peripheral artery disease)    6. PVD (peripheral vascular disease) with claudication    7. History of alcohol use    8. Essential hypertension  -     hydroCHLOROthiazide (HYDRODIURIL) 25 MG tablet; Take 1 tablet by mouth Daily.  Dispense: 90 tablet; Refill: 1             Follow Up   Return in about 4 weeks (around 6/6/2023), or if symptoms worsen or fail to improve, for Recheck.  Patient was given instructions and counseling regarding his condition or for health maintenance advice. Please see specific information pulled into the AVS if appropriate.

## 2023-05-23 ENCOUNTER — TELEPHONE (OUTPATIENT)
Dept: FAMILY MEDICINE CLINIC | Facility: CLINIC | Age: 63
End: 2023-05-23
Payer: MEDICARE

## 2023-06-13 ENCOUNTER — OFFICE VISIT (OUTPATIENT)
Dept: FAMILY MEDICINE CLINIC | Facility: CLINIC | Age: 63
End: 2023-06-13
Payer: MEDICARE

## 2023-06-13 VITALS
DIASTOLIC BLOOD PRESSURE: 82 MMHG | SYSTOLIC BLOOD PRESSURE: 134 MMHG | TEMPERATURE: 97.1 F | HEIGHT: 70 IN | OXYGEN SATURATION: 98 % | HEART RATE: 80 BPM | WEIGHT: 220 LBS | BODY MASS INDEX: 31.5 KG/M2

## 2023-06-13 DIAGNOSIS — G62.9 NEUROPATHY: ICD-10-CM

## 2023-06-13 DIAGNOSIS — F33.1 MAJOR DEPRESSIVE DISORDER, RECURRENT, MODERATE: ICD-10-CM

## 2023-06-13 DIAGNOSIS — I73.9 PAD (PERIPHERAL ARTERY DISEASE): ICD-10-CM

## 2023-06-13 DIAGNOSIS — I10 ESSENTIAL HYPERTENSION: Primary | ICD-10-CM

## 2023-06-13 DIAGNOSIS — Z87.898 HISTORY OF ALCOHOL USE: ICD-10-CM

## 2023-06-13 PROCEDURE — 3079F DIAST BP 80-89 MM HG: CPT | Performed by: NURSE PRACTITIONER

## 2023-06-13 PROCEDURE — 1159F MED LIST DOCD IN RCRD: CPT | Performed by: NURSE PRACTITIONER

## 2023-06-13 PROCEDURE — 99214 OFFICE O/P EST MOD 30 MIN: CPT | Performed by: NURSE PRACTITIONER

## 2023-06-13 PROCEDURE — 3075F SYST BP GE 130 - 139MM HG: CPT | Performed by: NURSE PRACTITIONER

## 2023-06-13 PROCEDURE — 1160F RVW MEDS BY RX/DR IN RCRD: CPT | Performed by: NURSE PRACTITIONER

## 2023-06-13 RX ORDER — ISOSORBIDE MONONITRATE 30 MG/1
30 TABLET, EXTENDED RELEASE ORAL DAILY
Qty: 90 TABLET | Refills: 1 | Status: SHIPPED | OUTPATIENT
Start: 2023-06-13

## 2023-06-13 RX ORDER — ATENOLOL 50 MG/1
50 TABLET ORAL DAILY
Qty: 30 TABLET | Refills: 5 | Status: SHIPPED | OUTPATIENT
Start: 2023-06-13

## 2023-06-13 RX ORDER — CILOSTAZOL 100 MG/1
100 TABLET ORAL DAILY
Qty: 60 TABLET | Refills: 5 | Status: SHIPPED | OUTPATIENT
Start: 2023-06-13

## 2023-06-13 RX ORDER — TRAZODONE HYDROCHLORIDE 50 MG/1
50 TABLET ORAL NIGHTLY
Qty: 90 TABLET | Refills: 1 | Status: SHIPPED | OUTPATIENT
Start: 2023-06-13

## 2023-06-13 NOTE — PROGRESS NOTES
Chief Complaint  Edema (Feet swelling )    Subjective          Gurwinder Harding presents to [facility]  Hypertension  This is a chronic (carotid stenosis) problem. The current episode started more than 1 year ago. The problem is controlled. Associated symptoms include anxiety, malaise/fatigue (improved with medication changes), peripheral edema (on and off) and shortness of breath (at times). Pertinent negatives include no blurred vision, chest pain, headaches, neck pain, orthopnea, palpitations, PND or sweats. Risk factors for coronary artery disease include smoking/tobacco exposure and sedentary lifestyle. Past treatments include beta blockers and ACE inhibitors. Current antihypertension treatment includes ACE inhibitors and beta blockers. The current treatment provides moderate improvement. Compliance problems include diet and exercise.    Fatigue  This is a chronic problem. The current episode started more than 1 year ago. The problem occurs constantly. The problem has been unchanged. Associated symptoms include arthralgias and fatigue. Pertinent negatives include no chest pain, headaches or neck pain. Nothing aggravates the symptoms.   Extremity Weakness   Pain location: bilateral lower extremity. Chronicity: Known PAD post stenting with chronic Neuralgia.  Continued difficulty standing, walking.  Gait is slow and unsteady.  Continued swelling.  Overall unchanged. The current episode started more than 1 year ago. The problem occurs daily. The problem has been unchanged. The pain is moderate. Associated symptoms include a limited range of motion and stiffness. Associated symptoms comments: Tremors of hands . He has tried NSAIDS, oral narcotics, acetaminophen and rest for the symptoms. Family history includes gout. Parkinsons in the family His past medical history is significant for gout and osteoarthritis.   Depression  Visit Type: follow-up (Feels symptoms are stable)  Patient  "presents with the following symptoms: shortness of breath (at times).  Patient is not experiencing: palpitations.        Objective   Vital Signs:   /82 (BP Location: Right arm, Patient Position: Sitting)   Pulse 80   Temp 97.1 °F (36.2 °C) (Temporal)   Ht 177.8 cm (70\")   Wt 99.8 kg (220 lb)   SpO2 98%   BMI 31.57 kg/m²     Physical Exam  Vitals and nursing note reviewed.   Constitutional:       General: He is not in acute distress.     Appearance: He is well-developed.   HENT:      Head: Normocephalic.   Eyes:      Conjunctiva/sclera: Conjunctivae normal.   Cardiovascular:      Rate and Rhythm: Normal rate and regular rhythm.      Heart sounds: Normal heart sounds. No murmur heard.  Pulmonary:      Effort: Pulmonary effort is normal. No respiratory distress.      Breath sounds: Normal breath sounds. No wheezing.   Musculoskeletal:         General: Tenderness present. No swelling.      Right lower leg: No edema.      Left lower leg: No edema.   Skin:     General: Skin is warm and dry.   Neurological:      Mental Status: He is alert and oriented to person, place, and time.      Cranial Nerves: No cranial nerve deficit.      Sensory: Sensory deficit present.      Motor: Weakness present.      Coordination: Coordination abnormal.      Gait: Gait abnormal.      Deep Tendon Reflexes: Reflexes normal.   Psychiatric:         Mood and Affect: Mood normal.         Behavior: Behavior normal.         Thought Content: Thought content normal.         Judgment: Judgment normal.        During this visit the following were done:  Labs Reviewed [x]    Labs Ordered [x]    Radiology Reports Reviewed []    Radiology Ordered []    PCP Records Reviewed []    Referring Provider Records Reviewed []    ER Records Reviewed []    Hospital Records Reviewed []    History Obtained From Family []    Radiology Images Reviewed []    Other Reviewed []    Records Requested []            Diagnoses and all orders for this visit:    1. " Essential hypertension (Primary)  -     atenolol (Tenormin) 50 MG tablet; Take 1 tablet by mouth Daily.  Dispense: 30 tablet; Refill: 5  -     isosorbide mononitrate (IMDUR) 30 MG 24 hr tablet; Take 1 tablet by mouth Daily.  Dispense: 90 tablet; Refill: 1    2. Neuropathy  -     cilostazol (PLETAL) 100 MG tablet; Take 1 tablet by mouth Daily.  Dispense: 60 tablet; Refill: 5    3. PAD (peripheral artery disease)  -     cilostazol (PLETAL) 100 MG tablet; Take 1 tablet by mouth Daily.  Dispense: 60 tablet; Refill: 5    4. Major depressive disorder, recurrent, moderate  -     traZODone (DESYREL) 50 MG tablet; Take 1 tablet by mouth Every Night.  Dispense: 90 tablet; Refill: 1    5. History of alcohol use      BMI is >= 30 and <35. (Class 1 Obesity). The following options were offered after discussion;: exercise counseling/recommendations and nutrition counseling/recommendations        Follow Up   Return in about 3 months (around 9/13/2023), or if symptoms worsen or fail to improve, for Recheck.  Patient was given instructions and counseling regarding his condition or for health maintenance advice. Please see specific information pulled into the AVS if appropriate.     Michael Reviewed and is consistent.  UDS reviewed and is consistent.  Patient comfort assessment guide reviewed and updated today.    As part of the patient's treatment plan they are being prescribed a controlled substance/ substances with potential for abuse.  This patient has been made aware of the appropriate use of such medications, including potential risk of somnolence, limited ability to drive and/or work safely, and potential for overdose.  It has also been made clear these medications are for use by the patient only, without concomitant use of alcohol or other substances unless prescribed/advised by medical provider.    Patient has completed prescribing agreement detailing terms of continued prescribing of controlled substances including monitoring  HERON reports, urine drug screens, and pill counts.  The patient is aware HERON reports are reviewed on a regular basis and scanned into the chart.    History and physical exam exhibit continued safe and appropriate use of controlled substances.

## 2023-08-24 DIAGNOSIS — G62.9 NEUROPATHY: ICD-10-CM

## 2023-08-25 RX ORDER — METHOCARBAMOL 750 MG/1
TABLET, FILM COATED ORAL
Qty: 120 TABLET | Refills: 0 | Status: SHIPPED | OUTPATIENT
Start: 2023-08-25

## 2023-08-30 DIAGNOSIS — G62.9 NEUROPATHY: ICD-10-CM

## 2023-08-30 RX ORDER — METHOCARBAMOL 750 MG/1
TABLET, FILM COATED ORAL
Qty: 120 TABLET | Refills: 0 | OUTPATIENT
Start: 2023-08-30

## 2023-09-18 ENCOUNTER — OFFICE VISIT (OUTPATIENT)
Dept: FAMILY MEDICINE CLINIC | Facility: CLINIC | Age: 63
End: 2023-09-18
Payer: MEDICARE

## 2023-09-18 VITALS
HEIGHT: 70 IN | SYSTOLIC BLOOD PRESSURE: 126 MMHG | HEART RATE: 87 BPM | WEIGHT: 220.4 LBS | DIASTOLIC BLOOD PRESSURE: 80 MMHG | OXYGEN SATURATION: 98 % | BODY MASS INDEX: 31.55 KG/M2 | TEMPERATURE: 97.5 F

## 2023-09-18 DIAGNOSIS — G62.9 NEUROPATHY: ICD-10-CM
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
## 2023-09-18 DIAGNOSIS — I73.9 PAD (PERIPHERAL ARTERY DISEASE): ICD-10-CM

## 2023-09-18 DIAGNOSIS — M25.552 LEFT HIP PAIN: ICD-10-CM

## 2023-09-18 DIAGNOSIS — Z00.00 MEDICARE ANNUAL WELLNESS VISIT, SUBSEQUENT: ICD-10-CM

## 2023-09-18 DIAGNOSIS — F33.1 MAJOR DEPRESSIVE DISORDER, RECURRENT, MODERATE: ICD-10-CM

## 2023-09-18 DIAGNOSIS — I10 ESSENTIAL HYPERTENSION: ICD-10-CM

## 2023-09-18 DIAGNOSIS — R60.0 LOCALIZED EDEMA: ICD-10-CM

## 2023-09-18 PROCEDURE — 80053 COMPREHEN METABOLIC PANEL: CPT | Performed by: NURSE PRACTITIONER

## 2023-09-18 PROCEDURE — 36415 COLL VENOUS BLD VENIPUNCTURE: CPT | Performed by: NURSE PRACTITIONER

## 2023-09-18 PROCEDURE — 83036 HEMOGLOBIN GLYCOSYLATED A1C: CPT | Performed by: NURSE PRACTITIONER

## 2023-09-18 PROCEDURE — 80061 LIPID PANEL: CPT | Performed by: NURSE PRACTITIONER

## 2023-09-18 PROCEDURE — 85025 COMPLETE CBC W/AUTO DIFF WBC: CPT | Performed by: NURSE PRACTITIONER

## 2023-09-18 PROCEDURE — 84443 ASSAY THYROID STIM HORMONE: CPT | Performed by: NURSE PRACTITIONER

## 2023-09-18 RX ORDER — CILOSTAZOL 100 MG/1
100 TABLET ORAL DAILY
Qty: 180 TABLET | Refills: 1 | Status: SHIPPED | OUTPATIENT
Start: 2023-09-18

## 2023-09-18 RX ORDER — HYDROCHLOROTHIAZIDE 25 MG/1
25 TABLET ORAL DAILY
Qty: 90 TABLET | Refills: 1 | Status: SHIPPED | OUTPATIENT
Start: 2023-09-18

## 2023-09-18 RX ORDER — TRAZODONE HYDROCHLORIDE 50 MG/1
50 TABLET ORAL NIGHTLY
Qty: 90 TABLET | Refills: 1 | Status: SHIPPED | OUTPATIENT
Start: 2023-09-18

## 2023-09-18 RX ORDER — FOLIC ACID-PYRIDOXINE-CYANOCOBALAMIN TAB 2.5-25-2 MG 2.5-25-2 MG
1 TAB ORAL DAILY
Qty: 90 TABLET | Refills: 1 | Status: SHIPPED | OUTPATIENT
Start: 2023-09-18

## 2023-09-18 RX ORDER — ATENOLOL 50 MG/1
50 TABLET ORAL DAILY
Qty: 90 TABLET | Refills: 1 | Status: SHIPPED | OUTPATIENT
Start: 2023-09-18

## 2023-09-18 RX ORDER — METHOCARBAMOL 750 MG/1
750 TABLET, FILM COATED ORAL 4 TIMES DAILY
Qty: 360 TABLET | Refills: 1 | Status: SHIPPED | OUTPATIENT
Start: 2023-09-18

## 2023-09-18 RX ORDER — GABAPENTIN 800 MG/1
1200 TABLET ORAL 3 TIMES DAILY
Qty: 135 TABLET | Refills: 3 | Status: SHIPPED | OUTPATIENT
Start: 2023-09-18

## 2023-09-18 RX ORDER — ISOSORBIDE MONONITRATE 30 MG/1
30 TABLET, EXTENDED RELEASE ORAL DAILY
Qty: 90 TABLET | Refills: 1 | Status: SHIPPED | OUTPATIENT
Start: 2023-09-18

## 2023-09-18 NOTE — PROGRESS NOTES
Chief Complaint  Hip Pain (Pt fell about two months ago and states that he still has a knot on his right hip )    Subjective          Gurwinder Harding presents to [facility]  Hypertension  This is a chronic (carotid stenosis) problem. The current episode started more than 1 year ago. The problem is controlled. Associated symptoms include anxiety, malaise/fatigue (improved with medication changes), peripheral edema (on and off) and shortness of breath (at times). Pertinent negatives include no blurred vision, chest pain, headaches, neck pain, orthopnea, palpitations, PND or sweats. Risk factors for coronary artery disease include smoking/tobacco exposure and sedentary lifestyle. Past treatments include beta blockers and ACE inhibitors. Current antihypertension treatment includes ACE inhibitors and beta blockers. The current treatment provides moderate improvement. Compliance problems include diet and exercise.    Fatigue  This is a chronic problem. The current episode started more than 1 year ago. The problem occurs constantly. The problem has been unchanged. Associated symptoms include arthralgias and fatigue. Pertinent negatives include no chest pain, headaches, neck pain or numbness. Nothing aggravates the symptoms.   Extremity Weakness   Pain location: bilateral lower extremity. Chronicity: Known PAD post stenting with chronic Neuralgia.  Continued difficulty standing, walking.  Gait is slow and unsteady.  Continued swelling.  Overall unchanged. The current episode started more than 1 year ago. The problem occurs daily. The problem has been unchanged. The pain is moderate. Associated symptoms include a limited range of motion and stiffness. Pertinent negatives include no numbness or tingling. Associated symptoms comments: Tremors of hands . He has tried NSAIDS, oral narcotics, acetaminophen and rest for the symptoms. Family history includes gout. Parkinsons in the family His past  "medical history is significant for gout and osteoarthritis.   Depression  Visit Type: follow-up (Feels symptoms are stable)  Patient presents with the following symptoms: shortness of breath (at times).  Patient is not experiencing: palpitations.      Fall  The accident occurred More than 1 week ago. The fall occurred while standing. He fell from a height of 3 to 5 ft. He landed on Carpet. There was no blood loss. The point of impact was the left hip. Pain location: left hip large bruise with deformity still present. The pain is at a severity of 2/10. The pain is mild. The symptoms are aggravated by pressure on injury and movement. Pertinent negatives include no bowel incontinence, headaches, loss of consciousness, numbness or tingling. He has tried ice, heat and NSAID for the symptoms. The treatment provided mild relief.     Objective   Vital Signs:   /80 (BP Location: Right arm, Patient Position: Sitting)   Pulse 87   Temp 97.5 °F (36.4 °C) (Temporal)   Ht 177.8 cm (70\")   Wt 100 kg (220 lb 6.4 oz)   SpO2 98%   BMI 31.62 kg/m²     Physical Exam  Vitals and nursing note reviewed.   Constitutional:       General: He is not in acute distress.     Appearance: He is well-developed.   HENT:      Head: Normocephalic.   Eyes:      Conjunctiva/sclera: Conjunctivae normal.   Cardiovascular:      Rate and Rhythm: Normal rate and regular rhythm.      Heart sounds: Normal heart sounds. No murmur heard.  Pulmonary:      Effort: Pulmonary effort is normal. No respiratory distress.      Breath sounds: Normal breath sounds. No wheezing.   Musculoskeletal:         General: Tenderness present. No swelling.      Left hip: Deformity and bony tenderness present.      Right lower leg: No edema.      Left lower leg: No edema.   Skin:     General: Skin is warm and dry.   Neurological:      Mental Status: He is alert and oriented to person, place, and time.      Cranial Nerves: No cranial nerve deficit.      Sensory: Sensory " deficit present.      Motor: Weakness present.      Coordination: Coordination abnormal.      Gait: Gait abnormal.      Deep Tendon Reflexes: Reflexes normal.   Psychiatric:         Mood and Affect: Mood normal.         Behavior: Behavior normal.         Thought Content: Thought content normal.         Judgment: Judgment normal.      During this visit the following were done:  Labs Reviewed [x]    Labs Ordered [x]    Radiology Reports Reviewed []    Radiology Ordered []    PCP Records Reviewed []    Referring Provider Records Reviewed []    ER Records Reviewed []    Hospital Records Reviewed []    History Obtained From Family []    Radiology Images Reviewed []    Other Reviewed []    Records Requested []            Diagnoses and all orders for this visit:    1. Fall, initial encounter (Primary)  -     XR Hip With or Without Pelvis 2 - 3 View Left    2. Essential hypertension  -     atenolol (Tenormin) 50 MG tablet; Take 1 tablet by mouth Daily.  Dispense: 90 tablet; Refill: 1  -     folic acid-pyridoxine-cyanocobalamin (Folbic) 2.5-25-2 MG tablet tablet; Take 1 tablet by mouth Daily.  Dispense: 90 tablet; Refill: 1  -     hydroCHLOROthiazide (HYDRODIURIL) 25 MG tablet; Take 1 tablet by mouth Daily.  Dispense: 90 tablet; Refill: 1  -     isosorbide mononitrate (IMDUR) 30 MG 24 hr tablet; Take 1 tablet by mouth Daily.  Dispense: 90 tablet; Refill: 1    3. Neuropathy  -     cilostazol (PLETAL) 100 MG tablet; Take 1 tablet by mouth Daily.  Dispense: 180 tablet; Refill: 1  -     gabapentin (NEURONTIN) 800 MG tablet; Take 1.5 tablets by mouth 3 (Three) Times a Day.  Dispense: 135 tablet; Refill: 3  -     methocarbamol (ROBAXIN) 750 MG tablet; Take 1 tablet by mouth 4 (Four) Times a Day.  Dispense: 360 tablet; Refill: 1    4. PAD (peripheral artery disease)  -     cilostazol (PLETAL) 100 MG tablet; Take 1 tablet by mouth Daily.  Dispense: 180 tablet; Refill: 1    5. Localized edema  -     hydroCHLOROthiazide (HYDRODIURIL)  25 MG tablet; Take 1 tablet by mouth Daily.  Dispense: 90 tablet; Refill: 1    6. Major depressive disorder, recurrent, moderate  -     traZODone (DESYREL) 50 MG tablet; Take 1 tablet by mouth Every Night.  Dispense: 90 tablet; Refill: 1    7. Left hip pain  -     XR Hip With or Without Pelvis 2 - 3 View Left    8. Medicare annual wellness visit, subsequent  -     CBC Auto Differential  -     Comprehensive Metabolic Panel  -     Hemoglobin A1c  -     TSH  -     Lipid Panel      BMI is >= 30 and <35. (Class 1 Obesity). The following options were offered after discussion;: exercise counseling/recommendations and nutrition counseling/recommendations        Follow Up   Return in about 3 months (around 12/18/2023), or if symptoms worsen or fail to improve, for Recheck labs today XR today .  Patient was given instructions and counseling regarding his condition or for health maintenance advice. Please see specific information pulled into the AVS if appropriate.     Heron Reviewed and is consistent.  UDS reviewed and is consistent.  Patient comfort assessment guide reviewed and updated today.    As part of the patient's treatment plan they are being prescribed a controlled substance/ substances with potential for abuse.  This patient has been made aware of the appropriate use of such medications, including potential risk of somnolence, limited ability to drive and/or work safely, and potential for overdose.  It has also been made clear these medications are for use by the patient only, without concomitant use of alcohol or other substances unless prescribed/advised by medical provider.    Patient has completed prescribing agreement detailing terms of continued prescribing of controlled substances including monitoring HERON reports, urine drug screens, and pill counts.  The patient is aware HERON reports are reviewed on a regular basis and scanned into the chart.    History and physical exam exhibit continued safe and  appropriate use of controlled substances.

## 2023-09-19 ENCOUNTER — TELEPHONE (OUTPATIENT)
Dept: FAMILY MEDICINE CLINIC | Facility: CLINIC | Age: 63
End: 2023-09-19
Payer: MEDICARE

## 2023-09-19 LAB
ALBUMIN SERPL-MCNC: 3.8 G/DL (ref 3.5–5.2)
ALBUMIN/GLOB SERPL: 1.1 G/DL
ALP SERPL-CCNC: 82 U/L (ref 39–117)
ALT SERPL W P-5'-P-CCNC: 20 U/L (ref 1–41)
ANION GAP SERPL CALCULATED.3IONS-SCNC: 15.1 MMOL/L (ref 5–15)
AST SERPL-CCNC: 24 U/L (ref 1–40)
BASOPHILS # BLD AUTO: 0.06 10*3/MM3 (ref 0–0.2)
BASOPHILS NFR BLD AUTO: 0.6 % (ref 0–1.5)
BILIRUB SERPL-MCNC: 0.4 MG/DL (ref 0–1.2)
BUN SERPL-MCNC: 17 MG/DL (ref 8–23)
BUN/CREAT SERPL: 12.1 (ref 7–25)
CALCIUM SPEC-SCNC: 9.1 MG/DL (ref 8.6–10.5)
CHLORIDE SERPL-SCNC: 99 MMOL/L (ref 98–107)
CHOLEST SERPL-MCNC: 208 MG/DL (ref 0–200)
CO2 SERPL-SCNC: 24.9 MMOL/L (ref 22–29)
CREAT SERPL-MCNC: 1.41 MG/DL (ref 0.76–1.27)
DEPRECATED RDW RBC AUTO: 43.7 FL (ref 37–54)
EGFRCR SERPLBLD CKD-EPI 2021: 56.3 ML/MIN/1.73
EOSINOPHIL # BLD AUTO: 0.07 10*3/MM3 (ref 0–0.4)
EOSINOPHIL NFR BLD AUTO: 0.7 % (ref 0.3–6.2)
ERYTHROCYTE [DISTWIDTH] IN BLOOD BY AUTOMATED COUNT: 12.1 % (ref 12.3–15.4)
GLOBULIN UR ELPH-MCNC: 3.5 GM/DL
GLUCOSE SERPL-MCNC: 118 MG/DL (ref 65–99)
HBA1C MFR BLD: 5.8 % (ref 4.8–5.6)
HCT VFR BLD AUTO: 46.4 % (ref 37.5–51)
HDLC SERPL-MCNC: 45 MG/DL (ref 40–60)
HGB BLD-MCNC: 16 G/DL (ref 13–17.7)
IMM GRANULOCYTES # BLD AUTO: 0.05 10*3/MM3 (ref 0–0.05)
IMM GRANULOCYTES NFR BLD AUTO: 0.5 % (ref 0–0.5)
LDLC SERPL CALC-MCNC: 133 MG/DL (ref 0–100)
LDLC/HDLC SERPL: 2.88 {RATIO}
LYMPHOCYTES # BLD AUTO: 1.5 10*3/MM3 (ref 0.7–3.1)
LYMPHOCYTES NFR BLD AUTO: 14.1 % (ref 19.6–45.3)
MCH RBC QN AUTO: 33.8 PG (ref 26.6–33)
MCHC RBC AUTO-ENTMCNC: 34.5 G/DL (ref 31.5–35.7)
MCV RBC AUTO: 97.9 FL (ref 79–97)
MONOCYTES # BLD AUTO: 0.68 10*3/MM3 (ref 0.1–0.9)
MONOCYTES NFR BLD AUTO: 6.4 % (ref 5–12)
NEUTROPHILS NFR BLD AUTO: 77.7 % (ref 42.7–76)
NEUTROPHILS NFR BLD AUTO: 8.26 10*3/MM3 (ref 1.7–7)
NRBC BLD AUTO-RTO: 0 /100 WBC (ref 0–0.2)
PLATELET # BLD AUTO: 249 10*3/MM3 (ref 140–450)
PMV BLD AUTO: 11 FL (ref 6–12)
POTASSIUM SERPL-SCNC: 4.1 MMOL/L (ref 3.5–5.2)
PROT SERPL-MCNC: 7.3 G/DL (ref 6–8.5)
RBC # BLD AUTO: 4.74 10*6/MM3 (ref 4.14–5.8)
SODIUM SERPL-SCNC: 139 MMOL/L (ref 136–145)
TRIGL SERPL-MCNC: 167 MG/DL (ref 0–150)
TSH SERPL DL<=0.05 MIU/L-ACNC: 1.14 UIU/ML (ref 0.27–4.2)
VLDLC SERPL-MCNC: 30 MG/DL (ref 5–40)
WBC NRBC COR # BLD: 10.62 10*3/MM3 (ref 3.4–10.8)

## 2023-09-19 NOTE — TELEPHONE ENCOUNTER
----- Message from KEVIN Jensen sent at 9/19/2023  1:37 PM EDT -----  Labs are stable.  Mild improvement of cholesterol

## 2023-09-19 NOTE — TELEPHONE ENCOUNTER
----- Message from KEVIN Jensen sent at 9/19/2023  1:37 PM EDT -----  Labs are stable.  Mild improvement of cholesterol         Patient notified & verbalized understanding.

## 2023-11-06 DIAGNOSIS — I10 ESSENTIAL HYPERTENSION: ICD-10-CM

## 2023-11-06 DIAGNOSIS — R60.0 LOCALIZED EDEMA: ICD-10-CM

## 2023-11-06 RX ORDER — HYDROCHLOROTHIAZIDE 25 MG/1
25 TABLET ORAL DAILY
Qty: 90 TABLET | Refills: 1 | OUTPATIENT
Start: 2023-11-06

## 2023-12-18 ENCOUNTER — OFFICE VISIT (OUTPATIENT)
Dept: FAMILY MEDICINE CLINIC | Facility: CLINIC | Age: 63
End: 2023-12-18
Payer: MEDICARE

## 2023-12-18 VITALS
HEART RATE: 89 BPM | SYSTOLIC BLOOD PRESSURE: 106 MMHG | HEIGHT: 70 IN | TEMPERATURE: 97.8 F | OXYGEN SATURATION: 99 % | WEIGHT: 219.6 LBS | BODY MASS INDEX: 31.44 KG/M2 | DIASTOLIC BLOOD PRESSURE: 70 MMHG

## 2023-12-18 DIAGNOSIS — F33.1 MAJOR DEPRESSIVE DISORDER, RECURRENT, MODERATE: ICD-10-CM

## 2023-12-18 DIAGNOSIS — R60.0 LOCALIZED EDEMA: ICD-10-CM

## 2023-12-18 DIAGNOSIS — I73.9 PAD (PERIPHERAL ARTERY DISEASE): ICD-10-CM

## 2023-12-18 DIAGNOSIS — E78.49 OTHER HYPERLIPIDEMIA: ICD-10-CM

## 2023-12-18 DIAGNOSIS — I10 ESSENTIAL HYPERTENSION: Primary | ICD-10-CM

## 2023-12-18 DIAGNOSIS — G62.9 NEUROPATHY: ICD-10-CM

## 2023-12-18 PROCEDURE — 3078F DIAST BP <80 MM HG: CPT | Performed by: NURSE PRACTITIONER

## 2023-12-18 PROCEDURE — 1159F MED LIST DOCD IN RCRD: CPT | Performed by: NURSE PRACTITIONER

## 2023-12-18 PROCEDURE — 1160F RVW MEDS BY RX/DR IN RCRD: CPT | Performed by: NURSE PRACTITIONER

## 2023-12-18 PROCEDURE — 99214 OFFICE O/P EST MOD 30 MIN: CPT | Performed by: NURSE PRACTITIONER

## 2023-12-18 PROCEDURE — 3074F SYST BP LT 130 MM HG: CPT | Performed by: NURSE PRACTITIONER

## 2023-12-18 RX ORDER — TRAZODONE HYDROCHLORIDE 50 MG/1
50 TABLET ORAL NIGHTLY
Qty: 90 TABLET | Refills: 1 | Status: SHIPPED | OUTPATIENT
Start: 2023-12-18

## 2023-12-18 RX ORDER — HYDROCHLOROTHIAZIDE 25 MG/1
25 TABLET ORAL DAILY
Qty: 90 TABLET | Refills: 1 | Status: SHIPPED | OUTPATIENT
Start: 2023-12-18

## 2023-12-18 RX ORDER — ATENOLOL 50 MG/1
50 TABLET ORAL DAILY
Qty: 90 TABLET | Refills: 1 | Status: SHIPPED | OUTPATIENT
Start: 2023-12-18

## 2023-12-18 RX ORDER — ISOSORBIDE MONONITRATE 30 MG/1
30 TABLET, EXTENDED RELEASE ORAL DAILY
Qty: 90 TABLET | Refills: 1 | Status: SHIPPED | OUTPATIENT
Start: 2023-12-18

## 2023-12-18 RX ORDER — METHOCARBAMOL 750 MG/1
750 TABLET, FILM COATED ORAL 4 TIMES DAILY
Qty: 360 TABLET | Refills: 1 | Status: SHIPPED | OUTPATIENT
Start: 2023-12-18

## 2023-12-18 RX ORDER — GABAPENTIN 800 MG/1
1200 TABLET ORAL 3 TIMES DAILY
Qty: 135 TABLET | Refills: 3 | Status: SHIPPED | OUTPATIENT
Start: 2023-12-18

## 2023-12-18 NOTE — PROGRESS NOTES
[id number]   Provider Notification   Provider Name/Title Dr. Hamm   Method of Notification Phone   Request Evaluate-Remote   Notification Reason Lucas Status Update;Other  (weight loss)   Discuss with MD baby's weight loss of 10%. Mom has been encouraged to pump and feed every 2-3 hours. Supplemental donor milk x12ml has been given x2 occurrences during the day. At times feeds are every 4-4.5 hours, minimal pumping has been completed. Mom has been physically uncomfortable related to complications from  which has been a barrier to feedings. Baby current in nursery at this time for at least 2 feeds per parent request. Output WNL.    Discuss plan to increase donor milk feeds up to 15 ml and more frequent feeds. MD agrees with this plan and plan to increase feed amounts as baby tolerates. MD recommends that when mom breastfeeds to give baby donor milk post breastfeed, but when in nursery okay to give exclusive donor milk feeds per parent request.   Chief Complaint  Hyperlipidemia    Subjective          Gurwinder Harding presents to [facility]  Hypertension  This is a chronic (carotid stenosis) problem. The current episode started more than 1 year ago. The problem is controlled. Associated symptoms include anxiety, malaise/fatigue (improved with medication changes), peripheral edema (on and off) and shortness of breath (at times). Pertinent negatives include no blurred vision, chest pain, headaches, neck pain, orthopnea, palpitations, PND or sweats. Risk factors for coronary artery disease include smoking/tobacco exposure and sedentary lifestyle. Past treatments include beta blockers and ACE inhibitors. Current antihypertension treatment includes ACE inhibitors and beta blockers. The current treatment provides moderate improvement. Compliance problems include diet and exercise.    Fatigue  This is a chronic problem. The current episode started more than 1 year ago. The problem occurs constantly. The problem has been unchanged. Associated symptoms include arthralgias and fatigue. Pertinent negatives include no chest pain, headaches or neck pain. Nothing aggravates the symptoms.   Extremity Weakness   Pain location: bilateral lower extremity. Chronicity: Known PAD post stenting with chronic Neuralgia.  Continued difficulty standing, walking.  Gait is slow and unsteady.  Continued swelling.  Overall unchanged. The current episode started more than 1 year ago. The problem occurs daily. The problem has been unchanged. The pain is moderate. Associated symptoms include a limited range of motion and stiffness. Associated symptoms comments: Tremors of hands . He has tried NSAIDS, oral narcotics, acetaminophen and rest for the symptoms. Family history includes gout. Parkinsons in the family His past medical history is significant for gout and osteoarthritis.   Depression  Visit Type: follow-up (Feels symptoms are stable)  Patient presents  "with the following symptoms: shortness of breath (at times).  Patient is not experiencing: palpitations.      Hyperlipidemia  This is a chronic problem. Recent lipid tests were reviewed and are variable. Exacerbating diseases include nephrotic syndrome. Associated symptoms include shortness of breath (at times). Pertinent negatives include no chest pain. Current antihyperlipidemic treatment includes diet change.       Objective   Vital Signs:   /70 (BP Location: Right arm, Patient Position: Sitting)   Pulse 89   Temp 97.8 °F (36.6 °C) (Temporal)   Ht 177.8 cm (70\")   Wt 99.6 kg (219 lb 9.6 oz)   SpO2 99%   BMI 31.51 kg/m²     Physical Exam  Vitals and nursing note reviewed.   Constitutional:       General: He is not in acute distress.     Appearance: He is well-developed.   HENT:      Head: Normocephalic.   Eyes:      Conjunctiva/sclera: Conjunctivae normal.   Cardiovascular:      Rate and Rhythm: Normal rate and regular rhythm.      Heart sounds: Normal heart sounds. No murmur heard.  Pulmonary:      Effort: Pulmonary effort is normal. No respiratory distress.      Breath sounds: Normal breath sounds. No wheezing.   Musculoskeletal:         General: Tenderness present. No swelling.      Right lower leg: No edema.      Left lower leg: No edema.   Skin:     General: Skin is warm and dry.   Neurological:      Mental Status: He is alert and oriented to person, place, and time.      Cranial Nerves: No cranial nerve deficit.      Sensory: Sensory deficit present.      Motor: Weakness present.      Coordination: Coordination abnormal.      Gait: Gait abnormal.      Deep Tendon Reflexes: Reflexes normal.   Psychiatric:         Mood and Affect: Mood normal.         Behavior: Behavior normal.         Thought Content: Thought content normal.         Judgment: Judgment normal.        During this visit the following were done:  Labs Reviewed [x]    Labs Ordered [x]    Radiology Reports Reviewed []    Radiology " Ordered []    PCP Records Reviewed []    Referring Provider Records Reviewed []    ER Records Reviewed []    Hospital Records Reviewed []    History Obtained From Family []    Radiology Images Reviewed []    Other Reviewed []    Records Requested []            Diagnoses and all orders for this visit:    1. Essential hypertension (Primary)  -     isosorbide mononitrate (IMDUR) 30 MG 24 hr tablet; Take 1 tablet by mouth Daily.  Dispense: 90 tablet; Refill: 1  -     hydroCHLOROthiazide (HYDRODIURIL) 25 MG tablet; Take 1 tablet by mouth Daily.  Dispense: 90 tablet; Refill: 1  -     atenolol (Tenormin) 50 MG tablet; Take 1 tablet by mouth Daily.  Dispense: 90 tablet; Refill: 1    2. Major depressive disorder, recurrent, moderate  -     traZODone (DESYREL) 50 MG tablet; Take 1 tablet by mouth Every Night.  Dispense: 90 tablet; Refill: 1    3. Neuropathy  -     methocarbamol (ROBAXIN) 750 MG tablet; Take 1 tablet by mouth 4 (Four) Times a Day.  Dispense: 360 tablet; Refill: 1  -     gabapentin (NEURONTIN) 800 MG tablet; Take 1.5 tablets by mouth 3 (Three) Times a Day.  Dispense: 135 tablet; Refill: 3    4. Localized edema  -     hydroCHLOROthiazide (HYDRODIURIL) 25 MG tablet; Take 1 tablet by mouth Daily.  Dispense: 90 tablet; Refill: 1    5. PAD (peripheral artery disease)    6. Other hyperlipidemia    Other orders  -     Shingrix Vaccine; Future      BMI is >= 30 and <35. (Class 1 Obesity). The following options were offered after discussion;: exercise counseling/recommendations and nutrition counseling/recommendations        Follow Up   Return in about 3 months (around 3/18/2024), or if symptoms worsen or fail to improve, for Recheck.  Patient was given instructions and counseling regarding his condition or for health maintenance advice. Please see specific information pulled into the AVS if appropriate.     Michael Reviewed and is consistent.  HEATHER reviewed and is consistent.  Patient comfort assessment guide reviewed  and updated today.    As part of the patient's treatment plan they are being prescribed a controlled substance/ substances with potential for abuse.  This patient has been made aware of the appropriate use of such medications, including potential risk of somnolence, limited ability to drive and/or work safely, and potential for overdose.  It has also been made clear these medications are for use by the patient only, without concomitant use of alcohol or other substances unless prescribed/advised by medical provider.    Patient has completed prescribing agreement detailing terms of continued prescribing of controlled substances including monitoring HERON reports, urine drug screens, and pill counts.  The patient is aware HERON reports are reviewed on a regular basis and scanned into the chart.    History and physical exam exhibit continued safe and appropriate use of controlled substances.

## 2024-03-05 ENCOUNTER — TELEPHONE (OUTPATIENT)
Dept: FAMILY MEDICINE CLINIC | Facility: CLINIC | Age: 64
End: 2024-03-05
Payer: MEDICARE

## 2024-03-05 DIAGNOSIS — G62.9 NEUROPATHY: ICD-10-CM

## 2024-03-05 RX ORDER — GABAPENTIN 800 MG/1
1200 TABLET ORAL 3 TIMES DAILY
Qty: 135 TABLET | Refills: 3 | Status: CANCELLED | OUTPATIENT
Start: 2024-03-05

## 2024-03-05 NOTE — TELEPHONE ENCOUNTER
Caller: Patsy Harding    Relationship: Emergency Contact  NO BOXES CHECKED ON THE VERBAL     Best call back number: [phone]     What is the best time to reach you: ANY    Who are you requesting to speak with (clinical staff, provider,  specific staff member): CLINICAL     What was the call regarding: PATIENT STATED THAT THIS MEDICATION AND THE DOSAGE IS NOT COVERED BY HIS INSURANCE AND IS NEEDING A EXEMPTION FORM DONE SO THAT THIS MEDICATION CAN BE COVERED.   PLEASE CALL THE PATIENT TO GO OVER LETTER THAT THEY HAVE RECEIVED SO THAT THIS CAN BE FILLED.   PATIENT HAS 3 DAYS REMAINING       gabapentin (NEURONTIN) 800 MG tablet

## 2024-03-06 ENCOUNTER — TELEPHONE (OUTPATIENT)
Dept: FAMILY MEDICINE CLINIC | Facility: CLINIC | Age: 64
End: 2024-03-06
Payer: MEDICARE

## 2024-03-06 NOTE — TELEPHONE ENCOUNTER
03/06/2024 PA for Gabapentin(Neurontin) 800MG tablets submitted and approved from 01/01/2024 to 03/06/2025. [facility] #[id number] Samy blackwood and patient notified.

## 2024-03-06 NOTE — TELEPHONE ENCOUNTER
03/06/2024 I called the patient and informed him the PA for Gabapentin (Neurontin) 800MG tablets was approved  and he could pick his medication up at [facility] on 03/08/2024. Patient verbalized understanding.

## 2024-03-18 ENCOUNTER — OFFICE VISIT (OUTPATIENT)
Dept: FAMILY MEDICINE CLINIC | Facility: CLINIC | Age: 64
End: 2024-03-18
Payer: MEDICARE

## 2024-03-18 VITALS
HEIGHT: 70 IN | WEIGHT: 216.8 LBS | HEART RATE: 83 BPM | BODY MASS INDEX: 31.04 KG/M2 | DIASTOLIC BLOOD PRESSURE: 82 MMHG | TEMPERATURE: 97.7 F | SYSTOLIC BLOOD PRESSURE: 112 MMHG | OXYGEN SATURATION: 99 %

## 2024-03-18 DIAGNOSIS — E55.9 VITAMIN D DEFICIENCY: ICD-10-CM

## 2024-03-18 DIAGNOSIS — I10 ESSENTIAL HYPERTENSION: Primary | ICD-10-CM

## 2024-03-18 DIAGNOSIS — D64.9 ANEMIA, UNSPECIFIED TYPE: ICD-10-CM

## 2024-03-18 DIAGNOSIS — G62.9 NEUROPATHY: ICD-10-CM

## 2024-03-18 DIAGNOSIS — R60.0 LOCALIZED EDEMA: ICD-10-CM

## 2024-03-18 DIAGNOSIS — E78.49 OTHER HYPERLIPIDEMIA: ICD-10-CM

## 2024-03-18 DIAGNOSIS — I73.9 PAD (PERIPHERAL ARTERY DISEASE): ICD-10-CM

## 2024-03-18 DIAGNOSIS — F33.1 MAJOR DEPRESSIVE DISORDER, RECURRENT, MODERATE: ICD-10-CM

## 2024-03-18 RX ORDER — FOLIC ACID-PYRIDOXINE-CYANOCOBALAMIN TAB 2.5-25-2 MG 2.5-25-2 MG
1 TAB ORAL DAILY
Qty: 90 TABLET | Refills: 1 | Status: SHIPPED | OUTPATIENT
Start: 2024-03-18

## 2024-03-18 RX ORDER — TRAZODONE HYDROCHLORIDE 50 MG/1
50 TABLET ORAL NIGHTLY
Qty: 90 TABLET | Refills: 1 | Status: SHIPPED | OUTPATIENT
Start: 2024-03-18

## 2024-03-18 RX ORDER — HYDROCHLOROTHIAZIDE 25 MG/1
25 TABLET ORAL DAILY
Qty: 90 TABLET | Refills: 1 | Status: SHIPPED | OUTPATIENT
Start: 2024-03-18

## 2024-03-18 RX ORDER — CILOSTAZOL 100 MG/1
100 TABLET ORAL DAILY
Qty: 180 TABLET | Refills: 1 | Status: SHIPPED | OUTPATIENT
Start: 2024-03-18

## 2024-03-18 RX ORDER — GABAPENTIN 800 MG/1
1200 TABLET ORAL 3 TIMES DAILY
Qty: 135 TABLET | Refills: 3 | Status: SHIPPED | OUTPATIENT
Start: 2024-03-18

## 2024-03-18 RX ORDER — ISOSORBIDE MONONITRATE 30 MG/1
30 TABLET, EXTENDED RELEASE ORAL DAILY
Qty: 90 TABLET | Refills: 1 | Status: SHIPPED | OUTPATIENT
Start: 2024-03-18

## 2024-03-18 RX ORDER — METHOCARBAMOL 750 MG/1
750 TABLET, FILM COATED ORAL 4 TIMES DAILY
Qty: 360 TABLET | Refills: 1 | Status: SHIPPED | OUTPATIENT
Start: 2024-03-18

## 2024-03-18 RX ORDER — ATENOLOL 50 MG/1
50 TABLET ORAL DAILY
Qty: 90 TABLET | Refills: 1 | Status: SHIPPED | OUTPATIENT
Start: 2024-03-18

## 2024-03-18 NOTE — PROGRESS NOTES
Chief Complaint  Hypertension    Subjective          Gurwinder Harding presents to [facility]  Hypertension  This is a chronic (carotid stenosis) problem. The current episode started more than 1 year ago. The problem is controlled. Associated symptoms include anxiety, malaise/fatigue (improved with medication changes), peripheral edema (on and off) and shortness of breath (at times). Pertinent negatives include no blurred vision, chest pain, headaches, neck pain, orthopnea, palpitations, PND or sweats. Risk factors for coronary artery disease include smoking/tobacco exposure and sedentary lifestyle. Past treatments include beta blockers and ACE inhibitors. Current antihypertension treatment includes ACE inhibitors and beta blockers. The current treatment provides moderate improvement. Compliance problems include diet and exercise.    Fatigue  This is a chronic problem. The current episode started more than 1 year ago. The problem occurs constantly. The problem has been unchanged. Associated symptoms include arthralgias and fatigue. Pertinent negatives include no chest pain, headaches or neck pain. Nothing aggravates the symptoms.   Extremity Weakness   Pain location: bilateral lower extremity. Chronicity: Known PAD post stenting with chronic Neuralgia.  Continued difficulty standing, walking.  Gait is slow and unsteady.  Continued swelling.  Overall unchanged. The current episode started more than 1 year ago. The problem occurs daily. The problem has been unchanged. The pain is moderate. Associated symptoms include a limited range of motion and stiffness. Associated symptoms comments: Tremors of hands . He has tried NSAIDS, oral narcotics, acetaminophen and rest for the symptoms. Family history includes gout. Parkinsons in the family His past medical history is significant for gout and osteoarthritis.   Depression  Visit Type: follow-up (Feels symptoms are stable)  Patient presents  "with the following symptoms: shortness of breath (at times).  Patient is not experiencing: palpitations.      Hyperlipidemia  This is a chronic problem. Recent lipid tests were reviewed and are variable. Exacerbating diseases include nephrotic syndrome. Associated symptoms include shortness of breath (at times). Pertinent negatives include no chest pain. Current antihyperlipidemic treatment includes diet change.       Objective   Vital Signs:   /82 (BP Location: Right arm, Patient Position: Sitting)   Pulse 83   Temp 97.7 °F (36.5 °C) (Temporal)   Ht 177.8 cm (70\")   Wt 98.3 kg (216 lb 12.8 oz)   SpO2 99%   BMI 31.11 kg/m²     Physical Exam  Vitals and nursing note reviewed.   Constitutional:       General: He is not in acute distress.     Appearance: He is well-developed.   HENT:      Head: Normocephalic.   Eyes:      Conjunctiva/sclera: Conjunctivae normal.   Cardiovascular:      Rate and Rhythm: Normal rate and regular rhythm.      Heart sounds: Normal heart sounds. No murmur heard.  Pulmonary:      Effort: Pulmonary effort is normal. No respiratory distress.      Breath sounds: Normal breath sounds. No wheezing.   Musculoskeletal:         General: Tenderness present. No swelling.      Right lower leg: No edema.      Left lower leg: No edema.   Skin:     General: Skin is warm and dry.   Neurological:      Mental Status: He is alert and oriented to person, place, and time.      Cranial Nerves: No cranial nerve deficit.      Sensory: Sensory deficit present.      Motor: Weakness present.      Coordination: Coordination abnormal.      Gait: Gait abnormal.      Deep Tendon Reflexes: Reflexes normal.   Psychiatric:         Mood and Affect: Mood normal.         Behavior: Behavior normal.         Thought Content: Thought content normal.         Judgment: Judgment normal.        During this visit the following were done:  Labs Reviewed [x]    Labs Ordered [x]    Radiology Reports Reviewed []    Radiology " Ordered []    PCP Records Reviewed []    Referring Provider Records Reviewed []    ER Records Reviewed []    Hospital Records Reviewed []    History Obtained From Family []    Radiology Images Reviewed []    Other Reviewed []    Records Requested []            Diagnoses and all orders for this visit:    1. Essential hypertension (Primary)  -     atenolol (Tenormin) 50 MG tablet; Take 1 tablet by mouth Daily.  Dispense: 90 tablet; Refill: 1  -     folic acid-pyridoxine-cyanocobalamin (Folbic) 2.5-25-2 MG tablet tablet; Take 1 tablet by mouth Daily.  Dispense: 90 tablet; Refill: 1  -     hydroCHLOROthiazide 25 MG tablet; Take 1 tablet by mouth Daily.  Dispense: 90 tablet; Refill: 1  -     isosorbide mononitrate (IMDUR) 30 MG 24 hr tablet; Take 1 tablet by mouth Daily.  Dispense: 90 tablet; Refill: 1  -     Comprehensive Metabolic Panel; Future  -     CBC Auto Differential; Future  -     Lipid Panel; Future  -     TSH; Future    2. Neuropathy  -     cilostazol (PLETAL) 100 MG tablet; Take 1 tablet by mouth Daily.  Dispense: 180 tablet; Refill: 1  -     gabapentin (NEURONTIN) 800 MG tablet; Take 1.5 tablets by mouth 3 (Three) Times a Day.  Dispense: 135 tablet; Refill: 3  -     methocarbamol (ROBAXIN) 750 MG tablet; Take 1 tablet by mouth 4 (Four) Times a Day.  Dispense: 360 tablet; Refill: 1  -     Comprehensive Metabolic Panel; Future  -     CBC Auto Differential; Future  -     Lipid Panel; Future  -     TSH; Future    3. PAD (peripheral artery disease)  -     cilostazol (PLETAL) 100 MG tablet; Take 1 tablet by mouth Daily.  Dispense: 180 tablet; Refill: 1  -     Comprehensive Metabolic Panel; Future  -     CBC Auto Differential; Future  -     Lipid Panel; Future  -     TSH; Future  -     Vitamin B12; Future  -     Vitamin D,25-Hydroxy; Future  -     Iron and TIBC; Future    4. Localized edema  -     hydroCHLOROthiazide 25 MG tablet; Take 1 tablet by mouth Daily.  Dispense: 90 tablet; Refill: 1    5. Major depressive  disorder, recurrent, moderate  -     traZODone (DESYREL) 50 MG tablet; Take 1 tablet by mouth Every Night.  Dispense: 90 tablet; Refill: 1    6. Other hyperlipidemia  -     Comprehensive Metabolic Panel; Future  -     CBC Auto Differential; Future  -     Lipid Panel; Future    7. Anemia, unspecified type  -     Vitamin B12; Future  -     Vitamin D,25-Hydroxy; Future  -     Iron and TIBC; Future    8. Vitamin D deficiency  -     Vitamin D,25-Hydroxy; Future      BMI is >= 30 and <35. (Class 1 Obesity). The following options were offered after discussion;: exercise counseling/recommendations and nutrition counseling/recommendations        Follow Up   Return in about 3 months (around 6/18/2024), or if symptoms worsen or fail to improve, for Recheck.  Patient was given instructions and counseling regarding his condition or for health maintenance advice. Please see specific information pulled into the AVS if appropriate.     Heron Reviewed and is consistent.  UDS reviewed and is consistent.  Patient comfort assessment guide reviewed and updated today.    As part of the patient's treatment plan they are being prescribed a controlled substance/ substances with potential for abuse.  This patient has been made aware of the appropriate use of such medications, including potential risk of somnolence, limited ability to drive and/or work safely, and potential for overdose.  It has also been made clear these medications are for use by the patient only, without concomitant use of alcohol or other substances unless prescribed/advised by medical provider.    Patient has completed prescribing agreement detailing terms of continued prescribing of controlled substances including monitoring HERON reports, urine drug screens, and pill counts.  The patient is aware HERON reports are reviewed on a regular basis and scanned into the chart.    History and physical exam exhibit continued safe and appropriate use of controlled  substances.

## 2024-06-18 ENCOUNTER — OFFICE VISIT (OUTPATIENT)
Dept: FAMILY MEDICINE CLINIC | Facility: CLINIC | Age: 64
End: 2024-06-18
Payer: MEDICARE

## 2024-06-18 VITALS
OXYGEN SATURATION: 100 % | DIASTOLIC BLOOD PRESSURE: 82 MMHG | TEMPERATURE: 97.8 F | HEIGHT: 70 IN | SYSTOLIC BLOOD PRESSURE: 136 MMHG | WEIGHT: 213.8 LBS | HEART RATE: 69 BPM | BODY MASS INDEX: 30.61 KG/M2

## 2024-06-18 DIAGNOSIS — I73.9 PAD (PERIPHERAL ARTERY DISEASE): ICD-10-CM

## 2024-06-18 DIAGNOSIS — Z00.00 MEDICARE ANNUAL WELLNESS VISIT, SUBSEQUENT: Primary | ICD-10-CM

## 2024-06-18 DIAGNOSIS — I10 ESSENTIAL HYPERTENSION: ICD-10-CM

## 2024-06-18 DIAGNOSIS — F33.1 MAJOR DEPRESSIVE DISORDER, RECURRENT, MODERATE: ICD-10-CM

## 2024-06-18 DIAGNOSIS — R60.0 LOCALIZED EDEMA: ICD-10-CM

## 2024-06-18 DIAGNOSIS — G62.9 NEUROPATHY: ICD-10-CM

## 2024-06-18 PROCEDURE — 3075F SYST BP GE 130 - 139MM HG: CPT | Performed by: NURSE PRACTITIONER

## 2024-06-18 PROCEDURE — 90677 PCV20 VACCINE IM: CPT | Performed by: NURSE PRACTITIONER

## 2024-06-18 PROCEDURE — 1126F AMNT PAIN NOTED NONE PRSNT: CPT | Performed by: NURSE PRACTITIONER

## 2024-06-18 PROCEDURE — G0438 PPPS, INITIAL VISIT: HCPCS | Performed by: NURSE PRACTITIONER

## 2024-06-18 PROCEDURE — G0009 ADMIN PNEUMOCOCCAL VACCINE: HCPCS | Performed by: NURSE PRACTITIONER

## 2024-06-18 PROCEDURE — 3079F DIAST BP 80-89 MM HG: CPT | Performed by: NURSE PRACTITIONER

## 2024-06-18 PROCEDURE — 1170F FXNL STATUS ASSESSED: CPT | Performed by: NURSE PRACTITIONER

## 2024-06-18 RX ORDER — METHOCARBAMOL 750 MG/1
750 TABLET, FILM COATED ORAL 4 TIMES DAILY
Qty: 360 TABLET | Refills: 1 | Status: SHIPPED | OUTPATIENT
Start: 2024-06-18

## 2024-06-18 RX ORDER — CILOSTAZOL 100 MG/1
100 TABLET ORAL DAILY
Qty: 180 TABLET | Refills: 1 | Status: SHIPPED | OUTPATIENT
Start: 2024-06-18

## 2024-06-18 RX ORDER — FOLIC ACID-PYRIDOXINE-CYANOCOBALAMIN TAB 2.5-25-2 MG 2.5-25-2 MG
1 TAB ORAL DAILY
Qty: 90 TABLET | Refills: 1 | Status: SHIPPED | OUTPATIENT
Start: 2024-06-18

## 2024-06-18 RX ORDER — TRAZODONE HYDROCHLORIDE 50 MG/1
50 TABLET ORAL NIGHTLY
Qty: 90 TABLET | Refills: 1 | Status: SHIPPED | OUTPATIENT
Start: 2024-06-18

## 2024-06-18 RX ORDER — HYDROCHLOROTHIAZIDE 25 MG/1
25 TABLET ORAL DAILY
Qty: 90 TABLET | Refills: 1 | Status: SHIPPED | OUTPATIENT
Start: 2024-06-18

## 2024-06-18 RX ORDER — ISOSORBIDE MONONITRATE 30 MG/1
30 TABLET, EXTENDED RELEASE ORAL DAILY
Qty: 90 TABLET | Refills: 1 | Status: SHIPPED | OUTPATIENT
Start: 2024-06-18

## 2024-06-18 RX ORDER — ATENOLOL 50 MG/1
50 TABLET ORAL DAILY
Qty: 90 TABLET | Refills: 1 | Status: SHIPPED | OUTPATIENT
Start: 2024-06-18

## 2024-06-18 RX ORDER — GABAPENTIN 800 MG/1
1200 TABLET ORAL 3 TIMES DAILY
Qty: 135 TABLET | Refills: 3 | Status: SHIPPED | OUTPATIENT
Start: 2024-06-18

## 2024-06-19 NOTE — PROGRESS NOTES
The ABCs of the Annual Wellness Visit  Subsequent Medicare Wellness Visit    Subjective      Gurwinder Harding is a 64 y.o. male who presents for a Subsequent Medicare Wellness Visit.    The following portions of the patient's history were reviewed and   updated as appropriate: allergies, current medications, past family history, past medical history, past social history, past surgical history, and problem list.    Compared to one year ago, the patient feels his physical   health is the same.    Compared to one year ago, the patient feels his mental   health is the same.    Recent Hospitalizations:  He was not admitted to the hospital during the last year.       Current Medical Providers:  Patient Care Team:  Pastora Person APRN as PCP - General (Family Medicine)    Outpatient Medications Prior to Visit   Medication Sig Dispense Refill    Ascorbic Acid (VITAMIN C PO) Take  by mouth.      Potassium (POTASSIMIN PO) Take 10 mEq by mouth Daily.      VITAMIN A PO Take  by mouth.      VITAMIN D PO Take  by mouth.      ZINC ACETATE EX Apply  topically.      atenolol (Tenormin) 50 MG tablet Take 1 tablet by mouth Daily. 90 tablet 1    cilostazol (PLETAL) 100 MG tablet Take 1 tablet by mouth Daily. 180 tablet 1    folic acid-pyridoxine-cyanocobalamin (Folbic) 2.5-25-2 MG tablet tablet Take 1 tablet by mouth Daily. 90 tablet 1    gabapentin (NEURONTIN) 800 MG tablet Take 1.5 tablets by mouth 3 (Three) Times a Day. 135 tablet 3    hydroCHLOROthiazide 25 MG tablet Take 1 tablet by mouth Daily. 90 tablet 1    isosorbide mononitrate (IMDUR) 30 MG 24 hr tablet Take 1 tablet by mouth Daily. 90 tablet 1    methocarbamol (ROBAXIN) 750 MG tablet Take 1 tablet by mouth 4 (Four) Times a Day. 360 tablet 1    traZODone (DESYREL) 50 MG tablet Take 1 tablet by mouth Every Night. 90 tablet 1     No facility-administered medications prior to visit.       No opioid medication identified on active medication list. I have reviewed chart for other  "potential  high risk medication/s and harmful drug interactions in the elderly.        Aspirin is not on active medication list.  Aspirin use is not indicated based on review of current medical condition/s. Risk of harm outweighs potential benefits.  .    Patient Active Problem List   Diagnosis    Tobacco abuse    Alcohol abuse    ED (erectile dysfunction)    History of degenerative disc disease    Neuropathy    Hypertension    PVD (peripheral vascular disease) with claudication    Carotid artery stenosis    Hyponatremia    Obesity (BMI 30-39.9)    Hydrocele in adult    Flank pain    Gross hematuria    Constipation    Blurry vision, left eye    Cerebrovascular accident (CVA) due to occlusion of left carotid artery    Upper GI bleed    Non-pressure chronic ulcer of other part of left lower leg with fat layer exposed     Advance Care Planning   Advance Care Planning     Advance Directive is not on file.  ACP discussion was declined by the patient. Patient does not have an advance directive, information provided.     Objective    Vitals:    06/18/24 1439   BP: 136/82   BP Location: Right arm   Patient Position: Sitting   Pulse: 69   Temp: 97.8 °F (36.6 °C)   TempSrc: Temporal   SpO2: 100%   Weight: 97 kg (213 lb 12.8 oz)   Height: 177.8 cm (70\")   PainSc: 0-No pain     Estimated body mass index is 30.68 kg/m² as calculated from the following:    Height as of this encounter: 177.8 cm (70\").    Weight as of this encounter: 97 kg (213 lb 12.8 oz).           Does the patient have evidence of cognitive impairment?   No            HEALTH RISK ASSESSMENT    Smoking Status:  Social History     Tobacco Use   Smoking Status Every Day    Current packs/day: 1.00    Average packs/day: 1 pack/day for 30.0 years (30.0 ttl pk-yrs)    Types: Cigarettes   Smokeless Tobacco Never     Alcohol Consumption:  Social History     Substance and Sexual Activity   Alcohol Use Yes    Comment: few beers a day     Fall Risk Screen:    STEADI Fall " Risk Assessment was completed, and patient is at LOW risk for falls.Assessment completed on:2024    Depression Screenin/18/2024     2:50 PM   PHQ-2/PHQ-9 Depression Screening   Little Interest or Pleasure in Doing Things 0-->not at all   Feeling Down, Depressed or Hopeless 0-->not at all   PHQ-9: Brief Depression Severity Measure Score 0       Health Habits and Functional and Cognitive Screenin/18/2024     2:46 PM   Functional & Cognitive Status   Do you have difficulty preparing food and eating? No   Do you have difficulty bathing yourself, getting dressed or grooming yourself? No   Do you have difficulty using the toilet? No   Do you have difficulty moving around from place to place? Yes   Do you have trouble with steps or getting out of a bed or a chair? Yes   Current Diet Well Balanced Diet   Dental Exam Not up to date   Eye Exam Not up to date   Exercise (times per week) 0 times per week   Current Exercises Include No Regular Exercise   Do you need help using the phone?  No   Are you deaf or do you have serious difficulty hearing?  No   Do you need help to go to places out of walking distance? No   Do you need help shopping? No   Do you need help preparing meals?  No   Do you need help with housework?  No   Do you need help with laundry? No   Do you need help taking your medications? No   Do you need help managing money? No   Do you ever drive or ride in a car without wearing a seat belt? No   Have you felt unusual stress, anger or loneliness in the last month? No   Who do you live with? Spouse   If you need help, do you have trouble finding someone available to you? No   Have you been bothered in the last four weeks by sexual problems? No   Do you have difficulty concentrating, remembering or making decisions? No       Age-appropriate Screening Schedule:  Refer to the list below for future screening recommendations based on patient's age, sex and/or medical conditions. Orders for these  recommended tests are listed in the plan section. The patient has been provided with a written plan.    Health Maintenance   Topic Date Due    COLORECTAL CANCER SCREENING  Never done    TDAP/TD VACCINES (1 - Tdap) Never done    HEPATITIS C SCREENING  Never done    COVID-19 Vaccine (3 - 2023-24 season) 06/20/2024 (Originally 9/1/2023)    ZOSTER VACCINE (1 of 2) 12/18/2024 (Originally 6/11/2011)    LUNG CANCER SCREENING  12/18/2024 (Originally 6/11/2011)    RSV Vaccine - Adults (1 - 1-dose 60+ series) 06/18/2025 (Originally 6/11/2021)    INFLUENZA VACCINE  08/01/2024    LIPID PANEL  09/18/2024    BMI FOLLOWUP  03/18/2025    ANNUAL WELLNESS VISIT  06/18/2025    Pneumococcal Vaccine 0-64  Completed                  CMS Preventative Services Quick Reference  Risk Factors Identified During Encounter:    Chronic Pain: Natural history and expected course discussed. Questions answered.  Inactivity/Sedentary: Patient was advised to exercise at least 150 minutes a week per CDC recommendations.  Dental Screening Recommended    The above risks/problems have been discussed with the patient.  Pertinent information has been shared with the patient in the After Visit Summary.    Diagnoses and all orders for this visit:    1. Medicare annual wellness visit, subsequent (Primary)    2. Major depressive disorder, recurrent, moderate  -     traZODone (DESYREL) 50 MG tablet; Take 1 tablet by mouth Every Night.  Dispense: 90 tablet; Refill: 1    3. Neuropathy  -     methocarbamol (ROBAXIN) 750 MG tablet; Take 1 tablet by mouth 4 (Four) Times a Day.  Dispense: 360 tablet; Refill: 1  -     gabapentin (NEURONTIN) 800 MG tablet; Take 1.5 tablets by mouth 3 (Three) Times a Day.  Dispense: 135 tablet; Refill: 3  -     cilostazol (PLETAL) 100 MG tablet; Take 1 tablet by mouth Daily.  Dispense: 180 tablet; Refill: 1    4. Essential hypertension  -     isosorbide mononitrate (IMDUR) 30 MG 24 hr tablet; Take 1 tablet by mouth Daily.  Dispense: 90  tablet; Refill: 1  -     hydroCHLOROthiazide 25 MG tablet; Take 1 tablet by mouth Daily.  Dispense: 90 tablet; Refill: 1  -     folic acid-pyridoxine-cyanocobalamin (Folbic) 2.5-25-2 MG tablet tablet; Take 1 tablet by mouth Daily.  Dispense: 90 tablet; Refill: 1  -     atenolol (Tenormin) 50 MG tablet; Take 1 tablet by mouth Daily.  Dispense: 90 tablet; Refill: 1    5. Localized edema  -     hydroCHLOROthiazide 25 MG tablet; Take 1 tablet by mouth Daily.  Dispense: 90 tablet; Refill: 1    6. PAD (peripheral artery disease)  -     cilostazol (PLETAL) 100 MG tablet; Take 1 tablet by mouth Daily.  Dispense: 180 tablet; Refill: 1    Other orders  -     Pneumococcal Conjugate Vaccine 20-Valent (PCV20)        Follow Up:   Next Medicare Wellness visit to be scheduled in 1 year.      An After Visit Summary and PPPS were made available to the patient.

## 2024-08-19 ENCOUNTER — OFFICE VISIT (OUTPATIENT)
Dept: FAMILY MEDICINE CLINIC | Facility: CLINIC | Age: 64
End: 2024-08-19
Payer: MEDICARE

## 2024-08-19 ENCOUNTER — LAB (OUTPATIENT)
Dept: FAMILY MEDICINE CLINIC | Facility: CLINIC | Age: 64
End: 2024-08-19
Payer: MEDICARE

## 2024-08-19 VITALS
HEIGHT: 70 IN | BODY MASS INDEX: 29.23 KG/M2 | RESPIRATION RATE: 18 BRPM | TEMPERATURE: 97.1 F | WEIGHT: 204.2 LBS | SYSTOLIC BLOOD PRESSURE: 118 MMHG | OXYGEN SATURATION: 98 % | HEART RATE: 86 BPM | DIASTOLIC BLOOD PRESSURE: 70 MMHG

## 2024-08-19 DIAGNOSIS — I73.9 PAD (PERIPHERAL ARTERY DISEASE): ICD-10-CM

## 2024-08-19 DIAGNOSIS — I10 ESSENTIAL HYPERTENSION: ICD-10-CM

## 2024-08-19 DIAGNOSIS — G62.9 NEUROPATHY: ICD-10-CM

## 2024-08-19 DIAGNOSIS — E78.49 OTHER HYPERLIPIDEMIA: ICD-10-CM

## 2024-08-19 DIAGNOSIS — M54.41 CHRONIC MIDLINE LOW BACK PAIN WITH RIGHT-SIDED SCIATICA: Primary | ICD-10-CM

## 2024-08-19 DIAGNOSIS — D64.9 ANEMIA, UNSPECIFIED TYPE: ICD-10-CM

## 2024-08-19 DIAGNOSIS — G89.29 CHRONIC MIDLINE LOW BACK PAIN WITH RIGHT-SIDED SCIATICA: Primary | ICD-10-CM

## 2024-08-19 DIAGNOSIS — E55.9 VITAMIN D DEFICIENCY: ICD-10-CM

## 2024-08-19 DIAGNOSIS — R60.0 LOCALIZED EDEMA: ICD-10-CM

## 2024-08-19 DIAGNOSIS — F33.1 MAJOR DEPRESSIVE DISORDER, RECURRENT, MODERATE: ICD-10-CM

## 2024-08-19 PROCEDURE — 1159F MED LIST DOCD IN RCRD: CPT | Performed by: NURSE PRACTITIONER

## 2024-08-19 PROCEDURE — 36415 COLL VENOUS BLD VENIPUNCTURE: CPT

## 2024-08-19 PROCEDURE — G2211 COMPLEX E/M VISIT ADD ON: HCPCS | Performed by: NURSE PRACTITIONER

## 2024-08-19 PROCEDURE — 80061 LIPID PANEL: CPT | Performed by: NURSE PRACTITIONER

## 2024-08-19 PROCEDURE — 84443 ASSAY THYROID STIM HORMONE: CPT | Performed by: NURSE PRACTITIONER

## 2024-08-19 PROCEDURE — 85025 COMPLETE CBC W/AUTO DIFF WBC: CPT | Performed by: NURSE PRACTITIONER

## 2024-08-19 PROCEDURE — 3074F SYST BP LT 130 MM HG: CPT | Performed by: NURSE PRACTITIONER

## 2024-08-19 PROCEDURE — 83540 ASSAY OF IRON: CPT | Performed by: NURSE PRACTITIONER

## 2024-08-19 PROCEDURE — 82607 VITAMIN B-12: CPT | Performed by: NURSE PRACTITIONER

## 2024-08-19 PROCEDURE — 99214 OFFICE O/P EST MOD 30 MIN: CPT | Performed by: NURSE PRACTITIONER

## 2024-08-19 PROCEDURE — 1126F AMNT PAIN NOTED NONE PRSNT: CPT | Performed by: NURSE PRACTITIONER

## 2024-08-19 PROCEDURE — 3078F DIAST BP <80 MM HG: CPT | Performed by: NURSE PRACTITIONER

## 2024-08-19 PROCEDURE — 80053 COMPREHEN METABOLIC PANEL: CPT | Performed by: NURSE PRACTITIONER

## 2024-08-19 PROCEDURE — 1160F RVW MEDS BY RX/DR IN RCRD: CPT | Performed by: NURSE PRACTITIONER

## 2024-08-19 PROCEDURE — 82306 VITAMIN D 25 HYDROXY: CPT | Performed by: NURSE PRACTITIONER

## 2024-08-19 PROCEDURE — 84466 ASSAY OF TRANSFERRIN: CPT | Performed by: NURSE PRACTITIONER

## 2024-08-19 RX ORDER — METHOCARBAMOL 750 MG/1
750 TABLET, FILM COATED ORAL 4 TIMES DAILY
Qty: 360 TABLET | Refills: 1 | Status: SHIPPED | OUTPATIENT
Start: 2024-08-19

## 2024-08-19 RX ORDER — ATENOLOL 50 MG/1
50 TABLET ORAL DAILY
Qty: 90 TABLET | Refills: 1 | Status: SHIPPED | OUTPATIENT
Start: 2024-08-19

## 2024-08-19 RX ORDER — TRAZODONE HYDROCHLORIDE 50 MG/1
50 TABLET ORAL NIGHTLY
Qty: 90 TABLET | Refills: 1 | Status: SHIPPED | OUTPATIENT
Start: 2024-08-19

## 2024-08-19 RX ORDER — BRIMONIDINE TARTRATE AND TIMOLOL MALEATE 2; 5 MG/ML; MG/ML
1 SOLUTION OPHTHALMIC EVERY 12 HOURS
COMMUNITY
Start: 2024-07-09

## 2024-08-19 RX ORDER — GABAPENTIN 800 MG/1
1200 TABLET ORAL 3 TIMES DAILY
Qty: 135 TABLET | Refills: 3 | Status: SHIPPED | OUTPATIENT
Start: 2024-08-19

## 2024-08-19 RX ORDER — HYDROCHLOROTHIAZIDE 25 MG/1
25 TABLET ORAL DAILY
Qty: 90 TABLET | Refills: 1 | Status: SHIPPED | OUTPATIENT
Start: 2024-08-19

## 2024-08-19 RX ORDER — UREA 10 %
500 LOTION (ML) TOPICAL DAILY
COMMUNITY

## 2024-08-19 RX ORDER — CILOSTAZOL 100 MG/1
100 TABLET ORAL DAILY
Qty: 180 TABLET | Refills: 1 | Status: SHIPPED | OUTPATIENT
Start: 2024-08-19

## 2024-08-19 NOTE — PROGRESS NOTES
Chief Complaint  Back Pain (Patient states the pain in his back is just flared up worse today than usual and hurting more. Patient states he's thought about having chiropractic work done but he states the chiropractor needs x-rays before she could work on him due to his many back injuries and surgeries. ) and Leg Pain (Right leg and mainly located in right thigh. Patient did have a recent fall around the end of July but patient states he landed on left leg not right leg. Patient also stopped taking Folbic due to insurance change and not being covered so he's been buying all his vitamins otc.)    Subjective          Gurwinder Harding presents to [facility]  Hypertension  This is a chronic (carotid stenosis) problem. The current episode started more than 1 year ago. The problem is controlled. Associated symptoms include anxiety, malaise/fatigue (improved with medication changes), peripheral edema (on and off) and shortness of breath (at times). Pertinent negatives include no blurred vision, chest pain, headaches, neck pain, orthopnea, palpitations, PND or sweats. Risk factors for coronary artery disease include smoking/tobacco exposure and sedentary lifestyle. Past treatments include beta blockers and ACE inhibitors. Current antihypertension treatment includes ACE inhibitors and beta blockers. The current treatment provides moderate improvement. Compliance problems include diet and exercise.    Fatigue  This is a chronic problem. The current episode started more than 1 year ago. The problem occurs constantly. The problem has been unchanged. Associated symptoms include arthralgias and fatigue. Pertinent negatives include no chest pain, headaches or neck pain. Nothing aggravates the symptoms.   Extremity Weakness   Pain location: bilateral lower extremity. Chronicity: Known PAD post stenting with chronic Neuralgia.  Continued difficulty standing, walking.  Gait is slow and unsteady.   "Continued swelling.  Overall unchanged. The current episode started more than 1 year ago. The problem occurs daily. The problem has been unchanged. The pain is moderate. Associated symptoms include a limited range of motion and stiffness. Associated symptoms comments: Tremors of hands . He has tried NSAIDS, oral narcotics, acetaminophen and rest for the symptoms. Family history includes gout. Parkinsons in the family His past medical history is significant for gout and osteoarthritis.   Depression  Visit Type: follow-up (Feels symptoms are stable)  Patient presents with the following symptoms: shortness of breath (at times).  Patient is not experiencing: palpitations.      Hyperlipidemia  This is a chronic problem. Recent lipid tests were reviewed and are variable. Exacerbating diseases include nephrotic syndrome. Associated symptoms include shortness of breath (at times). Pertinent negatives include no chest pain. Current antihyperlipidemic treatment includes diet change.   Back Pain  This is a new (Fell in July now with right thigh pain) problem. The problem occurs constantly. The problem is unchanged. The pain is at a severity of 5/10. The pain is moderate. The symptoms are aggravated by bending, lying down, sitting, stress, position and standing. Associated symptoms include paresthesias. Pertinent negatives include no bladder incontinence, bowel incontinence, chest pain or headaches. He has tried chiropractic manipulation, NSAIDs, muscle relaxant and analgesics for the symptoms. The treatment provided mild relief.       Objective   Vital Signs:   /70 (BP Location: Right arm, Patient Position: Sitting, Cuff Size: Adult)   Pulse 86   Temp 97.1 °F (36.2 °C) (Temporal)   Resp 18   Ht 177.8 cm (70\")   Wt 92.6 kg (204 lb 3.2 oz)   SpO2 98%   BMI 29.30 kg/m²     Physical Exam  Vitals and nursing note reviewed.   Constitutional:       General: He is not in acute distress.     Appearance: He is " well-developed.   HENT:      Head: Normocephalic.   Eyes:      Conjunctiva/sclera: Conjunctivae normal.   Cardiovascular:      Rate and Rhythm: Normal rate and regular rhythm.      Heart sounds: Normal heart sounds. No murmur heard.  Pulmonary:      Effort: Pulmonary effort is normal. No respiratory distress.      Breath sounds: Normal breath sounds. No wheezing.   Musculoskeletal:         General: Tenderness present. No swelling.      Right lower leg: No edema.      Left lower leg: No edema.   Skin:     General: Skin is warm and dry.   Neurological:      Mental Status: He is alert and oriented to person, place, and time.      Cranial Nerves: No cranial nerve deficit.      Sensory: Sensory deficit present.      Motor: Weakness present.      Coordination: Coordination abnormal.      Gait: Gait abnormal.      Deep Tendon Reflexes: Reflexes normal.   Psychiatric:         Mood and Affect: Mood normal.         Behavior: Behavior normal.         Thought Content: Thought content normal.         Judgment: Judgment normal.        During this visit the following were done:  Labs Reviewed [x]    Labs Ordered [x]    Radiology Reports Reviewed []    Radiology Ordered []    PCP Records Reviewed []    Referring Provider Records Reviewed []    ER Records Reviewed []    Hospital Records Reviewed []    History Obtained From Family []    Radiology Images Reviewed []    Other Reviewed []    Records Requested []            Diagnoses and all orders for this visit:    1. Chronic midline low back pain with right-sided sciatica (Primary)  -     XR Spine Lumbar 2 or 3 View (In Office)    2. Essential hypertension  -     atenolol (Tenormin) 50 MG tablet; Take 1 tablet by mouth Daily.  Dispense: 90 tablet; Refill: 1  -     hydroCHLOROthiazide 25 MG tablet; Take 1 tablet by mouth Daily.  Dispense: 90 tablet; Refill: 1    3. Neuropathy  -     cilostazol (PLETAL) 100 MG tablet; Take 1 tablet by mouth Daily.  Dispense: 180 tablet; Refill: 1  -      methocarbamol (ROBAXIN) 750 MG tablet; Take 1 tablet by mouth 4 (Four) Times a Day.  Dispense: 360 tablet; Refill: 1  -     gabapentin (NEURONTIN) 800 MG tablet; Take 1.5 tablets by mouth 3 (Three) Times a Day.  Dispense: 135 tablet; Refill: 3    4. PAD (peripheral artery disease)  -     cilostazol (PLETAL) 100 MG tablet; Take 1 tablet by mouth Daily.  Dispense: 180 tablet; Refill: 1    5. Localized edema  -     hydroCHLOROthiazide 25 MG tablet; Take 1 tablet by mouth Daily.  Dispense: 90 tablet; Refill: 1    6. Major depressive disorder, recurrent, moderate  -     traZODone (DESYREL) 50 MG tablet; Take 1 tablet by mouth Every Night.  Dispense: 90 tablet; Refill: 1      BMI is >= 30 and <35. (Class 1 Obesity). The following options were offered after discussion;: exercise counseling/recommendations and nutrition counseling/recommendations        Follow Up   Return if symptoms worsen or fail to improve, for xray today , Recheck  labs today .  Patient was given instructions and counseling regarding his condition or for health maintenance advice. Please see specific information pulled into the AVS if appropriate.     Heron Reviewed and is consistent.  UDS reviewed and is consistent.  Patient comfort assessment guide reviewed and updated today.    As part of the patient's treatment plan they are being prescribed a controlled substance/ substances with potential for abuse.  This patient has been made aware of the appropriate use of such medications, including potential risk of somnolence, limited ability to drive and/or work safely, and potential for overdose.  It has also been made clear these medications are for use by the patient only, without concomitant use of alcohol or other substances unless prescribed/advised by medical provider.    Patient has completed prescribing agreement detailing terms of continued prescribing of controlled substances including monitoring HERON reports, urine drug screens, and pill  counts.  The patient is aware HERON reports are reviewed on a regular basis and scanned into the chart.    History and physical exam exhibit continued safe and appropriate use of controlled substances.

## 2024-08-20 ENCOUNTER — TELEPHONE (OUTPATIENT)
Dept: FAMILY MEDICINE CLINIC | Facility: CLINIC | Age: 64
End: 2024-08-20
Payer: MEDICARE

## 2024-08-20 DIAGNOSIS — M51.36 DDD (DEGENERATIVE DISC DISEASE), LUMBAR: ICD-10-CM

## 2024-08-20 DIAGNOSIS — G62.9 NEUROPATHY: Primary | ICD-10-CM

## 2024-08-20 DIAGNOSIS — M54.41 CHRONIC MIDLINE LOW BACK PAIN WITH RIGHT-SIDED SCIATICA: ICD-10-CM

## 2024-08-20 DIAGNOSIS — G89.29 CHRONIC MIDLINE LOW BACK PAIN WITH RIGHT-SIDED SCIATICA: ICD-10-CM

## 2024-08-20 LAB
25(OH)D3 SERPL-MCNC: 63.5 NG/ML (ref 30–100)
ALBUMIN SERPL-MCNC: 4 G/DL (ref 3.5–5.2)
ALBUMIN/GLOB SERPL: 1.2 G/DL
ALP SERPL-CCNC: 102 U/L (ref 39–117)
ALT SERPL W P-5'-P-CCNC: 22 U/L (ref 1–41)
ANION GAP SERPL CALCULATED.3IONS-SCNC: 13 MMOL/L (ref 5–15)
AST SERPL-CCNC: 27 U/L (ref 1–40)
BASOPHILS # BLD AUTO: 0.05 10*3/MM3 (ref 0–0.2)
BASOPHILS NFR BLD AUTO: 0.4 % (ref 0–1.5)
BILIRUB SERPL-MCNC: 0.4 MG/DL (ref 0–1.2)
BUN SERPL-MCNC: 19 MG/DL (ref 8–23)
BUN/CREAT SERPL: 13.6 (ref 7–25)
CALCIUM SPEC-SCNC: 11.1 MG/DL (ref 8.6–10.5)
CHLORIDE SERPL-SCNC: 97 MMOL/L (ref 98–107)
CHOLEST SERPL-MCNC: 179 MG/DL (ref 0–200)
CO2 SERPL-SCNC: 26 MMOL/L (ref 22–29)
CREAT SERPL-MCNC: 1.4 MG/DL (ref 0.76–1.27)
DEPRECATED RDW RBC AUTO: 44.2 FL (ref 37–54)
EGFRCR SERPLBLD CKD-EPI 2021: 56.5 ML/MIN/1.73
EOSINOPHIL # BLD AUTO: 0.06 10*3/MM3 (ref 0–0.4)
EOSINOPHIL NFR BLD AUTO: 0.5 % (ref 0.3–6.2)
ERYTHROCYTE [DISTWIDTH] IN BLOOD BY AUTOMATED COUNT: 12.3 % (ref 12.3–15.4)
GLOBULIN UR ELPH-MCNC: 3.4 GM/DL
GLUCOSE SERPL-MCNC: 132 MG/DL (ref 65–99)
HCT VFR BLD AUTO: 46.5 % (ref 37.5–51)
HDLC SERPL-MCNC: 47 MG/DL (ref 40–60)
HGB BLD-MCNC: 15.9 G/DL (ref 13–17.7)
IMM GRANULOCYTES # BLD AUTO: 0.07 10*3/MM3 (ref 0–0.05)
IMM GRANULOCYTES NFR BLD AUTO: 0.6 % (ref 0–0.5)
IRON 24H UR-MRATE: 64 MCG/DL (ref 59–158)
IRON SATN MFR SERPL: 14 % (ref 20–50)
LDLC SERPL CALC-MCNC: 107 MG/DL (ref 0–100)
LDLC/HDLC SERPL: 2.2 {RATIO}
LYMPHOCYTES # BLD AUTO: 1.31 10*3/MM3 (ref 0.7–3.1)
LYMPHOCYTES NFR BLD AUTO: 11.1 % (ref 19.6–45.3)
MCH RBC QN AUTO: 33.5 PG (ref 26.6–33)
MCHC RBC AUTO-ENTMCNC: 34.2 G/DL (ref 31.5–35.7)
MCV RBC AUTO: 98.1 FL (ref 79–97)
MONOCYTES # BLD AUTO: 0.72 10*3/MM3 (ref 0.1–0.9)
MONOCYTES NFR BLD AUTO: 6.1 % (ref 5–12)
NEUTROPHILS NFR BLD AUTO: 81.3 % (ref 42.7–76)
NEUTROPHILS NFR BLD AUTO: 9.54 10*3/MM3 (ref 1.7–7)
NRBC BLD AUTO-RTO: 0 /100 WBC (ref 0–0.2)
PLATELET # BLD AUTO: 261 10*3/MM3 (ref 140–450)
PMV BLD AUTO: 11.5 FL (ref 6–12)
POTASSIUM SERPL-SCNC: 3.5 MMOL/L (ref 3.5–5.2)
PROT SERPL-MCNC: 7.4 G/DL (ref 6–8.5)
RBC # BLD AUTO: 4.74 10*6/MM3 (ref 4.14–5.8)
SODIUM SERPL-SCNC: 136 MMOL/L (ref 136–145)
TIBC SERPL-MCNC: 446 MCG/DL (ref 298–536)
TRANSFERRIN SERPL-MCNC: 299 MG/DL (ref 200–360)
TRIGL SERPL-MCNC: 144 MG/DL (ref 0–150)
TSH SERPL DL<=0.05 MIU/L-ACNC: 1.03 UIU/ML (ref 0.27–4.2)
VIT B12 BLD-MCNC: 1431 PG/ML (ref 211–946)
VLDLC SERPL-MCNC: 25 MG/DL (ref 5–40)
WBC NRBC COR # BLD AUTO: 11.75 10*3/MM3 (ref 3.4–10.8)

## 2024-08-20 NOTE — TELEPHONE ENCOUNTER
----- Message from Pastora Person sent at 8/20/2024  5:25 PM EDT -----  Let patient know he has advanced DDD throughout the lumbar spine and a stable chronic compression fracture of L1.  Was he aware of this injury? Could be from the fall or a previous injury.

## 2024-09-18 ENCOUNTER — OFFICE VISIT (OUTPATIENT)
Dept: FAMILY MEDICINE CLINIC | Facility: CLINIC | Age: 64
End: 2024-09-18
Payer: MEDICARE

## 2024-09-18 VITALS
HEART RATE: 80 BPM | TEMPERATURE: 97.9 F | BODY MASS INDEX: 29.95 KG/M2 | OXYGEN SATURATION: 98 % | WEIGHT: 209.2 LBS | HEIGHT: 70 IN | SYSTOLIC BLOOD PRESSURE: 112 MMHG | DIASTOLIC BLOOD PRESSURE: 62 MMHG

## 2024-09-18 DIAGNOSIS — I73.9 PAD (PERIPHERAL ARTERY DISEASE): Primary | ICD-10-CM

## 2024-09-18 DIAGNOSIS — I10 ESSENTIAL HYPERTENSION: ICD-10-CM

## 2024-09-18 DIAGNOSIS — E78.49 OTHER HYPERLIPIDEMIA: ICD-10-CM

## 2024-09-18 DIAGNOSIS — G62.9 NEUROPATHY: ICD-10-CM

## 2024-09-18 DIAGNOSIS — L97.919 VENOUS STASIS ULCERS OF BOTH LOWER EXTREMITIES: ICD-10-CM

## 2024-09-18 DIAGNOSIS — I83.029 VENOUS STASIS ULCERS OF BOTH LOWER EXTREMITIES: ICD-10-CM

## 2024-09-18 DIAGNOSIS — M47.9 ADVANCED OSTEOARTHRITIS OF SPINE: ICD-10-CM

## 2024-09-18 DIAGNOSIS — I83.019 VENOUS STASIS ULCERS OF BOTH LOWER EXTREMITIES: ICD-10-CM

## 2024-09-18 DIAGNOSIS — L97.929 VENOUS STASIS ULCERS OF BOTH LOWER EXTREMITIES: ICD-10-CM

## 2024-09-18 DIAGNOSIS — M46.96 LUMBAR SPONDYLITIS: ICD-10-CM

## 2024-09-18 PROCEDURE — 99214 OFFICE O/P EST MOD 30 MIN: CPT | Performed by: NURSE PRACTITIONER

## 2024-09-18 PROCEDURE — 3074F SYST BP LT 130 MM HG: CPT | Performed by: NURSE PRACTITIONER

## 2024-09-18 PROCEDURE — 1160F RVW MEDS BY RX/DR IN RCRD: CPT | Performed by: NURSE PRACTITIONER

## 2024-09-18 PROCEDURE — 1159F MED LIST DOCD IN RCRD: CPT | Performed by: NURSE PRACTITIONER

## 2024-09-18 PROCEDURE — 3078F DIAST BP <80 MM HG: CPT | Performed by: NURSE PRACTITIONER

## 2024-09-18 PROCEDURE — G2211 COMPLEX E/M VISIT ADD ON: HCPCS | Performed by: NURSE PRACTITIONER

## 2024-09-18 PROCEDURE — 1126F AMNT PAIN NOTED NONE PRSNT: CPT | Performed by: NURSE PRACTITIONER

## 2024-09-18 RX ORDER — FOLIC ACID 1 MG/1
1 TABLET ORAL DAILY
COMMUNITY

## 2024-09-18 RX ORDER — ISOSORBIDE MONONITRATE 30 MG/1
30 TABLET, EXTENDED RELEASE ORAL DAILY
Qty: 90 TABLET | Refills: 1 | Status: SHIPPED | OUTPATIENT
Start: 2024-09-18

## 2024-11-20 ENCOUNTER — OFFICE VISIT (OUTPATIENT)
Dept: FAMILY MEDICINE CLINIC | Facility: CLINIC | Age: 64
End: 2024-11-20
Payer: MEDICARE

## 2024-11-20 ENCOUNTER — LAB (OUTPATIENT)
Dept: FAMILY MEDICINE CLINIC | Facility: CLINIC | Age: 64
End: 2024-11-20
Payer: MEDICARE

## 2024-11-20 VITALS
HEART RATE: 76 BPM | BODY MASS INDEX: 29.86 KG/M2 | HEIGHT: 70 IN | WEIGHT: 208.6 LBS | TEMPERATURE: 97.9 F | DIASTOLIC BLOOD PRESSURE: 72 MMHG | OXYGEN SATURATION: 99 % | SYSTOLIC BLOOD PRESSURE: 124 MMHG

## 2024-11-20 DIAGNOSIS — I73.9 PAD (PERIPHERAL ARTERY DISEASE): ICD-10-CM

## 2024-11-20 DIAGNOSIS — R60.0 LOCALIZED EDEMA: ICD-10-CM

## 2024-11-20 DIAGNOSIS — M51.369 DDD (DEGENERATIVE DISC DISEASE), LUMBAR: ICD-10-CM

## 2024-11-20 DIAGNOSIS — G62.9 NEUROPATHY: ICD-10-CM

## 2024-11-20 DIAGNOSIS — G89.29 CHRONIC MIDLINE LOW BACK PAIN WITH RIGHT-SIDED SCIATICA: ICD-10-CM

## 2024-11-20 DIAGNOSIS — M54.41 CHRONIC MIDLINE LOW BACK PAIN WITH RIGHT-SIDED SCIATICA: ICD-10-CM

## 2024-11-20 DIAGNOSIS — E78.49 OTHER HYPERLIPIDEMIA: ICD-10-CM

## 2024-11-20 DIAGNOSIS — M47.9 ADVANCED OSTEOARTHRITIS OF SPINE: ICD-10-CM

## 2024-11-20 DIAGNOSIS — I10 ESSENTIAL HYPERTENSION: Primary | ICD-10-CM

## 2024-11-20 PROCEDURE — 85025 COMPLETE CBC W/AUTO DIFF WBC: CPT | Performed by: NURSE PRACTITIONER

## 2024-11-20 PROCEDURE — 1126F AMNT PAIN NOTED NONE PRSNT: CPT | Performed by: NURSE PRACTITIONER

## 2024-11-20 PROCEDURE — 3078F DIAST BP <80 MM HG: CPT | Performed by: NURSE PRACTITIONER

## 2024-11-20 PROCEDURE — G2211 COMPLEX E/M VISIT ADD ON: HCPCS | Performed by: NURSE PRACTITIONER

## 2024-11-20 PROCEDURE — 3074F SYST BP LT 130 MM HG: CPT | Performed by: NURSE PRACTITIONER

## 2024-11-20 PROCEDURE — 1160F RVW MEDS BY RX/DR IN RCRD: CPT | Performed by: NURSE PRACTITIONER

## 2024-11-20 PROCEDURE — 1159F MED LIST DOCD IN RCRD: CPT | Performed by: NURSE PRACTITIONER

## 2024-11-20 PROCEDURE — 84550 ASSAY OF BLOOD/URIC ACID: CPT | Performed by: NURSE PRACTITIONER

## 2024-11-20 PROCEDURE — 99214 OFFICE O/P EST MOD 30 MIN: CPT | Performed by: NURSE PRACTITIONER

## 2024-11-20 PROCEDURE — 86038 ANTINUCLEAR ANTIBODIES: CPT | Performed by: NURSE PRACTITIONER

## 2024-11-20 PROCEDURE — 36415 COLL VENOUS BLD VENIPUNCTURE: CPT

## 2024-11-20 PROCEDURE — 80048 BASIC METABOLIC PNL TOTAL CA: CPT | Performed by: NURSE PRACTITIONER

## 2024-11-20 RX ORDER — GABAPENTIN 800 MG/1
1200 TABLET ORAL 3 TIMES DAILY
Qty: 135 TABLET | Refills: 3 | Status: SHIPPED | OUTPATIENT
Start: 2024-11-20

## 2024-11-20 RX ORDER — METHOCARBAMOL 750 MG/1
750 TABLET, FILM COATED ORAL 4 TIMES DAILY
Qty: 360 TABLET | Refills: 1 | Status: SHIPPED | OUTPATIENT
Start: 2024-11-20

## 2024-11-20 NOTE — PROGRESS NOTES
Chief Complaint  Hyperlipidemia    Subjective          Gurwinder Harding presents to [facility]  Hypertension  This is a chronic (carotid stenosis) problem. The current episode started more than 1 year ago. The problem is controlled. Associated symptoms include peripheral edema (on and off). Pertinent negatives include no chest pain or shortness of breath. Risk factors for coronary artery disease include smoking/tobacco exposure and sedentary lifestyle. Past treatments include beta blockers and ACE inhibitors. Current antihypertension treatment includes ACE inhibitors and beta blockers. The current treatment provides moderate improvement. Compliance problems include diet and exercise.    Extremity Weakness   The pain is present in the back (bilateral lower extremity). This is a chronic (Known PAD post stenting with chronic Neuralgia.  Continued difficulty standing, walking.  Gait is slow and unsteady.  Continued swelling.  Overall unchanged) problem. The current episode started more than 1 year ago. The problem occurs daily. The problem has been gradually worsening. The quality of the pain is described as aching and dull. The pain is at a severity of 5/10. The pain is moderate. Associated symptoms include a limited range of motion. Associated symptoms comments: Tremors of hands . He has tried NSAIDS, oral narcotics, acetaminophen and rest for the symptoms. Family history includes gout. Parkinsons in the family His past medical history is significant for osteoarthritis.   Hyperlipidemia  This is a chronic problem. Recent lipid tests were reviewed and are variable. Exacerbating diseases include nephrotic syndrome. Pertinent negatives include no chest pain or shortness of breath. Current antihyperlipidemic treatment includes diet change.       Objective   Vital Signs:   /72 (BP Location: Right arm, Patient Position: Sitting)   Pulse 76   Temp 97.9 °F (36.6 °C) (Temporal)   Ht  "177.8 cm (70\")   Wt 94.6 kg (208 lb 9.6 oz)   SpO2 99%   BMI 29.93 kg/m²     Physical Exam  Vitals and nursing note reviewed.   Constitutional:       General: He is not in acute distress.     Appearance: He is well-developed.   HENT:      Head: Normocephalic.   Eyes:      Conjunctiva/sclera: Conjunctivae normal.   Cardiovascular:      Rate and Rhythm: Normal rate and regular rhythm.      Heart sounds: Normal heart sounds. No murmur heard.  Pulmonary:      Effort: Pulmonary effort is normal. No respiratory distress.   Musculoskeletal:         General: Tenderness present. No swelling.      Right lower leg: Edema present.      Left lower leg: Edema present.   Skin:     General: Skin is warm and dry.      Capillary Refill: Capillary refill takes less than 2 seconds.      Findings: Bruising, erythema and lesion present.   Neurological:      Mental Status: He is alert and oriented to person, place, and time.      Cranial Nerves: No cranial nerve deficit.      Sensory: Sensory deficit present.      Motor: Weakness present.      Coordination: Coordination abnormal.      Gait: Gait abnormal.      Deep Tendon Reflexes: Reflexes normal.   Psychiatric:         Mood and Affect: Mood normal.         Behavior: Behavior normal.         Thought Content: Thought content normal.         Judgment: Judgment normal.        During this visit the following were done:  Labs Reviewed [x]    Labs Ordered [x]    Radiology Reports Reviewed []    Radiology Ordered [x]    PCP Records Reviewed []    Referring Provider Records Reviewed []    ER Records Reviewed []    Hospital Records Reviewed []    History Obtained From Family []    Radiology Images Reviewed []    Other Reviewed []    Records Requested []            Diagnoses and all orders for this visit:    1. Essential hypertension (Primary)  -     atenolol (Tenormin) 50 MG tablet; Take 1 tablet by mouth Daily.  Dispense: 90 tablet; Refill: 1  -     hydroCHLOROthiazide 25 MG tablet; Take 1 " tablet by mouth Daily.  Dispense: 90 tablet; Refill: 1    2. Neuropathy  -     gabapentin (NEURONTIN) 800 MG tablet; Take 1.5 tablets by mouth 3 (Three) Times a Day.  Dispense: 135 tablet; Refill: 3  -     methocarbamol (ROBAXIN) 750 MG tablet; Take 1 tablet by mouth 4 (Four) Times a Day.  Dispense: 360 tablet; Refill: 1  -     CBC w AUTO Differential; Future  -     Basic metabolic panel; Future    3. PAD (peripheral artery disease)    4. Advanced osteoarthritis of spine  Scheduled with pain clinic    5. Chronic midline low back pain with right-sided sciatica    6. Other hyperlipidemia    7. Localized edema  -     hydroCHLOROthiazide 25 MG tablet; Take 1 tablet by mouth Daily.  Dispense: 90 tablet; Refill: 1      BMI is >= 30 and <35. (Class 1 Obesity). The following options were offered after discussion;: exercise counseling/recommendations and nutrition counseling/recommendations        Follow Up   Return in about 3 months (around 2/20/2025), or if symptoms worsen or fail to improve, for Recheck  labs from today and august.  Patient was given instructions and counseling regarding his condition or for health maintenance advice. Please see specific information pulled into the AVS if appropriate.     Michael Reviewed and is consistent.  UDS reviewed and is consistent.  Patient comfort assessment guide reviewed and updated today.    As part of the patient's treatment plan they are being prescribed a controlled substance/ substances with potential for abuse.  This patient has been made aware of the appropriate use of such medications, including potential risk of somnolence, limited ability to drive and/or work safely, and potential for overdose.  It has also been made clear these medications are for use by the patient only, without concomitant use of alcohol or other substances unless prescribed/advised by medical provider.    Patient has completed prescribing agreement detailing terms of continued prescribing of  controlled substances including monitoring HERON reports, urine drug screens, and pill counts.  The patient is aware HERON reports are reviewed on a regular basis and scanned into the chart.    History and physical exam exhibit continued safe and appropriate use of controlled substances.

## 2024-11-21 LAB
ANION GAP SERPL CALCULATED.3IONS-SCNC: 10 MMOL/L (ref 5–15)
BASOPHILS # BLD AUTO: 0.07 10*3/MM3 (ref 0–0.2)
BASOPHILS NFR BLD AUTO: 0.8 % (ref 0–1.5)
BUN SERPL-MCNC: 17 MG/DL (ref 8–23)
BUN/CREAT SERPL: 12 (ref 7–25)
CALCIUM SPEC-SCNC: 10.6 MG/DL (ref 8.6–10.5)
CHLORIDE SERPL-SCNC: 95 MMOL/L (ref 98–107)
CO2 SERPL-SCNC: 28 MMOL/L (ref 22–29)
CREAT SERPL-MCNC: 1.42 MG/DL (ref 0.76–1.27)
DEPRECATED RDW RBC AUTO: 43.8 FL (ref 37–54)
EGFRCR SERPLBLD CKD-EPI 2021: 55.5 ML/MIN/1.73
EOSINOPHIL # BLD AUTO: 0.09 10*3/MM3 (ref 0–0.4)
EOSINOPHIL NFR BLD AUTO: 1.1 % (ref 0.3–6.2)
ERYTHROCYTE [DISTWIDTH] IN BLOOD BY AUTOMATED COUNT: 12.5 % (ref 12.3–15.4)
GLUCOSE SERPL-MCNC: 114 MG/DL (ref 65–99)
HCT VFR BLD AUTO: 48.1 % (ref 37.5–51)
HGB BLD-MCNC: 16.6 G/DL (ref 13–17.7)
IMM GRANULOCYTES # BLD AUTO: 0.05 10*3/MM3 (ref 0–0.05)
IMM GRANULOCYTES NFR BLD AUTO: 0.6 % (ref 0–0.5)
LYMPHOCYTES # BLD AUTO: 1.71 10*3/MM3 (ref 0.7–3.1)
LYMPHOCYTES NFR BLD AUTO: 20.6 % (ref 19.6–45.3)
MCH RBC QN AUTO: 33.1 PG (ref 26.6–33)
MCHC RBC AUTO-ENTMCNC: 34.5 G/DL (ref 31.5–35.7)
MCV RBC AUTO: 95.8 FL (ref 79–97)
MONOCYTES # BLD AUTO: 0.54 10*3/MM3 (ref 0.1–0.9)
MONOCYTES NFR BLD AUTO: 6.5 % (ref 5–12)
NEUTROPHILS NFR BLD AUTO: 5.83 10*3/MM3 (ref 1.7–7)
NEUTROPHILS NFR BLD AUTO: 70.4 % (ref 42.7–76)
NRBC BLD AUTO-RTO: 0 /100 WBC (ref 0–0.2)
PLATELET # BLD AUTO: 274 10*3/MM3 (ref 140–450)
PMV BLD AUTO: 10.7 FL (ref 6–12)
POTASSIUM SERPL-SCNC: 3.9 MMOL/L (ref 3.5–5.2)
RBC # BLD AUTO: 5.02 10*6/MM3 (ref 4.14–5.8)
SODIUM SERPL-SCNC: 133 MMOL/L (ref 136–145)
URATE SERPL-MCNC: 7.9 MG/DL (ref 3.4–7)
WBC NRBC COR # BLD AUTO: 8.29 10*3/MM3 (ref 3.4–10.8)

## 2024-11-25 ENCOUNTER — TELEPHONE (OUTPATIENT)
Dept: FAMILY MEDICINE CLINIC | Facility: CLINIC | Age: 64
End: 2024-11-25
Payer: MEDICARE

## 2024-11-25 LAB
ANA SER QL IF: NEGATIVE
LABORATORY COMMENT REPORT: NORMAL

## 2024-11-25 RX ORDER — HYDROCHLOROTHIAZIDE 25 MG/1
25 TABLET ORAL DAILY
Qty: 90 TABLET | Refills: 1 | Status: SHIPPED | OUTPATIENT
Start: 2024-11-25

## 2024-11-25 RX ORDER — ALLOPURINOL 100 MG/1
100 TABLET ORAL 2 TIMES DAILY
Qty: 180 TABLET | Refills: 1 | Status: SHIPPED | OUTPATIENT
Start: 2024-11-25

## 2024-11-25 RX ORDER — ATENOLOL 50 MG/1
50 TABLET ORAL DAILY
Qty: 90 TABLET | Refills: 1 | Status: SHIPPED | OUTPATIENT
Start: 2024-11-25

## 2024-11-25 NOTE — TELEPHONE ENCOUNTER
----- Message from Pastora Person sent at 11/25/2024 12:58 PM EST -----  Inflammatory markers are NEG.  Uric acid is high (gout) sent allopurinol to pharmacy

## 2024-11-27 ENCOUNTER — TRANSCRIBE ORDERS (OUTPATIENT)
Dept: ADMINISTRATIVE | Facility: HOSPITAL | Age: 64
End: 2024-11-27
Payer: MEDICARE

## 2024-11-27 DIAGNOSIS — M54.16 LUMBAR RADICULOPATHY: Primary | ICD-10-CM

## 2024-12-02 ENCOUNTER — TRANSCRIBE ORDERS (OUTPATIENT)
Dept: ADMINISTRATIVE | Facility: HOSPITAL | Age: 64
End: 2024-12-02

## 2024-12-02 ENCOUNTER — TRANSCRIBE ORDERS (OUTPATIENT)
Dept: ADMINISTRATIVE | Facility: HOSPITAL | Age: 64
End: 2024-12-02
Payer: MEDICARE

## 2024-12-02 ENCOUNTER — HOSPITAL ENCOUNTER (OUTPATIENT)
Dept: GENERAL RADIOLOGY | Facility: HOSPITAL | Age: 64
Discharge: HOME | End: 2024-12-02
Payer: MEDICARE

## 2024-12-02 ENCOUNTER — HOSPITAL ENCOUNTER (OUTPATIENT)
Dept: MRI IMAGING | Facility: HOSPITAL | Age: 64
Discharge: HOME | End: 2024-12-02
Payer: MEDICARE

## 2024-12-02 DIAGNOSIS — M54.50 LOW BACK PAIN, UNSPECIFIED BACK PAIN LATERALITY, UNSPECIFIED CHRONICITY, UNSPECIFIED WHETHER SCIATICA PRESENT: ICD-10-CM

## 2024-12-02 DIAGNOSIS — M54.16 LUMBAR RADICULOPATHY: ICD-10-CM

## 2024-12-02 DIAGNOSIS — M54.50 LOW BACK PAIN, UNSPECIFIED BACK PAIN LATERALITY, UNSPECIFIED CHRONICITY, UNSPECIFIED WHETHER SCIATICA PRESENT: Primary | ICD-10-CM

## 2024-12-02 PROCEDURE — 72120 X-RAY BEND ONLY L-S SPINE: CPT

## 2024-12-02 PROCEDURE — 72148 MRI LUMBAR SPINE W/O DYE: CPT

## 2025-01-29 ENCOUNTER — OFFICE VISIT (OUTPATIENT)
Dept: FAMILY MEDICINE CLINIC | Facility: CLINIC | Age: 65
End: 2025-01-29
Payer: MEDICARE

## 2025-01-29 VITALS
OXYGEN SATURATION: 97 % | DIASTOLIC BLOOD PRESSURE: 62 MMHG | SYSTOLIC BLOOD PRESSURE: 108 MMHG | HEART RATE: 77 BPM | TEMPERATURE: 97.1 F

## 2025-01-29 DIAGNOSIS — F33.1 MAJOR DEPRESSIVE DISORDER, RECURRENT, MODERATE: ICD-10-CM

## 2025-01-29 DIAGNOSIS — M47.9 ADVANCED OSTEOARTHRITIS OF SPINE: ICD-10-CM

## 2025-01-29 DIAGNOSIS — I10 ESSENTIAL HYPERTENSION: ICD-10-CM

## 2025-01-29 DIAGNOSIS — G62.9 NEUROPATHY: ICD-10-CM

## 2025-01-29 DIAGNOSIS — I73.9 CLAUDICATION: ICD-10-CM

## 2025-01-29 DIAGNOSIS — M46.96 LUMBAR SPONDYLITIS: ICD-10-CM

## 2025-01-29 DIAGNOSIS — F17.200 CURRENT SMOKER: ICD-10-CM

## 2025-01-29 DIAGNOSIS — I73.9 PAD (PERIPHERAL ARTERY DISEASE): Primary | ICD-10-CM

## 2025-01-29 DIAGNOSIS — R60.0 LOCALIZED EDEMA: ICD-10-CM

## 2025-01-29 PROCEDURE — G2211 COMPLEX E/M VISIT ADD ON: HCPCS | Performed by: NURSE PRACTITIONER

## 2025-01-29 PROCEDURE — 1126F AMNT PAIN NOTED NONE PRSNT: CPT | Performed by: NURSE PRACTITIONER

## 2025-01-29 PROCEDURE — 3074F SYST BP LT 130 MM HG: CPT | Performed by: NURSE PRACTITIONER

## 2025-01-29 PROCEDURE — 99214 OFFICE O/P EST MOD 30 MIN: CPT | Performed by: NURSE PRACTITIONER

## 2025-01-29 PROCEDURE — 1160F RVW MEDS BY RX/DR IN RCRD: CPT | Performed by: NURSE PRACTITIONER

## 2025-01-29 PROCEDURE — 1159F MED LIST DOCD IN RCRD: CPT | Performed by: NURSE PRACTITIONER

## 2025-01-29 PROCEDURE — 3078F DIAST BP <80 MM HG: CPT | Performed by: NURSE PRACTITIONER

## 2025-01-29 RX ORDER — TRAZODONE HYDROCHLORIDE 50 MG/1
50 TABLET, FILM COATED ORAL NIGHTLY
Qty: 90 TABLET | Refills: 1 | Status: SHIPPED | OUTPATIENT
Start: 2025-01-29

## 2025-01-29 RX ORDER — ISOSORBIDE MONONITRATE 30 MG/1
30 TABLET, EXTENDED RELEASE ORAL DAILY
Qty: 90 TABLET | Refills: 1 | Status: SHIPPED | OUTPATIENT
Start: 2025-01-29

## 2025-01-29 NOTE — PROGRESS NOTES
Chief Complaint   Immobility     Subjective          Gurwinder Harding presents to [facility]   History of Present Illness   History of Present Illness  The patient is a 64-year-old male who presents with increased difficulty in mobility.    He reports experiencing sharp pain in his upper legs and posterior thighs upon standing or walking, which radiates to his back. This pain, which has been progressively worsening, occurs even after standing for a brief period of 1 to 2 minutes. He also experiences weakness in his knees and occasional burning sensation in his right foot due to neuropathy. He has not observed any discoloration in his feet or legs. He has reduced his alcohol consumption to 2 to 3 beers at night. He has not noticed any ulcerations or sores on his feet or toes, but reports persistent swelling in his right leg and frequent pain in his left leg. He has not returned to the pain clinic since his injection, as they scheduled his next appointment for 3 months later and advised him to contact them if any issues arose. The pain subsides when he sits down. He has been using a walker and cane for mobility. He recalls receiving a steroid injection at a pain clinic on 12/20/2024 and starting gout medication shortly thereafter. Despite discontinuing the gout medication for 2 days, he did not observe any improvement. He has previously consulted with Dr. Azar, a vascular surgeon, and underwent a procedure in 2019 where a stent was placed in one leg and the other leg was opened up. He had a follow-up visit post-procedure, but has not seen Dr. Azar since then. He underwent an ultrasound of his lower extremities in 2021.    Supplemental Information  He was admitted to the hospital for 3 days in 2022 for low iron and low sodium.    SOCIAL HISTORY  The patient admits to smoking. The patient has cut down on alcohol consumption and currently drinks two to three beers at night, sometimes  four or five.    MEDICATIONS  Current: methocarbamol       Review of Systems       Objective    Vital Signs:   /62 (BP Location: Right arm, Patient Position: Sitting)   Pulse 77   Temp 97.1 °F (36.2 °C) (Temporal)   SpO2 97%     Physical Exam           Physical Exam  Vitals and nursing note reviewed.   Constitutional:       General: He is not in acute distress.     Appearance: He is well-developed. He is not ill-appearing.   HENT:      Head: Normocephalic.   Cardiovascular:      Rate and Rhythm: Normal rate and regular rhythm.      Heart sounds: Normal heart sounds. No murmur heard.  Pulmonary:      Effort: Pulmonary effort is normal.      Breath sounds: Normal breath sounds. No wheezing.   Musculoskeletal:      Right lower leg: Edema present.      Left lower leg: Edema present.   Skin:     General: Skin is warm and dry.      Findings: Erythema present. No lesion or rash.   Neurological:      Mental Status: He is alert and oriented to person, place, and time.      Motor: Weakness present.      Gait: Gait abnormal.   Psychiatric:         Mood and Affect: Mood normal.         Behavior: Behavior normal.         Thought Content: Thought content normal.         Judgment: Judgment normal.        Result Review :  During this visit the following were done:  Labs Reviewed [x]    Labs Ordered [x]    Radiology Reports Reviewed [x]    Radiology Ordered [x]    PCP Records Reviewed []    Referring Provider Records Reviewed []    ER Records Reviewed []    Hospital Records Reviewed []    History Obtained From Family []    Radiology Images Reviewed []    Other Reviewed [x]    Records Requested []          Results               Assessment and Plan    Diagnoses and all orders for this visit:    1. PAD (peripheral artery disease) (Primary)  -     US Arterial Doppler Lower Extremity Bilateral; Future    2. Claudication  -     US Arterial Doppler Lower Extremity Bilateral; Future    3. Advanced osteoarthritis of spine    4.  Essential hypertension  -     isosorbide mononitrate (IMDUR) 30 MG 24 hr tablet; Take 1 tablet by mouth Daily.  Dispense: 90 tablet; Refill: 1    5. Localized edema    6. Lumbar spondylitis    7. Neuropathy    8. Current smoker    9. Major depressive disorder, recurrent, moderate  -     traZODone (DESYREL) 50 MG tablet; Take 1 tablet by mouth Every Night.  Dispense: 90 tablet; Refill: 1       Assessment & Plan  1. Mobility impairment.  The patient's mobility issues are likely due to a combination of arthritis, neuropathy, and gout. The sharp pain experienced in the upper legs and back upon standing is not typical of these conditions and may be related to claudication. The patient has a history of poor circulation and has undergone procedures in the past to address this issue. An ultrasound arterial Doppler of the bilateral lower extremities will be ordered to assess the current state of circulation. The patient will discontinue the gout medication as it may be contributing to the symptoms. A referral to a vascular surgeon will be made based on the ultrasound results.    2. Smoking cessation.  The patient continues to smoke, which may be exacerbating his circulatory issues. He is encouraged to use nicotine patches to aid in smoking cessation. The patient has a box of patches at home and is advised to use them when ready to commit to quitting.    3. Medication management.  The patient mentioned difficulty obtaining methocarbamol, a muscle relaxer. It was noted that a 90-day supply was provided 37 days ago, and the patient may have misplaced one of the bottles. The patient is advised to check for the additional bottle at home.    PROCEDURE  The patient received a steroid injection at a pain clinic on 12/20/2024. He underwent a stent placement procedure in one leg and the other leg was opened up in 2019.         Follow Up    Return in about 6 weeks (around 3/12/2025), or if symptoms worsen or fail to improve, for  Recheck.   Patient was given instructions and counseling regarding his condition or for health maintenance advice. Please see specific information pulled into the AVS if appropriate.     Heron  Reviewed and is consistent.  UDS reviewed and is consistent.  Patient comfort assessment guide reviewed and updated today.    As part of the patient's treatment plan they are being prescribed a controlled substance/ substances with potential for abuse.  This patient has been made aware of the appropriate use of such medications, including potential risk of somnolence, limited ability to drive and/or work safely, and potential for overdose.  It has also been made clear these medications are for use by the patient only, without concomitant use of alcohol or other substances unless prescribed/advised by medical provider.    Patient has completed prescribing agreement detailing terms of continued prescribing of controlled substances including monitoring HERON reports, urine drug screens, and pill counts.  The patient is aware HERON reports are reviewed on a regular basis and scanned into the chart.    History and physical exam exhibit continued safe and appropriate use of controlled substances.             This document has been electronically signed by KEVIN Jensen  January 29, 2025 15:14 EST    Patient or patient representative verbalized consent for the use of Ambient Listening during the visit with  KEVIN Jensen for chart documentation. 1/29/2025  15:08 EST

## 2025-02-03 ENCOUNTER — HOSPITAL ENCOUNTER (OUTPATIENT)
Dept: ULTRASOUND IMAGING | Facility: HOSPITAL | Age: 65
Discharge: HOME | End: 2025-02-03
Admitting: NURSE PRACTITIONER
Payer: MEDICARE

## 2025-02-03 DIAGNOSIS — I73.9 PAD (PERIPHERAL ARTERY DISEASE): ICD-10-CM

## 2025-02-03 DIAGNOSIS — I73.9 CLAUDICATION: ICD-10-CM

## 2025-02-03 DIAGNOSIS — I73.9 PAD (PERIPHERAL ARTERY DISEASE): Primary | ICD-10-CM

## 2025-02-03 PROCEDURE — 93923 UPR/LXTR ART STDY 3+ LVLS: CPT

## 2025-02-03 PROCEDURE — 93923 UPR/LXTR ART STDY 3+ LVLS: CPT | Performed by: RADIOLOGY

## 2025-02-04 ENCOUNTER — TELEPHONE (OUTPATIENT)
Dept: FAMILY MEDICINE CLINIC | Facility: CLINIC | Age: 65
End: 2025-02-04
Payer: MEDICARE

## 2025-02-04 ENCOUNTER — TELEPHONE (OUTPATIENT)
Dept: CARDIAC SURGERY | Facility: CLINIC | Age: 65
End: 2025-02-04
Payer: MEDICARE

## 2025-02-04 NOTE — TELEPHONE ENCOUNTER
----- Message from Pastora Person sent at 2/3/2025  4:53 PM EST -----  US shows concerns in the more distal arteries and I was concerned about and discussed with him prior to ordering US.  Now will proceed with CT angiogram and referral back to Vascular surgeon

## 2025-02-04 NOTE — TELEPHONE ENCOUNTER
LVM TO PT STATING THAT HE HAS A NP APPT WITH [facility] AND TO CALL OUR OFFICE TO CONFIRM THAT APPT. R/M SENT 2/4

## 2025-02-06 ENCOUNTER — TELEPHONE (OUTPATIENT)
Dept: CARDIAC SURGERY | Facility: CLINIC | Age: 65
End: 2025-02-06
Payer: MEDICARE

## 2025-02-06 NOTE — TELEPHONE ENCOUNTER
LVM TO PT A 2ND TIME TO CONFIRM HIS NP APPT WITH [facility]. TOLD PT TO CALL OUR OFFICE IF HE HAS ANY QUESTIONS OR CONCERNS

## 2025-03-10 ENCOUNTER — HOSPITAL ENCOUNTER (OUTPATIENT)
Dept: CT IMAGING | Facility: HOSPITAL | Age: 65
Discharge: HOME | End: 2025-03-10
Admitting: NURSE PRACTITIONER
Payer: MEDICARE

## 2025-03-10 DIAGNOSIS — I73.9 PAD (PERIPHERAL ARTERY DISEASE): ICD-10-CM

## 2025-03-10 DIAGNOSIS — I73.9 CLAUDICATION: ICD-10-CM

## 2025-03-10 PROCEDURE — 82565 ASSAY OF CREATININE: CPT

## 2025-03-10 PROCEDURE — 73706 CT ANGIO LWR EXTR W/O&W/DYE: CPT

## 2025-03-10 RX ORDER — IOPAMIDOL 755 MG/ML
100 INJECTION, SOLUTION INTRAVASCULAR
Status: DISCONTINUED | OUTPATIENT
Start: 2025-03-10 | End: 2025-03-11 | Stop reason: HOSPADM

## 2025-03-13 LAB — CREAT BLDA-MCNC: 1.5 MG/DL (ref 0.6–1.3)

## 2025-04-01 ENCOUNTER — RESULTS FOLLOW-UP (OUTPATIENT)
Dept: FAMILY MEDICINE CLINIC | Facility: CLINIC | Age: 65
End: 2025-04-01
Payer: MEDICARE

## 2025-04-01 DIAGNOSIS — I70.209 FEMORAL ARTERY OCCLUSION: ICD-10-CM

## 2025-04-01 DIAGNOSIS — I73.9 PVD (PERIPHERAL VASCULAR DISEASE) WITH CLAUDICATION: Primary | ICD-10-CM

## 2025-04-02 DIAGNOSIS — G62.9 NEUROPATHY: ICD-10-CM

## 2025-04-02 RX ORDER — GABAPENTIN 800 MG/1
TABLET ORAL
Qty: 135 TABLET | Refills: 2 | Status: SHIPPED | OUTPATIENT
Start: 2025-04-02

## 2025-04-30 DIAGNOSIS — R60.0 LOCALIZED EDEMA: ICD-10-CM

## 2025-04-30 DIAGNOSIS — I10 ESSENTIAL HYPERTENSION: ICD-10-CM

## 2025-04-30 RX ORDER — HYDROCHLOROTHIAZIDE 25 MG/1
25 TABLET ORAL DAILY
Qty: 90 TABLET | Refills: 1 | Status: SHIPPED | OUTPATIENT
Start: 2025-04-30

## 2025-05-14 ENCOUNTER — APPOINTMENT (OUTPATIENT)
Dept: GENERAL RADIOLOGY | Facility: HOSPITAL | Age: 65
DRG: 871 | End: 2025-05-14
Payer: MEDICARE

## 2025-05-14 ENCOUNTER — HOSPITAL ENCOUNTER (INPATIENT)
Facility: HOSPITAL | Age: 65
LOS: 4 days | Discharge: HOME HEALTH | DRG: 871 | End: 2025-05-18
Admitting: INTERNAL MEDICINE
Payer: MEDICARE

## 2025-05-14 ENCOUNTER — APPOINTMENT (OUTPATIENT)
Dept: CT IMAGING | Facility: HOSPITAL | Age: 65
DRG: 871 | End: 2025-05-14
Payer: MEDICARE

## 2025-05-14 DIAGNOSIS — E87.6 HYPOKALEMIA: ICD-10-CM

## 2025-05-14 DIAGNOSIS — A41.9 SEPSIS, DUE TO UNSPECIFIED ORGANISM, UNSPECIFIED WHETHER ACUTE ORGAN DYSFUNCTION PRESENT: Primary | ICD-10-CM

## 2025-05-14 DIAGNOSIS — R53.81 PHYSICAL DEBILITY: ICD-10-CM

## 2025-05-14 DIAGNOSIS — E83.52 HYPERCALCEMIA: ICD-10-CM

## 2025-05-14 DIAGNOSIS — E83.39 HYPOPHOSPHATEMIA: ICD-10-CM

## 2025-05-14 DIAGNOSIS — G62.9 NEUROPATHY: ICD-10-CM

## 2025-05-14 DIAGNOSIS — J18.9 PNEUMONIA OF RIGHT UPPER LOBE DUE TO INFECTIOUS ORGANISM: ICD-10-CM

## 2025-05-14 LAB
A-A DO2: 24.9 MMHG (ref 0–300)
ALBUMIN SERPL-MCNC: 1.9 G/DL (ref 3.5–5.2)
ALBUMIN SERPL-MCNC: 2.3 G/DL (ref 3.5–5.2)
ALBUMIN/GLOB SERPL: 0.5 G/DL
ALBUMIN/GLOB SERPL: 0.5 G/DL
ALP SERPL-CCNC: 126 U/L (ref 39–117)
ALP SERPL-CCNC: 134 U/L (ref 39–117)
ALT SERPL W P-5'-P-CCNC: 7 U/L (ref 1–41)
ALT SERPL W P-5'-P-CCNC: 8 U/L (ref 1–41)
ANION GAP SERPL CALCULATED.3IONS-SCNC: 11.2 MMOL/L (ref 5–15)
ANION GAP SERPL CALCULATED.3IONS-SCNC: 13.3 MMOL/L (ref 5–15)
APTT PPP: 29.6 SECONDS (ref 24.5–35.9)
ARTERIAL PATENCY WRIST A: POSITIVE
AST SERPL-CCNC: 14 U/L (ref 1–40)
AST SERPL-CCNC: 17 U/L (ref 1–40)
ATMOSPHERIC PRESS: 722 MMHG
B PARAPERT DNA SPEC QL NAA+PROBE: NOT DETECTED
B PERT DNA SPEC QL NAA+PROBE: NOT DETECTED
BASE EXCESS BLDA CALC-SCNC: 4.1 MMOL/L (ref 0–2)
BASOPHILS # BLD AUTO: 0.04 10*3/MM3 (ref 0–0.2)
BASOPHILS # BLD AUTO: 0.04 10*3/MM3 (ref 0–0.2)
BASOPHILS NFR BLD AUTO: 0.2 % (ref 0–1.5)
BASOPHILS NFR BLD AUTO: 0.2 % (ref 0–1.5)
BDY SITE: ABNORMAL
BILIRUB SERPL-MCNC: 0.3 MG/DL (ref 0–1.2)
BILIRUB SERPL-MCNC: 0.4 MG/DL (ref 0–1.2)
BUN SERPL-MCNC: 21 MG/DL (ref 8–23)
BUN SERPL-MCNC: 23 MG/DL (ref 8–23)
BUN/CREAT SERPL: 18.5 (ref 7–25)
BUN/CREAT SERPL: 19.6 (ref 7–25)
C PNEUM DNA NPH QL NAA+NON-PROBE: NOT DETECTED
CA-I SERPL ISE-MCNC: 1.38 MMOL/L (ref 1.15–1.3)
CALCIUM SPEC-SCNC: 11.1 MG/DL (ref 8.6–10.5)
CALCIUM SPEC-SCNC: 9.5 MG/DL (ref 8.6–10.5)
CHLORIDE SERPL-SCNC: 97 MMOL/L (ref 98–107)
CHLORIDE SERPL-SCNC: 98 MMOL/L (ref 98–107)
CO2 BLDA-SCNC: 28.4 MMOL/L (ref 22–33)
CO2 SERPL-SCNC: 17.7 MMOL/L (ref 22–29)
CO2 SERPL-SCNC: 26.8 MMOL/L (ref 22–29)
COHGB MFR BLD: 3.5 % (ref 0–5)
CREAT SERPL-MCNC: 1.07 MG/DL (ref 0.76–1.27)
CREAT SERPL-MCNC: 1.24 MG/DL (ref 0.76–1.27)
CRP SERPL-MCNC: 13.8 MG/DL (ref 0–0.5)
D-LACTATE SERPL-SCNC: 1.6 MMOL/L (ref 0.5–2)
D-LACTATE SERPL-SCNC: 3.5 MMOL/L (ref 0.5–2)
DEPRECATED RDW RBC AUTO: 51.6 FL (ref 37–54)
DEPRECATED RDW RBC AUTO: 55.6 FL (ref 37–54)
EGFRCR SERPLBLD CKD-EPI 2021: 65.3 ML/MIN/1.73
EGFRCR SERPLBLD CKD-EPI 2021: 78 ML/MIN/1.73
EOSINOPHIL # BLD AUTO: 0.01 10*3/MM3 (ref 0–0.4)
EOSINOPHIL # BLD AUTO: 0.01 10*3/MM3 (ref 0–0.4)
EOSINOPHIL NFR BLD AUTO: 0.1 % (ref 0.3–6.2)
EOSINOPHIL NFR BLD AUTO: 0.1 % (ref 0.3–6.2)
ERYTHROCYTE [DISTWIDTH] IN BLOOD BY AUTOMATED COUNT: 14.7 % (ref 12.3–15.4)
ERYTHROCYTE [DISTWIDTH] IN BLOOD BY AUTOMATED COUNT: 14.8 % (ref 12.3–15.4)
FLUAV SUBTYP SPEC NAA+PROBE: NOT DETECTED
FLUBV RNA ISLT QL NAA+PROBE: NOT DETECTED
GEN 5 1HR TROPONIN T REFLEX: 53 NG/L
GLOBULIN UR ELPH-MCNC: 4.2 GM/DL
GLOBULIN UR ELPH-MCNC: 4.2 GM/DL
GLUCOSE SERPL-MCNC: 151 MG/DL (ref 65–99)
GLUCOSE SERPL-MCNC: 178 MG/DL (ref 65–99)
HADV DNA SPEC NAA+PROBE: NOT DETECTED
HBA1C MFR BLD: 5.6 % (ref 4.8–5.6)
HCO3 BLDA-SCNC: 27.3 MMOL/L (ref 20–26)
HCOV 229E RNA SPEC QL NAA+PROBE: NOT DETECTED
HCOV HKU1 RNA SPEC QL NAA+PROBE: NOT DETECTED
HCOV NL63 RNA SPEC QL NAA+PROBE: NOT DETECTED
HCOV OC43 RNA SPEC QL NAA+PROBE: NOT DETECTED
HCT VFR BLD AUTO: 38.6 % (ref 37.5–51)
HCT VFR BLD AUTO: 40.1 % (ref 37.5–51)
HCT VFR BLD CALC: 36.5 % (ref 38–51)
HGB BLD-MCNC: 12.1 G/DL (ref 13–17.7)
HGB BLD-MCNC: 12.3 G/DL (ref 13–17.7)
HGB BLDA-MCNC: 11.9 G/DL (ref 14–18)
HMPV RNA NPH QL NAA+NON-PROBE: NOT DETECTED
HOLD SPECIMEN: NORMAL
HOLD SPECIMEN: NORMAL
HPIV1 RNA ISLT QL NAA+PROBE: NOT DETECTED
HPIV2 RNA SPEC QL NAA+PROBE: NOT DETECTED
HPIV3 RNA NPH QL NAA+PROBE: NOT DETECTED
HPIV4 P GENE NPH QL NAA+PROBE: NOT DETECTED
IMM GRANULOCYTES # BLD AUTO: 0.16 10*3/MM3 (ref 0–0.05)
IMM GRANULOCYTES # BLD AUTO: 0.19 10*3/MM3 (ref 0–0.05)
IMM GRANULOCYTES NFR BLD AUTO: 0.9 % (ref 0–0.5)
IMM GRANULOCYTES NFR BLD AUTO: 1 % (ref 0–0.5)
INHALED O2 CONCENTRATION: 21 %
INR PPP: 1.11 (ref 0.9–1.1)
LYMPHOCYTES # BLD AUTO: 1.37 10*3/MM3 (ref 0.7–3.1)
LYMPHOCYTES # BLD AUTO: 1.8 10*3/MM3 (ref 0.7–3.1)
LYMPHOCYTES NFR BLD AUTO: 7.5 % (ref 19.6–45.3)
LYMPHOCYTES NFR BLD AUTO: 9.4 % (ref 19.6–45.3)
Lab: ABNORMAL
M PNEUMO IGG SER IA-ACNC: NOT DETECTED
MAGNESIUM SERPL-MCNC: 2 MG/DL (ref 1.6–2.4)
MAGNESIUM SERPL-MCNC: 2.2 MG/DL (ref 1.6–2.4)
MCH RBC QN AUTO: 30.6 PG (ref 26.6–33)
MCH RBC QN AUTO: 31.2 PG (ref 26.6–33)
MCHC RBC AUTO-ENTMCNC: 30.2 G/DL (ref 31.5–35.7)
MCHC RBC AUTO-ENTMCNC: 31.9 G/DL (ref 31.5–35.7)
MCV RBC AUTO: 103.4 FL (ref 79–97)
MCV RBC AUTO: 96 FL (ref 79–97)
METHGB BLD QL: 0 % (ref 0–3)
MODALITY: ABNORMAL
MONOCYTES # BLD AUTO: 0.6 10*3/MM3 (ref 0.1–0.9)
MONOCYTES # BLD AUTO: 0.71 10*3/MM3 (ref 0.1–0.9)
MONOCYTES NFR BLD AUTO: 3.3 % (ref 5–12)
MONOCYTES NFR BLD AUTO: 3.7 % (ref 5–12)
NEUTROPHILS NFR BLD AUTO: 16.2 10*3/MM3 (ref 1.7–7)
NEUTROPHILS NFR BLD AUTO: 16.34 10*3/MM3 (ref 1.7–7)
NEUTROPHILS NFR BLD AUTO: 85.6 % (ref 42.7–76)
NEUTROPHILS NFR BLD AUTO: 88 % (ref 42.7–76)
NRBC BLD AUTO-RTO: 0 /100 WBC (ref 0–0.2)
NRBC BLD AUTO-RTO: 0 /100 WBC (ref 0–0.2)
OXYHGB MFR BLDV: 93.6 % (ref 94–99)
PCO2 BLDA: 35.4 MM HG (ref 35–45)
PCO2 TEMP ADJ BLD: ABNORMAL MM[HG]
PH BLDA: 7.5 PH UNITS (ref 7.35–7.45)
PH, TEMP CORRECTED: ABNORMAL
PHOSPHATE SERPL-MCNC: 1.7 MG/DL (ref 2.5–4.5)
PLATELET # BLD AUTO: 400 10*3/MM3 (ref 140–450)
PLATELET # BLD AUTO: 482 10*3/MM3 (ref 140–450)
PMV BLD AUTO: 9.3 FL (ref 6–12)
PMV BLD AUTO: 9.4 FL (ref 6–12)
PO2 BLDA: 76.8 MM HG (ref 83–108)
PO2 TEMP ADJ BLD: ABNORMAL MM[HG]
POTASSIUM SERPL-SCNC: 2.7 MMOL/L (ref 3.5–5.2)
POTASSIUM SERPL-SCNC: 3.7 MMOL/L (ref 3.5–5.2)
PROCALCITONIN SERPL-MCNC: 30.65 NG/ML (ref 0–0.25)
PROT SERPL-MCNC: 6.1 G/DL (ref 6–8.5)
PROT SERPL-MCNC: 6.5 G/DL (ref 6–8.5)
PROTHROMBIN TIME: 14.4 SECONDS (ref 11.6–15.1)
QT INTERVAL: 364 MS
QTC INTERVAL: 485 MS
RBC # BLD AUTO: 3.88 10*6/MM3 (ref 4.14–5.8)
RBC # BLD AUTO: 4.02 10*6/MM3 (ref 4.14–5.8)
RHINOVIRUS RNA SPEC NAA+PROBE: NOT DETECTED
RSV RNA NPH QL NAA+NON-PROBE: NOT DETECTED
SAO2 % BLDCOA: 96.9 % (ref 94–99)
SARS-COV-2 RNA RESP QL NAA+PROBE: NOT DETECTED
SODIUM SERPL-SCNC: 129 MMOL/L (ref 136–145)
SODIUM SERPL-SCNC: 135 MMOL/L (ref 136–145)
TROPONIN T % DELTA: -13
TROPONIN T NUMERIC DELTA: -8 NG/L
TROPONIN T SERPL HS-MCNC: 61 NG/L
VENTILATOR MODE: ABNORMAL
WBC NRBC COR # BLD AUTO: 18.38 10*3/MM3 (ref 3.4–10.8)
WBC NRBC COR # BLD AUTO: 19.09 10*3/MM3 (ref 3.4–10.8)
WHOLE BLOOD HOLD COAG: NORMAL
WHOLE BLOOD HOLD SPECIMEN: NORMAL

## 2025-05-14 PROCEDURE — 25810000003 LACTATED RINGERS SOLUTION

## 2025-05-14 PROCEDURE — 25810000003 SODIUM CHLORIDE 0.9 % SOLUTION 250 ML FLEX CONT: Performed by: INTERNAL MEDICINE

## 2025-05-14 PROCEDURE — 82330 ASSAY OF CALCIUM: CPT

## 2025-05-14 PROCEDURE — 85730 THROMBOPLASTIN TIME PARTIAL: CPT

## 2025-05-14 PROCEDURE — 84484 ASSAY OF TROPONIN QUANT: CPT

## 2025-05-14 PROCEDURE — 85610 PROTHROMBIN TIME: CPT

## 2025-05-14 PROCEDURE — 71045 X-RAY EXAM CHEST 1 VIEW: CPT

## 2025-05-14 PROCEDURE — 85025 COMPLETE CBC W/AUTO DIFF WBC: CPT

## 2025-05-14 PROCEDURE — 70450 CT HEAD/BRAIN W/O DYE: CPT | Performed by: RADIOLOGY

## 2025-05-14 PROCEDURE — 82805 BLOOD GASES W/O2 SATURATION: CPT

## 2025-05-14 PROCEDURE — 83735 ASSAY OF MAGNESIUM: CPT | Performed by: INTERNAL MEDICINE

## 2025-05-14 PROCEDURE — 80053 COMPREHEN METABOLIC PANEL: CPT

## 2025-05-14 PROCEDURE — 80053 COMPREHEN METABOLIC PANEL: CPT | Performed by: INTERNAL MEDICINE

## 2025-05-14 PROCEDURE — 25810000003 LACTATED RINGERS PER 1000 ML: Performed by: INTERNAL MEDICINE

## 2025-05-14 PROCEDURE — 25010000002 VANCOMYCIN 5 G RECONSTITUTED SOLUTION

## 2025-05-14 PROCEDURE — 25010000002 AZITHROMYCIN PER 500 MG: Performed by: INTERNAL MEDICINE

## 2025-05-14 PROCEDURE — 36415 COLL VENOUS BLD VENIPUNCTURE: CPT

## 2025-05-14 PROCEDURE — 84100 ASSAY OF PHOSPHORUS: CPT

## 2025-05-14 PROCEDURE — 84145 PROCALCITONIN (PCT): CPT | Performed by: INTERNAL MEDICINE

## 2025-05-14 PROCEDURE — 25010000002 AZTREONAM PER 500 MG

## 2025-05-14 PROCEDURE — 93010 ELECTROCARDIOGRAM REPORT: CPT | Performed by: INTERNAL MEDICINE

## 2025-05-14 PROCEDURE — 83605 ASSAY OF LACTIC ACID: CPT

## 2025-05-14 PROCEDURE — 0202U NFCT DS 22 TRGT SARS-COV-2: CPT | Performed by: PHYSICIAN ASSISTANT

## 2025-05-14 PROCEDURE — 85025 COMPLETE CBC W/AUTO DIFF WBC: CPT | Performed by: INTERNAL MEDICINE

## 2025-05-14 PROCEDURE — 99285 EMERGENCY DEPT VISIT HI MDM: CPT

## 2025-05-14 PROCEDURE — 93005 ELECTROCARDIOGRAM TRACING: CPT

## 2025-05-14 PROCEDURE — 25010000002 POTASSIUM CHLORIDE 10 MEQ/100ML SOLUTION

## 2025-05-14 PROCEDURE — 83735 ASSAY OF MAGNESIUM: CPT

## 2025-05-14 PROCEDURE — 87040 BLOOD CULTURE FOR BACTERIA: CPT

## 2025-05-14 PROCEDURE — 70450 CT HEAD/BRAIN W/O DYE: CPT

## 2025-05-14 PROCEDURE — 25810000003 SODIUM CHLORIDE 0.9 % SOLUTION

## 2025-05-14 PROCEDURE — 82375 ASSAY CARBOXYHB QUANT: CPT

## 2025-05-14 PROCEDURE — 83050 HGB METHEMOGLOBIN QUAN: CPT

## 2025-05-14 PROCEDURE — 83036 HEMOGLOBIN GLYCOSYLATED A1C: CPT | Performed by: INTERNAL MEDICINE

## 2025-05-14 PROCEDURE — 99223 1ST HOSP IP/OBS HIGH 75: CPT | Performed by: INTERNAL MEDICINE

## 2025-05-14 PROCEDURE — 36600 WITHDRAWAL OF ARTERIAL BLOOD: CPT

## 2025-05-14 PROCEDURE — 94799 UNLISTED PULMONARY SVC/PX: CPT

## 2025-05-14 PROCEDURE — 86140 C-REACTIVE PROTEIN: CPT

## 2025-05-14 PROCEDURE — 71045 X-RAY EXAM CHEST 1 VIEW: CPT | Performed by: RADIOLOGY

## 2025-05-14 PROCEDURE — 25010000002 METHYLPREDNISOLONE PER 40 MG: Performed by: INTERNAL MEDICINE

## 2025-05-14 PROCEDURE — 87641 MR-STAPH DNA AMP PROBE: CPT | Performed by: INTERNAL MEDICINE

## 2025-05-14 RX ORDER — SODIUM CHLORIDE, SODIUM LACTATE, POTASSIUM CHLORIDE, CALCIUM CHLORIDE 600; 310; 30; 20 MG/100ML; MG/100ML; MG/100ML; MG/100ML
100 INJECTION, SOLUTION INTRAVENOUS CONTINUOUS
Status: ACTIVE | OUTPATIENT
Start: 2025-05-14 | End: 2025-05-15

## 2025-05-14 RX ORDER — BISACODYL 10 MG
10 SUPPOSITORY, RECTAL RECTAL DAILY PRN
Status: DISCONTINUED | OUTPATIENT
Start: 2025-05-14 | End: 2025-05-18 | Stop reason: HOSPADM

## 2025-05-14 RX ORDER — SODIUM CHLORIDE 0.9 % (FLUSH) 0.9 %
10 SYRINGE (ML) INJECTION AS NEEDED
Status: DISCONTINUED | OUTPATIENT
Start: 2025-05-14 | End: 2025-05-18 | Stop reason: HOSPADM

## 2025-05-14 RX ORDER — POTASSIUM CHLORIDE 1500 MG/1
40 TABLET, EXTENDED RELEASE ORAL
Status: COMPLETED | OUTPATIENT
Start: 2025-05-14 | End: 2025-05-14

## 2025-05-14 RX ORDER — NITROGLYCERIN 0.4 MG/1
0.4 TABLET SUBLINGUAL
Status: DISCONTINUED | OUTPATIENT
Start: 2025-05-14 | End: 2025-05-18 | Stop reason: HOSPADM

## 2025-05-14 RX ORDER — ACETAMINOPHEN 500 MG
1000 TABLET ORAL EVERY 8 HOURS PRN
Status: DISCONTINUED | OUTPATIENT
Start: 2025-05-14 | End: 2025-05-18 | Stop reason: HOSPADM

## 2025-05-14 RX ORDER — SODIUM CHLORIDE 9 MG/ML
40 INJECTION, SOLUTION INTRAVENOUS AS NEEDED
Status: DISCONTINUED | OUTPATIENT
Start: 2025-05-14 | End: 2025-05-18 | Stop reason: HOSPADM

## 2025-05-14 RX ORDER — NICOTINE 21 MG/24HR
1 PATCH, TRANSDERMAL 24 HOURS TRANSDERMAL
Status: DISCONTINUED | OUTPATIENT
Start: 2025-05-14 | End: 2025-05-18 | Stop reason: HOSPADM

## 2025-05-14 RX ORDER — SODIUM CHLORIDE 0.9 % (FLUSH) 0.9 %
10 SYRINGE (ML) INJECTION EVERY 12 HOURS SCHEDULED
Status: DISCONTINUED | OUTPATIENT
Start: 2025-05-14 | End: 2025-05-18 | Stop reason: HOSPADM

## 2025-05-14 RX ORDER — GUAIFENESIN/DEXTROMETHORPHAN 100-10MG/5
10 SYRUP ORAL EVERY 4 HOURS PRN
Status: DISCONTINUED | OUTPATIENT
Start: 2025-05-14 | End: 2025-05-18 | Stop reason: HOSPADM

## 2025-05-14 RX ORDER — IPRATROPIUM BROMIDE AND ALBUTEROL SULFATE 2.5; .5 MG/3ML; MG/3ML
3 SOLUTION RESPIRATORY (INHALATION) EVERY 6 HOURS PRN
Status: DISCONTINUED | OUTPATIENT
Start: 2025-05-14 | End: 2025-05-16

## 2025-05-14 RX ORDER — TRAZODONE HYDROCHLORIDE 50 MG/1
50 TABLET ORAL NIGHTLY
Status: DISCONTINUED | OUTPATIENT
Start: 2025-05-14 | End: 2025-05-18 | Stop reason: HOSPADM

## 2025-05-14 RX ORDER — VANCOMYCIN 2 GRAM/500 ML IN 0.9 % SODIUM CHLORIDE INTRAVENOUS
20 ONCE
Status: COMPLETED | OUTPATIENT
Start: 2025-05-14 | End: 2025-05-14

## 2025-05-14 RX ORDER — ASPIRIN 81 MG/1
324 TABLET, CHEWABLE ORAL ONCE
Status: COMPLETED | OUTPATIENT
Start: 2025-05-14 | End: 2025-05-14

## 2025-05-14 RX ORDER — BENZONATATE 100 MG/1
200 CAPSULE ORAL 3 TIMES DAILY PRN
Status: DISCONTINUED | OUTPATIENT
Start: 2025-05-14 | End: 2025-05-18 | Stop reason: HOSPADM

## 2025-05-14 RX ORDER — GUAIFENESIN 600 MG/1
1200 TABLET, EXTENDED RELEASE ORAL 2 TIMES DAILY PRN
Status: DISCONTINUED | OUTPATIENT
Start: 2025-05-14 | End: 2025-05-18 | Stop reason: HOSPADM

## 2025-05-14 RX ORDER — AMOXICILLIN 250 MG
2 CAPSULE ORAL 2 TIMES DAILY PRN
Status: DISCONTINUED | OUTPATIENT
Start: 2025-05-14 | End: 2025-05-18 | Stop reason: HOSPADM

## 2025-05-14 RX ORDER — POTASSIUM CHLORIDE 7.45 MG/ML
10 INJECTION INTRAVENOUS
Status: COMPLETED | OUTPATIENT
Start: 2025-05-14 | End: 2025-05-14

## 2025-05-14 RX ORDER — POLYETHYLENE GLYCOL 3350 17 G/17G
17 POWDER, FOR SOLUTION ORAL DAILY PRN
Status: DISCONTINUED | OUTPATIENT
Start: 2025-05-14 | End: 2025-05-18 | Stop reason: HOSPADM

## 2025-05-14 RX ORDER — BISACODYL 5 MG/1
5 TABLET, DELAYED RELEASE ORAL DAILY PRN
Status: DISCONTINUED | OUTPATIENT
Start: 2025-05-14 | End: 2025-05-18 | Stop reason: HOSPADM

## 2025-05-14 RX ORDER — ENOXAPARIN SODIUM 100 MG/ML
40 INJECTION SUBCUTANEOUS DAILY
Status: DISCONTINUED | OUTPATIENT
Start: 2025-05-15 | End: 2025-05-15

## 2025-05-14 RX ORDER — GABAPENTIN 400 MG/1
800 CAPSULE ORAL EVERY 8 HOURS SCHEDULED
Status: DISCONTINUED | OUTPATIENT
Start: 2025-05-14 | End: 2025-05-18 | Stop reason: HOSPADM

## 2025-05-14 RX ADMIN — AZITHROMYCIN MONOHYDRATE 500 MG: 500 INJECTION, POWDER, LYOPHILIZED, FOR SOLUTION INTRAVENOUS at 21:30

## 2025-05-14 RX ADMIN — SODIUM CHLORIDE 2 G: 9 INJECTION, SOLUTION INTRAVENOUS at 18:01

## 2025-05-14 RX ADMIN — BENZONATATE 200 MG: 100 CAPSULE ORAL at 21:28

## 2025-05-14 RX ADMIN — POTASSIUM CHLORIDE 40 MEQ: 1500 TABLET, EXTENDED RELEASE ORAL at 21:41

## 2025-05-14 RX ADMIN — TRAZODONE HYDROCHLORIDE 50 MG: 50 TABLET ORAL at 21:28

## 2025-05-14 RX ADMIN — GABAPENTIN 800 MG: 400 CAPSULE ORAL at 23:28

## 2025-05-14 RX ADMIN — ASPIRIN 324 MG: 81 TABLET, CHEWABLE ORAL at 16:56

## 2025-05-14 RX ADMIN — METHYLPREDNISOLONE SODIUM SUCCINATE 40 MG: 40 INJECTION, POWDER, LYOPHILIZED, FOR SOLUTION INTRAMUSCULAR; INTRAVENOUS at 21:28

## 2025-05-14 RX ADMIN — POTASSIUM CHLORIDE 10 MEQ: 7.46 INJECTION, SOLUTION INTRAVENOUS at 21:30

## 2025-05-14 RX ADMIN — NICOTINE 1 PATCH: 21 PATCH TRANSDERMAL at 21:28

## 2025-05-14 RX ADMIN — POTASSIUM & SODIUM PHOSPHATES POWDER PACK 280-160-250 MG 2 PACKET: 280-160-250 PACK at 19:54

## 2025-05-14 RX ADMIN — SODIUM CHLORIDE, POTASSIUM CHLORIDE, SODIUM LACTATE AND CALCIUM CHLORIDE 100 ML/HR: 600; 310; 30; 20 INJECTION, SOLUTION INTRAVENOUS at 21:10

## 2025-05-14 RX ADMIN — POTASSIUM CHLORIDE 40 MEQ: 1500 TABLET, EXTENDED RELEASE ORAL at 19:53

## 2025-05-14 RX ADMIN — Medication 10 ML: at 21:28

## 2025-05-14 RX ADMIN — VANCOMYCIN 2 GRAM/500 ML IN 0.9 % SODIUM CHLORIDE INTRAVENOUS 2000 MG: at 19:09

## 2025-05-14 RX ADMIN — SODIUM CHLORIDE, POTASSIUM CHLORIDE, SODIUM LACTATE AND CALCIUM CHLORIDE 1000 ML: 600; 310; 30; 20 INJECTION, SOLUTION INTRAVENOUS at 19:10

## 2025-05-14 RX ADMIN — GUAIFENESIN AND DEXTROMETHORPHAN 10 ML: 100; 10 SYRUP ORAL at 21:27

## 2025-05-14 RX ADMIN — POTASSIUM CHLORIDE 10 MEQ: 7.46 INJECTION, SOLUTION INTRAVENOUS at 19:59

## 2025-05-14 NOTE — ED NOTES
MEDICAL SCREENING:    Reason for Visit: weakness     Patient initially seen in triage.  The patient was advised further evaluation and diagnostic testing will be needed, some of the treatment and testing will be initiated in the lobby in order to begin the process.  The patient will be returned to the waiting area for the time being and possibly be re-assessed by a subsequent ED provider.  The patient will be brought back to the treatment area in as timely manner as possible.      Marissa Sheikh PA  05/14/25 [id number]

## 2025-05-14 NOTE — ED PROVIDER NOTES
Subjective   History of Present Illness  Patient comes in with complaint of weakness.  He has got a bit of a cough.  Does get peripheral vascular disease.  Problem list includes alcohol abuse arthritis carotid stenosis CVA's disc disease of the back peripheral vascular disease tobacco abuse.            Review of Systems   Constitutional:  Positive for activity change and fatigue.   Respiratory:  Positive for cough and shortness of breath.        Past Medical History:   Diagnosis Date    Alcohol abuse     Arthritis     Carotid stenosis     Cerebrovascular accident (CVA) due to occlusion of left carotid artery 07/15/2021    ED (erectile dysfunction)     History of degenerative disc disease     Hydrocele in adult 12/29/2020    Hypertension     MRSA (methicillin resistant staph aureus) culture positive     patient states diagnosed approx 2 wks ago    Neuropathy     Peripheral vascular disease     s/p arthrectomy follow by balloon angioplasty and stents of right and left SFA    Tobacco abuse     Vitamin D deficiency        Allergies   Allergen Reactions    Penicillins Hives and Shortness Of Breath     As A child    Novocain [Procaine] Nausea And Vomiting       Past Surgical History:   Procedure Laterality Date    APPENDECTOMY      ARTERIOGRAM N/A 9/19/2019    Procedure: Arteriogram;  Surgeon: Tong Azar MD;  Location:  [facility];  Service: Cardiology    BACK SURGERY      DISC RUPTURE REPAIR, L5    CARDIAC CATHETERIZATION Right 10/29/2019    Procedure: Peripheral angiography;  Surgeon: Tong Azar MD;  Location:  [facility];  Service: Cardiovascular    ENDOSCOPY N/A 2/18/2022    Procedure: ESOPHAGOGASTRODUODENOSCOPY;  Surgeon: Christine Duran MD;  Location:  [facility];  Service: General;  Laterality: N/A;    VASCULAR SURGERY Bilateral        Family History   Problem Relation Age of Onset    Hypertension Mother     Cancer Father         LUNG    Diabetes Father      Hypertension Father     Heart attack Other         GRANDFATHER       Social History     Socioeconomic History    Marital status:     Number of children: 1   Tobacco Use    Smoking status: Every Day     Current packs/day: 1.00     Average packs/day: 1 pack/day for 73.0 years (73.0 ttl pk-yrs)     Types: Cigarettes    Smokeless tobacco: Never   Vaping Use    Vaping status: Never Used   Substance and Sexual Activity    Alcohol use: Yes     Comment: few beers a day    Drug use: No    Sexual activity: Defer           Objective   Physical Exam  Constitutional:       Appearance: He is ill-appearing and toxic-appearing.   HENT:      Head: Normocephalic.      Right Ear: External ear normal.      Left Ear: External ear normal.      Nose: Nose normal.      Mouth/Throat:      Mouth: Mucous membranes are moist.   Eyes:      Pupils: Pupils are equal, round, and reactive to light.   Cardiovascular:      Rate and Rhythm: Normal rate.   Pulmonary:      Effort: Respiratory distress present.      Breath sounds: Wheezing present.   Abdominal:      General: Abdomen is flat.      Palpations: Abdomen is soft.   Musculoskeletal:         General: Normal range of motion.      Cervical back: Normal range of motion.   Skin:     General: Skin is warm.      Capillary Refill: Capillary refill takes less than 2 seconds.   Neurological:      General: No focal deficit present.      Mental Status: He is alert.         Procedures           ED Course  ED Course as of 05/23/25 1125   Wed May 14, 2025   1719 WBC(!): 18.38 [OKSANA]   1719 Hemoglobin(!): 12.3 [OKSANA]   1719 Platelets(!): 482 [OKSANA]   1719 pH, Arterial(!): 7.496 [OKSANA]   1719 pCO2, Arterial: 35.4 [OKSANA]   1719 pO2, Arterial(!): 76.8 [OKSANA]   1719 HCO3, Arterial(!): 27.3 [OKSANA]   1719 Base Excess(!): 4.1 [OKSANA]   1719 Phosphorus(!!): 1.7 [OKSANA]   1719 Lactate(!!): 3.5 [OKSANA]   1719 HS Troponin T(!!): 61 [OKSANA]   1719 Magnesium: 2.2 [OKSANA]   1719 C-Reactive Protein(!): 13.80 [OKSANA]   1719 Ionized Calcium(!): 1.38  [OKSANA]   1719 Potassium(!): 2.7 [OKSANA]   1719 Chloride(!): 97 [OKSANA]   1719 Calcium(!): 11.1 [OKSANA]   1719 BP(!): 78/53 [OKSANA]   1719 BP(!): 85/53 [OKSANA]   1720 CT Head Without Contrast  Neg [OKSANA]   1728 ECG 12 Lead Chest Pain  Personal EKG interpretation:  Heart rate 107, sinus tachycardia with intermittent PVCs, normal axis and intervals, no STEMI.  Electronically signed by Timothy Floyd MD, 05/14/25, 5:28 PM EDT.   [OKSANA]   1734 BP: 109/75 [OKSANA]   1734 Noninvasive MAP (mmHg): 87 [OKSANA]   1734 Heart Rate: 88 [OKSANA]   1734 SpO2: 98 % [OKSANA]   1750 Respiratory Panel PCR w/COVID-19(SARS-CoV-2) [facility] In-House, NP Swab in UTM/VTM, 2 HR TAT - Swab, Nasopharynx  Negative [OKSANA]   1803 ECG demonstrates sinus tachycardia at a rate of 107 bpm.  There are premature ventricular complexes.  VA interval is normal as is QRS duration.  QTc interval is prolonged at 485 ms.  There are no acute ST-T wave changes. [RA]   1814 HS Troponin T(!!): 53 [OKSANA]   1814 Troponin T Numeric Delta: -8 [OKSANA]   1814 Troponin T % Delta: -13 [OKSANA]   1857 XR Chest 1 View  IMPRESSION:  Findings concerning for right upper lobe pneumonia. Follow-up  recommended to ensure resolution.   [OKSANA]   1935 Patient with a right upper lobe pneumonia.  Will start the process of getting admitted.  He is already on antibiotics.  GRP [GP]      ED Course User Index  [GP] Terence Gutierrez MD  [OKSANA] Timothy Floyd MD  [RA] Dontae Mcintosh MD      Care taken over from Dr. Floyd at shift change at 7:00.  My job was to get the patient admitted to the hospital.                                                 Medical Decision Making  On chest x-ray patient's got a right upper lobe pneumonia.  He is got an 18,000 white count we will bring him in for the pneumonia get him on some antibiotics.    Problems Addressed:  Hypercalcemia: complicated acute illness or injury  Hypokalemia: complicated acute illness or injury  Hypophosphatemia: complicated acute illness or  injury  Pneumonia of right upper lobe due to infectious organism: complicated acute illness or injury  Sepsis, due to unspecified organism, unspecified whether acute organ dysfunction present: complicated acute illness or injury    Amount and/or Complexity of Data Reviewed  Labs: ordered. Decision-making details documented in ED Course.  Radiology: ordered. Decision-making details documented in ED Course.  ECG/medicine tests: ordered. Decision-making details documented in ED Course.    Risk  OTC drugs.  Prescription drug management.  Decision regarding hospitalization.        Final diagnoses:   Sepsis, due to unspecified organism, unspecified whether acute organ dysfunction present   Hypokalemia   Hypophosphatemia   Hypercalcemia   Pneumonia of right upper lobe due to infectious organism       ED Disposition  ED Disposition       ED Disposition   Decision to Admit    Condition   --    Comment   Level of Care: Telemetry [5]   Diagnosis: Sepsis due to pneumonia [[id number]]   Admitting Physician: SONIDO AGUILERA [[id number]]   Certification: I Certify That Inpatient Hospital Services Are Medically Necessary For Greater Than 2 Midnights                 Pastora Person, APRN  [address] [place] [id number] [phone]    Follow up in 1 week(s)  Office is closed, will call patient with a follow-up appt. with Pastora Person on 05/19/25.         Medication List        New Prescriptions      Eliquis 5 MG tablet tablet  Generic drug: apixaban  Take 1 tablet by mouth Every 12 (Twelve) Hours for 30 days. Indications: Atrial Fibrillation     metoprolol tartrate 25 MG tablet  Commonly known as: LOPRESSOR  Take 1 tablet by mouth every 12 hours for 30 days.     nitroglycerin 0.4 MG SL tablet  Commonly known as: NITROSTAT  Place 1 tablet under the tongue Every 5 (Five) Minutes As Needed for Chest Pain. Take no more than 3 doses in 15 minutes.     rosuvastatin 10 MG tablet  Commonly known as: CRESTOR  Take 1 tablet by mouth Every Night for 30  days.            Stop      aspirin 81 MG EC tablet     hydroCHLOROthiazide 25 MG tablet            ASK your doctor about these medications      cefdinir 300 MG capsule  Commonly known as: OMNICEF  Take 1 capsule by mouth Every 12 hours for 4 doses. Indications: pneumonia  Ask about: Should I take this medication?     predniSONE 20 MG tablet  Commonly known as: DELTASONE  Take 1 tablet by mouth Daily With Breakfast for 2 doses.  Ask about: Should I take this medication?               Where to Get Your Medications        These medications were sent to [id number]      Hours: Monday to Friday 7 AM to 6 PM Phone: [phone]   cefdinir 300 MG capsule  Eliquis 5 MG tablet tablet  metoprolol tartrate 25 MG tablet  nitroglycerin 0.4 MG SL tablet  predniSONE 20 MG tablet  rosuvastatin 10 MG tablet            Terence Gutierrez MD  05/15/25 3420       Terence Gutierrez MD  05/23/25 1123       Terence Gutierrez MD  05/23/25 1139

## 2025-05-15 ENCOUNTER — APPOINTMENT (OUTPATIENT)
Dept: CARDIOLOGY | Facility: HOSPITAL | Age: 65
DRG: 871 | End: 2025-05-15
Payer: MEDICARE

## 2025-05-15 LAB
ADV 40+41 DNA STL QL NAA+NON-PROBE: NOT DETECTED
ALBUMIN SERPL-MCNC: 2.1 G/DL (ref 3.5–5.2)
ALBUMIN/GLOB SERPL: 0.6 G/DL
ALP SERPL-CCNC: 136 U/L (ref 39–117)
ALT SERPL W P-5'-P-CCNC: 10 U/L (ref 1–41)
AMPHET+METHAMPHET UR QL: NEGATIVE
AMPHETAMINES UR QL: NEGATIVE
ANION GAP SERPL CALCULATED.3IONS-SCNC: 9.9 MMOL/L (ref 5–15)
AORTIC DIMENSIONLESS INDEX: 0.88 (DI)
APTT PPP: 42.3 SECONDS (ref 24.5–35.9)
AST SERPL-CCNC: 18 U/L (ref 1–40)
ASTRO TYP 1-8 RNA STL QL NAA+NON-PROBE: NOT DETECTED
AV MEAN PRESS GRAD SYS DOP V1V2: 3 MMHG
AV VMAX SYS DOP: 129 CM/SEC
BARBITURATES UR QL SCN: NEGATIVE
BASOPHILS # BLD AUTO: 0.02 10*3/MM3 (ref 0–0.2)
BASOPHILS # BLD AUTO: 0.03 10*3/MM3 (ref 0–0.2)
BASOPHILS NFR BLD AUTO: 0.2 % (ref 0–1.5)
BASOPHILS NFR BLD AUTO: 0.2 % (ref 0–1.5)
BENZODIAZ UR QL SCN: NEGATIVE
BH CV ECHO MEAS - ACS: 1.5 CM
BH CV ECHO MEAS - AO MAX PG: 6.7 MMHG
BH CV ECHO MEAS - AO ROOT DIAM: 3.1 CM
BH CV ECHO MEAS - AO V2 VTI: 20.6 CM
BH CV ECHO MEAS - AVA(I,D): 2.8 CM2
BH CV ECHO MEAS - EDV(CUBED): 64 ML
BH CV ECHO MEAS - EDV(MOD-SP4): 90.8 ML
BH CV ECHO MEAS - EF(MOD-SP4): 70.5 %
BH CV ECHO MEAS - ESV(CUBED): 8.6 ML
BH CV ECHO MEAS - ESV(MOD-SP4): 26.8 ML
BH CV ECHO MEAS - FS: 48.7 %
BH CV ECHO MEAS - IVS/LVPW: 1.1 CM
BH CV ECHO MEAS - IVSD: 1.1 CM
BH CV ECHO MEAS - LA DIMENSION: 3.1 CM
BH CV ECHO MEAS - LAT PEAK E' VEL: 7 CM/SEC
BH CV ECHO MEAS - LV DIASTOLIC VOL/BSA (35-75): 46.5 CM2
BH CV ECHO MEAS - LV MASS(C)D: 136.2 GRAMS
BH CV ECHO MEAS - LV MAX PG: 4.5 MMHG
BH CV ECHO MEAS - LV MEAN PG: 2 MMHG
BH CV ECHO MEAS - LV SYSTOLIC VOL/BSA (12-30): 13.7 CM2
BH CV ECHO MEAS - LV V1 MAX: 106 CM/SEC
BH CV ECHO MEAS - LV V1 VTI: 18.1 CM
BH CV ECHO MEAS - LVIDD: 4 CM
BH CV ECHO MEAS - LVIDS: 2.05 CM
BH CV ECHO MEAS - LVOT AREA: 3.1 CM2
BH CV ECHO MEAS - LVOT DIAM: 2 CM
BH CV ECHO MEAS - LVPWD: 1 CM
BH CV ECHO MEAS - MED PEAK E' VEL: 7.2 CM/SEC
BH CV ECHO MEAS - MV A MAX VEL: 70.6 CM/SEC
BH CV ECHO MEAS - MV E MAX VEL: 60.7 CM/SEC
BH CV ECHO MEAS - MV E/A: 0.86
BH CV ECHO MEAS - PA ACC TIME: 0.12 SEC
BH CV ECHO MEAS - PA V2 MAX: 77.7 CM/SEC
BH CV ECHO MEAS - RVDD: 3.1 CM
BH CV ECHO MEAS - SV(LVOT): 56.9 ML
BH CV ECHO MEAS - SV(MOD-SP4): 64 ML
BH CV ECHO MEAS - SVI(LVOT): 29.1 ML/M2
BH CV ECHO MEAS - SVI(MOD-SP4): 32.8 ML/M2
BH CV ECHO MEAS - TAPSE (>1.6): 2.12 CM
BH CV ECHO MEASUREMENTS AVERAGE E/E' RATIO: 8.55
BH CV XLRA - RV BASE: 2.8 CM
BH CV XLRA - RV LENGTH: 5.3 CM
BH CV XLRA - RV MID: 1.9 CM
BILIRUB SERPL-MCNC: 0.2 MG/DL (ref 0–1.2)
BILIRUB UR QL STRIP: NEGATIVE
BILIRUB UR QL STRIP: NEGATIVE
BUN SERPL-MCNC: 22 MG/DL (ref 8–23)
BUN/CREAT SERPL: 21.4 (ref 7–25)
BUPRENORPHINE SERPL-MCNC: NEGATIVE NG/ML
C CAYETANENSIS DNA STL QL NAA+NON-PROBE: NOT DETECTED
C COLI+JEJ+UPSA DNA STL QL NAA+NON-PROBE: NOT DETECTED
CALCIUM SPEC-SCNC: 9 MG/DL (ref 8.6–10.5)
CANNABINOIDS SERPL QL: NEGATIVE
CHLORIDE SERPL-SCNC: 104 MMOL/L (ref 98–107)
CLARITY UR: CLEAR
CLARITY UR: CLEAR
CO2 SERPL-SCNC: 21.1 MMOL/L (ref 22–29)
COCAINE UR QL: NEGATIVE
COLOR UR: YELLOW
COLOR UR: YELLOW
CREAT SERPL-MCNC: 1.03 MG/DL (ref 0.76–1.27)
CRYPTOSP DNA STL QL NAA+NON-PROBE: NOT DETECTED
DEPRECATED RDW RBC AUTO: 53.3 FL (ref 37–54)
DEPRECATED RDW RBC AUTO: 55.7 FL (ref 37–54)
E HISTOLYT DNA STL QL NAA+NON-PROBE: NOT DETECTED
EAEC PAA PLAS AGGR+AATA ST NAA+NON-PRB: NOT DETECTED
EC STX1+STX2 GENES STL QL NAA+NON-PROBE: NOT DETECTED
EGFRCR SERPLBLD CKD-EPI 2021: 81.6 ML/MIN/1.73
EOSINOPHIL # BLD AUTO: 0 10*3/MM3 (ref 0–0.4)
EOSINOPHIL # BLD AUTO: 0 10*3/MM3 (ref 0–0.4)
EOSINOPHIL NFR BLD AUTO: 0 % (ref 0.3–6.2)
EOSINOPHIL NFR BLD AUTO: 0 % (ref 0.3–6.2)
EPEC EAE GENE STL QL NAA+NON-PROBE: NOT DETECTED
ERYTHROCYTE [DISTWIDTH] IN BLOOD BY AUTOMATED COUNT: 15.1 % (ref 12.3–15.4)
ERYTHROCYTE [DISTWIDTH] IN BLOOD BY AUTOMATED COUNT: 15.2 % (ref 12.3–15.4)
ETEC LTA+ST1A+ST1B TOX ST NAA+NON-PROBE: NOT DETECTED
FENTANYL UR-MCNC: NEGATIVE NG/ML
G LAMBLIA DNA STL QL NAA+NON-PROBE: NOT DETECTED
GLOBULIN UR ELPH-MCNC: 3.8 GM/DL
GLUCOSE SERPL-MCNC: 166 MG/DL (ref 65–99)
GLUCOSE UR STRIP-MCNC: NEGATIVE MG/DL
GLUCOSE UR STRIP-MCNC: NEGATIVE MG/DL
HCT VFR BLD AUTO: 32.8 % (ref 37.5–51)
HCT VFR BLD AUTO: 36.3 % (ref 37.5–51)
HGB BLD-MCNC: 10.6 G/DL (ref 13–17.7)
HGB BLD-MCNC: 11.2 G/DL (ref 13–17.7)
HGB UR QL STRIP.AUTO: NEGATIVE
HGB UR QL STRIP.AUTO: NEGATIVE
IMM GRANULOCYTES # BLD AUTO: 0.15 10*3/MM3 (ref 0–0.05)
IMM GRANULOCYTES # BLD AUTO: 0.19 10*3/MM3 (ref 0–0.05)
IMM GRANULOCYTES NFR BLD AUTO: 0.9 % (ref 0–0.5)
IMM GRANULOCYTES NFR BLD AUTO: 1.5 % (ref 0–0.5)
INR PPP: 1.17 (ref 0.9–1.1)
KETONES UR QL STRIP: NEGATIVE
KETONES UR QL STRIP: NEGATIVE
LEUKOCYTE ESTERASE UR QL STRIP.AUTO: NEGATIVE
LEUKOCYTE ESTERASE UR QL STRIP.AUTO: NEGATIVE
LYMPHOCYTES # BLD AUTO: 0.84 10*3/MM3 (ref 0.7–3.1)
LYMPHOCYTES # BLD AUTO: 1.03 10*3/MM3 (ref 0.7–3.1)
LYMPHOCYTES NFR BLD AUTO: 5.1 % (ref 19.6–45.3)
LYMPHOCYTES NFR BLD AUTO: 8 % (ref 19.6–45.3)
MCH RBC QN AUTO: 30.8 PG (ref 26.6–33)
MCH RBC QN AUTO: 31.3 PG (ref 26.6–33)
MCHC RBC AUTO-ENTMCNC: 30.9 G/DL (ref 31.5–35.7)
MCHC RBC AUTO-ENTMCNC: 32.3 G/DL (ref 31.5–35.7)
MCV RBC AUTO: 96.8 FL (ref 79–97)
MCV RBC AUTO: 99.7 FL (ref 79–97)
METHADONE UR QL SCN: NEGATIVE
MONOCYTES # BLD AUTO: 0.23 10*3/MM3 (ref 0.1–0.9)
MONOCYTES # BLD AUTO: 0.64 10*3/MM3 (ref 0.1–0.9)
MONOCYTES NFR BLD AUTO: 1.4 % (ref 5–12)
MONOCYTES NFR BLD AUTO: 5 % (ref 5–12)
MRSA DNA SPEC QL NAA+PROBE: NORMAL
NEUTROPHILS NFR BLD AUTO: 10.99 10*3/MM3 (ref 1.7–7)
NEUTROPHILS NFR BLD AUTO: 15.38 10*3/MM3 (ref 1.7–7)
NEUTROPHILS NFR BLD AUTO: 85.3 % (ref 42.7–76)
NEUTROPHILS NFR BLD AUTO: 92.4 % (ref 42.7–76)
NITRITE UR QL STRIP: NEGATIVE
NITRITE UR QL STRIP: NEGATIVE
NOROVIRUS GI+II RNA STL QL NAA+NON-PROBE: NOT DETECTED
NRBC BLD AUTO-RTO: 0 /100 WBC (ref 0–0.2)
NRBC BLD AUTO-RTO: 0 /100 WBC (ref 0–0.2)
OPIATES UR QL: NEGATIVE
OXYCODONE UR QL SCN: NEGATIVE
P SHIGELLOIDES DNA STL QL NAA+NON-PROBE: NOT DETECTED
PCP UR QL SCN: NEGATIVE
PH UR STRIP.AUTO: 7 [PH] (ref 5–8)
PH UR STRIP.AUTO: 7 [PH] (ref 5–8)
PHOSPHATE SERPL-MCNC: 1.7 MG/DL (ref 2.5–4.5)
PHOSPHATE SERPL-MCNC: 2 MG/DL (ref 2.5–4.5)
PHOSPHATE SERPL-MCNC: 2 MG/DL (ref 2.5–4.5)
PLATELET # BLD AUTO: 382 10*3/MM3 (ref 140–450)
PLATELET # BLD AUTO: 396 10*3/MM3 (ref 140–450)
PMV BLD AUTO: 9.5 FL (ref 6–12)
PMV BLD AUTO: 9.8 FL (ref 6–12)
POTASSIUM SERPL-SCNC: 4.1 MMOL/L (ref 3.5–5.2)
PROT SERPL-MCNC: 5.9 G/DL (ref 6–8.5)
PROT UR QL STRIP: NEGATIVE
PROT UR QL STRIP: NEGATIVE
PROTHROMBIN TIME: 15.1 SECONDS (ref 11.6–15.1)
RBC # BLD AUTO: 3.39 10*6/MM3 (ref 4.14–5.8)
RBC # BLD AUTO: 3.64 10*6/MM3 (ref 4.14–5.8)
RVA RNA STL QL NAA+NON-PROBE: NOT DETECTED
S ENT+BONG DNA STL QL NAA+NON-PROBE: NOT DETECTED
S PNEUM AG SPEC QL LA: NEGATIVE
SAPO I+II+IV+V RNA STL QL NAA+NON-PROBE: NOT DETECTED
SHIGELLA SP+EIEC IPAH ST NAA+NON-PROBE: NOT DETECTED
SODIUM SERPL-SCNC: 135 MMOL/L (ref 136–145)
SP GR UR STRIP: <=1.005 (ref 1–1.03)
SP GR UR STRIP: <=1.005 (ref 1–1.03)
TRICYCLICS UR QL SCN: NEGATIVE
UFH PPP CHRO-ACNC: 0.14 IU/ML (ref 0.3–0.7)
UFH PPP CHRO-ACNC: 0.34 IU/ML (ref 0.3–0.7)
UROBILINOGEN UR QL STRIP: NORMAL
UROBILINOGEN UR QL STRIP: NORMAL
V CHOL+PARA+VUL DNA STL QL NAA+NON-PROBE: NOT DETECTED
V CHOLERAE DNA STL QL NAA+NON-PROBE: NOT DETECTED
WBC NRBC COR # BLD AUTO: 12.87 10*3/MM3 (ref 3.4–10.8)
WBC NRBC COR # BLD AUTO: 16.63 10*3/MM3 (ref 3.4–10.8)
Y ENTEROCOL DNA STL QL NAA+NON-PROBE: NOT DETECTED

## 2025-05-15 PROCEDURE — 93005 ELECTROCARDIOGRAM TRACING: CPT | Performed by: INTERNAL MEDICINE

## 2025-05-15 PROCEDURE — 93306 TTE W/DOPPLER COMPLETE: CPT | Performed by: INTERNAL MEDICINE

## 2025-05-15 PROCEDURE — 81003 URINALYSIS AUTO W/O SCOPE: CPT | Performed by: INTERNAL MEDICINE

## 2025-05-15 PROCEDURE — 85520 HEPARIN ASSAY: CPT | Performed by: NURSE PRACTITIONER

## 2025-05-15 PROCEDURE — 80307 DRUG TEST PRSMV CHEM ANLYZR: CPT | Performed by: INTERNAL MEDICINE

## 2025-05-15 PROCEDURE — 25010000002 METHYLPREDNISOLONE PER 40 MG: Performed by: INTERNAL MEDICINE

## 2025-05-15 PROCEDURE — 94761 N-INVAS EAR/PLS OXIMETRY MLT: CPT

## 2025-05-15 PROCEDURE — 25810000003 SODIUM CHLORIDE 0.9 % SOLUTION 250 ML FLEX CONT: Performed by: INTERNAL MEDICINE

## 2025-05-15 PROCEDURE — 25010000002 ENOXAPARIN PER 10 MG: Performed by: INTERNAL MEDICINE

## 2025-05-15 PROCEDURE — 85730 THROMBOPLASTIN TIME PARTIAL: CPT | Performed by: NURSE PRACTITIONER

## 2025-05-15 PROCEDURE — 36415 COLL VENOUS BLD VENIPUNCTURE: CPT | Performed by: INTERNAL MEDICINE

## 2025-05-15 PROCEDURE — 84100 ASSAY OF PHOSPHORUS: CPT | Performed by: STUDENT IN AN ORGANIZED HEALTH CARE EDUCATION/TRAINING PROGRAM

## 2025-05-15 PROCEDURE — 84100 ASSAY OF PHOSPHORUS: CPT | Performed by: INTERNAL MEDICINE

## 2025-05-15 PROCEDURE — 25010000002 AZTREONAM PER 500 MG: Performed by: INTERNAL MEDICINE

## 2025-05-15 PROCEDURE — 25010000002 AZITHROMYCIN PER 500 MG: Performed by: INTERNAL MEDICINE

## 2025-05-15 PROCEDURE — 85025 COMPLETE CBC W/AUTO DIFF WBC: CPT | Performed by: NURSE PRACTITIONER

## 2025-05-15 PROCEDURE — 99232 SBSQ HOSP IP/OBS MODERATE 35: CPT | Performed by: STUDENT IN AN ORGANIZED HEALTH CARE EDUCATION/TRAINING PROGRAM

## 2025-05-15 PROCEDURE — 87899 AGENT NOS ASSAY W/OPTIC: CPT | Performed by: INTERNAL MEDICINE

## 2025-05-15 PROCEDURE — 93306 TTE W/DOPPLER COMPLETE: CPT

## 2025-05-15 PROCEDURE — 85520 HEPARIN ASSAY: CPT

## 2025-05-15 PROCEDURE — 80053 COMPREHEN METABOLIC PANEL: CPT | Performed by: STUDENT IN AN ORGANIZED HEALTH CARE EDUCATION/TRAINING PROGRAM

## 2025-05-15 PROCEDURE — 25010000002 SULFUR HEXAFLUORIDE MICROSPH 60.7-25 MG RECONSTITUTED SUSPENSION: Performed by: STUDENT IN AN ORGANIZED HEALTH CARE EDUCATION/TRAINING PROGRAM

## 2025-05-15 PROCEDURE — 94799 UNLISTED PULMONARY SVC/PX: CPT

## 2025-05-15 PROCEDURE — 99222 1ST HOSP IP/OBS MODERATE 55: CPT | Performed by: INTERNAL MEDICINE

## 2025-05-15 PROCEDURE — 85610 PROTHROMBIN TIME: CPT | Performed by: NURSE PRACTITIONER

## 2025-05-15 PROCEDURE — 25010000002 HEPARIN (PORCINE) 25000-0.45 UT/250ML-% SOLUTION: Performed by: NURSE PRACTITIONER

## 2025-05-15 PROCEDURE — 87507 IADNA-DNA/RNA PROBE TQ 12-25: CPT

## 2025-05-15 RX ORDER — HEPARIN SODIUM 5000 [USP'U]/ML
50 INJECTION, SOLUTION INTRAVENOUS; SUBCUTANEOUS AS NEEDED
Status: DISCONTINUED | OUTPATIENT
Start: 2025-05-15 | End: 2025-05-15

## 2025-05-15 RX ORDER — HYDROCHLOROTHIAZIDE 25 MG/1
25 TABLET ORAL DAILY
Status: CANCELLED | OUTPATIENT
Start: 2025-05-15

## 2025-05-15 RX ORDER — HEPARIN SODIUM 5000 [USP'U]/ML
25 INJECTION, SOLUTION INTRAVENOUS; SUBCUTANEOUS AS NEEDED
Status: DISCONTINUED | OUTPATIENT
Start: 2025-05-15 | End: 2025-05-15

## 2025-05-15 RX ORDER — ASCORBIC ACID 500 MG
500 TABLET ORAL DAILY
COMMUNITY

## 2025-05-15 RX ORDER — ISOSORBIDE MONONITRATE 30 MG/1
30 TABLET, EXTENDED RELEASE ORAL DAILY
COMMUNITY

## 2025-05-15 RX ORDER — LANOLIN ALCOHOL/MO/W.PET/CERES
500 CREAM (GRAM) TOPICAL DAILY
Status: DISCONTINUED | OUTPATIENT
Start: 2025-05-15 | End: 2025-05-18 | Stop reason: HOSPADM

## 2025-05-15 RX ORDER — MAGNESIUM OXIDE 400 MG/1
400 TABLET ORAL DAILY
COMMUNITY

## 2025-05-15 RX ORDER — HYDROCHLOROTHIAZIDE 25 MG/1
25 TABLET ORAL DAILY
COMMUNITY
End: 2025-05-18 | Stop reason: HOSPADM

## 2025-05-15 RX ORDER — MULTIVITAMIN WITH IRON
500 TABLET ORAL DAILY
COMMUNITY

## 2025-05-15 RX ORDER — ASPIRIN 81 MG/1
81 TABLET ORAL DAILY
Status: DISCONTINUED | OUTPATIENT
Start: 2025-05-15 | End: 2025-05-16

## 2025-05-15 RX ORDER — CHOLECALCIFEROL (VITAMIN D3) 25 MCG
1000 TABLET ORAL DAILY
Status: DISCONTINUED | OUTPATIENT
Start: 2025-05-15 | End: 2025-05-18 | Stop reason: HOSPADM

## 2025-05-15 RX ORDER — ZINC SULFATE 50(220)MG
220 CAPSULE ORAL DAILY
COMMUNITY

## 2025-05-15 RX ORDER — ASCORBIC ACID 500 MG
500 TABLET ORAL DAILY
Status: DISCONTINUED | OUTPATIENT
Start: 2025-05-15 | End: 2025-05-18 | Stop reason: HOSPADM

## 2025-05-15 RX ORDER — METHOCARBAMOL 750 MG/1
750 TABLET, FILM COATED ORAL 4 TIMES DAILY
COMMUNITY

## 2025-05-15 RX ORDER — HEPARIN SODIUM 10000 [USP'U]/100ML
14 INJECTION, SOLUTION INTRAVENOUS
Status: DISCONTINUED | OUTPATIENT
Start: 2025-05-15 | End: 2025-05-16

## 2025-05-15 RX ORDER — TRAZODONE HYDROCHLORIDE 50 MG/1
50 TABLET ORAL NIGHTLY
COMMUNITY

## 2025-05-15 RX ORDER — ZINC SULFATE 50(220)MG
220 CAPSULE ORAL DAILY
Status: DISCONTINUED | OUTPATIENT
Start: 2025-05-15 | End: 2025-05-18 | Stop reason: HOSPADM

## 2025-05-15 RX ORDER — METHOCARBAMOL 500 MG/1
750 TABLET, FILM COATED ORAL 4 TIMES DAILY
Status: DISCONTINUED | OUTPATIENT
Start: 2025-05-15 | End: 2025-05-18 | Stop reason: HOSPADM

## 2025-05-15 RX ORDER — GABAPENTIN 400 MG/1
1200 CAPSULE ORAL EVERY 8 HOURS SCHEDULED
Status: CANCELLED | OUTPATIENT
Start: 2025-05-15

## 2025-05-15 RX ORDER — CHOLECALCIFEROL (VITAMIN D3) 25 MCG
1000 TABLET ORAL DAILY
COMMUNITY

## 2025-05-15 RX ORDER — ASPIRIN 81 MG/1
81 TABLET ORAL DAILY
COMMUNITY
End: 2025-05-18 | Stop reason: HOSPADM

## 2025-05-15 RX ORDER — GABAPENTIN 800 MG/1
1200 TABLET ORAL 3 TIMES DAILY
COMMUNITY

## 2025-05-15 RX ORDER — ISOSORBIDE MONONITRATE 30 MG/1
30 TABLET, EXTENDED RELEASE ORAL DAILY
Status: DISCONTINUED | OUTPATIENT
Start: 2025-05-15 | End: 2025-05-18 | Stop reason: HOSPADM

## 2025-05-15 RX ADMIN — OXYCODONE HYDROCHLORIDE AND ACETAMINOPHEN 500 MG: 500 TABLET ORAL at 10:29

## 2025-05-15 RX ADMIN — Medication 400 MG: at 10:29

## 2025-05-15 RX ADMIN — Medication 1000 UNITS: at 10:29

## 2025-05-15 RX ADMIN — Medication 500 MCG: at 10:29

## 2025-05-15 RX ADMIN — METHOCARBAMOL 750 MG: 500 TABLET ORAL at 10:29

## 2025-05-15 RX ADMIN — AZTREONAM 2000 MG: 2 INJECTION, POWDER, LYOPHILIZED, FOR SOLUTION INTRAMUSCULAR; INTRAVENOUS at 05:45

## 2025-05-15 RX ADMIN — AZITHROMYCIN MONOHYDRATE 500 MG: 500 INJECTION, POWDER, LYOPHILIZED, FOR SOLUTION INTRAVENOUS at 21:29

## 2025-05-15 RX ADMIN — BENZONATATE 200 MG: 100 CAPSULE ORAL at 22:22

## 2025-05-15 RX ADMIN — TRAZODONE HYDROCHLORIDE 50 MG: 50 TABLET ORAL at 21:29

## 2025-05-15 RX ADMIN — SULFUR HEXAFLUORIDE 2 ML: KIT at 10:06

## 2025-05-15 RX ADMIN — GABAPENTIN 800 MG: 400 CAPSULE ORAL at 21:29

## 2025-05-15 RX ADMIN — METHOCARBAMOL 750 MG: 500 TABLET ORAL at 13:10

## 2025-05-15 RX ADMIN — Medication 10 ML: at 10:31

## 2025-05-15 RX ADMIN — POTASSIUM & SODIUM PHOSPHATES POWDER PACK 280-160-250 MG 2 PACKET: 280-160-250 PACK at 16:21

## 2025-05-15 RX ADMIN — METHOCARBAMOL 750 MG: 500 TABLET ORAL at 17:50

## 2025-05-15 RX ADMIN — ASPIRIN 81 MG: 81 TABLET, COATED ORAL at 10:29

## 2025-05-15 RX ADMIN — METHYLPREDNISOLONE SODIUM SUCCINATE 40 MG: 40 INJECTION, POWDER, LYOPHILIZED, FOR SOLUTION INTRAMUSCULAR; INTRAVENOUS at 10:28

## 2025-05-15 RX ADMIN — Medication 10 ML: at 21:30

## 2025-05-15 RX ADMIN — GABAPENTIN 800 MG: 400 CAPSULE ORAL at 13:10

## 2025-05-15 RX ADMIN — ISOSORBIDE MONONITRATE 30 MG: 30 TABLET, EXTENDED RELEASE ORAL at 10:29

## 2025-05-15 RX ADMIN — ENOXAPARIN SODIUM 40 MG: 40 INJECTION SUBCUTANEOUS at 10:28

## 2025-05-15 RX ADMIN — AZTREONAM 2000 MG: 2 INJECTION, POWDER, LYOPHILIZED, FOR SOLUTION INTRAMUSCULAR; INTRAVENOUS at 15:15

## 2025-05-15 RX ADMIN — AZTREONAM 2000 MG: 2 INJECTION, POWDER, LYOPHILIZED, FOR SOLUTION INTRAMUSCULAR; INTRAVENOUS at 22:19

## 2025-05-15 RX ADMIN — BENZONATATE 200 MG: 100 CAPSULE ORAL at 10:29

## 2025-05-15 RX ADMIN — POTASSIUM & SODIUM PHOSPHATES POWDER PACK 280-160-250 MG 2 PACKET: 280-160-250 PACK at 05:25

## 2025-05-15 RX ADMIN — HEPARIN SODIUM 10 UNITS/KG/HR: 10000 INJECTION, SOLUTION INTRAVENOUS at 16:21

## 2025-05-15 RX ADMIN — METHOCARBAMOL 750 MG: 500 TABLET ORAL at 21:29

## 2025-05-15 RX ADMIN — ZINC SULFATE 220 MG (50 MG) CAPSULE 220 MG: CAPSULE at 10:29

## 2025-05-15 RX ADMIN — GABAPENTIN 800 MG: 400 CAPSULE ORAL at 05:25

## 2025-05-15 NOTE — PROGRESS NOTES
[facility] INF[facility]ION  Gurwinder Harding is a  64 y.o. male receiving heparin infusion.     Therapy for (VTE/Cardiac):   Cardiac  Patient Dosing Weight: 77.9 kg  Initial Bolus (Y/N):   N  Any Bolus (Y/N):   Y        Signs or Symptoms of Bleeding: N    Cardiac or Other (Not VTE)   Initial Bolus: 60 units/kg (Max 4,000 units)  Initial rate: 12 units/kg/hr (Max 1,000 units/hr)   Anti Xa Rebolus Infusion Hold time Change infusion Dose (Units/kg/hr) Next Anti Xa or aPTT Level Due   < 0.11 50 Units/kg  (4000 Units Max) None Increase by  3 Units/kg/hr 6 hours   0.11- 0.19 25 Units/kg  (2000 Units Max) None Increase by  2 Units/kg/hr 6 hours   0.2 - 0.29 0 None Increase by  1 Units/kg/hr 6 hours   0.3 - 0.5 0 None No Change 6 hours (after 2 consecutive levels in range check q24h @0700)   0.51 - 0.6 0 None Decrease by  1 Units/kg/hr 6 hours   0.61 - 0.8 0 30 Minutes Decrease by  2 Units/kg/hr 6 hours   0.81 - 1 0 60 Minutes Decrease by  3 Units/kg/hr 6 hours   >1 0 Hold  After Anti Xa less than 0.5 decrease previous rate by  4 Units/kg/hr  Every 2 hours until Anti Xa  less than 0.5 then when infusion restarts in 6 hours         Recommend anti-Xa every 6 hours.          Date   Time   Anti-Xa Current Rate (Unit/kg/hr) Bolus   (Units) Rate Change   (Unit/kg/hr) New Rate (Unit/kg/hr) Next   Anti-Xa Comments  Pump Check Daily   5/15 1449 0.34 0 - +10 10 2200 Baseline Xa level already therapeutic without starting heparin drip. Considering this and following the oral Anti-Xa exposure flowsheet will start the drip 2 unit/kg/hr less then the initial rate.                                                                                                                                                                                                                                  Pharmacy will continue to follow anti-Xa results and monitor for signs and symptoms of bleeding or thrombosis.    Maegan Toro, Felicitas  05/15/25 15:27 EDT

## 2025-05-15 NOTE — CONSULTS
[facility]  CONSULT  NOTE      Patient information:  Date of Admit: 5/14/2025  Date of Consult: 05/15/25  Hospitalist/Referring MD:Anika Hood DO  PCP: Pastora Person APRN  MRN:  [id number]  Visit Number:  [id number]    LOS: 1  CODE STATUS:  Code Status and Medical Interventions: CPR (Attempt to Resuscitate); Full Support   Ordered at: 05/14/25 1953     Code Status (Patient has no pulse and is not breathing):    CPR (Attempt to Resuscitate)     Medical Interventions (Patient has pulse or is breathing):    Full Support       Reason for Cardiology consultation: New onset atrial fibrillation    General Cardiology Consulting Physician: Dr. Mango Walker MD, [facility]    Primary Cardiologist: None since 2022    PROBLEM LIST: Principal Problem:    Sepsis due to pneumonia      Assessment      New onset atrial fibrillation with controlled ventricular response at this time, [id number] score of 1-2 with history of hypertension and PAD.  Patient states his blood pressure has been low recently.  Peripheral arterial disease, s/p previous interventions.  No known coronary artery disease  History of GI bleed (remote) with mild anemia  History of alcohol abuse.  Hypokalemia at admission, being corrected.    Recommendations     Supplement potassium for hypokalemia.  Try to keep the potassium levels between 4 and 5.  Continue with IV diltiazem and add IV heparin for now.  If patient's H&H remained stable on IV heparin and be able to take chronic anticoagulation then will consider electrical cardioversion.  Would strongly emphasized on abstinence from alcohol abuse.  Continue with aspirin and statin for his PAD.  Discontinue HCTZ to avoid hypokalemia that could be triggering his atrial fibrillation.    Subjective Data     5/15/2025    ADMISSION INFORMATION:  Chief Complaint   Patient presents with    Weakness - Generalized    Dizziness     History of Present Illness (HPI)  HPI obtained  from chart review     Gurwinder Harding is a 64 y.o. male with a past medical history significant for HTN, carotid artery stenosis,, CAD, PVD, previous CVA due to occlusion of left carotid artery, history of gross hematuria, and history of GI bleed, current tobacco use, and ETOH abuse.    Patient presented to [facility] emergency department (ED) on 5/14/2025 with complaints of cough and weakness.    5/15/2025, Cardiology has been consulted for further evaluation and management for new onset atrial fibrillation.     Primary Cardiologist: None since 2022    Most recent labs reveals sodium 129, potassium 3.7, chloride 98, CO2 17.7, BUN 21, creatinine 1.07, hemoglobin 12.1, platelet count 400, INR is 1.11, WBC is 19.09.  Magnesium was 2.0. HS troponin was initially 61 trending down to 53.  Urinalysis was negative for any blood.    Patient was in room 322 when he was seen and examined by Dr. Walker.  Patient is lying in bed resting quietly.  No acute distress noted at this time.  Patient reports he presented due to cough and increased weakness.  He denies any fever, chills, chest pain, shortness of breath, or palpitations.  Patient denies any previous history of cardiac disease such as cardiac stents, MI, or arrhythmias including atrial fibrillation or atrial flutter.  Patient reports that he has decreased his amount of EtOH use and admits that his last use was 1 beer 3 days ago.  Patient does report a history of hypertension and states that lately his blood pressure has been running low so he has stopped his medications.  Patient does report increased shortness of breath, denies being diagnosed with COPD, however he admits to smoking 1 pack/day for at least 30 years.  Patient denies any history of diabetes, and noticing any blood in his bowel or urine.  Bedside telemetry revealed atrial fibrillation in the 80s with frequent PVCs.  Head bed was at 29 degrees per bed monitor.  Patient's last recorded blood  pressure was 100/87.  Oxygen saturation was 93% on room air.    ORDERS:  Pharmacy consult for price check on DOACs please.  DC subcu Lovenox.  Initiate IV heparin per weight-based without a bolus for pharmacy to dose.    EVENT TIMELINE:  5/15: Echo with a EF of 66 to 70%.  LV diastolic function is consistent with grade 1.  Please see full report attached below.    Known medications given enroute via EMS and in the ER:   Medications   sodium chloride 0.9 % flush 10 mL (has no administration in time range)   sodium chloride 0.9 % flush 10 mL (has no administration in time range)   sodium chloride 0.9 % flush 10 mL (10 mL Intravenous Given 5/15/25 1031)   sodium chloride 0.9 % flush 10 mL (has no administration in time range)   sodium chloride 0.9 % infusion 40 mL (has no administration in time range)   nitroglycerin (NITROSTAT) SL tablet 0.4 mg (has no administration in time range)   azithromycin (ZITHROMAX) 500 mg in sodium chloride 0.9 % 250 mL IVPB-VTB (500 mg Intravenous New Bag 5/14/25 7290)   aztreonam (AZACTAM) 2,000 mg in sodium chloride 0.9 % 100 mL IVPB-VTB (2,000 mg Intravenous New Bag 5/15/25 9405)   sennosides-docusate (PERICOLACE) 8.6-50 MG per tablet 2 tablet (has no administration in time range)     And   polyethylene glycol (MIRALAX) packet 17 g (has no administration in time range)     And   bisacodyl (DULCOLAX) EC tablet 5 mg (has no administration in time range)     And   bisacodyl (DULCOLAX) suppository 10 mg (has no administration in time range)   acetaminophen (TYLENOL) tablet 1,000 mg (has no administration in time range)   lactated ringers infusion (0 mL/hr Intravenous Stopped 5/15/25 7630)   Potassium Replacement - Follow Nurse / BPA Driven Protocol (has no administration in time range)   Magnesium Standard Dose Replacement - Follow Nurse / BPA Driven Protocol (has no administration in time range)   Phosphorus Replacement - Follow Nurse / BPA Driven Protocol (has no administration in time  range)   Calcium Replacement - Follow Nurse / BPA Driven Protocol (has no administration in time range)   ipratropium-albuterol (DUO-NEB) nebulizer solution 3 mL (has no administration in time range)   nicotine (NICODERM CQ) 21 MG/24HR patch 1 patch (1 patch Transdermal Medication Applied 5/14/25 2128)   methylPREDNISolone sodium succinate (SOLU-Medrol) 40 mg in sterile water (preservative free) 1 mL (40 mg Intravenous Given 5/15/25 1028)   nicotine polacrilex (NICORETTE) gum 4 mg (has no administration in time range)   benzonatate (TESSALON) capsule 200 mg (200 mg Oral Given 5/15/25 1029)   guaiFENesin-dextromethorphan (ROBITUSSIN DM) 100-10 MG/5ML syrup 10 mL (10 mL Oral Given 5/14/25 2127)   gabapentin (NEURONTIN) capsule 800 mg (800 mg Oral Given 5/15/25 1310)   traZODone (DESYREL) tablet 50 mg (50 mg Oral Given 5/14/25 2128)   guaiFENesin (MUCINEX) 12 hr tablet 1,200 mg (has no administration in time range)   ascorbic acid (VITAMIN C) tablet 500 mg (500 mg Oral Given 5/15/25 1029)   aspirin EC tablet 81 mg (81 mg Oral Given 5/15/25 1029)   cholecalciferol (VITAMIN D3) tablet 1,000 Units (1,000 Units Oral Given 5/15/25 1029)   isosorbide mononitrate (IMDUR) 24 hr tablet 30 mg (30 mg Oral Given 5/15/25 1029)   magnesium oxide (MAG-OX) tablet 400 mg (400 mg Oral Given 5/15/25 1029)   methocarbamol (ROBAXIN) tablet 750 mg (750 mg Oral Given 5/15/25 1310)   vitamin B-12 (CYANOCOBALAMIN) tablet 500 mcg (500 mcg Oral Given 5/15/25 1029)   zinc sulfate (ZINCATE) capsule 220 mg (220 mg Oral Given 5/15/25 1029)   Pharmacy Consult (has no administration in time range)   heparin 28063 units/250 mL (100 units/mL) in 0.45 % NaCl infusion (has no administration in time range)   heparin (porcine) 5000 UNIT/ML injection 3,900 Units (has no administration in time range)   heparin (porcine) 5000 UNIT/ML injection 2,000 Units (has no administration in time range)   Pharmacy to Dose Heparin (has no administration in time range)    aspirin chewable tablet 324 mg (324 mg Oral Given 5/14/25 1656)   aztreonam (AZACTAM) 2 g in sodium chloride 0.9 % 100 mL IVPB-VTB (0 g Intravenous Stopped 5/14/25 1831)   vancomycin IVPB 2000 mg in 0.9% Sodium Chloride 500 mL (2,000 mg Intravenous New Bag 5/14/25 1909)   lactated ringers bolus 1,000 mL (1,000 mL Intravenous New Bag 5/14/25 1910)   potassium chloride (KLOR-CON M20) CR tablet 40 mEq (40 mEq Oral Given 5/14/25 2141)   potassium chloride 10 mEq in 100 mL IVPB (10 mEq Intravenous New Bag 5/14/25 2130)   potassium & sodium phosphates (PHOS-NAK) 280-160-250 MG packet 2 packet (2 packets Oral Given 5/14/25 1954)   potassium & sodium phosphates (PHOS-NAK) 280-160-250 MG packet 2 packet (2 packets Oral Given 5/15/25 0525)   Sulfur Hexafluoride Microsph (LUMASON) 60.7-25 MG IV reconstituted suspension reconstituted suspension 2 mL (2 mL Intravenous Given 5/15/25 1006)     Personal History     Cardiac risk factors:cerebral vascular disease, hypertension, peripheral vascular disease, and smoking      Last Echo: Results for orders placed during the hospital encounter of 05/14/25    Adult Transthoracic Echo Complete W/ Cont if Necessary Per Protocol    Interpretation Summary    Lumason image enhancer was used in order to optimize the study.    Normal left ventricular cavity size and wall thickness noted. All left ventricular wall segments contract normally.    Left ventricular ejection fraction appears to be 66 - 70%.    Left ventricular diastolic function is consistent with (grade I) impaired relaxation.    The aortic valve is not well visualized. No aortic valve regurgitation or stenosis is present. The aortic valve is grossly normal in structure.    The mitral valve is structurally normal with no regurgitation or significant stenosis present.    There is no evidence of pericardial effusion.         Last Stress: Results for orders placed during the hospital encounter of 11/30/16    Stress Test With Myocardial  Perfusion One Day    Interpretation Summary  · Left ventricular ejection fraction is normal (Calculated EF = 67%).  · Myocardial perfusion imaging indicates a normal myocardial perfusion study with no evidence of ischemia.  · Impressions are consistent with a low risk study.  · low risk for ischemic heart disease.  · Findings consistent with a normal ECG stress test.        Last Cath: Results for orders placed during the hospital encounter of 10/29/19    Cardiac Catheterization/Vascular Study    Narrative  Images from the original result were not included.  PERIPHERAL ARTERIOGRAM / INTERVENTION REPORT    DATE OF PROCEDURE: 10/29/19    INDICATION FOR PROCEDURE: Severe claudication of the RIGHT leg.  Recent intervention to LLE with healing of ulcer on the left  Pt still has cold foot on the right with no pulse.      PROCEDURE PERFORMED:  LEFT femoral artery access with 5 Fijian sheath  Bilateral lower extremity angiogram  Atherectomy followed by Balloon angioplasty and stenting of the R SFA    PROCEDURE COMMENTS:    Gurwinder Harding was brought to the cath lab and placed on the cardiac catherization table in the postabsortive state. The patient was prepped and draped according to the cath lab protocol under strict aseptic and sterile condition. Patient's right femoral artery sight was prepped and draped. Under fluoroscopic guidance theright femoral artery was punctured using a 21 gauge needle utilizing the modified Seldinger technique. 5 Ugandan sheath was introduced over a wire. After aspirating for blood it was flushed with heparinized saline.    LEFT extremity imaging was performed via the sheath. A 5F RIM catheter was then advanced to the bifurcation at L5 and engaged in the contralateral CLEMENTE. RIGHT extremity imaging was then performed. After the procedure was completed the sheath was removed in the cath lab and hemostasis achieved via manual compression. No complications occurred.    Findings:  Right Lower  Extremity:  Common  Iliac artery was normal  SFA shows 100% short occlusion in the distal third  Popliteal Artery was patent with no disease  Tibio-peroneal trunk patent with no disease  Anterior tibial artery was patent  Posterior tibial artery was patent  3 Vessel run off present    Left Lower Extremity:  Common iliac artery shows mild disease and calcific changes  SFA is patent at the site of previous atherectomy and DCB with excellent flow  Popliteal Artery is patent with no disease  Tibio-peroneal trunk patent with no significant disease  Anterior tibial artery was patent  Posterior tibial artery is patent  3 Vessel run off present    Recommendations:  Intervention to R SFA    6x45 straight sheath used  Angled NaviCross with glidewire 0.035  Crossed successfully and a Stabilizer wire was used for Atherectomy with HawkOne 6F  DCB with 5x120 Admiral performed  There was residual stenosis which was flow limiting and thus stenting was elected.  6x120 Everflex stent was delivered with excellent results and flow was fully re-established.      FINAL RESULTS:  Successful Intervention to the Right SFA Chronic Total occlusion  beginning at the ostium and extending to the Adductor canal using:    Atherectomy HawkOne device 6F  Balloon angioplasty with Admiral DCB 5x120 followed by stenting of the right SFA using 6x120 Everflex stent  From 100% to 20% residual with brisk flow to the distal vessel.      EBL: 50 ML  No specimens were removed.    Plan  Continue medical therapy.    Tong Azar MD  10/29/19        Director, Cardiac Cath Lab            EP study: No results found for this or any previous visit.              Past Medical History:   Diagnosis Date    Alcohol abuse     Arthritis     Carotid stenosis     Cerebrovascular accident (CVA) due to occlusion of left carotid artery 07/15/2021    ED (erectile dysfunction)     History of degenerative disc disease     Hydrocele in adult 12/29/2020    Hypertension     MRSA  (methicillin resistant staph aureus) culture positive     patient states diagnosed approx 2 wks ago    Neuropathy     Peripheral vascular disease     s/p arthrectomy follow by balloon angioplasty and stents of right and left SFA    Tobacco abuse     Vitamin D deficiency      Past Surgical History:   Procedure Laterality Date    APPENDECTOMY      ARTERIOGRAM N/A 9/19/2019    Procedure: Arteriogram;  Surgeon: Tong Azar MD;  Location: [facility] CATH INVASIVE LOCATION;  Service: Cardiology    BACK SURGERY      DISC RUPTURE REPAIR, L5    CARDIAC CATHETERIZATION Right 10/29/2019    Procedure: Peripheral angiography;  Surgeon: Tong Azar MD;  Location: [facility] CATH INVASIVE LOCATION;  Service: Cardiovascular    ENDOSCOPY N/A 2/18/2022    Procedure: ESOPHAGOGASTRODUODENOSCOPY;  Surgeon: Christine Duran MD;  Location: [facility] OR;  Service: General;  Laterality: N/A;    VASCULAR SURGERY Bilateral      Family History   Problem Relation Age of Onset    Hypertension Mother     Cancer Father         LUNG    Diabetes Father     Hypertension Father     Heart attack Other         GRANDFATHER     Social History     Tobacco Use    Smoking status: Every Day     Current packs/day: 1.00     Average packs/day: 1 pack/day for 73.0 years (73.0 ttl pk-yrs)     Types: Cigarettes    Smokeless tobacco: Never   Vaping Use    Vaping status: Never Used   Substance Use Topics    Alcohol use: Yes     Comment: few beers a day    Drug use: No       ALLERGIES: Penicillins and Novocain [procaine]    Medications listed below are reported home medications pulling from within the system:  Medications Prior to Admission   Medication Sig Dispense Refill Last Dose/Taking    ascorbic acid (VITAMIN C) 500 MG tablet Take 1 tablet by mouth Daily.   5/14/2025    aspirin 81 MG EC tablet Take 1 tablet by mouth Daily.   5/14/2025 Morning    gabapentin (NEURONTIN) 800 MG tablet Take 1.5 tablets by mouth 3 (Three) Times a Day.   5/14/2025     hydroCHLOROthiazide 25 MG tablet Take 1 tablet by mouth Daily.   5/14/2025    isosorbide mononitrate (IMDUR) 30 MG 24 hr tablet Take 1 tablet by mouth Daily.   5/14/2025    magnesium oxide (MAG-OX) 400 MG tablet Take 1 tablet by mouth Daily.   5/14/2025    methocarbamol (ROBAXIN) 750 MG tablet Take 1 tablet by mouth 4 (Four) Times a Day.   5/14/2025    traZODone (DESYREL) 50 MG tablet Take 1 tablet by mouth Every Night.   Past Week    vitamin B-12 (CYANOCOBALAMIN) 500 MCG tablet Take 1 tablet by mouth Daily.   5/14/2025    zinc sulfate (ZINCATE) 220 (50 Zn) MG capsule Take 1 capsule by mouth Daily.   5/14/2025    Cholecalciferol 25 MCG (1000 UT) tablet Take 1 tablet by mouth Daily.          Review of Systems   Constitutional:  Negative for activity change, diaphoresis and unexpected weight change.   HENT:  Negative for facial swelling and trouble swallowing.    Eyes:  Negative for visual disturbance.   Respiratory:  Positive for cough. Negative for apnea, chest tightness, shortness of breath, wheezing and stridor.    Cardiovascular:  Negative for chest pain, palpitations and leg swelling.   Gastrointestinal:  Negative for abdominal distention, nausea and vomiting.   Endocrine: Negative.    Genitourinary: Negative.    Skin:  Negative for color change.   Neurological:  Positive for weakness. Negative for dizziness, syncope and speech difficulty.   Psychiatric/Behavioral:  Negative for agitation and behavioral problems.      Objective Data      Vital Signs  Temp:  [96.5 °F (35.8 °C)-98.4 °F (36.9 °C)] 98.4 °F (36.9 °C)  Heart Rate:  [] 131  Resp:  [18-22] 20  BP: ()/(53-98) 122/78  Device (Oxygen Therapy): room air  Vital Signs (last 72 hrs)         05/12 0700  05/13 0659 05/13 0700  05/14 0659 05/14 0700  05/15 0659 05/15 0700  05/15 1358   Most Recent      Temp (°F)     96.5 -  98      98.4     98.4 (36.9) 05/15 0705    Heart Rate     66 -  124    110 -  131     131 05/15 0759    Resp     18 -  22      20      20 05/15 0759    BP     78/53 -  150/81      122/78     122/78 05/15 0705    SpO2 (%)     91 -  100    95 -  97     95 05/15 0759          BMI:  Body mass index is 24.64 kg/m².  WEIGHT:      05/14/25  2110 05/15/25  0500   Weight: 77.3 kg (170 lb 8 oz) 77.9 kg (171 lb 11.2 oz)     DIET:  Diet: Regular/House; Fluid Consistency: Thin (IDDSI 0)  I&O:  Intake & Output (last 3 days)         05/12 0701 05/13 0700 05/13 0701 05/14 0700 05/14 0701  05/15 0700 05/15 0701 05/16 0700    P.O.   240 600    IV Piggyback   100     Total Intake(mL/kg)   340 (4.4) 600 (7.7)    Urine (mL/kg/hr)   750 500 (0.9)    Stool   0 0    Total Output   750 500    Net   -410 +100            Stool Unmeasured Occurrence   2 x 3 x          Physical Exam  Constitutional:       General: He is not in acute distress.     Appearance: Normal appearance. He is ill-appearing. He is not toxic-appearing or diaphoretic.   HENT:      Head: Normocephalic and atraumatic.      Nose: Nose normal.      Mouth/Throat:      Mouth: Mucous membranes are moist.   Eyes:      Extraocular Movements: Extraocular movements intact.      Pupils: Pupils are equal, round, and reactive to light.   Cardiovascular:      Rate and Rhythm: Normal rate. Rhythm irregular.      Heart sounds: Normal heart sounds.   Pulmonary:      Effort: Pulmonary effort is normal.      Breath sounds: Normal breath sounds.   Abdominal:      General: Bowel sounds are normal.      Palpations: Abdomen is soft.   Musculoskeletal:         General: Normal range of motion.      Cervical back: Normal range of motion.   Skin:     General: Skin is warm and dry.   Neurological:      General: No focal deficit present.      Mental Status: He is alert and oriented to person, place, and time. Mental status is at baseline.   Psychiatric:         Mood and Affect: Mood normal.         Behavior: Behavior normal.         Thought Content: Thought content normal.         Judgment: Judgment normal.       Results review  "  Results Review:    I have reviewed the patient's new clinical results. 05/15/25 14:37 EDT    Results from last 7 days   Lab Units 05/14/25  1739 05/14/25  1615   HSTROP T ng/L 53* 61*     Lab Results   Component Value Date    PROBNP 202.4 07/15/2021     Results from last 7 days   Lab Units 05/14/25  2241 05/14/25  1615   WBC 10*3/mm3 19.09* 18.38*   HEMOGLOBIN g/dL 12.1* 12.3*   PLATELETS 10*3/mm3 400 482*     Results from last 7 days   Lab Units 05/14/25  2241 05/14/25  1615   SODIUM mmol/L 129* 135*   POTASSIUM mmol/L 3.7 2.7*   CHLORIDE mmol/L 98 97*   CO2 mmol/L 17.7* 26.8   BUN mg/dL 21 23   CREATININE mg/dL 1.07 1.24   CALCIUM mg/dL 9.5 11.1*   GLUCOSE mg/dL 151* 178*   ALT (SGPT) U/L 8 7   AST (SGOT) U/L 17 14     Lab Results   Component Value Date    MG 2.0 05/14/2025    MG 2.2 05/14/2025    MG 1.8 02/19/2022     Lab Results   Component Value Date    CHOL 179 08/19/2024    TRIG 144 08/19/2024    HDL 47 08/19/2024     (H) 08/19/2024     Estimated Creatinine Clearance: 77.9 mL/min (by C-G formula based on SCr of 1.07 mg/dL).  Lab Results   Component Value Date    HGBA1C 5.60 05/14/2025     Lab Results   Component Value Date    INR 1.11 (H) 05/14/2025    INR 1.04 02/17/2022    INR 0.97 07/15/2021    INR 1.09 10/28/2019    INR 1.00 09/19/2019     No results found for: \"LABHEPA\"      Lab Results   Component Value Date    TSH 1.030 08/19/2024    PSA 0.650 06/29/2021      No results found for: \"URICACID\"  Pain Management Panel  More data may exist         Latest Ref Rng & Units 5/15/2025 8/31/2020   Pain Management Panel   Amphetamine, Urine Qual Negative Negative  Negative    Barbiturates Screen, Urine Negative Negative  Negative    Benzodiazepine Screen, Urine Negative Negative  Negative    Buprenorphine, Screen, Urine Negative Negative  Negative    Cocaine Screen, Urine Negative Negative  Negative    Fentanyl, Urine Negative Negative  -   Methadone Screen , Urine Negative Negative  Negative  "   Methamphetamine, Ur Negative Negative  -     Microbiology Results (last 10 days)       Procedure Component Value - Date/Time    S. Pneumo Ag Urine or CSF - Urine, Urine, Clean Catch [[id number]]  (Normal) Collected: 05/15/25 0327    Lab Status: Final result Specimen: Urine, Clean Catch Updated: 05/15/25 1303     Strep Pneumo Ag Negative    Gastrointestinal Panel, PCR - Stool, Per Rectum [[id number]]  (Normal) Collected: 05/15/25 0053    Lab Status: Final result Specimen: Stool from Per Rectum Updated: 05/15/25 0212     Campylobacter Not Detected     Plesiomonas shigelloides Not Detected     Salmonella Not Detected     Vibrio Not Detected     Vibrio cholerae Not Detected     Yersinia enterocolitica Not Detected     Enteroaggregative E. coli (EAEC) Not Detected     Enteropathogenic E. coli (EPEC) Not Detected     Enterotoxigenic E. coli (ETEC) lt/st Not Detected     Shiga-like toxin-producing E. coli (STEC) stx1/stx2 Not Detected     Shigella/Enteroinvasive E. coli (EIEC) Not Detected     Cryptosporidium Not Detected     Cyclospora cayetanensis Not Detected     Entamoeba histolytica Not Detected     Giardia lamblia Not Detected     Adenovirus F40/41 Not Detected     Astrovirus Not Detected     Norovirus GI/GII Not Detected     Rotavirus A Not Detected     Sapovirus (I, II, IV or V) Not Detected    MRSA Screen, PCR (Inpatient) - Swab, Nares [[id number]]  (Normal) Collected: 05/14/25 2336    Lab Status: Final result Specimen: Swab from Nares Updated: 05/15/25 0051     MRSA PCR No MRSA Detected    Narrative:      The negative predictive value of this diagnostic test is high and should only be used to consider de-escalating anti-MRSA therapy. A positive result may indicate colonization with MRSA and must be correlated clinically.    Respiratory Panel PCR w/COVID-19(SARS-CoV-2) MULU/HOWARD/ZAK/PAD/COR/KRZYSZTOF In-House, NP Swab in UTM/VTM, 2 HR TAT - Swab, Nasopharynx [[id number]]  (Normal) Collected: 05/14/25 1640    Lab Status:  "Final result Specimen: Swab from Nasopharynx Updated: 25 1735     ADENOVIRUS, PCR Not Detected     Coronavirus 229E Not Detected     Coronavirus HKU1 Not Detected     Coronavirus NL63 Not Detected     Coronavirus OC43 Not Detected     COVID19 Not Detected     Human Metapneumovirus Not Detected     Human Rhinovirus/Enterovirus Not Detected     Influenza A PCR Not Detected     Influenza B PCR Not Detected     Parainfluenza Virus 1 Not Detected     Parainfluenza Virus 2 Not Detected     Parainfluenza Virus 3 Not Detected     Parainfluenza Virus 4 Not Detected     RSV, PCR Not Detected     Bordetella pertussis pcr Not Detected     Bordetella parapertussis PCR Not Detected     Chlamydophila pneumoniae PCR Not Detected     Mycoplasma pneumo by PCR Not Detected    Narrative:      In the setting of a positive respiratory panel with a viral infection PLUS a negative procalcitonin without other underlying concern for bacterial infection, consider observing off antibiotics or discontinuation of antibiotics and continue supportive care. If the respiratory panel is positive for atypical bacterial infection (Bordetella pertussis, Chlamydophila pneumoniae, or Mycoplasma pneumoniae), consider antibiotic de-escalation to target atypical bacterial infection.           No results found for: \"BLOODCX\"      EC/15/2025          2025        ECG/EMG Results (last 24 hours)       Procedure Component Value Units Date/Time    ECG 12 Lead Chest Pain [[id number]] Collected: 25 1612     Updated: 25     QT Interval 364 ms      QTC Interval 485 ms     Narrative:      Test Reason : Chest Pain  Blood Pressure :   */*   mmHG  Vent. Rate : 107 BPM     Atrial Rate : 107 BPM     P-R Int : 116 ms          QRS Dur :  70 ms      QT Int : 364 ms       P-R-T Axes :  73  42  59 degrees    QTcB Int : 485 ms    Sinus tachycardia with frequent  premature atrialr complexes and occasional  PVCs.  Otherwise normal ECG  When compared " with ECG of 17-Feb-2022 15:57,  premature ventricular complexes are now present  Vent. rate has increased by  37 bpm  QRS duration has decreased  QT has lengthened  Confirmed by Mango Walker ([id number]) on 5/14/2025 9:42:33 PM    Referred By: SERA           Confirmed By: Mango Walker    ECG 12 Lead Tachycardia [[id number]] Collected: 05/15/25 0025     Updated: 05/15/25 0027     QT Interval 310 ms      QTC Interval 445 ms     Narrative:      Test Reason : Tachycardia  Blood Pressure :   */*   mmHG  Vent. Rate : 124 BPM     Atrial Rate : 124 BPM     P-R Int : 160 ms          QRS Dur :  64 ms      QT Int : 310 ms       P-R-T Axes :  88  49  66 degrees    QTcB Int : 445 ms    Sinus tachycardia with premature supraventricular complexes and with  occasional premature ventricular complexes  Otherwise normal ECG  When compared with ECG of 14-May-2025 16:12,  premature supraventricular complexes are now present    Referred By:            Confirmed By:     Telemetry Scan [[id number]] Resulted: 05/14/25     Updated: 05/15/25 0507    ECG 12 Lead Electrolyte Imbalance [[id number]] Collected: 05/15/25 0612     Updated: 05/15/25 0614     QT Interval 342 ms      QTC Interval 458 ms     Narrative:      Test Reason : Electrolyte Imbalance  Blood Pressure :   */*   mmHG  Vent. Rate : 108 BPM     Atrial Rate : 108 BPM     P-R Int : 122 ms          QRS Dur :  74 ms      QT Int : 342 ms       P-R-T Axes :  67  47  46 degrees    QTcB Int : 458 ms    Sinus tachycardia with premature supraventricular complexes and with  occasional premature ventricular complexes  Otherwise normal ECG  When compared with ECG of 15-May-2025 00:25, (Unconfirmed)  No significant change was found    Referred By: WILLY           Confirmed By:     Adult Transthoracic Echo Complete W/ Cont if Necessary Per Protocol [[id number]] Resulted: 05/15/25 1259     Updated: 05/15/25 1259     LVIDd 4.0 cm      LVIDs 2.05 cm      IVSd 1.10 cm      LVPWd 1.00 cm      FS 48.7 %       IVS/LVPW 1.10 cm      ESV(cubed) 8.6 ml      LV Sys Vol (BSA corrected) 13.7 cm2      EDV(cubed) 64.0 ml      LV Childress Vol (BSA corrected) 46.5 cm2      LV mass(C)d 136.2 grams      LVOT area 3.1 cm2      LVOT diam 2.00 cm      EDV(MOD-sp4) 90.8 ml      ESV(MOD-sp4) 26.8 ml      SV(MOD-sp4) 64.0 ml      SVi(MOD-SP4) 32.8 ml/m2      SVi (LVOT) 29.1 ml/m2      EF(MOD-sp4) 70.5 %      MV E max franklin 60.7 cm/sec      MV A max franklin 70.6 cm/sec      MV E/A 0.86     Med Peak E' Franklin 7.2 cm/sec      Lat Peak E' Franklin 7.0 cm/sec      Avg E/e' ratio 8.55     SV(LVOT) 56.9 ml      RVIDd 3.1 cm      RV Base 2.8 cm      RV Mid 1.90 cm      RV Length 5.3 cm      TAPSE (>1.6) 2.12 cm      LA dimension (2D)  3.1 cm      LV V1 max 106.0 cm/sec      LV V1 max PG 4.5 mmHg      LV V1 mean PG 2.00 mmHg      LV V1 VTI 18.1 cm      Ao pk franklin 129.0 cm/sec      Ao max PG 6.7 mmHg      Ao mean PG 3.0 mmHg      Ao V2 VTI 20.6 cm      RALPH(I,D) 2.8 cm2      Dimensionless Index 0.88 (DI)      PA V2 max 77.7 cm/sec      PA acc time 0.12 sec      Ao root diam 3.1 cm      ACS 1.50 cm     Narrative:        Lumason image enhancer was used in order to optimize the study.    Normal left ventricular cavity size and wall thickness noted. All left   ventricular wall segments contract normally.    Left ventricular ejection fraction appears to be 66 - 70%.    Left ventricular diastolic function is consistent with (grade I)   impaired relaxation.    The aortic valve is not well visualized. No aortic valve regurgitation   or stenosis is present. The aortic valve is grossly normal in structure.    The mitral valve is structurally normal with no regurgitation or   significant stenosis present.    There is no evidence of pericardial effusion.              TELEMETRY:             RADIOLOGY STUDIES:  Imaging Results (Last 72 Hours)       Procedure Component Value Units Date/Time    XR Chest 1 View [[id number]] Collected: 05/14/25 1825     Updated: 05/14/25 1830     Narrative:      INDICATION: Chest pain.     TECHNIQUE: Frontal radiograph of the chest.     COMPARISON: Radiograph 1/9/2021.     FINDINGS:   Mild cardiomegaly. Pulmonary vasculature appear within normal limits.  Right upper lobe infiltrate. No pleural effusion or pneumothorax. No  acute osseous abnormality evident.       Impression:      Findings concerning for right upper lobe pneumonia. Follow-up  recommended to ensure resolution.     This report was finalized on 5/14/2025 6:28 PM by Alex Pallas, DO.       CT Head Without Contrast [[id number]] Collected: 05/14/25 1641     Updated: 05/14/25 1644    Narrative:      EXAM:    CT Head Without Intravenous Contrast     EXAM DATE:    5/14/2025 4:23 PM     CLINICAL HISTORY:    dizziness     TECHNIQUE:    Axial computed tomography images of the head/brain without intravenous  contrast.  Sagittal and coronal reformatted images were created and  reviewed.  This CT exam was performed using one or more of the following  dose reduction techniques:  automated exposure control, adjustment of  the mA and/or kV according to patient size, and/or use of iterative  reconstruction technique.     COMPARISON:    7/15/2021     FINDINGS:    Brain and extra-axial spaces:  Unremarkable.  No hemorrhage.  No  significant white matter disease.  No edema.  No ventriculomegaly.    Bones/joints:  Unremarkable.  No acute fracture.    Soft tissues:  Unremarkable.    Sinuses:  Unremarkable as visualized.  No acute sinusitis.    Mastoid air cells:  Unremarkable as visualized.  No mastoid effusion.       Impression:        Unremarkable exam. No CT evidence of acute intracranial abnormality.     This report was finalized on 5/14/2025 4:42 PM by Dr. Neal Lanza MD.               ALLERGIES: Penicillins and Novocain [procaine]    CURRENT MEDICATIONS:  Current list of medications may not reflect those currently placed in orders that are not signed or are being held.     ascorbic acid, 500 mg, Oral,  Daily  aspirin, 81 mg, Oral, Daily  azithromycin, 500 mg, Intravenous, Q24H  aztreonam, 2,000 mg, Intravenous, Q8H  cholecalciferol, 1,000 Units, Oral, Daily  gabapentin, 800 mg, Oral, Q8H  isosorbide mononitrate, 30 mg, Oral, Daily  magnesium oxide, 400 mg, Oral, Daily  methocarbamol, 750 mg, Oral, 4x Daily  methylPREDNISolone sodium succinate, 40 mg, Intravenous, Q12H  nicotine, 1 patch, Transdermal, Q24H  sodium chloride, 10 mL, Intravenous, Q12H  traZODone, 50 mg, Oral, Nightly  vitamin B-12, 500 mcg, Oral, Daily  zinc sulfate, 220 mg, Oral, Daily      heparin, 12 Units/kg/hr  Pharmacy Consult,   Pharmacy to Dose Heparin,         acetaminophen    benzonatate    senna-docusate sodium **AND** polyethylene glycol **AND** bisacodyl **AND** bisacodyl    Calcium Replacement - Follow Nurse / BPA Driven Protocol    guaiFENesin    guaiFENesin-dextromethorphan    heparin (porcine)    heparin (porcine)    ipratropium-albuterol    Magnesium Standard Dose Replacement - Follow Nurse / BPA Driven Protocol    nicotine polacrilex    nitroglycerin    Pharmacy Consult    Pharmacy to Dose Heparin    Phosphorus Replacement - Follow Nurse / BPA Driven Protocol    Potassium Replacement - Follow Nurse / BPA Driven Protocol    sodium chloride    sodium chloride    sodium chloride    sodium chloride    Thank you very much for asking us to be involved in this patient's care. Please do not hesitate to call for any questions or concerns.     I have discussed the patients findings and recommendations with the patient.    Electronically signed by KEVIN Nguyen, 05/15/25, 2:33 PM EDT.     Electronically signed by Mango Walker MD, 05/15/25, 3:03 PM EDT.        Please note that portions of this note were copied and has been reviewed and is accurate as of 5/15/2025 .      Please note that portions of this note were completed with a voice recognition program.

## 2025-05-15 NOTE — CASE MANAGEMENT/SOCIAL WORK
Discharge Planning Assessment   Samy     Patient Name: Gurwinder Harding  MRN: [id number]  Today's Date: 5/14/2025    Admit Date: 5/14/2025    Plan: Spoke with patient and spouse at bedside. Patient lives at home and will return at discharge. Patient has no POA or Living Will. Patient uses a walker from [employer] and receives no HH. Patients PCP Pastora Person and uses [facility] on [address]. Patients family will transport home at discharge.   Discharge Needs Assessment       Row Name 05/14/25 1957       Living Environment    People in Home spouse    Name(s) of People in Home Patsy Harding Spouse [phone]    Current Living Arrangements home    Potentially Unsafe Housing Conditions none    In the past 12 months has the electric, gas, oil, or water company threatened to shut off services in your home? No    Primary Care Provided by self;spouse/significant other    Provides Primary Care For no one    Family Caregiver if Needed spouse    Family Caregiver Names Patsy Harding Spouse [phone]    Quality of Family Relationships helpful;involved;supportive    Able to Return to Prior Arrangements yes       Resource/Environmental Concerns    Resource/Environmental Concerns none    Transportation Concerns none       Transportation Needs    In the past 12 months, has lack of transportation kept you from medical appointments or from getting medications? no    In the past 12 months, has lack of transportation kept you from meetings, work, or from getting things needed for daily living? No       Food Insecurity    Within the past 12 months, you worried that your food would run out before you got the money to buy more. Never true    Within the past 12 months, the food you bought just didn't last and you didn't have money to get more. Never true       Transition Planning    Patient/Family Anticipates Transition to home with family    Patient/Family Anticipated Services at Transition none    Transportation Anticipated  family or friend will provide       Discharge Needs Assessment    Readmission Within the Last 30 Days no previous admission in last 30 days    Equipment Currently Used at Home walker, standard    Concerns to be Addressed discharge planning    Do you want help finding or keeping work or a job? I do not need or want help    Do you want help with school or training? For example, starting or completing job training or getting a high school diploma, GED or equivalent No    Anticipated Changes Related to Illness none    Equipment Needed After Discharge none                   Discharge Plan       Row Name 05/14/25 1958       Plan    Plan Spoke with patient and spouse at bedside. Patient lives at home and will return at discharge. Patient has no POA or Living Will. Patient uses a walker from [employer] and receives no HH. Patients PCP Pastora Person and uses [facility] on [address]. Patients family will transport home at discharge.    Patient/Family in Agreement with Plan yes                    Expected Discharge Date and Time       Expected Discharge Date Expected Discharge Time    May 17, 2025            Demographic Summary       Row Name 05/14/25 1957       General Information    Admission Type inpatient    Arrived From home    Referral Source emergency department    Reason for Consult discharge planning    Preferred Language English               Josiane Potts

## 2025-05-15 NOTE — PAYOR COMM NOTE
"CONTACT:  SIVAN WHITE RN  UTILIZATION MANAGEMENT DEPT.   [facility]    [address], [address]   PHONE:  [phone]   FAX: [phone]   NPI [id number]      INPATIENT AUTH REQUEST          Richie Hardingwin IRA \"Ray\" (64 y.o. Male)       Date of Birth   1960    Social Security Number       Address   [address] [address]    Home Phone   [phone]    MRN   [id number]       [place]    Marital Status                               Admission Date   2025    Admission Type   Emergency    Admitting Provider   Anika Hood DO    Attending Provider   Ki Medina DO    Department, Room/Bed   [address], [facility]/       Discharge Date       Discharge Disposition       Discharge Destination                                 Attending Provider: Ki Medina DO    Allergies: Penicillins, Novocain [Procaine]    Isolation: None   Infection: MRSA (22)   Code Status: CPR    Ht: 177.8 cm (70\")   Wt: 77.9 kg (171 lb 11.2 oz)    Admission Cmt: None   Principal Problem: Sepsis due to pneumonia [J18.9,A41.9]                   Active Insurance as of 2025       Primary Coverage       Payor Plan Insurance Group Employer/Plan Group    [facility] MEDICARE REPLACEMENT [facility] MEDICARE ADVANTAGE HMO [id number]       Payor Plan Address Payor Plan Phone Number Payor Plan Fax Number Effective Dates    [address] [phone]  2025 - None Entered    [facility] [address]         Subscriber Name Subscriber Birth Date Member ID       ISAJANINE ROTH 1960 [id number]                     Emergency Contacts        (Rel.) Home Phone Work Phone Mobile Phone    HardingPatsy (Spouse) [phone] -- --                 History & Physical        Anika Hood DO at 26          [facility]   HISTORY AND PHYSICAL    Patient Name: Janine Harding  : 1960  MRN: [id number]  Primary Care " Physician:  Pastora Person APRN  Date of admission: 5/14/2025    Subjective  Subjective     Chief Complaint: Weakness, Cough    History of Present Illness the patient is a 64-year-old male with past medical history significant for tobacco use, peripheral vascular disease status post arthrectomy followed by balloon angioplasty and stenting of the right and left SFA, peripheral neuropathy, hypertension, CVA due to an occluded left carotid artery, alcohol use and hypertension who presents to the emergency department complaining of worsening weakness and cough.    Patient seen and examined in . Wife at bedside.  Wife states that the patient has had a cough for approximately 2 weeks.  Patient states that it is mostly nonproductive.  He denies fever and/or chills but does report profound generalized weakness and fatigue.  Patient denies any nausea, vomiting and/or abdominal pain.  He does not report any recent diarrhea.    Patient denies any dysuria but does report some occasional urge incontinence and weak stream.    Patient states that he currently smokes approximately 1 pack of cigarettes per day.  He states that in the past 2 or 3 weeks he has only had 1 beer and has significantly cut back on his overall alcohol intake.  Patient and wife deny any known sick contacts.  Patient denies any recent hospitalizations.    Review of Systems   Constitutional:  Positive for fatigue. Negative for chills, diaphoresis and fever.   HENT:  Negative for hearing loss and trouble swallowing (occasional; no specific foods/liquids).    Eyes:  Negative for discharge and visual disturbance.   Respiratory:  Positive for cough and shortness of breath. Negative for wheezing.    Cardiovascular:  Negative for chest pain, palpitations and leg swelling.   Gastrointestinal:  Negative for abdominal pain, constipation, diarrhea, nausea and vomiting.   Endocrine: Negative for polydipsia, polyphagia and polyuria.   Genitourinary:  Positive for  decreased urine volume and difficulty urinating. Negative for dysuria, frequency and hematuria.   Musculoskeletal:  Negative for gait problem and myalgias.   Skin:  Negative for rash.   Neurological:  Positive for weakness. Negative for dizziness, tremors, seizures, syncope and light-headedness.   Hematological:  Does not bruise/bleed easily.   Psychiatric/Behavioral:  Negative for agitation and confusion.         Personal History     Past Medical History:   Diagnosis Date    Alcohol abuse     Arthritis     Carotid stenosis     Cerebrovascular accident (CVA) due to occlusion of left carotid artery 07/15/2021    ED (erectile dysfunction)     History of degenerative disc disease     Hydrocele in adult 12/29/2020    Hypertension     MRSA (methicillin resistant staph aureus) culture positive     patient states diagnosed approx 2 wks ago    Neuropathy     Peripheral vascular disease     s/p arthrectomy follow by balloon angioplasty and stents of right and left SFA    Tobacco abuse     Vitamin D deficiency        Past Surgical History:   Procedure Laterality Date    APPENDECTOMY      ARTERIOGRAM N/A 9/19/2019    Procedure: Arteriogram;  Surgeon: Tong Azar MD;  Location: [facility] CATH INVASIVE LOCATION;  Service: Cardiology    BACK SURGERY      DISC RUPTURE REPAIR, L5    CARDIAC CATHETERIZATION Right 10/29/2019    Procedure: Peripheral angiography;  Surgeon: Tong Azar MD;  Location: [facility] CATH INVASIVE LOCATION;  Service: Cardiovascular    ENDOSCOPY N/A 2/18/2022    Procedure: ESOPHAGOGASTRODUODENOSCOPY;  Surgeon: Christine Duran MD;  Location: [facility] OR;  Service: General;  Laterality: N/A;    VASCULAR SURGERY Bilateral        Family History: family history includes Cancer in his father; Diabetes in his father; Heart attack in an other family member; Hypertension in his father and mother.     Social History:  reports that he has been smoking cigarettes. He has a 30 pack-year smoking history. He has never  used smokeless tobacco. He reports current alcohol use. He reports that he does not use drugs.    Home Medications:  Ascorbic Acid, Magnesium, Potassium, Vitamin A, Vitamin D, Zinc Acetate, atenolol, brimonidine-timolol, cilostazol, folic acid, folic acid-pyridoxine-cyanocobalamin, gabapentin, hydroCHLOROthiazide, isosorbide mononitrate, methocarbamol, traZODone, and vitamin B-12    Allergies:  Allergies   Allergen Reactions    Penicillins Hives and Shortness Of Breath     As A child    Novocain [Procaine] Nausea And Vomiting       Objective   Objective     Vitals:   Temp:  [96.5 °F (35.8 °C)-97.1 °F (36.2 °C)] 96.5 °F (35.8 °C)  Heart Rate:  [] 110  Resp:  [20] 20  BP: ()/(53-98) 129/98    Physical Exam  Constitutional:       General: He is not in acute distress.     Appearance: He is well-developed. He is ill-appearing.   HENT:      Head: Normocephalic and atraumatic.   Eyes:      Conjunctiva/sclera: Conjunctivae normal.   Neck:      Trachea: No tracheal deviation.   Cardiovascular:      Rate and Rhythm: Normal rate and regular rhythm.      Pulses:           Dorsalis pedis pulses are 1+ on the right side and 1+ on the left side.      Heart sounds: No murmur heard.     No friction rub. No gallop.      Comments: Runs of irregular rate/rhythm noted - afib  Pulmonary:      Effort: No tachypnea or respiratory distress.      Breath sounds: Rhonchi present. No wheezing or rales.      Comments: Coarse B/L breath sounds. Frequent non-productive cough.  Abdominal:      General: Bowel sounds are normal. There is no distension.      Palpations: Abdomen is soft.      Tenderness: There is no abdominal tenderness. There is no guarding.   Musculoskeletal:      Right lower leg: No edema.      Left lower leg: No edema.   Skin:     General: Skin is warm and dry.      Findings: No erythema or rash.      Comments: Various chronic appearing excoriations, scars noted   Neurological:      Mental Status: He is alert and  oriented to person, place, and time.      Cranial Nerves: No cranial nerve deficit.         Result Review   Result Review:  I have personally reviewed the results from the time of this admission to 5/14/2025 20:20 EDT and agree with these findings:  [x]  Laboratory list / accordion  []  Microbiology  [x]  Radiology  [x]  EKG/Telemetry   []  Cardiology/Vascular   []  Pathology  []  Old records  []  Other:  Most notable findings include:     EKG per my view with sinus tachycardia, occasional PVCs, QTc 485 ms    CARDIAC LABS:      Lab 05/14/25  1739 05/14/25  1615   HSTROP T 53* 61*   PROTIME  --  14.4   INR  --  1.11*       Results from last 7 days   Lab Units 05/14/25  1615   WBC 10*3/mm3 18.38*   HEMOGLOBIN g/dL 12.3*   HEMATOCRIT % 38.6   PLATELETS 10*3/mm3 482*     Results from last 7 days   Lab Units 05/14/25  1615   SODIUM mmol/L 135*   POTASSIUM mmol/L 2.7*   CHLORIDE mmol/L 97*   CO2 mmol/L 26.8   BUN mg/dL 23   CREATININE mg/dL 1.24   CALCIUM mg/dL 11.1*   BILIRUBIN mg/dL 0.4   ALK PHOS U/L 134*   ALT (SGPT) U/L 7   AST (SGOT) U/L 14   GLUCOSE mg/dL 178*         CXR (images reviewed; RUL infiltrate):  FINDINGS:   Mild cardiomegaly. Pulmonary vasculature appear within normal limits.  Right upper lobe infiltrate. No pleural effusion or pneumothorax. No  acute osseous abnormality evident.     IMPRESSION:  Findings concerning for right upper lobe pneumonia. Follow-up  recommended to ensure resolution.    Assessment & Plan  Assessment / Plan     #Sepsis (severe per CMS with lactate >2) due to RUL pneumonia treating for gram +/atypicals associated with NSTEMI, probably type II  #Acute hypokalemia  #Acute hypercalcemia  #Acute hypophosphatemia  #Severe hypoalbuminemia  #Hyperglycemia without known history of diabetes mellitus  #Thrombocytosis, likely reactive  #Brief run of atrial fibrillation with rapid ventricular response in ED; no known history  #Generalized weakness, multifactorial and due to above  - Patient  will be admitted to the telemetry unit for further evaluation management  - I have requested a respiratory culture, testing for strep pneumo and MRSA  - Patient's respiratory PCR panel is negative in the ED  - Patient's lactate level upon admission was 3.5 with a reflex of 1.6  - Check pro-calcitonin level  - Admission WBC 18.38  - Patient has been started on intravenous antibiotic therapy with azithromycin and Azactam for now; adjust based on culture data results  - Repeat CBC in a.m. and monitor temperature curve  - I suspect that the patient may have some underlying chronic obstructive pulmonary disease given his longstanding history of tobacco use; given patient's respiratory exam, I have started the patient on IV Solu-Medrol 40 mg every 12 hours with nebulized inhalants as needed  - Patient will have other symptomatic care in terms of cough suppressants or expectorants  - Patient is chest pain free  - Suspect type II NSTEMI in the setting of sepsis and electrolyte abnormalities  - Will obtain an echocardiogram to evaluate EF and for any wall motion abnormalities  - May consider ischemic evaluation once patient's respiratory status has improved  - Patient has been started on the electrolyte replacement protocol  - Will obtain a magnesium level and supplement if found to be deficient  - I am currently awaiting the patient's official medication reconciliation from pharmacy that it appears that he has previously been on HCTZ and we will plan to hold that now in the setting of hypokalemia and hypotension upon admission  - Will continue IV fluid hydration with LR @ 100 ml/hr for now  - Check A1c  - Monitor a.m. glucose level in setting of steroids; may need some low dose PRN sliding scale  - Monitor on telemetry for further episodes of atrial fibrillation  - Repeat EKG in a.m.   - Repeat  chemistry panel in a.m.     Chronic medical conditions:  - Peripheral vascular disease s/p stenting  - Carotid stenosis  -  Cerebrovascular accident due to occluded L carotid artery  - Tobacco use  - Occasional alcohol use  - Hypertension  - Neuropathy      VTE Prophylaxis:  Lovenox    CODE STATUS:    Code Status (Patient has no pulse and is not breathing): CPR (Attempt to Resuscitate)  Medical Interventions (Patient has pulse or is breathing): Full Support    Admission Status:  I believe this patient meets inpatient status.    Case d/w patient and wife at bedside    Anika Hood DO    Electronically signed by Anika Hood DO at 05/14/25 2223          Emergency Department Notes        Marissa Sheikh PA at 05/14/25 1623                   MEDICAL SCREENING:    Reason for Visit: weakness     Patient initially seen in triage.  The patient was advised further evaluation and diagnostic testing will be needed, some of the treatment and testing will be initiated in the lobby in order to begin the process.  The patient will be returned to the waiting area for the time being and possibly be re-assessed by a subsequent ED provider.  The patient will be brought back to the treatment area in as timely manner as possible.      Marissa Sheikh PA  05/14/25 1623      Electronically signed by Marissa Sheikh PA at 05/14/25 1623       Facility-Administered Medications as of 5/15/2025   Medication Dose Route Frequency Provider Last Rate Last Admin    acetaminophen (TYLENOL) tablet 1,000 mg  1,000 mg Oral Q8H PRN Anika Hood DO        ascorbic acid (VITAMIN C) tablet 500 mg  500 mg Oral Daily Anika Hood DO        [COMPLETED] aspirin chewable tablet 324 mg  324 mg Oral Once Timothy Floyd MD   324 mg at 05/14/25 1656    aspirin EC tablet 81 mg  81 mg Oral Daily Anika Hood DO        azithromycin (ZITHROMAX) 500 mg in sodium chloride 0.9 % 250 mL IVPB-VTB  500 mg Intravenous Q24H Anika Hood DO   500 mg at 05/14/25 2130    [COMPLETED] aztreonam (AZACTAM) 2 g in sodium chloride 0.9 % 100 mL  IVPB-VTB  2 g Intravenous Once Timothy Floyd MD   Stopped at 25 183    aztreonam (AZACTAM) 2,000 mg in sodium chloride 0.9 % 100 mL IVPB-VTB  2,000 mg Intravenous Q8H Anika Hood DO   2,000 mg at 05/15/25 0545    benzonatate (TESSALON) capsule 200 mg  200 mg Oral TID PRN Anika Hood DO   200 mg at 25    sennosides-docusate (PERICOLACE) 8.6-50 MG per tablet 2 tablet  2 tablet Oral BID PRN Anika Hood DO        And    polyethylene glycol (MIRALAX) packet 17 g  17 g Oral Daily PRN Anika Hood DO        And    bisacodyl (DULCOLAX) EC tablet 5 mg  5 mg Oral Daily PRN Anika Hood DO        And    bisacodyl (DULCOLAX) suppository 10 mg  10 mg Rectal Daily PRN Anika Hood DO        Calcium Replacement - Follow Nurse / BPA Driven Protocol   Not Applicable PRN Anika Hood DO        cholecalciferol (VITAMIN D3) tablet 1,000 Units  1,000 Units Oral Daily Anika Hood DO        enoxaparin sodium (LOVENOX) syringe 40 mg  40 mg Subcutaneous Daily Anika Hood DO        gabapentin (NEURONTIN) capsule 800 mg  800 mg Oral Q8H Anika Hood DO   800 mg at 05/15/25 0525    guaiFENesin (MUCINEX) 12 hr tablet 1,200 mg  1,200 mg Oral BID PRN Anika Hood DO        guaiFENesin-dextromethorphan (ROBITUSSIN DM) 100-10 MG/5ML syrup 10 mL  10 mL Oral Q4H PRN Anika Hood DO   10 mL at 25    ipratropium-albuterol (DUO-NEB) nebulizer solution 3 mL  3 mL Nebulization Q6H PRN Anika Hood DO        isosorbide mononitrate (IMDUR) 24 hr tablet 30 mg  30 mg Oral Daily Anika Hood DO        [COMPLETED] lactated ringers bolus 1,000 mL  1,000 mL Intravenous Once Timothy Floyd MD 1,000 mL/hr at 25 1,000 mL at 25    [] lactated ringers infusion  100 mL/hr Intravenous Continuous Anika Hood  mL/hr at 25 100 mL/hr at 25     magnesium oxide (MAG-OX) tablet 400 mg  400 mg Oral Daily Anika Hood DO        Magnesium Standard Dose Replacement - Follow Nurse / BPA Driven Protocol   Not Applicable PRN Anika Hood DO        methocarbamol (ROBAXIN) tablet 750 mg  750 mg Oral 4x Daily Anika Hood DO        methylPREDNISolone sodium succinate (SOLU-Medrol) 40 mg in sterile water (preservative free) 1 mL  40 mg Intravenous Q12H Anika Hood DO   40 mg at 05/14/25 2128    nicotine (NICODERM CQ) 21 MG/24HR patch 1 patch  1 patch Transdermal Q24H Anika Hood DO   1 patch at 05/14/25 2128    nicotine polacrilex (NICORETTE) gum 4 mg  4 mg Mouth/Throat Q1H PRN Anika Hood DO        nitroglycerin (NITROSTAT) SL tablet 0.4 mg  0.4 mg Sublingual Q5 Min PRN Anika Hood DO        Phosphorus Replacement - Follow Nurse / BPA Driven Protocol   Not Applicable PRN Anika Hood DO        [COMPLETED] potassium & sodium phosphates (PHOS-NAK) 280-160-250 MG packet 2 packet  2 packet Oral Once Timothy Floyd MD   2 packet at 05/14/25 1954    [COMPLETED] potassium & sodium phosphates (PHOS-NAK) 280-160-250 MG packet 2 packet  2 packet Oral Once Anika Hood DO   2 packet at 05/15/25 0525    [COMPLETED] potassium chloride (KLOR-CON M20) CR tablet 40 mEq  40 mEq Oral Q2H Timothy Floyd MD   40 mEq at 05/14/25 2141    [COMPLETED] potassium chloride 10 mEq in 100 mL IVPB  10 mEq Intravenous Q1H Timothy Floyd  mL/hr at 05/14/25 2130 10 mEq at 05/14/25 2130    Potassium Replacement - Follow Nurse / BPA Driven Protocol   Not Applicable PRN Anika Hood DO        sodium chloride 0.9 % flush 10 mL  10 mL Intravenous PRN Anika Hood DO        sodium chloride 0.9 % flush 10 mL  10 mL Intravenous PRN Anika Hood DO        sodium chloride 0.9 % flush 10 mL  10 mL Intravenous Q12H Anika Hood DO   10 mL at 05/14/25 2128    sodium chloride 0.9 %  flush 10 mL  10 mL Intravenous PRN Anika Hood DO        sodium chloride 0.9 % infusion 40 mL  40 mL Intravenous PRN Anika Hood DO        traZODone (DESYREL) tablet 50 mg  50 mg Oral Nightly Anika Hood DO   50 mg at 05/14/25 2128    [COMPLETED] vancomycin IVPB 2000 mg in 0.9% Sodium Chloride 500 mL  20 mg/kg Intravenous Once Timothy Floyd  mL/hr at 05/14/25 1909 2,000 mg at 05/14/25 1909    vitamin B-12 (CYANOCOBALAMIN) tablet 500 mcg  500 mcg Oral Daily Anika Hood DO        zinc sulfate (ZINCATE) capsule 220 mg  220 mg Oral Daily Anika Hood DO         Orders (all)        Start     Ordered    05/16/25 2359  Auto Discontinue GI Panel in 48 Hours if not Collected  ONCE GI PANEL         05/14/25 2358    05/15/25 1231  Phosphorus  Timed         05/15/25 0430    05/15/25 0900  enoxaparin sodium (LOVENOX) syringe 40 mg  Daily         05/14/25 2109    05/15/25 0900  ascorbic acid (VITAMIN C) tablet 500 mg  Daily         05/15/25 0535    05/15/25 0900  aspirin EC tablet 81 mg  Daily         05/15/25 0535    05/15/25 0900  cholecalciferol (VITAMIN D3) tablet 1,000 Units  Daily         05/15/25 0535    05/15/25 0900  isosorbide mononitrate (IMDUR) 24 hr tablet 30 mg  Daily         05/15/25 0535    05/15/25 0900  magnesium oxide (MAG-OX) tablet 400 mg  Daily         05/15/25 0535    05/15/25 0900  vitamin B-12 (CYANOCOBALAMIN) tablet 500 mcg  Daily         05/15/25 0535    05/15/25 0900  zinc sulfate (ZINCATE) capsule 220 mg  Daily         05/15/25 0535    05/15/25 0800  methocarbamol (ROBAXIN) tablet 750 mg  4 Times Daily         05/15/25 0535    05/15/25 0645  aztreonam (AZACTAM) 2,000 mg in sodium chloride 0.9 % 100 mL IVPB-VTB  Every 8 Hours         05/14/25 2109    05/15/25 0600  CBC & Differential  Morning Draw         05/14/25 2109    05/15/25 0600  Comprehensive Metabolic Panel  Morning Draw         05/14/25 2109    05/15/25 0600  CBC Auto Differential   PROCEDURE ONCE         05/14/25 2202    05/15/25 0530  potassium & sodium phosphates (PHOS-NAK) 280-160-250 MG packet 2 packet  Once         05/15/25 0430    05/15/25 0500  ECG 12 Lead Electrolyte Imbalance  Tomorrow AM         05/14/25 2222    05/15/25 0334  Fentanyl, Urine - Urine, Clean Catch  Once         05/15/25 0333    05/15/25 0301  Phosphorus  Timed         05/14/25 1901    05/15/25 0019  ECG 12 Lead Tachycardia  STAT         05/15/25 0019    05/15/25 0000  Vital Signs  Every 4 Hours      Comments: Per per hospital policy    05/14/25 2109 05/14/25 2359  Gastrointestinal Panel, PCR - Stool, Per Rectum  Once         05/14/25 2358 05/14/25 2221  Magnesium  Once         05/14/25 2220 05/14/25 2221  Hemoglobin A1c  Once         05/14/25 2220 05/14/25 2218  guaiFENesin (MUCINEX) 12 hr tablet 1,200 mg  2 Times Daily PRN         05/14/25 2219 05/14/25 2218  MRSA Screen, PCR (Inpatient) - Swab, Nares  Once         05/14/25 2217 05/14/25 2218  S. Pneumo Ag Urine or CSF - Urine, Urine, Clean Catch  Once         05/14/25 2217 05/14/25 2200  sodium chloride 0.9 % flush 10 mL  Every 12 Hours Scheduled         05/14/25 2109 05/14/25 2200  Incentive Spirometry  Every 4 Hours While Awake       05/14/25 2109 05/14/25 2200  azithromycin (ZITHROMAX) 500 mg in sodium chloride 0.9 % 250 mL IVPB-VTB  Every 24 Hours         05/14/25 2109 05/14/25 2200  lactated ringers infusion  Continuous         05/14/25 2109 05/14/25 2200  nicotine (NICODERM CQ) 21 MG/24HR patch 1 patch  Every 24 Hours Scheduled         05/14/25 2109 05/14/25 2200  methylPREDNISolone sodium succinate (SOLU-Medrol) 40 mg in sterile water (preservative free) 1 mL  Every 12 Hours         05/14/25 2109 05/14/25 2200  gabapentin (NEURONTIN) capsule 800 mg  Every 8 Hours Scheduled         05/14/25 2109 05/14/25 2200  traZODone (DESYREL) tablet 50 mg  Nightly         05/14/25 2109 05/14/25 2119  Adult Transthoracic Echo  Complete W/ Cont if Necessary Per Protocol  Once         05/14/25 2118 05/14/25 2115  Procalcitonin  STAT         05/14/25 2114 05/14/25 2110  Notify Physician (with Parameters)  Until Discontinued         05/14/25 2109 05/14/25 2110  Intake & Output  Every Shift       05/14/25 2109 05/14/25 2110  Weigh patient  Once         05/14/25 2109 05/14/25 2110  Urine Drug Screen - Urine, Clean Catch  STAT         05/14/25 2109 05/14/25 2110  Insert Peripheral IV  Once         05/14/25 2109 05/14/25 2110  Saline Lock & Maintain IV Access  Continuous,   Status:  Canceled         05/14/25 2109 05/14/25 2110  Nursing Dysphagia Screening (Complete Prior to Giving Anything By Mouth)  Once        Comments: Use Dysphagia Screen for Pneumonia    05/14/25 2109 05/14/25 2110  RN to Place Order SLP Consult - Eval & Treat Choosing Reason of RN Dysphagia Screen Failed  Per Order Details        Comments: RN to Place Order SLP Consult - Eval & Treat Choosing Reason of RN Dysphagia Screen Failed    05/14/25 2109 05/14/25 2110  Nurse to Call MD or Nutrition Services for Diet if Patient Passes Dysphagia Screen  Once         05/14/25 2109 05/14/25 2110  Pneumonia Finding Seen on CXR  Once         05/14/25 2109 05/14/25 2110  Continuous Cardiac Monitoring  Continuous        Comments: Follow Standing Orders As Outlined in Process Instructions (Open Order Report to View Full Instructions)    05/14/25 2109 05/14/25 2110  Maintain IV Access  Continuous         05/14/25 2109 05/14/25 2110  Telemetry - Place Orders & Notify Provider of Results When Patient Experiences Acute Chest Pain, Dysrhythmia or Respiratory Distress  Continuous        Comments: Open Order Report to View Parameters Requiring Provider Notification    05/14/25 2109 05/14/25 2110  May Be Off Telemetry for Tests  Continuous         05/14/25 2109 05/14/25 2110  Diet: Regular/House; Fluid Consistency: Thin (IDDSI 0)  Diet Effective Now     "     05/14/25 2109 05/14/25 2110  Respiratory Culture - Sputum, Cough  Once         05/14/25 2109 05/14/25 2109  sodium chloride 0.9 % flush 10 mL  As Needed         05/14/25 2109 05/14/25 2109  sodium chloride 0.9 % infusion 40 mL  As Needed         05/14/25 2109 05/14/25 2109  nitroglycerin (NITROSTAT) SL tablet 0.4 mg  Every 5 Minutes PRN         05/14/25 2109 05/14/25 2109  sennosides-docusate (PERICOLACE) 8.6-50 MG per tablet 2 tablet  2 Times Daily PRN        Placed in \"And\" Linked Group    05/14/25 2109 05/14/25 2109  polyethylene glycol (MIRALAX) packet 17 g  Daily PRN        Placed in \"And\" Linked Group    05/14/25 2109 05/14/25 2109  bisacodyl (DULCOLAX) EC tablet 5 mg  Daily PRN        Placed in \"And\" Linked Group    05/14/25 2109 05/14/25 2109  bisacodyl (DULCOLAX) suppository 10 mg  Daily PRN        Placed in \"And\" Linked Group    05/14/25 2109 05/14/25 2109  acetaminophen (TYLENOL) tablet 1,000 mg  Every 8 Hours PRN         05/14/25 2109 05/14/25 2109  Potassium Replacement - Follow Nurse / BPA Driven Protocol  As Needed         05/14/25 2109 05/14/25 2109  Magnesium Standard Dose Replacement - Follow Nurse / BPA Driven Protocol  As Needed         05/14/25 2109 05/14/25 2109  Phosphorus Replacement - Follow Nurse / BPA Driven Protocol  As Needed         05/14/25 2109 05/14/25 2109  Calcium Replacement - Follow Nurse / BPA Driven Protocol  As Needed         05/14/25 2109 05/14/25 2109  ipratropium-albuterol (DUO-NEB) nebulizer solution 3 mL  Every 6 Hours PRN         05/14/25 2109 05/14/25 2109  nicotine polacrilex (NICORETTE) gum 4 mg  Every 1 Hour PRN         05/14/25 2109 05/14/25 2109  benzonatate (TESSALON) capsule 200 mg  3 Times Daily PRN         05/14/25 2109 05/14/25 2109  guaiFENesin-dextromethorphan (ROBITUSSIN DM) 100-10 MG/5ML syrup 10 mL  Every 4 Hours PRN         05/14/25 2109 05/14/25 1950  Code Status and Medical Interventions: CPR " (Attempt to Resuscitate); Full Support  Continuous         05/14/25 1953 05/14/25 1949  Inpatient Admission  Once         05/14/25 1953 05/14/25 1920  Urinalysis With Culture If Indicated - Urine, Clean Catch  STAT         05/14/25 1919 05/14/25 1915  STAT Lactic Acid, Reflex  PROCEDURE ONCE         05/14/25 1707 05/14/25 1915  potassium chloride (KLOR-CON M20) CR tablet 40 mEq  Every 2 Hours         05/14/25 1901 05/14/25 1915  potassium chloride 10 mEq in 100 mL IVPB  Every 1 Hour         05/14/25 1901 05/14/25 1915  potassium & sodium phosphates (PHOS-NAK) 280-160-250 MG packet 2 packet  Once         05/14/25 1901 05/14/25 1823  lactated ringers bolus 1,000 mL  Once         05/14/25 1807    05/14/25 1744  aztreonam (AZACTAM) 2 g in sodium chloride 0.9 % 100 mL IVPB-VTB  Once         05/14/25 1728    05/14/25 1744  vancomycin IVPB 2000 mg in 0.9% Sodium Chloride 500 mL  Once         05/14/25 1728    05/14/25 1728  Calcium Replacement - Follow Nurse / BPA Driven Protocol  As Needed,   Status:  Discontinued         05/14/25 1728    05/14/25 1728  Potassium Replacement - Follow Nurse / BPA Driven Protocol  As Needed,   Status:  Discontinued         05/14/25 1728    05/14/25 1728  Magnesium Standard Dose Replacement - Follow Nurse / BPA Driven Protocol  As Needed,   Status:  Discontinued         05/14/25 1728    05/14/25 1728  Phosphorus Replacement - Follow Nurse / BPA Driven Protocol  As Needed,   Status:  Discontinued         05/14/25 1728    05/14/25 1715  High Sensitivity Troponin T 1Hr  PROCEDURE ONCE         05/14/25 1707    05/14/25 1648  Blood Gas, Arterial With Co-Ox  PROCEDURE ONCE         05/14/25 1646    05/14/25 1623  CT Head Without Contrast  1 Time Imaging         05/14/25 1622    05/14/25 1623  Respiratory Panel PCR w/COVID-19(SARS-CoV-2) MULU/HOWARD/ZAK/PAD/COR/KRZYSZTOF In-House, NP Swab in UTM/VTM, 2 HR TAT - Swab, Nasopharynx  STAT         05/14/25 1622    05/14/25 1622  Undress & Gown   Once         05/14/25 1622    05/14/25 1622  Cardiac Monitoring  Continuous,   Status:  Canceled        Comments: Follow Standing Orders As Outlined in Process Instructions (Open Order Report to View Full Instructions)    05/14/25 1622    05/14/25 1622  Continuous Pulse Oximetry  Continuous         05/14/25 1622    05/14/25 1622  Measure Blood Pressure  Every 30 Minutes        Comments: Increase Frequency As Patient Condition Dictates and/or With Use of Vasoactive Treatments    05/14/25 1622    05/14/25 1622  Vital Signs  Every 30 Minutes        Comments: Increase Frequency As Patient Condition Dictates    05/14/25 1622    05/14/25 1622  ECG 12 Lead Other; Sepsis Evaluation  Once         05/14/25 1622    05/14/25 1622  Insert Large Bore Peripheral IV  Once         05/14/25 1622    05/14/25 1622  Insert 2nd Large Bore Peripheral IV  Once         05/14/25 1622    05/14/25 1622  [place] Draw  Once,   Status:  Canceled         05/14/25 1622    05/14/25 1622  Protime-INR  Once         05/14/25 1622    05/14/25 1622  aPTT  Once         05/14/25 1622    05/14/25 1622  Magnesium  Once         05/14/25 1622    05/14/25 1622  Phosphorus  Once         05/14/25 1622    05/14/25 1622  Calcium, Ionized  Once         05/14/25 1622    05/14/25 1622  Lactic Acid, Plasma  Once         05/14/25 1622    05/14/25 1622  C-reactive Protein  Once         05/14/25 1622    05/14/25 1622  Blood Culture - Blood, Arm, Left  STAT         05/14/25 1622    05/14/25 1622  Blood Culture - Blood, Arm, Right  STAT         05/14/25 1622    05/14/25 1622  Blood Gas, Arterial -With Co-Ox Panel: Yes  Once         05/14/25 1622    05/14/25 1622  Urinalysis With Microscopic If Indicated (No Culture) - Urine, Clean Catch  Once         05/14/25 1622    05/14/25 1622  Green Top (Gel)  PROCEDURE ONCE,   Status:  Canceled         05/14/25 1622    05/14/25 1622  Lavender Top  PROCEDURE ONCE,   Status:  Canceled         05/14/25 1622    05/14/25 1622  Gold Top -  SST  PROCEDURE ONCE,   Status:  Canceled         05/14/25 1622    05/14/25 1622  Light Blue Top  PROCEDURE ONCE,   Status:  Canceled         05/14/25 1622    05/14/25 1621  sodium chloride 0.9 % flush 10 mL  As Needed         05/14/25 1622    05/14/25 1620  aspirin chewable tablet 324 mg  Once         05/14/25 1604    05/14/25 1605  Cardiac Monitoring  Continuous,   Status:  Canceled        Comments: Follow Standing Orders As Outlined in Process Instructions (Open Order Report to View Full Instructions)    05/14/25 1604    05/14/25 1605  Continuous Pulse Oximetry, Add Qxygen if SaO2 is Below 90%  Continuous,   Status:  Canceled        Comments: Add Oxygen if SaO2 is Below 90%.    05/14/25 1604    05/14/25 1605  Vital Signs  Per Hospital Policy/Protocol         05/14/25 1604    05/14/25 1605  ECG 12 Lead Chest Pain  Now Then Every 1 Hour,   Status:  Canceled       05/14/25 1604    05/14/25 1605  XR Chest 1 View  1 Time Imaging         05/14/25 1604    05/14/25 1605  Insert Peripheral IV  Once         05/14/25 1604    05/14/25 1605  [place] Draw  Once         05/14/25 1604    05/14/25 1605  CBC & Differential  Once         05/14/25 1604    05/14/25 1605  Comprehensive Metabolic Panel  Once         05/14/25 1604    05/14/25 1605  High Sensitivity Troponin T  Once         05/14/25 1604    05/14/25 1605  Green Top (Gel)  PROCEDURE ONCE         05/14/25 1604    05/14/25 1605  Lavender Top  PROCEDURE ONCE         05/14/25 1604    05/14/25 1605  Gold Top - SST  PROCEDURE ONCE         05/14/25 1604    05/14/25 1605  Light Blue Top  PROCEDURE ONCE         05/14/25 1604    05/14/25 1605  CBC Auto Differential  PROCEDURE ONCE         05/14/25 1604    05/14/25 1604  sodium chloride 0.9 % flush 10 mL  As Needed         05/14/25 1604    05/14/25 0000  Telemetry Scan  Once         05/14/25 0000    Unscheduled  Oxygen Therapy- Nasal Cannula; Titrate 1-6 LPM Per SpO2; 90 - 95%  Continuous PRN       05/14/25 1604    Unscheduled  ECG  12 Lead Chest Pain  As Needed      Comments: Q15 minutes x 4 for first hour after persistent or recurrent chest pain    05/14/25 1604    Unscheduled  ECG 12 Lead Chest Pain  As Needed      Comments: Persistent, unrelieved or recurrent chest pain after the first hour of treatment    05/14/25 1604    Unscheduled  Oxygen Therapy- Nasal Cannula; Titrate 1-6 LPM Per SpO2; 90 - 95%  Continuous PRN       05/14/25 1622    Unscheduled  Blood Gas, Arterial -With Co-Ox Panel: Yes  As Needed      Comments: Respiratory Distress      05/14/25 2109    Unscheduled  Up With Assistance  As Needed       05/14/25 2109    --  gabapentin (NEURONTIN) 800 MG tablet  3 Times Daily         05/15/25 0515    --  hydroCHLOROthiazide 25 MG tablet  Daily         05/15/25 0515    --  isosorbide mononitrate (IMDUR) 30 MG 24 hr tablet  Daily         05/15/25 0515    --  traZODone (DESYREL) 50 MG tablet  Nightly         05/15/25 0515    --  ascorbic acid (VITAMIN C) 500 MG tablet  Daily         05/15/25 0515    --  magnesium oxide (MAG-OX) 400 MG tablet  Daily         05/15/25 0515    --  zinc sulfate (ZINCATE) 220 (50 Zn) MG capsule  Daily         05/15/25 0515    --  vitamin B-12 (CYANOCOBALAMIN) 500 MCG tablet  Daily         05/15/25 0515    --  Cholecalciferol 25 MCG (1000 UT) tablet  Daily         05/15/25 0515    --  aspirin 81 MG EC tablet  Daily         05/15/25 0515    --  methocarbamol (ROBAXIN) 750 MG tablet  4 Times Daily         05/15/25 0515                  Physician Progress Notes (all)    No notes of this type exist for this encounter.       Consult Notes (all)    No notes of this type exist for this encounter.

## 2025-05-15 NOTE — PLAN OF CARE
Goal Outcome Evaluation:  Pt resting in bed with no s/s of distress noted. VSS. IV access maintained. Call light and belongings in reach. Plan of care ongoing.

## 2025-05-15 NOTE — PLAN OF CARE
Goal Outcome Evaluation: Patient has been very pleasant today. Compliant with all medications and care as ordered. He remains on RA, saturations maintaining well above 90%. He has been setup for each meal, fair intake noted. Family at bedside, updated on plan of care. IV Abx currently infusing. Replacing Phos per protocol. Urinal at bedside, good UO noted. He is currently receiving a bath. Will continue with plan of care.

## 2025-05-15 NOTE — H&P
[facility]   HISTORY AND PHYSICAL    Patient Name: Gurwinder Harding  : 1960  MRN: [id number]  Primary Care Physician:  Pastora Person APRN  Date of admission: 2025    Subjective   Subjective     Chief Complaint: Weakness, Cough    History of Present Illness the patient is a 64-year-old male with past medical history significant for tobacco use, peripheral vascular disease status post arthrectomy followed by balloon angioplasty and stenting of the right and left SFA, peripheral neuropathy, hypertension, CVA due to an occluded left carotid artery, alcohol use and hypertension who presents to the emergency department complaining of worsening weakness and cough.    Patient seen and examined in . Wife at bedside.  Wife states that the patient has had a cough for approximately 2 weeks.  Patient states that it is mostly nonproductive.  He denies fever and/or chills but does report profound generalized weakness and fatigue.  Patient denies any nausea, vomiting and/or abdominal pain.  He does not report any recent diarrhea.    Patient denies any dysuria but does report some occasional urge incontinence and weak stream.    Patient states that he currently smokes approximately 1 pack of cigarettes per day.  He states that in the past 2 or 3 weeks he has only had 1 beer and has significantly cut back on his overall alcohol intake.  Patient and wife deny any known sick contacts.  Patient denies any recent hospitalizations.    Review of Systems   Constitutional:  Positive for fatigue. Negative for chills, diaphoresis and fever.   HENT:  Negative for hearing loss and trouble swallowing (occasional; no specific foods/liquids).    Eyes:  Negative for discharge and visual disturbance.   Respiratory:  Positive for cough and shortness of breath. Negative for wheezing.    Cardiovascular:  Negative for chest pain, palpitations and leg swelling.   Gastrointestinal:  Negative for abdominal pain, constipation, diarrhea,  nausea and vomiting.   Endocrine: Negative for polydipsia, polyphagia and polyuria.   Genitourinary:  Positive for decreased urine volume and difficulty urinating. Negative for dysuria, frequency and hematuria.   Musculoskeletal:  Negative for gait problem and myalgias.   Skin:  Negative for rash.   Neurological:  Positive for weakness. Negative for dizziness, tremors, seizures, syncope and light-headedness.   Hematological:  Does not bruise/bleed easily.   Psychiatric/Behavioral:  Negative for agitation and confusion.         Personal History     Past Medical History:   Diagnosis Date    Alcohol abuse     Arthritis     Carotid stenosis     Cerebrovascular accident (CVA) due to occlusion of left carotid artery 07/15/2021    ED (erectile dysfunction)     History of degenerative disc disease     Hydrocele in adult 12/29/2020    Hypertension     MRSA (methicillin resistant staph aureus) culture positive     patient states diagnosed approx 2 wks ago    Neuropathy     Peripheral vascular disease     s/p arthrectomy follow by balloon angioplasty and stents of right and left SFA    Tobacco abuse     Vitamin D deficiency        Past Surgical History:   Procedure Laterality Date    APPENDECTOMY      ARTERIOGRAM N/A 9/19/2019    Procedure: Arteriogram;  Surgeon: Tong Azar MD;  Location: [facility] CATH INVASIVE LOCATION;  Service: Cardiology    BACK SURGERY      DISC RUPTURE REPAIR, L5    CARDIAC CATHETERIZATION Right 10/29/2019    Procedure: Peripheral angiography;  Surgeon: Tong Azar MD;  Location: [facility] CATH INVASIVE LOCATION;  Service: Cardiovascular    ENDOSCOPY N/A 2/18/2022    Procedure: ESOPHAGOGASTRODUODENOSCOPY;  Surgeon: Christine Duran MD;  Location: [facility] OR;  Service: General;  Laterality: N/A;    VASCULAR SURGERY Bilateral        Family History: family history includes Cancer in his father; Diabetes in his father; Heart attack in an other family member; Hypertension in his father and mother.      Social History:  reports that he has been smoking cigarettes. He has a 30 pack-year smoking history. He has never used smokeless tobacco. He reports current alcohol use. He reports that he does not use drugs.    Home Medications:  Ascorbic Acid, Magnesium, Potassium, Vitamin A, Vitamin D, Zinc Acetate, atenolol, brimonidine-timolol, cilostazol, folic acid, folic acid-pyridoxine-cyanocobalamin, gabapentin, hydroCHLOROthiazide, isosorbide mononitrate, methocarbamol, traZODone, and vitamin B-12    Allergies:  Allergies   Allergen Reactions    Penicillins Hives and Shortness Of Breath     As A child    Novocain [Procaine] Nausea And Vomiting       Objective    Objective     Vitals:   Temp:  [96.5 °F (35.8 °C)-97.1 °F (36.2 °C)] 96.5 °F (35.8 °C)  Heart Rate:  [] 110  Resp:  [20] 20  BP: ()/(53-98) 129/98    Physical Exam  Constitutional:       General: He is not in acute distress.     Appearance: He is well-developed. He is ill-appearing.   HENT:      Head: Normocephalic and atraumatic.   Eyes:      Conjunctiva/sclera: Conjunctivae normal.   Neck:      Trachea: No tracheal deviation.   Cardiovascular:      Rate and Rhythm: Normal rate and regular rhythm.      Pulses:           Dorsalis pedis pulses are 1+ on the right side and 1+ on the left side.      Heart sounds: No murmur heard.     No friction rub. No gallop.      Comments: Runs of irregular rate/rhythm noted - afib  Pulmonary:      Effort: No tachypnea or respiratory distress.      Breath sounds: Rhonchi present. No wheezing or rales.      Comments: Coarse B/L breath sounds. Frequent non-productive cough.  Abdominal:      General: Bowel sounds are normal. There is no distension.      Palpations: Abdomen is soft.      Tenderness: There is no abdominal tenderness. There is no guarding.   Musculoskeletal:      Right lower leg: No edema.      Left lower leg: No edema.   Skin:     General: Skin is warm and dry.      Findings: No erythema or rash.       Comments: Various chronic appearing excoriations, scars noted   Neurological:      Mental Status: He is alert and oriented to person, place, and time.      Cranial Nerves: No cranial nerve deficit.         Result Review    Result Review:  I have personally reviewed the results from the time of this admission to 5/14/2025 20:20 EDT and agree with these findings:  [x]  Laboratory list / accordion  []  Microbiology  [x]  Radiology  [x]  EKG/Telemetry   []  Cardiology/Vascular   []  Pathology  []  Old records  []  Other:  Most notable findings include:     EKG per my view with sinus tachycardia, occasional PVCs, QTc 485 ms    CARDIAC LABS:      Lab 05/14/25  1739 05/14/25  1615   HSTROP T 53* 61*   PROTIME  --  14.4   INR  --  1.11*       Results from last 7 days   Lab Units 05/14/25  1615   WBC 10*3/mm3 18.38*   HEMOGLOBIN g/dL 12.3*   HEMATOCRIT % 38.6   PLATELETS 10*3/mm3 482*     Results from last 7 days   Lab Units 05/14/25  1615   SODIUM mmol/L 135*   POTASSIUM mmol/L 2.7*   CHLORIDE mmol/L 97*   CO2 mmol/L 26.8   BUN mg/dL 23   CREATININE mg/dL 1.24   CALCIUM mg/dL 11.1*   BILIRUBIN mg/dL 0.4   ALK PHOS U/L 134*   ALT (SGPT) U/L 7   AST (SGOT) U/L 14   GLUCOSE mg/dL 178*         CXR (images reviewed; RUL infiltrate):  FINDINGS:   Mild cardiomegaly. Pulmonary vasculature appear within normal limits.  Right upper lobe infiltrate. No pleural effusion or pneumothorax. No  acute osseous abnormality evident.     IMPRESSION:  Findings concerning for right upper lobe pneumonia. Follow-up  recommended to ensure resolution.    Assessment & Plan   Assessment / Plan     #Sepsis (severe per CMS with lactate >2) due to RUL pneumonia treating for gram +/atypicals associated with NSTEMI, probably type II  #Acute hypokalemia  #Acute hypercalcemia  #Acute hypophosphatemia  #Severe hypoalbuminemia  #Hyperglycemia without known history of diabetes mellitus  #Thrombocytosis, likely reactive  #Brief run of atrial fibrillation with  rapid ventricular response in ED; no known history  #Generalized weakness, multifactorial and due to above  - Patient will be admitted to the telemetry unit for further evaluation management  - I have requested a respiratory culture, testing for strep pneumo and MRSA  - Patient's respiratory PCR panel is negative in the ED  - Patient's lactate level upon admission was 3.5 with a reflex of 1.6  - Check pro-calcitonin level  - Admission WBC 18.38  - Patient has been started on intravenous antibiotic therapy with azithromycin and Azactam for now; adjust based on culture data results  - Repeat CBC in a.m. and monitor temperature curve  - I suspect that the patient may have some underlying chronic obstructive pulmonary disease given his longstanding history of tobacco use; given patient's respiratory exam, I have started the patient on IV Solu-Medrol 40 mg every 12 hours with nebulized inhalants as needed  - Patient will have other symptomatic care in terms of cough suppressants or expectorants  - Patient is chest pain free  - Suspect type II NSTEMI in the setting of sepsis and electrolyte abnormalities  - Will obtain an echocardiogram to evaluate EF and for any wall motion abnormalities  - May consider ischemic evaluation once patient's respiratory status has improved  - Patient has been started on the electrolyte replacement protocol  - Will obtain a magnesium level and supplement if found to be deficient  - I am currently awaiting the patient's official medication reconciliation from pharmacy that it appears that he has previously been on HCTZ and we will plan to hold that now in the setting of hypokalemia and hypotension upon admission  - Will continue IV fluid hydration with LR @ 100 ml/hr for now  - Check A1c  - Monitor a.m. glucose level in setting of steroids; may need some low dose PRN sliding scale  - Monitor on telemetry for further episodes of atrial fibrillation  - Repeat EKG in a.m.   - Repeat  chemistry  panel in a.m.     Chronic medical conditions:  - Peripheral vascular disease s/p stenting  - Carotid stenosis  - Cerebrovascular accident due to occluded L carotid artery  - Tobacco use  - Occasional alcohol use  - Hypertension  - Neuropathy      VTE Prophylaxis:  Lovenox    CODE STATUS:    Code Status (Patient has no pulse and is not breathing): CPR (Attempt to Resuscitate)  Medical Interventions (Patient has pulse or is breathing): Full Support    Admission Status:  I believe this patient meets inpatient status.    Case d/w patient and wife at bedside    Anika Dax Hood, DO

## 2025-05-16 LAB
QT INTERVAL: 310 MS
QT INTERVAL: 310 MS
QT INTERVAL: 332 MS
QT INTERVAL: 342 MS
QTC INTERVAL: 445 MS
QTC INTERVAL: 458 MS
QTC INTERVAL: 465 MS
QTC INTERVAL: 469 MS
UFH PPP CHRO-ACNC: 0.14 IU/ML (ref 0.3–0.7)

## 2025-05-16 PROCEDURE — 25010000002 METHYLPREDNISOLONE PER 40 MG: Performed by: STUDENT IN AN ORGANIZED HEALTH CARE EDUCATION/TRAINING PROGRAM

## 2025-05-16 PROCEDURE — 94640 AIRWAY INHALATION TREATMENT: CPT

## 2025-05-16 PROCEDURE — 94761 N-INVAS EAR/PLS OXIMETRY MLT: CPT

## 2025-05-16 PROCEDURE — 94799 UNLISTED PULMONARY SVC/PX: CPT

## 2025-05-16 PROCEDURE — 99232 SBSQ HOSP IP/OBS MODERATE 35: CPT | Performed by: STUDENT IN AN ORGANIZED HEALTH CARE EDUCATION/TRAINING PROGRAM

## 2025-05-16 PROCEDURE — 25010000002 AZITHROMYCIN PER 500 MG: Performed by: INTERNAL MEDICINE

## 2025-05-16 PROCEDURE — 85520 HEPARIN ASSAY: CPT | Performed by: STUDENT IN AN ORGANIZED HEALTH CARE EDUCATION/TRAINING PROGRAM

## 2025-05-16 PROCEDURE — 25010000002 HEPARIN (PORCINE) PER 1000 UNITS: Performed by: NURSE PRACTITIONER

## 2025-05-16 PROCEDURE — 25010000002 AZTREONAM PER 500 MG: Performed by: INTERNAL MEDICINE

## 2025-05-16 PROCEDURE — 93005 ELECTROCARDIOGRAM TRACING: CPT | Performed by: NURSE PRACTITIONER

## 2025-05-16 PROCEDURE — 99232 SBSQ HOSP IP/OBS MODERATE 35: CPT | Performed by: SPECIALIST

## 2025-05-16 PROCEDURE — 25010000002 HEPARIN (PORCINE) PER 1000 UNITS: Performed by: STUDENT IN AN ORGANIZED HEALTH CARE EDUCATION/TRAINING PROGRAM

## 2025-05-16 PROCEDURE — 25010000002 ONDANSETRON PER 1 MG: Performed by: STUDENT IN AN ORGANIZED HEALTH CARE EDUCATION/TRAINING PROGRAM

## 2025-05-16 PROCEDURE — 93005 ELECTROCARDIOGRAM TRACING: CPT | Performed by: STUDENT IN AN ORGANIZED HEALTH CARE EDUCATION/TRAINING PROGRAM

## 2025-05-16 PROCEDURE — 25810000003 SODIUM CHLORIDE 0.9 % SOLUTION 250 ML FLEX CONT: Performed by: INTERNAL MEDICINE

## 2025-05-16 RX ORDER — IPRATROPIUM BROMIDE AND ALBUTEROL SULFATE 2.5; .5 MG/3ML; MG/3ML
3 SOLUTION RESPIRATORY (INHALATION)
Status: DISCONTINUED | OUTPATIENT
Start: 2025-05-16 | End: 2025-05-16

## 2025-05-16 RX ORDER — ROSUVASTATIN CALCIUM 10 MG/1
10 TABLET, COATED ORAL NIGHTLY
Status: DISCONTINUED | OUTPATIENT
Start: 2025-05-16 | End: 2025-05-18 | Stop reason: HOSPADM

## 2025-05-16 RX ORDER — ENOXAPARIN SODIUM 100 MG/ML
40 INJECTION SUBCUTANEOUS EVERY 24 HOURS
Status: DISCONTINUED | OUTPATIENT
Start: 2025-05-16 | End: 2025-05-16

## 2025-05-16 RX ORDER — HEPARIN SODIUM 5000 [USP'U]/ML
25 INJECTION, SOLUTION INTRAVENOUS; SUBCUTANEOUS ONCE
Status: COMPLETED | OUTPATIENT
Start: 2025-05-16 | End: 2025-05-16

## 2025-05-16 RX ORDER — ONDANSETRON 2 MG/ML
4 INJECTION INTRAMUSCULAR; INTRAVENOUS EVERY 6 HOURS PRN
Status: DISCONTINUED | OUTPATIENT
Start: 2025-05-16 | End: 2025-05-18 | Stop reason: HOSPADM

## 2025-05-16 RX ORDER — METOPROLOL TARTRATE 25 MG/1
25 TABLET, FILM COATED ORAL EVERY 12 HOURS SCHEDULED
Status: DISCONTINUED | OUTPATIENT
Start: 2025-05-16 | End: 2025-05-18 | Stop reason: HOSPADM

## 2025-05-16 RX ORDER — HEPARIN SODIUM 5000 [USP'U]/ML
5000 INJECTION, SOLUTION INTRAVENOUS; SUBCUTANEOUS EVERY 8 HOURS SCHEDULED
Status: CANCELLED | OUTPATIENT
Start: 2025-05-16

## 2025-05-16 RX ADMIN — ROSUVASTATIN 10 MG: 10 TABLET, FILM COATED ORAL at 21:31

## 2025-05-16 RX ADMIN — APIXABAN 5 MG: 5 TABLET, FILM COATED ORAL at 21:38

## 2025-05-16 RX ADMIN — NICOTINE 1 PATCH: 21 PATCH TRANSDERMAL at 09:15

## 2025-05-16 RX ADMIN — METHYLPREDNISOLONE SODIUM SUCCINATE 40 MG: 40 INJECTION, POWDER, LYOPHILIZED, FOR SOLUTION INTRAMUSCULAR; INTRAVENOUS at 09:16

## 2025-05-16 RX ADMIN — BENZONATATE 200 MG: 100 CAPSULE ORAL at 21:45

## 2025-05-16 RX ADMIN — Medication 10 ML: at 09:20

## 2025-05-16 RX ADMIN — Medication 400 MG: at 09:17

## 2025-05-16 RX ADMIN — GABAPENTIN 800 MG: 400 CAPSULE ORAL at 13:27

## 2025-05-16 RX ADMIN — METOPROLOL TARTRATE 25 MG: 25 TABLET, FILM COATED ORAL at 12:23

## 2025-05-16 RX ADMIN — AZITHROMYCIN MONOHYDRATE 500 MG: 500 INJECTION, POWDER, LYOPHILIZED, FOR SOLUTION INTRAVENOUS at 21:31

## 2025-05-16 RX ADMIN — Medication 500 MCG: at 09:17

## 2025-05-16 RX ADMIN — APIXABAN 5 MG: 5 TABLET, FILM COATED ORAL at 14:05

## 2025-05-16 RX ADMIN — METHOCARBAMOL 750 MG: 500 TABLET ORAL at 09:17

## 2025-05-16 RX ADMIN — ISOSORBIDE MONONITRATE 30 MG: 30 TABLET, EXTENDED RELEASE ORAL at 09:17

## 2025-05-16 RX ADMIN — Medication 1000 UNITS: at 09:17

## 2025-05-16 RX ADMIN — AZTREONAM 2000 MG: 2 INJECTION, POWDER, LYOPHILIZED, FOR SOLUTION INTRAMUSCULAR; INTRAVENOUS at 13:30

## 2025-05-16 RX ADMIN — IPRATROPIUM BROMIDE 0.5 MG: 0.5 SOLUTION RESPIRATORY (INHALATION) at 12:38

## 2025-05-16 RX ADMIN — OXYCODONE HYDROCHLORIDE AND ACETAMINOPHEN 500 MG: 500 TABLET ORAL at 09:17

## 2025-05-16 RX ADMIN — BENZONATATE 200 MG: 100 CAPSULE ORAL at 09:17

## 2025-05-16 RX ADMIN — AZTREONAM 2000 MG: 2 INJECTION, POWDER, LYOPHILIZED, FOR SOLUTION INTRAMUSCULAR; INTRAVENOUS at 22:43

## 2025-05-16 RX ADMIN — POTASSIUM & SODIUM PHOSPHATES POWDER PACK 280-160-250 MG 2 PACKET: 280-160-250 PACK at 00:21

## 2025-05-16 RX ADMIN — METHOCARBAMOL 750 MG: 500 TABLET ORAL at 11:17

## 2025-05-16 RX ADMIN — METHOCARBAMOL 750 MG: 500 TABLET ORAL at 17:30

## 2025-05-16 RX ADMIN — METOPROLOL TARTRATE 25 MG: 25 TABLET, FILM COATED ORAL at 21:31

## 2025-05-16 RX ADMIN — ONDANSETRON 4 MG: 2 INJECTION INTRAMUSCULAR; INTRAVENOUS at 11:17

## 2025-05-16 RX ADMIN — GABAPENTIN 800 MG: 400 CAPSULE ORAL at 21:31

## 2025-05-16 RX ADMIN — Medication 10 ML: at 21:31

## 2025-05-16 RX ADMIN — ZINC SULFATE 220 MG (50 MG) CAPSULE 220 MG: CAPSULE at 09:17

## 2025-05-16 RX ADMIN — AZTREONAM 2000 MG: 2 INJECTION, POWDER, LYOPHILIZED, FOR SOLUTION INTRAMUSCULAR; INTRAVENOUS at 06:16

## 2025-05-16 RX ADMIN — TRAZODONE HYDROCHLORIDE 50 MG: 50 TABLET ORAL at 21:31

## 2025-05-16 RX ADMIN — METHOCARBAMOL 750 MG: 500 TABLET ORAL at 21:31

## 2025-05-16 RX ADMIN — GABAPENTIN 800 MG: 400 CAPSULE ORAL at 06:09

## 2025-05-16 RX ADMIN — HEPARIN SODIUM 2000 UNITS: 5000 INJECTION INTRAVENOUS; SUBCUTANEOUS at 09:16

## 2025-05-16 RX ADMIN — HEPARIN SODIUM 2000 UNITS: 5000 INJECTION INTRAVENOUS; SUBCUTANEOUS at 00:21

## 2025-05-16 RX ADMIN — ASPIRIN 81 MG: 81 TABLET, COATED ORAL at 09:17

## 2025-05-16 NOTE — PROGRESS NOTES
Psychiatric HOSPITALIST PROGRESS NOTE     Patient Identification:  Name:  Gurwinder Harding  Age:  64 y.o.  Sex:  male  :  1960  MRN:  [id number]  Visit Number:  [id number]  ROOM: [address] [place] [address]     Primary Care Provider:  Pastora Person APRN    Length of stay in inpatient status:  2    Subjective     Chief Compliant:    Chief Complaint   Patient presents with    Weakness - Generalized    Dizziness       History of Presenting Illness: Patient seen evaluated follow-up for sepsis with right upper lobe pneumonia with new onset of atrial fibrillation.  Patient at time of evaluation resting comfortably in bed on room air.  Patient with improving leukocytosis and hyponatremia.  Patient transition from heparin to Eliquis and initiated on metoprolol.    Objective     Current Hospital Meds:  apixaban, 5 mg, Oral, Q12H  ascorbic acid, 500 mg, Oral, Daily  azithromycin, 500 mg, Intravenous, Q24H  aztreonam, 2,000 mg, Intravenous, Q8H  cholecalciferol, 1,000 Units, Oral, Daily  gabapentin, 800 mg, Oral, Q8H  ipratropium, 0.5 mg, Nebulization, 4x Daily - RT  isosorbide mononitrate, 30 mg, Oral, Daily  magnesium oxide, 400 mg, Oral, Daily  methocarbamol, 750 mg, Oral, 4x Daily  methylPREDNISolone sodium succinate, 40 mg, Intravenous, Daily  metoprolol tartrate, 25 mg, Oral, Q12H  nicotine, 1 patch, Transdermal, Q24H  rosuvastatin, 10 mg, Oral, Nightly  sodium chloride, 10 mL, Intravenous, Q12H  traZODone, 50 mg, Oral, Nightly  vitamin B-12, 500 mcg, Oral, Daily  zinc sulfate, 220 mg, Oral, Daily      Pharmacy Consult,       ----------------------------------------------------------------------------------------------------------------------  Vital Signs:  Temp:  [97.7 °F (36.5 °C)-98.3 °F (36.8 °C)] 97.7 °F (36.5 °C)  Heart Rate:  [] 92  Resp:  [16-19] 18  BP: (100-130)/(54-97) 100/54  SpO2:  [92 %-97 %] 96 %  on   ;   Device (Oxygen Therapy): room air  Body mass index is 24.98 kg/m².      Intake/Output Summary  "(Last 24 hours) at 5/16/2025 1811  Last data filed at 5/16/2025 1700  Gross per 24 hour   Intake 720 ml   Output 2500 ml   Net -1780 ml      ----------------------------------------------------------------------------------------------------------------------  Physical exam:  Constitutional: Chronically ill-appearing adult male, appears much older than stated age.  NAD at time of evaluation.      HENT:  Head:  Normocephalic and atraumatic.  Mouth:  Moist mucous membranes.    Eyes:  Conjunctivae and EOM are normal. No scleral icterus.    Cardiovascular: Irregularly irregular rate and rhythm and normal heart sounds with no murmur.  Pulmonary/Chest:  No respiratory distress, coarse breath sounds bilaterally with rhonchi in right upper lung field.  Abdominal:  Soft.  Bowel sounds are normal.  No distension and no tenderness.   Musculoskeletal:  No tenderness, and no deformity.  No red or swollen joints anywhere.  Functional ROM intact.   Neurological:  Alert and oriented to person, place, and time.  No cranial nerve deficit.  No tongue deviation.  No facial droop.  No slurred speech. Intact Sensation throughout  Skin:  Skin is warm and dry.  Scattered areas of excoriation, scarring and ecchymosis bilaterally in the upper extremities.   Peripheral vascular:  Pulses in all 4 extremities with no clubbing, no cyanosis, no edema.  Psychiatric: Appropriate mood and affect, pleasant.   ----------------------------------------------------------------------------------------------------------------------  WBC/HGB/HCT/PLT   12.87/11.2/36.3/396 (05/15 2233)  BUN/CREAT/GLUC/ALT/AST/ALBERTO/LIP    22/1.03/166/10/18/--/-- (05/15 2233)  LYTES - Na/K/Cl/CO2: 135*/4.1/104/21.1* (05/15 2233)        No results found for: \"URINECX\"  Blood Culture   Date Value Ref Range Status   05/14/2025 No growth at 24 hours  Preliminary   05/14/2025 No growth at 24 hours  Preliminary       I have personally looked at the labs and they are summarized " above.  ----------------------------------------------------------------------------------------------------------------------  Detailed radiology reports for the last 24 hours:  No radiology results for the last day    Assessment & Plan      Sepsis  Lactic acidemia  Right upper lobe bacterial pneumonia, suspect gram-negative    - Patient with evidence of right upper lobe pneumonia on imaging, presenting with leukocytosis and lactic acid elevation consistent with sepsis.    - Broad-spectrum antibiotic therapy with azithromycin and aztreonam given patient penicillin allergy.  Completing azithromycin therapy today.    - Respiratory PCR panel negative,, strep pneumo antigen negative, MRSA screening swab negative    - Patient for underlying COPD, however no wheezing at time of evaluation we will continue tapering steroids.    - Patient potential discharge in the next 24 to 48 hours given continued improvement in respiratory status.    NSTEMI, type II  Acute hypokalemia  Acute hypercalcemia  Acute hypophosphatemia  Severe hypoalbuminemia  Moderate hyponatremia  New onset atrial fibrillation    - Patient with elevated troponins of 61 and 53 likely secondary to demand from patient's sepsis and rather lower pneumonia as well as atrial fibrillation new in onset likely precipitated by patient's multiple electrolyte derangements.    - Patient also with chronic alcohol use likely also contributing to both A-fib and hyponatremia and electrolyte derangements with poor nutritional intake given hypoalbuminemia.    - Given new onset atrial fibrillation, cardiology consulted and seeing and evaluating, appreciate their input.      - Transitioned off heparin drip and initiated on Eliquis for stroke thromboembolic prophylaxis.    - Initiated on low-dose metoprolol for rate control.  Patient now in sinus rhythm.    - Transthoracic echocardiogram obtained and shows EF of 66 to 70% with grade 1 impaired diastolic function, no clear  evidence of aortic valve regurgitation or stenosis.  Mitral valve structurally normal.  No evidence of pericardial effusion..    Hx alcohol abuse  Acute hyponatremia    - Likely secondary to alcohol abuse and poor nutritional intake and hypoalbuminemia contributing to hyponatremia and will hold on further fluid administration as patient with worsening sodium after IV fluids.  With continued avoidance of additional IV fluids and supportive care and nutrition patient with improving hyponatremia.    - Patient encouraged to cease alcohol use.    Chronic:  Peripheral vascular disease s/p stent  Carotid stenosis  Stroke 2/2 occluded left carotid  Chronic tobacco use  Hypertension  Peripheral neuropathy    Copied text in portions of the note has been reviewed and is accurate as of 05/16/25    VTE Prophylaxis:     No Active Pharmacologic or Mechanical VTE Prophylaxis AND No VTE Prophylaxis Contraindications Documented   - Select Appropriate VTE Prophylaxis       Disposition Home with home health in the next 24 to 48 hours.    Ki Medina DO  [facility]  05/16/25  18:11 EDT

## 2025-05-16 NOTE — PLAN OF CARE
Goal Outcome Evaluation:     Patient resting in bed. Phos 1.7, PO dose given per order. No visible indicators of acute distress noted. Plan of care ongoing.

## 2025-05-16 NOTE — CASE MANAGEMENT/SOCIAL WORK
Discharge Planning Assessment  [facility]     Patient Name: Gurwinder Harding  MRN: [id number]  Today's Date: 5/16/2025    Admit Date: 5/14/2025    Plan: SS received a consult for Pt would like [facility] for PT at discharge. Home Health to be arrange     Discharge Plan       Row Name 05/16/25 1359       Plan    Plan SS received a consult for Pt would like [facility] for PT at discharge. Home Health to be arrange        Expected Discharge Date and Time       Expected Discharge Date Expected Discharge Time    May 17, 2025         Demographic Summary       Row Name 05/16/25 3576       General Information    Admission Type inpatient    Referral Source nursing    Reason for Consult discharge planning  SS received a consult for Pt would like [facility] for PT at discharge.        MEY Fernandes

## 2025-05-16 NOTE — CASE MANAGEMENT/SOCIAL WORK
Continued Stay Note  [facility]     Patient Name: Gurwinder Harding  MRN: [id number]  Today's Date: 5/16/2025    Admit Date: 5/14/2025    Plan: This CM spoke with pt at bedside to verify discharge plan.  Per pt report has only been up to bedside commode and, at home normally ambulates around 14' at a time; this CM offered Home Health for PT if provider is in agreement would like [facility]; this CM notified attending via secure chat and SS consult added.  Discharge plan remains home with spouse and family to transport once medically stable.  Lorraine Abel RN

## 2025-05-16 NOTE — PLAN OF CARE
Goal Outcome Evaluation: Patient has been very pleasant today. Compliant with all medications and care as ordered. He remains on RA, saturations maintaining well above 90%. He has been ambulating to the [facility] with nursing assistance, steady gait noted. Heparin Drip DC as ordered. He has been setup for each meal, fair intake noted. He continues to have persistent cough, PRN medication given. He is currently resting in bed. Will continue with plan of care.

## 2025-05-16 NOTE — PROGRESS NOTES
[facility]  PROGRESS NOTE    Patient information:  Name: Gurwinder Harding  Age/Sex: 64 y.o. male  :  1960        PCP: Pastora Person APRN  Attending: Anika Hood DO  MRN:  [id number]  Visit Number:  [id number]  LOS: 2  CODE STATUS:    Code Status and Medical Interventions: CPR (Attempt to Resuscitate); Full Support   Ordered at: 25     Code Status (Patient has no pulse and is not breathing):    CPR (Attempt to Resuscitate)     Medical Interventions (Patient has pulse or is breathing):    Full Support     PROBLEM LIST:Principal Problem:    Sepsis due to pneumonia    Reason for Cardiology follow-up: New onset atrial fibrillation     Subjective   ADMISSION INFORMATION:  Chief Complaint   Patient presents with    Weakness - Generalized    Dizziness     DATE:2025    Admission information/HPI:  Gurwinder Harding is a 64 y.o. male with a past medical history significant for HTN, carotid artery stenosis,, CAD, PVD, previous CVA due to occlusion of left carotid artery, history of gross hematuria, and history of GI bleed, current tobacco use, and ETOH abuse (patient reports he has cut back with last beer being 3 days prior to admission).     Patient presented to [facility] ([facility]) emergency department (ED) on 2025 with complaints of cough and weakness.     5/15/2025, Cardiology has been consulted for further evaluation and management for new onset atrial fibrillation.      Primary Cardiologist: None since     Interval History:   According to patient's I & O flow chart his total urine output was 2950 mL leaving his net balance -2350 mL overnight. AM labs reveal sodium 135, potassium 4.1, creatinine 1.03 versus 1.07, hemoglobin 11.2 versus 10.6, platelet count 396 versus 382, and last PTT is 42.3.     Patient was in room 322 when he was seen and examined.  Patient is lying in bed resting quietly with eyes closed.  No acute distress noted  at this time.  Patient is easily awakened with verbal stimuli.  Patient denies any chest pain, shortness of breath, or palpitations.  While at bedside, telemetry reveals sinus rhythm with frequent PACs and PVCs.  Please see telemetry strips attached below.    ORDERS:   EKG for rhythm change.  DC IV heparin  Restart Lovenox 40 mg subcu daily for VTE prophylaxis  Added metoprolol tartrate 25 mg p.o. twice daily    EVENT TIMELINE:   5/15: Echo with a EF of 66 to 70%.  LV diastolic function is consistent with grade 1.  Please see full report attached below.  5/14-5/15: New onset Atrial Fibrillation. Started on IV Heparin. Pending price check on DOAC in case he has YAHIR guided DCCV.   5/16: Restarted Lovenox 40 mg subcu daily for VTE prophylaxis    Objective     Vital Signs  Temp:  [97.6 °F (36.4 °C)-98.3 °F (36.8 °C)] 98.2 °F (36.8 °C)  Heart Rate:  [] 84  Resp:  [16-20] 18  BP: (107-130)/(64-97) 130/88  Device (Oxygen Therapy): room air  Vital Signs (last 72 hrs)         05/13 0700  05/14 0659 05/14 0700  05/15 0659 05/15 0700 05/16 0659 05/16 0700 05/16 0812   Most Recent      Temp (°F)   96.5 -  98    97.6 -  98.4      98     98 (36.7) 05/16 0722    Heart Rate   66 -  124    93 -  131      94     94 05/16 0722    Resp   18 -  22    16 -  20      18     18 05/16 0722    BP   78/53 -  150/81    107/64 -  126/74      124/97     124/97 05/16 0722    SpO2 (%)   91 -  100    93 -  97      93     93 05/16 0722          BMI:Body mass index is 24.98 kg/m².    WEIGHT:      05/14/25  2110 05/15/25  0500 05/16/25  0500   Weight: 77.3 kg (170 lb 8 oz) 77.9 kg (171 lb 11.2 oz) 79 kg (174 lb 1.6 oz)       DIET:Diet: Regular/House; Fluid Consistency: Thin (IDDSI 0)    I&O:  Intake & Output (last 3 days)         05/13 0701  05/14 0700 05/14 0701  05/15 0700 05/15 0701  05/16 0700 05/16 0701  05/17 0700    P.O.  240 600     IV Piggyback  100      Total Intake(mL/kg)  340 (4.4) 600 (7.7)     Urine (mL/kg/hr)  750 2950 (1.6)      Stool  0 0     Total Output  750 2950     Net  -410 -5274             Urine Unmeasured Occurrence   3 x     Stool Unmeasured Occurrence  2 x 6 x              PHYSICAL EXAM:  Vitals and nursing note reviewed.   Constitutional:       Appearance: Not in distress.   HENT:      Head: Normocephalic.   Pulmonary:      Effort: Pulmonary effort is normal.      Breath sounds: Normal breath sounds. No wheezing. No rhonchi. No rales.   Cardiovascular:      Normal rate. Irregular rhythm.      Murmurs: There is no murmur.      No gallop.  No click. No rub.   Pulses:     Intact distal pulses.   Edema:     Peripheral edema absent.   Musculoskeletal: Normal range of motion.      Cervical back: Normal range of motion and neck supple. Skin:     General: Skin is warm and dry.   Neurological:      General: No focal deficit present.      Mental Status: Alert and oriented to person, place and time.   Psychiatric:         Behavior: Behavior is cooperative.                  Results review   Results Review:    I have reviewed the patient's new clinical results. 05/16/25 12:26 EDT    Results from last 7 days   Lab Units 05/14/25  1739 05/14/25  1615   HSTROP T ng/L 53* 61*     Lab Results   Component Value Date    PROBNP 202.4 07/15/2021     Results from last 7 days   Lab Units 05/15/25  2233 05/15/25  1449 05/14/25 2241 05/14/25  1615   WBC 10*3/mm3 12.87* 16.63* 19.09* 18.38*   HEMOGLOBIN g/dL 11.2* 10.6* 12.1* 12.3*   PLATELETS 10*3/mm3 396 382 400 482*     Results from last 7 days   Lab Units 05/15/25  2233 05/14/25  2241 05/14/25  1615   SODIUM mmol/L 135* 129* 135*   POTASSIUM mmol/L 4.1 3.7 2.7*   CHLORIDE mmol/L 104 98 97*   CO2 mmol/L 21.1* 17.7* 26.8   BUN mg/dL 22 21 23   CREATININE mg/dL 1.03 1.07 1.24   CALCIUM mg/dL 9.0 9.5 11.1*   GLUCOSE mg/dL 166* 151* 178*   ALT (SGPT) U/L 10 8 7   AST (SGOT) U/L 18 17 14     Lab Results   Component Value Date    MG 2.0 05/14/2025    MG 2.2 05/14/2025    MG 1.8 02/19/2022     Estimated  Creatinine Clearance: 82 mL/min (by C-G formula based on SCr of 1.03 mg/dL).    Lab Results   Component Value Date    HGBA1C 5.60 05/14/2025    HGBA1C 5.80 (H) 09/18/2023    HGBA1C 5.10 11/07/2022     Lab Results   Component Value Date    CHOL 179 08/19/2024    TRIG 144 08/19/2024     (H) 08/19/2024    HDL 47 08/19/2024        Lab Results   Component Value Date    INR 1.17 (H) 05/15/2025    INR 1.11 (H) 05/14/2025    INR 1.04 02/17/2022    INR 0.97 07/15/2021    INR 1.09 10/28/2019    INR 1.00 09/19/2019     Lab Results   Component Value Date    LABHEPA 0.14 (L) 05/16/2025    LABHEPA 0.14 (L) 05/15/2025    LABHEPA 0.34 05/15/2025       Lab Results   Component Value Date    TSH 1.030 08/19/2024    PSA 0.650 06/29/2021      Pain Management Panel  More data may exist         Latest Ref Rng & Units 5/15/2025 8/31/2020   Pain Management Panel   Amphetamine, Urine Qual Negative Negative  Negative    Barbiturates Screen, Urine Negative Negative  Negative    Benzodiazepine Screen, Urine Negative Negative  Negative    Buprenorphine, Screen, Urine Negative Negative  Negative    Cocaine Screen, Urine Negative Negative  Negative    Fentanyl, Urine Negative Negative  -   Methadone Screen , Urine Negative Negative  Negative    Methamphetamine, Ur Negative Negative  -     Microbiology Results (last 10 days)       Procedure Component Value - Date/Time    S. Pneumo Ag Urine or CSF - Urine, Urine, Clean Catch [[id number]]  (Normal) Collected: 05/15/25 0327    Lab Status: Final result Specimen: Urine, Clean Catch Updated: 05/15/25 1303     Strep Pneumo Ag Negative    Gastrointestinal Panel, PCR - Stool, Per Rectum [[id number]]  (Normal) Collected: 05/15/25 0053    Lab Status: Final result Specimen: Stool from Per Rectum Updated: 05/15/25 0212     Campylobacter Not Detected     Plesiomonas shigelloides Not Detected     Salmonella Not Detected     Vibrio Not Detected     Vibrio cholerae Not Detected     Yersinia enterocolitica Not  Detected     Enteroaggregative E. coli (EAEC) Not Detected     Enteropathogenic E. coli (EPEC) Not Detected     Enterotoxigenic E. coli (ETEC) lt/st Not Detected     Shiga-like toxin-producing E. coli (STEC) stx1/stx2 Not Detected     Shigella/Enteroinvasive E. coli (EIEC) Not Detected     Cryptosporidium Not Detected     Cyclospora cayetanensis Not Detected     Entamoeba histolytica Not Detected     Giardia lamblia Not Detected     Adenovirus F40/41 Not Detected     Astrovirus Not Detected     Norovirus GI/GII Not Detected     Rotavirus A Not Detected     Sapovirus (I, II, IV or V) Not Detected    MRSA Screen, PCR (Inpatient) - Swab, Nares [[id number]]  (Normal) Collected: 05/14/25 2336    Lab Status: Final result Specimen: Swab from Nares Updated: 05/15/25 0051     MRSA PCR No MRSA Detected    Narrative:      The negative predictive value of this diagnostic test is high and should only be used to consider de-escalating anti-MRSA therapy. A positive result may indicate colonization with MRSA and must be correlated clinically.    Respiratory Panel PCR w/COVID-19(SARS-CoV-2) MULU/HOWARD/ZAK/PAD/COR/KRZYSZTOF In-House, NP Swab in UTM/VTM, 2 HR TAT - Swab, Nasopharynx [[id number]]  (Normal) Collected: 05/14/25 1640    Lab Status: Final result Specimen: Swab from Nasopharynx Updated: 05/14/25 1735     ADENOVIRUS, PCR Not Detected     Coronavirus 229E Not Detected     Coronavirus HKU1 Not Detected     Coronavirus NL63 Not Detected     Coronavirus OC43 Not Detected     COVID19 Not Detected     Human Metapneumovirus Not Detected     Human Rhinovirus/Enterovirus Not Detected     Influenza A PCR Not Detected     Influenza B PCR Not Detected     Parainfluenza Virus 1 Not Detected     Parainfluenza Virus 2 Not Detected     Parainfluenza Virus 3 Not Detected     Parainfluenza Virus 4 Not Detected     RSV, PCR Not Detected     Bordetella pertussis pcr Not Detected     Bordetella parapertussis PCR Not Detected     Chlamydophila pneumoniae  PCR Not Detected     Mycoplasma pneumo by PCR Not Detected    Narrative:      In the setting of a positive respiratory panel with a viral infection PLUS a negative procalcitonin without other underlying concern for bacterial infection, consider observing off antibiotics or discontinuation of antibiotics and continue supportive care. If the respiratory panel is positive for atypical bacterial infection (Bordetella pertussis, Chlamydophila pneumoniae, or Mycoplasma pneumoniae), consider antibiotic de-escalation to target atypical bacterial infection.    Blood Culture - Blood, Arm, Left [[id number]]  (Normal) Collected: 05/14/25 1615    Lab Status: Preliminary result Specimen: Blood from Arm, Left Updated: 05/15/25 1645     Blood Culture No growth at 24 hours    Blood Culture - Blood, Arm, Right [[id number]]  (Normal) Collected: 05/14/25 1615    Lab Status: Preliminary result Specimen: Blood from Arm, Right Updated: 05/15/25 1645     Blood Culture No growth at 24 hours           Imaging Results (Last 24 Hours)       ** No results found for the last 24 hours. **          ECHO:  Results for orders placed during the hospital encounter of 05/14/25    Adult Transthoracic Echo Complete W/ Cont if Necessary Per Protocol    Interpretation Summary    Lumason image enhancer was used in order to optimize the study.    Normal left ventricular cavity size and wall thickness noted. All left ventricular wall segments contract normally.    Left ventricular ejection fraction appears to be 66 - 70%.    Left ventricular diastolic function is consistent with (grade I) impaired relaxation.    The aortic valve is not well visualized. No aortic valve regurgitation or stenosis is present. The aortic valve is grossly normal in structure.    The mitral valve is structurally normal with no regurgitation or significant stenosis present.    There is no evidence of pericardial effusion.        STRESS TEST:  Results for orders placed during the  hospital encounter of 11/30/16    Stress Test With Myocardial Perfusion One Day    Interpretation Summary  · Left ventricular ejection fraction is normal (Calculated EF = 67%).  · Myocardial perfusion imaging indicates a normal myocardial perfusion study with no evidence of ischemia.  · Impressions are consistent with a low risk study.  · low risk for ischemic heart disease.  · Findings consistent with a normal ECG stress test.         HEART CATH:  Results for orders placed during the hospital encounter of 10/29/19    Cardiac Catheterization/Vascular Study    Narrative  Images from the original result were not included.  PERIPHERAL ARTERIOGRAM / INTERVENTION REPORT    DATE OF PROCEDURE: 10/29/19    INDICATION FOR PROCEDURE: Severe claudication of the RIGHT leg.  Recent intervention to LLE with healing of ulcer on the left  Pt still has cold foot on the right with no pulse.      PROCEDURE PERFORMED:  LEFT femoral artery access with 5 Tajik sheath  Bilateral lower extremity angiogram  Atherectomy followed by Balloon angioplasty and stenting of the R SFA    PROCEDURE COMMENTS:    Gurwinder Harding was brought to the cath lab and placed on the cardiac catherization table in the postabsortive state. The patient was prepped and draped according to the cath lab protocol under strict aseptic and sterile condition. Patient's right femoral artery sight was prepped and draped. Under fluoroscopic guidance theright femoral artery was punctured using a 21 gauge needle utilizing the modified Seldinger technique. 5 Maori sheath was introduced over a wire. After aspirating for blood it was flushed with heparinized saline.    LEFT extremity imaging was performed via the sheath. A 5F RIM catheter was then advanced to the bifurcation at L5 and engaged in the contralateral CLEMENTE. RIGHT extremity imaging was then performed. After the procedure was completed the sheath was removed in the cath lab and hemostasis achieved via manual  compression. No complications occurred.    Findings:  Right Lower Extremity:  Common  Iliac artery was normal  SFA shows 100% short occlusion in the distal third  Popliteal Artery was patent with no disease  Tibio-peroneal trunk patent with no disease  Anterior tibial artery was patent  Posterior tibial artery was patent  3 Vessel run off present    Left Lower Extremity:  Common iliac artery shows mild disease and calcific changes  SFA is patent at the site of previous atherectomy and DCB with excellent flow  Popliteal Artery is patent with no disease  Tibio-peroneal trunk patent with no significant disease  Anterior tibial artery was patent  Posterior tibial artery is patent  3 Vessel run off present    Recommendations:  Intervention to R SFA    6x45 straight sheath used  Angled NaviCross with glidewire 0.035  Crossed successfully and a Stabilizer wire was used for Atherectomy with HawkOne 6F  DCB with 5x120 Admiral performed  There was residual stenosis which was flow limiting and thus stenting was elected.  6x120 Everflex stent was delivered with excellent results and flow was fully re-established.      FINAL RESULTS:  Successful Intervention to the Right SFA Chronic Total occlusion  beginning at the ostium and extending to the Adductor canal using:    Atherectomy HawkOne device 6F  Balloon angioplasty with Admiral DCB 5x120 followed by stenting of the right SFA using 6x120 Everflex stent  From 100% to 20% residual with brisk flow to the distal vessel.      EBL: 50 ML  No specimens were removed.    Plan  Continue medical therapy.    Tong Azar MD  10/29/19        Director, Cardiac Cath Lab        EP STUDY/Interrogations:   No results found for this or any previous visit.        [id number]              TELEMETRY:                       I reviewed the patient's new clinical results.    ALLERGIES: Penicillins and Novocain [procaine]    Medication Review:   Current list of medications may not reflect those  currently placed in orders that are not signed or are being held.     ascorbic acid, 500 mg, Oral, Daily  aspirin, 81 mg, Oral, Daily  azithromycin, 500 mg, Intravenous, Q24H  aztreonam, 2,000 mg, Intravenous, Q8H  cholecalciferol, 1,000 Units, Oral, Daily  enoxaparin sodium, 40 mg, Subcutaneous, Q24H  gabapentin, 800 mg, Oral, Q8H  ipratropium-albuterol, 3 mL, Nebulization, Q6H While Awake - RT  isosorbide mononitrate, 30 mg, Oral, Daily  magnesium oxide, 400 mg, Oral, Daily  methocarbamol, 750 mg, Oral, 4x Daily  methylPREDNISolone sodium succinate, 40 mg, Intravenous, Daily  metoprolol tartrate, 25 mg, Oral, Q12H  nicotine, 1 patch, Transdermal, Q24H  sodium chloride, 10 mL, Intravenous, Q12H  traZODone, 50 mg, Oral, Nightly  vitamin B-12, 500 mcg, Oral, Daily  zinc sulfate, 220 mg, Oral, Daily      Pharmacy Consult,         acetaminophen    benzonatate    senna-docusate sodium **AND** polyethylene glycol **AND** bisacodyl **AND** bisacodyl    Calcium Replacement - Follow Nurse / BPA Driven Protocol    guaiFENesin    guaiFENesin-dextromethorphan    Magnesium Standard Dose Replacement - Follow Nurse / BPA Driven Protocol    nicotine polacrilex    nitroglycerin    ondansetron    Pharmacy Consult    Phosphorus Replacement - Follow Nurse / BPA Driven Protocol    Potassium Replacement - Follow Nurse / BPA Driven Protocol    sodium chloride    sodium chloride    sodium chloride    sodium chloride        Admitting Problem List Principal Problem:    Sepsis due to pneumonia    Assessment    1.  New onset atrial fibrillation with controlled ventricular response on admission, [id number]: of 2 [hypertension and PAD], currently patient is in sinus rhythm with frequent premature atrial and ventricular complexes  2.  Relatively low blood pressure  3.  History of remote GI bleed with mild anemia with a stable hemoglobin on heparin  4.  History of alcohol abuse  5.  Dyslipidemia  6.  Prediabetes  7.  Hyponatremia  8.   Hypokalemia, resolved  9.  Peripheral vascular disease s/p previous PCI  10.  Elevated high-sensitivity troponin admission consistent most likely with NSTEMI type II secondary to atrial fibrillation with RVR  11.  Left upper lobe pneumonia  12.  Hypercalcemia  13.  Carotid stenosis with complete occlusion of the left internal carotid artery          Recommendations / Plan   1.  Patient now and is in sinus rhythm we will start him on metoprolol 25 mg p.o. twice daily and keep potassium between 4-4.5 and then magnesium 2-2.2  2.  Will start Eliquis for thromboembolic prophylaxis and can hold aspirin for risk of bleeding  3.  No chest pain reported consider ischemia workup as an outpatient  4.  Follow-up with cardiology 2 to 4 weeks postdischarge  5.  Will start the patient on a statin as he has prediabetes, vascular disease as well as hyperlipidemia we will start him on rosuvastatin 10 mg/day          I have discussed the patients findings and recommendations with the patient.    Thank you very much for asking us to be involved in this patient's care.      Electronically signed by KEVIN Nguyen, 05/16/25, 12:25 PM EDT.   Electronically signed by Carla Yen MD, 05/16/25, 1:15 PM EDT.                    Please note that portions of this note were completed with a voice recognition program.    Please note that portions of this note were copied and has been reviewed and is accurate as of 5/16/2025 .

## 2025-05-16 NOTE — PROGRESS NOTES
[facility] HOSPITALIST PROGRESS NOTE     Patient Identification:  Name:  Gurwinder aHrding  Age:  64 y.o.  Sex:  male  :  1960  MRN:  [id number]  Visit Number:  [id number]  ROOM: [address] [place] [address]     Primary Care Provider:  Pastora Person APRN    Length of stay in inpatient status:  1    Subjective     Chief Compliant:    Chief Complaint   Patient presents with    Weakness - Generalized    Dizziness       History of Presenting Illness: Patient seen evaluated follow-up for sepsis with right upper lobe pneumonia with new onset of atrial fibrillation.  Patient at time of evaluation resting comfortably in bed on room air.  Patient with significant multiple electrolyte derangements at presentation likely contributing to arrhythmia.  Cardiology consulted to see and evaluate patient today.    Objective     Current Hospital Meds:  ascorbic acid, 500 mg, Oral, Daily  aspirin, 81 mg, Oral, Daily  azithromycin, 500 mg, Intravenous, Q24H  aztreonam, 2,000 mg, Intravenous, Q8H  cholecalciferol, 1,000 Units, Oral, Daily  gabapentin, 800 mg, Oral, Q8H  isosorbide mononitrate, 30 mg, Oral, Daily  magnesium oxide, 400 mg, Oral, Daily  methocarbamol, 750 mg, Oral, 4x Daily  methylPREDNISolone sodium succinate, 40 mg, Intravenous, Q12H  nicotine, 1 patch, Transdermal, Q24H  sodium chloride, 10 mL, Intravenous, Q12H  traZODone, 50 mg, Oral, Nightly  vitamin B-12, 500 mcg, Oral, Daily  zinc sulfate, 220 mg, Oral, Daily      heparin, 10 Units/kg/hr (Dosing Weight), Last Rate: 10 Units/kg/hr (05/15/25 1750)  Pharmacy Consult,   Pharmacy to Dose Heparin,       ----------------------------------------------------------------------------------------------------------------------  Vital Signs:  Temp:  [97.6 °F (36.4 °C)-98.4 °F (36.9 °C)] 98.3 °F (36.8 °C)  Heart Rate:  [] 96  Resp:  [16-22] 17  BP: ()/(64-81) 110/68  SpO2:  [91 %-97 %] 97 %  on   ;   Device (Oxygen Therapy): room air  Body mass index is 24.64  "kg/m².      Intake/Output Summary (Last 24 hours) at 5/15/2025 2026  Last data filed at 5/15/2025 1700  Gross per 24 hour   Intake 840 ml   Output 1700 ml   Net -860 ml      ----------------------------------------------------------------------------------------------------------------------  Physical exam:  Constitutional: Chronically ill-appearing adult male, appears much older than stated age.  NAD at time of evaluation.      HENT:  Head:  Normocephalic and atraumatic.  Mouth:  Moist mucous membranes.    Eyes:  Conjunctivae and EOM are normal. No scleral icterus.    Cardiovascular: Irregularly irregular rate and rhythm and normal heart sounds with no murmur.  Pulmonary/Chest:  No respiratory distress, coarse breath sounds bilaterally with rhonchi in right upper lung field.  Abdominal:  Soft.  Bowel sounds are normal.  No distension and no tenderness.   Musculoskeletal:  No tenderness, and no deformity.  No red or swollen joints anywhere.  Functional ROM intact.   Neurological:  Alert and oriented to person, place, and time.  No cranial nerve deficit.  No tongue deviation.  No facial droop.  No slurred speech. Intact Sensation throughout  Skin:  Skin is warm and dry.  Scattered areas of excoriation, scarring and ecchymosis bilaterally in the upper extremities.   Peripheral vascular:  Pulses in all 4 extremities with no clubbing, no cyanosis, no edema.  Psychiatric: Appropriate mood and affect, pleasant.   ----------------------------------------------------------------------------------------------------------------------  WBC/HGB/HCT/PLT   16.63/10.6/32.8/382 (05/15 1449)  BUN/CREAT/GLUC/ALT/AST/ALBERTO/LIP    21/1.07/151/8/17/--/-- (05/14 2241)  LYTES - Na/K/Cl/CO2: 129*/3.7/98/17.7* (05/14 2241)  PT/INR/PTT   15.1/1.17/42.3 (05/15 1449)     No results found for: \"URINECX\"  Blood Culture   Date Value Ref Range Status   05/14/2025 No growth at 24 hours  Preliminary   05/14/2025 No growth at 24 hours  Preliminary "       I have personally looked at the labs and they are summarized above.  ----------------------------------------------------------------------------------------------------------------------  Detailed radiology reports for the last 24 hours:  XR Chest 1 View  Result Date: 5/14/2025  Findings concerning for right upper lobe pneumonia. Follow-up recommended to ensure resolution.  This report was finalized on 5/14/2025 6:28 PM by Alex Pallas, DO.      CT Head Without Contrast  Result Date: 5/14/2025    Unremarkable exam. No CT evidence of acute intracranial abnormality.  This report was finalized on 5/14/2025 4:42 PM by Dr. Neal Lanza MD.      Assessment & Plan      Sepsis  Lactic acidemia  Right upper lobe bacterial pneumonia, suspect gram-negative    - Patient with evidence of right upper lobe pneumonia on imaging, presenting with leukocytosis and lactic acid elevation consistent with sepsis.    - Broad-spectrum antibiotic therapy with azithromycin and aztreonam given patient penicillin allergy.    - Respiratory PCR panel negative,, strep pneumo antigen negative, MRSA screening swab negative    - Patient for underlying COPD, however no wheezing at time of evaluation we will begin tapering steroids.    NSTEMI, type II  Acute hypokalemia  Acute hypercalcemia  Acute hypophosphatemia  Severe hypoalbuminemia  Moderate hyponatremia  New onset atrial fibrillation    - Patient with elevated troponins of 61 and 53 likely secondary to demand from patient's sepsis and rather lower pneumonia as well as atrial fibrillation new in onset likely precipitated by patient's multiple electrolyte derangements.    - Patient also with chronic alcohol use likely also contributing to both A-fib and hyponatremia and electrolyte derangements with poor nutritional intake given hypoalbuminemia.    - Given new onset atrial fibrillation, cardiology consulted and seeing and evaluating.      - Continue heparin drip, likely plans to transition  to oral anticoagulation if tolerating    - Cardiology considering possible cardioversion if anticoagulation tolerance.    - Transthoracic echocardiogram ordered and pending at this time.    Hx alcohol abuse  Acute hyponatremia    - Likely secondary to alcohol abuse and poor nutritional intake and hypoalbuminemia contributing to hyponatremia and will hold on further fluid administration as patient with worsening sodium after IV fluids.    - Patient encouraged to cease alcohol use.    Chronic:  Peripheral vascular disease s/p stent  Carotid stenosis  Stroke 2/2 occluded left carotid  Chronic tobacco use  Hypertension  Peripheral neuropathy    Copied text in portions of the note has been reviewed and is accurate as of 05/15/25    VTE Prophylaxis:     No Active Pharmacologic or Mechanical VTE Prophylaxis AND No VTE Prophylaxis Contraindications Documented   - Select Appropriate VTE Prophylaxis       Disposition likely return home pending clinical course and recovery.    Ki Medina DO  [facility] Hospitalist  05/15/25  20:26 EDT

## 2025-05-16 NOTE — SIGNIFICANT NOTE
[facility]  SIGNIFICANT EVENT NOTE      Patient information:  Name: Gurwinder Harding  Age/Sex: 64 y.o. male  :  1960        PCP: Pastora Person APRN  Attending: Anika Hood DO  MRN:  [id number]  Visit Number:  [id number]    LOS:  LOS: 2 days   CODE STATUS:   Code Status and Medical Interventions: CPR (Attempt to Resuscitate); Full Support   Ordered at: 25     Code Status (Patient has no pulse and is not breathing):    CPR (Attempt to Resuscitate)     Medical Interventions (Patient has pulse or is breathing):    Full Support       PROBLEM LIST:Principal Problem:    Sepsis due to pneumonia      Reason for Cardiology follow-up: New onset atrial fibrillation     Subjective Data:   EVENT/HPI:    After Dr. Yen recalculating [id number]-SVHl score of 2 with history of hypertension and PAD, anticoagulation changed.     Objective Data:   Temp:  [97.6 °F (36.4 °C)-98.3 °F (36.8 °C)] 98.2 °F (36.8 °C)  Heart Rate:  [] 109  Resp:  [16-20] 19  BP: (107-130)/(64-97) 130/88  Device (Oxygen Therapy): room air  Vital Signs (last 72 hrs)          0700   0659  0700  05/15 0659 05/15 0700   0659  0700   1319   Most Recent      Temp (°F)   96.5 -  98    97.6 -  98.4    98 -  98.2     98.2 (36.8)  1141    Heart Rate   66 -  124    93 -  131    84 -  109     109  1238    Resp   18 -  22    16 -  20    18 -  19     19  1238    BP   78/53 -  150/81    107/64 -  126/74    124/97 -  130/88     130/88  1141    SpO2 (%)   91 -  100    93 -  97    92 -  96     96  1238          Body mass index is 24.98 kg/m².      05/15/25  0500 25  0500   Weight: 77.9 kg (171 lb 11.2 oz) 79 kg (174 lb 1.6 oz)     Results Reviewed:     Active medication list:   ALLERGIES:Penicillins and Novocain [procaine]    apixaban, 5 mg, Oral, Q12H  ascorbic acid, 500 mg, Oral, Daily  azithromycin, 500 mg, Intravenous, Q24H  aztreonam, 2,000  mg, Intravenous, Q8H  cholecalciferol, 1,000 Units, Oral, Daily  gabapentin, 800 mg, Oral, Q8H  ipratropium, 0.5 mg, Nebulization, 4x Daily - RT  isosorbide mononitrate, 30 mg, Oral, Daily  magnesium oxide, 400 mg, Oral, Daily  methocarbamol, 750 mg, Oral, 4x Daily  methylPREDNISolone sodium succinate, 40 mg, Intravenous, Daily  metoprolol tartrate, 25 mg, Oral, Q12H  nicotine, 1 patch, Transdermal, Q24H  sodium chloride, 10 mL, Intravenous, Q12H  traZODone, 50 mg, Oral, Nightly  vitamin B-12, 500 mcg, Oral, Daily  zinc sulfate, 220 mg, Oral, Daily      Pharmacy Consult,         Assessment and Plan:   Per verbal order from Dr. Yen:     DC Lovenox subq for prophylaxis VTE and Aspirin    Started Eliquis 5 mg PO BID       KEVIN Castaneda (Robin)   [facility] 13:19 EDT  5/16/2025    Electronically signed by KEVIN Nguyen, 05/16/25, 1:22 PM EDT.           Please note that portions of this note were copied and has been reviewed and is accurate as of 5/16/2025 .      Please note that portions of this note were completed with a voice recognition program.

## 2025-05-17 LAB
ALBUMIN SERPL-MCNC: 2.2 G/DL (ref 3.5–5.2)
ALBUMIN/GLOB SERPL: 0.6 G/DL
ALP SERPL-CCNC: 104 U/L (ref 39–117)
ALT SERPL W P-5'-P-CCNC: 14 U/L (ref 1–41)
ANION GAP SERPL CALCULATED.3IONS-SCNC: 9.2 MMOL/L (ref 5–15)
AST SERPL-CCNC: 17 U/L (ref 1–40)
BILIRUB SERPL-MCNC: 0.2 MG/DL (ref 0–1.2)
BUN SERPL-MCNC: 20 MG/DL (ref 8–23)
BUN/CREAT SERPL: 23 (ref 7–25)
CALCIUM SPEC-SCNC: 8.3 MG/DL (ref 8.6–10.5)
CHLORIDE SERPL-SCNC: 107 MMOL/L (ref 98–107)
CO2 SERPL-SCNC: 20.8 MMOL/L (ref 22–29)
CREAT SERPL-MCNC: 0.87 MG/DL (ref 0.76–1.27)
DEPRECATED RDW RBC AUTO: 56.9 FL (ref 37–54)
EGFRCR SERPLBLD CKD-EPI 2021: 97 ML/MIN/1.73
ERYTHROCYTE [DISTWIDTH] IN BLOOD BY AUTOMATED COUNT: 15.4 % (ref 12.3–15.4)
GLOBULIN UR ELPH-MCNC: 3.4 GM/DL
GLUCOSE SERPL-MCNC: 108 MG/DL (ref 65–99)
HCT VFR BLD AUTO: 35.3 % (ref 37.5–51)
HGB BLD-MCNC: 10.8 G/DL (ref 13–17.7)
MAGNESIUM SERPL-MCNC: 2.1 MG/DL (ref 1.6–2.4)
MCH RBC QN AUTO: 30.9 PG (ref 26.6–33)
MCHC RBC AUTO-ENTMCNC: 30.6 G/DL (ref 31.5–35.7)
MCV RBC AUTO: 100.9 FL (ref 79–97)
PLATELET # BLD AUTO: 366 10*3/MM3 (ref 140–450)
PMV BLD AUTO: 9.6 FL (ref 6–12)
POTASSIUM SERPL-SCNC: 3.6 MMOL/L (ref 3.5–5.2)
POTASSIUM SERPL-SCNC: 4.6 MMOL/L (ref 3.5–5.2)
PROT SERPL-MCNC: 5.6 G/DL (ref 6–8.5)
QT INTERVAL: 368 MS
QTC INTERVAL: 429 MS
RBC # BLD AUTO: 3.5 10*6/MM3 (ref 4.14–5.8)
SODIUM SERPL-SCNC: 137 MMOL/L (ref 136–145)
WBC NRBC COR # BLD AUTO: 11.54 10*3/MM3 (ref 3.4–10.8)

## 2025-05-17 PROCEDURE — 25010000002 METHYLPREDNISOLONE PER 40 MG: Performed by: STUDENT IN AN ORGANIZED HEALTH CARE EDUCATION/TRAINING PROGRAM

## 2025-05-17 PROCEDURE — 25010000002 MAGNESIUM SULFATE 2 GM/50ML SOLUTION: Performed by: STUDENT IN AN ORGANIZED HEALTH CARE EDUCATION/TRAINING PROGRAM

## 2025-05-17 PROCEDURE — 94664 DEMO&/EVAL PT USE INHALER: CPT

## 2025-05-17 PROCEDURE — 83735 ASSAY OF MAGNESIUM: CPT | Performed by: STUDENT IN AN ORGANIZED HEALTH CARE EDUCATION/TRAINING PROGRAM

## 2025-05-17 PROCEDURE — 99232 SBSQ HOSP IP/OBS MODERATE 35: CPT | Performed by: SPECIALIST

## 2025-05-17 PROCEDURE — 93005 ELECTROCARDIOGRAM TRACING: CPT | Performed by: NURSE PRACTITIONER

## 2025-05-17 PROCEDURE — 94761 N-INVAS EAR/PLS OXIMETRY MLT: CPT

## 2025-05-17 PROCEDURE — 99232 SBSQ HOSP IP/OBS MODERATE 35: CPT | Performed by: STUDENT IN AN ORGANIZED HEALTH CARE EDUCATION/TRAINING PROGRAM

## 2025-05-17 PROCEDURE — 80053 COMPREHEN METABOLIC PANEL: CPT | Performed by: STUDENT IN AN ORGANIZED HEALTH CARE EDUCATION/TRAINING PROGRAM

## 2025-05-17 PROCEDURE — 84132 ASSAY OF SERUM POTASSIUM: CPT | Performed by: STUDENT IN AN ORGANIZED HEALTH CARE EDUCATION/TRAINING PROGRAM

## 2025-05-17 PROCEDURE — 94799 UNLISTED PULMONARY SVC/PX: CPT

## 2025-05-17 PROCEDURE — 25010000002 AZTREONAM PER 500 MG: Performed by: INTERNAL MEDICINE

## 2025-05-17 PROCEDURE — 85027 COMPLETE CBC AUTOMATED: CPT | Performed by: STUDENT IN AN ORGANIZED HEALTH CARE EDUCATION/TRAINING PROGRAM

## 2025-05-17 RX ORDER — MAGNESIUM SULFATE HEPTAHYDRATE 40 MG/ML
2 INJECTION, SOLUTION INTRAVENOUS ONCE
Status: COMPLETED | OUTPATIENT
Start: 2025-05-17 | End: 2025-05-17

## 2025-05-17 RX ORDER — POTASSIUM CHLORIDE 1500 MG/1
20 TABLET, EXTENDED RELEASE ORAL ONCE
Status: COMPLETED | OUTPATIENT
Start: 2025-05-17 | End: 2025-05-17

## 2025-05-17 RX ORDER — POTASSIUM CHLORIDE 1500 MG/1
40 TABLET, EXTENDED RELEASE ORAL EVERY 4 HOURS
Status: COMPLETED | OUTPATIENT
Start: 2025-05-17 | End: 2025-05-17

## 2025-05-17 RX ORDER — PREDNISONE 20 MG/1
20 TABLET ORAL
Status: DISCONTINUED | OUTPATIENT
Start: 2025-05-18 | End: 2025-05-18 | Stop reason: HOSPADM

## 2025-05-17 RX ADMIN — TRAZODONE HYDROCHLORIDE 50 MG: 50 TABLET ORAL at 21:55

## 2025-05-17 RX ADMIN — GABAPENTIN 800 MG: 400 CAPSULE ORAL at 21:55

## 2025-05-17 RX ADMIN — POTASSIUM CHLORIDE 40 MEQ: 1500 TABLET, EXTENDED RELEASE ORAL at 08:53

## 2025-05-17 RX ADMIN — AZTREONAM 2000 MG: 2 INJECTION, POWDER, LYOPHILIZED, FOR SOLUTION INTRAMUSCULAR; INTRAVENOUS at 22:27

## 2025-05-17 RX ADMIN — METHOCARBAMOL 750 MG: 500 TABLET ORAL at 08:53

## 2025-05-17 RX ADMIN — METOPROLOL TARTRATE 25 MG: 25 TABLET, FILM COATED ORAL at 21:55

## 2025-05-17 RX ADMIN — METHOCARBAMOL 750 MG: 500 TABLET ORAL at 18:10

## 2025-05-17 RX ADMIN — IPRATROPIUM BROMIDE 0.5 MG: 0.5 SOLUTION RESPIRATORY (INHALATION) at 07:32

## 2025-05-17 RX ADMIN — MAGNESIUM SULFATE HEPTAHYDRATE 2 G: 40 INJECTION, SOLUTION INTRAVENOUS at 18:10

## 2025-05-17 RX ADMIN — NICOTINE 1 PATCH: 21 PATCH TRANSDERMAL at 08:54

## 2025-05-17 RX ADMIN — ISOSORBIDE MONONITRATE 30 MG: 30 TABLET, EXTENDED RELEASE ORAL at 08:53

## 2025-05-17 RX ADMIN — AZTREONAM 2000 MG: 2 INJECTION, POWDER, LYOPHILIZED, FOR SOLUTION INTRAMUSCULAR; INTRAVENOUS at 06:21

## 2025-05-17 RX ADMIN — Medication 400 MG: at 08:53

## 2025-05-17 RX ADMIN — POTASSIUM CHLORIDE 40 MEQ: 1500 TABLET, EXTENDED RELEASE ORAL at 15:33

## 2025-05-17 RX ADMIN — Medication 500 MCG: at 08:54

## 2025-05-17 RX ADMIN — POTASSIUM CHLORIDE 20 MEQ: 1500 TABLET, EXTENDED RELEASE ORAL at 18:10

## 2025-05-17 RX ADMIN — Medication 10 ML: at 08:54

## 2025-05-17 RX ADMIN — BENZONATATE 200 MG: 100 CAPSULE ORAL at 08:53

## 2025-05-17 RX ADMIN — AZTREONAM 2000 MG: 2 INJECTION, POWDER, LYOPHILIZED, FOR SOLUTION INTRAMUSCULAR; INTRAVENOUS at 15:33

## 2025-05-17 RX ADMIN — ROSUVASTATIN 10 MG: 10 TABLET, FILM COATED ORAL at 21:55

## 2025-05-17 RX ADMIN — METOPROLOL TARTRATE 25 MG: 25 TABLET, FILM COATED ORAL at 08:53

## 2025-05-17 RX ADMIN — METHOCARBAMOL 750 MG: 500 TABLET ORAL at 21:55

## 2025-05-17 RX ADMIN — METHOCARBAMOL 750 MG: 500 TABLET ORAL at 15:33

## 2025-05-17 RX ADMIN — ZINC SULFATE 220 MG (50 MG) CAPSULE 220 MG: CAPSULE at 08:53

## 2025-05-17 RX ADMIN — Medication 1000 UNITS: at 08:53

## 2025-05-17 RX ADMIN — APIXABAN 5 MG: 5 TABLET, FILM COATED ORAL at 21:55

## 2025-05-17 RX ADMIN — Medication 10 ML: at 21:56

## 2025-05-17 RX ADMIN — GABAPENTIN 800 MG: 400 CAPSULE ORAL at 15:33

## 2025-05-17 RX ADMIN — METHYLPREDNISOLONE SODIUM SUCCINATE 40 MG: 40 INJECTION, POWDER, LYOPHILIZED, FOR SOLUTION INTRAMUSCULAR; INTRAVENOUS at 08:53

## 2025-05-17 RX ADMIN — OXYCODONE HYDROCHLORIDE AND ACETAMINOPHEN 500 MG: 500 TABLET ORAL at 08:53

## 2025-05-17 RX ADMIN — APIXABAN 5 MG: 5 TABLET, FILM COATED ORAL at 08:53

## 2025-05-17 RX ADMIN — GABAPENTIN 800 MG: 400 CAPSULE ORAL at 06:21

## 2025-05-17 RX ADMIN — IPRATROPIUM BROMIDE 0.5 MG: 0.5 SOLUTION RESPIRATORY (INHALATION) at 19:03

## 2025-05-17 RX ADMIN — IPRATROPIUM BROMIDE 0.5 MG: 0.5 SOLUTION RESPIRATORY (INHALATION) at 13:00

## 2025-05-17 NOTE — PLAN OF CARE
Goal Outcome Evaluation: Patient has been very pleasant today. Compliant with all medications and care as ordered. He remains on RA, saturations maintaining well above 90%. He has been ambulating to the [facility] with nursing assistance. Family has been at bedside, updated on plan of care. Patient continues to have persistent cough, PRN medication given. He is currently resting in bed. Will continue with plan of care.

## 2025-05-17 NOTE — PROGRESS NOTES
[facility] HOSPITALIST PROGRESS NOTE     Patient Identification:  Name:  Gurwinder Harding  Age:  64 y.o.  Sex:  male  :  1960  MRN:  [id number]  Visit Number:  [id number]  ROOM: [address] [place] [address]     Primary Care Provider:  Pastora Person APRN    Length of stay in inpatient status:  3    Subjective     Chief Compliant:    Chief Complaint   Patient presents with    Weakness - Generalized    Dizziness       History of Presenting Illness: Patient seen evaluated follow-up for sepsis with right upper lobe pneumonia with new onset of atrial fibrillation.  Patient at time of evaluation resting comfortably in bed on room air.  Patient today complaining of cramping in his legs.  Otherwise reports significant improvement in breathing and having no shortness of breath or chest discomfort.    Objective     Current Hospital Meds:  apixaban, 5 mg, Oral, Q12H  ascorbic acid, 500 mg, Oral, Daily  aztreonam, 2,000 mg, Intravenous, Q8H  cholecalciferol, 1,000 Units, Oral, Daily  gabapentin, 800 mg, Oral, Q8H  ipratropium, 0.5 mg, Nebulization, 4x Daily - RT  isosorbide mononitrate, 30 mg, Oral, Daily  magnesium oxide, 400 mg, Oral, Daily  magnesium sulfate, 2 g, Intravenous, Once  methocarbamol, 750 mg, Oral, 4x Daily  methylPREDNISolone sodium succinate, 40 mg, Intravenous, Daily  metoprolol tartrate, 25 mg, Oral, Q12H  nicotine, 1 patch, Transdermal, Q24H  potassium chloride, 20 mEq, Oral, Once  rosuvastatin, 10 mg, Oral, Nightly  sodium chloride, 10 mL, Intravenous, Q12H  traZODone, 50 mg, Oral, Nightly  vitamin B-12, 500 mcg, Oral, Daily  zinc sulfate, 220 mg, Oral, Daily      Pharmacy Consult,       ----------------------------------------------------------------------------------------------------------------------  Vital Signs:  Temp:  [97.5 °F (36.4 °C)-98 °F (36.7 °C)] 97.5 °F (36.4 °C)  Heart Rate:  [75-89] 87  Resp:  [13-20] 18  BP: ()/(56-82) 113/70  SpO2:  [95 %-98 %] 98 %  on   ;   Device (Oxygen Therapy):  "room air  Body mass index is 25.56 kg/m².      Intake/Output Summary (Last 24 hours) at 5/17/2025 1634  Last data filed at 5/17/2025 1300  Gross per 24 hour   Intake 1080 ml   Output 700 ml   Net 380 ml      ----------------------------------------------------------------------------------------------------------------------  Physical exam:  Constitutional: Chronically ill-appearing adult male, appears much older than stated age.  NAD at time of evaluation.      HENT:  Head:  Normocephalic and atraumatic.  Mouth:  Moist mucous membranes.    Eyes:  Conjunctivae and EOM are normal. No scleral icterus.    Cardiovascular: Irregularly irregular rate and rhythm and normal heart sounds with no murmur.  Pulmonary/Chest:  No respiratory distress, coarse breath sounds bilaterally with rhonchi in right upper lung field.  Abdominal:  Soft.  Bowel sounds are normal.  No distension and no tenderness.   Musculoskeletal:  No tenderness, and no deformity.  No red or swollen joints anywhere.  Functional ROM intact.   Neurological:  Alert and oriented to person, place, and time.  No cranial nerve deficit.  No tongue deviation.  No facial droop.  No slurred speech. Intact Sensation throughout  Skin:  Skin is warm and dry.  Scattered areas of excoriation, scarring and ecchymosis bilaterally in the upper extremities.   Peripheral vascular:  Pulses in all 4 extremities with no clubbing, no cyanosis, no edema.  Psychiatric: Appropriate mood and affect, pleasant.   ----------------------------------------------------------------------------------------------------------------------  WBC/HGB/HCT/PLT   11.54/10.8/35.3/366 (05/17 0416)  BUN/CREAT/GLUC/ALT/AST/ALBERTO/LIP    20/0.87/108/14/17/--/-- (05/17 0416)  LYTES - Na/K/Cl/CO2: 137/3.6/107/20.8* (05/17 0416)        No results found for: \"URINECX\"  Blood Culture   Date Value Ref Range Status   05/14/2025 No growth at 24 hours  Preliminary   05/14/2025 No growth at 24 hours  Preliminary       I " have personally looked at the labs and they are summarized above.  ----------------------------------------------------------------------------------------------------------------------  Detailed radiology reports for the last 24 hours:  No radiology results for the last day    Assessment & Plan      Sepsis  Lactic acidemia  Right upper lobe bacterial pneumonia, suspect gram-negative    - Patient with evidence of right upper lobe pneumonia on imaging, presenting with leukocytosis and lactic acid elevation consistent with sepsis.    - Broad-spectrum antibiotic therapy with azithromycin and aztreonam given patient penicillin allergy.  Completed previous 3-day course of azithromycin.  Currently on day 4 of 5 of aztreonam.    - Respiratory PCR panel negative,, strep pneumo antigen negative, MRSA screening swab negative    - Patient for underlying COPD, however no wheezing at time of evaluation we will continue tapering steroids.    - Likely discharge home tomorrow as patient's leukocytosis is continue to trend down and is almost normalized.    NSTEMI, type II  Acute hypokalemia  Acute hypercalcemia  Acute hypophosphatemia  Severe hypoalbuminemia  Moderate hyponatremia  New onset atrial fibrillation    - Patient with elevated troponins of 61 and 53 likely secondary to demand from patient's sepsis and rather lower pneumonia as well as atrial fibrillation new in onset likely precipitated by patient's multiple electrolyte derangements.    - Patient also with chronic alcohol use likely also contributing to both A-fib and hyponatremia and electrolyte derangements with poor nutritional intake given hypoalbuminemia.    - Given new onset atrial fibrillation, cardiology consulted and seeing and evaluating, appreciate their input.      - Transitioned off heparin drip and initiated on Eliquis for stroke thromboembolic prophylaxis.    - Initiated on low-dose metoprolol for rate control.  Patient now in sinus rhythm.    -  Transthoracic echocardiogram obtained and shows EF of 66 to 70% with grade 1 impaired diastolic function, no clear evidence of aortic valve regurgitation or stenosis.  Mitral valve structurally normal.  No evidence of pericardial effusion..    Hx alcohol abuse  Acute hyponatremia    - Likely secondary to alcohol abuse and poor nutritional intake and hypoalbuminemia contributing to hyponatremia and will hold on further fluid administration as patient with worsening sodium after IV fluids.  With continued avoidance of additional IV fluids and supportive care and nutrition patient with improving hyponatremia.    - Patient encouraged to cease alcohol use.    Chronic:  Peripheral vascular disease s/p stent  Carotid stenosis  Stroke 2/2 occluded left carotid  Chronic tobacco use  Hypertension  Peripheral neuropathy    Copied text in portions of the note has been reviewed and is accurate as of 05/17/25    VTE Prophylaxis:     No Active Pharmacologic or Mechanical VTE Prophylaxis AND No VTE Prophylaxis Contraindications Documented   - Select Appropriate VTE Prophylaxis       Disposition Home with home health in the next 24 hours.    Ki Medina DO  [facility]  05/17/25  16:34 EDT

## 2025-05-17 NOTE — PHARMACY PATIENT ASSISTANCE
Pharmacy checked on cost/coverage of eliquis. According to his insurance, he will have a coinsurance of $145.67 for a 30 day supply. I have profile a one time eliquis free trial card in the apothecary. I spoke to the wife about assistance and the free 30 day trial card. She understands. She said she was on a program that helped her with her meds at one time to.She will talk to his primary later.    Thank You;  Loida Ortez, PharmD  05/17/25   15:20 EDT

## 2025-05-17 NOTE — PROGRESS NOTES
[facility]  PROGRESS NOTE    Patient information:  Name: Gurwinder Harding  Age/Sex: 64 y.o. male  :  1960        PCP: Pastora Person APRN  Attending: Anika Hood DO  MRN:  [id number]  Visit Number:  [id number]  LOS: 3  CODE STATUS:    Code Status and Medical Interventions: CPR (Attempt to Resuscitate); Full Support   Ordered at: 25     Code Status (Patient has no pulse and is not breathing):    CPR (Attempt to Resuscitate)     Medical Interventions (Patient has pulse or is breathing):    Full Support     PROBLEM LIST:Principal Problem:    Sepsis due to pneumonia    Reason for Cardiology follow-up:  New onset atrial fibrillation     Subjective   ADMISSION INFORMATION:  Chief Complaint   Patient presents with    Weakness - Generalized    Dizziness     DATE:2025    Primary Cardiologist: None since      Interval History:   According to patient's I & O flow chart total urine output was 1200 mL with a net balance of -480 mL overnight. AM labs reveal potassium 3.6, creatinine 0.87, hemoglobin 10.8 versus 11.2, and platelet count 366 versus 396.    Patient was in room 322 when she was seen and examined.  Patient is lying in bed resting quietly.  No acute distress noted at this time.  Dr. Medina was at bedside as well.  Patient reporting leg cramps intermittently throughout the night.  He denies any chest pain, shortness of breath, or palpitations.    EVENT TIMELINE:   5/15: Echo with a EF of 66 to 70%.  LV diastolic function is consistent with grade 1.  Please see full report attached below.  -5/15: New onset Atrial Fibrillation. Started on IV Heparin. Pending price check on DOAC in case he has YAHIR guided DCCV.   : Restarted Lovenox 40 mg subcu daily for VTE prophylaxis then later on changed to Eliquis as noted in my significant note on .     Objective     Vital Signs  Temp:  [97.7 °F (36.5 °C)-98 °F (36.7 °C)] 97.7 °F (36.5  °C)  Heart Rate:  [] 89  Resp:  [13-19] 13  BP: ()/(54-82) 84/56  Device (Oxygen Therapy): room air  Vital Signs (last 72 hrs)         05/14 0700  05/15 0659 05/15 0700  05/16 0659 05/16 0700 05/17 0659 05/17 0700 05/17 0827   Most Recent      Temp (°F) 96.5 -  98    97.6 -  98.4    97.7 -  98.2      98     98 (36.7) 05/17 0742    Heart Rate 66 -  124    93 -  131    79 -  109    75 -  79     79 05/17 0742    Resp 18 -  22    16 -  20    16 -  19      16     16 05/17 0742    BP 78/53 -  150/81    107/64 -  126/74    91/62 -  130/88      108/68     108/68 05/17 0742    SpO2 (%) 91 -  100    93 -  97    92 -  96    95 -  97     95 05/17 0742          BMI:Body mass index is 25.56 kg/m².    WEIGHT:      05/15/25  0500 05/16/25  0500 05/17/25  0500   Weight: 77.9 kg (171 lb 11.2 oz) 79 kg (174 lb 1.6 oz) 80.8 kg (178 lb 2.1 oz)       DIET:Diet: Regular/House; Fluid Consistency: Thin (IDDSI 0)    I&O:  Intake & Output (last 3 days)         05/14 0701  05/15 0700 05/15 0701  05/16 0700 05/16 0701  05/17 0700 05/17 0701 05/18 0700    P.O. 240 600 720     IV Piggyback 100       Total Intake(mL/kg) 340 (4.4) 600 (7.7) 720 (9.2)     Urine (mL/kg/hr) 750 2950 (1.6) 1200 (0.6)     Stool 0 0 0     Total Output 750 2950 1200     Net -410 -2350 -480             Urine Unmeasured Occurrence  3 x      Stool Unmeasured Occurrence 2 x 6 x 4 x              PHYSICAL EXAM:  Vitals and nursing note reviewed.   Constitutional:       Appearance: Not in distress.   HENT:      Head: Normocephalic.   Pulmonary:      Effort: Pulmonary effort is normal.      Breath sounds: Normal breath sounds. No wheezing. No rhonchi. No rales.   Cardiovascular:      Normal rate. Regular rhythm.      Murmurs: There is no murmur.      No gallop.  No click. No rub.   Pulses:     Intact distal pulses.   Edema:     Peripheral edema absent.   Musculoskeletal: Normal range of motion.      Cervical back: Normal range of motion and neck supple. Skin:      General: Skin is warm and dry.   Neurological:      General: No focal deficit present.      Mental Status: Alert and oriented to person, place and time.   Psychiatric:         Behavior: Behavior is cooperative.                    Results review   Results Review:    I have reviewed the patient's new clinical results. 05/17/25 12:17 EDT    Results from last 7 days   Lab Units 05/14/25  1739 05/14/25  1615   HSTROP T ng/L 53* 61*     Lab Results   Component Value Date    PROBNP 202.4 07/15/2021     Results from last 7 days   Lab Units 05/17/25  0416 05/15/25  2233 05/15/25  1449 05/14/25  2241 05/14/25  1615   WBC 10*3/mm3 11.54* 12.87* 16.63* 19.09* 18.38*   HEMOGLOBIN g/dL 10.8* 11.2* 10.6* 12.1* 12.3*   PLATELETS 10*3/mm3 366 396 382 400 482*     Results from last 7 days   Lab Units 05/17/25  0416 05/15/25  2233 05/14/25  2241 05/14/25  1615   SODIUM mmol/L 137 135* 129* 135*   POTASSIUM mmol/L 3.6 4.1 3.7 2.7*   CHLORIDE mmol/L 107 104 98 97*   CO2 mmol/L 20.8* 21.1* 17.7* 26.8   BUN mg/dL 20 22 21 23   CREATININE mg/dL 0.87 1.03 1.07 1.24   CALCIUM mg/dL 8.3* 9.0 9.5 11.1*   GLUCOSE mg/dL 108* 166* 151* 178*   ALT (SGPT) U/L 14 10 8 7   AST (SGOT) U/L 17 18 17 14     Lab Results   Component Value Date    MG 2.1 05/17/2025    MG 2.0 05/14/2025    MG 2.2 05/14/2025     Estimated Creatinine Clearance: 99.3 mL/min (by C-G formula based on SCr of 0.87 mg/dL).    Lab Results   Component Value Date    HGBA1C 5.60 05/14/2025    HGBA1C 5.80 (H) 09/18/2023    HGBA1C 5.10 11/07/2022     Lab Results   Component Value Date    CHOL 179 08/19/2024    TRIG 144 08/19/2024     (H) 08/19/2024    HDL 47 08/19/2024        Lab Results   Component Value Date    INR 1.17 (H) 05/15/2025    INR 1.11 (H) 05/14/2025    INR 1.04 02/17/2022    INR 0.97 07/15/2021    INR 1.09 10/28/2019    INR 1.00 09/19/2019     Lab Results   Component Value Date    LABHEPA 0.14 (L) 05/16/2025    LABHEPA 0.14 (L) 05/15/2025    LABHEPA 0.34 05/15/2025        Lab Results   Component Value Date    TSH 1.030 08/19/2024    PSA 0.650 06/29/2021      Pain Management Panel  More data may exist         Latest Ref Rng & Units 5/15/2025 8/31/2020   Pain Management Panel   Amphetamine, Urine Qual Negative Negative  Negative    Barbiturates Screen, Urine Negative Negative  Negative    Benzodiazepine Screen, Urine Negative Negative  Negative    Buprenorphine, Screen, Urine Negative Negative  Negative    Cocaine Screen, Urine Negative Negative  Negative    Fentanyl, Urine Negative Negative  -   Methadone Screen , Urine Negative Negative  Negative    Methamphetamine, Ur Negative Negative  -     Microbiology Results (last 10 days)       Procedure Component Value - Date/Time    S. Pneumo Ag Urine or CSF - Urine, Urine, Clean Catch [[id number]]  (Normal) Collected: 05/15/25 0327    Lab Status: Final result Specimen: Urine, Clean Catch Updated: 05/15/25 1303     Strep Pneumo Ag Negative    Gastrointestinal Panel, PCR - Stool, Per Rectum [[id number]]  (Normal) Collected: 05/15/25 0053    Lab Status: Final result Specimen: Stool from Per Rectum Updated: 05/15/25 0212     Campylobacter Not Detected     Plesiomonas shigelloides Not Detected     Salmonella Not Detected     Vibrio Not Detected     Vibrio cholerae Not Detected     Yersinia enterocolitica Not Detected     Enteroaggregative E. coli (EAEC) Not Detected     Enteropathogenic E. coli (EPEC) Not Detected     Enterotoxigenic E. coli (ETEC) lt/st Not Detected     Shiga-like toxin-producing E. coli (STEC) stx1/stx2 Not Detected     Shigella/Enteroinvasive E. coli (EIEC) Not Detected     Cryptosporidium Not Detected     Cyclospora cayetanensis Not Detected     Entamoeba histolytica Not Detected     Giardia lamblia Not Detected     Adenovirus F40/41 Not Detected     Astrovirus Not Detected     Norovirus GI/GII Not Detected     Rotavirus A Not Detected     Sapovirus (I, II, IV or V) Not Detected    MRSA Screen, PCR (Inpatient) - Swab,  Nares [[id number]]  (Normal) Collected: 05/14/25 2336    Lab Status: Final result Specimen: Swab from Nares Updated: 05/15/25 0051     MRSA PCR No MRSA Detected    Narrative:      The negative predictive value of this diagnostic test is high and should only be used to consider de-escalating anti-MRSA therapy. A positive result may indicate colonization with MRSA and must be correlated clinically.    Respiratory Panel PCR w/COVID-19(SARS-CoV-2) MULU/HOWARD/ZAK/PAD/COR/KRZYSZTOF In-House, NP Swab in UTM/VTM, 2 HR TAT - Swab, Nasopharynx [[id number]]  (Normal) Collected: 05/14/25 1640    Lab Status: Final result Specimen: Swab from Nasopharynx Updated: 05/14/25 1735     ADENOVIRUS, PCR Not Detected     Coronavirus 229E Not Detected     Coronavirus HKU1 Not Detected     Coronavirus NL63 Not Detected     Coronavirus OC43 Not Detected     COVID19 Not Detected     Human Metapneumovirus Not Detected     Human Rhinovirus/Enterovirus Not Detected     Influenza A PCR Not Detected     Influenza B PCR Not Detected     Parainfluenza Virus 1 Not Detected     Parainfluenza Virus 2 Not Detected     Parainfluenza Virus 3 Not Detected     Parainfluenza Virus 4 Not Detected     RSV, PCR Not Detected     Bordetella pertussis pcr Not Detected     Bordetella parapertussis PCR Not Detected     Chlamydophila pneumoniae PCR Not Detected     Mycoplasma pneumo by PCR Not Detected    Narrative:      In the setting of a positive respiratory panel with a viral infection PLUS a negative procalcitonin without other underlying concern for bacterial infection, consider observing off antibiotics or discontinuation of antibiotics and continue supportive care. If the respiratory panel is positive for atypical bacterial infection (Bordetella pertussis, Chlamydophila pneumoniae, or Mycoplasma pneumoniae), consider antibiotic de-escalation to target atypical bacterial infection.    Blood Culture - Blood, Arm, Left [[id number]]  (Normal) Collected: 05/14/25 1615    Lab  Status: Preliminary result Specimen: Blood from Arm, Left Updated: 05/16/25 1645     Blood Culture No growth at 2 days    Blood Culture - Blood, Arm, Right [[id number]]  (Normal) Collected: 05/14/25 1615    Lab Status: Preliminary result Specimen: Blood from Arm, Right Updated: 05/16/25 1645     Blood Culture No growth at 2 days           Imaging Results (Last 24 Hours)       ** No results found for the last 24 hours. **          ECHO:  Results for orders placed during the hospital encounter of 05/14/25    Adult Transthoracic Echo Complete W/ Cont if Necessary Per Protocol    Interpretation Summary    Lumason image enhancer was used in order to optimize the study.    Normal left ventricular cavity size and wall thickness noted. All left ventricular wall segments contract normally.    Left ventricular ejection fraction appears to be 66 - 70%.    Left ventricular diastolic function is consistent with (grade I) impaired relaxation.    The aortic valve is not well visualized. No aortic valve regurgitation or stenosis is present. The aortic valve is grossly normal in structure.    The mitral valve is structurally normal with no regurgitation or significant stenosis present.    There is no evidence of pericardial effusion.        STRESS TEST:  Results for orders placed during the hospital encounter of 11/30/16    Stress Test With Myocardial Perfusion One Day    Interpretation Summary  · Left ventricular ejection fraction is normal (Calculated EF = 67%).  · Myocardial perfusion imaging indicates a normal myocardial perfusion study with no evidence of ischemia.  · Impressions are consistent with a low risk study.  · low risk for ischemic heart disease.  · Findings consistent with a normal ECG stress test.         HEART CATH:  Results for orders placed during the hospital encounter of 10/29/19    Cardiac Catheterization/Vascular Study    Narrative  Images from the original result were not included.  PERIPHERAL ARTERIOGRAM /  INTERVENTION REPORT    DATE OF PROCEDURE: 10/29/19    INDICATION FOR PROCEDURE: Severe claudication of the RIGHT leg.  Recent intervention to LLE with healing of ulcer on the left  Pt still has cold foot on the right with no pulse.      PROCEDURE PERFORMED:  LEFT femoral artery access with 5 English sheath  Bilateral lower extremity angiogram  Atherectomy followed by Balloon angioplasty and stenting of the R SFA    PROCEDURE COMMENTS:    Gurwinder Harding was brought to the cath lab and placed on the cardiac catherization table in the postabsortive state. The patient was prepped and draped according to the cath lab protocol under strict aseptic and sterile condition. Patient's right femoral artery sight was prepped and draped. Under fluoroscopic guidance theright femoral artery was punctured using a 21 gauge needle utilizing the modified Seldinger technique. 5 Estonian sheath was introduced over a wire. After aspirating for blood it was flushed with heparinized saline.    LEFT extremity imaging was performed via the sheath. A 5F RIM catheter was then advanced to the bifurcation at L5 and engaged in the contralateral CLEMENTE. RIGHT extremity imaging was then performed. After the procedure was completed the sheath was removed in the cath lab and hemostasis achieved via manual compression. No complications occurred.    Findings:  Right Lower Extremity:  Common  Iliac artery was normal  SFA shows 100% short occlusion in the distal third  Popliteal Artery was patent with no disease  Tibio-peroneal trunk patent with no disease  Anterior tibial artery was patent  Posterior tibial artery was patent  3 Vessel run off present    Left Lower Extremity:  Common iliac artery shows mild disease and calcific changes  SFA is patent at the site of previous atherectomy and DCB with excellent flow  Popliteal Artery is patent with no disease  Tibio-peroneal trunk patent with no significant disease  Anterior tibial artery was patent  Posterior  tibial artery is patent  3 Vessel run off present    Recommendations:  Intervention to R SFA    6x45 straight sheath used  Angled NaviCross with glidewire 0.035  Crossed successfully and a Stabilizer wire was used for Atherectomy with HawkOne 6F  DCB with 5x120 Admiral performed  There was residual stenosis which was flow limiting and thus stenting was elected.  6x120 Everflex stent was delivered with excellent results and flow was fully re-established.      FINAL RESULTS:  Successful Intervention to the Right SFA Chronic Total occlusion  beginning at the ostium and extending to the Adductor canal using:    Atherectomy HawkOne device 6F  Balloon angioplasty with Admiral DCB 5x120 followed by stenting of the right SFA using 6x120 Everflex stent  From 100% to 20% residual with brisk flow to the distal vessel.      EBL: 50 ML  No specimens were removed.    Plan  Continue medical therapy.    Tong Azar MD  10/29/19        Director, Cardiac Cath Lab        EP STUDY/Interrogations:   No results found for this or any previous visit.        [id number]        TELEMETRY:                       I reviewed the patient's new clinical results.    ALLERGIES: Penicillins and Novocain [procaine]    Medication Review:   Current list of medications may not reflect those currently placed in orders that are not signed or are being held.     apixaban, 5 mg, Oral, Q12H  ascorbic acid, 500 mg, Oral, Daily  aztreonam, 2,000 mg, Intravenous, Q8H  cholecalciferol, 1,000 Units, Oral, Daily  gabapentin, 800 mg, Oral, Q8H  ipratropium, 0.5 mg, Nebulization, 4x Daily - RT  isosorbide mononitrate, 30 mg, Oral, Daily  magnesium oxide, 400 mg, Oral, Daily  methocarbamol, 750 mg, Oral, 4x Daily  methylPREDNISolone sodium succinate, 40 mg, Intravenous, Daily  metoprolol tartrate, 25 mg, Oral, Q12H  nicotine, 1 patch, Transdermal, Q24H  potassium chloride ER, 40 mEq, Oral, Q4H  rosuvastatin, 10 mg, Oral, Nightly  sodium chloride, 10 mL,  Intravenous, Q12H  traZODone, 50 mg, Oral, Nightly  vitamin B-12, 500 mcg, Oral, Daily  zinc sulfate, 220 mg, Oral, Daily      Pharmacy Consult,         acetaminophen    benzonatate    senna-docusate sodium **AND** polyethylene glycol **AND** bisacodyl **AND** bisacodyl    Calcium Replacement - Follow Nurse / BPA Driven Protocol    guaiFENesin    guaiFENesin-dextromethorphan    Magnesium Standard Dose Replacement - Follow Nurse / BPA Driven Protocol    nicotine polacrilex    nitroglycerin    ondansetron    Pharmacy Consult    Phosphorus Replacement - Follow Nurse / BPA Driven Protocol    Potassium Replacement - Follow Nurse / BPA Driven Protocol    sodium chloride    sodium chloride    sodium chloride    sodium chloride        Admitting Problem List Principal Problem:    Sepsis due to pneumonia    Assessment    1.  New onset atrial fibrillation with controlled ventricular response on admission, [id number]: of 2 [hypertension and PAD], currently patient is in sinus rhythm with frequent premature atrial and ventricular complexes  2.  Relatively low blood pressure  3.  History of remote GI bleed with mild anemia with a stable hemoglobin on heparin  4.  History of alcohol abuse  5.  Dyslipidemia  6.  Prediabetes  7.  Hyponatremia  8.  Hypokalemia, resolved  9.  Peripheral vascular disease s/p previous PCI  10.  Elevated high-sensitivity troponin admission consistent most likely with NSTEMI type II secondary to atrial fibrillation with RVR  11.  Left upper lobe pneumonia  12.  Hypercalcemia  13.  Carotid stenosis with complete occlusion of the left internal carotid artery          Recommendations / Plan   1.  More or less stable had couple of very short runs of atrial fibrillation overnight currently in sinus rhythm again keep potassium between 4-4.5 and magnesium 2-2.2  2.  Blood pressure is borderline continue current dose of metoprolol  3.  Continue Eliquis for thromboembolic prophylaxis  4.  Follow-up with  cardiology as an outpatient postdischarge  5.  Will follow the distance please call if assistance is required          I have discussed the patients findings and recommendations with the patient.    Thank you very much for asking us to be involved in this patient's care.      Electronically signed by KEVIN Nguyen, 05/17/25, 12:17 PM EDT.   Electronically signed by Carla Yen MD, 05/17/25, 12:25 PM EDT.                  Please note that portions of this note were completed with a voice recognition program.    Please note that portions of this note were copied and has been reviewed and is accurate as of 5/17/2025 .

## 2025-05-18 ENCOUNTER — READMISSION MANAGEMENT (OUTPATIENT)
Dept: CALL CENTER | Facility: HOSPITAL | Age: 65
End: 2025-05-18
Payer: MEDICARE

## 2025-05-18 VITALS
HEIGHT: 70 IN | OXYGEN SATURATION: 97 % | TEMPERATURE: 98.1 F | RESPIRATION RATE: 14 BRPM | WEIGHT: 174.1 LBS | SYSTOLIC BLOOD PRESSURE: 102 MMHG | HEART RATE: 86 BPM | BODY MASS INDEX: 24.92 KG/M2 | DIASTOLIC BLOOD PRESSURE: 73 MMHG

## 2025-05-18 LAB
ANION GAP SERPL CALCULATED.3IONS-SCNC: 6.4 MMOL/L (ref 5–15)
BUN SERPL-MCNC: 19 MG/DL (ref 8–23)
BUN/CREAT SERPL: 24.1 (ref 7–25)
CALCIUM SPEC-SCNC: 7.8 MG/DL (ref 8.6–10.5)
CHLORIDE SERPL-SCNC: 110 MMOL/L (ref 98–107)
CO2 SERPL-SCNC: 18.6 MMOL/L (ref 22–29)
CREAT SERPL-MCNC: 0.79 MG/DL (ref 0.76–1.27)
DEPRECATED RDW RBC AUTO: 57.4 FL (ref 37–54)
EGFRCR SERPLBLD CKD-EPI 2021: 99.8 ML/MIN/1.73
ERYTHROCYTE [DISTWIDTH] IN BLOOD BY AUTOMATED COUNT: 15.4 % (ref 12.3–15.4)
GLUCOSE SERPL-MCNC: 103 MG/DL (ref 65–99)
HCT VFR BLD AUTO: 31.2 % (ref 37.5–51)
HGB BLD-MCNC: 9.6 G/DL (ref 13–17.7)
MCH RBC QN AUTO: 31.2 PG (ref 26.6–33)
MCHC RBC AUTO-ENTMCNC: 30.8 G/DL (ref 31.5–35.7)
MCV RBC AUTO: 101.3 FL (ref 79–97)
PLATELET # BLD AUTO: 353 10*3/MM3 (ref 140–450)
PMV BLD AUTO: 9.8 FL (ref 6–12)
POTASSIUM SERPL-SCNC: 5.4 MMOL/L (ref 3.5–5.2)
RBC # BLD AUTO: 3.08 10*6/MM3 (ref 4.14–5.8)
SODIUM SERPL-SCNC: 135 MMOL/L (ref 136–145)
WBC NRBC COR # BLD AUTO: 12.7 10*3/MM3 (ref 3.4–10.8)

## 2025-05-18 PROCEDURE — 25010000002 AZTREONAM PER 500 MG: Performed by: INTERNAL MEDICINE

## 2025-05-18 PROCEDURE — 94799 UNLISTED PULMONARY SVC/PX: CPT

## 2025-05-18 PROCEDURE — 94761 N-INVAS EAR/PLS OXIMETRY MLT: CPT

## 2025-05-18 PROCEDURE — 85027 COMPLETE CBC AUTOMATED: CPT | Performed by: STUDENT IN AN ORGANIZED HEALTH CARE EDUCATION/TRAINING PROGRAM

## 2025-05-18 PROCEDURE — 94664 DEMO&/EVAL PT USE INHALER: CPT

## 2025-05-18 PROCEDURE — 63710000001 PREDNISONE PER 1 MG: Performed by: STUDENT IN AN ORGANIZED HEALTH CARE EDUCATION/TRAINING PROGRAM

## 2025-05-18 PROCEDURE — 99239 HOSP IP/OBS DSCHRG MGMT >30: CPT | Performed by: STUDENT IN AN ORGANIZED HEALTH CARE EDUCATION/TRAINING PROGRAM

## 2025-05-18 PROCEDURE — 80048 BASIC METABOLIC PNL TOTAL CA: CPT | Performed by: STUDENT IN AN ORGANIZED HEALTH CARE EDUCATION/TRAINING PROGRAM

## 2025-05-18 RX ORDER — NITROGLYCERIN 0.4 MG/1
0.4 TABLET SUBLINGUAL
Qty: 25 TABLET | Refills: 0 | Status: SHIPPED | OUTPATIENT
Start: 2025-05-18

## 2025-05-18 RX ORDER — METOPROLOL TARTRATE 25 MG/1
25 TABLET, FILM COATED ORAL EVERY 12 HOURS SCHEDULED
Qty: 60 TABLET | Refills: 0 | Status: SHIPPED | OUTPATIENT
Start: 2025-05-18 | End: 2025-06-17

## 2025-05-18 RX ORDER — CEFDINIR 300 MG/1
300 CAPSULE ORAL EVERY 12 HOURS SCHEDULED
Status: DISCONTINUED | OUTPATIENT
Start: 2025-05-18 | End: 2025-05-18 | Stop reason: HOSPADM

## 2025-05-18 RX ORDER — CEFDINIR 300 MG/1
300 CAPSULE ORAL EVERY 12 HOURS SCHEDULED
Qty: 4 CAPSULE | Refills: 0 | Status: SHIPPED | OUTPATIENT
Start: 2025-05-18 | End: 2025-05-20

## 2025-05-18 RX ORDER — PREDNISONE 20 MG/1
20 TABLET ORAL
Qty: 2 TABLET | Refills: 0 | Status: SHIPPED | OUTPATIENT
Start: 2025-05-19 | End: 2025-05-21

## 2025-05-18 RX ORDER — ROSUVASTATIN CALCIUM 10 MG/1
10 TABLET, COATED ORAL NIGHTLY
Qty: 30 TABLET | Refills: 0 | Status: SHIPPED | OUTPATIENT
Start: 2025-05-18 | End: 2025-06-17

## 2025-05-18 RX ADMIN — APIXABAN 5 MG: 5 TABLET, FILM COATED ORAL at 09:44

## 2025-05-18 RX ADMIN — OXYCODONE HYDROCHLORIDE AND ACETAMINOPHEN 500 MG: 500 TABLET ORAL at 09:44

## 2025-05-18 RX ADMIN — ZINC SULFATE 220 MG (50 MG) CAPSULE 220 MG: CAPSULE at 09:43

## 2025-05-18 RX ADMIN — Medication 10 ML: at 09:44

## 2025-05-18 RX ADMIN — METOPROLOL TARTRATE 25 MG: 25 TABLET, FILM COATED ORAL at 09:44

## 2025-05-18 RX ADMIN — NICOTINE 1 PATCH: 21 PATCH TRANSDERMAL at 09:45

## 2025-05-18 RX ADMIN — METHOCARBAMOL 750 MG: 500 TABLET ORAL at 09:43

## 2025-05-18 RX ADMIN — GABAPENTIN 800 MG: 400 CAPSULE ORAL at 06:19

## 2025-05-18 RX ADMIN — IPRATROPIUM BROMIDE 0.5 MG: 0.5 SOLUTION RESPIRATORY (INHALATION) at 11:55

## 2025-05-18 RX ADMIN — CEFDINIR 300 MG: 300 CAPSULE ORAL at 17:04

## 2025-05-18 RX ADMIN — AZTREONAM 2000 MG: 2 INJECTION, POWDER, LYOPHILIZED, FOR SOLUTION INTRAMUSCULAR; INTRAVENOUS at 06:19

## 2025-05-18 RX ADMIN — Medication 400 MG: at 09:44

## 2025-05-18 RX ADMIN — ISOSORBIDE MONONITRATE 30 MG: 30 TABLET, EXTENDED RELEASE ORAL at 09:43

## 2025-05-18 RX ADMIN — IPRATROPIUM BROMIDE 0.5 MG: 0.5 SOLUTION RESPIRATORY (INHALATION) at 07:04

## 2025-05-18 RX ADMIN — GABAPENTIN 800 MG: 400 CAPSULE ORAL at 15:22

## 2025-05-18 RX ADMIN — PREDNISONE 20 MG: 20 TABLET ORAL at 09:43

## 2025-05-18 RX ADMIN — Medication 1000 UNITS: at 09:43

## 2025-05-18 RX ADMIN — Medication 500 MCG: at 09:44

## 2025-05-18 RX ADMIN — AZTREONAM 2000 MG: 2 INJECTION, POWDER, LYOPHILIZED, FOR SOLUTION INTRAMUSCULAR; INTRAVENOUS at 15:22

## 2025-05-18 RX ADMIN — METHOCARBAMOL 750 MG: 500 TABLET ORAL at 12:09

## 2025-05-18 RX ADMIN — METHOCARBAMOL 750 MG: 500 TABLET ORAL at 17:04

## 2025-05-18 NOTE — DISCHARGE SUMMARY
[facility] DISCHARGE SUMMARY    Patient Identification:  Name:  Gurwinder Harding  Age:  64 y.o.  Sex:  male  :  1960  MRN:  [id number]  Visit Number:  [id number]    Date of Admission: 2025  Date of Discharge:  2025     PCP: Pastora Person APRN    DISCHARGE DIAGNOSIS  Sepsis  Lactic acidemia  Right upper lobe bacterial pneumonia, suspect gram-negative  NSTEMI, type II  Acute hypokalemia  Acute hypercalcemia  Acute hypophosphatemia  Severe hypoalbuminemia  Moderate hyponatremia  New onset atrial fibrillation  Hx alcohol abuse  Acute hyponatremia  Peripheral vascular disease s/p stent  Carotid stenosis  Stroke 2/2 occluded left carotid  Chronic tobacco use  Hypertension  Peripheral neuropathy    CONSULTS       PROCEDURES PERFORMED      HOSPITAL COURSE  Patient is a 64 y.o. male presented to [facility] complaining of generalized weakness and cough.  Please see the admitting history and physical for further details.      Patient presented to the emergency department with above-noted complaint with cough persisting for the last 2 weeks and mostly nonproductive with no fever or chills but complaining primarily of profound generalized weakness and fatigue.  Patient currently a 1 pack a day cigarette smoker.  After initial evaluation emergency department patient found to meet sepsis criteria with evidence of right upper lobe pneumonia on imaging.  Patient also with elevated troponins consistent with NSTEMI type II secondary to sepsis and pneumonia.  Patient also with multiple electrolyte derangements including hypokalemia, hypercalcemia, hypophosphatemia, hypoalbuminemia.  Patient subsequently admitted to the hospitalist service and initiated on empiric antibiotic therapy with aztreonam and azithromycin with suspected gram-negative pneumonia contributing to patient's sepsis.  Patient completing appropriate 3-day course of azithromycin and did remain on room air while in  hospital with PCR respiratory panel negative, strep pneumo antigen negative and MRSA screening swab negative.  Patient with suspected underlying COPD however no wheezing and was quickly tapered off of steroids.  Patient with multiple electrolyte derangements with also development postadmission of new onset atrial fibrillation likely helped to be precipitated by patient's electrolyte derangements due to poor nutrition and chronic alcohol use.  Given new onset atrial fibrillation cardiology was consulted and did see and evaluate the patient throughout his hospitalization and patient transitioned off of heparin drip initiated on Eliquis for stroke thromboembolic prophylaxis.  Patient also initiated on low-dose metoprolol for rate control with good tolerance.  A transthoracic echocardiogram obtained showed an EF of 66 to 70% with grade 1 impaired diastolic function with no clear evidence of aortic valve regurgitation or stenosis.  Mitral valve was noted to be structurally normal with no evidence of pericardial effusion on echo.  Patient with some hyponatremia felt to be likely secondary to poor nutrition from chronic alcohol use and weight loss.  Patient with slight decline in sodium after initial IV fluids that admission and with further withholding of additional IV fluids with improving sodium.  Patient was encouraged to cease alcohol use.  At time of discharge patient with above-noted interventions feeling much improved and progressing well and transition to oral Omnicef.  Patient reporting ambulating only around 14 feet at a time due to increased debility and dyspnea and patient offered home health for PT services and patient agreeable.  On day of discharge patient feeling significantly improved and desired to return home and given the above noted findings as well as transition in his care with antibiotics was felt to achieve maximal benefit of continued inpatient hospitalization and subsequently discharged home in  stable medical condition with home health PT and OT services.  He will follow-up with PCP postdischarge.    VITAL SIGNS:  Temp:  [97.5 °F (36.4 °C)-98.3 °F (36.8 °C)] 98.1 °F (36.7 °C)  Heart Rate:  [] 86  Resp:  [14-22] 14  BP: (102-161)/(62-86) 102/73  SpO2:  [92 %-100 %] 97 %  on   ;   Device (Oxygen Therapy): room air    Body mass index is 24.98 kg/m².  Wt Readings from Last 3 Encounters:   05/18/25 79 kg (174 lb 1.6 oz)   11/20/24 94.6 kg (208 lb 9.6 oz)   09/18/24 94.9 kg (209 lb 3.2 oz)       PHYSICAL EXAM:  Constitutional: Chronically ill-appearing adult male, appears much older than stated age.  NAD at time of evaluation.      HENT:  Head:  Normocephalic and atraumatic.  Mouth:  Moist mucous membranes.    Eyes:  Conjunctivae and EOM are normal. No scleral icterus.    Cardiovascular: Irregularly irregular rate and rhythm and normal heart sounds with no murmur.  Pulmonary/Chest:  No respiratory distress, coarse breath sounds bilaterally with resolved rhonchi in right upper lung field no longer present.  Abdominal:  Soft.  Bowel sounds are normal.  No distension and no tenderness.   Musculoskeletal:  No tenderness, and no deformity.  No red or swollen joints anywhere.  Functional ROM intact.   Neurological:  Alert and oriented to person, place, and time.  No cranial nerve deficit.  No tongue deviation.  No facial droop.  No slurred speech. Intact Sensation throughout  Skin:  Skin is warm and dry.  Scattered areas of excoriation, scarring and ecchymosis bilaterally in the upper extremities.   Peripheral vascular:  Pulses in all 4 extremities with no clubbing, no cyanosis, no edema.  Psychiatric: Appropriate mood and affect, pleasant.     DISCHARGE DISPOSITION   Stable    DISCHARGE MEDICATIONS:     Discharge Medications        New Medications        Instructions Start Date   apixaban 5 MG tablet tablet  Commonly known as: ELIQUIS   5 mg, Oral, Every 12 Hours Scheduled      cefdinir 300 MG capsule  Commonly  known as: OMNICEF   300 mg, Oral, Every 12 Hours Scheduled      metoprolol tartrate 25 MG tablet  Commonly known as: LOPRESSOR   25 mg, Oral, Every 12 Hours Scheduled      nitroglycerin 0.4 MG SL tablet  Commonly known as: NITROSTAT   0.4 mg, Sublingual, Every 5 Minutes PRN, Take no more than 3 doses in 15 minutes.      predniSONE 20 MG tablet  Commonly known as: DELTASONE   20 mg, Oral, Daily With Breakfast   Start Date: May 19, 2025     rosuvastatin 10 MG tablet  Commonly known as: CRESTOR   10 mg, Oral, Nightly             Continue These Medications        Instructions Start Date   ascorbic acid 500 MG tablet  Commonly known as: VITAMIN C   500 mg, Daily      cholecalciferol 25 MCG (1000 UT) tablet  Commonly known as: VITAMIN D3   1,000 Units, Oral, Daily      gabapentin 800 MG tablet  Commonly known as: NEURONTIN   1,200 mg, 3 Times Daily      isosorbide mononitrate 30 MG 24 hr tablet  Commonly known as: IMDUR   30 mg, Daily      magnesium oxide 400 MG tablet  Commonly known as: MAG-OX   400 mg, Daily      methocarbamol 750 MG tablet  Commonly known as: ROBAXIN   750 mg, 4 Times Daily      traZODone 50 MG tablet  Commonly known as: DESYREL   50 mg, Nightly      vitamin B-12 500 MCG tablet  Commonly known as: CYANOCOBALAMIN   500 mcg, Daily      zinc sulfate 220 (50 Zn) MG capsule  Commonly known as: ZINCATE   220 mg, Daily             Stop These Medications      aspirin 81 MG EC tablet     hydroCHLOROthiazide 25 MG tablet                Additional Instructions for the Follow-ups that You Need to Schedule       Ambulatory Referral to Home Health   As directed      Face to Face Visit Date: 5/18/2025   Follow-up provider for Plan of Care?: I treated the patient in an acute care facility and will not continue treatment after discharge.   Follow-up provider: ALBERTO THACKER [[id number]]   Reason/Clinical Findings: physical debility, pulmonary cachexia   Describe mobility limitations that make leaving home difficult: same as  above   Nursing/Therapeutic Services Requested: Occupational Therapy Physical Therapy   Frequency: 1 Week 1        Discharge Follow-up with PCP   As directed       Currently Documented PCP:    Pastora Person APRN    PCP Phone Number:    [phone]     Follow Up Details: 1 week follow up               Follow-up Information       Pastora Person APRN .    Specialty: Family Medicine  Why: 1 week follow up  Contact information:  [address] [place] [address]  [phone]                              TEST  RESULTS PENDING AT DISCHARGE  Pending Labs       Order Current Status    Blood Culture - Blood, Arm, Left Preliminary result    Blood Culture - Blood, Arm, Right Preliminary result             CODE STATUS  Code Status and Medical Interventions: CPR (Attempt to Resuscitate); Full Support   Ordered at: 05/14/25 1953     Code Status (Patient has no pulse and is not breathing):    CPR (Attempt to Resuscitate)     Medical Interventions (Patient has pulse or is breathing):    Full Support       Ki Medina DO  [facility]  05/18/25  16:44 EDT    Please note that this discharge summary required more than 30 minutes to complete.

## 2025-05-18 NOTE — PLAN OF CARE
Goal Outcome Evaluation:     Patient resting in bed. No visible indicators of acute distress noted. Plan of care ongoing.     05-Oct-2022

## 2025-05-18 NOTE — NURSING NOTE
Pt being discharged home today on room air. JARED Shah made aware the discharge is complete. Family to provide transportation. I spoke to Mi with [facility] who stated someone will be out tomorrow.

## 2025-05-18 NOTE — PLAN OF CARE
Pt rested in bed this shift without complaints. No s/s of acute distress noted. VSS    Pt discharged home with spouse. IV removed; telemetry leads removed; belongings gathered and returned with pt; and educated pt on discharge instructions.

## 2025-05-19 ENCOUNTER — TRANSITIONAL CARE MANAGEMENT TELEPHONE ENCOUNTER (OUTPATIENT)
Dept: CALL CENTER | Facility: HOSPITAL | Age: 65
End: 2025-05-19
Payer: MEDICARE

## 2025-05-19 ENCOUNTER — NURSE TRIAGE (OUTPATIENT)
Dept: CALL CENTER | Facility: HOSPITAL | Age: 65
End: 2025-05-19
Payer: MEDICARE

## 2025-05-19 ENCOUNTER — TELEPHONE (OUTPATIENT)
Dept: FAMILY MEDICINE CLINIC | Facility: CLINIC | Age: 65
End: 2025-05-19
Payer: MEDICARE

## 2025-05-19 ENCOUNTER — TELEPHONE (OUTPATIENT)
Dept: TELEMETRY | Facility: HOSPITAL | Age: 65
End: 2025-05-19
Payer: MEDICARE

## 2025-05-19 LAB
BACTERIA SPEC AEROBE CULT: NORMAL
BACTERIA SPEC AEROBE CULT: NORMAL

## 2025-05-19 NOTE — TELEPHONE ENCOUNTER
"Transferred call to [facility]  [phone].   Reason for Disposition   [1] Caller requesting NON-URGENT health information AND [2] PCP's office is the best resource    Additional Information   Negative: [1] Caller is not with the adult (patient) AND [2] reporting urgent symptoms   Negative: Lab result questions   Negative: Medication questions   Negative: Caller can't be reached by phone   Negative: Caller has already spoken to PCP or another triager   Negative: RN needs further essential information from caller in order to complete triage   Negative: Requesting regular office appointment    Answer Assessment - Initial Assessment Questions  1. REASON FOR CALL or QUESTION: \"What is your reason for calling today?\" or \"How can I best help you?\" or \"What question do you have that I can help answer?\"       faxed requested information to the  agency today advised caller.  Would need to speak with them to see if he has been accepted and when they will see him. Transferred call to [phone].    Protocols used: Information Only Call-ADULT-    "

## 2025-05-19 NOTE — TELEPHONE ENCOUNTER
Wife called patient was recently discharged from the hospital,had med changes the one thing she is not sure of is his Pletal 100 mg,she reports they were told it is not on a list anywhere & it is not listed but she reports his last refill was on 4/30/2025 per yourself so she wants to know if he is to take it or not?

## 2025-05-19 NOTE — OUTREACH NOTE
Call Center TCM Note      Flowsheet Row Responses   [facility] patient discharged from? [facility]   Does the patient have one of the following disease processes/diagnoses(primary or secondary)? Sepsis   TCM attempt successful? Yes   Call start time 1105   Call end time 1113   Discharge diagnosis Sepsis due to pneumonia   Meds reviewed with patient/caregiver? Yes   Is the patient having any side effects they believe may be caused by any medication additions or changes? No   Does the patient have all medications related to this admission filled (includes all antibiotics, inhalers, nebulizers,steroids,etc.) Yes   Is the patient taking all medications as directed (includes completed medication regime)? Yes   Comments Appt made for 5/28 @ 10:45 with PCP.   Does the patient have an appointment with their PCP within 7-14 days of discharge? No   Nursing Interventions Assisted patient with making appointment per protocol   What is the Home health agency?  HH ordered   Has home health visited the patient within 72 hours of discharge? Call prior to 72 hours   Psychosocial issues? No   Did the patient receive a copy of their discharge instructions? Yes   Nursing interventions Reviewed instructions with patient   What is the patient's perception of their health status since discharge? Improving   Nursing interventions Nurse provided patient education   Is the patient/caregiver able to teach back TIME? T emperature - higher or lower than normal, I nfection - may have signs and symptoms of an infection, M ental Decline - confused, sleepy, difficult to arouse, E xtremely Ill - severe pain, discomfort, shortness of breath   Nursing interventions Nurse provided patient education   Is patient/caregiver able to teach back steps to recovery at home? Set small, achievable goals for return to baseline health, Rest and regain strength, Eat a balanced diet, Make a list of questions for PCP appoinment   Is the patient/caregiver able to  teach back signs and symptoms of worsening condition: Fever, Hyperthermia, Rapid heart rate (>90)   If the patient is a current smoker, are they able to teach back resources for cessation? Smoking cessation medications, [id number]   Is the patient/caregiver able to teach back the hierarchy of who to call/visit for symptoms/problems? PCP, Specialist, Home health nurse, Urgent Care, ED, 911 Yes   Additional teach back comments States he is just trying to recover.   TCM call completed? Yes   Wrap up additional comments Appt made with PCP.   Call end time 1113            Yee KANG - Licensed Nurse    5/19/2025, 11:13 EDT

## 2025-05-19 NOTE — DISCHARGE PLACEMENT REQUEST
"Janine Harding \"Ray\" (64 y.o. Male)       Date of Birth   1960    Social Security Number       Address   [address] [place] [address]    Home Phone   [phone]    MRN   [id number]       Mandaen   Religion    Marital Status                               Admission Date   2025    Admission Type   Emergency    Admitting Provider   Anika Hood DO    Attending Provider       Department, Room/Bed   [address], 3322/1P       Discharge Date   2025    Discharge Disposition   Home-Health Care [facility]    Discharge Destination                                 Attending Provider: (none)   Allergies: Penicillins, Novocain [Procaine]    Isolation: None   Infection: MRSA (22)   Code Status: Prior    Ht: 177.8 cm (70\")   Wt: 79 kg (174 lb 1.6 oz)    Admission Cmt: None   Principal Problem: Sepsis due to pneumonia [J18.9,A41.9]                   Active Insurance as of 2025       Primary Coverage       Payor Plan Insurance Group Employer/Plan Group    ANTHEM MEDICARE REPLACEMENT ANTHEM MEDICARE ADVANTAGE HMO [id number]       Payor Plan Address Payor Plan Phone Number Payor Plan Fax Number Effective Dates    [address] [phone]  2025 - None Entered    [facility] [address]         Subscriber Name Subscriber Birth Date Member ID       JANINE HARDING 1960 [id number]                     Emergency Contacts        (Rel.) Home Phone Work Phone Mobile Phone    Patsy Harding (Spouse) [phone] -- --          [address] [address]  Phone:  [phone]  Fax:  [phone] Date: May 18, 2025      Ambulatory Referral to Home Health     Patient:  Janine Harding MRN:  [id number]   [address] [address] :  1960  SSN:    Phone: [phone] Sex:  M      INSURANCE PAYOR PLAN GROUP # SUBSCRIBER ID   Primary:    ANTHEM MEDICARE REPLACEMENT [id number] " [id number]      Referring Provider Information:  KI POMPA Phone: [phone] Fax: [phone]       Referral Information:   # Visits:  999 Referral Type: Home Health [42]   Urgency:  Routine Referral Reason: Specialty Services Required   Start Date: May 18, 2025 End Date:  To be determined by Insurer   Diagnosis: Neuropathy (G62.9)      Refer to Dept:   Refer to Provider:   Refer to Provider Phone:   Refer to Facility:       Face to Face Visit Date: 5/18/2025  Follow-up provider for Plan of Care? I treated the patient in an acute care facility and will not continue treatment after discharge.  Follow-up provider: ALBERTO THACKER [[id number]]  Reason/Clinical Findings: physical debility, pulmonary cachexia  Describe mobility limitations that make leaving home difficult: same as above  Nursing/Therapeutic Services Requested: Occupational Therapy  Nursing/Therapeutic Services Requested: Physical Therapy  Frequency: 1 Week 1     This document serves as a request of services and does not constitute Insurance authorization or approval of services.  To determine eligibility, please contact the members Insurance carrier to verify and review coverage.     If you have medical questions regarding this request for services. Please contact [address] at [phone] during normal business hours.        Authorizing Provider:Ki Pompa DO  Authorizing Provider's NPI: [id number]  Order Entered By: Ki Pompa DO 5/18/2025  4:43 PM     Electronically signed by: Ki Pompa DO 5/18/2025  4:43 PM       Insurance Information                  Critical access hospital MEDICARE REPLACEMENT/ANTHEM MEDICARE ADVANTAGE HMO Phone: [phone]    Subscriber: Gurwinder Harding Subscriber#: [id number]    Group#: [id number] Precert#: [id number]    Authorization#: -- Effective Date: --             History & Physical        Anika Hood DO at 05/14/25 2020          [facility]   HISTORY AND PHYSICAL    Patient Name: Gurwinder ROTH  Meaghan  : 1960  MRN: [id number]  Primary Care Physician:  Pastora Person APRN  Date of admission: 2025    Subjective  Subjective     Chief Complaint: Weakness, Cough    History of Present Illness the patient is a 64-year-old male with past medical history significant for tobacco use, peripheral vascular disease status post arthrectomy followed by balloon angioplasty and stenting of the right and left SFA, peripheral neuropathy, hypertension, CVA due to an occluded left carotid artery, alcohol use and hypertension who presents to the emergency department complaining of worsening weakness and cough.    Patient seen and examined in . Wife at bedside.  Wife states that the patient has had a cough for approximately 2 weeks.  Patient states that it is mostly nonproductive.  He denies fever and/or chills but does report profound generalized weakness and fatigue.  Patient denies any nausea, vomiting and/or abdominal pain.  He does not report any recent diarrhea.    Patient denies any dysuria but does report some occasional urge incontinence and weak stream.    Patient states that he currently smokes approximately 1 pack of cigarettes per day.  He states that in the past 2 or 3 weeks he has only had 1 beer and has significantly cut back on his overall alcohol intake.  Patient and wife deny any known sick contacts.  Patient denies any recent hospitalizations.    Review of Systems   Constitutional:  Positive for fatigue. Negative for chills, diaphoresis and fever.   HENT:  Negative for hearing loss and trouble swallowing (occasional; no specific foods/liquids).    Eyes:  Negative for discharge and visual disturbance.   Respiratory:  Positive for cough and shortness of breath. Negative for wheezing.    Cardiovascular:  Negative for chest pain, palpitations and leg swelling.   Gastrointestinal:  Negative for abdominal pain, constipation, diarrhea, nausea and vomiting.   Endocrine: Negative for polydipsia,  polyphagia and polyuria.   Genitourinary:  Positive for decreased urine volume and difficulty urinating. Negative for dysuria, frequency and hematuria.   Musculoskeletal:  Negative for gait problem and myalgias.   Skin:  Negative for rash.   Neurological:  Positive for weakness. Negative for dizziness, tremors, seizures, syncope and light-headedness.   Hematological:  Does not bruise/bleed easily.   Psychiatric/Behavioral:  Negative for agitation and confusion.         Personal History     Past Medical History:   Diagnosis Date    Alcohol abuse     Arthritis     Carotid stenosis     Cerebrovascular accident (CVA) due to occlusion of left carotid artery 07/15/2021    ED (erectile dysfunction)     History of degenerative disc disease     Hydrocele in adult 12/29/2020    Hypertension     MRSA (methicillin resistant staph aureus) culture positive     patient states diagnosed approx 2 wks ago    Neuropathy     Peripheral vascular disease     s/p arthrectomy follow by balloon angioplasty and stents of right and left SFA    Tobacco abuse     Vitamin D deficiency        Past Surgical History:   Procedure Laterality Date    APPENDECTOMY      ARTERIOGRAM N/A 9/19/2019    Procedure: Arteriogram;  Surgeon: Tong Azar MD;  Location: [facility] CATH INVASIVE LOCATION;  Service: Cardiology    BACK SURGERY      DISC RUPTURE REPAIR, L5    CARDIAC CATHETERIZATION Right 10/29/2019    Procedure: Peripheral angiography;  Surgeon: Tong Azar MD;  Location: [facility] CATH INVASIVE LOCATION;  Service: Cardiovascular    ENDOSCOPY N/A 2/18/2022    Procedure: ESOPHAGOGASTRODUODENOSCOPY;  Surgeon: Christine Duran MD;  Location: [facility] OR;  Service: General;  Laterality: N/A;    VASCULAR SURGERY Bilateral        Family History: family history includes Cancer in his father; Diabetes in his father; Heart attack in an other family member; Hypertension in his father and mother.     Social History:  reports that he has been smoking cigarettes.  He has a 30 pack-year smoking history. He has never used smokeless tobacco. He reports current alcohol use. He reports that he does not use drugs.    Home Medications:  Ascorbic Acid, Magnesium, Potassium, Vitamin A, Vitamin D, Zinc Acetate, atenolol, brimonidine-timolol, cilostazol, folic acid, folic acid-pyridoxine-cyanocobalamin, gabapentin, hydroCHLOROthiazide, isosorbide mononitrate, methocarbamol, traZODone, and vitamin B-12    Allergies:  Allergies   Allergen Reactions    Penicillins Hives and Shortness Of Breath     As A child    Novocain [Procaine] Nausea And Vomiting       Objective   Objective     Vitals:   Temp:  [96.5 °F (35.8 °C)-97.1 °F (36.2 °C)] 96.5 °F (35.8 °C)  Heart Rate:  [] 110  Resp:  [20] 20  BP: ()/(53-98) 129/98    Physical Exam  Constitutional:       General: He is not in acute distress.     Appearance: He is well-developed. He is ill-appearing.   HENT:      Head: Normocephalic and atraumatic.   Eyes:      Conjunctiva/sclera: Conjunctivae normal.   Neck:      Trachea: No tracheal deviation.   Cardiovascular:      Rate and Rhythm: Normal rate and regular rhythm.      Pulses:           Dorsalis pedis pulses are 1+ on the right side and 1+ on the left side.      Heart sounds: No murmur heard.     No friction rub. No gallop.      Comments: Runs of irregular rate/rhythm noted - afib  Pulmonary:      Effort: No tachypnea or respiratory distress.      Breath sounds: Rhonchi present. No wheezing or rales.      Comments: Coarse B/L breath sounds. Frequent non-productive cough.  Abdominal:      General: Bowel sounds are normal. There is no distension.      Palpations: Abdomen is soft.      Tenderness: There is no abdominal tenderness. There is no guarding.   Musculoskeletal:      Right lower leg: No edema.      Left lower leg: No edema.   Skin:     General: Skin is warm and dry.      Findings: No erythema or rash.      Comments: Various chronic appearing excoriations, scars noted    Neurological:      Mental Status: He is alert and oriented to person, place, and time.      Cranial Nerves: No cranial nerve deficit.         Result Review   Result Review:  I have personally reviewed the results from the time of this admission to 5/14/2025 20:20 EDT and agree with these findings:  [x]  Laboratory list / accordion  []  Microbiology  [x]  Radiology  [x]  EKG/Telemetry   []  Cardiology/Vascular   []  Pathology  []  Old records  []  Other:  Most notable findings include:     EKG per my view with sinus tachycardia, occasional PVCs, QTc 485 ms    CARDIAC LABS:      Lab 05/14/25  1739 05/14/25  1615   HSTROP T 53* 61*   PROTIME  --  14.4   INR  --  1.11*       Results from last 7 days   Lab Units 05/14/25  1615   WBC 10*3/mm3 18.38*   HEMOGLOBIN g/dL 12.3*   HEMATOCRIT % 38.6   PLATELETS 10*3/mm3 482*     Results from last 7 days   Lab Units 05/14/25  1615   SODIUM mmol/L 135*   POTASSIUM mmol/L 2.7*   CHLORIDE mmol/L 97*   CO2 mmol/L 26.8   BUN mg/dL 23   CREATININE mg/dL 1.24   CALCIUM mg/dL 11.1*   BILIRUBIN mg/dL 0.4   ALK PHOS U/L 134*   ALT (SGPT) U/L 7   AST (SGOT) U/L 14   GLUCOSE mg/dL 178*         CXR (images reviewed; RUL infiltrate):  FINDINGS:   Mild cardiomegaly. Pulmonary vasculature appear within normal limits.  Right upper lobe infiltrate. No pleural effusion or pneumothorax. No  acute osseous abnormality evident.     IMPRESSION:  Findings concerning for right upper lobe pneumonia. Follow-up  recommended to ensure resolution.    Assessment & Plan  Assessment / Plan     #Sepsis (severe per CMS with lactate >2) due to RUL pneumonia treating for gram +/atypicals associated with NSTEMI, probably type II  #Acute hypokalemia  #Acute hypercalcemia  #Acute hypophosphatemia  #Severe hypoalbuminemia  #Hyperglycemia without known history of diabetes mellitus  #Thrombocytosis, likely reactive  #Brief run of atrial fibrillation with rapid ventricular response in ED; no known history  #Generalized  weakness, multifactorial and due to above  - Patient will be admitted to the telemetry unit for further evaluation management  - I have requested a respiratory culture, testing for strep pneumo and MRSA  - Patient's respiratory PCR panel is negative in the ED  - Patient's lactate level upon admission was 3.5 with a reflex of 1.6  - Check pro-calcitonin level  - Admission WBC 18.38  - Patient has been started on intravenous antibiotic therapy with azithromycin and Azactam for now; adjust based on culture data results  - Repeat CBC in a.m. and monitor temperature curve  - I suspect that the patient may have some underlying chronic obstructive pulmonary disease given his longstanding history of tobacco use; given patient's respiratory exam, I have started the patient on IV Solu-Medrol 40 mg every 12 hours with nebulized inhalants as needed  - Patient will have other symptomatic care in terms of cough suppressants or expectorants  - Patient is chest pain free  - Suspect type II NSTEMI in the setting of sepsis and electrolyte abnormalities  - Will obtain an echocardiogram to evaluate EF and for any wall motion abnormalities  - May consider ischemic evaluation once patient's respiratory status has improved  - Patient has been started on the electrolyte replacement protocol  - Will obtain a magnesium level and supplement if found to be deficient  - I am currently awaiting the patient's official medication reconciliation from pharmacy that it appears that he has previously been on HCTZ and we will plan to hold that now in the setting of hypokalemia and hypotension upon admission  - Will continue IV fluid hydration with LR @ 100 ml/hr for now  - Check A1c  - Monitor a.m. glucose level in setting of steroids; may need some low dose PRN sliding scale  - Monitor on telemetry for further episodes of atrial fibrillation  - Repeat EKG in a.m.   - Repeat  chemistry panel in a.m.     Chronic medical conditions:  - Peripheral  vascular disease s/p stenting  - Carotid stenosis  - Cerebrovascular accident due to occluded L carotid artery  - Tobacco use  - Occasional alcohol use  - Hypertension  - Neuropathy      VTE Prophylaxis:  Lovenox    CODE STATUS:    Code Status (Patient has no pulse and is not breathing): CPR (Attempt to Resuscitate)  Medical Interventions (Patient has pulse or is breathing): Full Support    Admission Status:  I believe this patient meets inpatient status.    Case d/w patient and wife at bedside    Anika Hood DO    Electronically signed by Anika Hood DO at 05/14/25 3537       No current facility-administered medications for this encounter.     Current Outpatient Medications   Medication Sig Dispense Refill    apixaban (ELIQUIS) 5 MG tablet tablet Take 1 tablet by mouth Every 12 (Twelve) Hours for 30 days. Indications: Atrial Fibrillation 60 tablet 0    ascorbic acid (VITAMIN C) 500 MG tablet Take 1 tablet by mouth Daily.      cefdinir (OMNICEF) 300 MG capsule Take 1 capsule by mouth Every 12 hours for 4 doses. Indications: pneumonia 4 capsule 0    gabapentin (NEURONTIN) 800 MG tablet Take 1.5 tablets by mouth 3 (Three) Times a Day.      isosorbide mononitrate (IMDUR) 30 MG 24 hr tablet Take 1 tablet by mouth Daily.      magnesium oxide (MAG-OX) 400 MG tablet Take 1 tablet by mouth Daily.      methocarbamol (ROBAXIN) 750 MG tablet Take 1 tablet by mouth 4 (Four) Times a Day.      metoprolol tartrate (LOPRESSOR) 25 MG tablet Take 1 tablet by mouth every 12 hours for 30 days. 60 tablet 0    nitroglycerin (NITROSTAT) 0.4 MG SL tablet Place 1 tablet under the tongue Every 5 (Five) Minutes As Needed for Chest Pain. Take no more than 3 doses in 15 minutes. 25 tablet 0    predniSONE (DELTASONE) 20 MG tablet Take 1 tablet by mouth Daily With Breakfast for 2 doses. 2 tablet 0    rosuvastatin (CRESTOR) 10 MG tablet Take 1 tablet by mouth Every Night for 30 days. 30 tablet 0    traZODone (DESYREL) 50 MG  tablet Take 1 tablet by mouth Every Night.      vitamin B-12 (CYANOCOBALAMIN) 500 MCG tablet Take 1 tablet by mouth Daily.      zinc sulfate (ZINCATE) 220 (50 Zn) MG capsule Take 1 capsule by mouth Daily.      Cholecalciferol 25 MCG (1000 UT) tablet Take 1 tablet by mouth Daily.          Physician Progress Notes (most recent note)        Ki Medina DO at 25 1627              [facility] HOSPITALIST PROGRESS NOTE     Patient Identification:  Name:  Gurwinder Harding  Age:  64 y.o.  Sex:  male  :  1960  MRN:  [id number]  Visit Number:  [id number]  ROOM: [address] [place] [address]     Primary Care Provider:  Pastora Person APRN    Length of stay in inpatient status:  3    Subjective     Chief Compliant:    Chief Complaint   Patient presents with    Weakness - Generalized    Dizziness       History of Presenting Illness: Patient seen evaluated follow-up for sepsis with right upper lobe pneumonia with new onset of atrial fibrillation.  Patient at time of evaluation resting comfortably in bed on room air.  Patient today complaining of cramping in his legs.  Otherwise reports significant improvement in breathing and having no shortness of breath or chest discomfort.    Objective     Current Hospital Meds:  apixaban, 5 mg, Oral, Q12H  ascorbic acid, 500 mg, Oral, Daily  aztreonam, 2,000 mg, Intravenous, Q8H  cholecalciferol, 1,000 Units, Oral, Daily  gabapentin, 800 mg, Oral, Q8H  ipratropium, 0.5 mg, Nebulization, 4x Daily - RT  isosorbide mononitrate, 30 mg, Oral, Daily  magnesium oxide, 400 mg, Oral, Daily  magnesium sulfate, 2 g, Intravenous, Once  methocarbamol, 750 mg, Oral, 4x Daily  methylPREDNISolone sodium succinate, 40 mg, Intravenous, Daily  metoprolol tartrate, 25 mg, Oral, Q12H  nicotine, 1 patch, Transdermal, Q24H  potassium chloride, 20 mEq, Oral, Once  rosuvastatin, 10 mg, Oral, Nightly  sodium chloride, 10 mL, Intravenous, Q12H  traZODone, 50 mg, Oral, Nightly  vitamin B-12, 500 mcg, Oral, Daily  zinc  sulfate, 220 mg, Oral, Daily      Pharmacy Consult,       ----------------------------------------------------------------------------------------------------------------------  Vital Signs:  Temp:  [97.5 °F (36.4 °C)-98 °F (36.7 °C)] 97.5 °F (36.4 °C)  Heart Rate:  [75-89] 87  Resp:  [13-20] 18  BP: ()/(56-82) 113/70  SpO2:  [95 %-98 %] 98 %  on   ;   Device (Oxygen Therapy): room air  Body mass index is 25.56 kg/m².      Intake/Output Summary (Last 24 hours) at 5/17/2025 1634  Last data filed at 5/17/2025 1300  Gross per 24 hour   Intake 1080 ml   Output 700 ml   Net 380 ml      ----------------------------------------------------------------------------------------------------------------------  Physical exam:  Constitutional: Chronically ill-appearing adult male, appears much older than stated age.  NAD at time of evaluation.      HENT:  Head:  Normocephalic and atraumatic.  Mouth:  Moist mucous membranes.    Eyes:  Conjunctivae and EOM are normal. No scleral icterus.    Cardiovascular: Irregularly irregular rate and rhythm and normal heart sounds with no murmur.  Pulmonary/Chest:  No respiratory distress, coarse breath sounds bilaterally with rhonchi in right upper lung field.  Abdominal:  Soft.  Bowel sounds are normal.  No distension and no tenderness.   Musculoskeletal:  No tenderness, and no deformity.  No red or swollen joints anywhere.  Functional ROM intact.   Neurological:  Alert and oriented to person, place, and time.  No cranial nerve deficit.  No tongue deviation.  No facial droop.  No slurred speech. Intact Sensation throughout  Skin:  Skin is warm and dry.  Scattered areas of excoriation, scarring and ecchymosis bilaterally in the upper extremities.   Peripheral vascular:  Pulses in all 4 extremities with no clubbing, no cyanosis, no edema.  Psychiatric: Appropriate mood and affect, pleasant.  "  ----------------------------------------------------------------------------------------------------------------------  WBC/HGB/HCT/PLT   11.54/10.8/35.3/366 (05/17 0416)  BUN/CREAT/GLUC/ALT/AST/ALBERTO/LIP    20/0.87/108/14/17/--/-- (05/17 0416)  LYTES - Na/K/Cl/CO2: 137/3.6/107/20.8* (05/17 0416)        No results found for: \"URINECX\"  Blood Culture   Date Value Ref Range Status   05/14/2025 No growth at 24 hours  Preliminary   05/14/2025 No growth at 24 hours  Preliminary       I have personally looked at the labs and they are summarized above.  ----------------------------------------------------------------------------------------------------------------------  Detailed radiology reports for the last 24 hours:  No radiology results for the last day    Assessment & Plan      Sepsis  Lactic acidemia  Right upper lobe bacterial pneumonia, suspect gram-negative    - Patient with evidence of right upper lobe pneumonia on imaging, presenting with leukocytosis and lactic acid elevation consistent with sepsis.    - Broad-spectrum antibiotic therapy with azithromycin and aztreonam given patient penicillin allergy.  Completed previous 3-day course of azithromycin.  Currently on day 4 of 5 of aztreonam.    - Respiratory PCR panel negative,, strep pneumo antigen negative, MRSA screening swab negative    - Patient for underlying COPD, however no wheezing at time of evaluation we will continue tapering steroids.    - Likely discharge home tomorrow as patient's leukocytosis is continue to trend down and is almost normalized.    NSTEMI, type II  Acute hypokalemia  Acute hypercalcemia  Acute hypophosphatemia  Severe hypoalbuminemia  Moderate hyponatremia  New onset atrial fibrillation    - Patient with elevated troponins of 61 and 53 likely secondary to demand from patient's sepsis and rather lower pneumonia as well as atrial fibrillation new in onset likely precipitated by patient's multiple electrolyte derangements.    - " Patient also with chronic alcohol use likely also contributing to both A-fib and hyponatremia and electrolyte derangements with poor nutritional intake given hypoalbuminemia.    - Given new onset atrial fibrillation, cardiology consulted and seeing and evaluating, appreciate their input.      - Transitioned off heparin drip and initiated on Eliquis for stroke thromboembolic prophylaxis.    - Initiated on low-dose metoprolol for rate control.  Patient now in sinus rhythm.    - Transthoracic echocardiogram obtained and shows EF of 66 to 70% with grade 1 impaired diastolic function, no clear evidence of aortic valve regurgitation or stenosis.  Mitral valve structurally normal.  No evidence of pericardial effusion..    Hx alcohol abuse  Acute hyponatremia    - Likely secondary to alcohol abuse and poor nutritional intake and hypoalbuminemia contributing to hyponatremia and will hold on further fluid administration as patient with worsening sodium after IV fluids.  With continued avoidance of additional IV fluids and supportive care and nutrition patient with improving hyponatremia.    - Patient encouraged to cease alcohol use.    Chronic:  Peripheral vascular disease s/p stent  Carotid stenosis  Stroke 2/2 occluded left carotid  Chronic tobacco use  Hypertension  Peripheral neuropathy    Copied text in portions of the note has been reviewed and is accurate as of 05/17/25    VTE Prophylaxis:     No Active Pharmacologic or Mechanical VTE Prophylaxis AND No VTE Prophylaxis Contraindications Documented   - Select Appropriate VTE Prophylaxis       Disposition Home with home health in the next 24 hours.    Ki Medina DO  [facility]  05/17/25  16:34 EDT      Electronically signed by Ki Medina DO at 05/17/25 0971       Physical Therapy Notes (most recent note)    No notes exist for this encounter.       Occupational Therapy Notes (most recent note)    No notes exist for this encounter.        Speech Language Pathology Notes (most recent note)    No notes exist for this encounter.       ADL Documentation (most recent)      Flowsheet Row Most Recent Value   Transferring 2 - assistive person   Toileting 3 - assistive equipment and person   Bathing 2 - assistive person   Dressing 2 - assistive person   Eating 0 - independent   Communication 0 - understands/communicates without difficulty   Swallowing 0 - swallows foods/liquids without difficulty   Equipment Currently Used at Home walker, standard, bp cuff               Discharge Summary        Ki Medina DO at 25 1644              [facility] HOSPITALISTS DISCHARGE SUMMARY    Patient Identification:  Name:  Gurwinder Harding  Age:  64 y.o.  Sex:  male  :  1960  MRN:  [id number]  Visit Number:  [id number]    Date of Admission: 2025  Date of Discharge:  2025     PCP: Pastora Person APRN    DISCHARGE DIAGNOSIS  Sepsis  Lactic acidemia  Right upper lobe bacterial pneumonia, suspect gram-negative  NSTEMI, type II  Acute hypokalemia  Acute hypercalcemia  Acute hypophosphatemia  Severe hypoalbuminemia  Moderate hyponatremia  New onset atrial fibrillation  Hx alcohol abuse  Acute hyponatremia  Peripheral vascular disease s/p stent  Carotid stenosis  Stroke 2/2 occluded left carotid  Chronic tobacco use  Hypertension  Peripheral neuropathy    CONSULTS       PROCEDURES PERFORMED      HOSPITAL COURSE  Patient is a 64 y.o. male presented to [facility] complaining of generalized weakness and cough.  Please see the admitting history and physical for further details.      Patient presented to the emergency department with above-noted complaint with cough persisting for the last 2 weeks and mostly nonproductive with no fever or chills but complaining primarily of profound generalized weakness and fatigue.  Patient currently a 1 pack a day cigarette smoker.  After initial evaluation emergency department patient found to meet  sepsis criteria with evidence of right upper lobe pneumonia on imaging.  Patient also with elevated troponins consistent with NSTEMI type II secondary to sepsis and pneumonia.  Patient also with multiple electrolyte derangements including hypokalemia, hypercalcemia, hypophosphatemia, hypoalbuminemia.  Patient subsequently admitted to the hospitalist service and initiated on empiric antibiotic therapy with aztreonam and azithromycin with suspected gram-negative pneumonia contributing to patient's sepsis.  Patient completing appropriate 3-day course of azithromycin and did remain on room air while in hospital with PCR respiratory panel negative, strep pneumo antigen negative and MRSA screening swab negative.  Patient with suspected underlying COPD however no wheezing and was quickly tapered off of steroids.  Patient with multiple electrolyte derangements with also development postadmission of new onset atrial fibrillation likely helped to be precipitated by patient's electrolyte derangements due to poor nutrition and chronic alcohol use.  Given new onset atrial fibrillation cardiology was consulted and did see and evaluate the patient throughout his hospitalization and patient transitioned off of heparin drip initiated on Eliquis for stroke thromboembolic prophylaxis.  Patient also initiated on low-dose metoprolol for rate control with good tolerance.  A transthoracic echocardiogram obtained showed an EF of 66 to 70% with grade 1 impaired diastolic function with no clear evidence of aortic valve regurgitation or stenosis.  Mitral valve was noted to be structurally normal with no evidence of pericardial effusion on echo.  Patient with some hyponatremia felt to be likely secondary to poor nutrition from chronic alcohol use and weight loss.  Patient with slight decline in sodium after initial IV fluids that admission and with further withholding of additional IV fluids with improving sodium.  Patient was encouraged to  cease alcohol use.  At time of discharge patient with above-noted interventions feeling much improved and progressing well and transition to oral Omnicef.  Patient reporting ambulating only around 14 feet at a time due to increased debility and dyspnea and patient offered home health for PT services and patient agreeable.  On day of discharge patient feeling significantly improved and desired to return home and given the above noted findings as well as transition in his care with antibiotics was felt to achieve maximal benefit of continued inpatient hospitalization and subsequently discharged home in stable medical condition with home health PT and OT services.  He will follow-up with PCP postdischarge.    VITAL SIGNS:  Temp:  [97.5 °F (36.4 °C)-98.3 °F (36.8 °C)] 98.1 °F (36.7 °C)  Heart Rate:  [] 86  Resp:  [14-22] 14  BP: (102-161)/(62-86) 102/73  SpO2:  [92 %-100 %] 97 %  on   ;   Device (Oxygen Therapy): room air    Body mass index is 24.98 kg/m².  Wt Readings from Last 3 Encounters:   05/18/25 79 kg (174 lb 1.6 oz)   11/20/24 94.6 kg (208 lb 9.6 oz)   09/18/24 94.9 kg (209 lb 3.2 oz)       PHYSICAL EXAM:  Constitutional: Chronically ill-appearing adult male, appears much older than stated age.  NAD at time of evaluation.      HENT:  Head:  Normocephalic and atraumatic.  Mouth:  Moist mucous membranes.    Eyes:  Conjunctivae and EOM are normal. No scleral icterus.    Cardiovascular: Irregularly irregular rate and rhythm and normal heart sounds with no murmur.  Pulmonary/Chest:  No respiratory distress, coarse breath sounds bilaterally with resolved rhonchi in right upper lung field no longer present.  Abdominal:  Soft.  Bowel sounds are normal.  No distension and no tenderness.   Musculoskeletal:  No tenderness, and no deformity.  No red or swollen joints anywhere.  Functional ROM intact.   Neurological:  Alert and oriented to person, place, and time.  No cranial nerve deficit.  No tongue deviation.  No  facial droop.  No slurred speech. Intact Sensation throughout  Skin:  Skin is warm and dry.  Scattered areas of excoriation, scarring and ecchymosis bilaterally in the upper extremities.   Peripheral vascular:  Pulses in all 4 extremities with no clubbing, no cyanosis, no edema.  Psychiatric: Appropriate mood and affect, pleasant.     DISCHARGE DISPOSITION   Stable    DISCHARGE MEDICATIONS:     Discharge Medications        New Medications        Instructions Start Date   apixaban 5 MG tablet tablet  Commonly known as: ELIQUIS   5 mg, Oral, Every 12 Hours Scheduled      cefdinir 300 MG capsule  Commonly known as: OMNICEF   300 mg, Oral, Every 12 Hours Scheduled      metoprolol tartrate 25 MG tablet  Commonly known as: LOPRESSOR   25 mg, Oral, Every 12 Hours Scheduled      nitroglycerin 0.4 MG SL tablet  Commonly known as: NITROSTAT   0.4 mg, Sublingual, Every 5 Minutes PRN, Take no more than 3 doses in 15 minutes.      predniSONE 20 MG tablet  Commonly known as: DELTASONE   20 mg, Oral, Daily With Breakfast   Start Date: May 19, 2025     rosuvastatin 10 MG tablet  Commonly known as: CRESTOR   10 mg, Oral, Nightly             Continue These Medications        Instructions Start Date   ascorbic acid 500 MG tablet  Commonly known as: VITAMIN C   500 mg, Daily      cholecalciferol 25 MCG (1000 UT) tablet  Commonly known as: VITAMIN D3   1,000 Units, Oral, Daily      gabapentin 800 MG tablet  Commonly known as: NEURONTIN   1,200 mg, 3 Times Daily      isosorbide mononitrate 30 MG 24 hr tablet  Commonly known as: IMDUR   30 mg, Daily      magnesium oxide 400 MG tablet  Commonly known as: MAG-OX   400 mg, Daily      methocarbamol 750 MG tablet  Commonly known as: ROBAXIN   750 mg, 4 Times Daily      traZODone 50 MG tablet  Commonly known as: DESYREL   50 mg, Nightly      vitamin B-12 500 MCG tablet  Commonly known as: CYANOCOBALAMIN   500 mcg, Daily      zinc sulfate 220 (50 Zn) MG capsule  Commonly known as: ZINCATE   220  mg, Daily             Stop These Medications      aspirin 81 MG EC tablet     hydroCHLOROthiazide 25 MG tablet                Additional Instructions for the Follow-ups that You Need to Schedule       Ambulatory Referral to Home Health   As directed      Face to Face Visit Date: 5/18/2025   Follow-up provider for Plan of Care?: I treated the patient in an acute care facility and will not continue treatment after discharge.   Follow-up provider: PASTORA PERSON [[id number]]   Reason/Clinical Findings: physical debility, pulmonary cachexia   Describe mobility limitations that make leaving home difficult: same as above   Nursing/Therapeutic Services Requested: Occupational Therapy Physical Therapy   Frequency: 1 Week 1        Discharge Follow-up with PCP   As directed       Currently Documented PCP:    Pastora Person APRN    PCP Phone Number:    [phone]     Follow Up Details: 1 week follow up               Follow-up Information       Pastora Person APRN .    Specialty: Family Medicine  Why: 1 week follow up  Contact information:  [address] [place] [address]  [phone]                              TEST  RESULTS PENDING AT DISCHARGE  Pending Labs       Order Current Status    Blood Culture - Blood, Arm, Left Preliminary result    Blood Culture - Blood, Arm, Right Preliminary result             CODE STATUS  Code Status and Medical Interventions: CPR (Attempt to Resuscitate); Full Support   Ordered at: 05/14/25 1953     Code Status (Patient has no pulse and is not breathing):    CPR (Attempt to Resuscitate)     Medical Interventions (Patient has pulse or is breathing):    Full Support       Ki Medina DO  [facility] Hospitalist  05/18/25  16:44 EDT    Please note that this discharge summary required more than 30 minutes to complete.    Electronically signed by Ki Medina DO at 05/19/25 0100       Discharge Order (From admission, onward)       Start     Ordered    05/18/25 1636  Discharge patient  Once         Expected Discharge Date: 05/18/25   Discharge Disposition: Home-Health Care [facility] [place]   Physician of Record for Attribution - Please select from Treatment Team: NILTON POMPA [[id number]]   Review needed by CMO to determine Physician of Record: No      Question Answer Comment   Physician of Record for Attribution - Please select from Treatment Team NILTON POMPA    Review needed by CMO to determine Physician of Record No        05/18/25 1090

## 2025-05-19 NOTE — TELEPHONE ENCOUNTER
Advised if it was not given as a home medication when he entered the ER then it would not make it to the AVS list.  Call KEVIN Jensen his PCP and ask what to do about it, transferred caller to the office.   Reason for Disposition  • [1] Caller has URGENT medicine question about med that PCP or specialist prescribed AND [2] triager unable to answer question    Additional Information  • Negative: [1] Intentional drug overdose AND [2] suicidal thoughts or ideas  • Negative: Drug overdose and triager unable to answer question  • Negative: Caller requesting a renewal or refill of a medicine patient is currently taking  • Negative: Caller requesting information unrelated to medicine  • Negative: Caller requesting information about COVID-19 Vaccine  • Negative: Caller requesting information about Emergency Contraception  • Negative: Caller requesting information about Combined Birth Control Pills  • Negative: Caller requesting information about Progestin Birth Control Pills  • Negative: Caller requesting information about Post-Op pain or medicines  • Negative: Caller requesting a prescription antibiotic (such as Penicillin) for Strep throat and has a positive culture result  • Negative: Caller requesting a prescription anti-viral med (such as Tamiflu) and has influenza (flu) symptoms  • Negative: Immunization reaction suspected  • Negative: Rash while taking a medicine or within 3 days of stopping it  • Negative: [1] Asthma and [2] having symptoms of asthma (cough, wheezing, etc.)  • Negative: [1] Symptom of illness (e.g., headache, abdominal pain, earache, vomiting) AND [2] more than mild  • Negative: Breastfeeding questions about mother's medicines and diet  • Negative: MORE THAN A DOUBLE DOSE of a prescription or over-the-counter (OTC) drug  • Negative: [1] DOUBLE DOSE (an extra dose or lesser amount) of prescription drug AND [2] any symptoms (e.g., dizziness, nausea, pain, sleepiness)  • Negative: [1] DOUBLE DOSE (an  "extra dose or lesser amount) of over-the-counter (OTC) drug AND [2] any symptoms (e.g., dizziness, nausea, pain, sleepiness)  • Negative: Took another person's prescription drug  • Negative: [1] DOUBLE DOSE (an extra dose or lesser amount) of prescription drug AND [2] NO symptoms  (Exception: A double dose of antibiotics.)  • Negative: Diabetes drug error or overdose (e.g., took wrong type of insulin or took extra dose)  • Negative: [1] Prescription not at pharmacy AND [2] was prescribed by PCP recently (Exception: Triager has access to EMR and prescription is recorded there. Go to Home Care and confirm for pharmacy.)  • Negative: [1] Pharmacy calling with prescription question AND [2] triager unable to answer question    Answer Assessment - Initial Assessment Questions  1. NAME of MEDICINE: \"What medicine(s) are you calling about?\"      cilostazol  2. QUESTION: \"What is your question?\" (e.g., double dose of medicine, side effect)      Whether to take it or now  3. PRESCRIBER: \"Who prescribed the medicine?\" Reason: if prescribed by specialist, call should be referred to that group.      Pastora Person  4. SYMPTOMS: \"Do you have any symptoms?\" If Yes, ask: \"What symptoms are you having?\"  \"How bad are the symptoms (e.g., mild, moderate, severe)      Takes it for pvd  5. PREGNANCY:  \"Is there any chance that you are pregnant?\" \"When was your last menstrual period?\"      na    Protocols used: Medication Question Call-ADULT-    "

## 2025-05-19 NOTE — CASE MANAGEMENT/SOCIAL WORK
Discharge Planning Assessment   [place]     Patient Name: Gurwinder Harding  MRN: [id number]  Today's Date: 5/19/2025    Admit Date: 5/14/2025    Plan: SS received a consult for Pt would like [facility] for PT at discharge. Home Health to be arrange     Discharge Plan       Row Name 05/19/25 0858       Plan    Final Discharge Disposition Code 06 - home with home health care    Final Note Pt was discharged home on 5/18/25. Lead RN arranged [facility]. SS contacted [facility] per Mi who states they needs additional information. SS faxed additional information to [phone]. No other needs identified at this time.      Addendum @ [facility]:SS received a call from [facility] per Mi who states pt lives in [facility]. SS faxed orderst to [facility] [phone]. SS contacted [facility] per [facility] to notify.         Continued Care and Services - Discharged on 5/18/2025 Admission date: 5/14/2025 - Discharge disposition: Home-Health Care [facility]      Home Medical Care       Service Provider Request Status Services Address Phone Fax Patient Preferred    [facility] Pending - No Request Sent -- [address] [place], [place] [address] [facility] [address] [phone] --        Expected Discharge Date and Time       Expected Discharge Date Expected Discharge Time    May 18, 2025      MEY Fernandes

## 2025-05-19 NOTE — OUTREACH NOTE
Prep Survey      Flowsheet Row Responses   [facility] patient discharged from? [facility]   Is LACE score < 7 ? No   Eligibility [facility]   Date of Admission 05/14/25   Date of Discharge 05/18/25   Discharge Disposition Home-Health Care Sv   Discharge diagnosis Sepsis due to pneumonia   Does the patient have one of the following disease processes/diagnoses(primary or secondary)? Sepsis   Does the patient have Home health ordered? Yes   What is the Home health agency?  HH ordered   Is there a DME ordered? No   Prep survey completed? Yes            Krissy BARTON - Registered Nurse

## 2025-05-19 NOTE — PAYOR COMM NOTE
"CONTACT:  SIVAN WHITE RN  UTILIZATION MANAGEMENT DEPT.   [facility]    [address] [facility], [address]   PHONE:  [phone]   FAX: [phone]   [facility] [phone]        [place] HOME/HOME HEALTH 5/18/25---AUTH PENDING    REF # [id number]           Janine Harding \"Ray\" (64 y.o. Male)       Date of Birth   1960    Social Security Number       Address   [address] [facility] [address]    Home Phone   [phone]    MRN   [id number]       Cheondoism   Anabaptism    Marital Status                               Admission Date   5/14/2025    Admission Type   Emergency    Admitting Provider   Anika Hood DO    Attending Provider       Department, Room/Bed   [address], 3322/1P       Discharge Date   5/18/2025    Discharge Disposition   Home-Health Care Svc    Discharge Destination                                 Attending Provider: (none)   Allergies: Penicillins, Novocain [Procaine]    Isolation: None   Infection: MRSA (06/22/22)   Code Status: Prior    Ht: 177.8 cm (70\")   Wt: 79 kg (174 lb 1.6 oz)    Admission Cmt: None   Principal Problem: Sepsis due to pneumonia [J18.9,A41.9]                   Active Insurance as of 5/14/2025       Primary Coverage       Payor Plan Insurance Group Employer/Plan Group    ANTH MEDICARE REPLACEMENT [facility] MEDICARE ADVANTAGE HMO [id number]       Payor Plan Address Payor Plan Phone Number Payor Plan Fax Number Effective Dates    [address] [phone]  1/1/2025 - None Entered    [facility] [address]         Subscriber Name Subscriber Birth Date Member ID       JANINE HARDING 1960 [id number]                     Emergency Contacts        (Rel.) Home Phone Work Phone Mobile Phone    MeaghanPatsy (Spouse) [phone] -- --                 Discharge Summary        Ki Medina DO at 05/18/25 [facility]              [facility] HOSPITALISTS DISCHARGE SUMMARY    Patient Identification:  Name:  " Gurwinder Harding  Age:  64 y.o.  Sex:  male  :  1960  MRN:  [id number]  Visit Number:  [id number]    Date of Admission: 2025  Date of Discharge:  2025     PCP: Pastora Person APRN    DISCHARGE DIAGNOSIS  Sepsis  Lactic acidemia  Right upper lobe bacterial pneumonia, suspect gram-negative  NSTEMI, type II  Acute hypokalemia  Acute hypercalcemia  Acute hypophosphatemia  Severe hypoalbuminemia  Moderate hyponatremia  New onset atrial fibrillation  Hx alcohol abuse  Acute hyponatremia  Peripheral vascular disease s/p stent  Carotid stenosis  Stroke 2/2 occluded left carotid  Chronic tobacco use  Hypertension  Peripheral neuropathy    CONSULTS       PROCEDURES PERFORMED      HOSPITAL COURSE  Patient is a 64 y.o. male presented to [facility] complaining of generalized weakness and cough.  Please see the admitting history and physical for further details.      Patient presented to the emergency department with above-noted complaint with cough persisting for the last 2 weeks and mostly nonproductive with no fever or chills but complaining primarily of profound generalized weakness and fatigue.  Patient currently a 1 pack a day cigarette smoker.  After initial evaluation emergency department patient found to meet sepsis criteria with evidence of right upper lobe pneumonia on imaging.  Patient also with elevated troponins consistent with NSTEMI type II secondary to sepsis and pneumonia.  Patient also with multiple electrolyte derangements including hypokalemia, hypercalcemia, hypophosphatemia, hypoalbuminemia.  Patient subsequently admitted to the hospitalist service and initiated on empiric antibiotic therapy with aztreonam and azithromycin with suspected gram-negative pneumonia contributing to patient's sepsis.  Patient completing appropriate 3-day course of azithromycin and did remain on room air while in hospital with PCR respiratory panel negative, strep pneumo antigen negative and MRSA screening  swab negative.  Patient with suspected underlying COPD however no wheezing and was quickly tapered off of steroids.  Patient with multiple electrolyte derangements with also development postadmission of new onset atrial fibrillation likely helped to be precipitated by patient's electrolyte derangements due to poor nutrition and chronic alcohol use.  Given new onset atrial fibrillation cardiology was consulted and did see and evaluate the patient throughout his hospitalization and patient transitioned off of heparin drip initiated on Eliquis for stroke thromboembolic prophylaxis.  Patient also initiated on low-dose metoprolol for rate control with good tolerance.  A transthoracic echocardiogram obtained showed an EF of 66 to 70% with grade 1 impaired diastolic function with no clear evidence of aortic valve regurgitation or stenosis.  Mitral valve was noted to be structurally normal with no evidence of pericardial effusion on echo.  Patient with some hyponatremia felt to be likely secondary to poor nutrition from chronic alcohol use and weight loss.  Patient with slight decline in sodium after initial IV fluids that admission and with further withholding of additional IV fluids with improving sodium.  Patient was encouraged to cease alcohol use.  At time of discharge patient with above-noted interventions feeling much improved and progressing well and transition to oral Omnicef.  Patient reporting ambulating only around 14 feet at a time due to increased debility and dyspnea and patient offered home health for PT services and patient agreeable.  On day of discharge patient feeling significantly improved and desired to return home and given the above noted findings as well as transition in his care with antibiotics was felt to achieve maximal benefit of continued inpatient hospitalization and subsequently discharged home in stable medical condition with home health PT and OT services.  He will follow-up with PCP  postdischarge.    VITAL SIGNS:  Temp:  [97.5 °F (36.4 °C)-98.3 °F (36.8 °C)] 98.1 °F (36.7 °C)  Heart Rate:  [] 86  Resp:  [14-22] 14  BP: (102-161)/(62-86) 102/73  SpO2:  [92 %-100 %] 97 %  on   ;   Device (Oxygen Therapy): room air    Body mass index is 24.98 kg/m².  Wt Readings from Last 3 Encounters:   05/18/25 79 kg (174 lb 1.6 oz)   11/20/24 94.6 kg (208 lb 9.6 oz)   09/18/24 94.9 kg (209 lb 3.2 oz)       PHYSICAL EXAM:  Constitutional: Chronically ill-appearing adult male, appears much older than stated age.  NAD at time of evaluation.      HENT:  Head:  Normocephalic and atraumatic.  Mouth:  Moist mucous membranes.    Eyes:  Conjunctivae and EOM are normal. No scleral icterus.    Cardiovascular: Irregularly irregular rate and rhythm and normal heart sounds with no murmur.  Pulmonary/Chest:  No respiratory distress, coarse breath sounds bilaterally with resolved rhonchi in right upper lung field no longer present.  Abdominal:  Soft.  Bowel sounds are normal.  No distension and no tenderness.   Musculoskeletal:  No tenderness, and no deformity.  No red or swollen joints anywhere.  Functional ROM intact.   Neurological:  Alert and oriented to person, place, and time.  No cranial nerve deficit.  No tongue deviation.  No facial droop.  No slurred speech. Intact Sensation throughout  Skin:  Skin is warm and dry.  Scattered areas of excoriation, scarring and ecchymosis bilaterally in the upper extremities.   Peripheral vascular:  Pulses in all 4 extremities with no clubbing, no cyanosis, no edema.  Psychiatric: Appropriate mood and affect, pleasant.     DISCHARGE DISPOSITION   Stable    DISCHARGE MEDICATIONS:     Discharge Medications        New Medications        Instructions Start Date   apixaban 5 MG tablet tablet  Commonly known as: ELIQUIS   5 mg, Oral, Every 12 Hours Scheduled      cefdinir 300 MG capsule  Commonly known as: OMNICEF   300 mg, Oral, Every 12 Hours Scheduled      metoprolol tartrate 25 MG  tablet  Commonly known as: LOPRESSOR   25 mg, Oral, Every 12 Hours Scheduled      nitroglycerin 0.4 MG SL tablet  Commonly known as: NITROSTAT   0.4 mg, Sublingual, Every 5 Minutes PRN, Take no more than 3 doses in 15 minutes.      predniSONE 20 MG tablet  Commonly known as: DELTASONE   20 mg, Oral, Daily With Breakfast   Start Date: May 19, 2025     rosuvastatin 10 MG tablet  Commonly known as: CRESTOR   10 mg, Oral, Nightly             Continue These Medications        Instructions Start Date   ascorbic acid 500 MG tablet  Commonly known as: VITAMIN C   500 mg, Daily      cholecalciferol 25 MCG (1000 UT) tablet  Commonly known as: VITAMIN D3   1,000 Units, Oral, Daily      gabapentin 800 MG tablet  Commonly known as: NEURONTIN   1,200 mg, 3 Times Daily      isosorbide mononitrate 30 MG 24 hr tablet  Commonly known as: IMDUR   30 mg, Daily      magnesium oxide 400 MG tablet  Commonly known as: MAG-OX   400 mg, Daily      methocarbamol 750 MG tablet  Commonly known as: ROBAXIN   750 mg, 4 Times Daily      traZODone 50 MG tablet  Commonly known as: DESYREL   50 mg, Nightly      vitamin B-12 500 MCG tablet  Commonly known as: CYANOCOBALAMIN   500 mcg, Daily      zinc sulfate 220 (50 Zn) MG capsule  Commonly known as: ZINCATE   220 mg, Daily             Stop These Medications      aspirin 81 MG EC tablet     hydroCHLOROthiazide 25 MG tablet                Additional Instructions for the Follow-ups that You Need to Schedule       Ambulatory Referral to Home Health   As directed      Face to Face Visit Date: 5/18/2025   Follow-up provider for Plan of Care?: I treated the patient in an acute care facility and will not continue treatment after discharge.   Follow-up provider: ALBERTO THACKER [[id number]]   Reason/Clinical Findings: physical debility, pulmonary cachexia   Describe mobility limitations that make leaving home difficult: same as above   Nursing/Therapeutic Services Requested: Occupational Therapy Physical Therapy    Frequency: 1 Week 1        Discharge Follow-up with PCP   As directed       Currently Documented PCP:    Pastora Person APRN    PCP Phone Number:    [phone]     Follow Up Details: 1 week follow up               Follow-up Information       Pastora Person APRN .    Specialty: Family Medicine  Why: 1 week follow up  Contact information:  [address] [place] [address]  [phone]                              TEST  RESULTS PENDING AT DISCHARGE  Pending Labs       Order Current Status    Blood Culture - Blood, Arm, Left Preliminary result    Blood Culture - Blood, Arm, Right Preliminary result             CODE STATUS  Code Status and Medical Interventions: CPR (Attempt to Resuscitate); Full Support   Ordered at: 05/14/25 1953     Code Status (Patient has no pulse and is not breathing):    CPR (Attempt to Resuscitate)     Medical Interventions (Patient has pulse or is breathing):    Full Support       Nilton Medina DO  [facility] Hospitalist  05/18/25  16:44 EDT    Please note that this discharge summary required more than 30 minutes to complete.    Electronically signed by Nilton Medina DO at 05/19/25 0100       Discharge Order (From admission, onward)       Start     Ordered    05/18/25 1636  Discharge patient  Once        Expected Discharge Date: 05/18/25   [place] [facility]   Physician of Record for Attribution - Please select from Treatment Team: NILTON MEDINA [[id number]]   Review needed by CMO to determine Physician of Record: No      Question Answer Comment   Physician of Record for Attribution - Please select from Treatment Team NILTON MEDINA    Review needed by CMO to determine Physician of Record No        05/18/25 1498                   coarse

## 2025-05-27 RX ORDER — ALLOPURINOL 100 MG/1
100 TABLET ORAL 2 TIMES DAILY
Qty: 180 TABLET | Refills: 1 | OUTPATIENT
Start: 2025-05-27

## 2025-05-30 ENCOUNTER — READMISSION MANAGEMENT (OUTPATIENT)
Dept: CALL CENTER | Facility: HOSPITAL | Age: 65
End: 2025-05-30
Payer: MEDICARE

## 2025-05-30 NOTE — OUTREACH NOTE
Sepsis Week 2 Survey      Flowsheet Row Responses   [facility] patient discharged from? [facility]   Does the patient have one of the following disease processes/diagnoses(primary or secondary)? Sepsis   Week 2 attempt successful? Yes   Call start time 1507   Unsuccessful attempts Attempt 1   Call end time 1516   Discharge diagnosis Sepsis due to pneumonia   Medication alerts for this patient ELIQUIS   Meds reviewed with patient/caregiver? Yes   Is the patient having any side effects they believe may be caused by any medication additions or changes? No   Does the patient have all medications related to this admission filled (includes all antibiotics, inhalers, nebulizers,steroids,etc.) Yes   Is the patient taking all medications as directed (includes completed medication regime)? Yes   Does the patient have an appointment with their PCP within 7 days of discharge? Greater than 7 days  [6/3/25]   Nursing Interventions Verified appointment date/time/provider   Has the patient kept scheduled appointments due by today? N/A   What is the Home health agency?  HH ordered   Has home health visited the patient within 72 hours of discharge? Yes   Home health comments Nursing, PT, and OT working with patient   Psychosocial issues? No   Comments wife reports some remodeling taking place in bathroom for pt   Did the patient receive a copy of their discharge instructions? Yes   Nursing interventions Reviewed instructions with patient   What is the patient's perception of their health status since discharge? Same  [Pt having some increased swelling to feet, encocuraged elevation if possible and daily wts and to monitor for SOA.  Pt tired and fatigued,  discussed new meds and encouraged to check BP readings,  reviewed s/s hypotension.]   Nursing interventions Nurse provided patient education   Is the patient/caregiver able to teach back TIME? T emperature - higher or lower than normal, I nfection - may have signs and symptoms of  an infection, M ental Decline - confused, sleepy, difficult to arouse, E xtremely Ill - severe pain, discomfort, shortness of breath   Nursing interventions Nurse provided patient education   Is patient/caregiver able to teach back steps to recovery at home? Rest and regain strength, Eat a balanced diet, Set small, achievable goals for return to baseline health  [pt adding more]   Is the patient/caregiver able to teach back signs and symptoms of worsening condition: Fever, Shortness of breath/rapid respiratory rate, Edema   Is the patient/caregiver able to teach back the hierarchy of who to call/visit for symptoms/problems? PCP, Specialist, Home health nurse, Urgent Care, ED, 911 Yes   Week 2 call completed? Yes   Graduated Yes  [pt with upcoming PCP appt, HH following to assist with needs]   Call end time 1517            STACEY MONTGOMERY - Registered Nurse

## 2025-06-18 ENCOUNTER — OFFICE VISIT (OUTPATIENT)
Dept: FAMILY MEDICINE CLINIC | Facility: CLINIC | Age: 65
End: 2025-06-18
Payer: MEDICARE

## 2025-06-18 VITALS
OXYGEN SATURATION: 99 % | BODY MASS INDEX: 25.54 KG/M2 | HEART RATE: 87 BPM | DIASTOLIC BLOOD PRESSURE: 70 MMHG | TEMPERATURE: 96.9 F | SYSTOLIC BLOOD PRESSURE: 102 MMHG | HEIGHT: 70 IN | WEIGHT: 178.4 LBS

## 2025-06-18 DIAGNOSIS — I73.9 PAD (PERIPHERAL ARTERY DISEASE): ICD-10-CM

## 2025-06-18 DIAGNOSIS — I48.0 PAF (PAROXYSMAL ATRIAL FIBRILLATION): ICD-10-CM

## 2025-06-18 DIAGNOSIS — A41.9 SEPSIS DUE TO PNEUMONIA: ICD-10-CM

## 2025-06-18 DIAGNOSIS — E78.49 OTHER HYPERLIPIDEMIA: ICD-10-CM

## 2025-06-18 DIAGNOSIS — I10 ESSENTIAL HYPERTENSION: Primary | ICD-10-CM

## 2025-06-18 DIAGNOSIS — J18.9 SEPSIS DUE TO PNEUMONIA: ICD-10-CM

## 2025-06-18 DIAGNOSIS — R60.0 LOCALIZED EDEMA: ICD-10-CM

## 2025-06-18 RX ORDER — METOPROLOL SUCCINATE 25 MG/1
25 TABLET, EXTENDED RELEASE ORAL DAILY
Qty: 90 TABLET | Refills: 1 | Status: SHIPPED | OUTPATIENT
Start: 2025-06-18

## 2025-06-18 RX ORDER — ISOSORBIDE MONONITRATE 30 MG/1
30 TABLET, EXTENDED RELEASE ORAL DAILY
Qty: 90 TABLET | Refills: 1 | Status: SHIPPED | OUTPATIENT
Start: 2025-06-18

## 2025-06-18 RX ORDER — ROSUVASTATIN CALCIUM 10 MG/1
10 TABLET, COATED ORAL NIGHTLY
Qty: 90 TABLET | Refills: 1 | Status: SHIPPED | OUTPATIENT
Start: 2025-06-18 | End: 2025-07-18

## 2025-06-18 NOTE — PROGRESS NOTES
Chief Complaint   Hypertension and SET PAD     Subjective          Gurwinder Harding presents to [facility]   History of Present Illness   History of Present Illness  The patient is a 65-year-old male who presents for follow-up from a recent hospitalization.    In 05/2025, he was admitted from the emergency room due to sepsis and right upper lobe pneumonia, which was preceded by a persistent dry cough lasting approximately 1.5 weeks. He did not have any fever. During his hospital stay, he was administered antibiotics with subsequent improvement. He reports an improvement in his respiratory function compared to his pre-hospitalization state. He has been receiving physical therapy and occupational therapy at home, which he reports as beneficial. He continues to smoke about a pack a day. He has abstained from alcohol for the past month, with his last consumption being 1 beer during dinner. He reports no improvement in his feet and legs since stopping alcohol.    He also experienced a new onset of atrial fibrillation during his hospitalization, for which he was prescribed Eliquis and metoprolol. He has exhausted his supply of these medications and is seeking refills. He was also started on Crestor during his hospital stay. He has been taking metoprolol once daily instead of the prescribed twice daily dosage. He was given nitroglycerin tablets in case of emergency. He was also started on Imdur in the hospital. He has a history of atenolol use, which was discontinued due to low blood pressure. He reports no history of heart problems.    He has a history of peripheral vascular disease and carotid disease. He has an upcoming appointment with a vascular surgeon on 06/25/2025, which he has rescheduled 2 or 3 times. He reports severe swelling in his left foot extending up to his knee, accompanied by pain. He was advised to discontinue his diuretic medication due to low blood pressure. He reports  "no significant change in his foot swelling while on the diuretic. He has been using a walker for mobility and has installed a handicap toilet and grab bar in his bathroom to aid in standing up.    He continues to take gabapentin for neuropathy and is requesting a refill.    SOCIAL HISTORY  He smokes about a pack a day. He has not consumed alcohol in a month.       Review of Systems       Objective    Vital Signs:   /70   Pulse 87   Temp 96.9 °F (36.1 °C) (Temporal)   Ht 177.8 cm (70\")   Wt 80.9 kg (178 lb 6.4 oz)   SpO2 99%   BMI 25.60 kg/m²     Physical Exam  Extremities: Swelling in the lower extremities         Physical Exam  Vitals and nursing note reviewed.   Constitutional:       General: He is not in acute distress.     Appearance: He is well-developed. He is not ill-appearing.   HENT:      Head: Normocephalic.   Cardiovascular:      Rate and Rhythm: Normal rate and regular rhythm.      Heart sounds: Normal heart sounds. No murmur heard.  Pulmonary:      Effort: Pulmonary effort is normal.      Breath sounds: Normal breath sounds. No wheezing.   Musculoskeletal:      Right lower leg: Edema present.      Left lower leg: Edema present.   Skin:     General: Skin is warm and dry.   Neurological:      General: No focal deficit present.      Mental Status: He is alert and oriented to person, place, and time.      Cranial Nerves: No cranial nerve deficit.      Sensory: Sensory deficit present.      Motor: Weakness present.      Coordination: Coordination normal.      Gait: Gait abnormal.      Deep Tendon Reflexes: Reflexes normal.   Psychiatric:         Mood and Affect: Mood normal.         Behavior: Behavior normal.         Thought Content: Thought content normal.         Judgment: Judgment normal.        Result Review :    Results  Labs   - Electrolytes: Abnormal    Imaging   - X-ray of the right upper lobe: 2025, Pneumonia    Diagnostic Testing   - [id number], New onset atrial fibrillation        "      Assessment and Plan    Diagnoses and all orders for this visit:    1. Essential hypertension (Primary)  -     metoprolol succinate XL (Toprol XL) 25 MG 24 hr tablet; Take 1 tablet by mouth Daily.  Dispense: 90 tablet; Refill: 1  -     isosorbide mononitrate (IMDUR) 30 MG 24 hr tablet; Take 1 tablet by mouth Daily.  Dispense: 90 tablet; Refill: 1    2. PAF (paroxysmal atrial fibrillation)  -     metoprolol succinate XL (Toprol XL) 25 MG 24 hr tablet; Take 1 tablet by mouth Daily.  Dispense: 90 tablet; Refill: 1  -     apixaban (ELIQUIS) 5 MG tablet tablet; Take 1 tablet by mouth Every 12 (Twelve) Hours for 180 days. Indications: Atrial Fibrillation  Dispense: 180 tablet; Refill: 1  -     Ambulatory Referral to Cardiology for Atrial Fibrillation    3. Localized edema    4. PAD (peripheral artery disease)    5. Other hyperlipidemia  -     rosuvastatin (CRESTOR) 10 MG tablet; Take 1 tablet by mouth Every Night for 30 days.  Dispense: 90 tablet; Refill: 1    6. Sepsis due to pneumonia       Assessment & Plan  1. Sepsis.  - Admitted to the hospital in May due to sepsis and right upper lobe pneumonia.  - Electrolytes were significantly imbalanced during this episode.  - Treated with IV antibiotics and fluids.  - Continue monitoring for any signs of infection and seek immediate medical attention if symptoms reoccur.    2. Pneumonia.  - Diagnosed with right upper lobe pneumonia during hospital stay.  - Condition has improved, but respiratory symptoms should be monitored.  - Seek medical attention if symptoms such as persistent cough or shortness of breath return.    3. Atrial fibrillation.  - New onset of atrial fibrillation diagnosed during hospital stay.  - Started on Eliquis and metoprolol.  - Metoprolol will be changed to Toprol-XL 25 mg once daily.  - Referral to cardiology will be made for further management of atrial fibrillation.    4. Peripheral vascular disease.  - History of peripheral vascular disease and  carotid disease.  - Reports swelling in legs and feet, which has not significantly improved.  - Keep upcoming appointment with vascular surgeon on 06/25/2025.    5. Medication management.  - Currently taking gabapentin and may need a refill soon.  - Continue taking current medications, including Crestor, Eliquis, and metoprolol.  - Metoprolol will be changed to Toprol-XL 25 mg once daily.            Follow Up    Return in about 3 months (around 9/18/2025), or if symptoms worsen or fail to improve, for Recheck.   Patient was given instructions and counseling regarding his condition or for health maintenance advice. Please see specific information pulled into the AVS if appropriate.           This document has been electronically signed by KEVIN Jensen  June 18, 2025 17:15 EDT    Patient or patient representative verbalized consent for the use of Ambient Listening during the visit with  KEVIN Jensen for chart documentation. 6/18/2025  17:14 EDT

## 2025-07-05 DIAGNOSIS — M46.96 LUMBAR SPONDYLITIS: ICD-10-CM

## 2025-07-05 DIAGNOSIS — I73.9 PAD (PERIPHERAL ARTERY DISEASE): Primary | ICD-10-CM

## 2025-07-05 DIAGNOSIS — M47.9 ADVANCED OSTEOARTHRITIS OF SPINE: ICD-10-CM

## 2025-07-07 RX ORDER — GABAPENTIN 800 MG/1
TABLET ORAL
Qty: 135 TABLET | Refills: 2 | Status: SHIPPED | OUTPATIENT
Start: 2025-07-07

## 2025-08-13 DIAGNOSIS — I10 ESSENTIAL HYPERTENSION: ICD-10-CM

## 2025-08-13 RX ORDER — ISOSORBIDE MONONITRATE 30 MG/1
30 TABLET, EXTENDED RELEASE ORAL DAILY
Qty: 90 TABLET | Refills: 1 | OUTPATIENT
Start: 2025-08-13

## (undated) DEVICE — MYNXGRIP 6F/7F: Brand: MYNXGRIP

## (undated) DEVICE — ASMBL SPK CONTRST CONTRL

## (undated) DEVICE — ST EXT IV SMARTSITE 2VLV SP M LL 5ML IV1

## (undated) DEVICE — PK CATH CARD 70

## (undated) DEVICE — GW INQWIRE FC PTFE J/3MM .035 180

## (undated) DEVICE — DEV INFL MONARCH 25W

## (undated) DEVICE — Device

## (undated) DEVICE — DRSNG SURESITE WNDW 4X4.5

## (undated) DEVICE — RADIFOCUS GLIDEWIRE: Brand: GLIDEWIRE

## (undated) DEVICE — SGW .014 STABILIZER 300CM: Brand: STABILIZER

## (undated) DEVICE — CATH ATHRCTMY HAWK1 6F

## (undated) DEVICE — NAVICROSS SUPPORT CATHETER: Brand: NAVICROSS

## (undated) DEVICE — LN INJ CONTRST FLXCIL HP F/M LL 1200PSI10

## (undated) DEVICE — THE BITE BLOCK MAXI, LATEX FREE STRAP IS USED TO PROTECT THE ENDOSCOPE INSERTION TUBE FROM BEING BITTEN BY THE PATIENT.

## (undated) DEVICE — ADULT DISPOSABLE SINGLE-PATIENT USE PULSE OXIMETER SENSOR: Brand: NONIN

## (undated) DEVICE — KT MINI ACC 5F .18X40CM SS 21G 7CM

## (undated) DEVICE — CATH SFT VU RIM BR 5F65CM 0.35

## (undated) DEVICE — SHEATH INTRO SUPERSHEATH JWIRE .035 5F 11CM

## (undated) DEVICE — SYR LUERLOK 30CC

## (undated) DEVICE — GUIDEWIRE BOWL W/LID: Brand: MEDLINE INDUSTRIES, INC.

## (undated) DEVICE — CANNULA,OXY,ADULT,SUPER SOFT,W/14'TUB,UC: Brand: MEDLINE INDUSTRIES, INC.

## (undated) DEVICE — SHEATH INTRO SUPERSHEATH JWIRE .035 6F 11CM

## (undated) DEVICE — FRCP BX RADJAW4 NDL 2.8 240CM LG OG BX40

## (undated) DEVICE — ST INF PRI SMRTSTE 20DRP 2VLV 24ML 117

## (undated) DEVICE — DESTINATION PERIPHERAL GUIDING SHEATH: Brand: DESTINATION

## (undated) DEVICE — ENDOGATOR TUBING FOR ENDOGATOR EGP-100 IRRIGATION PUMP,OLYMPUS OFP PUMP, OLYMPUS AFU-100 PUMP AND ERBE EIP2 PUMP: Brand: ENDOGATOR

## (undated) DEVICE — Device: Brand: MEDEX